# Patient Record
Sex: FEMALE | Race: OTHER | NOT HISPANIC OR LATINO | Employment: OTHER | URBAN - METROPOLITAN AREA
[De-identification: names, ages, dates, MRNs, and addresses within clinical notes are randomized per-mention and may not be internally consistent; named-entity substitution may affect disease eponyms.]

---

## 2017-02-20 ENCOUNTER — HOSPITAL ENCOUNTER (EMERGENCY)
Facility: HOSPITAL | Age: 67
Discharge: HOME | End: 2017-02-21
Attending: EMERGENCY MEDICINE
Payer: MEDICARE

## 2017-02-20 DIAGNOSIS — R10.13 EPIGASTRIC ABDOMINAL PAIN: Primary | ICD-10-CM

## 2017-02-20 DIAGNOSIS — I10 POORLY-CONTROLLED HYPERTENSION: ICD-10-CM

## 2017-02-20 DIAGNOSIS — Z91.148 H/O MEDICATION NONCOMPLIANCE: ICD-10-CM

## 2017-02-20 LAB
ALBUMIN SERPL BCP-MCNC: 3.8 G/DL
ALP SERPL-CCNC: 127 U/L
ALT SERPL W/O P-5'-P-CCNC: 45 U/L
ANION GAP SERPL CALC-SCNC: 9 MMOL/L
AST SERPL-CCNC: 37 U/L
BASOPHILS # BLD AUTO: 0.03 K/UL
BASOPHILS NFR BLD: 0.4 %
BILIRUB SERPL-MCNC: 0.5 MG/DL
BNP SERPL-MCNC: 36 PG/ML
BUN SERPL-MCNC: 10 MG/DL
CALCIUM SERPL-MCNC: 9.3 MG/DL
CHLORIDE SERPL-SCNC: 100 MMOL/L
CO2 SERPL-SCNC: 28 MMOL/L
CREAT SERPL-MCNC: 1.2 MG/DL
DIFFERENTIAL METHOD: NORMAL
EOSINOPHIL # BLD AUTO: 0.3 K/UL
EOSINOPHIL NFR BLD: 3.1 %
ERYTHROCYTE [DISTWIDTH] IN BLOOD BY AUTOMATED COUNT: 14 %
EST. GFR  (AFRICAN AMERICAN): 55 ML/MIN/1.73 M^2
EST. GFR  (NON AFRICAN AMERICAN): 48 ML/MIN/1.73 M^2
GLUCOSE SERPL-MCNC: 222 MG/DL
HCT VFR BLD AUTO: 43 %
HGB BLD-MCNC: 14.2 G/DL
INR PPP: 1
LYMPHOCYTES # BLD AUTO: 2.6 K/UL
LYMPHOCYTES NFR BLD: 30.9 %
MCH RBC QN AUTO: 28.1 PG
MCHC RBC AUTO-ENTMCNC: 33 %
MCV RBC AUTO: 85 FL
MONOCYTES # BLD AUTO: 0.5 K/UL
MONOCYTES NFR BLD: 5.8 %
NEUTROPHILS # BLD AUTO: 5.1 K/UL
NEUTROPHILS NFR BLD: 59.8 %
PLATELET # BLD AUTO: 281 K/UL
PMV BLD AUTO: 11 FL
POCT GLUCOSE: 216 MG/DL (ref 70–110)
POTASSIUM SERPL-SCNC: 4.5 MMOL/L
PROT SERPL-MCNC: 8.2 G/DL
PROTHROMBIN TIME: 10.7 SEC
RBC # BLD AUTO: 5.05 M/UL
SODIUM SERPL-SCNC: 137 MMOL/L
TROPONIN I SERPL DL<=0.01 NG/ML-MCNC: 0.01 NG/ML
WBC # BLD AUTO: 8.52 K/UL

## 2017-02-20 PROCEDURE — 82962 GLUCOSE BLOOD TEST: CPT

## 2017-02-20 PROCEDURE — 96374 THER/PROPH/DIAG INJ IV PUSH: CPT

## 2017-02-20 PROCEDURE — 84484 ASSAY OF TROPONIN QUANT: CPT

## 2017-02-20 PROCEDURE — 83880 ASSAY OF NATRIURETIC PEPTIDE: CPT

## 2017-02-20 PROCEDURE — 63600175 PHARM REV CODE 636 W HCPCS: Performed by: EMERGENCY MEDICINE

## 2017-02-20 PROCEDURE — 80053 COMPREHEN METABOLIC PANEL: CPT

## 2017-02-20 PROCEDURE — 96375 TX/PRO/DX INJ NEW DRUG ADDON: CPT

## 2017-02-20 PROCEDURE — 85610 PROTHROMBIN TIME: CPT

## 2017-02-20 PROCEDURE — 93010 ELECTROCARDIOGRAM REPORT: CPT | Mod: ,,, | Performed by: INTERNAL MEDICINE

## 2017-02-20 PROCEDURE — 25000003 PHARM REV CODE 250: Performed by: EMERGENCY MEDICINE

## 2017-02-20 PROCEDURE — 99284 EMERGENCY DEPT VISIT MOD MDM: CPT | Mod: 25

## 2017-02-20 PROCEDURE — 93005 ELECTROCARDIOGRAM TRACING: CPT

## 2017-02-20 PROCEDURE — 85025 COMPLETE CBC W/AUTO DIFF WBC: CPT

## 2017-02-20 RX ORDER — FAMOTIDINE 10 MG/ML
20 INJECTION INTRAVENOUS 2 TIMES DAILY
Status: DISCONTINUED | OUTPATIENT
Start: 2017-02-20 | End: 2017-02-20

## 2017-02-20 RX ORDER — AMLODIPINE BESYLATE 10 MG/1
10 TABLET ORAL DAILY
Qty: 30 TABLET | Refills: 0 | Status: ON HOLD | OUTPATIENT
Start: 2017-02-20 | End: 2017-07-04 | Stop reason: HOSPADM

## 2017-02-20 RX ORDER — HYDRALAZINE HYDROCHLORIDE 20 MG/ML
10 INJECTION INTRAMUSCULAR; INTRAVENOUS
Status: DISCONTINUED | OUTPATIENT
Start: 2017-02-20 | End: 2017-02-21

## 2017-02-20 RX ORDER — AMLODIPINE BESYLATE 10 MG/1
10 TABLET ORAL DAILY
Qty: 30 TABLET | Refills: 0 | Status: SHIPPED | OUTPATIENT
Start: 2017-02-20 | End: 2017-02-20

## 2017-02-20 RX ORDER — NITROGLYCERIN 0.4 MG/1
0.4 TABLET SUBLINGUAL EVERY 5 MIN PRN
Status: DISCONTINUED | OUTPATIENT
Start: 2017-02-20 | End: 2017-02-21 | Stop reason: HOSPADM

## 2017-02-20 RX ORDER — FAMOTIDINE 10 MG/ML
20 INJECTION INTRAVENOUS ONCE
Status: COMPLETED | OUTPATIENT
Start: 2017-02-20 | End: 2017-02-20

## 2017-02-20 RX ORDER — ASPIRIN 325 MG
325 TABLET ORAL
Status: COMPLETED | OUTPATIENT
Start: 2017-02-20 | End: 2017-02-20

## 2017-02-20 RX ORDER — MORPHINE SULFATE 2 MG/ML
6 INJECTION, SOLUTION INTRAMUSCULAR; INTRAVENOUS
Status: COMPLETED | OUTPATIENT
Start: 2017-02-20 | End: 2017-02-20

## 2017-02-20 RX ORDER — ONDANSETRON 4 MG/1
4 TABLET, ORALLY DISINTEGRATING ORAL EVERY 8 HOURS PRN
Qty: 15 TABLET | Refills: 0 | Status: SHIPPED | OUTPATIENT
Start: 2017-02-20 | End: 2017-02-27

## 2017-02-20 RX ORDER — OMEPRAZOLE 20 MG/1
20 CAPSULE, DELAYED RELEASE ORAL DAILY
Qty: 30 CAPSULE | Refills: 0 | Status: ON HOLD | OUTPATIENT
Start: 2017-02-20 | End: 2017-07-04 | Stop reason: HOSPADM

## 2017-02-20 RX ORDER — HYDRALAZINE HYDROCHLORIDE 20 MG/ML
10 INJECTION INTRAMUSCULAR; INTRAVENOUS
Status: COMPLETED | OUTPATIENT
Start: 2017-02-20 | End: 2017-02-20

## 2017-02-20 RX ADMIN — ASPIRIN 325 MG ORAL TABLET 325 MG: 325 PILL ORAL at 08:02

## 2017-02-20 RX ADMIN — FAMOTIDINE 20 MG: 10 INJECTION INTRAVENOUS at 08:02

## 2017-02-20 RX ADMIN — LIDOCAINE HYDROCHLORIDE: 20 SOLUTION ORAL; TOPICAL at 08:02

## 2017-02-20 RX ADMIN — HYDRALAZINE HYDROCHLORIDE 10 MG: 20 INJECTION INTRAMUSCULAR; INTRAVENOUS at 10:02

## 2017-02-20 RX ADMIN — MORPHINE SULFATE 6 MG: 2 INJECTION, SOLUTION INTRAMUSCULAR; INTRAVENOUS at 11:02

## 2017-02-20 NOTE — ED AVS SNAPSHOT
[facility]  [address]  [place] [address]               Eliza Louie   2017  7:29 PM   ED    Description:  Female : 1950   Department:  [facility]           Your Care was Coordinated By:     Provider Role From To    Erik Evans MD Attending Provider 17 --      Reason for Visit     Abdominal Pain           Diagnoses this Visit        Comments    Epigastric abdominal pain    -  Primary     Poorly-controlled hypertension         H/O medication noncompliance           ED Disposition     None           To Do List           Follow-up Information     Follow up with [facility]. Call in 3 days.    Specialty:  Family Medicine    Contact information:    [address] [address]  [phone]         These Medications        Disp Refills Start End    omeprazole (PRILOSEC) 20 MG capsule 30 capsule 0 2017    Take 1 capsule (20 mg total) by mouth once daily. - Oral    ondansetron (ZOFRAN ODT) 4 MG TbDL 15 tablet 0 2017    Take 1 tablet (4 mg total) by mouth every 8 (eight) hours as needed (nausea/vomiting). - Oral    amlodipine (NORVASC) 10 MG tablet 30 tablet 0 2017    Take 1 tablet (10 mg total) by mouth once daily. - Oral      [facility]     [facility] On Call Nurse Care Line -  Assistance  Registered nurses in the [facility] provide clinical advisement, health education, appointment booking, and other advisory services.  Call for this free service at [phone].             Medications           Message regarding Medications     Verify the changes and/or additions to your medication regime listed below are the same as discussed with your clinician today.  If any of these changes or additions are incorrect, please notify your healthcare provider.        START taking these NEW medications        Refills    omeprazole  (PRILOSEC) 20 MG capsule 0    Sig: Take 1 capsule (20 mg total) by mouth once daily.    Class: Print    Route: Oral    ondansetron (ZOFRAN ODT) 4 MG TbDL 0    Sig: Take 1 tablet (4 mg total) by mouth every 8 (eight) hours as needed (nausea/vomiting).    Class: Print    Route: Oral    amlodipine (NORVASC) 10 MG tablet 0    Sig: Take 1 tablet (10 mg total) by mouth once daily.    Class: Print    Route: Oral      These medications were administered today        Dose Freq    aspirin tablet 325 mg 325 mg ED 1 Time    Sig: Take 1 tablet (325 mg total) by mouth ED 1 Time.    Class: Normal    Route: Oral    (pyxis) gi cocktail (mylanta 30 mL, lidocaine 2 % viscous 10 mL, dicyclomine 10 mL) 50 mL  ED 1 Time    Sig: Take by mouth ED 1 Time.    Class: Normal    Route: Oral    nitroGLYCERIN SL tablet 0.4 mg 0.4 mg Every 5 min PRN    Sig: Place 1 tablet (0.4 mg total) under the tongue every 5 (five) minutes as needed for Chest pain.    Class: Normal    Route: Sublingual    famotidine (PF) 20 mg/2 mL injection 20 mg 20 mg Once    Sig: Inject 2 mLs (20 mg total) into the vein once.    Class: Normal    Route: Intravenous    hydrALAZINE injection 10 mg 10 mg ED 1 Time    Sig: Inject 0.5 mLs (10 mg total) into the vein ED 1 Time.    Class: Normal    Route: Intravenous           Verify that the below list of medications is an accurate representation of the medications you are currently taking.  If none reported, the list may be blank. If incorrect, please contact your healthcare provider. Carry this list with you in case of emergency.           Current Medications     amlodipine (NORVASC) 10 MG tablet Take 1 tablet (10 mg total) by mouth once daily.    hydrALAZINE injection 10 mg Inject 0.5 mLs (10 mg total) into the vein ED 1 Time.    nitroGLYCERIN SL tablet 0.4 mg Place 1 tablet (0.4 mg total) under the tongue every 5 (five) minutes as needed for Chest pain.    omeprazole (PRILOSEC) 20 MG capsule Take 1 capsule (20 mg total) by  "mouth once daily.    ondansetron (ZOFRAN ODT) 4 MG TbDL Take 1 tablet (4 mg total) by mouth every 8 (eight) hours as needed (nausea/vomiting).           Clinical Reference Information           Your Vitals Were     BP Pulse Temp Resp Height SpO2    189/78 66 98.6 °F (37 °C) (Oral) 20 5' 3" (1.6 m) 96%      Allergies as of 2/20/2017     No Known Allergies      Immunizations Administered on Date of Encounter - 2/20/2017     None      ED Micro, Lab, POCT     Start Ordered       Status Ordering Provider    02/20/17 2012 02/20/17 2012  CBC auto differential  STAT      Final result     02/20/17 2012 02/20/17 2012  Comprehensive metabolic panel  STAT      Final result     02/20/17 2012 02/20/17 2012  Protime-INR  STAT      Final result     02/20/17 2012 02/20/17 2012  Troponin I  Now then every 3 hours     Comments:  PLEASE REVIEW ORDER START TIME BEFORE MARKING SPECIMEN COLLECTED.   Start Status   02/20/17 2012 Final result   02/20/17 2312 Acknowledged       Acknowledged     02/20/17 2012 02/20/17 2012  B-Type natriuretic peptide (BNP)  STAT      Final result     02/20/17 1950 02/20/17 1950  POCT glucose  Once      Final result       ED Imaging Orders     Start Ordered       Status Ordering Provider    02/20/17 2012 02/20/17 2012  X-Ray Chest PA And Lateral  1 time imaging      Final result       Discharge References/Attachments     GASTRITIS OR ULCER (NO ANTIBIOTIC TREATMENT) (ENGLISH)      [facility] Sign-Up     Activating your [facility] account is as easy as 1-2-3!     1) Visit [email or web address], select Sign Up Now, enter this activation code and your date of birth, then select Next.  [email or web address]  Expires: 4/6/2017 10:21 PM      2) Create a username and password to use when you visit [facility] in the future and select a security question in case you lose your password and select Next.    3) Enter your e-mail address and click Sign Up!    Additional Information  If you have questions, please e-mail " [email or web address] or call [phone] to talk to our [facility] staff. Remember, [facility] is NOT to be used for urgent needs. For medical emergencies, dial 911.          [facility] complies with applicable Federal civil rights laws and does not discriminate on the basis of race, color, national origin, age, disability, or sex.        Language Assistance Services     ATTENTION: Language assistance services are available, free of charge. Please call [phone].      ATENCIÓN: Si habla español, tiene a coronado disposición servicios gratuitos de asistencia lingüística. Llame al [phone].     CHÚ Ý: N?u b?n nói Ti?ng Vi?t, có các d?ch v? h? tr? ngôn ng? mi?n phí dành cho b?n. G?i s? [phone].

## 2017-02-21 VITALS
DIASTOLIC BLOOD PRESSURE: 67 MMHG | TEMPERATURE: 99 F | OXYGEN SATURATION: 96 % | HEIGHT: 63 IN | SYSTOLIC BLOOD PRESSURE: 167 MMHG | RESPIRATION RATE: 22 BRPM | HEART RATE: 73 BPM

## 2017-02-21 NOTE — ED PROVIDER NOTES
Encounter Date: 2/20/2017       History     Chief Complaint   Patient presents with    Abdominal Pain     epigastric; accompanied by nausea; denies radiation; states was diagnosed with ulcer two years ago and thinks it came back;      Review of patient's allergies indicates:  No Known Allergies    HPI   Eliza Louie is a 66 y.o. female who has a past medical history of Diabetes mellitus and Hypertension who presents to the Emergency Department with epigastric pain. Symptoms began 3 days ago, gradual onset, dull, worsening this morning suddenly, now severe 10/10, nonradiating. Symptoms are associated with nausea, and are not associated with vomiting, diarrhea, chest pain, shortness of breath, back pain, fever, chills. Symptoms are aggravated by nothing, and relieved by nothing.  Patient did not take anything for her pain.  She does not relate any change with activity, PO intake or position. Patient has prior history of similar symptoms and diagnosed with ulcer. Pt has no past surgical history on file.      Past Medical History   Diagnosis Date    Diabetes mellitus     Hypertension      No past medical history pertinent negatives.  History reviewed. No pertinent past surgical history.  History reviewed. No pertinent family history.  Social History   Substance Use Topics    Smoking status: Never Smoker    Smokeless tobacco: None    Alcohol use No     Review of Systems   Constitutional: Negative for appetite change, fatigue and fever.   HENT: Negative for sore throat.    Eyes: Negative for photophobia.   Respiratory: Negative for shortness of breath.    Cardiovascular: Negative for chest pain and palpitations.   Gastrointestinal: Positive for abdominal pain and nausea. Negative for blood in stool, diarrhea and vomiting.   Genitourinary: Negative for difficulty urinating, dysuria, flank pain, pelvic pain, vaginal bleeding, vaginal discharge and vaginal pain.   Musculoskeletal: Negative for back pain.   Skin: Negative  for pallor.   Neurological: Negative for dizziness and light-headedness.   Hematological: Does not bruise/bleed easily.   Psychiatric/Behavioral: The patient is not nervous/anxious.    All other systems reviewed and are negative.      Physical Exam   Initial Vitals   BP Pulse Resp Temp SpO2   02/20/17 1823 02/20/17 1823 02/20/17 1823 02/20/17 1823 02/20/17 1823   257/117 82 20 98.6 °F (37 °C) 95 %     Physical Exam    Nursing note and vitals reviewed.  Constitutional: She appears well-developed and well-nourished. She is not diaphoretic. No distress.   HENT:   Head: Normocephalic and atraumatic.   Mouth/Throat: Oropharynx is clear and moist.   Eyes: Conjunctivae are normal. Pupils are equal, round, and reactive to light.   Neck: Neck supple.   Cardiovascular: Normal rate, regular rhythm and intact distal pulses.   No murmur heard.  Pulmonary/Chest: Breath sounds normal. No respiratory distress. She has no wheezes. She has no rhonchi. She has no rales. She exhibits no tenderness.   Abdominal: Soft. Bowel sounds are normal. She exhibits no distension. There is tenderness (epigastric, reproducible). There is guarding (voluntary).   Obesity   Musculoskeletal: Normal range of motion. She exhibits no edema or tenderness.   Neurological: She is alert and oriented to person, place, and time.   Skin: Skin is warm and dry. No rash noted.   Psychiatric: She has a normal mood and affect. Her behavior is normal. Judgment and thought content normal.         ED Course   Procedures  Labs Reviewed   COMPREHENSIVE METABOLIC PANEL - Abnormal; Notable for the following:        Result Value    Glucose 222 (*)     ALT 45 (*)     eGFR if  55 (*)     eGFR if non  48 (*)     All other components within normal limits    Narrative:     PLEASE REVIEW ORDER START TIME BEFORE MARKING SPECIMEN  COLLECTED.   POCT GLUCOSE - Abnormal; Notable for the following:     POCT Glucose 216 (*)     All other components within  normal limits   CBC W/ AUTO DIFFERENTIAL    Narrative:     PLEASE REVIEW ORDER START TIME BEFORE MARKING SPECIMEN  COLLECTED.   PROTIME-INR    Narrative:     PLEASE REVIEW ORDER START TIME BEFORE MARKING SPECIMEN  COLLECTED.   TROPONIN I    Narrative:     PLEASE REVIEW ORDER START TIME BEFORE MARKING SPECIMEN  COLLECTED.   B-TYPE NATRIURETIC PEPTIDE    Narrative:     PLEASE REVIEW ORDER START TIME BEFORE MARKING SPECIMEN  COLLECTED.     EKG Readings: (Independently Interpreted)   Initial Reading: No STEMI.   EKG: Normal sinus rhythm at 75 bpm, nl axis, nl intervals, no hypertrophy, T-wave inversions laterally as read by me.  No previous EKG for comparison.  Impression: Possible ischemia, abnormal EKG.          Medical Decision Making:   History:   Old Medical Records: I decided to obtain old medical records.  Old Records Summarized: other records.       <> Summary of Records: No previous charts for review  Initial Assessment:   66-year-old female presents emergently for epigastric pain ×3 days elevated blood pressure, abnormal EKG  Differential Diagnosis:   Gastritis, ulcer, ACS  Clinical Tests:   Lab Tests: Ordered and Reviewed  Radiological Study: Ordered and Reviewed  Medical Tests: Ordered and Reviewed  ED Management:  Cardiac workup including EKG and troponin, cardiac monitoring  GI cocktail, aspirin, nitroglycerin (as needed)              Attending Attestation:             Attending ED Notes:   Patient improved after GI cocktail, but still with mild to moderate pain.  Given morphine to which blood pressure responded nicely.  Do not believe the patient's blood pressures causing her symptoms but merely incidental due to medication compliance and pain.  Advised follow-up with GI physician within 1 week or rtn to ED for any concerns. Will give RX for norvasc for BP.          ED Course     Clinical Impression:   The primary encounter diagnosis was Epigastric abdominal pain. Diagnoses of Poorly-controlled  hypertension and H/O medication noncompliance were also pertinent to this visit.          Erik Evans MD  02/21/17 0452

## 2017-02-21 NOTE — ED NOTES
Pt sitting up in bed watching TV. NAD noted. Family at bedside. Will continue to monitor. Patient on cardiac monitor, automatic blood pressure cuff and pulse oximeter. Bed in low locked position with side rails up x 2.

## 2017-02-21 NOTE — ED NOTES
Patient identifiers verified and correct for Eliza Louie.    LOC: The patient is awake, alert and aware of environment with an appropriate affect, the patient is oriented x 3 and speaking appropriately.  APPEARANCE: Patient resting comfortably and in no acute distress  SKIN: The skin is warm and dry, color consistent with ethnicity, patient has normal skin turgor and moist mucus membranes, skin intact, no breakdown or bruising noted.  MUSCULOSKELETAL: Patient moving all extremities spontaneously, no obvious swelling or deformities noted.  RESPIRATORY: Airway is open and patent, respirations are spontaneous, patient has a normal effort and rate, no accessory muscle use noted, bilateral breath sounds CTA   CARDIAC: Patient has a normal rate and regular rhythm, no periphreal edema noted, capillary refill < 3 seconds. Pt reports reproducible epigastric chest pain    ABDOMEN: Soft and non tender to palpation, no distention noted, normoactive bowel sounds present in all four quadrants.

## 2017-07-02 ENCOUNTER — HOSPITAL ENCOUNTER (INPATIENT)
Facility: HOSPITAL | Age: 67
LOS: 3 days | Discharge: HOME | DRG: 377 | End: 2017-07-05
Attending: EMERGENCY MEDICINE | Admitting: INTERNAL MEDICINE
Payer: MEDICARE

## 2017-07-02 DIAGNOSIS — R10.13 EPIGASTRIC PAIN: ICD-10-CM

## 2017-07-02 DIAGNOSIS — I21.4 NSTEMI (NON-ST ELEVATED MYOCARDIAL INFARCTION): ICD-10-CM

## 2017-07-02 DIAGNOSIS — I16.1 HYPERTENSIVE EMERGENCY: ICD-10-CM

## 2017-07-02 DIAGNOSIS — K92.2 GASTROINTESTINAL HEMORRHAGE, UNSPECIFIED GASTROINTESTINAL HEMORRHAGE TYPE: Primary | ICD-10-CM

## 2017-07-02 DIAGNOSIS — K92.2 GASTROINTESTINAL HEMORRHAGE: ICD-10-CM

## 2017-07-02 DIAGNOSIS — R79.89 ELEVATED TROPONIN: ICD-10-CM

## 2017-07-02 DIAGNOSIS — R07.9 EXERTIONAL CHEST PAIN: ICD-10-CM

## 2017-07-02 DIAGNOSIS — I10 HTN (HYPERTENSION): ICD-10-CM

## 2017-07-02 DIAGNOSIS — K92.1 GASTROINTESTINAL HEMORRHAGE WITH MELENA: ICD-10-CM

## 2017-07-02 PROBLEM — R73.03 PREDIABETES: Status: ACTIVE | Noted: 2017-07-02

## 2017-07-02 PROBLEM — R06.00 DYSPNEA: Status: ACTIVE | Noted: 2017-07-02

## 2017-07-02 LAB
ABO + RH BLD: NORMAL
ALBUMIN SERPL BCP-MCNC: 3.4 G/DL
ALP SERPL-CCNC: 87 U/L
ALT SERPL W/O P-5'-P-CCNC: 44 U/L
AMYLASE SERPL-CCNC: 81 U/L
ANION GAP SERPL CALC-SCNC: 12 MMOL/L
AST SERPL-CCNC: 42 U/L
BASOPHILS # BLD AUTO: 0.05 K/UL
BASOPHILS NFR BLD: 0.7 %
BILIRUB SERPL-MCNC: 0.5 MG/DL
BLD GP AB SCN CELLS X3 SERPL QL: NORMAL
BNP SERPL-MCNC: 21 PG/ML
BUN SERPL-MCNC: 15 MG/DL
CALCIUM SERPL-MCNC: 9.1 MG/DL
CHLORIDE SERPL-SCNC: 100 MMOL/L
CO2 SERPL-SCNC: 25 MMOL/L
CREAT SERPL-MCNC: 1.1 MG/DL
D DIMER PPP IA.FEU-MCNC: 0.57 MG/L FEU
DIFFERENTIAL METHOD: ABNORMAL
EOSINOPHIL # BLD AUTO: 0.2 K/UL
EOSINOPHIL NFR BLD: 2.3 %
ERYTHROCYTE [DISTWIDTH] IN BLOOD BY AUTOMATED COUNT: 15.1 %
EST. GFR  (AFRICAN AMERICAN): >60 ML/MIN/1.73 M^2
EST. GFR  (NON AFRICAN AMERICAN): 53 ML/MIN/1.73 M^2
ESTIMATED AVG GLUCOSE: 214 MG/DL
GLUCOSE SERPL-MCNC: 235 MG/DL
HBA1C MFR BLD HPLC: 9.1 %
HCT VFR BLD AUTO: 28.9 %
HCT VFR BLD AUTO: 30.4 %
HCT VFR BLD AUTO: 30.4 %
HGB BLD-MCNC: 10.1 G/DL
HGB BLD-MCNC: 10.2 G/DL
HGB BLD-MCNC: 9.5 G/DL
LIPASE SERPL-CCNC: 91 U/L
LYMPHOCYTES # BLD AUTO: 3.3 K/UL
LYMPHOCYTES NFR BLD: 43.3 %
MCH RBC QN AUTO: 28.4 PG
MCHC RBC AUTO-ENTMCNC: 33.6 %
MCV RBC AUTO: 85 FL
MONOCYTES # BLD AUTO: 0.6 K/UL
MONOCYTES NFR BLD: 7.7 %
NEUTROPHILS # BLD AUTO: 3.4 K/UL
NEUTROPHILS NFR BLD: 45.3 %
OB PNL STL: POSITIVE
PLATELET # BLD AUTO: 321 K/UL
PMV BLD AUTO: 9.9 FL
POCT GLUCOSE: 237 MG/DL (ref 70–110)
POCT GLUCOSE: 286 MG/DL (ref 70–110)
POCT GLUCOSE: 325 MG/DL (ref 70–110)
POTASSIUM SERPL-SCNC: 3.2 MMOL/L
PROT SERPL-MCNC: 7.3 G/DL
RBC # BLD AUTO: 3.59 M/UL
SODIUM SERPL-SCNC: 137 MMOL/L
TROPONIN I SERPL DL<=0.01 NG/ML-MCNC: 0.05 NG/ML
TROPONIN I SERPL DL<=0.01 NG/ML-MCNC: 1.15 NG/ML
TSH SERPL DL<=0.005 MIU/L-ACNC: 1.56 UIU/ML
WBC # BLD AUTO: 7.53 K/UL

## 2017-07-02 PROCEDURE — 83036 HEMOGLOBIN GLYCOSYLATED A1C: CPT

## 2017-07-02 PROCEDURE — 93005 ELECTROCARDIOGRAM TRACING: CPT

## 2017-07-02 PROCEDURE — 63600175 PHARM REV CODE 636 W HCPCS: Performed by: FAMILY MEDICINE

## 2017-07-02 PROCEDURE — 63600175 PHARM REV CODE 636 W HCPCS: Performed by: EMERGENCY MEDICINE

## 2017-07-02 PROCEDURE — 85025 COMPLETE CBC W/AUTO DIFF WBC: CPT

## 2017-07-02 PROCEDURE — 80053 COMPREHEN METABOLIC PANEL: CPT

## 2017-07-02 PROCEDURE — 96376 TX/PRO/DX INJ SAME DRUG ADON: CPT

## 2017-07-02 PROCEDURE — 84484 ASSAY OF TROPONIN QUANT: CPT | Mod: 91

## 2017-07-02 PROCEDURE — 85379 FIBRIN DEGRADATION QUANT: CPT

## 2017-07-02 PROCEDURE — 82272 OCCULT BLD FECES 1-3 TESTS: CPT

## 2017-07-02 PROCEDURE — 86850 RBC ANTIBODY SCREEN: CPT

## 2017-07-02 PROCEDURE — 86900 BLOOD TYPING SEROLOGIC ABO: CPT

## 2017-07-02 PROCEDURE — 25000003 PHARM REV CODE 250: Performed by: FAMILY MEDICINE

## 2017-07-02 PROCEDURE — 85018 HEMOGLOBIN: CPT

## 2017-07-02 PROCEDURE — 25500020 PHARM REV CODE 255: Performed by: EMERGENCY MEDICINE

## 2017-07-02 PROCEDURE — 85014 HEMATOCRIT: CPT

## 2017-07-02 PROCEDURE — 96365 THER/PROPH/DIAG IV INF INIT: CPT

## 2017-07-02 PROCEDURE — 93010 ELECTROCARDIOGRAM REPORT: CPT | Mod: 77,,, | Performed by: INTERNAL MEDICINE

## 2017-07-02 PROCEDURE — 96366 THER/PROPH/DIAG IV INF ADDON: CPT

## 2017-07-02 PROCEDURE — 36415 COLL VENOUS BLD VENIPUNCTURE: CPT

## 2017-07-02 PROCEDURE — 93010 ELECTROCARDIOGRAM REPORT: CPT | Mod: ,,, | Performed by: INTERNAL MEDICINE

## 2017-07-02 PROCEDURE — C9113 INJ PANTOPRAZOLE SODIUM, VIA: HCPCS | Performed by: FAMILY MEDICINE

## 2017-07-02 PROCEDURE — 82962 GLUCOSE BLOOD TEST: CPT

## 2017-07-02 PROCEDURE — 99285 EMERGENCY DEPT VISIT HI MDM: CPT | Mod: 25

## 2017-07-02 PROCEDURE — 82150 ASSAY OF AMYLASE: CPT

## 2017-07-02 PROCEDURE — 83880 ASSAY OF NATRIURETIC PEPTIDE: CPT

## 2017-07-02 PROCEDURE — 96375 TX/PRO/DX INJ NEW DRUG ADDON: CPT

## 2017-07-02 PROCEDURE — 84443 ASSAY THYROID STIM HORMONE: CPT

## 2017-07-02 PROCEDURE — 94761 N-INVAS EAR/PLS OXIMETRY MLT: CPT

## 2017-07-02 PROCEDURE — 21400001 HC TELEMETRY ROOM

## 2017-07-02 PROCEDURE — 25000003 PHARM REV CODE 250: Performed by: EMERGENCY MEDICINE

## 2017-07-02 PROCEDURE — 36430 TRANSFUSION BLD/BLD COMPNT: CPT

## 2017-07-02 PROCEDURE — 83690 ASSAY OF LIPASE: CPT

## 2017-07-02 PROCEDURE — 86920 COMPATIBILITY TEST SPIN: CPT

## 2017-07-02 PROCEDURE — 25000003 PHARM REV CODE 250: Performed by: STUDENT IN AN ORGANIZED HEALTH CARE EDUCATION/TRAINING PROGRAM

## 2017-07-02 PROCEDURE — 99223 1ST HOSP IP/OBS HIGH 75: CPT | Mod: ,,, | Performed by: INTERNAL MEDICINE

## 2017-07-02 RX ORDER — HYDROCODONE BITARTRATE AND ACETAMINOPHEN 500; 5 MG/1; MG/1
TABLET ORAL
Status: DISCONTINUED | OUTPATIENT
Start: 2017-07-02 | End: 2017-07-05 | Stop reason: HOSPADM

## 2017-07-02 RX ORDER — NITROGLYCERIN 0.4 MG/1
0.4 TABLET SUBLINGUAL
Status: COMPLETED | OUTPATIENT
Start: 2017-07-02 | End: 2017-07-02

## 2017-07-02 RX ORDER — HYDRALAZINE HYDROCHLORIDE 20 MG/ML
10 INJECTION INTRAMUSCULAR; INTRAVENOUS
Status: COMPLETED | OUTPATIENT
Start: 2017-07-02 | End: 2017-07-02

## 2017-07-02 RX ORDER — HYDROMORPHONE HYDROCHLORIDE 1 MG/ML
1 INJECTION, SOLUTION INTRAMUSCULAR; INTRAVENOUS; SUBCUTANEOUS
Status: DISCONTINUED | OUTPATIENT
Start: 2017-07-02 | End: 2017-07-02

## 2017-07-02 RX ORDER — IBUPROFEN 200 MG
16 TABLET ORAL
Status: DISCONTINUED | OUTPATIENT
Start: 2017-07-02 | End: 2017-07-05 | Stop reason: HOSPADM

## 2017-07-02 RX ORDER — POTASSIUM CHLORIDE 7.45 MG/ML
10 INJECTION INTRAVENOUS ONCE
Status: COMPLETED | OUTPATIENT
Start: 2017-07-02 | End: 2017-07-02

## 2017-07-02 RX ORDER — SODIUM CHLORIDE 9 MG/ML
INJECTION, SOLUTION INTRAVENOUS CONTINUOUS
Status: DISCONTINUED | OUTPATIENT
Start: 2017-07-02 | End: 2017-07-05 | Stop reason: HOSPADM

## 2017-07-02 RX ORDER — ACETAMINOPHEN 325 MG/1
650 TABLET ORAL ONCE
Status: COMPLETED | OUTPATIENT
Start: 2017-07-02 | End: 2017-07-02

## 2017-07-02 RX ORDER — INSULIN ASPART 100 [IU]/ML
0-5 INJECTION, SOLUTION INTRAVENOUS; SUBCUTANEOUS EVERY 6 HOURS PRN
Status: DISCONTINUED | OUTPATIENT
Start: 2017-07-02 | End: 2017-07-05 | Stop reason: HOSPADM

## 2017-07-02 RX ORDER — GLUCAGON 1 MG
1 KIT INJECTION
Status: DISCONTINUED | OUTPATIENT
Start: 2017-07-02 | End: 2017-07-05 | Stop reason: HOSPADM

## 2017-07-02 RX ORDER — MORPHINE SULFATE 2 MG/ML
6 INJECTION, SOLUTION INTRAMUSCULAR; INTRAVENOUS
Status: COMPLETED | OUTPATIENT
Start: 2017-07-02 | End: 2017-07-02

## 2017-07-02 RX ORDER — FAMOTIDINE 10 MG/ML
20 INJECTION INTRAVENOUS 2 TIMES DAILY
Status: DISCONTINUED | OUTPATIENT
Start: 2017-07-02 | End: 2017-07-02

## 2017-07-02 RX ORDER — FAMOTIDINE 10 MG/ML
20 INJECTION INTRAVENOUS
Status: COMPLETED | OUTPATIENT
Start: 2017-07-02 | End: 2017-07-02

## 2017-07-02 RX ORDER — AMLODIPINE BESYLATE 5 MG/1
10 TABLET ORAL DAILY
Status: DISCONTINUED | OUTPATIENT
Start: 2017-07-02 | End: 2017-07-05 | Stop reason: HOSPADM

## 2017-07-02 RX ORDER — IBUPROFEN 200 MG
24 TABLET ORAL
Status: DISCONTINUED | OUTPATIENT
Start: 2017-07-02 | End: 2017-07-05 | Stop reason: HOSPADM

## 2017-07-02 RX ORDER — ONDANSETRON 2 MG/ML
4 INJECTION INTRAMUSCULAR; INTRAVENOUS
Status: COMPLETED | OUTPATIENT
Start: 2017-07-02 | End: 2017-07-02

## 2017-07-02 RX ORDER — HYDRALAZINE HYDROCHLORIDE 20 MG/ML
10 INJECTION INTRAMUSCULAR; INTRAVENOUS
Status: DISCONTINUED | OUTPATIENT
Start: 2017-07-02 | End: 2017-07-02

## 2017-07-02 RX ORDER — ASPIRIN 325 MG
325 TABLET ORAL
Status: COMPLETED | OUTPATIENT
Start: 2017-07-02 | End: 2017-07-02

## 2017-07-02 RX ORDER — METOCLOPRAMIDE HYDROCHLORIDE 5 MG/ML
5 INJECTION INTRAMUSCULAR; INTRAVENOUS
Status: DISCONTINUED | OUTPATIENT
Start: 2017-07-02 | End: 2017-07-02

## 2017-07-02 RX ADMIN — FAMOTIDINE 20 MG: 10 INJECTION, SOLUTION INTRAVENOUS at 05:07

## 2017-07-02 RX ADMIN — DEXTROSE 8 MG/HR: 50 INJECTION, SOLUTION INTRAVENOUS at 06:07

## 2017-07-02 RX ADMIN — ACETAMINOPHEN 650 MG: 325 TABLET ORAL at 08:07

## 2017-07-02 RX ADMIN — AMLODIPINE BESYLATE 10 MG: 5 TABLET ORAL at 10:07

## 2017-07-02 RX ADMIN — MORPHINE SULFATE 6 MG: 2 INJECTION, SOLUTION INTRAMUSCULAR; INTRAVENOUS at 05:07

## 2017-07-02 RX ADMIN — DEXTROSE 8 MG/HR: 50 INJECTION, SOLUTION INTRAVENOUS at 03:07

## 2017-07-02 RX ADMIN — HYDRALAZINE HYDROCHLORIDE 10 MG: 20 INJECTION INTRAMUSCULAR; INTRAVENOUS at 06:07

## 2017-07-02 RX ADMIN — DEXTROSE 8 MG/HR: 50 INJECTION, SOLUTION INTRAVENOUS at 08:07

## 2017-07-02 RX ADMIN — ONDANSETRON 4 MG: 2 INJECTION INTRAMUSCULAR; INTRAVENOUS at 05:07

## 2017-07-02 RX ADMIN — POTASSIUM CHLORIDE 10 MEQ: 10 INJECTION, SOLUTION INTRAVENOUS at 10:07

## 2017-07-02 RX ADMIN — IOHEXOL 100 ML: 350 INJECTION, SOLUTION INTRAVENOUS at 06:07

## 2017-07-02 RX ADMIN — SODIUM CHLORIDE: 0.9 INJECTION, SOLUTION INTRAVENOUS at 03:07

## 2017-07-02 RX ADMIN — HYDRALAZINE HYDROCHLORIDE 10 MG: 20 INJECTION INTRAMUSCULAR; INTRAVENOUS at 05:07

## 2017-07-02 RX ADMIN — ASPIRIN 325 MG ORAL TABLET 325 MG: 325 PILL ORAL at 04:07

## 2017-07-02 RX ADMIN — NITROGLYCERIN 0.4 MG: 0.4 TABLET SUBLINGUAL at 04:07

## 2017-07-02 RX ADMIN — DEXTROSE 8 MG/HR: 50 INJECTION, SOLUTION INTRAVENOUS at 10:07

## 2017-07-02 NOTE — NURSING
Pt arrived to unit via wheelchair from ED. Pt is AAOx4. She is room air. Telemetry applied on. Fall risk band applied. Complaints of headache. No lightheadedness or SOB reported. SCDs are on. Bed in lowest position. Bed alarm set. Pt instructed to call for assistance. VS documented refer to flowsheet. Will continue to monitor.

## 2017-07-02 NOTE — CONSULTS
[facility]  Gastroenterology  Consult Note    Patient Name: Eliza Louie  MRN: [id number]  Admission Date: 7/2/2017  Hospital Length of Stay: 0 days  Code Status: Full Code   Attending Provider: No att. providers found   Consulting Provider: Jethro Gorman Jr, MD  Primary Care Physician: Primary Doctor No  Principal Problem:Gastrointestinal hemorrhage    Consults  Subjective:     HPI:  67 yo female admitted with a two-week history of epigastric pain, intermittent nausea vomiting.  Describes episodes of dark tarry stools several days ago.  Indicates she is unable to tolerate much oral intake due to epigastric discomfort.    Describes a prior history of upper endoscopy for anemia at [facility] in 2014.  Clinical details are unclear but it sounds as though she had peptic ulcer disease.  She indicates she was transfused several units of packed red blood cells.  Also indicates she had a cholecystectomy in 2009 by Dr. Reagan.  She does have dyspnea on exertion.  She given various descriptions of her pain being epigastric or chest.  In my history she predominantly describes epigastric pain.  Denies NSAID use.  Nondrinker.  Family history apparently negative for GI neoplasm.  No prior colonoscopy.  Initial evaluation thus far: CT angiogram of the chest was negative for pulmonary embolus.  One troponin was elevated at 0.46.  Hematocrit 30 with a normal MCV the.  B naturetic peptide ,normal    Past medical history includes: HTN, peptic ulcer, and prediabetes    Past Medical History:   Diagnosis Date    Diabetes mellitus     Hypertension        History reviewed. No pertinent surgical history.    Review of patient's allergies indicates:  No Known Allergies  Family History     None        Social History Main Topics    Smoking status: Never Smoker    Smokeless tobacco: Not on file    Alcohol use No    Drug use: No    Sexual activity: Not on file     Review of Systems   Constitutional: Positive for  activity change and fatigue. Negative for appetite change, chills, fever and unexpected weight change.   HENT: Negative for congestion, ear pain, nosebleeds, sinus pressure, sore throat and voice change.    Eyes: Negative for pain.   Respiratory: Positive for shortness of breath. Negative for cough and wheezing.         Some dyspnea on exertion   Cardiovascular: Negative for chest pain, palpitations and leg swelling.   Genitourinary: Negative for difficulty urinating, dysuria, frequency and hematuria.   Musculoskeletal: Positive for back pain. Negative for joint swelling.   Skin: Negative for rash.   Allergic/Immunologic: Negative for immunocompromised state.   Neurological: Positive for weakness and light-headedness. Negative for seizures, numbness and headaches.   Hematological: Negative for adenopathy. Does not bruise/bleed easily.   Psychiatric/Behavioral: Negative for confusion and dysphoric mood. The patient is not nervous/anxious.      Objective:     Vital Signs (Most Recent):  Temp: 98.1 °F (36.7 °C) (07/02/17 1200)  Pulse: 96 (07/02/17 1300)  Resp: 20 (07/02/17 1200)  BP: (!) 175/83 (07/02/17 1200)  SpO2: 98 % (07/02/17 1525) Vital Signs (24h Range):  Temp:  [98.1 °F (36.7 °C)-98.4 °F (36.9 °C)] 98.1 °F (36.7 °C)  Pulse:  [] 96  Resp:  [16-26] 20  SpO2:  [96 %-99 %] 98 %  BP: (157-223)/() 175/83     Weight: 90.7 kg (200 lb) (07/02/17 0950)  Body mass index is 35.43 kg/m².    No intake or output data in the 24 hours ending 07/02/17 1529    Lines/Drains/Airways     Peripheral Intravenous Line                 Peripheral IV - Single Lumen 07/02/17 0452 Right Antecubital less than 1 day         Peripheral IV - Single Lumen 07/02/17 0620 Left Antecubital less than 1 day                Physical Exam   Constitutional: She is oriented to person, place, and time. She appears well-developed and well-nourished. No distress.   HENT:   Head: Normocephalic and atraumatic.   Nose: Nose normal.   Eyes:  Conjunctivae and EOM are normal. Pupils are equal, round, and reactive to light. No scleral icterus.   Neck: Neck supple.   Cardiovascular: Normal rate and regular rhythm.  Exam reveals no gallop.    No murmur heard.  Pulmonary/Chest: Effort normal and breath sounds normal. No stridor. No respiratory distress. She has no wheezes. She has no rales.   Abdominal: Soft. Bowel sounds are normal. She exhibits no distension and no mass. There is no tenderness. There is no rebound.   Musculoskeletal: She exhibits no edema or tenderness.   Lymphadenopathy:     She has no cervical adenopathy.   Neurological: She is alert and oriented to person, place, and time. No cranial nerve deficit. Coordination normal.   Skin: Skin is warm and dry. No rash noted. She is not diaphoretic. No erythema.   Psychiatric: She has a normal mood and affect. Her behavior is normal.       Significant Labs:  Amylase:   Recent Labs  Lab 07/02/17  0453   AMYLASE 81     CBC:   Recent Labs  Lab 07/02/17  0453 07/02/17  1025   WBC 7.53  --    HGB 10.2* 10.1*   HCT 30.4* 30.4*     --      BMP:   Recent Labs  Lab 07/02/17  0453   *      K 3.2*      CO2 25   BUN 15   CREATININE 1.1   CALCIUM 9.1     CMP:   Recent Labs  Lab 07/02/17  0453   *   CALCIUM 9.1   ALBUMIN 3.4*   PROT 7.3      K 3.2*   CO2 25      BUN 15   CREATININE 1.1   ALKPHOS 87   ALT 44   AST 42*   BILITOT 0.5     Coagulation: No results for input(s): INR, APTT in the last 48 hours.    Invalid input(s): PT    Significant Imaging:  Imaging results within the past 24 hours have been reviewed.    Assessment/Plan:     * Gastrointestinal hemorrhage    Impression:  History of nausea vomiting and melena.  Epigastric pain  Hemoccult positive stool  History of prior ulcer disease    Plan:  Continue PPI  Follow H&H  Gastroscopy in a.m.              Thank you for your consult. I will follow-up with patient. Please contact us if you have any additional  questions.    Jethro Gorman Jr, MD  Gastroenterology  [facility]

## 2017-07-02 NOTE — ASSESSMENT & PLAN NOTE
H/H in the ED 10.2/30.4 baseline 14.2/43  Follow h/h every 6 hours  Occult blood: positive  2 bore IV access  Type and screen  3 units of RBC prepared  [facility] consulted.   Consent obtained if needed to transfuse  IV Protonix given   Lipase : 91  Amylase: 81

## 2017-07-02 NOTE — SUBJECTIVE & OBJECTIVE
Past Medical History:   Diagnosis Date    Diabetes mellitus     Hypertension        History reviewed. No pertinent surgical history.    Review of patient's allergies indicates:  No Known Allergies  Family History     None        Social History Main Topics    Smoking status: Never Smoker    Smokeless tobacco: Not on file    Alcohol use No    Drug use: No    Sexual activity: Not on file     Review of Systems   Constitutional: Positive for activity change and fatigue. Negative for appetite change, chills, fever and unexpected weight change.   HENT: Negative for congestion, ear pain, nosebleeds, sinus pressure, sore throat and voice change.    Eyes: Negative for pain.   Respiratory: Positive for shortness of breath. Negative for cough and wheezing.         Some dyspnea on exertion   Cardiovascular: Negative for chest pain, palpitations and leg swelling.   Genitourinary: Negative for difficulty urinating, dysuria, frequency and hematuria.   Musculoskeletal: Positive for back pain. Negative for joint swelling.   Skin: Negative for rash.   Allergic/Immunologic: Negative for immunocompromised state.   Neurological: Positive for weakness and light-headedness. Negative for seizures, numbness and headaches.   Hematological: Negative for adenopathy. Does not bruise/bleed easily.   Psychiatric/Behavioral: Negative for confusion and dysphoric mood. The patient is not nervous/anxious.      Objective:     Vital Signs (Most Recent):  Temp: 98.1 °F (36.7 °C) (07/02/17 1200)  Pulse: 96 (07/02/17 1300)  Resp: 20 (07/02/17 1200)  BP: (!) 175/83 (07/02/17 1200)  SpO2: 98 % (07/02/17 1525) Vital Signs (24h Range):  Temp:  [98.1 °F (36.7 °C)-98.4 °F (36.9 °C)] 98.1 °F (36.7 °C)  Pulse:  [] 96  Resp:  [16-26] 20  SpO2:  [96 %-99 %] 98 %  BP: (157-223)/() 175/83     Weight: 90.7 kg (200 lb) (07/02/17 0950)  Body mass index is 35.43 kg/m².    No intake or output data in the 24 hours ending 07/02/17  1529    Lines/Drains/Airways     Peripheral Intravenous Line                 Peripheral IV - Single Lumen 07/02/17 0452 Right Antecubital less than 1 day         Peripheral IV - Single Lumen 07/02/17 0620 Left Antecubital less than 1 day                Physical Exam   Constitutional: She is oriented to person, place, and time. She appears well-developed and well-nourished. No distress.   HENT:   Head: Normocephalic and atraumatic.   Nose: Nose normal.   Eyes: Conjunctivae and EOM are normal. Pupils are equal, round, and reactive to light. No scleral icterus.   Neck: Neck supple.   Cardiovascular: Normal rate and regular rhythm.  Exam reveals no gallop.    No murmur heard.  Pulmonary/Chest: Effort normal and breath sounds normal. No stridor. No respiratory distress. She has no wheezes. She has no rales.   Abdominal: Soft. Bowel sounds are normal. She exhibits no distension and no mass. There is no tenderness. There is no rebound.   Musculoskeletal: She exhibits no edema or tenderness.   Lymphadenopathy:     She has no cervical adenopathy.   Neurological: She is alert and oriented to person, place, and time. No cranial nerve deficit. Coordination normal.   Skin: Skin is warm and dry. No rash noted. She is not diaphoretic. No erythema.   Psychiatric: She has a normal mood and affect. Her behavior is normal.       Significant Labs:  Amylase:   Recent Labs  Lab 07/02/17  0453   AMYLASE 81     CBC:   Recent Labs  Lab 07/02/17  0453 07/02/17  1025   WBC 7.53  --    HGB 10.2* 10.1*   HCT 30.4* 30.4*     --      BMP:   Recent Labs  Lab 07/02/17  0453   *      K 3.2*      CO2 25   BUN 15   CREATININE 1.1   CALCIUM 9.1     CMP:   Recent Labs  Lab 07/02/17  0453   *   CALCIUM 9.1   ALBUMIN 3.4*   PROT 7.3      K 3.2*   CO2 25      BUN 15   CREATININE 1.1   ALKPHOS 87   ALT 44   AST 42*   BILITOT 0.5     Coagulation: No results for input(s): INR, APTT in the last 48 hours.    Invalid  input(s): PT    Significant Imaging:  Imaging results within the past 24 hours have been reviewed.

## 2017-07-02 NOTE — SUBJECTIVE & OBJECTIVE
Past Medical History:   Diagnosis Date    Diabetes mellitus     Hypertension        History reviewed. No pertinent surgical history.    Review of patient's allergies indicates:  No Known Allergies    No current facility-administered medications on file prior to encounter.      Current Outpatient Prescriptions on File Prior to Encounter   Medication Sig    amlodipine (NORVASC) 10 MG tablet Take 1 tablet (10 mg total) by mouth once daily.    omeprazole (PRILOSEC) 20 MG capsule Take 1 capsule (20 mg total) by mouth once daily.     Family History     None        Social History Main Topics    Smoking status: Never Smoker    Smokeless tobacco: Not on file    Alcohol use No    Drug use: No    Sexual activity: Not on file     Review of Systems   HENT: Negative.    Respiratory: Positive for shortness of breath. Negative for cough, chest tightness and stridor.    Cardiovascular: Negative for chest pain, palpitations and leg swelling.   Gastrointestinal: Positive for abdominal pain, nausea and vomiting. Negative for blood in stool, constipation and diarrhea.   Genitourinary: Negative.    Musculoskeletal: Positive for back pain. Negative for gait problem and joint swelling.   Neurological: Positive for weakness.     Objective:     Vital Signs (Most Recent):  Temp: 98.4 °F (36.9 °C) (07/02/17 0425)  Pulse: 106 (07/02/17 0850)  Resp: 16 (07/02/17 0850)  BP: (!) 188/65 (07/02/17 0850)  SpO2: 99 % (07/02/17 0850) Vital Signs (24h Range):  Temp:  [98.4 °F (36.9 °C)] 98.4 °F (36.9 °C)  Pulse:  [] 106  Resp:  [16-26] 16  SpO2:  [96 %-99 %] 99 %  BP: (162-223)/() 188/65        There is no height or weight on file to calculate BMI.    Physical Exam   Constitutional: She is oriented to person, place, and time. She appears well-developed and well-nourished. No distress.   HENT:   Head: Normocephalic and atraumatic.   Nose: Nose normal.   Mouth/Throat: No oropharyngeal exudate.   Eyes: Conjunctivae are normal. Right  eye exhibits no discharge. Left eye exhibits no discharge.   Neck: Normal range of motion. Neck supple. No JVD present. No tracheal deviation present.   Cardiovascular: Normal rate, regular rhythm, normal heart sounds and intact distal pulses.  Exam reveals no gallop and no friction rub.    No murmur heard.  Pulmonary/Chest: Effort normal and breath sounds normal. No stridor. No respiratory distress. She has no wheezes. She has no rales. She exhibits no tenderness.   Abdominal: Soft. Bowel sounds are normal. She exhibits no distension and no mass. There is tenderness in the epigastric area. There is no guarding.   Musculoskeletal: Normal range of motion. She exhibits no edema, tenderness or deformity.   Neurological: She is alert and oriented to person, place, and time.   Skin: Skin is warm and dry. No rash noted. She is not diaphoretic. No erythema. No pallor.        Significant Labs:   CBC:   Recent Labs  Lab 07/02/17 0453   WBC 7.53   HGB 10.2*   HCT 30.4*        CMP:   Recent Labs  Lab 07/02/17 0453      K 3.2*      CO2 25   *   BUN 15   CREATININE 1.1   CALCIUM 9.1   PROT 7.3   ALBUMIN 3.4*   BILITOT 0.5   ALKPHOS 87   AST 42*   ALT 44   ANIONGAP 12   EGFRNONAA 53*     Cardiac Markers:   Recent Labs  Lab 07/02/17  0453   BNP 21       Significant Imaging: I have reviewed all pertinent imaging results/findings within the past 24 hours.

## 2017-07-02 NOTE — ASSESSMENT & PLAN NOTE
Initial trop: 0.052 and EKG NSR  Will trend trop and EKG x 3   BNP: 21  D- dimer: 0.57  CTA was done to r/o PE and dissection. Mild coronary artery atherosclerosis and thyroid nodules. Suspected cholelithiasis.

## 2017-07-02 NOTE — ED NOTES
I attempted to call report to [facility] and she could not take report and stated she would call me back in 5 minutes

## 2017-07-02 NOTE — ASSESSMENT & PLAN NOTE
Initial BP was 223/90  IV hydralazine 10 mg given x 2 and now BP is 157/80;s  Will restart home medication Amlodipine 10mg  Will continue to monitor BP.

## 2017-07-02 NOTE — ED TRIAGE NOTES
"Patient presents to the ED with reports of having intermittent nausea, vomiting and diarrhea x 2 weeks. States last diarrhea episode was x 1 week ago and now having only soft stool at this time. States having an epigastric pain that radiates to the back, shortness of breath that is worse on exertion, and generalized weakness. Denies any fever or chills. Patient states pain worsened tonight and is having multiple episodes of vomiting that patient describes as "dry" but with some bile. Hx of gallbladder removal and upper endoscopy. Patient also describes intermittent episodes of dark stools.     Review of patient's allergies indicates:  No Known Allergies     Patient has verified the spelling of their name and  on armband.   APPEARANCE: Patient is alert, oriented x 4, and is restless in appearance.  SKIN: Skin is normal for race, warm, and dry. Normal skin turgor and mucous membranes moist.  CARDIAC: Normal rate and rhythm, no murmur heard. +Chest pain.   RESPIRATORY:Normal rate and effort. Breath sounds clear bilaterally throughout chest. Respirations are equal and unlabored.    GASTRO: Bowel sounds normal, abdomen is soft, and no abdominal distention. +Epigastric tenderness. +N/V/D.   MUSCLE: Full ROM. No bony tenderness or soft tissue tenderness. No obvious deformity.  PERIPHERAL VASCULAR: peripheral pulses present. Normal cap refill. No edema. Warm to touch.  MENTAL STATUS: awake, alert and aware of environment.  ENT: EARS: no obvious drainage. NOSE: no active bleeding. THROAT: no redness or swelling.  "

## 2017-07-02 NOTE — HPI
67 yo female admitted with a two-week history of epigastric pain, intermittent nausea vomiting.  Describes episodes of dark tarry stools several days ago.  Indicates she is unable to tolerate much oral intake due to epigastric discomfort.    Describes a prior history of upper endoscopy for anemia at [facility] in 2014.  Clinical details are unclear but it sounds as though she had peptic ulcer disease.  She indicates she was transfused several units of packed red blood cells.  Also indicates she had a cholecystectomy in 2009 by Dr. Reagan.  She does have dyspnea on exertion.  She given various descriptions of her pain being epigastric or chest.  In my history she predominantly describes epigastric pain.  Denies NSAID use.  Nondrinker.  Family history apparently negative for GI neoplasm.  No prior colonoscopy.  Initial evaluation thus far: CT angiogram of the chest was negative for pulmonary embolus.  One troponin was elevated at 0.46.  Hematocrit 30 with a normal MCV the.  B naturetic peptide ,normal    Past medical history includes: HTN, peptic ulcer, and prediabetes

## 2017-07-02 NOTE — ASSESSMENT & PLAN NOTE
Impression:  History of nausea vomiting and melena.  Epigastric pain  Hemoccult positive stool  History of prior ulcer disease    Plan:  Continue PPI  Follow H&H  Gastroscopy in a.m.

## 2017-07-02 NOTE — NURSING
Call placed to Dr. Herron for GI consult. Left voicemail about pt information. Will continue to follow up and monitor.

## 2017-07-02 NOTE — HPI
65 yo female with pmhx of HTN, gastric ulcers, and prediabetes presented to the ED with nausea, vomiting, epigastric pain with SOB x 1 day. She reports having similar episode in the past and had an endoscopy done which showed that she had ulcers. When she had a similar episode last year she was vomiting blood, but this time it is mostly nausea. She experiences SOB only when she is exerting and whenever she has vomit and epigastric pain. The pain from the epigastric region radiates to the back. She reports not being able to sleep flat, and she sleeps on her sides. She has not had SOB otherwise. She denies any urinary symptoms or bowel movement symptoms. She has noticed that the past 2 weeks her stool has been dark and tarry but has been becoming brown now with no blood seen in the stool.

## 2017-07-02 NOTE — ASSESSMENT & PLAN NOTE
Pt has hx of prediabetes but unknown HgA1c.   HgA1c ordered  Glu initially 235, will continue to monitor  Low dose SSI

## 2017-07-02 NOTE — ED PROVIDER NOTES
Encounter Date: 7/2/2017       History     Chief Complaint   Patient presents with    Nausea     n.v.d x2 weeks. reports dark black tarry stool x 2 weeks. intermittent sharp epigastric pain that radiates to back with SOB. pt. dry heaving at present time.     66-year-old female presents to the emergency department with episode of chest pain, nausea. She has a history of high blood pressure, as well as GERD, and reports she feels like it's her GERD.  However she endorses that her chest pain gets worse with walking. She has some associated back pain when she vomits. She denies tearing ripping pain.  She denies numbness or tingling in her hands or legs.  She denies diarrhea, or abdominal cramping.  She denies burning with urination.      The history is provided by the patient.     Review of patient's allergies indicates:  No Known Allergies  Past Medical History:   Diagnosis Date    Diabetes mellitus     Hypertension      History reviewed. No pertinent surgical history.  History reviewed. No pertinent family history.  Social History   Substance Use Topics    Smoking status: Never Smoker    Smokeless tobacco: Not on file    Alcohol use No     Review of Systems   Cardiovascular: Positive for chest pain. Negative for palpitations and leg swelling.   Gastrointestinal: Positive for abdominal pain.   Endocrine: Negative.    Allergic/Immunologic: Negative.    All other systems reviewed and are negative.      Physical Exam     Initial Vitals [07/02/17 0425]   BP Pulse Resp Temp SpO2   (!) 197/109 105 19 98.4 °F (36.9 °C) 96 %      MAP       138.33         Physical Exam    Nursing note and vitals reviewed.  Constitutional: She appears well-developed and well-nourished. She appears distressed.   HENT:   Head: Normocephalic and atraumatic.   Right Ear: External ear normal.   Left Ear: External ear normal.   Eyes: Conjunctivae and EOM are normal. Pupils are equal, round, and reactive to light.   Neck: Normal range of motion.  Neck supple.   Cardiovascular: Normal rate, normal heart sounds and intact distal pulses.   Her blood pressure is 197-223/100's, pulses are symmetric and bounding bilaterally radial and femoral, DP pulses strong    She has some anterior chest wall tenderness/midepigastric tenderness   Pulmonary/Chest: Breath sounds normal. No respiratory distress.   Abdominal: Soft.   Musculoskeletal: Normal range of motion.   Neurological: She is alert and oriented to person, place, and time. She has normal strength. No cranial nerve deficit.   Skin: Skin is warm and dry.   No cyanosis or pallor, skin is warm   Psychiatric: She has a normal mood and affect. Her behavior is normal. Thought content normal.         ED Course   Critical Care  Date/Time: 7/2/2017 6:14 AM  Performed by: SARAH KHAN  Authorized by: SARAH KHAN   Direct patient critical care time: 7 minutes  Additional history critical care time: 7 minutes  Ordering / reviewing critical care time: 7 minutes  Documentation critical care time: 7 minutes  Consulting other physicians critical care time: 7 minutes  Total critical care time (exclusive of procedural time) : 35 minutes  Critical care time was exclusive of separately billable procedures and treating other patients and teaching time.  Critical care was necessary to treat or prevent imminent or life-threatening deterioration of the following conditions: cardiac failure.  Critical care was time spent personally by me on the following activities: interpretation of cardiac output measurements, evaluation of patient's response to treatment, examination of patient, ordering and performing treatments and interventions, ordering and review of laboratory studies, ordering and review of radiographic studies, pulse oximetry, re-evaluation of patient's condition, review of old charts and discussions with consultants.        Labs Reviewed   CBC W/ AUTO DIFFERENTIAL - Abnormal; Notable for the following:        Result  Value    RBC 3.59 (*)     Hemoglobin 10.2 (*)     Hematocrit 30.4 (*)     RDW 15.1 (*)     All other components within normal limits   COMPREHENSIVE METABOLIC PANEL - Abnormal; Notable for the following:     Potassium 3.2 (*)     Glucose 235 (*)     Albumin 3.4 (*)     AST 42 (*)     eGFR if non  53 (*)     All other components within normal limits   TROPONIN I - Abnormal; Notable for the following:     Troponin I 0.052 (*)     All other components within normal limits   OCCULT BLOOD X 1, STOOL - Abnormal; Notable for the following:     Occult Blood Positive (*)     All other components within normal limits   D DIMER, QUANTITATIVE - Abnormal; Notable for the following:     D-Dimer 0.57 (*)     All other components within normal limits   B-TYPE NATRIURETIC PEPTIDE   D DIMER, QUANTITATIVE   TYPE & SCREEN     EKG Readings: (Independently Interpreted)   Initial Reading: No STEMI. Previous EKG: Compared with most recent EKG Rhythm: Normal Sinus Rhythm. Heart Rate: 96. Ectopy: No Ectopy. Conduction: Normal.   Diffuse ST depression I, II, avF          Medical Decision Making:   Initial Assessment:   66-year-old female here w/ epigastric pain, that is worse with walking.  Associated nausea.  Differential Diagnosis:   ACS, possible dissection, possible pulmonary embolism, PE, GI bleed/gastritis/PUD/GERD  Clinical Tests:   Lab Tests: Ordered and Reviewed  Radiological Study: Ordered and Reviewed  Medical Tests: Ordered and Reviewed  ED Management:  Patient was given antacid (PPI backorder)  Aspirin, nitroglycerin    When she was last seen in February she was hypertensive to 252/120's, had normal troponin/BNP                         ED Course   5:35: She continued to have nausea and vomiting after getting the aspirin. She was given zofran and morphine. BP improved with nitro to 198. Her H/H dropped 4g since last checked a few months ago. She  Was given famotidine. CXR without mediastinal widening, no free air  under the diaphragm.     6:00 patient admitted to family medicine for hypertensive emergency with exertional chest pain, and GI bleed    Clinical Impression:   The primary encounter diagnosis was Gastrointestinal hemorrhage, unspecified gastrointestinal hemorrhage type. Diagnoses of Hypertensive emergency, NSTEMI (non-ST elevated myocardial infarction), and Exertional chest pain were also pertinent to this visit.                           Mia Camarillo MD  07/02/17 0617       Mia Camarillo MD  07/02/17 0633

## 2017-07-02 NOTE — PLAN OF CARE
Problem: Patient Care Overview  Goal: Plan of Care Review  Plan of care reviewed with the patient. Verbalized clear understanding. Bed alarm set. Bed in lowest position. Call light within reach. Instructed to call for assistance. Denies any pain or discomfort. NSR HR between 60's-70's on telemetry monitor. Continuous IV fluids@ 100ml/hr and IV protonix @ 20ml/hr. NPO after midnight for EGD tomorrow. Pt remains free of fall. No report of SOB or lightheadedness. Will continue to monitor.

## 2017-07-02 NOTE — H&P
[facility] Medicine  History & Physical    Patient Name: Eliza Louie  MRN: [id number]  Admission Date: 7/2/2017  Attending Physician: Dr. Caldwell  Primary Care Provider: Primary Doctor No         Patient information was obtained from patient and ER records.     Subjective:     Principal Problem:Gastrointestinal hemorrhage    Chief Complaint:   Chief Complaint   Patient presents with    Nausea     n.v.d x2 weeks. reports dark black tarry stool x 2 weeks. intermittent sharp epigastric pain that radiates to back with SOB. pt. dry heaving at present time.        HPI: 65 yo female with pmhx of HTN, gastric ulcers, and prediabetes presented to the ED with nausea, vomiting, epigastric pain with SOB x 1 day. She reports having similar episode in the past and had an endoscopy done which showed that she had ulcers. When she had a similar episode last year she was vomiting blood, but this time it is mostly nausea. She experiences SOB only when she is exerting and whenever she has vomit and epigastric pain. The pain from the epigastric region radiates to the back. She reports not being able to sleep flat, and she sleeps on her sides. She has not had SOB otherwise. She denies any urinary symptoms or bowel movement symptoms. She has noticed that the past 2 weeks her stool has been dark and tarry but has been becoming brown now with no blood seen in the stool.         Past Medical History:   Diagnosis Date    Diabetes mellitus     Hypertension        History reviewed. No pertinent surgical history.    Review of patient's allergies indicates:  No Known Allergies    No current facility-administered medications on file prior to encounter.      Current Outpatient Prescriptions on File Prior to Encounter   Medication Sig    amlodipine (NORVASC) 10 MG tablet Take 1 tablet (10 mg total) by mouth once daily.    omeprazole (PRILOSEC) 20 MG capsule Take 1 capsule (20 mg total) by mouth once daily.     Family  History     None        Social History Main Topics    Smoking status: Never Smoker    Smokeless tobacco: Not on file    Alcohol use No    Drug use: No    Sexual activity: Not on file     Review of Systems   HENT: Negative.    Respiratory: Positive for shortness of breath. Negative for cough, chest tightness and stridor.    Cardiovascular: Negative for chest pain, palpitations and leg swelling.   Gastrointestinal: Positive for abdominal pain, nausea and vomiting. Negative for blood in stool, constipation and diarrhea.   Genitourinary: Negative.    Musculoskeletal: Positive for back pain. Negative for gait problem and joint swelling.   Neurological: Positive for weakness.     Objective:     Vital Signs (Most Recent):  Temp: 98.4 °F (36.9 °C) (07/02/17 0425)  Pulse: 106 (07/02/17 0850)  Resp: 16 (07/02/17 0850)  BP: (!) 188/65 (07/02/17 0850)  SpO2: 99 % (07/02/17 0850) Vital Signs (24h Range):  Temp:  [98.4 °F (36.9 °C)] 98.4 °F (36.9 °C)  Pulse:  [] 106  Resp:  [16-26] 16  SpO2:  [96 %-99 %] 99 %  BP: (162-223)/() 188/65        There is no height or weight on file to calculate BMI.    Physical Exam   Constitutional: She is oriented to person, place, and time. She appears well-developed and well-nourished. No distress.   HENT:   Head: Normocephalic and atraumatic.   Nose: Nose normal.   Mouth/Throat: No oropharyngeal exudate.   Eyes: Conjunctivae are normal. Right eye exhibits no discharge. Left eye exhibits no discharge.   Neck: Normal range of motion. Neck supple. No JVD present. No tracheal deviation present.   Cardiovascular: Normal rate, regular rhythm, normal heart sounds and intact distal pulses.  Exam reveals no gallop and no friction rub.    No murmur heard.  Pulmonary/Chest: Effort normal and breath sounds normal. No stridor. No respiratory distress. She has no wheezes. She has no rales. She exhibits no tenderness.   Abdominal: Soft. Bowel sounds are normal. She exhibits no distension and no  mass. There is tenderness in the epigastric area. There is no guarding.   Musculoskeletal: Normal range of motion. She exhibits no edema, tenderness or deformity.   Neurological: She is alert and oriented to person, place, and time.   Skin: Skin is warm and dry. No rash noted. She is not diaphoretic. No erythema. No pallor.        Significant Labs:   CBC:   Recent Labs  Lab 07/02/17 0453   WBC 7.53   HGB 10.2*   HCT 30.4*        CMP:   Recent Labs  Lab 07/02/17 0453      K 3.2*      CO2 25   *   BUN 15   CREATININE 1.1   CALCIUM 9.1   PROT 7.3   ALBUMIN 3.4*   BILITOT 0.5   ALKPHOS 87   AST 42*   ALT 44   ANIONGAP 12   EGFRNONAA 53*     Cardiac Markers:   Recent Labs  Lab 07/02/17 0453   BNP 21       Significant Imaging: I have reviewed all pertinent imaging results/findings within the past 24 hours.    Assessment/Plan:     * Gastrointestinal hemorrhage    H/H in the ED 10.2/30.4 baseline 14.2/43  Follow h/h every 6 hours  Occult blood: positive  2 bore IV access  Type and screen  3 units of RBC prepared  [facility] consulted.   Consent obtained if needed to transfuse  IV Protonix given   Lipase : 91  Amylase: 81        Hypertension    Initial BP was 223/90  IV hydralazine 10 mg given x 2 and now BP is 157/80;s  Will restart home medication Amlodipine 10mg  Will continue to monitor BP.             Dyspnea    Initial trop: 0.052 and EKG NSR  Will trend trop and EKG x 3   BNP: 21  D- dimer: 0.57  CTA was done to r/o PE and dissection. Mild coronary artery atherosclerosis and thyroid nodules. Suspected cholelithiasis.             Prediabetes    Pt has hx of prediabetes but unknown HgA1c.   HgA1c ordered  Glu initially 235, will continue to monitor  Low dose SSI             VTE Risk Mitigation         Ordered     Medium Risk of VTE  Once      07/02/17 0950     Place sequential compression device  Until discontinued      07/02/17 0950        Yvette Caruso MD  Department of Hospital Medicine    [facility]

## 2017-07-03 ENCOUNTER — ANESTHESIA EVENT (OUTPATIENT)
Dept: ENDOSCOPY | Facility: HOSPITAL | Age: 67
DRG: 377 | End: 2017-07-03
Payer: MEDICARE

## 2017-07-03 ENCOUNTER — ANESTHESIA (OUTPATIENT)
Dept: ENDOSCOPY | Facility: HOSPITAL | Age: 67
DRG: 377 | End: 2017-07-03
Payer: MEDICARE

## 2017-07-03 LAB
ALBUMIN SERPL BCP-MCNC: 3.1 G/DL
ALP SERPL-CCNC: 76 U/L
ALT SERPL W/O P-5'-P-CCNC: 39 U/L
ANION GAP SERPL CALC-SCNC: 8 MMOL/L
AST SERPL-CCNC: 50 U/L
BASOPHILS # BLD AUTO: 0.03 K/UL
BASOPHILS NFR BLD: 0.4 %
BILIRUB SERPL-MCNC: 0.6 MG/DL
BUN SERPL-MCNC: 9 MG/DL
CALCIUM SERPL-MCNC: 8.2 MG/DL
CHLORIDE SERPL-SCNC: 105 MMOL/L
CO2 SERPL-SCNC: 25 MMOL/L
CREAT SERPL-MCNC: 1.1 MG/DL
DIFFERENTIAL METHOD: ABNORMAL
EOSINOPHIL # BLD AUTO: 0.2 K/UL
EOSINOPHIL NFR BLD: 3.1 %
ERYTHROCYTE [DISTWIDTH] IN BLOOD BY AUTOMATED COUNT: 15.8 %
EST. GFR  (AFRICAN AMERICAN): >60 ML/MIN/1.73 M^2
EST. GFR  (NON AFRICAN AMERICAN): 53 ML/MIN/1.73 M^2
GLUCOSE SERPL-MCNC: 159 MG/DL
HCT VFR BLD AUTO: 27.5 %
HCT VFR BLD AUTO: 28.6 %
HCT VFR BLD AUTO: 29.1 %
HCT VFR BLD AUTO: 31.1 %
HCT VFR BLD AUTO: 31.9 %
HGB BLD-MCNC: 10.2 G/DL
HGB BLD-MCNC: 10.2 G/DL
HGB BLD-MCNC: 9.3 G/DL
HGB BLD-MCNC: 9.5 G/DL
HGB BLD-MCNC: 9.7 G/DL
LYMPHOCYTES # BLD AUTO: 3 K/UL
LYMPHOCYTES NFR BLD: 40.3 %
MAGNESIUM SERPL-MCNC: 1.4 MG/DL
MCH RBC QN AUTO: 28.5 PG
MCHC RBC AUTO-ENTMCNC: 33.2 %
MCV RBC AUTO: 86 FL
MONOCYTES # BLD AUTO: 0.6 K/UL
MONOCYTES NFR BLD: 8.4 %
NEUTROPHILS # BLD AUTO: 3.6 K/UL
NEUTROPHILS NFR BLD: 47.5 %
PHOSPHATE SERPL-MCNC: 3.6 MG/DL
PLATELET # BLD AUTO: 294 K/UL
PMV BLD AUTO: 9.8 FL
POCT GLUCOSE: 177 MG/DL (ref 70–110)
POCT GLUCOSE: 200 MG/DL (ref 70–110)
POCT GLUCOSE: 206 MG/DL (ref 70–110)
POCT GLUCOSE: 237 MG/DL (ref 70–110)
POTASSIUM SERPL-SCNC: 3.4 MMOL/L
PROT SERPL-MCNC: 6.3 G/DL
RBC # BLD AUTO: 3.33 M/UL
SODIUM SERPL-SCNC: 138 MMOL/L
TROPONIN I SERPL DL<=0.01 NG/ML-MCNC: 2.61 NG/ML
TROPONIN I SERPL DL<=0.01 NG/ML-MCNC: 3.5 NG/ML
WBC # BLD AUTO: 7.52 K/UL

## 2017-07-03 PROCEDURE — 85018 HEMOGLOBIN: CPT

## 2017-07-03 PROCEDURE — 85014 HEMATOCRIT: CPT

## 2017-07-03 PROCEDURE — B211YZZ FLUOROSCOPY OF MULTIPLE CORONARY ARTERIES USING OTHER CONTRAST: ICD-10-PCS | Performed by: INTERNAL MEDICINE

## 2017-07-03 PROCEDURE — C9113 INJ PANTOPRAZOLE SODIUM, VIA: HCPCS | Performed by: FAMILY MEDICINE

## 2017-07-03 PROCEDURE — 80053 COMPREHEN METABOLIC PANEL: CPT

## 2017-07-03 PROCEDURE — 93306 TTE W/DOPPLER COMPLETE: CPT

## 2017-07-03 PROCEDURE — 25500020 PHARM REV CODE 255

## 2017-07-03 PROCEDURE — 37000009 HC ANESTHESIA EA ADD 15 MINS: Performed by: INTERNAL MEDICINE

## 2017-07-03 PROCEDURE — 63600175 PHARM REV CODE 636 W HCPCS: Performed by: NURSE ANESTHETIST, CERTIFIED REGISTERED

## 2017-07-03 PROCEDURE — 93005 ELECTROCARDIOGRAM TRACING: CPT

## 2017-07-03 PROCEDURE — 94761 N-INVAS EAR/PLS OXIMETRY MLT: CPT

## 2017-07-03 PROCEDURE — 25000003 PHARM REV CODE 250: Performed by: STUDENT IN AN ORGANIZED HEALTH CARE EDUCATION/TRAINING PROGRAM

## 2017-07-03 PROCEDURE — 43235 EGD DIAGNOSTIC BRUSH WASH: CPT | Performed by: INTERNAL MEDICINE

## 2017-07-03 PROCEDURE — 0DJ08ZZ INSPECTION OF UPPER INTESTINAL TRACT, VIA NATURAL OR ARTIFICIAL OPENING ENDOSCOPIC: ICD-10-PCS | Performed by: INTERNAL MEDICINE

## 2017-07-03 PROCEDURE — 43235 EGD DIAGNOSTIC BRUSH WASH: CPT | Mod: ,,, | Performed by: INTERNAL MEDICINE

## 2017-07-03 PROCEDURE — 83735 ASSAY OF MAGNESIUM: CPT

## 2017-07-03 PROCEDURE — 63600175 PHARM REV CODE 636 W HCPCS: Performed by: STUDENT IN AN ORGANIZED HEALTH CARE EDUCATION/TRAINING PROGRAM

## 2017-07-03 PROCEDURE — 84484 ASSAY OF TROPONIN QUANT: CPT

## 2017-07-03 PROCEDURE — 21400001 HC TELEMETRY ROOM

## 2017-07-03 PROCEDURE — 85025 COMPLETE CBC W/AUTO DIFF WBC: CPT

## 2017-07-03 PROCEDURE — 25000003 PHARM REV CODE 250

## 2017-07-03 PROCEDURE — 25000003 PHARM REV CODE 250: Performed by: FAMILY MEDICINE

## 2017-07-03 PROCEDURE — 37000008 HC ANESTHESIA 1ST 15 MINUTES: Performed by: INTERNAL MEDICINE

## 2017-07-03 PROCEDURE — 99152 MOD SED SAME PHYS/QHP 5/>YRS: CPT

## 2017-07-03 PROCEDURE — 36415 COLL VENOUS BLD VENIPUNCTURE: CPT

## 2017-07-03 PROCEDURE — 63600175 PHARM REV CODE 636 W HCPCS: Performed by: FAMILY MEDICINE

## 2017-07-03 PROCEDURE — 25000003 PHARM REV CODE 250: Performed by: INTERNAL MEDICINE

## 2017-07-03 PROCEDURE — 63600175 PHARM REV CODE 636 W HCPCS

## 2017-07-03 PROCEDURE — 84100 ASSAY OF PHOSPHORUS: CPT

## 2017-07-03 PROCEDURE — 25000003 PHARM REV CODE 250: Performed by: NURSE ANESTHETIST, CERTIFIED REGISTERED

## 2017-07-03 RX ORDER — NAPROXEN SODIUM 220 MG/1
81 TABLET, FILM COATED ORAL DAILY
Status: DISCONTINUED | OUTPATIENT
Start: 2017-07-04 | End: 2017-07-03

## 2017-07-03 RX ORDER — LISINOPRIL 5 MG/1
5 TABLET ORAL DAILY
Status: DISCONTINUED | OUTPATIENT
Start: 2017-07-03 | End: 2017-07-04

## 2017-07-03 RX ORDER — PROPOFOL 10 MG/ML
VIAL (ML) INTRAVENOUS
Status: DISCONTINUED | OUTPATIENT
Start: 2017-07-03 | End: 2017-07-03

## 2017-07-03 RX ORDER — ATORVASTATIN CALCIUM 40 MG/1
80 TABLET, FILM COATED ORAL NIGHTLY
Status: DISCONTINUED | OUTPATIENT
Start: 2017-07-03 | End: 2017-07-05 | Stop reason: HOSPADM

## 2017-07-03 RX ORDER — MAGNESIUM SULFATE HEPTAHYDRATE 40 MG/ML
2 INJECTION, SOLUTION INTRAVENOUS ONCE
Status: COMPLETED | OUTPATIENT
Start: 2017-07-03 | End: 2017-07-03

## 2017-07-03 RX ORDER — PANTOPRAZOLE SODIUM 40 MG/1
40 TABLET, DELAYED RELEASE ORAL 2 TIMES DAILY
Status: DISCONTINUED | OUTPATIENT
Start: 2017-07-03 | End: 2017-07-05 | Stop reason: HOSPADM

## 2017-07-03 RX ORDER — CLOPIDOGREL BISULFATE 75 MG/1
600 TABLET ORAL ONCE
Status: COMPLETED | OUTPATIENT
Start: 2017-07-03 | End: 2017-07-03

## 2017-07-03 RX ORDER — OXYCODONE HYDROCHLORIDE 5 MG/1
5 TABLET ORAL EVERY 4 HOURS PRN
Status: DISCONTINUED | OUTPATIENT
Start: 2017-07-03 | End: 2017-07-05 | Stop reason: HOSPADM

## 2017-07-03 RX ORDER — ISOSORBIDE MONONITRATE 60 MG/1
60 TABLET, EXTENDED RELEASE ORAL DAILY
Status: DISCONTINUED | OUTPATIENT
Start: 2017-07-04 | End: 2017-07-05 | Stop reason: HOSPADM

## 2017-07-03 RX ORDER — LIDOCAINE HCL/PF 100 MG/5ML
SYRINGE (ML) INTRAVENOUS
Status: DISCONTINUED | OUTPATIENT
Start: 2017-07-03 | End: 2017-07-03

## 2017-07-03 RX ORDER — ASPIRIN 81 MG/1
81 TABLET ORAL DAILY
Status: DISCONTINUED | OUTPATIENT
Start: 2017-07-04 | End: 2017-07-05 | Stop reason: HOSPADM

## 2017-07-03 RX ORDER — CLOPIDOGREL BISULFATE 75 MG/1
75 TABLET ORAL DAILY
Status: DISCONTINUED | OUTPATIENT
Start: 2017-07-04 | End: 2017-07-05 | Stop reason: HOSPADM

## 2017-07-03 RX ORDER — POTASSIUM CHLORIDE 20 MEQ/1
40 TABLET, EXTENDED RELEASE ORAL DAILY
Status: DISCONTINUED | OUTPATIENT
Start: 2017-07-03 | End: 2017-07-05 | Stop reason: HOSPADM

## 2017-07-03 RX ORDER — ATORVASTATIN CALCIUM 40 MG/1
80 TABLET, FILM COATED ORAL DAILY
Status: DISCONTINUED | OUTPATIENT
Start: 2017-07-03 | End: 2017-07-03

## 2017-07-03 RX ADMIN — MAGNESIUM SULFATE HEPTAHYDRATE 2 G: 40 INJECTION, SOLUTION INTRAVENOUS at 12:07

## 2017-07-03 RX ADMIN — DEXTROSE 8 MG/HR: 50 INJECTION, SOLUTION INTRAVENOUS at 01:07

## 2017-07-03 RX ADMIN — LIDOCAINE HYDROCHLORIDE 40 MG: 20 INJECTION, SOLUTION INTRAVENOUS at 02:07

## 2017-07-03 RX ADMIN — PANTOPRAZOLE SODIUM 40 MG: 40 TABLET, DELAYED RELEASE ORAL at 08:07

## 2017-07-03 RX ADMIN — POTASSIUM CHLORIDE 40 MEQ: 20 TABLET, EXTENDED RELEASE ORAL at 12:07

## 2017-07-03 RX ADMIN — DEXTROSE 8 MG/HR: 50 INJECTION, SOLUTION INTRAVENOUS at 06:07

## 2017-07-03 RX ADMIN — DEXTROSE 8 MG/HR: 50 INJECTION, SOLUTION INTRAVENOUS at 12:07

## 2017-07-03 RX ADMIN — PROPOFOL 50 MG: 10 INJECTION, EMULSION INTRAVENOUS at 02:07

## 2017-07-03 RX ADMIN — ATORVASTATIN CALCIUM 80 MG: 40 TABLET, FILM COATED ORAL at 08:07

## 2017-07-03 RX ADMIN — CLOPIDOGREL BISULFATE 600 MG: 75 TABLET ORAL at 05:07

## 2017-07-03 RX ADMIN — AMLODIPINE BESYLATE 10 MG: 5 TABLET ORAL at 08:07

## 2017-07-03 RX ADMIN — INSULIN ASPART 2 UNITS: 100 INJECTION, SOLUTION INTRAVENOUS; SUBCUTANEOUS at 05:07

## 2017-07-03 NOTE — PLAN OF CARE
Problem: Patient Care Overview  Goal: Plan of Care Review  Outcome: Ongoing (interventions implemented as appropriate)  Pt on RA with sats of 96%. Will continue to monitor.

## 2017-07-03 NOTE — ANESTHESIA RELEASE NOTE
"Anesthesia Release from PACU Note    Patient: Eliza Louie    Procedure(s) Performed: Procedure(s) (LRB):  ESOPHAGOGASTRODUODENOSCOPY (EGD) (N/A)    Anesthesia type: MAC    Post pain: Adequate analgesia    Post assessment: no apparent anesthetic complications    Last Vitals:   Visit Vitals  BP (!) 145/55   Pulse 72   Temp 36.7 °C (98 °F) (Oral)   Resp (!) 24   Ht 5' 3" (1.6 m)   Wt 90.7 kg (200 lb)   SpO2 96%   Breastfeeding? No   BMI 35.43 kg/m²       Post vital signs: stable    Level of consciousness: awake and oriented    Nausea/Vomiting: no nausea/no vomiting    Complications: none    Airway Patency: patent    Respiratory: unassisted, spontaneous ventilation, room air SpO2 94%    Cardiovascular: stable    Hydration: euvolemic  "

## 2017-07-03 NOTE — ANESTHESIA POSTPROCEDURE EVALUATION
"Anesthesia Post Evaluation    Patient: Eliza Louie    Procedure(s) Performed: Procedure(s) (LRB):  ESOPHAGOGASTRODUODENOSCOPY (EGD) (N/A)    Final Anesthesia Type: MAC  Patient location during evaluation: PACU  Patient participation: Yes- Able to Participate  Level of consciousness: awake  Post-procedure vital signs: reviewed and stable  Pain management: adequate  Airway patency: patent  PONV status at discharge: No PONV  Anesthetic complications: no      Cardiovascular status: stable  Respiratory status: room air  Hydration status: euvolemic  Follow-up not needed.        Visit Vitals  BP (!) 145/55   Pulse 72   Temp 36.7 °C (98 °F) (Oral)   Resp (!) 24   Ht 5' 3" (1.6 m)   Wt 90.7 kg (200 lb)   SpO2 96%   Breastfeeding? No   BMI 35.43 kg/m²       Pain/Anton Score: Pain Assessment Performed: Yes (7/3/2017 11:45 AM)  Presence of Pain: denies (7/3/2017 11:45 AM)  Pain Rating Prior to Med Admin: 10 (7/2/2017  8:58 AM)  Anton Score: 10 (7/3/2017 11:45 AM)      "

## 2017-07-03 NOTE — TRANSFER OF CARE
"Anesthesia Transfer of Care Note    Patient: Eliza Louie    Procedure(s) Performed: Procedure(s) (LRB):  ESOPHAGOGASTRODUODENOSCOPY (EGD) (N/A)    Patient location: PACU    Anesthesia Type: MAC    Transport from OR: Transported from OR on room air with adequate spontaneous ventilation    Post pain: adequate analgesia    Post assessment: no apparent anesthetic complications and tolerated procedure well    Post vital signs: stable    Level of consciousness: awake, alert and oriented    Nausea/Vomiting: no nausea/vomiting    Complications: none    Transfer of care protocol was followed      Last vitals:   Visit Vitals  BP (!) 178/84 (BP Location: Right arm, Patient Position: Lying, BP Method: Automatic)   Pulse 82   Temp 37.2 °C (98.9 °F) (Oral)   Resp 15   Ht 5' 3" (1.6 m)   Wt 90.7 kg (200 lb)   SpO2 96%   Breastfeeding? No   BMI 35.43 kg/m²     "

## 2017-07-03 NOTE — SIGNIFICANT EVENT
Patient with elevation in troponin from 0.051 to 1.153 to 3.495. EKG reviewed, no ST elevation. Patient seen and evaluated. Denies chest pain/SOB, epigastric pain or N/V. Will repeat troponin and EKG at 5AM. F/U cardiology and echo and continue to monitor. Holding ASA at this time due to history of PUD and scheduled EGD in AM.     7/3/2017 1:33 AM James Espinosa M.D.

## 2017-07-03 NOTE — CONSULTS
[facility] Cardiology Consult Note     Date of Admit: 7/2/2017    Consult Requested By:  Reason For Consult: Elevated troponin     Chief Complaint      Chief Complaint   Patient presents with    Nausea     n.v.d x2 weeks. reports dark black tarry stool x 2 weeks. intermittent sharp epigastric pain that radiates to back with SOB. pt. dry heaving at present time.        Subjective:      History of Present Illness:  Eliza Louie is a 66 y.o. female who  has a past medical history of Diabetes mellitus and Hypertension.. The patient presented to the [facility] on 7/2/2017 with a primary complaint of Nausea (n.v.d x2 weeks. reports dark black tarry stool x 2 weeks. intermittent sharp epigastric pain that radiates to back with SOB. pt. dry heaving at present time.)    Notes she has not been taking home medications for 3 years     The patient was in their usual state of health until 2 nights ago. Woke up in 10/10 epigastric pain. Pain radiates to back. Denied fever, cough, SOB, sweating, vomiting, diaphoresis, dizziness. Admits to dry heaves.     Last night, troponin increased to 3.495.      Past Medical History:  Past Medical History:   Diagnosis Date    Diabetes mellitus     Hypertension         Past Surgical History:  History reviewed. No pertinent surgical history.     Allergies:  Review of patient's allergies indicates:  No Known Allergies     Home Medications:  Prior to Admission medications    Medication Sig Start Date End Date Taking? Authorizing Provider   amlodipine (NORVASC) 10 MG tablet Take 1 tablet (10 mg total) by mouth once daily. 2/20/17 2/20/18  Erik Evans MD   omeprazole (PRILOSEC) 20 MG capsule Take 1 capsule (20 mg total) by mouth once daily. 2/20/17 2/20/18  Erik Evans MD        Family History:  History reviewed. No pertinent family history.     Social History:  Social History   Substance Use Topics    Smoking status: Never Smoker    Smokeless tobacco: Not on file    Alcohol use No  "       Review of Systems:  Pertinent items are noted in HPI. All other systems are reviewed and are negative.       Objective:   Last 24 Hour Vital Signs:  BP  Min: 149/71  Max: 225/96  Temp  Av.6 °F (37 °C)  Min: 98.2 °F (36.8 °C)  Max: 98.9 °F (37.2 °C)  Pulse  Av.9  Min: 62  Max: 96  Resp  Av.4  Min: 10  Max: 20  SpO2  Av %  Min: 91 %  Max: 99 %  I/O last 3 completed shifts:  In: 1965.3 [I.V.:1965.3]  Out: 550 [Urine:550]     Physical Examination:    BP (!) 178/84 (BP Location: Right arm, Patient Position: Lying, BP Method: Automatic)   Pulse 66   Temp 98.9 °F (37.2 °C) (Oral)   Resp 15   Ht 5' 3" (1.6 m)   Wt 90.7 kg (200 lb)   SpO2 97%   Breastfeeding? No   BMI 35.43 kg/m²     General Appearance:    Alert, cooperative, no distress, appears stated age   Head:    Normocephalic, without obvious abnormality, atraumatic   Eyes:    PERRL, conjunctiva/corneas clear, EOM's intact, fundi     benign, both eyes   Ears:    Normal TM's and external ear canals, both ears   Nose:   Nares normal, septum midline, mucosa normal, no drainage    or sinus tenderness   Throat:   Lips, mucosa, and tongue normal; teeth and gums normal   Neck:   Supple, symmetrical, trachea midline, no adenopathy;     thyroid:  no enlargement/tenderness/nodules; no carotid    bruit or JVD   Back:     Symmetric, no curvature, ROM normal, no CVA tenderness   Lungs:     Clear to auscultation bilaterally, respirations unlabored   Chest Wall:    No tenderness or deformity    Heart:    Regular rate and rhythm, S1 and S2 normal, no murmur, rub   or gallop   Breast Exam:    No tenderness, masses, or nipple abnormality   Abdomen:     Soft, non-tender, bowel sounds active all four quadrants,     no masses, no organomegaly   Genitalia:    Normal female without lesion, discharge or tenderness   Rectal:    Normal tone, no masses or tenderness;    guaiac negative stool   Extremities:   Extremities normal, atraumatic, no cyanosis or edema "   Pulses:   2+ and symmetric all extremities   Skin:   Skin color, texture, turgor normal, no rashes or lesions   Lymph nodes:   Cervical, supraclavicular, and axillary nodes normal   Neurologic:   CNII-XII intact, normal strength, sensation and reflexes     throughout            Laboratory:    Component      Latest Ref Rng & Units 7/3/2017 7/2/2017 7/2/2017 7/2/2017           11:49 PM  4:58 PM 10:25 AM   Troponin I      0.000 - 0.026 ng/mL 2.606 (H) 3.495 (H) 1.153 (H) 0.051 (H)     Component      Latest Ref Rng & Units 7/2/2017 7/2/2017           7:42 AM  4:53 AM   Troponin I      0.000 - 0.026 ng/mL 0.046 (H) 0.052 (H)     Component      Latest Ref Rng & Units 7/3/2017 7/2/2017   WBC      3.90 - 12.70 K/uL 7.52 7.53   RBC      4.00 - 5.40 M/uL 3.33 (L) 3.59 (L)   Hemoglobin      12.0 - 16.0 g/dL 9.5 (L) 10.2 (L)   Hematocrit      37.0 - 48.5 % 28.6 (L) 30.4 (L)   MCV      82 - 98 fL 86 85   MCH      27.0 - 31.0 pg 28.5 28.4   MCHC      32.0 - 36.0 % 33.2 33.6   RDW      11.5 - 14.5 % 15.8 (H) 15.1 (H)   Platelets      150 - 350 K/uL 294 321   MPV      9.2 - 12.9 fL 9.8 9.9   Gran #      1.8 - 7.7 K/uL 3.6 3.4   Lymph #      1.0 - 4.8 K/uL 3.0 3.3   Mono #      0.3 - 1.0 K/uL 0.6 0.6   Eos #      0.0 - 0.5 K/uL 0.2 0.2   Baso #      0.00 - 0.20 K/uL 0.03 0.05   Gran%      38.0 - 73.0 % 47.5 45.3   Lymph%      18.0 - 48.0 % 40.3 43.3   Mono%      4.0 - 15.0 % 8.4 7.7   Eosinophil%      0.0 - 8.0 % 3.1 2.3   Basophil%      0.0 - 1.9 % 0.4 0.7   Differential Method       Automated Automated     Component      Latest Ref Rng & Units 7/3/2017 7/2/2017   Sodium      136 - 145 mmol/L 138 137   Potassium      3.5 - 5.1 mmol/L 3.4 (L) 3.2 (L)   Chloride      95 - 110 mmol/L 105 100   CO2      23 - 29 mmol/L 25 25   Glucose      70 - 110 mg/dL 159 (H) 235 (H)   BUN, Bld      8 - 23 mg/dL 9 15   Creatinine      0.5 - 1.4 mg/dL 1.1 1.1   Calcium      8.7 - 10.5 mg/dL 8.2 (L) 9.1   Total Protein      6.0 - 8.4 g/dL 6.3 7.3    Albumin      3.5 - 5.2 g/dL 3.1 (L) 3.4 (L)   Total Bilirubin      0.1 - 1.0 mg/dL 0.6 0.5   Alkaline Phosphatase      55 - 135 U/L 76 87   AST      10 - 40 U/L 50 (H) 42 (H)   ALT      10 - 44 U/L 39 44   Anion Gap      8 - 16 mmol/L 8 12   eGFR if African American      >60 mL/min/1.73 m:2 >60 >60   eGFR if non African American      >60 mL/min/1.73 m:2 53 (A) 53 (A)       Radiology:  Cta Chest Non-coronary (pe Study)    Result Date: 7/2/2017  Technique:  1.25 mm axial images were obtained through the chest following the administration of 100 cc of Omnipaque 350 IV contrast. Multiplanar MIP reformats were performed Comparison: Radiograph 07/02/2017. Findings: Hypodense thyroid nodule is noted, the largest of which measures approximately 1 cm and is located within the isthmus/left lobe. There is a left-sided aortic arch with 3 branch vessels.  The thoracic aorta tapers normally with mild atherosclerotic calcification.  No aneurysmal dilatation or dissection. Pulmonary arteries distributed normally.  No filling defects identified to suggest pulmonary thromboembolus.  Heart is at the upper limit of normal in size and demonstrates mild coronary artery atherosclerosis.  No pericardial effusion. No axillary or mediastinal lymph node enlargement.  Hilar contours are unremarkable. Esophagus is normal in caliber and course. Trachea and proximal airways are patent.  Incidental note is made of a tracheal bronchus, a normal congenital variant. Lung volumes are normal and symmetric.  No focal consolidation.  Subsegmental atelectasis is noted within the lingula and right middle lobe.  No pneumothorax or pleural effusions.  No bronchiectasis.  No CT findings to suggest chronic interstitial lung disease. Suspected calcified stone noted within the gallbladder lumen.  Remaining visualized upper abdominal structures demonstrate no significant abnormalities. Subcutaneous soft tissues are within normal limits. No fractures.  No  osseous destruction.    No pulmonary thromboembolus. Findings include: - Mild coronary artery atherosclerosis. - Thyroid nodules.  Consider correlation with ultrasound. - Suspected cholelithiasis. Electronically signed by: FRANCES GARZA MD Date:     07/02/17 Time:    06:20     X-ray Chest Ap Portable    Result Date: 7/2/2017  Technique: AP chest radiograph. Comparison: Radiograph 02/20/2017. Findings: Mediastinal structures are midline. Cardiac silhouette is normal in size. Lung volumes are normal and symmetric. No pulmonary consolidation.  No pneumothorax or pleural effusion. No free air beneath the diaphragm. Bones demonstrate no acute abnormalities.    No acute radiographic findings in the chest. Electronically signed by: FRANCES GARZA MD Date:     07/02/17 Time:    05:11           Assessment:    Eliza Louie is a 66 y.o. female with:  Patient Active Problem List    Diagnosis Date Noted    Gastrointestinal hemorrhage 07/02/2017    Hypertension 07/02/2017    Prediabetes 07/02/2017    Dyspnea 07/02/2017    Epigastric pain         Plan:      NSTEMI - H/o HTN and DM. Will undergo coronary angiography today    HTN - Elevated upon admission at 225/96. Unknown if due to pain or uncontrolled HTN. /62 this am. Continue amlodipine 10 mg qd and monitoring BP

## 2017-07-03 NOTE — BRIEF OP NOTE
S/p Coronary Angiography     LM: large caliber vessel without significant disease   LAD:  Moderate caliber vessel without significant disease that wraps around the apex   LCX:  Moderate caliber vessel with near total occlusion of distal portion near second OM  Right:  Right dominant vessel, large caliber with two sequential 80% lesions.      Continue active medical management for NSTEMI, recommend increase BP control, will discuss with GI timing of intervention as NSTEMI felt to be secondary to LCX (Unable to provide intervention) and RCA lesions thought to be incidental.

## 2017-07-03 NOTE — CONSULTS
Discussed case with her son as well, discussed her case in detail, he understands and is amenable to proceeding

## 2017-07-03 NOTE — ANESTHESIA PREPROCEDURE EVALUATION
07/03/2017  Eliza Louie is a 66 y.o., female w GI bleed for upper GI endoscopy.    ATTEMPT TO PRE-OP FOR EGD:  PT WAS ON CATH LAB TABLE TO R/O CAD.  EGD done later (p pcath).  _________________________________________________  POST-CATH CARDIOLOGY NOTE QUOTE 2017-07-03  S/p Coronary Angiography    LM: large caliber vessel without significant disease   LAD:  Moderate caliber vessel without significant disease that wraps around the apex   LCX:  Moderate caliber vessel with near total occlusion of distal portion near second OM  Right:  Right dominant vessel, large caliber with two sequential 80% lesions.     Continue active medical management for NSTEMI, recommend increase BP control, will discuss with GI timing of intervention as NSTEMI felt to be secondary to LCX (Unable to provide intervention) and RCA lesions thought to be incidental.      PRIOR ANES (in Epic) 2017-07-03   none    ANES-RELATED MED/SURG  HTN  DM   CAD (being managed as NSTEMI 2017-07-03)   - ischemic EKG  GI bleed & epigastric pain    History reviewed. No pertinent surgical history.  Gall bladder  BTL  Cardiac Cath (coronary arteries) 2017-07-03    ALLERGIES  Review of patient's allergies indicates:  No Known Allergies    ANES-RELATED MAR 201707-03  amlodipine insulin-aspart(SS)  lisinopril  atorvastatin  KCl  MgSO4    Anesthesia Evaluation         Review of Systems  Anesthesia Hx:  History of prior surgery of interest to airway management or planning: Denies Personal Hx of Anesthesia complications.   Social:  Non-Smoker, No Alcohol Use    Cardiovascular:   Hypertension    Endocrine:   Diabetes      Wt Readings from Last 1 Encounters:   07/02/17 90.7 kg (200 lb)     Temp Readings from Last 1 Encounters:   07/03/17 37.2 °C (98.9 °F) (Oral)     BP Readings from Last 1 Encounters:   07/03/17 (!) 156/72     Pulse Readings from Last 1 Encounters:    07/03/17 73     SpO2 Readings from Last 1 Encounters:   07/03/17 95%       Physical Exam  General:  Obesity    Airway/Jaw/Neck:  Airway Findings: Mouth Opening: Small, but > 3cm     Dental:  DENTAL FINDINGS: Normal   Chest/Lungs:   Pt on cath lab table; unable to examine       Mental Status:  Mental Status Findings: Normal       Lab Results   Component Value Date    WBC 7.52 07/03/2017    HGB 9.5 (L) 07/03/2017    HCT 28.6 (L) 07/03/2017    MCV 86 07/03/2017     07/03/2017       Chemistry        Component Value Date/Time     07/03/2017 0558    K 3.4 (L) 07/03/2017 0558     07/03/2017 0558    CO2 25 07/03/2017 0558    BUN 9 07/03/2017 0558    CREATININE 1.1 07/03/2017 0558     (H) 07/03/2017 0558        Component Value Date/Time    CALCIUM 8.2 (L) 07/03/2017 0558    ALKPHOS 76 07/03/2017 0558    AST 50 (H) 07/03/2017 0558    ALT 39 07/03/2017 0558    BILITOT 0.6 07/03/2017 0558    ESTGFRAFRICA >60 07/03/2017 0558    EGFRNONAA 53 (A) 07/03/2017 0558        Lab Results   Component Value Date    ALBUMIN 3.1 (L) 07/03/2017     Lab Results   Component Value Date    TSH 1.560 07/02/2017     POCT GLUCOSE 2017-07-03 05:00  177    CXR 2017-07-02  Mediastinal structures are midline. Cardiac silhouette is normal in size. Lung volumes are normal and symmetric. No pulmonary consolidation.  No pneumothorax or pleural effusion. No free air beneath the diaphragm. Bones demonstrate no acute abnormalities.    EKG 2017-07-03  Normal sinus rhythm  ST and T wave abnormality, consider lateral ischemia  Abnormal ECG  When compared with ECG of 03-JUL-2017 01:09,  No significant change was found    ECHO      Anesthesia Plan  Type of Anesthesia, risks & benefits discussed:  Anesthesia Type:  MAC  Patient's Preference:   Intra-op Monitoring Plan:   Intra-op Monitoring Plan Comments:   Post Op Pain Control Plan:   Post Op Pain Control Plan Comments:   Induction:    Beta Blocker:  Patient is not currently on a  Beta-Blocker (No further documentation required).       Informed Consent: Patient understands risks and agrees with Anesthesia plan.  Questions answered. Anesthesia consent signed with patient.  ASA Score: 4  emergent   Day of Surgery Review of History & Physical:        Anesthesia Plan Notes: 2017-07-03   - pt was on cath lab table     ~ unable to perform exam of heart/lungs     ~ pt able to consent, but not sign consent => consent obtained later by Dr. Aguero (?)        Ready For Surgery From Anesthesia Perspective.

## 2017-07-03 NOTE — PLAN OF CARE
TN met with patient and son at bedside. Patient lives with her family at home. Patient has a rollator she uses at home, she DOES NOT have a glucometer. TN informed patient regarding importance of checking sugars at home, and can be bought at [facility] if needing one sooner. Patient still drives, but her family can help with transport. Patient will need a PCP on discharge. TN will continue to follow, updated  Dianne.     07/03/17 2357   Discharge Assessment   Assessment Type Discharge Planning Assessment   Confirmed/corrected address and phone number on facesheet? Yes   Assessment information obtained from? Patient;Other  (son at bedside)   Prior to hospitilization cognitive status: Alert/Oriented   Prior to hospitalization functional status: Assistive Equipment   Current cognitive status: Alert/Oriented   Current Functional Status: Assistive Equipment   Arrived From home or self-care   Lives With child(everette), adult   Able to Return to Prior Arrangements yes   Is patient able to care for self after discharge? Yes   How many people do you have in your home that can help with your care after discharge? 1   Who are your caregiver(s) and their phone number(s)? Katie [phone]   Patient's perception of discharge disposition home or selfcare   Readmission Within The Last 30 Days no previous admission in last 30 days   Patient currently receives home health services? No   Does the patient currently use HME? Yes   Equipment Currently Used at Home rollator   Do you have any problems affording any of your prescribed medications? No   Does the patient have transportation to healthcare appointments? Yes   Transportation Available car;family or friend will provide   On Dialysis? No   Discharge Plan A Home with family   Discharge Plan B Home with family;Home Health   Patient/Family In Agreement With Plan yes     Mi Weaver RN  Transition Navigator  [phone]

## 2017-07-03 NOTE — PROGRESS NOTES
Cath showed occluded OM2 filling late which most likely is the culprit leison along with two significant lesions in the proximal and mid Right which is large. The groin stick was high and clearly, with the right not being the culprit, feel that we will manage medically today and see what the EGD yields. If and when OK with GI regarding antiplatelet use and anticoagulation for the interventinal procedure, will proceed with right coronary intervention

## 2017-07-03 NOTE — PROGRESS NOTES
"PGY-1 Progress Note    Hospital Stay Day 1    Patient is 66 y.o. year old female with PMH of HTN and pre-dm admitted with concerns for GI bleed and elevated trops.     Subjective: Overnight, patient had increase in Trop with EKG wnl. Cards consulted. Patient seen briefly this AM. On way to cath lab. Pt had no complaints.     Scheduled Meds:   amlodipine  10 mg Oral Daily     Continuous Infusions:   sodium chloride 0.9% 100 mL/hr at 17 1524    pantoprazole 40 mg in dextrose 5 % 100 mL infusion (ready to mix system) 8 mg/hr (17 0620)     PRN Meds:sodium chloride, dextrose 50%, dextrose 50%, dextrose 50%, glucagon (human recombinant), glucagon (human recombinant), glucose, glucose, insulin aspart, pneumoc 13-diana conj-dip cr(PF)    Review of patient's allergies indicates:  No Known Allergies    Objectives:     Vitals(Most Recent)      BP  Min: 149/67  Max: 225/96  Temp  Av.5 °F (36.9 °C)  Min: 98.1 °F (36.7 °C)  Max: 98.9 °F (37.2 °C)  Pulse  Av.7  Min: 62  Max: 106  Resp  Av.6  Min: 16  Max: 20  SpO2  Av.9 %  Min: 97 %  Max: 99 %  Height  Av' 3" (160 cm)  Min: 5' 3" (160 cm)  Max: 5' 3" (160 cm)  Weight  Av.7 kg (200 lb)  Min: 90.7 kg (200 lb)  Max: 90.7 kg (200 lb)             Vitals([id number])  Temp:  [98.1 °F (36.7 °C)-98.9 °F (37.2 °C)]   Pulse:  []   Resp:  [16-20]   BP: (149-225)/(61-98)   SpO2:  [97 %-99 %]     I & O(Aceg91o)    Intake/Output Summary (Last 24 hours) at 17 0846  Last data filed at 17 0620   Gross per 24 hour   Intake          1965.33 ml   Output              550 ml   Net          1415.33 ml       PE: unable to obtain this AM. Will evaluate once pt returns from Cath lab.     LABS  CBC    Recent Labs  Lab 17  0453  17  1658 17  2349 17  0558   WBC 7.53  --   --   --  7.52   RBC 3.59*  --   --   --  3.33*   HGB 10.2*  < > 9.5* 9.7* 9.5*   HCT 30.4*  < > 28.9* 29.1* 28.6*     --   --   --  294   MCV 85  --   -- "   --  86   MCH 28.4  --   --   --  28.5   MCHC 33.6  --   --   --  33.2   < > = values in this interval not displayed.  BMP    Recent Labs  Lab 07/02/17  0453 07/03/17  0558    138   K 3.2* 3.4*   CO2 25 25    105   BUN 15 9   CREATININE 1.1 1.1   * 159*       POCT-Glucose  POCT Glucose   Date Value Ref Range Status   07/03/2017 177 (H) 70 - 110 mg/dL Final   07/03/2017 200 (H) 70 - 110 mg/dL Final   07/02/2017 237 (H) 70 - 110 mg/dL Final   07/02/2017 325 (H) 70 - 110 mg/dL Final   07/02/2017 286 (H) 70 - 110 mg/dL Final         Recent Labs  Lab 07/02/17  0453 07/03/17  0558   CALCIUM 9.1 8.2*   MG  --  1.4*   PHOS  --  3.6     LFT    Recent Labs  Lab 07/02/17 0453 07/03/17  0558   PROT 7.3 6.3   ALBUMIN 3.4* 3.1*   BILITOT 0.5 0.6   AST 42* 50*   ALKPHOS 87 76   ALT 44 39         CE    Recent Labs  Lab 07/02/17  1658 07/02/17  2349 07/03/17  0558   TROPONINI 1.153* 3.495* 2.606*       BNP    Recent Labs  Lab 07/02/17  0453   BNP 21     UA  No results for input(s): COLORU, CLARITYU, SPECGRAV, PHUR, PROTEINUA, GLUCOSEU, BLOODU, WBCU, RBCU, BACTERIA, MUCUS in the last 24 hours.    Invalid input(s):  BILIRUBINCON  LAST HbA1c  Lab Results   Component Value Date    HGBA1C 9.1 (H) 07/02/2017           Imaging  Imaging Results          CTA Chest Non-Coronary (PE Study) (Final result)  Result time 07/02/17 06:20:33    Final result by Frances Glaser MD (07/02/17 06:20:33)                 Impression:        No pulmonary thromboembolus.    Findings include:  - Mild coronary artery atherosclerosis.  - Thyroid nodules.  Consider correlation with ultrasound.  - Suspected cholelithiasis.      Electronically signed by: FRANCES GLASER MD  Date:     07/02/17  Time:    06:20              Narrative:    Technique:  1.25 mm axial images were obtained through the chest following the administration of 100 cc of Omnipaque 350 IV contrast. Multiplanar MIP reformats were performed    Comparison: Radiograph  07/02/2017.    Findings:    Hypodense thyroid nodule is noted, the largest of which measures approximately 1 cm and is located within the isthmus/left lobe.    There is a left-sided aortic arch with 3 branch vessels.  The thoracic aorta tapers normally with mild atherosclerotic calcification.  No aneurysmal dilatation or dissection.    Pulmonary arteries distributed normally.  No filling defects identified to suggest pulmonary thromboembolus.      Heart is at the upper limit of normal in size and demonstrates mild coronary artery atherosclerosis.  No pericardial effusion.    No axillary or mediastinal lymph node enlargement.  Hilar contours are unremarkable.    Esophagus is normal in caliber and course.    Trachea and proximal airways are patent.  Incidental note is made of a tracheal bronchus, a normal congenital variant.    Lung volumes are normal and symmetric.  No focal consolidation.  Subsegmental atelectasis is noted within the lingula and right middle lobe.  No pneumothorax or pleural effusions.  No bronchiectasis.  No CT findings to suggest chronic interstitial lung disease.    Suspected calcified stone noted within the gallbladder lumen.  Remaining visualized upper abdominal structures demonstrate no significant abnormalities.    Subcutaneous soft tissues are within normal limits.    No fractures.  No osseous destruction.                             X-Ray Chest AP Portable (Final result)  Result time 07/02/17 05:11:58    Final result by Frances Glaser MD (07/02/17 05:11:58)                 Impression:        No acute radiographic findings in the chest.      Electronically signed by: FRANCES GLASER MD  Date:     07/02/17  Time:    05:11              Narrative:    Technique: AP chest radiograph.    Comparison: Radiograph 02/20/2017.    Findings:    Mediastinal structures are midline. Cardiac silhouette is normal in size. Lung volumes are normal and symmetric. No pulmonary consolidation.  No pneumothorax  or pleural effusion. No free air beneath the diaphragm. Bones demonstrate no acute abnormalities.                              Micro:  Microbiology Results (last 7 days)     ** No results found for the last 168 hours. **         Assessment/Plan:  67 yo Female with hx HTN and pre-dm admitted for concern for GI bleed with H/H decline from 4 months prior with GI consulted and plans for EGD. Also admitted with Elevated troponins. Overnight troponin increased and cards consulted.     Gastrointestinal hemorrhage     H/H in the ED 10.2/30.4 baseline 14.2/43  H/H today stable 9.5/28.6  Follow h/h every 6 hours  Occult blood: positive  Type and screen  3 units of RBC prepared   consented obtained.   [facility] consulted plans for EGD today.   GI notified patient sent to cath lab per cards.   IV Protonix given   Lipase : 91  Amylase: 81      Elevated Troponin  - On admission: Initial trop: 0.052 and EKG NSR  - Trop elevated overnight: 3.495, EKG no ischemic change. [facility] consulted.  - AM trop 2.606  - Pt brought to Cath lab this AM  - Follow up Cardiology recommendations.        Hypertension     Initial BP was 223/90  24hr -225/61-98   IV hydralazine 10 mg given x 2 and now BP is 157/80;s  Will restart home medication Amlodipine 10mg  Will continue to monitor BP.              SOB      improved today  BNP: 21  D- dimer: 0.57  CTA was done to r/o PE and dissection. Mild coronary artery atherosclerosis and thyroid nodules. Suspected cholelithiasis.              Diabetes     Pt has hx of prediabetes but unknown HgA1c prior to admission.   HgA1c ordered and elevated at 9.1  Glu 235 on admission  AM   Low dose SSI     Will likely start metformin on discharge.       Dispo: follow up cards and cath, follow up GI and EGD      Jenny Sweeney D.O.  [facility] [place]  HO-2  07/03/2017

## 2017-07-03 NOTE — PLAN OF CARE
Problem: Patient Care Overview  Goal: Plan of Care Review  Outcome: Ongoing (interventions implemented as appropriate)  No issues in recovery, vss

## 2017-07-03 NOTE — NURSING
Report called to kilo Rn and questions answered. Patient right groin site WNL, no bleeding, no hematoma, no swelling noted. Vitals and pulses WNL and patient transported to room on monitor by RN.

## 2017-07-03 NOTE — PLAN OF CARE
Problem: Fall Risk (Adult)  Goal: Identify Related Risk Factors and Signs and Symptoms  Related risk factors and signs and symptoms are identified upon initiation of Human Response Clinical Practice Guideline (CPG)   Outcome: Ongoing (interventions implemented as appropriate)  Bed in low position and locked. Alarms on and audible. Call light within reach. Education given on preventing falls and using call light. Undersatnding verbalized    Problem: Patient Care Overview  Goal: Plan of Care Review  Outcome: Ongoing (interventions implemented as appropriate)  Patient AAOx4 moving all extremities as normal.  Respirations even and unlabored. Skin warm dry intact and normal color of ethnicity. Telemetry monitor on, functioning and displaying no red alarms.  Plan of care discussed and goals created. Patient educated on diagnosis and medications. Understanding verbalized.    Problem: Diabetes, Type 2 (Adult)  Goal: Signs and Symptoms of Listed Potential Problems Will be Absent, Minimized or Managed (Diabetes, Type 2)  Signs and symptoms of listed potential problems will be absent, minimized or managed by discharge/transition of care (reference Diabetes, Type 2 (Adult) CPG).   Outcome: Ongoing (interventions implemented as appropriate)  Accuchecks are monitored every six hours since NPO status and sliding scale insulin administered as ordered

## 2017-07-03 NOTE — PLAN OF CARE
Problem: Patient Care Overview  Goal: Plan of Care Review  Outcome: Ongoing (interventions implemented as appropriate)  Pt on RA with sats of 98%.  Will continue to monitor.

## 2017-07-04 LAB
ALBUMIN SERPL BCP-MCNC: 3.2 G/DL
ALP SERPL-CCNC: 82 U/L
ALT SERPL W/O P-5'-P-CCNC: 37 U/L
ANION GAP SERPL CALC-SCNC: 6 MMOL/L
AST SERPL-CCNC: 39 U/L
BASOPHILS # BLD AUTO: 0.03 K/UL
BASOPHILS NFR BLD: 0.5 %
BILIRUB SERPL-MCNC: 0.4 MG/DL
BUN SERPL-MCNC: 8 MG/DL
CALCIUM SERPL-MCNC: 8.4 MG/DL
CHLORIDE SERPL-SCNC: 106 MMOL/L
CO2 SERPL-SCNC: 26 MMOL/L
CORONARY STENOSIS: ABNORMAL
CREAT SERPL-MCNC: 1.1 MG/DL
DIFFERENTIAL METHOD: ABNORMAL
EOSINOPHIL # BLD AUTO: 0.3 K/UL
EOSINOPHIL NFR BLD: 4.7 %
ERYTHROCYTE [DISTWIDTH] IN BLOOD BY AUTOMATED COUNT: 16.1 %
EST. GFR  (AFRICAN AMERICAN): >60 ML/MIN/1.73 M^2
EST. GFR  (NON AFRICAN AMERICAN): 53 ML/MIN/1.73 M^2
ESTIMATED PA SYSTOLIC PRESSURE: 9.45
GLUCOSE SERPL-MCNC: 198 MG/DL
HCT VFR BLD AUTO: 27.4 %
HCT VFR BLD AUTO: 28.5 %
HCT VFR BLD AUTO: 28.6 %
HCT VFR BLD AUTO: 29.4 %
HGB BLD-MCNC: 9.1 G/DL
HGB BLD-MCNC: 9.3 G/DL
HGB BLD-MCNC: 9.4 G/DL
HGB BLD-MCNC: 9.5 G/DL
LYMPHOCYTES # BLD AUTO: 2.5 K/UL
LYMPHOCYTES NFR BLD: 38.5 %
MAGNESIUM SERPL-MCNC: 1.8 MG/DL
MCH RBC QN AUTO: 28.4 PG
MCHC RBC AUTO-ENTMCNC: 32 %
MCV RBC AUTO: 89 FL
MONOCYTES # BLD AUTO: 0.5 K/UL
MONOCYTES NFR BLD: 7.1 %
NEUTROPHILS # BLD AUTO: 3.1 K/UL
NEUTROPHILS NFR BLD: 49.2 %
PHOSPHATE SERPL-MCNC: 2.8 MG/DL
PLATELET # BLD AUTO: 296 K/UL
PMV BLD AUTO: 10.1 FL
POCT GLUCOSE: 229 MG/DL (ref 70–110)
POCT GLUCOSE: 241 MG/DL (ref 70–110)
POCT GLUCOSE: 297 MG/DL (ref 70–110)
POCT GLUCOSE: 330 MG/DL (ref 70–110)
POTASSIUM SERPL-SCNC: 3.6 MMOL/L
PROT SERPL-MCNC: 6.8 G/DL
RBC # BLD AUTO: 3.31 M/UL
RETIRED EF AND QEF - SEE NOTES: 65 (ref 55–65)
SODIUM SERPL-SCNC: 138 MMOL/L
WBC # BLD AUTO: 6.36 K/UL

## 2017-07-04 PROCEDURE — 25000003 PHARM REV CODE 250: Performed by: INTERNAL MEDICINE

## 2017-07-04 PROCEDURE — 84100 ASSAY OF PHOSPHORUS: CPT

## 2017-07-04 PROCEDURE — 93005 ELECTROCARDIOGRAM TRACING: CPT

## 2017-07-04 PROCEDURE — 94761 N-INVAS EAR/PLS OXIMETRY MLT: CPT

## 2017-07-04 PROCEDURE — 21400001 HC TELEMETRY ROOM

## 2017-07-04 PROCEDURE — 83735 ASSAY OF MAGNESIUM: CPT

## 2017-07-04 PROCEDURE — 80053 COMPREHEN METABOLIC PANEL: CPT

## 2017-07-04 PROCEDURE — 25000003 PHARM REV CODE 250: Performed by: STUDENT IN AN ORGANIZED HEALTH CARE EDUCATION/TRAINING PROGRAM

## 2017-07-04 PROCEDURE — 85018 HEMOGLOBIN: CPT | Mod: 91

## 2017-07-04 PROCEDURE — 25000003 PHARM REV CODE 250: Performed by: FAMILY MEDICINE

## 2017-07-04 PROCEDURE — 85014 HEMATOCRIT: CPT | Mod: 91

## 2017-07-04 PROCEDURE — 85025 COMPLETE CBC W/AUTO DIFF WBC: CPT

## 2017-07-04 PROCEDURE — 86677 HELICOBACTER PYLORI ANTIBODY: CPT

## 2017-07-04 PROCEDURE — 36415 COLL VENOUS BLD VENIPUNCTURE: CPT

## 2017-07-04 RX ORDER — AMLODIPINE BESYLATE 10 MG/1
10 TABLET ORAL DAILY
Qty: 90 TABLET | Refills: 3 | Status: SHIPPED | OUTPATIENT
Start: 2017-07-04 | End: 2018-02-20 | Stop reason: SDUPTHER

## 2017-07-04 RX ORDER — PANTOPRAZOLE SODIUM 40 MG/1
40 TABLET, DELAYED RELEASE ORAL 2 TIMES DAILY
Qty: 180 TABLET | Refills: 3 | OUTPATIENT
Start: 2017-07-04 | End: 2017-07-04

## 2017-07-04 RX ORDER — CLOPIDOGREL BISULFATE 75 MG/1
75 TABLET ORAL DAILY
Qty: 90 TABLET | Refills: 3 | OUTPATIENT
Start: 2017-07-04 | End: 2017-07-04

## 2017-07-04 RX ORDER — ATORVASTATIN CALCIUM 80 MG/1
80 TABLET, FILM COATED ORAL NIGHTLY
Qty: 90 TABLET | Refills: 3 | Status: SHIPPED | OUTPATIENT
Start: 2017-07-04 | End: 2017-12-07 | Stop reason: SINTOL

## 2017-07-04 RX ORDER — POTASSIUM CHLORIDE 20 MEQ/1
40 TABLET, EXTENDED RELEASE ORAL ONCE
Status: COMPLETED | OUTPATIENT
Start: 2017-07-04 | End: 2017-07-04

## 2017-07-04 RX ORDER — CLOPIDOGREL BISULFATE 75 MG/1
75 TABLET ORAL DAILY
Qty: 90 TABLET | Refills: 3 | Status: SHIPPED | OUTPATIENT
Start: 2017-07-04 | End: 2018-02-20 | Stop reason: SDUPTHER

## 2017-07-04 RX ORDER — NITROGLYCERIN 0.4 MG/1
0.4 TABLET SUBLINGUAL EVERY 5 MIN PRN
Qty: 10 TABLET | Refills: 1 | Status: SHIPPED | OUTPATIENT
Start: 2017-07-04 | End: 2021-08-26 | Stop reason: SDUPTHER

## 2017-07-04 RX ORDER — ISOSORBIDE MONONITRATE 60 MG/1
60 TABLET, EXTENDED RELEASE ORAL DAILY
Qty: 90 TABLET | Refills: 3 | Status: ON HOLD | OUTPATIENT
Start: 2017-07-04 | End: 2017-07-07

## 2017-07-04 RX ORDER — ASPIRIN 81 MG/1
81 TABLET ORAL DAILY
Qty: 90 TABLET | Refills: 3 | OUTPATIENT
Start: 2017-07-04 | End: 2017-07-04

## 2017-07-04 RX ORDER — ASPIRIN 81 MG/1
81 TABLET ORAL DAILY
Qty: 90 TABLET | Refills: 3 | Status: SHIPPED | OUTPATIENT
Start: 2017-07-04 | End: 2018-02-20 | Stop reason: SDUPTHER

## 2017-07-04 RX ORDER — LANOLIN ALCOHOL/MO/W.PET/CERES
400 CREAM (GRAM) TOPICAL DAILY
Status: DISCONTINUED | OUTPATIENT
Start: 2017-07-04 | End: 2017-07-05 | Stop reason: HOSPADM

## 2017-07-04 RX ORDER — PANTOPRAZOLE SODIUM 40 MG/1
40 TABLET, DELAYED RELEASE ORAL 2 TIMES DAILY
Qty: 180 TABLET | Refills: 3 | Status: SHIPPED | OUTPATIENT
Start: 2017-07-04 | End: 2017-07-27 | Stop reason: SDUPTHER

## 2017-07-04 RX ORDER — LISINOPRIL 10 MG/1
10 TABLET ORAL DAILY
Qty: 90 TABLET | Refills: 3 | OUTPATIENT
Start: 2017-07-05 | End: 2017-07-04

## 2017-07-04 RX ORDER — AMLODIPINE BESYLATE 10 MG/1
10 TABLET ORAL DAILY
Qty: 90 TABLET | Refills: 3 | OUTPATIENT
Start: 2017-07-04 | End: 2017-07-04

## 2017-07-04 RX ORDER — ASPIRIN 81 MG/1
81 TABLET ORAL DAILY
Refills: 0 | COMMUNITY
Start: 2017-07-04 | End: 2017-07-04

## 2017-07-04 RX ORDER — CARVEDILOL 3.12 MG/1
3.12 TABLET ORAL 2 TIMES DAILY
Qty: 180 TABLET | Refills: 3 | Status: SHIPPED | OUTPATIENT
Start: 2017-07-04 | End: 2017-07-05 | Stop reason: HOSPADM

## 2017-07-04 RX ORDER — ISOSORBIDE MONONITRATE 60 MG/1
60 TABLET, EXTENDED RELEASE ORAL DAILY
Qty: 90 TABLET | Refills: 3 | OUTPATIENT
Start: 2017-07-04 | End: 2017-07-04

## 2017-07-04 RX ORDER — METFORMIN HYDROCHLORIDE 1000 MG/1
500 TABLET ORAL 2 TIMES DAILY WITH MEALS
Qty: 90 TABLET | Refills: 3 | Status: ON HOLD | OUTPATIENT
Start: 2017-07-04 | End: 2017-07-07

## 2017-07-04 RX ORDER — ATORVASTATIN CALCIUM 80 MG/1
80 TABLET, FILM COATED ORAL NIGHTLY
Qty: 90 TABLET | Refills: 3 | OUTPATIENT
Start: 2017-07-04 | End: 2017-07-04

## 2017-07-04 RX ORDER — METFORMIN HYDROCHLORIDE 1000 MG/1
500 TABLET ORAL 2 TIMES DAILY WITH MEALS
Qty: 90 TABLET | Refills: 3 | OUTPATIENT
Start: 2017-07-04 | End: 2017-07-04

## 2017-07-04 RX ORDER — CARVEDILOL 3.12 MG/1
3.12 TABLET ORAL ONCE
Status: COMPLETED | OUTPATIENT
Start: 2017-07-04 | End: 2017-07-04

## 2017-07-04 RX ORDER — CARVEDILOL 3.12 MG/1
3.12 TABLET ORAL 2 TIMES DAILY
Status: DISCONTINUED | OUTPATIENT
Start: 2017-07-04 | End: 2017-07-05

## 2017-07-04 RX ORDER — LISINOPRIL 10 MG/1
10 TABLET ORAL DAILY
Status: DISCONTINUED | OUTPATIENT
Start: 2017-07-05 | End: 2017-07-05 | Stop reason: HOSPADM

## 2017-07-04 RX ORDER — LISINOPRIL 10 MG/1
10 TABLET ORAL DAILY
Qty: 90 TABLET | Refills: 3 | Status: SHIPPED | OUTPATIENT
Start: 2017-07-05 | End: 2017-12-07

## 2017-07-04 RX ADMIN — PANTOPRAZOLE SODIUM 40 MG: 40 TABLET, DELAYED RELEASE ORAL at 09:07

## 2017-07-04 RX ADMIN — CARVEDILOL 3.12 MG: 3.12 TABLET, FILM COATED ORAL at 02:07

## 2017-07-04 RX ADMIN — POTASSIUM CHLORIDE 40 MEQ: 20 TABLET, EXTENDED RELEASE ORAL at 12:07

## 2017-07-04 RX ADMIN — MAGNESIUM OXIDE TAB 400 MG (241.3 MG ELEMENTAL MG) 400 MG: 400 (241.3 MG) TAB at 12:07

## 2017-07-04 RX ADMIN — CARVEDILOL 3.12 MG: 3.12 TABLET, FILM COATED ORAL at 09:07

## 2017-07-04 RX ADMIN — CARVEDILOL 3.12 MG: 3.12 TABLET, FILM COATED ORAL at 12:07

## 2017-07-04 RX ADMIN — POTASSIUM CHLORIDE 40 MEQ: 20 TABLET, EXTENDED RELEASE ORAL at 09:07

## 2017-07-04 RX ADMIN — AMLODIPINE BESYLATE 10 MG: 5 TABLET ORAL at 09:07

## 2017-07-04 RX ADMIN — CLOPIDOGREL BISULFATE 75 MG: 75 TABLET ORAL at 09:07

## 2017-07-04 RX ADMIN — ASPIRIN 81 MG: 81 TABLET, COATED ORAL at 09:07

## 2017-07-04 RX ADMIN — ATORVASTATIN CALCIUM 80 MG: 40 TABLET, FILM COATED ORAL at 09:07

## 2017-07-04 RX ADMIN — ISOSORBIDE MONONITRATE 60 MG: 60 TABLET, EXTENDED RELEASE ORAL at 09:07

## 2017-07-04 NOTE — PROGRESS NOTES
Notified that patient was tachycardic with pulses 100-120's, PVC's seen on tele.  Nursing concerned about potential afib.    On exam, patient states that she is excited to be going home. She has no complaints, denies cp, sob, palpations.     K 3.6, will replete to maintain K at 4. Mg 1.8, will replete to maintain Mg at 2.     Spoke with [facility], Dr. Wang, Ok to start lose dose beta blocker and ok to discharge.    Jenny Sweeney D.O.  [facility] HO-2  07/04/2017

## 2017-07-04 NOTE — NURSING
Pt ambulated in gomez, around nurses station, and outside on the balcony. No reported SOB or pain. No noted distress.

## 2017-07-04 NOTE — MEDICAL/APP STUDENT
Update 7/5/17: Pt had an episode of tachycardia with questionable Afib yesterday. Pt given Coreg 3.125x2 yesterday with improvement of rate to 70-80. Pt was seen by cardiology this morning and has a follow-up scheduled for Friday 7/7/17 for right coronary intervention. Pt otherwise asymptomatic this morning sitting comfortably in bed.     HPI:   This 66 y.o. female with PMHx of diabetes and HTN presented to the hospital complaining of nausea, vomiting, and epigastric pain. Pt was found to have an elevated troponin and admitted to r/o ACS. Trended troponins increased to 3.1 Cardiology was consulted and a left heart catheterization was performed on 7/3 which showed RCA stenosis 95%. Echocardiogram showed EF of 60-65%. EKG showed SR with PACs. GI proceeded with EGD which showed small duodenal ulcer without active bleeding. GI approved antiplatelet/anticoagulant therapy as well as adding a PPI. Pt was seen by cardiology this morning and was told she was cleared to have outpatient follow-up for right coronary intervention. Pt to be discharged on ASA, plavix, statin, amlodipine, imdur, and ACE. Pt reports no complaints today.    ROS:  General: negative for fever, chills, fatigue  Eyes: negative for vision changes, eye pain  Cardio: negative for chest pain, palpitations, edema  Pulm: negative for SOB, coug  Abdomen: negative for abdominal pain, nausea, vomiting, diarrhea, constipation  Musc: negative for arthralgias, myalgias.   Neuro: negative for headache, weakness, dizziness, numbness   Gu: negative for dysuria, hematuria, polyuria.     Physical exam:  General: well-developed, well-nourished, NAD  Head: normocephalic, atraumatic  Eyes: PERRLA, EOM WNL  Cardio: RRR, no murmurs  Pulm: CTA, no wheezing, rales, or rhonchi  Musc: no edema, normal ROM  Neuro: AAOx3    Assessment/Plan  NSTEMI  - discharge with outpatient follow-up with cardiology for right coronary intervention  - ASA 81 mg  - Plavix 75 mg qd  - Amlodipine 10  mg qd  - Atorvastatin 80 mg qd  - Imdur 20 mg bid  - Lisinopril 10 mg qd    Duodenal Ulcer  - PPI bid

## 2017-07-04 NOTE — DISCHARGE SUMMARY
Discharge Summary      Admit Date: 7/2/2017    Discharge Date and Time: 07/04/2017    Attending Physician: No att. providers found     Discharge Physician: Jenny Sweeney    Principal Diagnoses: Gastrointestinal hemorrhage  The primary encounter diagnosis was Gastrointestinal hemorrhage, unspecified gastrointestinal hemorrhage type. Diagnoses of Hypertensive emergency, NSTEMI (non-ST elevated myocardial infarction), Exertional chest pain, HTN (hypertension), Epigastric pain, Gastrointestinal hemorrhage with melena, Elevated troponin, and Gastrointestinal hemorrhage were also pertinent to this visit.    Discharged Condition: stable    HPI:   67 yo female with pmhx of HTN, gastric ulcers, and prediabetes presented to the ED with nausea, vomiting, epigastric pain with SOB x 1 day. She reports having similar episode in the past and had an endoscopy done which showed that she had ulcers. When she had a similar episode last year she was vomiting blood, but this time it is mostly nausea. She experiences SOB only when she is exerting and whenever she has vomit and epigastric pain. The pain from the epigastric region radiates to the back. She reports not being able to sleep flat, and she sleeps on her sides. She has not had SOB otherwise. She denies any urinary symptoms or bowel movement symptoms. She has noticed that the past 2 weeks her stool has been dark and tarry but has been becoming brown now with no blood seen in the stool.     Hospital Course: Eliza Louie is a 66 y.o. female with pmh  has a past medical history of Diabetes mellitus and Hypertension. who presented with Gastrointestinal hemorrhage. Pt was evaluated in the ED c/o exertional chest pain, nausea, and vomiting. Pt had elevated 's/100's. EKG showed NRS, no STEMI, Diffuse ST depression in I, II, avF. Labs significant for elevated troponin and decreased H/H. Pt was found to be FOBT positive. GI was consulted and EGD ordered. Pt's troponins trended upward  to 3.495. Cardiology was consulted. Left heart catheterization showed LCX lesion and RCA 95% stenosis. No intervention was indicated at the time. Echocardiogram showed EF 60-65%. Pt started on statin and Plavix per cardiology. Antiplatelt/Anticoagulatnt therapy was held secondary to GI bleed. GI performed EGD which showed small duodenal ulcer without active bleeding. GI recommended PPI BID. Pt started on Protonix 40 mg BID. H. Pylori antibody IgG in process. GI approved starting antiplatelet/anticoagulant therapy. Pt started on Plavix. Cardiology evaluated patient and determined pt was stable to discharge home with outpatient follow-up for right coronary intervention. Troponin trended down from 3.495 to 2.606. Prior to discharge, pt's had episodes of atrial fibrillation with heart rate in the 100-120s. Cardiology consulted. Pt given Coreg 3.125 mg x2 and converted to NSR with heart rate 60-80s. Pt was asymptomatic during the episodes of tachycardia. Pt to be discharged on ASA, Plavix, Amlodipine, Statin, Imdur, ACE and Coreg per cardiology. Sublingual NTG prescribed per Family Medicine. Pt's HbA1c in hospital was 9.1 Pt also discharged home on Metformin.       Consults: IP CONSULT TO GASTROENTEROLOGY-[facility]  IP CONSULT TO CARDIOLOGY-[facility]      Disposition: Home or Self Care    Patient Instructions:   Current Discharge Medication List      START taking these medications    Details   aspirin (ECOTRIN) 81 MG EC tablet Take 1 tablet (81 mg total) by mouth once daily.  Qty: 90 tablet, Refills: 3      atorvastatin (LIPITOR) 80 MG tablet Take 1 tablet (80 mg total) by mouth every evening.  Qty: 90 tablet, Refills: 3      clopidogrel (PLAVIX) 75 mg tablet Take 1 tablet (75 mg total) by mouth once daily.  Qty: 90 tablet, Refills: 3      isosorbide mononitrate (IMDUR) 60 MG 24 hr tablet Take 1 tablet (60 mg total) by mouth once daily.  Qty: 90 tablet, Refills: 3      lisinopril 10 MG tablet Take 1 tablet (10 mg total) by mouth  once daily.  Qty: 90 tablet, Refills: 3      metformin (GLUCOPHAGE) 1000 MG tablet Take 0.5 tablets (500 mg total) by mouth 2 (two) times daily with meals.  Qty: 90 tablet, Refills: 3      pantoprazole (PROTONIX) 40 MG tablet Take 1 tablet (40 mg total) by mouth 2 (two) times daily.  Qty: 180 tablet, Refills: 3         CONTINUE these medications which have CHANGED    Details   amlodipine (NORVASC) 10 MG tablet Take 1 tablet (10 mg total) by mouth once daily.  Qty: 90 tablet, Refills: 3         STOP taking these medications       omeprazole (PRILOSEC) 20 MG capsule Comments:   Reason for Stopping:               Jenny Sweeney D.O.  [facility] HO-2  07/05/2017  11:02 AM

## 2017-07-04 NOTE — PROGRESS NOTES
She feels well with no chest pains at all. See diagram in chart. She does have a right coronary lesion which can be intervened on as outpatient. At this time, would discharge on ASA, Plavix, statin, amlodipine, Imdur and ACE. I will set her up for intervention as outpatient. She also has instructions to return if she has any further chest pains. Her blood pressure is still elevated and thus would increase the ACE

## 2017-07-04 NOTE — PROGRESS NOTES
Pharmacy New Medication Education     Patient accepted medication education.     Pharmacy educated patient on the following medications, using the teach-back method.   Norvasc  Asa  Lipitor  Plavix  D50%  Glucagon  Glucose  Novolog  Imdur  Lisinopril  Oxy  Pantoprazole  kcl    Learners of pharmacy medication education included:  patient     Patient +/- learner response:  verbalize understanding

## 2017-07-04 NOTE — PROGRESS NOTES
PGY-1 Progress Note    Hospital Stay Day 2    Subjective: Patient seen and examined today. Pt states that she is feeling much better. She has no complaints today. She has been ambulating to rest room and is tolerating diet. Denies any CP, SOB, N/V/D, headaches, fever or chills.       Scheduled Meds:   amlodipine  10 mg Oral Daily    aspirin  81 mg Oral Daily    atorvastatin  80 mg Oral QHS    clopidogrel  75 mg Oral Daily    isosorbide mononitrate  60 mg Oral Daily    lisinopril  5 mg Oral Daily    pantoprazole  40 mg Oral BID    potassium chloride  40 mEq Oral Daily     Continuous Infusions:   sodium chloride 0.9% 100 mL/hr at 17 1524     PRN Meds:sodium chloride, dextrose 50%, dextrose 50%, dextrose 50%, glucagon (human recombinant), glucagon (human recombinant), glucose, glucose, insulin aspart, oxycodone, pneumoc 13-diana conj-dip cr(PF)    Review of patient's allergies indicates:  No Known Allergies    ROS  -per HPI    Objectives:     Vitals(Most Recent)      BP  Min: 136/61  Max: 225/96  Temp  Av.3 °F (36.8 °C)  Min: 97.7 °F (36.5 °C)  Max: 98.9 °F (37.2 °C)  Pulse  Av.9  Min: 66  Max: 112  Resp  Av.4  Min: 10  Max: 25  SpO2  Av.3 %  Min: 91 %  Max: 98 %             Vitals([id number])  Temp:  [97.7 °F (36.5 °C)-98.9 °F (37.2 °C)]   Pulse:  []   Resp:  [10-25]   BP: (136-225)/(55-96)   SpO2:  [91 %-98 %]     I & O([id number])    Intake/Output Summary (Last 24 hours) at 17 0724  Last data filed at 17 0500   Gross per 24 hour   Intake             1800 ml   Output              500 ml   Net             1300 ml     Physical Exam   Constitutional:  oriented to person, place, and time.  appears well-developed and well-nourished. No distress.   HENT:   Head: Normocephalic and atraumatic.   Mouth/Throat: Oropharynx is clear and moist.   Eyes: Conjunctivae and EOM are normal. Pupils are equal, round, and reactive to light.   Neck: Normal range of motion. Neck supple.    Cardiovascular: Normal rate, regular rhythm, normal heart sounds and intact distal pulses.    Pulmonary/Chest: Effort normal and breath sounds normal.   Abdominal: Soft. Bowel sounds are normal. no distension. There is no tenderness.   Musculoskeletal: Normal range of motion.  no edema, tenderness or deformity.   Neurological:  alert and oriented to person, place, and time.  normal reflexes.   Skin: Skin is warm and dry. not diaphoretic.   Psychiatric:  normal mood and affect.  behavior is normal. Judgment and thought content normal.   Nursing note and vitals reviewed.      LABS  CBC    Recent Labs  Lab 07/02/17  0453  07/03/17  0558  07/03/17  1745 07/03/17  2309 07/04/17  0458   WBC 7.53  --  7.52  --   --   --  6.36   RBC 3.59*  --  3.33*  --   --   --  3.31*   HGB 10.2*  < > 9.5*  < > 10.2* 9.3* 9.4*   HCT 30.4*  < > 28.6*  < > 31.1* 27.5* 29.4*     --  294  --   --   --  296   MCV 85  --  86  --   --   --  89   MCH 28.4  --  28.5  --   --   --  28.4   MCHC 33.6  --  33.2  --   --   --  32.0   < > = values in this interval not displayed.  BMP    Recent Labs  Lab 07/02/17  0453 07/03/17  0558    138   K 3.2* 3.4*   CO2 25 25    105   BUN 15 9   CREATININE 1.1 1.1   * 159*       POCT-Glucose  POCT Glucose   Date Value Ref Range Status   07/04/2017 229 (H) 70 - 110 mg/dL Final   07/03/2017 237 (H) 70 - 110 mg/dL Final   07/03/2017 206 (H) 70 - 110 mg/dL Final   07/03/2017 177 (H) 70 - 110 mg/dL Final   07/03/2017 200 (H) 70 - 110 mg/dL Final   07/02/2017 237 (H) 70 - 110 mg/dL Final   07/02/2017 325 (H) 70 - 110 mg/dL Final   07/02/2017 286 (H) 70 - 110 mg/dL Final         Recent Labs  Lab 07/02/17  0453 07/03/17  0558   CALCIUM 9.1 8.2*   MG  --  1.4*   PHOS  --  3.6     LFT    Recent Labs  Lab 07/02/17  0453 07/03/17  0558   PROT 7.3 6.3   ALBUMIN 3.4* 3.1*   BILITOT 0.5 0.6   AST 42* 50*   ALKPHOS 87 76   ALT 44 39       CE    Recent Labs  Lab 07/02/17  1658 07/02/17  2348  07/03/17  0558   TROPONINI 1.153* 3.495* 2.606*       BNP    Recent Labs  Lab 07/02/17  0453   BNP 21     LAST HbA1c  Lab Results   Component Value Date    HGBA1C 9.1 (H) 07/02/2017       Imaging  Imaging Results          CTA Chest Non-Coronary (PE Study) (Final result)  Result time 07/02/17 06:20:33    Final result by Frances Glaser MD (07/02/17 06:20:33)                 Impression:        No pulmonary thromboembolus.    Findings include:  - Mild coronary artery atherosclerosis.  - Thyroid nodules.  Consider correlation with ultrasound.  - Suspected cholelithiasis.      Electronically signed by: FRANCES GLASER MD  Date:     07/02/17  Time:    06:20              Narrative:    Technique:  1.25 mm axial images were obtained through the chest following the administration of 100 cc of Omnipaque 350 IV contrast. Multiplanar MIP reformats were performed    Comparison: Radiograph 07/02/2017.    Findings:    Hypodense thyroid nodule is noted, the largest of which measures approximately 1 cm and is located within the isthmus/left lobe.    There is a left-sided aortic arch with 3 branch vessels.  The thoracic aorta tapers normally with mild atherosclerotic calcification.  No aneurysmal dilatation or dissection.    Pulmonary arteries distributed normally.  No filling defects identified to suggest pulmonary thromboembolus.      Heart is at the upper limit of normal in size and demonstrates mild coronary artery atherosclerosis.  No pericardial effusion.    No axillary or mediastinal lymph node enlargement.  Hilar contours are unremarkable.    Esophagus is normal in caliber and course.    Trachea and proximal airways are patent.  Incidental note is made of a tracheal bronchus, a normal congenital variant.    Lung volumes are normal and symmetric.  No focal consolidation.  Subsegmental atelectasis is noted within the lingula and right middle lobe.  No pneumothorax or pleural effusions.  No bronchiectasis.  No CT findings to  suggest chronic interstitial lung disease.    Suspected calcified stone noted within the gallbladder lumen.  Remaining visualized upper abdominal structures demonstrate no significant abnormalities.    Subcutaneous soft tissues are within normal limits.    No fractures.  No osseous destruction.                             X-Ray Chest AP Portable (Final result)  Result time 07/02/17 05:11:58    Final result by Arcadio Glaser MD (07/02/17 05:11:58)                 Impression:        No acute radiographic findings in the chest.                 Narrative:    Technique: AP chest radiograph.    Comparison: Radiograph 02/20/2017.    Findings:    Mediastinal structures are midline. Cardiac silhouette is normal in size. Lung volumes are normal and symmetric. No pulmonary consolidation.  No pneumothorax or pleural effusion. No free air beneath the diaphragm. Bones demonstrate no acute abnormalities.                              Assessment/Plan:  67 yo Female with hx HTN and pre-dm admitted for concern of Gi bleed now s/p EGD found to have non-bleeding ulcer in duodenum. Also with elevated trop found to have NSTEMI with lesion in LCX.     NSTEMI  - On admission: Initial trop: 0.052 and EKG NSR  - Trop elevated to max of 3.495  - Serial EKG no ischemic change  - [facility] consulted and performed Cardiac Cath yesterday with noted lesion in LCX, 80% occlusion of RCA. no intervention at that time.  - medical management with BP control, plavix, statin at this time  - Per cards, OK to discharge with plan for Intervention as outpatient       HTN  - Initial BP was 223/90  - 24hr -225/55-96 with AM /85  - Per cards, medication recommended: ASA, Plavix, statin, amlodipine, Imdur and ACE (for both HTN and NSTEMI management)    GI Hemorrhage  - H/H in the ED 10.2/30.4 baseline 14.2/43  - GI consulted  - s/p EGD with finding of non-bleeding duodenal ulcer  - GI recs for PPI BID, h pylori testing and Ok to  anti-coagulate.    DM2  - On admisstion, Pt has hx of prediabetes but unknown HgA1c   - HgA1c  9.1  - Glu 235 on admission  - AM   - Low dose SSI   - Will metformin on discharge and recommend dietary changes    SOB - Resolved  - SOB on admission  - BNP: 21  - D- dimer: 0.57  - CTA was done to r/o PE and dissection. Mild coronary artery atherosclerosis and thyroid nodules      Dispo: likely discharge today with follow up for Cath as outpatient. F/u H. Pylori labs.       Jenny Sweeney D.O.  [facility] HO-1  07/04/2017

## 2017-07-04 NOTE — PLAN OF CARE
Problem: Patient Care Overview  Goal: Plan of Care Review  Outcome: Ongoing (interventions implemented as appropriate)  Pt stable. NO distress noted. POC reviewed with pt. Pt verbalized understanding. Pt remains SR n the monitor. NO true red alarms noted. Pt denied pain. 1 BM noted. Normal stool. Fall precautions maintained. Bed in lowest position, call light in reach and bed alarm on.

## 2017-07-04 NOTE — PLAN OF CARE
D/c orders received, pt has no HH or DME needs at this time. Family available to transport patient home,  Team assist with follow up appt.      Future Appointments  Date Time Provider Department Center   7/10/2017 3:20 PM Jenny Sweeney DO [facility]

## 2017-07-05 DIAGNOSIS — I21.4 NSTEMI (NON-ST ELEVATED MYOCARDIAL INFARCTION): Primary | ICD-10-CM

## 2017-07-05 LAB
ALBUMIN SERPL BCP-MCNC: 3.1 G/DL
ALP SERPL-CCNC: 78 U/L
ALT SERPL W/O P-5'-P-CCNC: 31 U/L
ANION GAP SERPL CALC-SCNC: 4 MMOL/L
AST SERPL-CCNC: 28 U/L
BASOPHILS # BLD AUTO: 0.03 K/UL
BASOPHILS NFR BLD: 0.3 %
BILIRUB SERPL-MCNC: 0.4 MG/DL
BUN SERPL-MCNC: 6 MG/DL
CALCIUM SERPL-MCNC: 8.4 MG/DL
CHLORIDE SERPL-SCNC: 106 MMOL/L
CO2 SERPL-SCNC: 27 MMOL/L
CREAT SERPL-MCNC: 1 MG/DL
DIFFERENTIAL METHOD: ABNORMAL
EOSINOPHIL # BLD AUTO: 0.4 K/UL
EOSINOPHIL NFR BLD: 4.8 %
ERYTHROCYTE [DISTWIDTH] IN BLOOD BY AUTOMATED COUNT: 15.6 %
EST. GFR  (AFRICAN AMERICAN): >60 ML/MIN/1.73 M^2
EST. GFR  (NON AFRICAN AMERICAN): 60 ML/MIN/1.73 M^2
GLUCOSE SERPL-MCNC: 180 MG/DL
HCT VFR BLD AUTO: 26.8 %
HCT VFR BLD AUTO: 27.6 %
HCT VFR BLD AUTO: 28.2 %
HGB BLD-MCNC: 8.9 G/DL
HGB BLD-MCNC: 9.2 G/DL
HGB BLD-MCNC: 9.3 G/DL
LYMPHOCYTES # BLD AUTO: 2.6 K/UL
LYMPHOCYTES NFR BLD: 29.7 %
MAGNESIUM SERPL-MCNC: 1.5 MG/DL
MCH RBC QN AUTO: 28.7 PG
MCHC RBC AUTO-ENTMCNC: 33.3 %
MCV RBC AUTO: 86 FL
MONOCYTES # BLD AUTO: 0.6 K/UL
MONOCYTES NFR BLD: 7.4 %
NEUTROPHILS # BLD AUTO: 5 K/UL
NEUTROPHILS NFR BLD: 57.6 %
PHOSPHATE SERPL-MCNC: 3.4 MG/DL
PLATELET # BLD AUTO: 289 K/UL
PMV BLD AUTO: 9.6 FL
POCT GLUCOSE: 202 MG/DL (ref 70–110)
POCT GLUCOSE: 228 MG/DL (ref 70–110)
POTASSIUM SERPL-SCNC: 4 MMOL/L
PROT SERPL-MCNC: 6.5 G/DL
RBC # BLD AUTO: 3.21 M/UL
SODIUM SERPL-SCNC: 137 MMOL/L
WBC # BLD AUTO: 8.66 K/UL

## 2017-07-05 PROCEDURE — 63600175 PHARM REV CODE 636 W HCPCS: Performed by: STUDENT IN AN ORGANIZED HEALTH CARE EDUCATION/TRAINING PROGRAM

## 2017-07-05 PROCEDURE — 25000003 PHARM REV CODE 250: Performed by: FAMILY MEDICINE

## 2017-07-05 PROCEDURE — 84100 ASSAY OF PHOSPHORUS: CPT

## 2017-07-05 PROCEDURE — 85018 HEMOGLOBIN: CPT

## 2017-07-05 PROCEDURE — 85025 COMPLETE CBC W/AUTO DIFF WBC: CPT

## 2017-07-05 PROCEDURE — 80053 COMPREHEN METABOLIC PANEL: CPT

## 2017-07-05 PROCEDURE — 83735 ASSAY OF MAGNESIUM: CPT

## 2017-07-05 PROCEDURE — 25000003 PHARM REV CODE 250: Performed by: STUDENT IN AN ORGANIZED HEALTH CARE EDUCATION/TRAINING PROGRAM

## 2017-07-05 PROCEDURE — 94761 N-INVAS EAR/PLS OXIMETRY MLT: CPT

## 2017-07-05 PROCEDURE — 25000003 PHARM REV CODE 250: Performed by: INTERNAL MEDICINE

## 2017-07-05 PROCEDURE — 63600175 PHARM REV CODE 636 W HCPCS: Performed by: FAMILY MEDICINE

## 2017-07-05 PROCEDURE — 36415 COLL VENOUS BLD VENIPUNCTURE: CPT

## 2017-07-05 PROCEDURE — 85014 HEMATOCRIT: CPT

## 2017-07-05 RX ORDER — MAGNESIUM SULFATE HEPTAHYDRATE 40 MG/ML
2 INJECTION, SOLUTION INTRAVENOUS ONCE
Status: COMPLETED | OUTPATIENT
Start: 2017-07-05 | End: 2017-07-05

## 2017-07-05 RX ORDER — CARVEDILOL 6.25 MG/1
6.25 TABLET ORAL 2 TIMES DAILY
Status: DISCONTINUED | OUTPATIENT
Start: 2017-07-05 | End: 2017-07-05 | Stop reason: HOSPADM

## 2017-07-05 RX ORDER — CARVEDILOL 6.25 MG/1
6.25 TABLET ORAL 2 TIMES DAILY
Qty: 180 TABLET | Refills: 3 | Status: SHIPPED | OUTPATIENT
Start: 2017-07-05 | End: 2018-02-20 | Stop reason: SDUPTHER

## 2017-07-05 RX ADMIN — CLOPIDOGREL BISULFATE 75 MG: 75 TABLET ORAL at 08:07

## 2017-07-05 RX ADMIN — ASPIRIN 81 MG: 81 TABLET, COATED ORAL at 08:07

## 2017-07-05 RX ADMIN — PANTOPRAZOLE SODIUM 40 MG: 40 TABLET, DELAYED RELEASE ORAL at 08:07

## 2017-07-05 RX ADMIN — POTASSIUM CHLORIDE 40 MEQ: 20 TABLET, EXTENDED RELEASE ORAL at 08:07

## 2017-07-05 RX ADMIN — LISINOPRIL 10 MG: 10 TABLET ORAL at 08:07

## 2017-07-05 RX ADMIN — INSULIN ASPART 2 UNITS: 100 INJECTION, SOLUTION INTRAVENOUS; SUBCUTANEOUS at 12:07

## 2017-07-05 RX ADMIN — ISOSORBIDE MONONITRATE 60 MG: 60 TABLET, EXTENDED RELEASE ORAL at 08:07

## 2017-07-05 RX ADMIN — MAGNESIUM SULFATE HEPTAHYDRATE 2 G: 40 INJECTION, SOLUTION INTRAVENOUS at 10:07

## 2017-07-05 RX ADMIN — CARVEDILOL 3.12 MG: 3.12 TABLET, FILM COATED ORAL at 08:07

## 2017-07-05 RX ADMIN — MAGNESIUM OXIDE TAB 400 MG (241.3 MG ELEMENTAL MG) 400 MG: 400 (241.3 MG) TAB at 08:07

## 2017-07-05 RX ADMIN — AMLODIPINE BESYLATE 10 MG: 5 TABLET ORAL at 08:07

## 2017-07-05 NOTE — CONSULTS
[facility]  Gastroenterology  Consult Note    Patient Name: Eliza Louie  MRN: [id number]  Admission Date: 7/2/2017  Hospital Length of Stay: 3 days  Code Status: Full Code   Attending Provider: No att. providers found   Consulting Provider: Junior Philippe MD  Primary Care Physician: Primary Doctor No  Principal Problem:Gastrointestinal hemorrhage    Inpatient consult to [facility]  Consult performed by: JUNIOR PHILIPPE  Consult ordered by: MARILOU DAMIAN        Subjective:   Consult already completed

## 2017-07-05 NOTE — NURSING
Discharge instructions and education provided. Patient voices understanding. IV site and telemetry discontinued without adverse reaction. Patient waiting for medications to arrive from pharmacy and then will be discharged.

## 2017-07-05 NOTE — PLAN OF CARE
Problem: Patient Care Overview  Goal: Plan of Care Review  Pt was going to go home today but had a change in heart rate, pt was tachycardic and then atrial fibrillation on the monitor. Cardiology consulted and coreg given twice today. Pt has currently converted back to normal sinus but will be staying overnight for monitoring. Pt will be reevaluated tomorrow by cardiology. No reported pain of noted distress.

## 2017-07-05 NOTE — PLAN OF CARE
Problem: Patient Care Overview  Goal: Plan of Care Review  Outcome: Ongoing (interventions implemented as appropriate)  Pt on RA with sats of 97. Will continue to monitor.

## 2017-07-05 NOTE — PROGRESS NOTES
PGY-1 Progress Note    Hospital Stay Day 3    Subjective: Patient seen and examined today. Pt has no complaints today and is eager for discharge. Denies any CP, SOB, N/V/D, headaches, fever or chills.         Scheduled Meds:   amlodipine  10 mg Oral Daily    aspirin  81 mg Oral Daily    atorvastatin  80 mg Oral QHS    carvedilol  6.25 mg Oral BID    clopidogrel  75 mg Oral Daily    isosorbide mononitrate  60 mg Oral Daily    lisinopril  10 mg Oral Daily    magnesium oxide  400 mg Oral Daily    magnesium sulfate IVPB  2 g Intravenous Once    pantoprazole  40 mg Oral BID    potassium chloride  40 mEq Oral Daily     Continuous Infusions:   sodium chloride 0.9% 100 mL/hr at 17 1524     PRN Meds:sodium chloride, dextrose 50%, dextrose 50%, dextrose 50%, glucagon (human recombinant), glucagon (human recombinant), glucose, glucose, insulin aspart, oxycodone, pneumoc 13-diana conj-dip cr(PF)    Review of patient's allergies indicates:  No Known Allergies    ROS  -per HPI    Objectives:     Vitals(Most Recent)      BP  Min: 134/61  Max: 172/73  Temp  Av.6 °F (37 °C)  Min: 98.3 °F (36.8 °C)  Max: 98.8 °F (37.1 °C)  Pulse  Av.7  Min: 68  Max: 116  Resp  Av  Min: 18  Max: 18  SpO2  Av %  Min: 96 %  Max: 98 %             Vitals([id number])  Temp:  [98.3 °F (36.8 °C)-98.8 °F (37.1 °C)]   Pulse:  []   Resp:  [18]   BP: (134-172)/(61-82)   SpO2:  [96 %-98 %]     I & O([id number])    Intake/Output Summary (Last 24 hours) at 17 1017  Last data filed at 17 0800   Gross per 24 hour   Intake             1475 ml   Output              400 ml   Net             1075 ml     Physical Exam   Constitutional:  oriented to person, place, and time.  appears well-developed and well-nourished. No distress.   HENT:   Head: Normocephalic and atraumatic.   Mouth/Throat: Oropharynx is clear and moist.   Eyes: Conjunctivae and EOM are normal. Pupils are equal, round, and reactive to light.   Neck: Normal  range of motion. Neck supple.   Cardiovascular: Normal rate, regular rhythm, normal heart sounds and intact distal pulses.    Pulmonary/Chest: Effort normal and breath sounds normal.   Abdominal: Soft. Bowel sounds are normal. no distension. There is no tenderness.   Musculoskeletal: Normal range of motion.  no edema, tenderness or deformity.   Neurological:  alert and oriented to person, place, and time.  normal reflexes.   Skin: Skin is warm and dry. not diaphoretic.   Psychiatric:  normal mood and affect.  behavior is normal. Judgment and thought content normal.   Nursing note and vitals reviewed.      LABS  CBC    Recent Labs  Lab 07/03/17  0558  07/04/17  0458  07/04/17  1736 07/04/17  2333 07/05/17  0446   WBC 7.52  --  6.36  --   --   --  8.66   RBC 3.33*  --  3.31*  --   --   --  3.21*   HGB 9.5*  < > 9.4*  < > 9.3* 9.1* 9.3*  9.2*   HCT 28.6*  < > 29.4*  < > 28.5* 27.4* 28.2*  27.6*     --  296  --   --   --  289   MCV 86  --  89  --   --   --  86   MCH 28.5  --  28.4  --   --   --  28.7   MCHC 33.2  --  32.0  --   --   --  33.3   < > = values in this interval not displayed.  BMP    Recent Labs  Lab 07/03/17  0558 07/04/17  0800 07/05/17  0446    138 137   K 3.4* 3.6 4.0   CO2 25 26 27    106 106   BUN 9 8 6*   CREATININE 1.1 1.1 1.0   * 198* 180*       POCT-Glucose  POCT Glucose   Date Value Ref Range Status   07/05/2017 202 (H) 70 - 110 mg/dL Final   07/04/2017 297 (H) 70 - 110 mg/dL Final   07/04/2017 330 (H) 70 - 110 mg/dL Final   07/04/2017 241 (H) 70 - 110 mg/dL Final   07/04/2017 229 (H) 70 - 110 mg/dL Final   07/03/2017 237 (H) 70 - 110 mg/dL Final   07/03/2017 206 (H) 70 - 110 mg/dL Final   07/03/2017 177 (H) 70 - 110 mg/dL Final   07/03/2017 200 (H) 70 - 110 mg/dL Final   07/02/2017 237 (H) 70 - 110 mg/dL Final   07/02/2017 325 (H) 70 - 110 mg/dL Final         Recent Labs  Lab 07/03/17  0558 07/04/17  0800 07/05/17  0446   CALCIUM 8.2* 8.4* 8.4*   MG 1.4* 1.8 1.5*    PHOS 3.6 2.8 3.4     LFT    Recent Labs  Lab 07/03/17  0558 07/04/17  0800 07/05/17  0446   PROT 6.3 6.8 6.5   ALBUMIN 3.1* 3.2* 3.1*   BILITOT 0.6 0.4 0.4   AST 50* 39 28   ALKPHOS 76 82 78   ALT 39 37 31       CE    Recent Labs  Lab 07/02/17  1658 07/02/17  2349 07/03/17  0558   TROPONINI 1.153* 3.495* 2.606*       BNP    Recent Labs  Lab 07/02/17  0453   BNP 21     LAST HbA1c  Lab Results   Component Value Date    HGBA1C 9.1 (H) 07/02/2017       Imaging  Imaging Results          CTA Chest Non-Coronary (PE Study) (Final result)  Result time 07/02/17 06:20:33    Final result by Frances Glaser MD (07/02/17 06:20:33)                 Impression:        No pulmonary thromboembolus.    Findings include:  - Mild coronary artery atherosclerosis.  - Thyroid nodules.  Consider correlation with ultrasound.  - Suspected cholelithiasis.      Electronically signed by: FRANCES GLASER MD  Date:     07/02/17  Time:    06:20              Narrative:    Technique:  1.25 mm axial images were obtained through the chest following the administration of 100 cc of Omnipaque 350 IV contrast. Multiplanar MIP reformats were performed    Comparison: Radiograph 07/02/2017.    Findings:    Hypodense thyroid nodule is noted, the largest of which measures approximately 1 cm and is located within the isthmus/left lobe.    There is a left-sided aortic arch with 3 branch vessels.  The thoracic aorta tapers normally with mild atherosclerotic calcification.  No aneurysmal dilatation or dissection.    Pulmonary arteries distributed normally.  No filling defects identified to suggest pulmonary thromboembolus.      Heart is at the upper limit of normal in size and demonstrates mild coronary artery atherosclerosis.  No pericardial effusion.    No axillary or mediastinal lymph node enlargement.  Hilar contours are unremarkable.    Esophagus is normal in caliber and course.    Trachea and proximal airways are patent.  Incidental note is made of a  tracheal bronchus, a normal congenital variant.    Lung volumes are normal and symmetric.  No focal consolidation.  Subsegmental atelectasis is noted within the lingula and right middle lobe.  No pneumothorax or pleural effusions.  No bronchiectasis.  No CT findings to suggest chronic interstitial lung disease.    Suspected calcified stone noted within the gallbladder lumen.  Remaining visualized upper abdominal structures demonstrate no significant abnormalities.    Subcutaneous soft tissues are within normal limits.    No fractures.  No osseous destruction.                             X-Ray Chest AP Portable (Final result)  Result time 07/02/17 05:11:58    Final result by Arcadio Glaser MD (07/02/17 05:11:58)                 Impression:        No acute radiographic findings in the chest.                 Narrative:    Technique: AP chest radiograph.    Comparison: Radiograph 02/20/2017.    Findings:    Mediastinal structures are midline. Cardiac silhouette is normal in size. Lung volumes are normal and symmetric. No pulmonary consolidation.  No pneumothorax or pleural effusion. No free air beneath the diaphragm. Bones demonstrate no acute abnormalities.                              Assessment/Plan:  65 yo Female with hx HTN and pre-dm admitted for concern of Gi bleed now s/p EGD found to have non-bleeding ulcer in duodenum. Also with elevated trop found to have NSTEMI with lesion in LCX.     NSTEMI  - On admission: Initial trop: 0.052 and EKG NSR  - Trop elevated to max of 3.495  - Serial EKG no ischemic change  - [facility] consulted and performed Cardiac Cath yesterday with noted lesion in LCX, 80% occlusion of RCA. no intervention at that time.  - medical management with BP control, plavix, statin at this time    Transient Afib  - Pt had episodes of Afib yesterday prior to planned discharge.  - Pt started on Coreg and received 3.125 x 2 yesterday afternoon and 3.125 prior to bedtime.  - Cards consulted  and recommend to keep patient overnight  - Today cards recommends Coreg 6.125mg BID and Ok to discharge with plan for PCI on Friday as Outpatient.  - NSR today.     HTN  - Initial BP was 223/90  - AM /73 P 73  - Per cards, medication recommended: ASA, Plavix, statin, amlodipine, Imdur and ACE (for both HTN and NSTEMI management)    GI Hemorrhage  - H/H in the ED 10.2/30.4 baseline 14.2/43  - GI consulted  - s/p EGD with finding of non-bleeding duodenal ulcer  - GI recs for PPI BID, h pylori testing and Ok to anti-coagulate.    DM2  - On admisstion, Pt has hx of prediabetes but unknown HgA1c   - HgA1c  9.1  - Glu 235 on admission  - AM   - Low dose SSI   - Will metformin on discharge and recommend dietary changes    SOB - Resolved  - SOB on admission  - BNP: 21  - D- dimer: 0.57  - CTA was done to r/o PE and dissection. Mild coronary artery atherosclerosis and thyroid nodules      Dispo:  F/u H. Pylori labs.       Jenny Sweeney D.O.  [facility] HO-1  07/05/2017

## 2017-07-05 NOTE — PROGRESS NOTES
Pharmacy New Medication Education     Patient accepted medication education.     Pharmacy educated patient on the following medications, using the teach-back method.   Asa  Lipitor  Coreg  Plavix  Imdur  Lisinopril  Metformin  Ntg  Pantoprazole  norvasc    Learners of pharmacy medication education included:  patient     Patient +/- learner response:  verbalize understanding

## 2017-07-05 NOTE — PROGRESS NOTES
[facility] Cardiology Progress Note   Subjective:   Eliza Louie is a 66 y.o. female who is being followed by the [facility]\"" Cardiology service for NSTEMI, A fib. Last night, patient expeirenced episode of A fib. Pt was given coreg 3.125 bid and converted back. No chest pain, sob, dizziness.   Objective:   Temp:  [98.3 °F (36.8 °C)-98.8 °F (37.1 °C)] 98.8 °F (37.1 °C)  Pulse:  [] 73  Resp:  [18] 18  SpO2:  [96 %-98 %] 96 %  BP: (134-172)/(61-82) 172/73  I/O last 3 completed shifts:  In: 3450 [P.O.:750; I.V.:2700]  Out: 400 [Urine:400]  Current Medications:   Infusions:    sodium chloride 0.9% 100 mL/hr at 07/02/17 1524      Scheduled:    amlodipine  10 mg Oral Daily    aspirin  81 mg Oral Daily    atorvastatin  80 mg Oral QHS    carvedilol  3.125 mg Oral BID    clopidogrel  75 mg Oral Daily    isosorbide mononitrate  60 mg Oral Daily    lisinopril  10 mg Oral Daily    magnesium oxide  400 mg Oral Daily    magnesium sulfate IVPB  2 g Intravenous Once    pantoprazole  40 mg Oral BID    potassium chloride  40 mEq Oral Daily      PRN:   sodium chloride, dextrose 50%, dextrose 50%, dextrose 50%, glucagon (human recombinant), glucagon (human recombinant), glucose, glucose, insulin aspart, oxycodone, pneumoc 13-diana conj-dip cr(PF)   Physical Examination:   General: No Acute Distress, Awake, Alert and Oriented X 4   Head: normocephalic, atraumatic   HEENT: EOMI, PERRL, MMM   Neck: Supple JVP, no LAD   Cardiovascular: Iregular Rate and Rhythm, no Murmur   Respiratory: Clear to Ausculation Bilaterally, no Wheezes, Rhonchi or Rales   Abdomen: Bowel Sounds present, Soft, Non tender, Non distended   Extremities: No Clubbing, Cyanosis or Edema, 2+DP     Laboratory:     Component      Latest Ref Rng & Units 7/5/2017   Sodium      136 - 145 mmol/L 137   Potassium      3.5 - 5.1 mmol/L 4.0   Chloride      95 - 110 mmol/L 106   CO2      23 - 29 mmol/L 27   Glucose      70 - 110 mg/dL 180 (H)   BUN, Bld      8 - 23 mg/dL 6 (L)    Creatinine      0.5 - 1.4 mg/dL 1.0   Calcium      8.7 - 10.5 mg/dL 8.4 (L)   Total Protein      6.0 - 8.4 g/dL 6.5   Albumin      3.5 - 5.2 g/dL 3.1 (L)   Total Bilirubin      0.1 - 1.0 mg/dL 0.4   Alkaline Phosphatase      55 - 135 U/L 78   AST      10 - 40 U/L 28   ALT      10 - 44 U/L 31   Anion Gap      8 - 16 mmol/L 4 (L)   eGFR if African American      >60 mL/min/1.73 m:2 >60   eGFR if non African American      >60 mL/min/1.73 m:2 60     Component      Latest Ref Rng & Units 7/5/2017   WBC      3.90 - 12.70 K/uL 8.66   RBC      4.00 - 5.40 M/uL 3.21 (L)   Hemoglobin      12.0 - 16.0 g/dL 9.2 (L)   Hematocrit      37.0 - 48.5 % 27.6 (L)   MCV      82 - 98 fL 86   MCH      27.0 - 31.0 pg 28.7   MCHC      32.0 - 36.0 % 33.3   RDW      11.5 - 14.5 % 15.6 (H)   Platelets      150 - 350 K/uL 289   MPV      9.2 - 12.9 fL 9.6   Gran #      1.8 - 7.7 K/uL 5.0   Lymph #      1.0 - 4.8 K/uL 2.6   Mono #      0.3 - 1.0 K/uL 0.6   Eos #      0.0 - 0.5 K/uL 0.4   Baso #      0.00 - 0.20 K/uL 0.03   Gran%      38.0 - 73.0 % 57.6   Lymph%      18.0 - 48.0 % 29.7   Mono%      4.0 - 15.0 % 7.4   Eosinophil%      0.0 - 8.0 % 4.8   Basophil%      0.0 - 1.9 % 0.3   Differential Method       Automated       Assessment:   Eliza Louie is a 66 y.o.female with   Patient Active Problem List    Diagnosis Date Noted    Gastrointestinal hemorrhage 07/02/2017    Hypertension 07/02/2017    Prediabetes 07/02/2017    Dyspnea 07/02/2017    Epigastric pain       Plan:     NSTEMI:   will undergo cath outpatient on Friday.   Continue asa 81 mg qd  Continue plavix 75 mg qd  Continue atorvastatin 80 mg qd   Continue amlodipine 10 qd  Continue Imdur 60 qd  Continue lisinopril 10 mg qd    Atrial Fibrillation:  Increase Coreg to 6.25 mg bid

## 2017-07-05 NOTE — PLAN OF CARE
Future Appointments  Date Time Provider Department Center   7/10/2017 3:20 PM Jenny Sweeney DO [facility] [place] [facility]     Patient returning Friday for outpatient cath with Dr. kruse       07/05/17 1345   Final Note   Assessment Type Final Discharge Note   Discharge Disposition Home [place]   Discharge planning education complete? Yes   Hospital Follow Up  Appt(s) scheduled? Yes   Discharge plans and expectations educations in teach back method with documentation complete? Yes   Offered [facility] -- Bedside Delivery? Yes   Discharge/Hospital Encounter Summary to (non-[facility]) PCP No   Referral to Outpatient Case Management complete? No   30 day supply of medicines given at discharge, if documented non-compliance / non-adherence? No   Any social issues identified prior to discharge? No   Did you assess the readiness or willingness of the family or caregiver to support self management of care? No   Right Care Referral Info   Post Acute Recommendation No Care     Dianne Muir, RN, CCM, CMSRN  RN Transition Navigator  [phone]

## 2017-07-05 NOTE — DISCHARGE INSTRUCTIONS
GASTROINTESTINAL (GI) BLEEDING, WHEN YOU HAVE (ENGLISH) View Edit Remove   CATHETERIZATION, CARDIAC (ENGLISH) View Edit Remove   ASPIRIN, ASA ORAL TABLETS (ENGLISH) View Edit Remove   ATORVASTATIN TABLETS (ENGLISH) View Edit Remove   CLOPIDOGREL BISULFATE ORAL TABLET (ENGLISH) View Edit Remove   PANTOPRAZOLE TABLETS (ENGLISH) View Edit Remove   NITROGLYCERIN SUBLINGUAL TABLETS (ENGLISH) View Edit Remove   METFORMIN TABLETS (ENGLISH) View Edit Remove   ISOSORBIDE MONONITRATE TABLETS (ENGLISH) View Edit Remove   LISINOPRIL TABLETS (ENGLISH) View Edit Remove   FIBRILLATION, ATRIAL (ENGLISH) View Edit Remove

## 2017-07-05 NOTE — PROGRESS NOTES
Angiogram scheduled for Friday 7/7. Pre procedure instructions given at bedside, paper copy provided. Arrival time 830am. Pt verbalized full understanding of instructions and denies questions.

## 2017-07-05 NOTE — PLAN OF CARE
Problem: Patient Care Overview  Goal: Plan of Care Review  Outcome: Ongoing (interventions implemented as appropriate)  Pt on RA sats  96%.

## 2017-07-05 NOTE — PLAN OF CARE
Problem: Diabetes, Type 2 (Adult)  Goal: Signs and Symptoms of Listed Potential Problems Will be Absent, Minimized or Managed (Diabetes, Type 2)  Signs and symptoms of listed potential problems will be absent, minimized or managed by discharge/transition of care (reference Diabetes, Type 2 (Adult) CPG).   Outcome: Unable to achieve outcome(s) by discharge  Will continue to monitor diabetes outpatient.

## 2017-07-06 LAB
BLD PROD TYP BPU: NORMAL
BLOOD UNIT EXPIRATION DATE: NORMAL
BLOOD UNIT TYPE CODE: 5100
BLOOD UNIT TYPE: NORMAL
CODING SYSTEM: NORMAL
DISPENSE STATUS: NORMAL
H PYLORI IGG SERPL QL IA: POSITIVE
TRANS ERYTHROCYTES VOL PATIENT: NORMAL ML

## 2017-07-06 NOTE — PHYSICIAN QUERY
PT Name: Eliza Louie  MR #: [id number]     Physician Query Form - Documentation Clarification      CDS/: Brook Patino               Contact information:[email or web address]    This form is a permanent document in the medical record.     Query Date: July 6, 2017    By submitting this query, we are merely seeking further clarification of documentation. Please utilize your independent clinical judgment when addressing the question(s) below.    The Medical record reflects the following:    Supporting Clinical Findings Location in Medical Record     GI performed EGD which showed small duodenal ulcer without active bleeding. GI recommended PPI BID. Pt started on Protonix 40 mg BID. H. Pylori antibody IgG in process       D/C Summary 7/4                                                                                      Doctor, Please specify the acuity of the duodenal ulcer that was found during EGD.    Provider Use Only      [ x  ]  Acute    [   ]  Chronic    [   ]  Other explanations with details_______________________________________                                                                                                                 [  ] Clinically undetermined

## 2017-07-07 ENCOUNTER — HOSPITAL ENCOUNTER (OUTPATIENT)
Facility: HOSPITAL | Age: 67
Discharge: HOME | End: 2017-07-08
Attending: INTERNAL MEDICINE | Admitting: INTERNAL MEDICINE
Payer: MEDICARE

## 2017-07-07 ENCOUNTER — SURGERY (OUTPATIENT)
Age: 67
End: 2017-07-07

## 2017-07-07 VITALS
BODY MASS INDEX: 35.44 KG/M2 | HEART RATE: 70 BPM | SYSTOLIC BLOOD PRESSURE: 162 MMHG | RESPIRATION RATE: 16 BRPM | OXYGEN SATURATION: 95 % | WEIGHT: 200 LBS | DIASTOLIC BLOOD PRESSURE: 74 MMHG | TEMPERATURE: 98 F | HEIGHT: 63 IN

## 2017-07-07 DIAGNOSIS — R93.1 ABNORMAL CORONARY ANGIOGRAM: ICD-10-CM

## 2017-07-07 DIAGNOSIS — I21.4 NSTEMI (NON-ST ELEVATED MYOCARDIAL INFARCTION): ICD-10-CM

## 2017-07-07 DIAGNOSIS — K92.2 GASTROINTESTINAL HEMORRHAGE: ICD-10-CM

## 2017-07-07 LAB
ANION GAP SERPL CALC-SCNC: 11 MMOL/L
BASOPHILS # BLD AUTO: 0.03 K/UL
BASOPHILS NFR BLD: 0.4 %
BUN SERPL-MCNC: 10 MG/DL
CALCIUM SERPL-MCNC: 8.9 MG/DL
CHLORIDE SERPL-SCNC: 103 MMOL/L
CO2 SERPL-SCNC: 24 MMOL/L
CREAT SERPL-MCNC: 1.2 MG/DL
DIFFERENTIAL METHOD: ABNORMAL
EOSINOPHIL # BLD AUTO: 0.3 K/UL
EOSINOPHIL NFR BLD: 3.2 %
ERYTHROCYTE [DISTWIDTH] IN BLOOD BY AUTOMATED COUNT: 15.2 %
EST. GFR  (AFRICAN AMERICAN): 55 ML/MIN/1.73 M^2
EST. GFR  (NON AFRICAN AMERICAN): 48 ML/MIN/1.73 M^2
GLUCOSE SERPL-MCNC: 165 MG/DL
HCT VFR BLD AUTO: 29.6 %
HGB BLD-MCNC: 9.7 G/DL
INR PPP: 1
LYMPHOCYTES # BLD AUTO: 2.1 K/UL
LYMPHOCYTES NFR BLD: 24.4 %
MCH RBC QN AUTO: 28.3 PG
MCHC RBC AUTO-ENTMCNC: 32.8 %
MCV RBC AUTO: 86 FL
MONOCYTES # BLD AUTO: 0.7 K/UL
MONOCYTES NFR BLD: 7.7 %
NEUTROPHILS # BLD AUTO: 5.4 K/UL
NEUTROPHILS NFR BLD: 64.2 %
PLATELET # BLD AUTO: 342 K/UL
PMV BLD AUTO: 10.3 FL
POTASSIUM SERPL-SCNC: 4.1 MMOL/L
PROTHROMBIN TIME: 10.2 SEC
RBC # BLD AUTO: 3.43 M/UL
SODIUM SERPL-SCNC: 138 MMOL/L
WBC # BLD AUTO: 8.47 K/UL

## 2017-07-07 PROCEDURE — 25000003 PHARM REV CODE 250: Performed by: INTERNAL MEDICINE

## 2017-07-07 PROCEDURE — 25000003 PHARM REV CODE 250

## 2017-07-07 PROCEDURE — 63600175 PHARM REV CODE 636 W HCPCS

## 2017-07-07 PROCEDURE — 85025 COMPLETE CBC W/AUTO DIFF WBC: CPT

## 2017-07-07 PROCEDURE — C1760 CLOSURE DEV, VASC: HCPCS

## 2017-07-07 PROCEDURE — C1769 GUIDE WIRE: HCPCS

## 2017-07-07 PROCEDURE — 80048 BASIC METABOLIC PNL TOTAL CA: CPT

## 2017-07-07 PROCEDURE — 25500020 PHARM REV CODE 255

## 2017-07-07 PROCEDURE — 85610 PROTHROMBIN TIME: CPT

## 2017-07-07 PROCEDURE — 93005 ELECTROCARDIOGRAM TRACING: CPT

## 2017-07-07 PROCEDURE — 36415 COLL VENOUS BLD VENIPUNCTURE: CPT

## 2017-07-07 RX ORDER — CLOPIDOGREL BISULFATE 75 MG/1
300 TABLET ORAL ONCE
Status: DISCONTINUED | OUTPATIENT
Start: 2017-07-07 | End: 2017-07-08 | Stop reason: HOSPADM

## 2017-07-07 RX ORDER — CARVEDILOL 6.25 MG/1
6.25 TABLET ORAL 2 TIMES DAILY
Status: DISCONTINUED | OUTPATIENT
Start: 2017-07-07 | End: 2017-07-08 | Stop reason: HOSPADM

## 2017-07-07 RX ORDER — ACETAMINOPHEN 325 MG/1
650 TABLET ORAL ONCE
Status: COMPLETED | OUTPATIENT
Start: 2017-07-07 | End: 2017-07-07

## 2017-07-07 RX ORDER — ASPIRIN 81 MG/1
81 TABLET ORAL DAILY
Status: DISCONTINUED | OUTPATIENT
Start: 2017-07-07 | End: 2017-07-08 | Stop reason: HOSPADM

## 2017-07-07 RX ORDER — LISINOPRIL 20 MG/1
20 TABLET ORAL ONCE
Status: COMPLETED | OUTPATIENT
Start: 2017-07-07 | End: 2017-07-07

## 2017-07-07 RX ORDER — PANTOPRAZOLE SODIUM 40 MG/1
40 TABLET, DELAYED RELEASE ORAL 2 TIMES DAILY
Status: DISCONTINUED | OUTPATIENT
Start: 2017-07-07 | End: 2017-07-08 | Stop reason: HOSPADM

## 2017-07-07 RX ORDER — CLOPIDOGREL BISULFATE 75 MG/1
75 TABLET ORAL DAILY
Status: DISCONTINUED | OUTPATIENT
Start: 2017-07-08 | End: 2017-07-08 | Stop reason: HOSPADM

## 2017-07-07 RX ORDER — AMLODIPINE BESYLATE 5 MG/1
10 TABLET ORAL DAILY
Status: DISCONTINUED | OUTPATIENT
Start: 2017-07-07 | End: 2017-07-08 | Stop reason: HOSPADM

## 2017-07-07 RX ORDER — ATORVASTATIN CALCIUM 40 MG/1
80 TABLET, FILM COATED ORAL NIGHTLY
Status: DISCONTINUED | OUTPATIENT
Start: 2017-07-07 | End: 2017-07-08 | Stop reason: HOSPADM

## 2017-07-07 RX ORDER — LISINOPRIL 5 MG/1
10 TABLET ORAL DAILY
Status: DISCONTINUED | OUTPATIENT
Start: 2017-07-07 | End: 2017-07-08

## 2017-07-07 RX ORDER — SODIUM CHLORIDE 9 MG/ML
1.5 INJECTION, SOLUTION INTRAVENOUS CONTINUOUS
Status: ACTIVE | OUTPATIENT
Start: 2017-07-07 | End: 2017-07-07

## 2017-07-07 RX ORDER — AMLODIPINE BESYLATE 5 MG/1
10 TABLET ORAL ONCE
Status: DISCONTINUED | OUTPATIENT
Start: 2017-07-07 | End: 2017-07-08 | Stop reason: HOSPADM

## 2017-07-07 RX ADMIN — LISINOPRIL 10 MG: 10 TABLET ORAL at 02:07

## 2017-07-07 RX ADMIN — AMLODIPINE BESYLATE 10 MG: 5 TABLET ORAL at 02:07

## 2017-07-07 RX ADMIN — LISINOPRIL 20 MG: 20 TABLET ORAL at 08:07

## 2017-07-07 RX ADMIN — SODIUM CHLORIDE 1.5 ML/KG/HR: 0.9 INJECTION, SOLUTION INTRAVENOUS at 02:07

## 2017-07-07 RX ADMIN — ACETAMINOPHEN 650 MG: 325 TABLET ORAL at 08:07

## 2017-07-07 RX ADMIN — ATORVASTATIN CALCIUM 80 MG: 40 TABLET, FILM COATED ORAL at 08:07

## 2017-07-07 RX ADMIN — PANTOPRAZOLE SODIUM 40 MG: 40 TABLET, DELAYED RELEASE ORAL at 08:07

## 2017-07-07 RX ADMIN — CARVEDILOL 6.25 MG: 6.25 TABLET, FILM COATED ORAL at 04:07

## 2017-07-07 RX ADMIN — AMLODIPINE BESYLATE 10 MG: 5 TABLET ORAL at 04:07

## 2017-07-07 NOTE — BRIEF OP NOTE
Left heart cath: Olga Gautam/Tamera  Blood loss: <50 cc  Complications: none  Site: right radial - could not advance wire due to spasm  Right groin entered. Left amplatz used; wire, baloon and stent placed without any difficulty.  Minx deployed - partial failure with manual pressure held  300 mg plavix given in the lab  Continue angiomax till completion  Continue ASA and Plavix, statin, carvedilol, amlodipine and lisinopril.  Will hold imdur  Plan for discharge in am

## 2017-07-07 NOTE — PLAN OF CARE
There are plavix 300 mg once and aspirin 81mg daily scheduled at 1430. Cath lab nurse reported that pt already had plavix 300 mg during cath lab procedure and pt stated that pt took aspirin 81 mg this morning. Called and verified with Dr. Thompson. MD order to not give plavix and aspirin scheduled at 1430.

## 2017-07-07 NOTE — H&P
[facility] H and P- Fellow Note     Date of Admit: 7/7/2017     Chief Complaint      Stenting      Subjective:      History of Present Illness:  Eliza Louie is a 66 y.o.  female who  has a past medical history of Diabetes mellitus, CAD s/p recent NSTEMI thought secondary to Left circumflex disease which was unable to be intervened upon, and Hypertension.. The patient presented to the [facility] facility on 7/7/2017 for elective outpatient intervention of proximal RCA lesions.    Patient recently had NSTEMI as above but had history of recent melanotic stools. She underwent EGD earlier this week showing nonbleeding duodenal ulceration.       Denies Chest Pain, SOB, DUMAS, RACIEL or any other concerns since discharge 2 days prior   Denies hematemesis, hematochezia, melena, or any other bleeding episodes        Past Medical History:  Past Medical History:   Diagnosis Date    Diabetes mellitus     Hypertension         Past Surgical History:  History reviewed. No pertinent surgical history.     Allergies:  Review of patient's allergies indicates:  No Known Allergies     Home Medications:  Prior to Admission medications    Medication Sig Start Date End Date Taking? Authorizing Provider   amlodipine (NORVASC) 10 MG tablet Take 1 tablet (10 mg total) by mouth once daily. 7/4/17 7/4/18 Yes Jenny Sweeney, DO   aspirin (ECOTRIN) 81 MG EC tablet Take 1 tablet (81 mg total) by mouth once daily. 7/4/17 7/4/18 Yes Jenny Sweeney DO   atorvastatin (LIPITOR) 80 MG tablet Take 1 tablet (80 mg total) by mouth every evening. 7/4/17 7/4/18 Yes Jenny Sweeney DO   carvedilol (COREG) 6.25 MG tablet Take 1 tablet (6.25 mg total) by mouth 2 (two) times daily. 7/5/17 7/5/18 Yes Jenny Sweeney DO   clopidogrel (PLAVIX) 75 mg tablet Take 1 tablet (75 mg total) by mouth once daily. 7/4/17 7/4/18 Yes Jenny Sweeney, DO   isosorbide mononitrate (IMDUR) 60 MG 24 hr tablet Take 1 tablet (60 mg total) by mouth once daily. 7/4/17  7/4/18 Yes Jenny Sweeney DO   lisinopril 10 MG tablet Take 1 tablet (10 mg total) by mouth once daily. 7/5/17 7/5/18 Yes Jenny Sweeney DO   metformin (GLUCOPHAGE) 1000 MG tablet Take 0.5 tablets (500 mg total) by mouth 2 (two) times daily with meals. 7/4/17 7/4/18 Yes Jenny Sweeney DO   nitroGLYCERIN (NITROSTAT) 0.4 MG SL tablet Place 1 tablet (0.4 mg total) under the tongue every 5 (five) minutes as needed for Chest pain (for a max of 3). 7/4/17 7/4/18 Yes Jenny Sweeney DO   pantoprazole (PROTONIX) 40 MG tablet Take 1 tablet (40 mg total) by mouth 2 (two) times daily. 7/4/17 7/4/18 Yes Jenny Sweeney DO        Family History:  History reviewed. No pertinent family history.     Social History:  Social History   Substance Use Topics    Smoking status: Never Smoker    Smokeless tobacco: Never Used    Alcohol use No        Review of Systems:  Pertinent items are noted in HPI. All other systems are reviewed and are negative.       Objective:   Last 24 Hour Vital Signs:  No Data Recorded  No intake/output data recorded.     Physical Examination:  General: No Acute Distress, Awake, Alert and Oriented X 4   Head: normocephalic, atraumatic   HEENT: EOMI, PERRL, MMM   Neck: Supple JVP, no LAD   Cardiovascular: Iregular Rate and Rhythm, no Murmur   Respiratory: Clear to Ausculation Bilaterally, no Wheezes, Rhonchi or Rales   Abdomen: Bowel Sounds present, Soft, Non tender, Non distended   Extremities: No Clubbing, Cyanosis or Edema, 2+DP      Laboratory:  Component      Latest Ref Rng & Units 7/5/2017   WBC      3.90 - 12.70 K/uL 8.66   RBC      4.00 - 5.40 M/uL 3.21 (L)   Hemoglobin      12.0 - 16.0 g/dL 9.2 (L)   Hematocrit      37.0 - 48.5 % 27.6 (L)   MCV      82 - 98 fL 86   MCH      27.0 - 31.0 pg 28.7   MCHC      32.0 - 36.0 % 33.3   RDW      11.5 - 14.5 % 15.6 (H)   Platelets      150 - 350 K/uL 289     Component      Latest Ref Rng & Units 7/5/2017   Sodium      136 - 145 mmol/L 137   Potassium      3.5 -  5.1 mmol/L 4.0   Chloride      95 - 110 mmol/L 106   CO2      23 - 29 mmol/L 27   Glucose      70 - 110 mg/dL 180 (H)   BUN, Bld      8 - 23 mg/dL 6 (L)   Creatinine      0.5 - 1.4 mg/dL 1.0   Calcium      8.7 - 10.5 mg/dL 8.4 (L)   Total Protein      6.0 - 8.4 g/dL 6.5   Albumin      3.5 - 5.2 g/dL 3.1 (L)   Total Bilirubin      0.1 - 1.0 mg/dL 0.4   Alkaline Phosphatase      55 - 135 U/L 78   AST      10 - 40 U/L 28   ALT      10 - 44 U/L 31   Anion Gap      8 - 16 mmol/L 4 (L)   eGFR if African American      >60 mL/min/1.73 m:2 >60   eGFR if non African American      >60 mL/min/1.73 m:2 60       Assessment:      Eliza Louie is a 66 y.o. female with:  Patient Active Problem List    Diagnosis Date Noted    NSTEMI (non-ST elevated myocardial infarction) 07/07/2017     Priority: High    Gastrointestinal hemorrhage 07/02/2017    Hypertension 07/02/2017    Prediabetes 07/02/2017    Dyspnea 07/02/2017    Epigastric pain        Plan:      Proceed with elective PTCA of proximal RCA.

## 2017-07-07 NOTE — PLAN OF CARE
Notify Dr. Thompson pt's hypertension /87. MD order to give an extra amlodipine 10 mg once and give carvedilol 6.25 mg schedule at 2100 tonight now.

## 2017-07-08 VITALS
SYSTOLIC BLOOD PRESSURE: 177 MMHG | RESPIRATION RATE: 20 BRPM | OXYGEN SATURATION: 95 % | HEART RATE: 68 BPM | WEIGHT: 200 LBS | BODY MASS INDEX: 35.44 KG/M2 | HEIGHT: 63 IN | TEMPERATURE: 99 F | DIASTOLIC BLOOD PRESSURE: 72 MMHG

## 2017-07-08 PROCEDURE — 25000003 PHARM REV CODE 250: Performed by: INTERNAL MEDICINE

## 2017-07-08 RX ORDER — LISINOPRIL 20 MG/1
20 TABLET ORAL 2 TIMES DAILY
Status: DISCONTINUED | OUTPATIENT
Start: 2017-07-08 | End: 2017-07-08 | Stop reason: HOSPADM

## 2017-07-08 RX ORDER — LISINOPRIL 20 MG/1
20 TABLET ORAL 2 TIMES DAILY
Qty: 60 TABLET | Refills: 11 | Status: SHIPPED | OUTPATIENT
Start: 2017-07-08 | End: 2017-09-01 | Stop reason: DRUGHIGH

## 2017-07-08 RX ADMIN — CARVEDILOL 6.25 MG: 6.25 TABLET, FILM COATED ORAL at 08:07

## 2017-07-08 RX ADMIN — LISINOPRIL 20 MG: 20 TABLET ORAL at 08:07

## 2017-07-08 RX ADMIN — ASPIRIN 81 MG: 81 TABLET, COATED ORAL at 08:07

## 2017-07-08 RX ADMIN — CLOPIDOGREL BISULFATE 75 MG: 75 TABLET ORAL at 08:07

## 2017-07-08 RX ADMIN — PANTOPRAZOLE SODIUM 40 MG: 40 TABLET, DELAYED RELEASE ORAL at 08:07

## 2017-07-08 RX ADMIN — AMLODIPINE BESYLATE 10 MG: 5 TABLET ORAL at 08:07

## 2017-07-08 NOTE — PLAN OF CARE
Discharge orders noted, no HH or HME ordered.    Future Appointments  Date Time Provider Department Center   7/10/2017 3:20 PM Jenny Sweeney DO [facility] [place] [facility]          07/08/17 0910   Final Note   Assessment Type Final Discharge Note   Discharge Disposition Home   What phone number can be called within the next 1-3 days to see how you are doing after discharge? [phone]   Hospital Follow Up  Appt(s) scheduled? No  (offices closed, [place] to follow up )   Offered [facility] -- Bedside Delivery? n/a   Discharge/Hospital Encounter Summary to (non[facility]) PCP n/a   Referral to Outpatient Case Management complete? n/a   Referral to / orders for Home Health Complete? n/a   30 day supply of medicines given at discharge, if documented non-compliance / non-adherence? n/a   Any social issues identified prior to discharge? n/a   Did you assess the readiness or willingness of the family or caregiver to support self management of care? n/a   Right Care Referral Info   Post Acute Recommendation No Care     Enriqueta Stapleton RN Transitional Navigator  [phone]

## 2017-07-08 NOTE — NURSING
Pt arrived to unit via stretcher with Cath lab nurse. Pt is AAOx4. She is room air. Telemetry applied on.  No lightheadedness or SOB reported. Bed in lowest position. Bed alarm set. Pt instructed to call for assistance. VS documented refer to flowsheet. Will continue to monitor.

## 2017-07-08 NOTE — NURSING
Pt discharged. IV and telemetry box removed. Pt education given and reviewed with Pt. Pt is awaiting daughter to pick her up. No noted distress or reported pain.

## 2017-07-08 NOTE — PLAN OF CARE
Problem: Patient Care Overview  Goal: Plan of Care Review  Plan of care reviewed with the patient. Verbalized clear understanding. Bed alarm set. Bed in lowest position. Call light within reach. Instructed to call for assistance. NSR HR on monitor. No report of SOB or lightheadedness. Complaints headache. Pt aware the recovery will be up @ 7:45pm. Pt remain flat till the recovery ends. Bleeding sites are clean, no hematoma and bleeding. Will continue to monitor.

## 2017-07-08 NOTE — PLAN OF CARE
Problem: Patient Care Overview  Goal: Plan of Care Review  Outcome: Ongoing (interventions implemented as appropriate)  Bedside shift report completed with Melonie Willams. Plan of care reviewed with patient. Patient verbalized complete understanding. Fall precautions maintained. Bed in lowest position, locked, call light within reach, and bed alarm on. Side rails up x's 2 with slip resistant socks on. Nurse instructed patient to notify staff for any assistance and pt verbalized complete understanding.

## 2017-07-08 NOTE — PLAN OF CARE
Problem: Patient Care Overview  Goal: Plan of Care Review  Outcome: Ongoing (interventions implemented as appropriate)  Pt received on RA.  SPO2  95%.  Pt in no apparent respiratory distress.  Will continue to monitor.

## 2017-07-08 NOTE — NURSING
Pt reported headache on pain scale of 10/10. Page Dr. Wang but waiting for call back. Gave report on to oncoming nurse to follow up for pain medicine for headache.

## 2017-07-08 NOTE — PLAN OF CARE
Problem: Cardiac Catheterization (Diagnostic/Interventional) (Adult)  Goal: Signs and Symptoms of Listed Potential Problems Will be Absent, Minimized or Managed (Cardiac Catheterization)  Signs and symptoms of listed potential problems will be absent, minimized or managed by discharge/transition of care (reference Cardiac Catheterization (Diagnostic/Interventional) (Adult) CPG).   Outcome: Ongoing (interventions implemented as appropriate)  Pt notified Dr. Wang of pt elevated SBP from 160's-180's during post cath procedure Q1 hr vitals. Pt also reporting headache 10/10, non relieved by cold pack.  R groin and R radial site c/d/i, free of pain, free of tenderness, a free of hematoma. Palpable pulses +2. NSR on the monitior, HR 65. Rn instructed to administer additional dose of Lisinopril 20 mg and re-check pressure in 1 hour and administer Tyelnol for headache. Rn instructed to notify MD if SBP elevated greater than 170 overnight. 1 hour after medications, B/P 167/75, and pt headache down to 2/10. Pt resting comfortably in bed with family in the room. Rn will continue to closely monitor.

## 2017-07-08 NOTE — PLAN OF CARE
Problem: Patient Care Overview  Goal: Plan of Care Review  Outcome: Ongoing (interventions implemented as appropriate)  Pt on room air with 98 SpO2  %. No respiratory distress noted. Will continue to monitor.

## 2017-07-09 NOTE — DISCHARGE SUMMARY
[facility] Discharge Summary    Primary Team: [facility]  Attending Physician: Donna  Fellow: Tamera    Date of Admit: 7/7/2017  Date of Discharge: 7/9/2017    Discharge to: Home  Condition: Stable     Discharge Diagnoses     Patient Active Problem List   Diagnosis    Gastrointestinal hemorrhage    Hypertension    Prediabetes    Dyspnea    Epigastric pain    NSTEMI (non-ST elevated myocardial infarction)       Procedures     S/p PCI of Proximal to mid RCA    Brief History of Present Illness     Eliza Louie is a 66 y.o.  female who  has a past medical history of Diabetes mellitus, CAD s/p recent NSTEMI thought secondary to Left circumflex disease which was unable to be intervened upon, and Hypertension.. The patient presented to the [facility] facility on 7/7/2017 for elective outpatient intervention of proximal RCA lesions.     Patient recently had NSTEMI as above but had history of recent melanotic stools. She underwent EGD earlier this week showing nonbleeding duodenal ulceration.       Denies Chest Pain, SOB, DUMAS, RACIEL or any other concerns since discharge 2 days prior   Denies hematemesis, hematochezia, melena, or any other bleeding episodes     For the full HPI please refer to the History & Physical from this admission.    Hospital Course By Problem with Pertinent Findings     Patient electively admitted through outpatient surgery for PCI of RCA.  Underwent procedure without complications and was discharged home the following day     Discharge Medications        Medication List      CHANGE how you take these medications    * lisinopril 10 MG tablet  Take 1 tablet (10 mg total) by mouth once daily.  What changed:  Another medication with the same name was added. Make sure you understand how and when to take each.     * lisinopril 20 MG tablet  Commonly known as:  PRINIVIL,ZESTRIL  Take 1 tablet (20 mg total) by mouth 2 (two) times daily.  What changed:  You were already taking a  medication with the same name, and this prescription was added. Make sure you understand how and when to take each.        * This list has 2 medication(s) that are the same as other medications prescribed for you. Read the directions carefully, and ask your doctor or other care provider to review them with you.            CONTINUE taking these medications    amlodipine 10 MG tablet  Commonly known as:  NORVASC  Take 1 tablet (10 mg total) by mouth once daily.     aspirin 81 MG EC tablet  Commonly known as:  ECOTRIN  Take 1 tablet (81 mg total) by mouth once daily.     atorvastatin 80 MG tablet  Commonly known as:  LIPITOR  Take 1 tablet (80 mg total) by mouth every evening.     carvedilol 6.25 MG tablet  Commonly known as:  COREG  Take 1 tablet (6.25 mg total) by mouth 2 (two) times daily.     clopidogrel 75 mg tablet  Commonly known as:  PLAVIX  Take 1 tablet (75 mg total) by mouth once daily.     nitroGLYCERIN 0.4 MG SL tablet  Commonly known as:  NITROSTAT  Place 1 tablet (0.4 mg total) under the tongue every 5 (five) minutes as needed for Chest pain (for a max of 3).     pantoprazole 40 MG tablet  Commonly known as:  PROTONIX  Take 1 tablet (40 mg total) by mouth 2 (two) times daily.        STOP taking these medications    isosorbide mononitrate 60 MG 24 hr tablet  Commonly known as:  IMDUR     metformin 1000 MG tablet  Commonly known as:  GLUCOPHAGE           Where to Get Your Medications      These medications were sent to [facility] - [address]  [address], [place] [address]    Phone:  [phone]   · lisinopril 20 MG tablet         Discharge Information:   Diet:  cardiac    Physical Activity:  No heavy lifting or exertion x 1 week     Instructions:  1. Take all medications as prescribed  2. Keep all follow-up appointments  3. Please take all meds as prescribed, keep all follow up appointments, Call PCP or return to ED for SOB,  Lightheadness, confusion, blood loss, or any other medical concerns      Follow-Up Appointments:  Donna 1-2 weeks     Billy Philip  [facility], -6

## 2017-07-10 ENCOUNTER — OFFICE VISIT (OUTPATIENT)
Dept: FAMILY MEDICINE | Facility: HOSPITAL | Age: 67
End: 2017-07-10
Attending: FAMILY MEDICINE
Payer: MEDICARE

## 2017-07-10 VITALS
BODY MASS INDEX: 35.39 KG/M2 | DIASTOLIC BLOOD PRESSURE: 71 MMHG | HEIGHT: 63 IN | HEART RATE: 86 BPM | WEIGHT: 199.75 LBS | SYSTOLIC BLOOD PRESSURE: 141 MMHG

## 2017-07-10 DIAGNOSIS — Z09 HOSPITAL DISCHARGE FOLLOW-UP: Primary | ICD-10-CM

## 2017-07-10 DIAGNOSIS — A04.8 H. PYLORI INFECTION: ICD-10-CM

## 2017-07-10 LAB
CORONARY STENOSIS: ABNORMAL
CORONARY STENT: YES

## 2017-07-10 PROCEDURE — 99213 OFFICE O/P EST LOW 20 MIN: CPT | Performed by: STUDENT IN AN ORGANIZED HEALTH CARE EDUCATION/TRAINING PROGRAM

## 2017-07-10 RX ORDER — BISMUTH SUBCITRATE POTASSIUM, METRONIDAZOLE AND TETRACYCLINE HYDROCHLORIDE 140; 125; 125 MG/1; MG/1; MG/1
3 CAPSULE ORAL
Qty: 120 CAPSULE | Refills: 0 | Status: SHIPPED | OUTPATIENT
Start: 2017-07-10 | End: 2017-07-20

## 2017-07-11 ENCOUNTER — TELEPHONE (OUTPATIENT)
Dept: PHARMACY | Facility: CLINIC | Age: 67
End: 2017-07-11

## 2017-07-11 NOTE — PHYSICIAN QUERY
PT Name: Eliza Louie  MR #: [id number]     Physician Query Form - Etiology of Condition Clarification      CDS/: Mary Haney               Contact information: [email or web address]    This form is a permanent document in the medical record.     Query Date: July 11, 2017    By submitting this query, we are merely seeking further clarification of documentation.  Please utilize your independent clinical judgment when addressing the question(s) below.     The medical record contains the following:    Findings Supporting Clinical Information Location in Medical Record   Gastrointestinal hemorrhage Impression:   History of nausea vomiting and melena. Epigastric pain   Hemoccult positive stool   History of prior ulcer disease     One non-bleeding superficial duodenal ulcer with no stigmata of   bleeding was found in the first portion of the duodenum. The lesion   was 6 mm in largest dimension. Estimated blood loss was minimal.       GI performed EGD which showed small duodenal ulcer without active bleeding.  GI recommended PPI BID. Pt started on Protonix 40 mg BID. H. Pylori antibody IgG in process. GI approved starting antiplatelet/anticoagulant therapy. Pt started on Plavix  Consult 7/2          EGD findings                Discharge summary     Please document your best medical opinion regarding the etiology of _Melena________________ for which the primary focus of treatment is/was directed.           (x  ) Duodenal Ulcer  (  ) Other ( please specify) ______________________________                [    ]  Clinically Undetermined    Please document in your progress notes daily for the duration of treatment, until resolved, and include in your discharge summary.

## 2017-07-11 NOTE — TELEPHONE ENCOUNTER
Patient was referred by Pharmacy for assistance with Pylera. Patient didn't answer the phone I left a message and mailed a letter with my contact information

## 2017-07-11 NOTE — PROGRESS NOTES
Subjective:       Patient ID: Eliza Louie is a 66 y.o. female.    Chief Complaint: Follow-up (HOSPITAL)    HPI   67 yo Female with Hx of HTN and recent admission for concerning for GI bleed s/p EGD with non-bleeding doudenal ulcer noted and NSTEMI with noted lesion on LCX and RCA here for hospital follow up.   Patient states that following hospital discharge she has been compliant with all recommended medications. She states that she has been feeling well but endorses some mild fatigue.    She states that on Friday 7/7 she returns for outpatient procedure with Dr. Correa. Patient was brought to Cath Lab for stenting procedure of the RCA. Patient states procedure went well.    Pt has no other complaints today and denies headache, visual disturbances, dizziness, chest pain, sob, nausea, vomiting, diarrhea, constipation, myalgia, arthralgia, dysuria, frequency.      Review of Systems   Constitutional: Positive for fatigue. Negative for chills and fever.   Respiratory: Negative for chest tightness, shortness of breath and wheezing.    Cardiovascular: Negative for chest pain and leg swelling.   Gastrointestinal: Negative for abdominal pain, constipation, diarrhea, nausea and vomiting.   Musculoskeletal: Negative for arthralgias and myalgias.   Skin: Negative for rash.   Neurological: Negative for dizziness, weakness and headaches.   Psychiatric/Behavioral: Negative for behavioral problems. The patient is not nervous/anxious.          Current Outpatient Prescriptions:     amlodipine (NORVASC) 10 MG tablet, Take 1 tablet (10 mg total) PO q day    aspirin (ECOTRIN) 81 MG EC tablet, Take 1 tablet (81 mg total) PO qday    atorvastatin (LIPITOR) 80 MG tablet, Take 1 tablet (80 mg total) PO QHS    carvedilol (COREG) 6.25 MG tablet, Take 1 tablet (6.25 mg total) PO BID    clopidogrel (PLAVIX) 75 mg tablet, Take 1 tablet (75 mg total) PO Qday    nitroGLYCERIN (NITROSTAT) 0.4 MG SL tablet, Place 1 tablet (0.4 mg total) under  the tongue every 5 (five) minutes as needed for Chest pain (for a max of 3).    pantoprazole (PROTONIX) 40 MG tablet, Take 1 tablet (40 mg total) PO BID    lisinopril 10 MG tablet, Take 1 tablet (10 mg total) PO q day    Objective:      Vitals:    07/10/17 1548   BP: (!) 141/71   Pulse: 86     Physical Exam   Constitutional: She is oriented to person, place, and time. She appears well-developed and well-nourished. No distress.   HENT:   Head: Normocephalic and atraumatic.   Eyes: Conjunctivae and EOM are normal. Pupils are equal, round, and reactive to light.   Neck: Normal range of motion. Neck supple. No JVD present. No thyromegaly present.   Cardiovascular: Normal rate, regular rhythm and normal heart sounds.  Exam reveals no gallop and no friction rub.    No murmur heard.  Pulmonary/Chest: Effort normal and breath sounds normal. She has no wheezes. She has no rales.   Abdominal: Soft. Bowel sounds are normal. She exhibits no distension. There is no tenderness.   Musculoskeletal: Normal range of motion.   Neurological: She is alert and oriented to person, place, and time. She has normal reflexes.   Skin: Skin is warm and dry. She is not diaphoretic.   Psychiatric: She has a normal mood and affect. Her behavior is normal.   Nursing note and vitals reviewed.      Assessment:       1. Hospital discharge follow-up    2. H. pylori infection        Plan:       Hospital discharge follow-up  - Hospitalization from 7/2-7/4 for NSTEMI and questionable GI bleed s/p EGD (non-bleeding ulcer)   -Pt doing well following discharge.   -Compliant will all medications.  - Pt s/p PCI of RCA on 7/7    H. pylori infection  - 7/4/17 H Pylori IgG: positive  - Will Tx with Quadruple therapy. Pt currently on Protonix 40 mg BID. Will add Pylera.     -     bismuth-metronidazole-tetracycline (PYLERA) 140-125-125 mg per capsule; Take 3 capsules by mouth 4 (four) times daily before meals and nightly.  Dispense: 120 capsule; Refill:  0      Return in about 3 months (around 10/10/2017).      Jenny Sweeney D.O.  [facility] HO-2  07/10/2017

## 2017-07-12 ENCOUNTER — TELEPHONE (OUTPATIENT)
Dept: FAMILY MEDICINE | Facility: HOSPITAL | Age: 67
End: 2017-07-12

## 2017-07-12 NOTE — TELEPHONE ENCOUNTER
----- Message from Ludmila Lawton sent at 7/12/2017  1:15 PM CDT -----  [facility] they requesting a phone call is about pt medicine bismuth-metronidazole-tetracycline (PYLERA) 140-125-125 mg per capsule;    The med is $900 and they want to know can they prescribe a different med for the pt.

## 2017-07-27 DIAGNOSIS — A04.8 H. PYLORI INFECTION: Primary | ICD-10-CM

## 2017-07-27 RX ORDER — AMOXICILLIN 500 MG/1
1000 TABLET, FILM COATED ORAL EVERY 12 HOURS
Qty: 40 TABLET | Refills: 0 | Status: SHIPPED | OUTPATIENT
Start: 2017-07-27 | End: 2017-08-06

## 2017-07-27 RX ORDER — PANTOPRAZOLE SODIUM 40 MG/1
40 TABLET, DELAYED RELEASE ORAL 2 TIMES DAILY
Qty: 180 TABLET | Refills: 3 | Status: SHIPPED | OUTPATIENT
Start: 2017-07-27 | End: 2018-10-01 | Stop reason: SDUPTHER

## 2017-07-27 RX ORDER — METRONIDAZOLE 500 MG/1
500 TABLET ORAL EVERY 12 HOURS
Qty: 20 TABLET | Refills: 0 | Status: SHIPPED | OUTPATIENT
Start: 2017-07-27 | End: 2017-08-06

## 2017-07-27 RX ORDER — CLARITHROMYCIN 500 MG/1
500 TABLET, FILM COATED ORAL EVERY 12 HOURS
Qty: 20 TABLET | Refills: 0 | Status: SHIPPED | OUTPATIENT
Start: 2017-07-27 | End: 2017-08-06

## 2017-09-01 ENCOUNTER — OFFICE VISIT (OUTPATIENT)
Dept: FAMILY MEDICINE | Facility: HOSPITAL | Age: 67
End: 2017-09-01
Payer: MEDICARE

## 2017-09-01 VITALS
DIASTOLIC BLOOD PRESSURE: 79 MMHG | HEIGHT: 63 IN | SYSTOLIC BLOOD PRESSURE: 161 MMHG | HEART RATE: 66 BPM | BODY MASS INDEX: 34.68 KG/M2 | WEIGHT: 195.75 LBS

## 2017-09-01 DIAGNOSIS — M79.89 LEG SWELLING: ICD-10-CM

## 2017-09-01 DIAGNOSIS — I25.2 HX OF NON-ST ELEVATION MYOCARDIAL INFARCTION (NSTEMI): ICD-10-CM

## 2017-09-01 DIAGNOSIS — R73.03 PREDIABETES: ICD-10-CM

## 2017-09-01 DIAGNOSIS — I10 ESSENTIAL HYPERTENSION: Primary | ICD-10-CM

## 2017-09-01 PROCEDURE — 99213 OFFICE O/P EST LOW 20 MIN: CPT | Performed by: STUDENT IN AN ORGANIZED HEALTH CARE EDUCATION/TRAINING PROGRAM

## 2017-09-01 NOTE — PROGRESS NOTES
Subjective:       Patient ID: Eliza Louie is a 66 y.o. female.    Chief Complaint: Follow-up    HPI   A 66 y.o. Female with PMHx of HTN, DM2 H.pylori infection with duodenal ulcer, and NSTEMI s/p stenting presents for follow up. She reports an increase in energy and feeling well overall. She has an appointment with her cardiologist next month for f/u of her NSTEMI and stenting. She is able to walk easier and perform ADLs but reports some bilateral ankle swelling that is worse with walking. She reports that the swelling is better in the morning. She denies any chest pain, SOB, dyspnea on exertion, fevers or chills.     She finished her Pylera for the H. Pylori infection and is still taking the protonix. She reports symptom relief and denies any abdominal pain, hematochezia, melena, nausea, vomiting, or acid reflux symptoms.   Review of Systems   Constitutional: Negative for chills, fatigue and fever.   Respiratory: Negative for chest tightness, shortness of breath and wheezing.    Cardiovascular: Positive for leg swelling. Negative for chest pain.   Gastrointestinal: Negative for abdominal pain, anal bleeding, blood in stool, constipation, diarrhea, nausea and vomiting.   Musculoskeletal: Negative for arthralgias and myalgias.   Skin: Negative for rash.   Neurological: Negative for dizziness, weakness and headaches.   Psychiatric/Behavioral: Negative for behavioral problems. The patient is not nervous/anxious.        Objective:      Vitals:    09/01/17 1120   BP: (!) 161/79   Pulse: 66     Physical Exam   Constitutional: She is oriented to person, place, and time. She appears well-developed and well-nourished.   HENT:   Head: Atraumatic.   Eyes: Conjunctivae and EOM are normal. Pupils are equal, round, and reactive to light.   Neck: Normal range of motion. Neck supple. No JVD present. No thyromegaly present.   Cardiovascular: Normal rate, regular rhythm, normal heart sounds and intact distal pulses.  Exam reveals no  gallop and no friction rub.    No murmur heard.  Pulmonary/Chest: Effort normal and breath sounds normal. She has no wheezes. She has no rales.   Abdominal: Soft. Bowel sounds are normal. She exhibits no distension. There is no tenderness.   Musculoskeletal: Normal range of motion. She exhibits edema.   Bilateral 1+ pitting edema to the ankles   Neurological: She is alert and oriented to person, place, and time. She has normal reflexes.   Skin: Skin is warm and dry.   Psychiatric: She has a normal mood and affect. Her behavior is normal. Judgment and thought content normal.   Nursing note and vitals reviewed.      Assessment:       1. Essential hypertension    2. Prediabetes    3. Leg swelling    4. Hx of non-ST elevation myocardial infarction (NSTEMI)        Plan:       Essential hypertension  -   / 79 repeat manual 158/80  - Medication: continue current medications.   - Education: The importance of dietary salt and fat restriction and exercise were discussed with the patient.    Prediabetes  - The patient is asked to make an attempt to improve diet and exercise patterns to aid in medical management of this problem.    Leg swelling  - ddx, norvasc side effect, venous insufficiency   - Pt will ask Cardiologist if medication change will help with leg swelling  - pt advised to elevate legs at end of day and wear compression stockings.     Hx of non-ST elevation myocardial infarction (NSTEMI)  - follow up with Cards as scheduled.     Return in about 3 months (around 12/1/2017).      Jenny Sweeney D.O.  [facility] HO-2  09/01/2017

## 2017-12-07 ENCOUNTER — OFFICE VISIT (OUTPATIENT)
Dept: FAMILY MEDICINE | Facility: HOSPITAL | Age: 67
End: 2017-12-07
Attending: FAMILY MEDICINE
Payer: MEDICARE

## 2017-12-07 VITALS
SYSTOLIC BLOOD PRESSURE: 175 MMHG | HEART RATE: 77 BPM | BODY MASS INDEX: 36.68 KG/M2 | WEIGHT: 207 LBS | HEIGHT: 63 IN | DIASTOLIC BLOOD PRESSURE: 83 MMHG

## 2017-12-07 DIAGNOSIS — E78.5 HYPERLIPIDEMIA, UNSPECIFIED HYPERLIPIDEMIA TYPE: Primary | ICD-10-CM

## 2017-12-07 DIAGNOSIS — I25.2 HX OF NON-ST ELEVATION MYOCARDIAL INFARCTION (NSTEMI): ICD-10-CM

## 2017-12-07 DIAGNOSIS — I10 ESSENTIAL HYPERTENSION: ICD-10-CM

## 2017-12-07 PROCEDURE — 99213 OFFICE O/P EST LOW 20 MIN: CPT | Performed by: STUDENT IN AN ORGANIZED HEALTH CARE EDUCATION/TRAINING PROGRAM

## 2017-12-07 RX ORDER — LISINOPRIL 20 MG/1
TABLET ORAL
COMMUNITY
Start: 2017-10-30 | End: 2017-12-07 | Stop reason: SINTOL

## 2017-12-07 RX ORDER — LOSARTAN POTASSIUM 25 MG/1
25 TABLET ORAL DAILY
Qty: 90 TABLET | Refills: 3 | Status: SHIPPED | OUTPATIENT
Start: 2017-12-07 | End: 2018-01-22 | Stop reason: SDUPTHER

## 2017-12-07 RX ORDER — ROSUVASTATIN CALCIUM 20 MG/1
20 TABLET, COATED ORAL DAILY
Qty: 90 TABLET | Refills: 3 | Status: SHIPPED | OUTPATIENT
Start: 2017-12-07 | End: 2018-02-20 | Stop reason: SDUPTHER

## 2017-12-07 NOTE — PROGRESS NOTES
Subjective:       Patient ID: Eliza Louie is a 67 y.o. female.    Chief Complaint: Medication Problem    HPI    66 y.o. Female with PMHx of HTN, hx of H.pylori infection with duodenal ulcer treated, and NSTEMI s/p stenting (July 2017) presents for follow up for chronic conditions. Patient states that she has been having diffuse muscle aches that she associates with taking Lipitor. She has been taking medication at night but still feels aches during day.  Pt reports dry cough since starting lisinopril. Denies wheeze, sob, cp, f/c, n/v/c/d.       Review of Systems   Constitutional: Negative for chills and fever.   Respiratory: Positive for cough (dry). Negative for chest tightness, shortness of breath and wheezing.    Cardiovascular: Negative for chest pain, palpitations and leg swelling.   Gastrointestinal: Negative for abdominal pain, constipation, diarrhea, nausea and vomiting.   Genitourinary: Negative for frequency and hematuria.   Musculoskeletal: Positive for myalgias. Negative for arthralgias.   Skin: Negative for rash.   Neurological: Negative for dizziness, weakness, light-headedness and headaches.   Psychiatric/Behavioral: Negative for behavioral problems. The patient is not nervous/anxious.        Objective:      Vitals:    12/07/17 1333   BP: (!) 175/83   Pulse: 77     Physical Exam   Constitutional: She is oriented to person, place, and time. She appears well-developed and well-nourished.   HENT:   Head: Normocephalic and atraumatic.   Eyes: Conjunctivae and EOM are normal. Pupils are equal, round, and reactive to light.   Neck: Normal range of motion. Neck supple. No JVD present. No thyromegaly present.   Cardiovascular: Normal rate, regular rhythm and normal heart sounds.  Exam reveals no gallop and no friction rub.    No murmur heard.  Pulmonary/Chest: Effort normal and breath sounds normal. She has no wheezes. She has no rales.   Abdominal: Soft. Bowel sounds are normal. She exhibits no distension.  There is no tenderness.   Musculoskeletal: Normal range of motion.   Neurological: She is alert and oriented to person, place, and time. She has normal reflexes.   Skin: Skin is warm and dry.   Psychiatric: She has a normal mood and affect. Her behavior is normal.   Nursing note and vitals reviewed.      Assessment:       1. Hyperlipidemia, unspecified hyperlipidemia type    2. Essential hypertension    3. Hx of non-ST elevation myocardial infarction (NSTEMI)        Plan:       Hyperlipidemia, unspecified hyperlipidemia type  - will change medication to crestor 2/2 side effects with lipitor.  - if pain continues at next visit will consider cpk  -     rosuvastatin (CRESTOR) 20 MG tablet; Take 1 tablet (20 mg total) by mouth once daily.  Dispense: 90 tablet; Refill: 3    Essential hypertension  - will d/c ace and start arb  -     losartan (COZAAR) 25 MG tablet; Take 1 tablet (25 mg total) by mouth once daily.  Dispense: 90 tablet; Refill: 3    Hx of non-ST elevation myocardial infarction (NSTEMI)  - stable       Return in about 2 weeks (around 12/21/2017) for medication follow up.        Jenny Sweeney D.O.  [facility] HO-2  12/07/2017

## 2017-12-08 NOTE — PROGRESS NOTES
I assume primary medical responsibility for this patient, I have reviewed the case history, findings, diagnosis and treatment plan with the resident and agree that the care is reasonable and necessary. This service has been performed by a resident without the presence of a teaching physician under the primary care exception  Rubi Gonsales  12/8/2017

## 2017-12-21 ENCOUNTER — OFFICE VISIT (OUTPATIENT)
Dept: FAMILY MEDICINE | Facility: HOSPITAL | Age: 67
End: 2017-12-21
Payer: MEDICARE

## 2017-12-21 VITALS
DIASTOLIC BLOOD PRESSURE: 82 MMHG | BODY MASS INDEX: 35.93 KG/M2 | WEIGHT: 202.81 LBS | HEIGHT: 63 IN | HEART RATE: 84 BPM | SYSTOLIC BLOOD PRESSURE: 148 MMHG

## 2017-12-21 DIAGNOSIS — A04.8 H. PYLORI INFECTION: ICD-10-CM

## 2017-12-21 DIAGNOSIS — R05.9 COUGH: Primary | ICD-10-CM

## 2017-12-21 PROCEDURE — 99214 OFFICE O/P EST MOD 30 MIN: CPT | Performed by: STUDENT IN AN ORGANIZED HEALTH CARE EDUCATION/TRAINING PROGRAM

## 2017-12-21 NOTE — PROGRESS NOTES
Subjective:       Patient ID: Eliza Louie is a 67 y.o. female.    Chief Complaint: Follow-up and Cough    HPI   68 yo  Female with hx of Gerd, h. Pylori, NSTEMI presents to clinic for follow up of chronic cough x 5 months. Patient was seen here on 12/07 for cough and was recommended to stop lisinopril as cough sx coincided with start of medication and start Losartan instead. Patient states that she was confused and did not stop Lisinopril since she thought that it was important for her heart.     Pt reports dry cough present throughout day and night, at rest and with activity. She denies nasal congestion, post nasal drip, recent uri, f/c.      Pt has not been taking protonix for several weeks.     Review of Systems   Constitutional: Negative for chills, fatigue and fever.   HENT: Negative for congestion, postnasal drip, rhinorrhea and sore throat.    Respiratory: Positive for cough. Negative for chest tightness, shortness of breath and wheezing.    Cardiovascular: Negative for chest pain, palpitations and leg swelling.   Gastrointestinal: Negative for abdominal pain, constipation, diarrhea, nausea and vomiting.   Musculoskeletal: Negative for arthralgias and myalgias.   Skin: Negative for rash.   Neurological: Negative for dizziness, weakness and headaches.   Psychiatric/Behavioral: Negative for behavioral problems. The patient is not nervous/anxious.        Objective:      Vitals:    12/21/17 0917   BP: (!) 148/82   Pulse: 84     Physical Exam   Constitutional: She is oriented to person, place, and time. She appears well-developed and well-nourished.   HENT:   Head: Normocephalic and atraumatic.   Eyes: Conjunctivae and EOM are normal. Pupils are equal, round, and reactive to light.   Neck: Normal range of motion. Neck supple. No JVD present. No thyromegaly present.   Cardiovascular: Normal rate, regular rhythm and normal heart sounds.  Exam reveals no gallop and no friction rub.    No murmur  heard.  Pulmonary/Chest: Effort normal and breath sounds normal. No respiratory distress. She has no wheezes. She has no rales.   Abdominal: Soft. Bowel sounds are normal. She exhibits no distension. There is no tenderness.   Musculoskeletal: Normal range of motion.   Neurological: She is alert and oriented to person, place, and time. She has normal reflexes.   Skin: Skin is warm and dry.   Psychiatric: She has a normal mood and affect. Her behavior is normal.   Nursing note and vitals reviewed.      Assessment:       1. Cough    2. H. pylori infection        Plan:       Cough  - likely 2/2 lisinopril vs Reflux  - Stop lisinopril and continue losartan  - Pt educated that losartan is replacement for lisinopril  - Pt will restart protonix for 1 month   - Pt will RTC  In 1 month for follow up: if cough still present will refer to GI and consider reglan for cough    H. pylori infection  - Pt treated for h.plyori : will repeat stool study to eval if cleared.   -     H. pylori antigen, stool; Future; Expected date: 12/21/2017      Return in about 4 weeks (around 1/18/2018) for follow up cough.     Jenny Sweeney D.O.  [facility] HO-2  12/21/2017

## 2017-12-21 NOTE — PATIENT INSTRUCTIONS
Stop taking Lisinopril: Take losartan instead.    Restart Pantoprazole: 40mg Q day for 1 month.  Stool study for H. pylori

## 2018-01-22 ENCOUNTER — OFFICE VISIT (OUTPATIENT)
Dept: FAMILY MEDICINE | Facility: HOSPITAL | Age: 68
End: 2018-01-22
Attending: FAMILY MEDICINE
Payer: MEDICARE

## 2018-01-22 VITALS
SYSTOLIC BLOOD PRESSURE: 146 MMHG | BODY MASS INDEX: 36.63 KG/M2 | WEIGHT: 206.81 LBS | HEART RATE: 69 BPM | DIASTOLIC BLOOD PRESSURE: 78 MMHG

## 2018-01-22 DIAGNOSIS — I10 ESSENTIAL HYPERTENSION: ICD-10-CM

## 2018-01-22 DIAGNOSIS — I10 HYPERTENSION, UNSPECIFIED TYPE: Primary | ICD-10-CM

## 2018-01-22 PROCEDURE — 99213 OFFICE O/P EST LOW 20 MIN: CPT | Performed by: FAMILY MEDICINE

## 2018-01-22 RX ORDER — LOSARTAN POTASSIUM 25 MG/1
50 TABLET ORAL DAILY
Qty: 180 TABLET | Refills: 3 | Status: SHIPPED | OUTPATIENT
Start: 2018-01-22 | End: 2018-01-22 | Stop reason: SDUPTHER

## 2018-01-22 RX ORDER — LOSARTAN POTASSIUM 50 MG/1
50 TABLET ORAL DAILY
Qty: 90 TABLET | Refills: 3 | Status: SHIPPED | OUTPATIENT
Start: 2018-01-22 | End: 2018-02-20 | Stop reason: SDUPTHER

## 2018-01-22 NOTE — PROGRESS NOTES
Subjective:       Patient ID: Eliza Louie is a 67 y.o. female.    Chief Complaint: Follow-up and Hypertension    Patient here for follow up of BP med change. Started on Losartan after lisinopril d/c'd due to cough. Pt denies any complaints.    Hypertension   This is a chronic problem. The current episode started more than 1 month ago. The problem has been gradually improving since onset. The problem is uncontrolled. Pertinent negatives include no anxiety, blurred vision, chest pain, headaches, malaise/fatigue, neck pain, orthopnea, palpitations, peripheral edema, PND, shortness of breath or sweats. There are no associated agents to hypertension. Risk factors for coronary artery disease include obesity, post-menopausal state and sedentary lifestyle. Past treatments include calcium channel blockers, beta blockers and angiotensin blockers.            Review of Systems   Constitutional: Negative for malaise/fatigue.   Eyes: Negative for blurred vision.   Respiratory: Negative for shortness of breath.    Cardiovascular: Negative for chest pain, palpitations, orthopnea and PND.   Musculoskeletal: Negative for neck pain.   Neurological: Negative for headaches.       Past Medical History:   Diagnosis Date    Diabetes mellitus     Hypertension        No family history on file.    Social History     Social History    Marital status: Single     Spouse name: N/A    Number of children: N/A    Years of education: N/A     Social History Main Topics    Smoking status: Never Smoker    Smokeless tobacco: Never Used    Alcohol use No    Drug use: No    Sexual activity: Not on file     Other Topics Concern    Not on file     Social History Narrative    No narrative on file       No past surgical history on file.      Current Outpatient Prescriptions:     amlodipine (NORVASC) 10 MG tablet, Take 1 tablet (10 mg total) by mouth once daily., Disp: 90 tablet, Rfl: 3    aspirin (ECOTRIN) 81 MG EC tablet, Take 1 tablet (81 mg  total) by mouth once daily., Disp: 90 tablet, Rfl: 3    carvedilol (COREG) 6.25 MG tablet, Take 1 tablet (6.25 mg total) by mouth 2 (two) times daily., Disp: 180 tablet, Rfl: 3    clopidogrel (PLAVIX) 75 mg tablet, Take 1 tablet (75 mg total) by mouth once daily., Disp: 90 tablet, Rfl: 3    losartan (COZAAR) 50 MG tablet, Take 1 tablet (50 mg total) by mouth once daily., Disp: 90 tablet, Rfl: 3    nitroGLYCERIN (NITROSTAT) 0.4 MG SL tablet, Place 1 tablet (0.4 mg total) under the tongue every 5 (five) minutes as needed for Chest pain (for a max of 3)., Disp: 10 tablet, Rfl: 1    pantoprazole (PROTONIX) 40 MG tablet, Take 1 tablet (40 mg total) by mouth 2 (two) times daily., Disp: 180 tablet, Rfl: 3    rosuvastatin (CRESTOR) 20 MG tablet, Take 1 tablet (20 mg total) by mouth once daily., Disp: 90 tablet, Rfl: 3      activities of daily living accessed.       Objective:      Vitals:    01/22/18 0900   BP: (!) 146/78   Pulse: 69   Body mass index is 36.63 kg/m².     Physical Exam   Constitutional: She is oriented to person, place, and time. She appears well-developed and well-nourished.   HENT:   Head: Normocephalic and atraumatic.   Eyes: Conjunctivae are normal.   Cardiovascular: Normal rate, regular rhythm, normal heart sounds and intact distal pulses.    Pulmonary/Chest: Effort normal and breath sounds normal.   Abdominal: Soft. Bowel sounds are normal.   Musculoskeletal: Normal range of motion.   Neurological: She is alert and oriented to person, place, and time.   Skin: Skin is warm and dry. Capillary refill takes less than 2 seconds.   Psychiatric: She has a normal mood and affect. Her behavior is normal. Judgment and thought content normal.       Assessment:       1. Hypertension, unspecified type    2. Essential hypertension        Plan:       Hypertension, unspecified type    Essential hypertension  -     Discontinue: losartan (COZAAR) 25 MG tablet; Take 2 tablets (50 mg total) by mouth once daily.   Dispense: 180 tablet; Refill: 3  -     losartan (COZAAR) 50 MG tablet; Take 1 tablet (50 mg total) by mouth once daily.  Dispense: 90 tablet; Refill: 3    increased cozaar to 50 mg daily.    Follow-up in about 4 weeks (around 2/19/2018) for blood pressure check.

## 2018-02-20 ENCOUNTER — OFFICE VISIT (OUTPATIENT)
Dept: FAMILY MEDICINE | Facility: HOSPITAL | Age: 68
End: 2018-02-20
Payer: MEDICARE

## 2018-02-20 VITALS
DIASTOLIC BLOOD PRESSURE: 77 MMHG | SYSTOLIC BLOOD PRESSURE: 156 MMHG | WEIGHT: 211.88 LBS | HEART RATE: 81 BPM | BODY MASS INDEX: 37.54 KG/M2 | HEIGHT: 63 IN

## 2018-02-20 DIAGNOSIS — I25.2 HX OF NON-ST ELEVATION MYOCARDIAL INFARCTION (NSTEMI): ICD-10-CM

## 2018-02-20 DIAGNOSIS — I10 ESSENTIAL HYPERTENSION: ICD-10-CM

## 2018-02-20 DIAGNOSIS — Z12.11 COLON CANCER SCREENING: ICD-10-CM

## 2018-02-20 DIAGNOSIS — E78.5 HYPERLIPIDEMIA, UNSPECIFIED HYPERLIPIDEMIA TYPE: ICD-10-CM

## 2018-02-20 DIAGNOSIS — K21.9 GASTROESOPHAGEAL REFLUX DISEASE, ESOPHAGITIS PRESENCE NOT SPECIFIED: Primary | ICD-10-CM

## 2018-02-20 PROCEDURE — 99213 OFFICE O/P EST LOW 20 MIN: CPT | Performed by: STUDENT IN AN ORGANIZED HEALTH CARE EDUCATION/TRAINING PROGRAM

## 2018-02-20 RX ORDER — AMLODIPINE BESYLATE 10 MG/1
10 TABLET ORAL DAILY
Qty: 90 TABLET | Refills: 3 | Status: SHIPPED | OUTPATIENT
Start: 2018-02-20 | End: 2018-02-20 | Stop reason: SDUPTHER

## 2018-02-20 RX ORDER — AMLODIPINE BESYLATE 10 MG/1
10 TABLET ORAL DAILY
Qty: 90 TABLET | Refills: 3 | Status: SHIPPED | OUTPATIENT
Start: 2018-02-20 | End: 2018-07-18 | Stop reason: SDUPTHER

## 2018-02-20 RX ORDER — CLOPIDOGREL BISULFATE 75 MG/1
75 TABLET ORAL DAILY
Qty: 90 TABLET | Refills: 3 | Status: SHIPPED | OUTPATIENT
Start: 2018-02-20 | End: 2018-06-08 | Stop reason: SDUPTHER

## 2018-02-20 RX ORDER — ROSUVASTATIN CALCIUM 20 MG/1
20 TABLET, COATED ORAL DAILY
Qty: 90 TABLET | Refills: 3 | Status: SHIPPED | OUTPATIENT
Start: 2018-02-20 | End: 2018-06-08 | Stop reason: SDUPTHER

## 2018-02-20 RX ORDER — ASPIRIN 81 MG/1
81 TABLET ORAL DAILY
Qty: 90 TABLET | Refills: 3 | Status: ON HOLD | OUTPATIENT
Start: 2018-02-20 | End: 2020-12-27

## 2018-02-20 RX ORDER — CARVEDILOL 6.25 MG/1
6.25 TABLET ORAL 2 TIMES DAILY
Qty: 180 TABLET | Refills: 3 | Status: SHIPPED | OUTPATIENT
Start: 2018-02-20 | End: 2018-06-08 | Stop reason: SDUPTHER

## 2018-02-20 RX ORDER — ASPIRIN 81 MG/1
81 TABLET ORAL DAILY
Qty: 90 TABLET | Refills: 3 | Status: SHIPPED | OUTPATIENT
Start: 2018-02-20 | End: 2018-02-20 | Stop reason: SDUPTHER

## 2018-02-20 RX ORDER — CLOPIDOGREL BISULFATE 75 MG/1
75 TABLET ORAL DAILY
Qty: 90 TABLET | Refills: 3 | Status: SHIPPED | OUTPATIENT
Start: 2018-02-20 | End: 2018-02-20 | Stop reason: SDUPTHER

## 2018-02-20 RX ORDER — LOSARTAN POTASSIUM 50 MG/1
50 TABLET ORAL DAILY
Qty: 90 TABLET | Refills: 3 | Status: SHIPPED | OUTPATIENT
Start: 2018-02-20 | End: 2018-06-08 | Stop reason: SDUPTHER

## 2018-02-20 NOTE — PROGRESS NOTES
Subjective:       Patient ID: Eliza Louie is a 67 y.o. female.    Chief Complaint: Follow-up    HPI    66 y.o. Female with PMHx of HTN, hx of H.pylori infection with duodenal ulcer treated, and NSTEMI s/p stenting (July 2017) presents to clinic today c/o medication refill and follow up of chronic conditions.     Today, patient states she is doing well.she is compliant with medication. She states that she has gained a few pounds over past several months 2/2 diet and is trying to eat healthy again.  She denies chest pain, sob, cough, palpations, leg swelling. She occasionally checks BP at home and states wnl.  Pt states that GERD symptoms have improved.       Review of Systems   Constitutional: Negative for chills and fever.   Respiratory: Negative for chest tightness, shortness of breath and wheezing.    Cardiovascular: Negative for chest pain and leg swelling.   Gastrointestinal: Negative for abdominal pain, constipation, diarrhea, nausea and vomiting.   Musculoskeletal: Negative for arthralgias and myalgias.   Skin: Negative for rash.   Neurological: Negative for dizziness, weakness and headaches.   Psychiatric/Behavioral: Negative for behavioral problems. The patient is not nervous/anxious.        Objective:      Vitals:    02/20/18 1320   BP: (!) 156/77   Pulse: 81     Physical Exam   Constitutional: She is oriented to person, place, and time. She appears well-developed and well-nourished.   HENT:   Head: Atraumatic.   Eyes: Conjunctivae and EOM are normal. Pupils are equal, round, and reactive to light.   Neck: Normal range of motion. Neck supple. No JVD present. No thyromegaly present.   Cardiovascular: Normal rate, regular rhythm and normal heart sounds.  Exam reveals no gallop and no friction rub.    No murmur heard.  Pulmonary/Chest: Effort normal and breath sounds normal. She has no wheezes. She has no rales.   Abdominal: Soft. Bowel sounds are normal. She exhibits no distension. There is no tenderness.    Musculoskeletal: Normal range of motion.   Neurological: She is alert and oriented to person, place, and time. She has normal reflexes.   Skin: Skin is warm and dry.   Psychiatric: She has a normal mood and affect. Her behavior is normal.   Nursing note and vitals reviewed.      Assessment:       1. Gastroesophageal reflux disease, esophagitis presence not specified    2. Essential hypertension    3. Hyperlipidemia, unspecified hyperlipidemia type    4. Hx of non-ST elevation myocardial infarction (NSTEMI)    5. Colon cancer screening        Plan:         Gastroesophageal reflux disease, esophagitis presence not specified  - stable    Essential hypertension  Hx of non-ST elevation myocardial infarction (NSTEMI)  - BP elevated today at 156/77 but usually wnl at home  - Pt will make effort to improve diet and exercise  - Pt will RTC in 1 month for recheck and if increased will plan to increase coreg to 12.5 bid.   -     amLODIPine (NORVASC) 10 MG tablet; Take 1 tablet (10 mg total) by mouth once daily.  Dispense: 90 tablet; Refill: 3  -     aspirin (ECOTRIN) 81 MG EC tablet; Take 1 tablet (81 mg total) by mouth once daily.  Dispense: 90 tablet; Refill: 3  -     carvedilol (COREG) 6.25 MG tablet; Take 1 tablet (6.25 mg total) by mouth 2 (two) times daily.  Dispense: 180 tablet; Refill: 3  -     clopidogrel (PLAVIX) 75 mg tablet; Take 1 tablet (75 mg total) by mouth once daily.  Dispense: 90 tablet; Refill: 3  -     losartan (COZAAR) 50 MG tablet; Take 1 tablet (50 mg total) by mouth once daily.  Dispense: 90 tablet; Refill: 3    Hyperlipidemia, unspecified hyperlipidemia type  -     rosuvastatin (CRESTOR) 20 MG tablet; Take 1 tablet (20 mg total) by mouth once daily.  Dispense: 90 tablet; Refill: 3       Health Maintenance  - Mammogram: states last about 5 years ago. Wnl. Pt defers at this time.  - Colonoscopy: refusing at this time will order fit kit.       Follow-up in about 4 weeks (around 3/20/2018).        Jenny  LEONARD Sweeney D.O.  [facility] HO-2  02/20/2018

## 2018-03-22 ENCOUNTER — OFFICE VISIT (OUTPATIENT)
Dept: FAMILY MEDICINE | Facility: HOSPITAL | Age: 68
End: 2018-03-22
Attending: FAMILY MEDICINE
Payer: MEDICARE

## 2018-03-22 VITALS
DIASTOLIC BLOOD PRESSURE: 77 MMHG | BODY MASS INDEX: 37.03 KG/M2 | HEART RATE: 80 BPM | HEIGHT: 63 IN | WEIGHT: 209 LBS | SYSTOLIC BLOOD PRESSURE: 147 MMHG

## 2018-03-22 DIAGNOSIS — I10 ESSENTIAL HYPERTENSION: Primary | ICD-10-CM

## 2018-03-22 DIAGNOSIS — N39.41 URGE INCONTINENCE OF URINE: ICD-10-CM

## 2018-03-22 PROCEDURE — 99213 OFFICE O/P EST LOW 20 MIN: CPT | Performed by: STUDENT IN AN ORGANIZED HEALTH CARE EDUCATION/TRAINING PROGRAM

## 2018-03-22 NOTE — PROGRESS NOTES
Subjective:       Patient ID: Eliza Louie is a 67 y.o. female.    Chief Complaint: bp check and urinary incontinence     HPI   66 y.o. Female with PMHx of HTN and NSTEMI s/p stenting (July 2017) presents to clinic today bp check and urinary incontinence.      BP  At last visit BP elevated to 150-160 range. Pt states that she has been eating a more healthy diet and has lost about 5 lbs. She is compliant with medications, amlodipine 10, asa, coreg 6.25 bid, plavix 75, losartan 50. Does not check bp at home. Denies ha, dizziness, blurry vision, chest pain/pressure/palpations.     Urinary incontinence  Pt states that she has the urge to urinate but can not make it to the toilet. She denies leakage with cough, sneeze or laugh. Denies dysuria.     Review of Systems   Constitutional: Negative for chills and fever.   Respiratory: Negative for chest tightness, shortness of breath and wheezing.    Cardiovascular: Negative for chest pain and leg swelling.   Gastrointestinal: Negative for abdominal pain, constipation, diarrhea, nausea and vomiting.   Genitourinary: Positive for enuresis. Negative for dyspareunia, dysuria, hematuria and pelvic pain.   Musculoskeletal: Negative for arthralgias and myalgias.   Skin: Negative for rash.   Neurological: Negative for dizziness, weakness and headaches.   Psychiatric/Behavioral: Negative for behavioral problems. The patient is not nervous/anxious.        Objective:      Vitals:    03/22/18 1324   BP: (!) 147/77  Repeat 138/78   Pulse: 80     Physical Exam   Constitutional: She is oriented to person, place, and time. She appears well-developed and well-nourished.   HENT:   Head: Atraumatic.   Eyes: Conjunctivae and EOM are normal. Pupils are equal, round, and reactive to light.   Neck: Normal range of motion. Neck supple. No thyromegaly present.   Cardiovascular: Normal rate, regular rhythm and normal heart sounds.  Exam reveals no gallop and no friction rub.    No murmur  heard.  Pulmonary/Chest: Effort normal and breath sounds normal. She has no wheezes. She has no rales.   Abdominal: Soft. Bowel sounds are normal. She exhibits no distension. There is no tenderness.   Musculoskeletal: Normal range of motion.   Neurological: She is alert and oriented to person, place, and time. She has normal reflexes.   Skin: Skin is warm and dry.   Psychiatric: She has a normal mood and affect. Her behavior is normal.   Nursing note and vitals reviewed.      Assessment:       1. Essential hypertension    2. Urge incontinence of urine        Plan:       Essential hypertension  Hx of nstemi  - /78 today  - c/w diet and exercise  - c/w medications:  amlodipine 10, asa, coreg 6.25 bid, plavix 75, losartan 50.    Urge incontinence of urine  - kegel exercises   - bladder training  - avoiding caffeine   - pt provided with handout   - discussed medication options but will try conservative management at this time.    Follow-up if symptoms worsen or fail to improve.      Jenny Sweeney D.O.  [facility] HO-2  03/22/2018

## 2018-03-23 NOTE — PROGRESS NOTES
I assume primary medical responsibility for this patient, I have reviewed the case history, findings, diagnosis and treatment plan with the resident and agree that the care is reasonable and necessary. This service has been performed by a resident without the presence of a teaching physician under the primary care exception  Rubi Gonsales  3/23/2018

## 2018-06-08 ENCOUNTER — OFFICE VISIT (OUTPATIENT)
Dept: FAMILY MEDICINE | Facility: HOSPITAL | Age: 68
End: 2018-06-08
Attending: FAMILY MEDICINE
Payer: MEDICARE

## 2018-06-08 VITALS
WEIGHT: 206.56 LBS | HEIGHT: 63 IN | DIASTOLIC BLOOD PRESSURE: 74 MMHG | SYSTOLIC BLOOD PRESSURE: 155 MMHG | BODY MASS INDEX: 36.6 KG/M2 | HEART RATE: 73 BPM

## 2018-06-08 DIAGNOSIS — I10 ESSENTIAL HYPERTENSION: Primary | ICD-10-CM

## 2018-06-08 DIAGNOSIS — E78.5 HYPERLIPIDEMIA, UNSPECIFIED HYPERLIPIDEMIA TYPE: ICD-10-CM

## 2018-06-08 DIAGNOSIS — Z12.11 COLON CANCER SCREENING: ICD-10-CM

## 2018-06-08 DIAGNOSIS — I25.2 HX OF NON-ST ELEVATION MYOCARDIAL INFARCTION (NSTEMI): ICD-10-CM

## 2018-06-08 PROCEDURE — 99213 OFFICE O/P EST LOW 20 MIN: CPT | Performed by: STUDENT IN AN ORGANIZED HEALTH CARE EDUCATION/TRAINING PROGRAM

## 2018-06-08 RX ORDER — ROSUVASTATIN CALCIUM 20 MG/1
20 TABLET, COATED ORAL DAILY
Qty: 90 TABLET | Refills: 3 | Status: SHIPPED | OUTPATIENT
Start: 2018-06-08 | End: 2018-12-18 | Stop reason: SDUPTHER

## 2018-06-08 RX ORDER — CLOPIDOGREL BISULFATE 75 MG/1
75 TABLET ORAL DAILY
Qty: 90 TABLET | Refills: 3 | Status: SHIPPED | OUTPATIENT
Start: 2018-06-08 | End: 2019-04-12 | Stop reason: SDUPTHER

## 2018-06-08 RX ORDER — LOSARTAN POTASSIUM 50 MG/1
50 TABLET ORAL DAILY
Qty: 90 TABLET | Refills: 3 | Status: SHIPPED | OUTPATIENT
Start: 2018-06-08 | End: 2020-07-20 | Stop reason: SDUPTHER

## 2018-06-08 RX ORDER — CARVEDILOL 6.25 MG/1
6.25 TABLET ORAL 2 TIMES DAILY
Qty: 180 TABLET | Refills: 3 | Status: SHIPPED | OUTPATIENT
Start: 2018-06-08 | End: 2018-12-03 | Stop reason: DRUGHIGH

## 2018-06-08 NOTE — PROGRESS NOTES
Subjective:       Patient ID: Eliza Louie is a 67 y.o. female.    Chief Complaint: Hypertension    HPI     67 year old female PMHx HTN, GERD, and NSTEMI presents to the clinic for follow-up on HTN management and medication refill. She denies any chest pain, edema, nausea, vomiting, or palpitations. She does not endorse any problems and is happy with her current prescription regimen but is only taking coreg q day.       Review of Systems   Constitutional: Negative for chills and fever.   Respiratory: Negative for chest tightness, shortness of breath and wheezing.    Cardiovascular: Negative for chest pain and leg swelling.   Gastrointestinal: Negative for abdominal pain, constipation, diarrhea, nausea and vomiting.   Musculoskeletal: Negative for arthralgias and myalgias.   Skin: Negative for rash.   Neurological: Negative for dizziness, weakness and headaches.   Psychiatric/Behavioral: Negative for behavioral problems. The patient is not nervous/anxious.        Objective:      Vitals:    06/08/18 0845   BP: (!) 155/74    Repeat 144/86   Pulse: 73     Physical Exam   Constitutional: She is oriented to person, place, and time. She appears well-developed and well-nourished.   HENT:   Head: Atraumatic.   Eyes: Conjunctivae and EOM are normal. Pupils are equal, round, and reactive to light.   Neck: Normal range of motion. Neck supple. No JVD present. No thyromegaly present.   Cardiovascular: Normal rate, regular rhythm and normal heart sounds.  Exam reveals no gallop and no friction rub.    No murmur heard.  Pulmonary/Chest: Effort normal and breath sounds normal. She has no wheezes. She has no rales.   Abdominal: Soft. Bowel sounds are normal. She exhibits no distension. There is no tenderness.   Musculoskeletal: Normal range of motion.   Neurological: She is alert and oriented to person, place, and time. She has normal reflexes.   Skin: Skin is warm and dry.   Psychiatric: She has a normal mood and affect. Her behavior  is normal.   Nursing note and vitals reviewed.      Assessment:       1. Essential hypertension    2. Hx of non-ST elevation myocardial infarction (NSTEMI)    3. Hyperlipidemia, unspecified hyperlipidemia type    4. Colon cancer screening        Plan:       Essential hypertension  Hx of non-ST elevation myocardial infarction (NSTEMI)  - bp elevated 155/74 with repeat 144/86  - pt has only been taking coreg Q day instead of  BID as recommended. Pt states that she was unaware that medication was bid. Educated on med and will take bid from now on.  - The patient is asked to make an attempt to improve diet and exercise patterns to aid in medical management of this problem.    -     carvedilol (COREG) 6.25 MG tablet; Take 1 tablet (6.25 mg total) by mouth 2 (two) times daily.  Dispense: 180 tablet; Refill: 3  -     clopidogrel (PLAVIX) 75 mg tablet; Take 1 tablet (75 mg total) by mouth once daily.  Dispense: 90 tablet; Refill: 3  -     losartan (COZAAR) 50 MG tablet; Take 1 tablet (50 mg total) by mouth once daily.  Dispense: 90 tablet; Refill: 3  -     rosuvastatin (CRESTOR) 20 MG tablet; Take 1 tablet (20 mg total) by mouth once daily.  Dispense: 90 tablet; Refill: 3    Colon cancer screening  -     Fecal Immunochemical Test (iFOBT); Future; Expected date: 06/08/2018    Health Maintenance  - Pap: declines  - Mammogram; Declines, breast self care advised  - Colonoscopy:  Declines, fit kit given       Follow-up in about 6 months (around 12/8/2018).        Jenny Sweeney D.O.  [facility]  06/08/2018

## 2018-06-18 ENCOUNTER — LAB VISIT (OUTPATIENT)
Dept: LAB | Facility: HOSPITAL | Age: 68
End: 2018-06-18
Attending: STUDENT IN AN ORGANIZED HEALTH CARE EDUCATION/TRAINING PROGRAM
Payer: MEDICARE

## 2018-06-18 DIAGNOSIS — Z12.11 COLON CANCER SCREENING: ICD-10-CM

## 2018-06-18 LAB — HEMOCCULT STL QL IA: NEGATIVE

## 2018-06-18 PROCEDURE — 82274 ASSAY TEST FOR BLOOD FECAL: CPT

## 2018-07-18 DIAGNOSIS — I10 ESSENTIAL HYPERTENSION: ICD-10-CM

## 2018-07-18 NOTE — TELEPHONE ENCOUNTER
----- Message from Yesenia Milligan sent at 7/18/2018  9:40 AM CDT -----  Patient needs a refill for amLODIPine (NORVASC) 10 MG tablet

## 2018-07-24 RX ORDER — AMLODIPINE BESYLATE 10 MG/1
10 TABLET ORAL DAILY
Qty: 90 TABLET | Refills: 3 | Status: SHIPPED | OUTPATIENT
Start: 2018-07-24 | End: 2018-12-03 | Stop reason: ALTCHOICE

## 2018-10-01 DIAGNOSIS — A04.8 H. PYLORI INFECTION: ICD-10-CM

## 2018-10-01 RX ORDER — PANTOPRAZOLE SODIUM 40 MG/1
40 TABLET, DELAYED RELEASE ORAL 2 TIMES DAILY
Qty: 180 TABLET | Refills: 3 | OUTPATIENT
Start: 2018-10-01 | End: 2018-10-02 | Stop reason: SDUPTHER

## 2018-10-01 NOTE — TELEPHONE ENCOUNTER
----- Message from Jennifer Armando sent at 10/1/2018  8:38 AM CDT -----  PT NEED REFILL pantoprazole (PROTONIX) 40 MG tablet ()

## 2018-10-02 DIAGNOSIS — A04.8 H. PYLORI INFECTION: ICD-10-CM

## 2018-10-02 RX ORDER — PANTOPRAZOLE SODIUM 40 MG/1
40 TABLET, DELAYED RELEASE ORAL 2 TIMES DAILY
Qty: 180 TABLET | Refills: 3 | Status: SHIPPED | OUTPATIENT
Start: 2018-10-02 | End: 2018-10-02 | Stop reason: SDUPTHER

## 2018-10-02 RX ORDER — PANTOPRAZOLE SODIUM 40 MG/1
40 TABLET, DELAYED RELEASE ORAL 2 TIMES DAILY
Qty: 180 TABLET | Refills: 3 | Status: SHIPPED | OUTPATIENT
Start: 2018-10-02 | End: 2021-08-26

## 2018-10-02 NOTE — TELEPHONE ENCOUNTER
----- Message from Eliza Mathew MA sent at 10/2/2018  1:14 PM CDT -----  Pantoprazole was printed and not sent to [facility].  Please send.  Thanks.

## 2018-12-03 ENCOUNTER — OFFICE VISIT (OUTPATIENT)
Dept: FAMILY MEDICINE | Facility: HOSPITAL | Age: 68
End: 2018-12-03
Attending: FAMILY MEDICINE
Payer: MEDICARE

## 2018-12-03 VITALS
HEART RATE: 78 BPM | SYSTOLIC BLOOD PRESSURE: 150 MMHG | DIASTOLIC BLOOD PRESSURE: 90 MMHG | WEIGHT: 206.81 LBS | HEIGHT: 63 IN | BODY MASS INDEX: 36.64 KG/M2

## 2018-12-03 DIAGNOSIS — I10 ESSENTIAL HYPERTENSION: Primary | ICD-10-CM

## 2018-12-03 DIAGNOSIS — R60.0 LOWER EXTREMITY EDEMA: ICD-10-CM

## 2018-12-03 PROBLEM — R05.9 COUGH: Status: RESOLVED | Noted: 2017-12-21 | Resolved: 2018-12-03

## 2018-12-03 PROBLEM — K92.2 GASTROINTESTINAL HEMORRHAGE: Status: RESOLVED | Noted: 2017-07-02 | Resolved: 2018-12-03

## 2018-12-03 PROBLEM — R06.00 DYSPNEA: Status: RESOLVED | Noted: 2017-07-02 | Resolved: 2018-12-03

## 2018-12-03 PROBLEM — I21.4 NSTEMI (NON-ST ELEVATED MYOCARDIAL INFARCTION): Status: RESOLVED | Noted: 2017-07-07 | Resolved: 2018-12-03

## 2018-12-03 PROCEDURE — 99214 OFFICE O/P EST MOD 30 MIN: CPT | Performed by: STUDENT IN AN ORGANIZED HEALTH CARE EDUCATION/TRAINING PROGRAM

## 2018-12-03 RX ORDER — NIFEDIPINE 20 MG/1
20 CAPSULE ORAL EVERY 8 HOURS
Qty: 90 CAPSULE | Refills: 11 | Status: ON HOLD | OUTPATIENT
Start: 2018-12-03 | End: 2020-12-27 | Stop reason: HOSPADM

## 2018-12-03 RX ORDER — LOSARTAN POTASSIUM 100 MG/1
TABLET ORAL
COMMUNITY
Start: 2018-10-25 | End: 2018-12-03 | Stop reason: DRUGHIGH

## 2018-12-03 RX ORDER — CARVEDILOL 12.5 MG/1
TABLET ORAL
COMMUNITY
Start: 2018-10-29 | End: 2020-07-20 | Stop reason: SDUPTHER

## 2018-12-03 NOTE — PROGRESS NOTES
Subjective:       Patient ID: Eliza Louie is a 68 y.o. female.    Chief Complaint: Hypertension    68 year old female with PMHx of HTN, pre-diabetes, NSTEMI, and GERD, here for HTN follow-up. Patient is compliant with all of her medications at home. She is not routinely checking her BP at home but is willing to do so. Reports that she is eating a healthy diet and being mindful of her sodium intake. She does not exercise due to SOB on exertion. Of note: pt has echo stress test planned for March 2019 with her cardiologist Dr. Correa. She is otherwise feeling well and has no complaints today. She is declining the flu vaccine today and is aware of risks and benefits.        Review of Systems   Constitutional: Negative.    HENT: Negative.    Eyes: Negative.    Respiratory: Negative for cough, chest tightness and wheezing.         SOB on exertion only. No orthopnea, No dyspnea at rest.   Cardiovascular: Positive for leg swelling. Negative for chest pain and palpitations.   Gastrointestinal: Negative for abdominal pain, anal bleeding, blood in stool, constipation, diarrhea, nausea and vomiting.   Endocrine: Negative.    Genitourinary: Negative.    Musculoskeletal: Negative.    Skin: Negative.    Neurological: Negative.    Hematological: Negative.    Psychiatric/Behavioral: Negative.             Objective:      Vitals:    12/03/18 1026   BP: (!) 150/90   Pulse:      Repeat BP: 150/80    Physical Exam   Constitutional: She is oriented to person, place, and time. She appears well-developed and well-nourished. No distress.   HENT:   Head: Normocephalic and atraumatic.   Eyes: Conjunctivae and EOM are normal. Pupils are equal, round, and reactive to light.   Neck: Normal range of motion. Neck supple.   Cardiovascular: Normal rate, regular rhythm, normal heart sounds and intact distal pulses. Exam reveals no gallop and no friction rub.   No murmur heard.  1+ LE piting edema BL   Pulmonary/Chest: Effort normal and breath sounds  normal. No stridor. No respiratory distress. She has no wheezes. She has no rales.   Abdominal: Soft. Bowel sounds are normal. There is no tenderness.   Musculoskeletal: She exhibits edema.   Lymphadenopathy:     She has no cervical adenopathy.   Neurological: She is alert and oriented to person, place, and time.   Skin: Skin is warm and dry. She is not diaphoretic.   Psychiatric: She has a normal mood and affect. Her behavior is normal.         Assessment:       1. Essential hypertension    2. Lower extremity edema          Plan:       Essential hypertension  -bp elevated today   - will change norvasc to procardia  -     NIFEdipine (PROCARDIA) 20 MG Cap; Take 1 capsule (20 mg total) by mouth every 8 (eight) hours.  Dispense: 90 capsule; Refill: 11    Lower extremity edema  - trace edema b/l  - ddx includes drug side effect, venous stasis, chf  - pt has planned stress echo in spring  - recommended to elevate legs and wear compression stockings  - will continue to monitor    RTC in 3-6 months    Jenny Sweeney D.O.  [facility] HO-3  12/03/2018

## 2018-12-05 ENCOUNTER — TELEPHONE (OUTPATIENT)
Dept: FAMILY MEDICINE | Facility: HOSPITAL | Age: 68
End: 2018-12-05

## 2018-12-05 NOTE — TELEPHONE ENCOUNTER
----- Message from Yesenia Milligan sent at 12/5/2018  8:36 AM CST -----  Patient needs a renewal for her handicap sticker. She would like to receive a call to see if  can do that

## 2018-12-18 DIAGNOSIS — E78.5 HYPERLIPIDEMIA, UNSPECIFIED HYPERLIPIDEMIA TYPE: ICD-10-CM

## 2018-12-18 NOTE — TELEPHONE ENCOUNTER
----- Message from Yesenia Milligan sent at 12/18/2018  8:43 AM CST -----  Patient needs a refill for rosuvastatin (CRESTOR) 20 MG tablet and also is waiting for the handicapped paperwork to be sent so she can get her tag.

## 2018-12-19 RX ORDER — ROSUVASTATIN CALCIUM 20 MG/1
20 TABLET, COATED ORAL DAILY
Qty: 90 TABLET | Refills: 3 | Status: SHIPPED | OUTPATIENT
Start: 2018-12-19 | End: 2018-12-21 | Stop reason: SDUPTHER

## 2018-12-21 DIAGNOSIS — E78.5 HYPERLIPIDEMIA, UNSPECIFIED HYPERLIPIDEMIA TYPE: ICD-10-CM

## 2018-12-21 RX ORDER — ROSUVASTATIN CALCIUM 20 MG/1
20 TABLET, COATED ORAL DAILY
Qty: 90 TABLET | Refills: 3 | Status: ON HOLD | OUTPATIENT
Start: 2018-12-21 | End: 2020-12-27

## 2019-04-12 DIAGNOSIS — I10 ESSENTIAL HYPERTENSION: ICD-10-CM

## 2019-04-12 DIAGNOSIS — I25.2 HX OF NON-ST ELEVATION MYOCARDIAL INFARCTION (NSTEMI): ICD-10-CM

## 2019-04-12 RX ORDER — CLOPIDOGREL BISULFATE 75 MG/1
75 TABLET ORAL DAILY
Qty: 90 TABLET | Refills: 3 | Status: ON HOLD | OUTPATIENT
Start: 2019-04-12 | End: 2020-12-27

## 2019-04-12 RX ORDER — CLOPIDOGREL BISULFATE 75 MG/1
75 TABLET ORAL DAILY
Qty: 90 TABLET | Refills: 3 | Status: SHIPPED | OUTPATIENT
Start: 2019-04-12 | End: 2019-04-12 | Stop reason: SDUPTHER

## 2019-06-04 ENCOUNTER — LAB VISIT (OUTPATIENT)
Dept: LAB | Facility: HOSPITAL | Age: 69
End: 2019-06-04
Attending: STUDENT IN AN ORGANIZED HEALTH CARE EDUCATION/TRAINING PROGRAM
Payer: MEDICARE

## 2019-06-04 ENCOUNTER — TELEPHONE (OUTPATIENT)
Dept: FAMILY MEDICINE | Facility: HOSPITAL | Age: 69
End: 2019-06-04

## 2019-06-04 ENCOUNTER — OFFICE VISIT (OUTPATIENT)
Dept: FAMILY MEDICINE | Facility: HOSPITAL | Age: 69
End: 2019-06-04
Payer: MEDICARE

## 2019-06-04 VITALS
SYSTOLIC BLOOD PRESSURE: 123 MMHG | DIASTOLIC BLOOD PRESSURE: 68 MMHG | WEIGHT: 208.13 LBS | HEART RATE: 69 BPM | OXYGEN SATURATION: 97 % | HEIGHT: 63 IN | BODY MASS INDEX: 36.88 KG/M2

## 2019-06-04 DIAGNOSIS — Z12.11 COLON CANCER SCREENING: ICD-10-CM

## 2019-06-04 DIAGNOSIS — E11.9 TYPE 2 DIABETES MELLITUS WITHOUT COMPLICATION, WITHOUT LONG-TERM CURRENT USE OF INSULIN: ICD-10-CM

## 2019-06-04 DIAGNOSIS — I10 ESSENTIAL HYPERTENSION: Primary | ICD-10-CM

## 2019-06-04 LAB
ALBUMIN SERPL BCP-MCNC: 3.6 G/DL (ref 3.5–5.2)
ALP SERPL-CCNC: 108 U/L (ref 55–135)
ALT SERPL W/O P-5'-P-CCNC: 16 U/L (ref 10–44)
ANION GAP SERPL CALC-SCNC: 14 MMOL/L (ref 8–16)
AST SERPL-CCNC: 19 U/L (ref 10–40)
BASOPHILS # BLD AUTO: 0.04 K/UL (ref 0–0.2)
BASOPHILS NFR BLD: 0.6 % (ref 0–1.9)
BILIRUB SERPL-MCNC: 0.5 MG/DL (ref 0.1–1)
BUN SERPL-MCNC: 12 MG/DL (ref 8–23)
CALCIUM SERPL-MCNC: 9.1 MG/DL (ref 8.7–10.5)
CHLORIDE SERPL-SCNC: 100 MMOL/L (ref 95–110)
CHOLEST SERPL-MCNC: 134 MG/DL (ref 120–199)
CHOLEST/HDLC SERPL: 3.7 {RATIO} (ref 2–5)
CO2 SERPL-SCNC: 20 MMOL/L (ref 23–29)
CREAT SERPL-MCNC: 1.3 MG/DL (ref 0.5–1.4)
DIFFERENTIAL METHOD: NORMAL
EOSINOPHIL # BLD AUTO: 0.4 K/UL (ref 0–0.5)
EOSINOPHIL NFR BLD: 5.8 % (ref 0–8)
ERYTHROCYTE [DISTWIDTH] IN BLOOD BY AUTOMATED COUNT: 13.7 % (ref 11.5–14.5)
EST. GFR  (AFRICAN AMERICAN): 49 ML/MIN/1.73 M^2
EST. GFR  (NON AFRICAN AMERICAN): 43 ML/MIN/1.73 M^2
ESTIMATED AVG GLUCOSE: 341 MG/DL (ref 68–131)
GLUCOSE SERPL-MCNC: 421 MG/DL (ref 70–110)
HBA1C MFR BLD HPLC: 13.5 % (ref 4–5.6)
HCT VFR BLD AUTO: 39 % (ref 37–48.5)
HDLC SERPL-MCNC: 36 MG/DL (ref 40–75)
HDLC SERPL: 26.9 % (ref 20–50)
HGB BLD-MCNC: 13 G/DL (ref 12–16)
LDLC SERPL CALC-MCNC: 46.4 MG/DL (ref 63–159)
LYMPHOCYTES # BLD AUTO: 2.5 K/UL (ref 1–4.8)
LYMPHOCYTES NFR BLD: 38.4 % (ref 18–48)
MCH RBC QN AUTO: 27.6 PG (ref 27–31)
MCHC RBC AUTO-ENTMCNC: 33.3 G/DL (ref 32–36)
MCV RBC AUTO: 83 FL (ref 82–98)
MONOCYTES # BLD AUTO: 0.3 K/UL (ref 0.3–1)
MONOCYTES NFR BLD: 4.7 % (ref 4–15)
NEUTROPHILS # BLD AUTO: 3.3 K/UL (ref 1.8–7.7)
NEUTROPHILS NFR BLD: 50.3 % (ref 38–73)
NONHDLC SERPL-MCNC: 98 MG/DL
PLATELET # BLD AUTO: 230 K/UL (ref 150–350)
PMV BLD AUTO: 10.8 FL (ref 9.2–12.9)
POTASSIUM SERPL-SCNC: 3.9 MMOL/L (ref 3.5–5.1)
PROT SERPL-MCNC: 7.6 G/DL (ref 6–8.4)
RBC # BLD AUTO: 4.71 M/UL (ref 4–5.4)
SODIUM SERPL-SCNC: 134 MMOL/L (ref 136–145)
TRIGL SERPL-MCNC: 258 MG/DL (ref 30–150)
WBC # BLD AUTO: 6.53 K/UL (ref 3.9–12.7)

## 2019-06-04 PROCEDURE — 80053 COMPREHEN METABOLIC PANEL: CPT

## 2019-06-04 PROCEDURE — 99214 OFFICE O/P EST MOD 30 MIN: CPT | Performed by: STUDENT IN AN ORGANIZED HEALTH CARE EDUCATION/TRAINING PROGRAM

## 2019-06-04 PROCEDURE — 85025 COMPLETE CBC W/AUTO DIFF WBC: CPT

## 2019-06-04 PROCEDURE — 36415 COLL VENOUS BLD VENIPUNCTURE: CPT

## 2019-06-04 PROCEDURE — 80061 LIPID PANEL: CPT

## 2019-06-04 PROCEDURE — 83036 HEMOGLOBIN GLYCOSYLATED A1C: CPT

## 2019-06-04 RX ORDER — METFORMIN HYDROCHLORIDE 1000 MG/1
1000 TABLET ORAL 2 TIMES DAILY WITH MEALS
Qty: 180 TABLET | Refills: 3 | Status: SHIPPED | OUTPATIENT
Start: 2019-06-04 | End: 2019-12-02

## 2019-06-04 NOTE — PROGRESS NOTES
Subjective:       Patient ID: Eliza Louie is a 68 y.o. female.    Chief Complaint: HTN follow up    HPI   69 yo female presenting for htn follow up. Home bp wnl. Today wnl.   Pt has no complaints today, Denies any CP, SOB, N/V/D, headaches, fever or chills.     Per chart review patient had hx of elevated a1c but has not been on medications.     Review of Systems   Constitutional: Negative for chills and fever.   Respiratory: Negative for chest tightness, shortness of breath and wheezing.    Cardiovascular: Negative for chest pain and leg swelling.   Gastrointestinal: Negative for abdominal pain, constipation, diarrhea, nausea and vomiting.   Musculoskeletal: Negative for arthralgias and myalgias.   Skin: Negative for rash.   Neurological: Negative for dizziness, weakness and headaches.   Psychiatric/Behavioral: Negative for behavioral problems. The patient is not nervous/anxious.        Objective:      Vitals:    06/04/19 0846   BP: 123/68   Pulse: 69     Physical Exam   Constitutional: She is oriented to person, place, and time. She appears well-developed and well-nourished.   HENT:   Head: Atraumatic.   Eyes: Pupils are equal, round, and reactive to light. Conjunctivae and EOM are normal.   Neck: Normal range of motion. Neck supple. No JVD present. No thyromegaly present.   Cardiovascular: Normal rate, regular rhythm and normal heart sounds. Exam reveals no gallop and no friction rub.   No murmur heard.  Pulmonary/Chest: Effort normal and breath sounds normal. She has no wheezes. She has no rales.   Abdominal: Soft. Bowel sounds are normal. She exhibits no distension. There is no tenderness.   Musculoskeletal: Normal range of motion.   Neurological: She is alert and oriented to person, place, and time. She has normal reflexes.   Skin: Skin is warm and dry.   Psychiatric: She has a normal mood and affect. Her behavior is normal.   Nursing note and vitals reviewed.      Assessment:       1. Type 2 diabetes mellitus  without complication, without long-term current use of insulin    2. Colon cancer screening        Plan:       Type 2 diabetes mellitus without complication, without long-term current use of insulin  -     Comprehensive metabolic panel; Future; Expected date: 06/04/2019  -     Lipid panel; Future; Expected date: 06/04/2019  -     CBC auto differential; Future; Expected date: 06/04/2019  -     Hemoglobin A1c; Future; Expected date: 06/04/2019  -     metFORMIN (GLUCOPHAGE) 1000 MG tablet; Take 1 tablet (1,000 mg total) by mouth 2 (two) times daily with meals.  Dispense: 180 tablet; Refill: 3  -     Ambulatory consult to Diabetic Education    Colon cancer screening  -     Cologuard Screening (Multitarget Stool DNA); Future; Expected date: 06/04/2019      RTC in 1 month    Jenny Sweeney D.O.  [facility] HO-3  06/27/2019

## 2019-12-02 ENCOUNTER — OFFICE VISIT (OUTPATIENT)
Dept: FAMILY MEDICINE | Facility: HOSPITAL | Age: 69
End: 2019-12-02
Attending: SPECIALIST
Payer: MEDICARE

## 2019-12-02 VITALS
WEIGHT: 202.19 LBS | HEIGHT: 63 IN | DIASTOLIC BLOOD PRESSURE: 76 MMHG | BODY MASS INDEX: 35.82 KG/M2 | SYSTOLIC BLOOD PRESSURE: 132 MMHG | HEART RATE: 75 BPM

## 2019-12-02 DIAGNOSIS — H60.502 ACUTE OTITIS EXTERNA OF LEFT EAR, UNSPECIFIED TYPE: Primary | ICD-10-CM

## 2019-12-02 DIAGNOSIS — Z13.820 SCREENING FOR OSTEOPOROSIS: ICD-10-CM

## 2019-12-02 DIAGNOSIS — Z11.59 NEED FOR HEPATITIS C SCREENING TEST: ICD-10-CM

## 2019-12-02 DIAGNOSIS — M89.9 DISORDER OF BONE, UNSPECIFIED: ICD-10-CM

## 2019-12-02 DIAGNOSIS — E11.9 TYPE 2 DIABETES MELLITUS WITHOUT COMPLICATION, WITHOUT LONG-TERM CURRENT USE OF INSULIN: ICD-10-CM

## 2019-12-02 PROCEDURE — 99213 OFFICE O/P EST LOW 20 MIN: CPT | Performed by: STUDENT IN AN ORGANIZED HEALTH CARE EDUCATION/TRAINING PROGRAM

## 2019-12-02 RX ORDER — METFORMIN HYDROCHLORIDE 1000 MG/1
1000 TABLET ORAL 2 TIMES DAILY WITH MEALS
Qty: 180 TABLET | Refills: 3 | Status: SHIPPED | OUTPATIENT
Start: 2019-12-02 | End: 2021-08-04

## 2019-12-02 RX ORDER — CIPROFLOXACIN AND DEXAMETHASONE 3; 1 MG/ML; MG/ML
4 SUSPENSION/ DROPS AURICULAR (OTIC) 2 TIMES DAILY
Qty: 7.5 ML | Refills: 0 | Status: SHIPPED | OUTPATIENT
Start: 2019-12-02 | End: 2020-12-21

## 2019-12-02 NOTE — PROGRESS NOTES
Subjective                                                                                                                                                                           Chief Complaint: ear pain    Otalgia    There is pain in the left ear. This is a new problem. The current episode started in the past 7 days. The problem occurs every few minutes. The problem has been unchanged. There has been no fever. The pain is at a severity of 4/10. The pain is mild. Pertinent negatives include no abdominal pain, coughing, diarrhea, ear discharge, headaches, hearing loss, neck pain, rhinorrhea, sore throat or vomiting. She has tried nothing for the symptoms. There is no history of a chronic ear infection, hearing loss or a tympanostomy tube.     Eliza Louie is a 69 y.o. female who  has a past medical history of Diabetes mellitus and Hypertension. The patient presents to clinic for evaluation of L sided ear pain.     DM2: per chart review, last a1c of >13. Appears result fell through and patient was lost to follow up prior to coming into clinic. Patient was on metformin in the past but is not currently taking any medications. Does not want to be on insulin, but is okay with resuming metformin.    Diet: The patient does not follow any particular diet.     Pt does not wish to get vaccines today, although she believes that she is behind on her vaccine series. Does not wish to undergo screening colonoscopy either.     Health Maintenance   Topic Date Due    Foot Exam  09/28/1962    Eye Exam  09/28/1962    Urine Microalbumin  09/28/1962    TETANUS VACCINE  09/28/1970    Mammogram  09/28/1992    DEXA SCAN  09/28/1992    Colonoscopy  09/28/2002    Pneumococcal Vaccine (65+ Low/Medium Risk) (1 of 2 - PCV13) 09/28/2017    Hemoglobin A1c  12/04/2019    Low Dose Statin  06/04/2020    Lipid Panel  06/04/2020    Hepatitis C Screening  Completed        Review of Systems   Constitutional: Negative for activity change,  "chills, fatigue and fever.   HENT: Positive for ear pain. Negative for congestion, ear discharge, hearing loss, rhinorrhea, sinus pressure and sore throat.    Eyes: Negative for visual disturbance.   Respiratory: Negative for cough, chest tightness and shortness of breath.    Cardiovascular: Negative for chest pain.   Gastrointestinal: Negative for abdominal distention, abdominal pain, diarrhea, nausea and vomiting.   Endocrine: Negative for polyuria.   Genitourinary: Negative for frequency.   Musculoskeletal: Negative for arthralgias, myalgias and neck pain.   Skin: Negative for color change.   Neurological: Positive for numbness (tingling bilateral hands and feet). Negative for dizziness, weakness, light-headedness and headaches.   Psychiatric/Behavioral: Negative for agitation and behavioral problems.        Past Medical History:   Diagnosis Date    Diabetes mellitus     Hypertension        Social History     Tobacco Use    Smoking status: Never Smoker    Smokeless tobacco: Never Used   Substance Use Topics    Alcohol use: No    Drug use: No       Review of patient's allergies indicates:   Allergen Reactions    Ace inhibitors Other (See Comments)     Cough        Objective                                                                                                                                                                             Vitals:    12/02/19 0910   BP: 132/76   Pulse: 75   Weight: 91.7 kg (202 lb 2.6 oz)   Height: 5' 3" (1.6 m)      Body mass index is 35.81 kg/m².    Physical Exam   Constitutional: She is oriented to person, place, and time. She appears well-developed and well-nourished. No distress.   HENT:   Head: Normocephalic and atraumatic.   Right Ear: Hearing, tympanic membrane, external ear and ear canal normal.   Left Ear: Hearing and tympanic membrane normal. There is tenderness. No drainage. No foreign bodies.   Erythema L ear canal   Eyes: Conjunctivae are normal.   Neck: " Normal range of motion. Neck supple.   Cardiovascular: Normal rate and regular rhythm.   Pulmonary/Chest: Effort normal and breath sounds normal.   Abdominal: Soft. Bowel sounds are normal. She exhibits no distension. There is no tenderness.   Musculoskeletal: She exhibits no edema or tenderness.   Neurological: She is alert and oriented to person, place, and time. A sensory deficit (bilateral hands and feet. Failed 2 point discrimination) is present.   Skin: Skin is warm.   Psychiatric: She has a normal mood and affect. Her behavior is normal.   Nursing note and vitals reviewed.    Laboratory:  Lab Results   Component Value Date    WBC 6.53 06/04/2019    HGB 13.0 06/04/2019    HCT 39.0 06/04/2019    MCV 83 06/04/2019     06/04/2019       Chemistry        Component Value Date/Time     (L) 06/04/2019 0920    K 3.9 06/04/2019 0920     06/04/2019 0920    CO2 20 (L) 06/04/2019 0920    BUN 12 06/04/2019 0920    CREATININE 1.3 06/04/2019 0920     (H) 06/04/2019 0920        Component Value Date/Time    CALCIUM 9.1 06/04/2019 0920    ALKPHOS 108 06/04/2019 0920    AST 19 06/04/2019 0920    ALT 16 06/04/2019 0920    BILITOT 0.5 06/04/2019 0920    ESTGFRAFRICA 49 (A) 06/04/2019 0920    EGFRNONAA 43 (A) 06/04/2019 0920          Lab Results   Component Value Date    MG 1.5 (L) 07/05/2017    PHOS 3.4 07/05/2017     Lab Results   Component Value Date    CHOL 134 06/04/2019    HDL 36 (L) 06/04/2019    LDLCALC 46.4 (L) 06/04/2019    TRIG 258 (H) 06/04/2019     The 10-year ASCVD risk score (Eriemaria eugenia DÍAZ Jr., et al., 2013) is: 17.5%    Values used to calculate the score:      Age: 69 years      Sex: Female      Is Non- : No      Diabetic: Yes      Tobacco smoker: No      Systolic Blood Pressure: 132 mmHg      Is BP treated: Yes      HDL Cholesterol: 36 mg/dL      Total Cholesterol: 134 mg/dL    Lab Results   Component Value Date    TSH 1.560 07/02/2017     Lab Results   Component Value  Date    HGBA1C 13.5 (H) 06/04/2019       Reviewed previous medical records and     Assessment/Plan                                                                                                                                                                Eliza Louie is a 69 y.o. female who presents to clinic with:    1. Acute otitis externa of left ear, unspecified type    2. Type 2 diabetes mellitus without complication, without long-term current use of insulin    3. Screening for osteoporosis    4. Disorder of bone, unspecified     5. Need for hepatitis C screening test         Problem List Items Addressed This Visit     None      Visit Diagnoses     Acute otitis externa of left ear, unspecified type    -  Primary    Relevant Medications    ciprofloxacin-dexamethasone 0.3-0.1% (CIPRODEX) 0.3-0.1 % DrpS    Type 2 diabetes mellitus without complication, without long-term current use of insulin        Relevant Medications    metFORMIN (GLUCOPHAGE) 1000 MG tablet    Other Relevant Orders    Comprehensive metabolic panel    Hemoglobin A1c    Ambulatory referral to Ophthalmology    Screening for osteoporosis        Relevant Orders    DXA Bone Density Spine And Hip    Disorder of bone, unspecified         Relevant Orders    DXA Bone Density Spine And Hip    Need for hepatitis C screening test        Relevant Orders    Hepatitis C antibody (Completed)          Medication List with Changes/Refills   New Medications    CIPROFLOXACIN-DEXAMETHASONE 0.3-0.1% (CIPRODEX) 0.3-0.1 % DRPS    Place 4 drops into both ears 2 (two) times daily.    METFORMIN (GLUCOPHAGE) 1000 MG TABLET    Take 1 tablet (1,000 mg total) by mouth 2 (two) times daily with meals.   Current Medications    ASPIRIN (ECOTRIN) 81 MG EC TABLET    Take 1 tablet (81 mg total) by mouth once daily.    CARVEDILOL (COREG) 12.5 MG TABLET        CLOPIDOGREL (PLAVIX) 75 MG TABLET    Take 1 tablet (75 mg total) by mouth once daily.    LOSARTAN (COZAAR) 50 MG TABLET     Take 1 tablet (50 mg total) by mouth once daily.    NIFEDIPINE (PROCARDIA) 20 MG CAP    Take 1 capsule (20 mg total) by mouth every 8 (eight) hours.    NITROGLYCERIN (NITROSTAT) 0.4 MG SL TABLET    Place 1 tablet (0.4 mg total) under the tongue every 5 (five) minutes as needed for Chest pain (for a max of 3).    PANTOPRAZOLE (PROTONIX) 40 MG TABLET    Take 1 tablet (40 mg total) by mouth 2 (two) times daily.    ROSUVASTATIN (CRESTOR) 20 MG TABLET    Take 1 tablet (20 mg total) by mouth once daily.   Discontinued Medications    METFORMIN (GLUCOPHAGE) 1000 MG TABLET    Take 1 tablet (1,000 mg total) by mouth 2 (two) times daily with meals.     Patient counseled about the importance of healthy dietary habits as well as routine physical activity and exercise for better health outcomes. Instructed that if unable to get blood sugars under control will need to consider insulin or other alternatives.     The patient's diagnosis, medications, and proper use of medications were dicussed. The importance of close follow up to discuss labs, modify medications, and monitor any potential side effects was also discussed. The patient was also informed of the importance of any future cancer screening.     Follow up in about 3 months (around 3/2/2020). Follow up for further workup and reassessment or sooner as needed.    Patient expressed understanding after counseling regarding diagnosis and recommendations.        Jayden Pérez MD  [place] PGY-3

## 2019-12-09 ENCOUNTER — HOSPITAL ENCOUNTER (OUTPATIENT)
Dept: RADIOLOGY | Facility: HOSPITAL | Age: 69
Discharge: HOME | End: 2019-12-09
Attending: STUDENT IN AN ORGANIZED HEALTH CARE EDUCATION/TRAINING PROGRAM
Payer: MEDICARE

## 2019-12-09 DIAGNOSIS — Z13.820 SCREENING FOR OSTEOPOROSIS: ICD-10-CM

## 2019-12-09 DIAGNOSIS — M89.9 DISORDER OF BONE, UNSPECIFIED: ICD-10-CM

## 2019-12-09 PROCEDURE — 77080 DXA BONE DENSITY AXIAL: CPT | Mod: TC

## 2019-12-09 PROCEDURE — 77080 DEXA BONE DENSITY SPINE HIP: ICD-10-PCS | Mod: 26,,, | Performed by: RADIOLOGY

## 2019-12-09 PROCEDURE — 77080 DXA BONE DENSITY AXIAL: CPT | Mod: 26,,, | Performed by: RADIOLOGY

## 2020-07-20 ENCOUNTER — TELEPHONE (OUTPATIENT)
Dept: GASTROENTEROLOGY | Facility: CLINIC | Age: 70
End: 2020-07-20

## 2020-07-20 ENCOUNTER — OFFICE VISIT (OUTPATIENT)
Dept: URGENT CARE | Facility: CLINIC | Age: 70
End: 2020-07-20
Payer: MEDICARE

## 2020-07-20 ENCOUNTER — HOSPITAL ENCOUNTER (EMERGENCY)
Facility: HOSPITAL | Age: 70
Discharge: HOME | End: 2020-07-20
Attending: EMERGENCY MEDICINE
Payer: MEDICARE

## 2020-07-20 VITALS
HEIGHT: 63 IN | BODY MASS INDEX: 35.79 KG/M2 | WEIGHT: 202 LBS | RESPIRATION RATE: 20 BRPM | TEMPERATURE: 99 F | DIASTOLIC BLOOD PRESSURE: 84 MMHG | SYSTOLIC BLOOD PRESSURE: 197 MMHG | OXYGEN SATURATION: 95 % | HEART RATE: 64 BPM

## 2020-07-20 VITALS
HEIGHT: 63 IN | WEIGHT: 202 LBS | HEART RATE: 101 BPM | SYSTOLIC BLOOD PRESSURE: 224 MMHG | DIASTOLIC BLOOD PRESSURE: 102 MMHG | TEMPERATURE: 98 F | OXYGEN SATURATION: 97 % | BODY MASS INDEX: 35.79 KG/M2 | RESPIRATION RATE: 16 BRPM

## 2020-07-20 DIAGNOSIS — I10 ESSENTIAL HYPERTENSION: Primary | ICD-10-CM

## 2020-07-20 DIAGNOSIS — I10 ELEVATED BLOOD PRESSURE READING IN OFFICE WITH DIAGNOSIS OF HYPERTENSION: ICD-10-CM

## 2020-07-20 DIAGNOSIS — R94.31 EKG ABNORMALITIES: ICD-10-CM

## 2020-07-20 DIAGNOSIS — R73.9 HYPERGLYCEMIA: ICD-10-CM

## 2020-07-20 DIAGNOSIS — K80.20 CYSTIC DUCT CALCULUS: ICD-10-CM

## 2020-07-20 DIAGNOSIS — M54.6 ACUTE BILATERAL THORACIC BACK PAIN: Primary | ICD-10-CM

## 2020-07-20 DIAGNOSIS — M54.9 RIGHT-SIDED BACK PAIN: ICD-10-CM

## 2020-07-20 LAB
ALBUMIN SERPL BCP-MCNC: 2.9 G/DL (ref 3.5–5.2)
ALP SERPL-CCNC: 99 U/L (ref 55–135)
ALT SERPL W/O P-5'-P-CCNC: 24 U/L (ref 10–44)
ANION GAP SERPL CALC-SCNC: 10 MMOL/L (ref 8–16)
AST SERPL-CCNC: 23 U/L (ref 10–40)
BASOPHILS # BLD AUTO: 0.05 K/UL (ref 0–0.2)
BASOPHILS NFR BLD: 0.7 % (ref 0–1.9)
BILIRUB SERPL-MCNC: 0.5 MG/DL (ref 0.1–1)
BNP SERPL-MCNC: 44 PG/ML (ref 0–99)
BUN SERPL-MCNC: 8 MG/DL (ref 8–23)
CALCIUM SERPL-MCNC: 8 MG/DL (ref 8.7–10.5)
CHLORIDE SERPL-SCNC: 98 MMOL/L (ref 95–110)
CO2 SERPL-SCNC: 25 MMOL/L (ref 23–29)
CREAT SERPL-MCNC: 1 MG/DL (ref 0.5–1.4)
DIFFERENTIAL METHOD: NORMAL
EOSINOPHIL # BLD AUTO: 0.2 K/UL (ref 0–0.5)
EOSINOPHIL NFR BLD: 2.1 % (ref 0–8)
ERYTHROCYTE [DISTWIDTH] IN BLOOD BY AUTOMATED COUNT: 13.8 % (ref 11.5–14.5)
EST. GFR  (AFRICAN AMERICAN): >60 ML/MIN/1.73 M^2
EST. GFR  (NON AFRICAN AMERICAN): 58 ML/MIN/1.73 M^2
GLUCOSE SERPL-MCNC: 395 MG/DL (ref 70–110)
GLUCOSE SERPL-MCNC: 398 MG/DL (ref 70–110)
HCT VFR BLD AUTO: 43.4 % (ref 37–48.5)
HGB BLD-MCNC: 15.1 G/DL (ref 12–16)
IMM GRANULOCYTES # BLD AUTO: 0.03 K/UL (ref 0–0.04)
IMM GRANULOCYTES NFR BLD AUTO: 0.4 % (ref 0–0.5)
LYMPHOCYTES # BLD AUTO: 2.8 K/UL (ref 1–4.8)
LYMPHOCYTES NFR BLD: 37 % (ref 18–48)
MCH RBC QN AUTO: 28.9 PG (ref 27–31)
MCHC RBC AUTO-ENTMCNC: 34.8 G/DL (ref 32–36)
MCV RBC AUTO: 83 FL (ref 82–98)
MONOCYTES # BLD AUTO: 0.5 K/UL (ref 0.3–1)
MONOCYTES NFR BLD: 6.8 % (ref 4–15)
NEUTROPHILS # BLD AUTO: 4.1 K/UL (ref 1.8–7.7)
NEUTROPHILS NFR BLD: 53 % (ref 38–73)
NRBC BLD-RTO: 0 /100 WBC
PLATELET # BLD AUTO: 225 K/UL (ref 150–350)
PMV BLD AUTO: 11.6 FL (ref 9.2–12.9)
POC ANION GAP: 18 MMOL/L (ref 10–20)
POC BUN: 7 MMOL/L (ref 8–26)
POC CHLORIDE: 96 MMOL/L (ref 98–109)
POC CREATININE: 0.8 MG/DL (ref 0.6–1.3)
POC HEMATOCRIT: 49 %PCV (ref 37–47)
POC HEMOGLOBIN: 16.7 G/DL (ref 12.5–16)
POC ICA: 1.08 MMOL/L (ref 1.12–1.32)
POC POTASSIUM: 3.7 MMOL/L (ref 3.5–4.9)
POC SODIUM: 136 MMOL/L (ref 138–146)
POC TCO2: 27 MMOL/L (ref 24–29)
POCT GLUCOSE: 212 MG/DL (ref 70–110)
POCT GLUCOSE: 251 MG/DL (ref 70–110)
POTASSIUM SERPL-SCNC: 3.2 MMOL/L (ref 3.5–5.1)
PROT SERPL-MCNC: 7 G/DL (ref 6–8.4)
RBC # BLD AUTO: 5.23 M/UL (ref 4–5.4)
SODIUM SERPL-SCNC: 133 MMOL/L (ref 136–145)
TROPONIN I SERPL DL<=0.01 NG/ML-MCNC: 0.02 NG/ML (ref 0–0.03)
TROPONIN I SERPL DL<=0.01 NG/ML-MCNC: 0.02 NG/ML (ref 0–0.03)
WBC # BLD AUTO: 7.67 K/UL (ref 3.9–12.7)

## 2020-07-20 PROCEDURE — 80047 POCT CHEMISTRY PANEL: ICD-10-PCS | Mod: QW,S$GLB,, | Performed by: NURSE PRACTITIONER

## 2020-07-20 PROCEDURE — 63600175 PHARM REV CODE 636 W HCPCS: Performed by: EMERGENCY MEDICINE

## 2020-07-20 PROCEDURE — 82962 GLUCOSE BLOOD TEST: CPT | Mod: 91

## 2020-07-20 PROCEDURE — 99214 PR OFFICE/OUTPT VISIT, EST, LEVL IV, 30-39 MIN: ICD-10-PCS | Mod: S$GLB,,, | Performed by: NURSE PRACTITIONER

## 2020-07-20 PROCEDURE — 84484 ASSAY OF TROPONIN QUANT: CPT | Mod: 91

## 2020-07-20 PROCEDURE — 93005 ELECTROCARDIOGRAM TRACING: CPT | Mod: S$GLB,,, | Performed by: NURSE PRACTITIONER

## 2020-07-20 PROCEDURE — 85025 COMPLETE CBC W/AUTO DIFF WBC: CPT

## 2020-07-20 PROCEDURE — 93010 EKG 12-LEAD: ICD-10-PCS | Mod: ,,, | Performed by: INTERNAL MEDICINE

## 2020-07-20 PROCEDURE — 80053 COMPREHEN METABOLIC PANEL: CPT

## 2020-07-20 PROCEDURE — 93010 ELECTROCARDIOGRAM REPORT: CPT | Mod: ,,, | Performed by: INTERNAL MEDICINE

## 2020-07-20 PROCEDURE — 99285 EMERGENCY DEPT VISIT HI MDM: CPT | Mod: 25

## 2020-07-20 PROCEDURE — 83880 ASSAY OF NATRIURETIC PEPTIDE: CPT

## 2020-07-20 PROCEDURE — 93005 ELECTROCARDIOGRAM TRACING: CPT

## 2020-07-20 PROCEDURE — 93005 EKG 12-LEAD: ICD-10-PCS | Mod: S$GLB,,, | Performed by: NURSE PRACTITIONER

## 2020-07-20 PROCEDURE — 25500020 PHARM REV CODE 255: Performed by: EMERGENCY MEDICINE

## 2020-07-20 PROCEDURE — 25000003 PHARM REV CODE 250: Performed by: EMERGENCY MEDICINE

## 2020-07-20 PROCEDURE — 93010 EKG 12-LEAD: ICD-10-PCS | Mod: 76,S$GLB,, | Performed by: INTERNAL MEDICINE

## 2020-07-20 PROCEDURE — 93010 ELECTROCARDIOGRAM REPORT: CPT | Mod: 76,S$GLB,, | Performed by: INTERNAL MEDICINE

## 2020-07-20 PROCEDURE — 80047 BASIC METABLC PNL IONIZED CA: CPT | Mod: QW,S$GLB,, | Performed by: NURSE PRACTITIONER

## 2020-07-20 PROCEDURE — 99214 OFFICE O/P EST MOD 30 MIN: CPT | Mod: S$GLB,,, | Performed by: NURSE PRACTITIONER

## 2020-07-20 PROCEDURE — 96374 THER/PROPH/DIAG INJ IV PUSH: CPT

## 2020-07-20 RX ORDER — CLONIDINE HYDROCHLORIDE 0.1 MG/1
0.1 TABLET ORAL
Status: COMPLETED | OUTPATIENT
Start: 2020-07-20 | End: 2020-07-20

## 2020-07-20 RX ORDER — CARVEDILOL 25 MG/1
25 TABLET ORAL 2 TIMES DAILY
Qty: 180 TABLET | Refills: 3 | Status: ON HOLD | OUTPATIENT
Start: 2020-07-20 | End: 2020-12-27 | Stop reason: HOSPADM

## 2020-07-20 RX ORDER — LOSARTAN POTASSIUM 100 MG/1
100 TABLET ORAL DAILY
Qty: 90 TABLET | Refills: 3 | Status: ON HOLD | OUTPATIENT
Start: 2020-07-20 | End: 2020-12-27 | Stop reason: SDUPTHER

## 2020-07-20 RX ORDER — ASPIRIN 325 MG
325 TABLET ORAL
Status: COMPLETED | OUTPATIENT
Start: 2020-07-20 | End: 2020-07-20

## 2020-07-20 RX ADMIN — INSULIN HUMAN 10 UNITS: 100 INJECTION, SOLUTION PARENTERAL at 04:07

## 2020-07-20 RX ADMIN — ASPIRIN 325 MG ORAL TABLET 325 MG: 325 PILL ORAL at 11:07

## 2020-07-20 RX ADMIN — IOHEXOL 100 ML: 350 INJECTION, SOLUTION INTRAVENOUS at 12:07

## 2020-07-20 RX ADMIN — CLONIDINE HYDROCHLORIDE 0.1 MG: 0.1 TABLET ORAL at 10:07

## 2020-07-20 RX ADMIN — CLONIDINE HYDROCHLORIDE 0.1 MG: 0.1 TABLET ORAL at 04:07

## 2020-07-20 NOTE — TELEPHONE ENCOUNTER
----- Message from Noy Moreira MD sent at 7/20/2020  3:46 PM CDT -----  Good afternoon Cat,     Can you schedule Ms Eliza Louie (MRN [id number]) in advanced endoscopy clinic as soon as possible? She was seen in the ED for back pain and US showed retained cystic duct stone. Thank you!    Noy Moreira  [facility]

## 2020-07-20 NOTE — ED PROVIDER NOTES
Encounter Date: 7/20/2020       History     Chief Complaint   Patient presents with    Back Pain     pt went to urgent care for neck and back pain x 1 month. pain is descriobed as intermittent and sharp. currently pain hasa resolved.  on eval at urgent care, pt was found to be hypertensive and hyperglycemic.     Hypertension    Hyperglycemia     The patient is a 69-year-old female who presents to the emergency department by EMS from [facility].  The patient went to urgent care this morning because she was having thoracic back pain for the past month to the right thoracic spine area, the pain was worse yesterday and today.  The patient states currently her back pain is completely resolved.  When she arrived in the Urgent Care, she was noted to have a blood pressure of 224/102 and an Accu-Chek of 395.  EKG showed a right bundle branch block.  The patient was given clonidine 0.1 mg p.o. and currently her blood pressure is improved, 206/90.  She denies any chest pain or shortness of breath, no peripheral edema, no abdominal pain, no nausea vomiting or diarrhea.  The patient is afebrile.        Review of patient's allergies indicates:   Allergen Reactions    Ace inhibitors Other (See Comments)     Cough     Past Medical History:   Diagnosis Date    Diabetes mellitus     Hypertension      History reviewed. No pertinent surgical history.  No family history on file.  Social History     Tobacco Use    Smoking status: Never Smoker    Smokeless tobacco: Never Used   Substance Use Topics    Alcohol use: No    Drug use: No     Review of Systems   Constitutional: Negative for activity change, appetite change, fatigue and fever.   HENT: Negative for sore throat.    Respiratory: Negative for shortness of breath.    Cardiovascular: Negative for chest pain.   Gastrointestinal: Negative for abdominal pain, diarrhea, nausea and vomiting.   Genitourinary: Negative for dysuria and flank pain.   Musculoskeletal: Negative  for back pain.   Skin: Negative for rash.   Neurological: Negative for dizziness, syncope, weakness and headaches.   Hematological: Does not bruise/bleed easily.       Physical Exam     Initial Vitals [07/20/20 1108]   BP Pulse Resp Temp SpO2   (!) 206/90 72 16 96 °F (35.6 °C) 100 %      MAP       --         Physical Exam    Nursing note and vitals reviewed.  Constitutional: She appears well-developed and well-nourished.   HENT:   Head: Normocephalic and atraumatic.   Mouth/Throat: Oropharynx is clear and moist.   Eyes: Conjunctivae and EOM are normal. Pupils are equal, round, and reactive to light.   Neck: Normal range of motion. Neck supple.   Cardiovascular: Normal rate, regular rhythm, normal heart sounds and intact distal pulses. Exam reveals no gallop and no friction rub.    No murmur heard.  Pulmonary/Chest: Breath sounds normal.   Abdominal: Soft. Bowel sounds are normal. She exhibits no distension. There is no abdominal tenderness. There is no rebound and no guarding.   Musculoskeletal: Normal range of motion. No tenderness or edema.   Lymphadenopathy:     She has no cervical adenopathy.   Neurological: She is alert and oriented to person, place, and time. She has normal strength and normal reflexes.   Skin: Skin is warm and dry.   Psychiatric: She has a normal mood and affect. Her behavior is normal. Judgment and thought content normal.         ED Course   Procedures  Labs Reviewed   COMPREHENSIVE METABOLIC PANEL - Abnormal; Notable for the following components:       Result Value    Sodium 133 (*)     Potassium 3.2 (*)     Glucose 398 (*)     Calcium 8.0 (*)     Albumin 2.9 (*)     eGFR if non  58 (*)     All other components within normal limits   CBC W/ AUTO DIFFERENTIAL   TROPONIN I   TROPONIN I   B-TYPE NATRIURETIC PEPTIDE   POCT GLUCOSE MONITORING CONTINUOUS     EKG Readings: (Independently Interpreted)   Initial: 1121. Rhythm: Normal Sinus Rhythm. Heart Rate: 71. Conduction: RBBB. ST  Segments: Normal ST Segments. T Waves: Normal.     ECG Results          EKG 12-lead (Final result)  Result time 07/20/20 13:21:47    Final result by Stanislaw, Lab In [facility] (07/20/20 13:21:47)                 Narrative:    Test Reason : R07.9,    Vent. Rate : 071 BPM     Atrial Rate : 071 BPM     P-R Int : 166 ms          QRS Dur : 150 ms      QT Int : 472 ms       P-R-T Axes : 015 031 010 degrees     QTc Int : 512 ms    Normal sinus rhythm  Right bundle branch block  Cannot rule out Inferior infarct (cited on or before 20-JUL-2020)  Abnormal ECG  When compared with ECG of 20-JUL-2020 10:01,  Premature atrial complexes are no longer Present  Confirmed by Alfonso COLORADO MD, Desmond SORIANO ([id number]) on 7/20/2020 1:21:34 PM    Referred By: BISHOP BERKOWITZ           Confirmed By:Desmond Hamilton III, MD                            Imaging Results          US Abdomen Limited (Gallbladder) (Final result)  Result time 07/20/20 13:40:38    Final result by Tino Pabon MD (07/20/20 13:40:38)                 Impression:      Postsurgical changes status post cholecystectomy with a 1.0 cm echogenic focus, likely within the cystic duct.      Electronically signed by: Tino Pabon MD  Date:    07/20/2020  Time:    13:40             Narrative:    EXAMINATION:  US ABDOMEN LIMITED    CLINICAL HISTORY:  Dorsalgia, unspecified    TECHNIQUE:  Limited ultrasound of the right upper quadrant of the abdomen (including pancreas, liver, gallbladder, common bile duct, and spleen) was performed.    COMPARISON:  CTA of the chest from same date.    FINDINGS:  Liver: Mildly enlarged in size measuring 18.5 cm. Homogeneous echotexture. No focal hepatic lesions.    Gallbladder: The gallbladder is surgically absent.    Biliary system: The common duct is not dilated, measuring 5 mm.  No intrahepatic ductal dilatation.  Echogenic focus within the cystic duct measuring 1.0 cm likely representing a stone.    Spleen: Normal in size and echotexture,  measuring 10.1 cm.    Miscellaneous: No upper abdominal ascites.                               CTA Chest Non-Coronary - PE Study (Final result)  Result time 07/20/20 12:20:42    Final result by Tino Pabon MD (07/20/20 12:20:42)                 Impression:      No evidence of thoracic aortic aneurysm or pulmonary embolism.    Thyroid nodule is visualized.  Correlation with dedicated nonemergent thyroid ultrasound is recommended.    Gallbladder is surgically absent, however there is a 1.1 cm hyperdense focus likely within the cystic duct suggestive of a retained gallstone.      Electronically signed by: Tino Pabon MD  Date:    07/20/2020  Time:    12:20             Narrative:    EXAMINATION:  CTA CHEST NON CORONARY    CLINICAL HISTORY:  Thoracic aortic aneurysm suspected, initial exam;    TECHNIQUE:  Low dose axial images, sagittal and coronal reformations were obtained from the thoracic inlet to the lung bases before and following the IV administration of 100 mL of Omnipaque 350.  Contrast timing was optimized to evaluate the pulmonary arteries.  MIP images were performed.    COMPARISON:  CTA chest from 07/02/2017    FINDINGS:  Pulmonary Arteries: This study is adequate for the evaluation of pulmonary thromboembolism.  No evidence of pulmonary embolism to the level of the subsegmental arteries bilaterally.    Soft tissues of the neck:  Thyroid nodule in the inferior pole of the left lobe measuring 1.4 cm.    Thoracic aorta:  Normal in caliber and contour.  Minimal ectasia.  No evidence of dissection.    Heart: No cardiomegaly.  No pericardial effusion.    Lymph nodes:  No evidence of mediastinal, hilar, or axillary lymphadenopathy.    Trachea and bronchi: Patent.    Lungs:  Lungs are clear.  No focal consolidation.  5 mm ground-glass nodule in the left upper lobe (series 3, image 198).  Linear atelectasis of the lingula.  No pleural effusion.  No pneumothorax.    Limited evaluation of the upper  abdomen:  Gallbladder is surgically absent, however there is a 1.1 cm hyperdense focus suggestive of a retained stone within the cystic duct.    Osseous structures:  No evidence of fractures or suspicious osseous lesions.                               X-Ray Chest AP Portable (Final result)  Result time 07/20/20 12:49:27    Final result by Toby Miller MD (07/20/20 12:49:27)                 Impression:      Stable enlarged cardiac silhouette without radiographic evidence of failure or acute intrathoracic process seen on this single view.      Electronically signed by: Toby Miller MD  Date:    07/20/2020  Time:    12:49             Narrative:    EXAMINATION:  XR CHEST AP PORTABLE    CLINICAL HISTORY:  Chest Pain;    TECHNIQUE:  Single frontal view of the chest was performed.    COMPARISON:  Chest radiograph 07/02/2017    FINDINGS:  Monitoring leads overlie the chest.  Patient is slightly rotated.  Large body habitus.    Cardiomediastinal silhouette is midline and prominent similar to prior.  Chronic mild nonspecific elevation of the right hemidiaphragm unchanged.  The lungs are otherwise well expanded without large consolidation, pleural effusion or pneumothorax.  Pulmonary vasculature and hilar contours are within normal limits.  No acute osseous process seen.  PA and lateral views can be obtained.                                                   ED Course as of Jul 20 1559   Mon Jul 20, 2020   1516 [facility] consulted regarding CT and ultrasound results.    [ST]   1543 The patient has a new right bundle branch block on her EKG.  She has had no chest pain but she has had right back pain off and on for the past month.  The patient has a stone in her cystic duct status post cholecystectomy.  Currently she is pain free.  Troponin is normal.  EKG has no ischemic changes.  The patient has remained hypertensive in the emergency department.  She states she is compliant with her medications however her glucose is elevated  also.  She is on metformin for her diabetes.  I will give her some insulin in the emergency department and additional blood pressure medications and she will be discharged with additional hypertension medications.  She can follow up with her primary care doctor in the next week.  The case was discussed with GI and they will call the patient and have her follow-up and [facility].    [ST]      ED Course User Index  [ST] Shaista Jarquin MD                Clinical Impression:       ICD-10-CM ICD-9-CM   1. Essential hypertension  I10 401.9   2. Right-sided back pain  M54.9 724.5   3. Hyperglycemia  R73.9 790.29   4. Cystic duct calculus  K80.20 574.20             ED Disposition Condition    Discharge Stable        ED Prescriptions     Medication Sig Dispense Start Date End Date Auth. Provider    losartan (COZAAR) 100 MG tablet Take 1 tablet (100 mg total) by mouth once daily. 90 tablet 7/20/2020 7/20/2021 Shaista Jarquin MD    carvediloL (COREG) 25 MG tablet Take 1 tablet (25 mg total) by mouth 2 (two) times daily. 180 tablet 7/20/2020 10/18/2020 Shaista Jarquin MD        Follow-up Information     Follow up With Specialties Details Why Contact Info    Jayden Pérez MD Family Medicine   [address] [place] [address]  [phone]      Wei Leigh MD Gastroenterology Schedule an appointment as soon as possible for a visit  The [facility] will call you for an appointment [address]  [place] [address]  [phone]                                       Shaista Jarquin MD  07/20/20 8070

## 2020-07-20 NOTE — PROGRESS NOTES
"Subjective:       Patient ID: Eliza Louie is a 69 y.o. female.    Vitals:  height is 5' 3" (1.6 m) and weight is 91.6 kg (202 lb). Her tympanic temperature is 97.9 °F (36.6 °C). Her blood pressure is 224/102 (abnormal) and her pulse is 101. Her respiration is 16 and oxygen saturation is 97%.     Chief Complaint: Back Pain (UPPER BACK)    Healthy-appearing adult female in no apparent distress.  She comes to the clinic today with complaint of thoracic bilateral back pain.    Patient noted to have elevated blood pressure in office on arrival.  She does have history of hypertension and is on carvedilol 12.5 mg.  She states she takes this medication in the evening and did take it last night.  She denies chest pain, palpitations, dizziness, syncope or presyncope, nausea or focal weakness.    Patient has pertinent history of stent placement she states was 2 years prior.  She states she has seen her cardiologist (Dr. Wang) in June and they noted an elevation of blood pressure at that time as well.  They asked her to continue to monitor for symptoms, including atypical symptoms of MI such as back pain, and to contact them or go to the emergency room if she experiences these pains.  Patient was encouraged by family member to come to clinic today secondary to pain.  She was unaware of her elevated blood pressure.    Back Pain  This is a new problem. The current episode started more than 1 month ago (X1 MONTH). The problem occurs intermittently. The problem is unchanged. The quality of the pain is described as shooting and burning. The pain is at a severity of 8/10. The pain is moderate. The pain is the same all the time. Risk factors include menopause and lack of exercise. She has tried nothing for the symptoms.       Constitution: Negative.   HENT: Negative.    Neck: negative.   Cardiovascular: Negative.    Eyes: Negative.    Respiratory: Negative.    Gastrointestinal: Negative.    Endocrine: negative.   Genitourinary: " Negative.    Musculoskeletal: Positive for pain and back pain. Negative for trauma.   Skin: Negative for color change and rash.   Allergic/Immunologic: Negative.    Neurological: Negative.    Hematologic/Lymphatic: Negative.    Psychiatric/Behavioral: Positive for sleep disturbance.       Objective:      Physical Exam   Constitutional: She appears well-developed. She is cooperative.  Non-toxic appearance. No distress.   HENT:   Head: Normocephalic and atraumatic.   Ears:   Right Ear: Hearing and external ear normal.   Left Ear: Hearing and external ear normal.   Nose: Nose normal.   Mouth/Throat: Oropharynx is clear and moist and mucous membranes are normal.   Eyes: Conjunctivae and lids are normal.   Neck: Trachea normal, normal range of motion, full passive range of motion without pain and phonation normal. Neck supple. Normal carotid pulses, no hepatojugular reflux and no JVD present. Carotid bruit is not present. No neck rigidity.   Cardiovascular: Normal rate, regular rhythm and normal heart sounds. Exam reveals no decreased pulses.   No murmur heard.  Pulses:       Carotid pulses are 2+ on the right side and 2+ on the left side.       Radial pulses are 2+ on the right side and 2+ on the left side.        Femoral pulses are 1+ on the right side and 0 on the left side.       Dorsalis pedis pulses are 1+ on the right side and 1+ on the left side.   Pulmonary/Chest: Effort normal and breath sounds normal. No stridor. She has no decreased breath sounds. She has no wheezes. She has no rhonchi. She has no rales.   Abdominal: Soft. Normal appearance and bowel sounds are normal. She exhibits no distension, no abdominal bruit, no pulsatile midline mass and no mass. There is no abdominal tenderness. There is no guarding.   Musculoskeletal:         General: No deformity.      Thoracic back: She exhibits tenderness. She exhibits normal range of motion, no bony tenderness and no spasm.        Back:    Neurological: She is  alert. She has normal motor skills, normal strength, normal reflexes and intact cranial nerves. She is disoriented. No sensory deficit. Gait and coordination normal.   Skin: Skin is warm, dry, intact and not diaphoretic. Psychiatric: Her speech is normal and behavior is normal. Judgment and thought content normal. Her mood appears anxious.      Comments: Anxious after discussing B/P and glucose   Nursing note and vitals reviewed.    Results for orders placed or performed in visit on 07/20/20   POCT Chemistry Panel   Result Value Ref Range    POC Sodium 136 (A) 138 - 146 MMOL/L    POC Potassium 3.7 3.5 - 4.9 MMOL/L    POC Chloride 96 (A) 98 - 109 MMOL/L    POC BUN 7 (A) 8 - 26 MMOL/L    POC Glucose 395 (A) 70 - 110 MG/DL    POC Creatinine 0.8 0.6 - 1.3 mg/dL    POC iCA 1.08 (A) 1.12 - 1.32 MMOL/L    POC TCO2 27 24 - 29 MMOL/L    POC Hematocrit 49 (A) 37 - 47 %PCV    POC Hemoglobin 16.7 (A) 12.5 - 16 g/dL    POC Anion Gap 18 10.0 - 20 MMOL/L     EKG: nonspecific ST and T waves changes, RBBB, PAC's noted.  This is EKG displays changes when compared to EKG of 7/2017.        Assessment:       1. Acute bilateral thoracic back pain    2. Elevated blood pressure reading in office with diagnosis of hypertension    3. Hyperglycemia    4. EKG abnormalities        Plan:       Labs ordered at this visit reviewed.     I discussed the disposition of this patient with the medical director Dr. Shields.  We agreed the patient could benefit from ED visit for further testing.    I discussed this patient's disposition with Dr. Jarquin from the [facility] emergency department.    Patient transported via EMS in no apparent distress.    Patient's car keys given to daughter per patient's request.      Acute bilateral thoracic back pain  -     IN OFFICE EKG 12-LEAD (to [facility])  -     POCT Chemistry Panel  -     Refer to Emergency Dept.    Elevated blood pressure reading in office with diagnosis of hypertension  -     IN OFFICE EKG 12-LEAD (to  [facility])  -     POCT Chemistry Panel  -     cloNIDine tablet 0.1 mg  -     Refer to Emergency Dept.    Hyperglycemia  -     Refer to Emergency Dept.    EKG abnormalities  -     Refer to Emergency Dept.      Patient Instructions     You are being sent to the Emergency Department based on your symptoms of back pain and lab results gathered while in Urgent Care today.    Back Pain (Acute or Chronic)     Back pain is one of the most common problems. The good news is that most people feel better in 1 to 2 weeks, and most of the rest in 1 to 2 months. Most people can remain active.  People experience and describe pain differently; not everyone is the same.  · The pain can be sharp, stabbing, shooting, aching, cramping or burning.  · Movement, standing, bending, lifting, sitting, or walking may worsen pain.  · It can be localized to one spot or area, or it can be more generalized.  · It can spread or radiate upwards, to the front, or go down your arms or legs (sciatica).  · It can cause muscle spasm.  Most of the time, mechanical problems with the muscles or spine cause the pain. Mechanical problems are usually caused by an injury to the muscles or ligaments. While illness can cause back pain, it is usually not caused by a serious illness. Mechanical problems include:   · Physical activity such as sports, exercise, work, or normal activity  · Overexertion, lifting, pushing, pulling incorrectly or too aggressively  · Sudden twisting, bending, or stretching from an accident, or accidental movement  · Poor posture  · Stretching or moving wrong, without noticing pain at the time  · Poor coordination, lack of regular exercise (check with your doctor about this)  · Spinal disc disease or arthritis  · Stress  Pain can also be related to pregnancy, or illness like appendicitis, bladder or kidney infections, pelvic infections, and many other things.  Acute back pain usually gets better in 1 to 2 weeks. Back pain related to disk  disease, arthritis in the spinal joints or spinal stenosis (narrowing of the spinal canal) can become chronic and last for months or years.  Unless you had a physical injury (for example, a car accident or fall) X-rays are usually not needed for the initial evaluation of back pain. If pain continues and does not respond to medical treatment, X-rays and other tests may be needed.  Home care  Try these home care recommendations:  · When in bed, try to find a position of comfort. A firm mattress is best. Try lying flat on your back with pillows under your knees. You can also try lying on your side with your knees bent up towards your chest and a pillow between your knees.  · At first, do not try to stretch out the sore spots. If there is a strain, it is not like the good soreness you get after exercising without an injury. In this case, stretching may make it worse.  · Avoid prolong sitting, long car rides, or travel. This puts more stress on the lower back than standing or walking.  · During the first 24 to 72 hours after an acute injury or flare up of chronic back pain, apply an ice pack to the painful area for 20 minutes and then remove it for 20 minutes. Do this over a period of 60 to 90 minutes or several times a day. This will reduce swelling and pain. Wrap the ice pack in a thin towel or plastic to protect your skin.  · You can start with ice, then switch to heat. Heat (hot shower, hot bath, or heating pad) reduces pain and works well for muscle spasms. Heat can be applied to the painful area for 20 minutes then remove it for 20 minutes. Do this over a period of 60 to 90 minutes or several times a day. Do not sleep on a heating pad. It can lead to skin burns or tissue damage.  · You can alternate ice and heat therapy. Talk with your doctor about the best treatment for your back pain.  · Therapeutic massage can help relax the back muscles without stretching them.  · Be aware of safe lifting methods and do not  lift anything without stretching first.  Medicines  Talk to your doctor before using medicine, especially if you have other medical problems or are taking other medicines.  · You may use over-the-counter medicine as directed on the bottle to control pain, unless another pain medicine was prescribed. If you have chronic conditions like diabetes, liver or kidney disease, stomach ulcers, or gastrointestinal bleeding, or are taking blood thinners, talk to your doctor before taking any medicine.  · Be careful if you are given a prescription medicines, narcotics, or medicine for muscle spasms. They can cause drowsiness, affect your coordination, reflexes, and judgement. Do not drive or operate heavy machinery.  Follow-up care  Follow up with your healthcare provider, or as advised.   A radiologist will review any X-rays that were taken. Your provide will notify you of any new findings that may affect your care.  Call 911  Call emergency services if any of the following occur:  · Trouble breathing  · Confusion  · Very drowsy or trouble awakening  · Fainting or loss of consciousness  · Rapid or very slow heart rate  · Loss of bowel or bladder control  When to seek medical advice  Call your healthcare provider right away if any of these occur:   · Pain becomes worse or spreads to your legs  · Weakness or numbness in one or both legs  · Numbness in the groin or genital area  Date Last Reviewed: 7/1/2016  © 1324-2084 [employer], [employer]. [address] [place] [address], [place] [address]. All rights reserved. This information is not intended as a substitute for professional medical care. Always follow your healthcare professional's instructions.

## 2020-07-20 NOTE — TELEPHONE ENCOUNTER
----- Message from Noy Moreira MD sent at 7/20/2020  3:46 PM CDT -----  Good afternoon Cat,     Can you schedule Ms Eliza Louie (MRN [id number]) in advanced endoscopy clinic as soon as possible? She was seen in the ED for back pain and US showed retained cystic duct stone. Thank you!    Noy Moreira  [facility]

## 2020-07-20 NOTE — PATIENT INSTRUCTIONS
You are being sent to the Emergency Department based on your symptoms of back pain and lab results gathered while in Urgent Care today.    Back Pain (Acute or Chronic)     Back pain is one of the most common problems. The good news is that most people feel better in 1 to 2 weeks, and most of the rest in 1 to 2 months. Most people can remain active.  People experience and describe pain differently; not everyone is the same.  · The pain can be sharp, stabbing, shooting, aching, cramping or burning.  · Movement, standing, bending, lifting, sitting, or walking may worsen pain.  · It can be localized to one spot or area, or it can be more generalized.  · It can spread or radiate upwards, to the front, or go down your arms or legs (sciatica).  · It can cause muscle spasm.  Most of the time, mechanical problems with the muscles or spine cause the pain. Mechanical problems are usually caused by an injury to the muscles or ligaments. While illness can cause back pain, it is usually not caused by a serious illness. Mechanical problems include:   · Physical activity such as sports, exercise, work, or normal activity  · Overexertion, lifting, pushing, pulling incorrectly or too aggressively  · Sudden twisting, bending, or stretching from an accident, or accidental movement  · Poor posture  · Stretching or moving wrong, without noticing pain at the time  · Poor coordination, lack of regular exercise (check with your doctor about this)  · Spinal disc disease or arthritis  · Stress  Pain can also be related to pregnancy, or illness like appendicitis, bladder or kidney infections, pelvic infections, and many other things.  Acute back pain usually gets better in 1 to 2 weeks. Back pain related to disk disease, arthritis in the spinal joints or spinal stenosis (narrowing of the spinal canal) can become chronic and last for months or years.  Unless you had a physical injury (for example, a car accident or fall) X-rays are usually  not needed for the initial evaluation of back pain. If pain continues and does not respond to medical treatment, X-rays and other tests may be needed.  Home care  Try these home care recommendations:  · When in bed, try to find a position of comfort. A firm mattress is best. Try lying flat on your back with pillows under your knees. You can also try lying on your side with your knees bent up towards your chest and a pillow between your knees.  · At first, do not try to stretch out the sore spots. If there is a strain, it is not like the good soreness you get after exercising without an injury. In this case, stretching may make it worse.  · Avoid prolong sitting, long car rides, or travel. This puts more stress on the lower back than standing or walking.  · During the first 24 to 72 hours after an acute injury or flare up of chronic back pain, apply an ice pack to the painful area for 20 minutes and then remove it for 20 minutes. Do this over a period of 60 to 90 minutes or several times a day. This will reduce swelling and pain. Wrap the ice pack in a thin towel or plastic to protect your skin.  · You can start with ice, then switch to heat. Heat (hot shower, hot bath, or heating pad) reduces pain and works well for muscle spasms. Heat can be applied to the painful area for 20 minutes then remove it for 20 minutes. Do this over a period of 60 to 90 minutes or several times a day. Do not sleep on a heating pad. It can lead to skin burns or tissue damage.  · You can alternate ice and heat therapy. Talk with your doctor about the best treatment for your back pain.  · Therapeutic massage can help relax the back muscles without stretching them.  · Be aware of safe lifting methods and do not lift anything without stretching first.  Medicines  Talk to your doctor before using medicine, especially if you have other medical problems or are taking other medicines.  · You may use over-the-counter medicine as directed on the  bottle to control pain, unless another pain medicine was prescribed. If you have chronic conditions like diabetes, liver or kidney disease, stomach ulcers, or gastrointestinal bleeding, or are taking blood thinners, talk to your doctor before taking any medicine.  · Be careful if you are given a prescription medicines, narcotics, or medicine for muscle spasms. They can cause drowsiness, affect your coordination, reflexes, and judgement. Do not drive or operate heavy machinery.  Follow-up care  Follow up with your healthcare provider, or as advised.   A radiologist will review any X-rays that were taken. Your provide will notify you of any new findings that may affect your care.  Call 911  Call emergency services if any of the following occur:  · Trouble breathing  · Confusion  · Very drowsy or trouble awakening  · Fainting or loss of consciousness  · Rapid or very slow heart rate  · Loss of bowel or bladder control  When to seek medical advice  Call your healthcare provider right away if any of these occur:   · Pain becomes worse or spreads to your legs  · Weakness or numbness in one or both legs  · Numbness in the groin or genital area  Date Last Reviewed: 7/1/2016  © 4883-4551 [employer], [employer]. [address] [place] [address], [place] [address]. All rights reserved. This information is not intended as a substitute for professional medical care. Always follow your healthcare professional's instructions.

## 2020-07-20 NOTE — ED NOTES
Pt resting on stretcher comfortably. Pt denies headache, chest pain, and improvement in back pain. Pt has no complaints at this time. Pt is NAD. Will continue to monitor.

## 2020-07-20 NOTE — ED NOTES
Pt arrived to ED via [facility] from [facility] in [place]. Pt reports she went to the urgent care to be evaluated for recurring pain in her upper back and chest for the past month. Pt reports that for the past month she has been experiencing a sudden onset of shooting pain in the R side of her upper back that radiates to the R side of her chest and R breast. Pt says the pain lasts for several minutes and the last time it happened was this morning. Pt denies any SOB, abdominal pain, nausea or vomiting. Pt also denies any headaches, dizziness or blurred vision.

## 2020-07-20 NOTE — PLAN OF CARE
[facility]    69 year old female s/p cholecystectomy who presents with intermittent, sharp back pain not associated with nausea or vomiting or RUQ abdominal pain. Reviewed labs and imaging, LFTs within normal limits, no hyperbilirubinemia, US GB w/ retained 1 cm cystic duct stone. Given back pain resolved, will refer to advanced endoscopy clinic for further evaluation to determine if stone needs to be removed.    Noy Moreira MD  [facility]

## 2020-07-20 NOTE — ED NOTES
HTN noted upon vital signs reassessment. Pt denies any dizziness, blurred vision, head ache or chest pain. Will advise MD

## 2020-07-20 NOTE — ED NOTES
Per Dr. Newman, watch patient for 15 more mins and recheck BP and blood sugar. Pt is NAD. Will continue to monitor.

## 2020-07-20 NOTE — DISCHARGE INSTRUCTIONS
Stop taking your Cozaar 50 mg once a day and start Cozaar 100 mg once a day.  Stop taking Coreg 12.5 mg twice a day and start taking Coreg 25 mg twice a day.  Continue Procardia 20 mg every 8 hr.  Continue metformin twice a day.  Continue Plavix once a day.  Continue aspirin 81 mg once a day.  Continue nitroglycerin sublingual as needed for chest pain.  Continue Protonix 40 mg twice a day.    Continue Crestor 20 mg once a day

## 2020-07-21 NOTE — PROGRESS NOTES
Sinus rhythm with Premature atrial complexes   Possible Left atrial enlargement   Right bundle branch block   Inferior infarct ,age undetermined   Abnormal ECG   When compared with ECG of 07-JUL-2017 14:55,   Premature atrial complexes are now Present   Right bundle branch block is now Present   Inferior infarct is now Present   Confirmed by Alfonso COLORADO MD, Desmond SORIANO ([id number]) on 7/20/2020 12:43:49 PM

## 2020-12-08 ENCOUNTER — TELEPHONE (OUTPATIENT)
Dept: ADMINISTRATIVE | Facility: OTHER | Age: 70
End: 2020-12-08

## 2020-12-21 ENCOUNTER — HOSPITAL ENCOUNTER (INPATIENT)
Facility: HOSPITAL | Age: 70
LOS: 6 days | Discharge: HOME | DRG: 280 | End: 2020-12-27
Attending: EMERGENCY MEDICINE | Admitting: INTERNAL MEDICINE
Payer: MEDICARE

## 2020-12-21 DIAGNOSIS — N17.9 AKI (ACUTE KIDNEY INJURY): Primary | ICD-10-CM

## 2020-12-21 DIAGNOSIS — R06.02 SOB (SHORTNESS OF BREATH): ICD-10-CM

## 2020-12-21 DIAGNOSIS — R11.2 NAUSEA & VOMITING: ICD-10-CM

## 2020-12-21 DIAGNOSIS — E78.5 HYPERLIPIDEMIA, UNSPECIFIED HYPERLIPIDEMIA TYPE: ICD-10-CM

## 2020-12-21 DIAGNOSIS — J81.0 ACUTE PULMONARY EDEMA: ICD-10-CM

## 2020-12-21 DIAGNOSIS — I21.3 ACUTE ST ELEVATION MYOCARDIAL INFARCTION (STEMI): ICD-10-CM

## 2020-12-21 DIAGNOSIS — I10 ESSENTIAL HYPERTENSION: ICD-10-CM

## 2020-12-21 DIAGNOSIS — J96.01 ACUTE HYPOXEMIC RESPIRATORY FAILURE: ICD-10-CM

## 2020-12-21 DIAGNOSIS — I50.9 CHF (CONGESTIVE HEART FAILURE): ICD-10-CM

## 2020-12-21 DIAGNOSIS — R07.9 CHEST PAIN: ICD-10-CM

## 2020-12-21 DIAGNOSIS — I45.10 RBBB: ICD-10-CM

## 2020-12-21 DIAGNOSIS — I25.2 HX OF NON-ST ELEVATION MYOCARDIAL INFARCTION (NSTEMI): ICD-10-CM

## 2020-12-21 DIAGNOSIS — R11.2 NAUSEA AND VOMITING, INTRACTABILITY OF VOMITING NOT SPECIFIED, UNSPECIFIED VOMITING TYPE: ICD-10-CM

## 2020-12-21 DIAGNOSIS — I10 HYPERTENSION, UNSPECIFIED TYPE: ICD-10-CM

## 2020-12-21 DIAGNOSIS — R00.1 BRADYCARDIA: ICD-10-CM

## 2020-12-21 DIAGNOSIS — R07.9 CHEST PAIN, UNSPECIFIED TYPE: ICD-10-CM

## 2020-12-21 PROBLEM — I16.0 HYPERTENSIVE URGENCY: Status: ACTIVE | Noted: 2020-12-21

## 2020-12-21 PROBLEM — J81.1 PULMONARY EDEMA: Status: ACTIVE | Noted: 2020-12-21

## 2020-12-21 LAB
ALBUMIN SERPL BCP-MCNC: 3.6 G/DL (ref 3.5–5.2)
ALLENS TEST: ABNORMAL
ALP SERPL-CCNC: 77 U/L (ref 55–135)
ALT SERPL W/O P-5'-P-CCNC: 31 U/L (ref 10–44)
ANION GAP SERPL CALC-SCNC: 18 MMOL/L (ref 8–16)
ANION GAP SERPL CALC-SCNC: 18 MMOL/L (ref 8–16)
AORTIC ROOT ANNULUS: 3.31 CM
APTT BLDCRRT: 23.9 SEC (ref 21–32)
ASCENDING AORTA: 3.22 CM
AST SERPL-CCNC: 121 U/L (ref 10–40)
AV INDEX (PROSTH): 0.62
AV MEAN GRADIENT: 4 MMHG
AV PEAK GRADIENT: 5 MMHG
AV VALVE AREA: 2.15 CM2
AV VELOCITY RATIO: 0.69
B-OH-BUTYR BLD STRIP-SCNC: 0.9 MMOL/L (ref 0–0.5)
BACTERIA #/AREA URNS HPF: NORMAL /HPF
BASOPHILS # BLD AUTO: 0.03 K/UL (ref 0–0.2)
BASOPHILS NFR BLD: 0.2 % (ref 0–1.9)
BILIRUB SERPL-MCNC: 0.8 MG/DL (ref 0.1–1)
BILIRUB UR QL STRIP: NEGATIVE
BNP SERPL-MCNC: 935 PG/ML (ref 0–99)
BSA FOR ECHO PROCEDURE: 1.92 M2
BUN SERPL-MCNC: 14 MG/DL (ref 8–23)
BUN SERPL-MCNC: 21 MG/DL (ref 8–23)
CALCIUM SERPL-MCNC: 8.2 MG/DL (ref 8.7–10.5)
CALCIUM SERPL-MCNC: 8.4 MG/DL (ref 8.7–10.5)
CHLORIDE SERPL-SCNC: 95 MMOL/L (ref 95–110)
CHLORIDE SERPL-SCNC: 97 MMOL/L (ref 95–110)
CLARITY UR: CLEAR
CO2 SERPL-SCNC: 20 MMOL/L (ref 23–29)
CO2 SERPL-SCNC: 22 MMOL/L (ref 23–29)
COLOR UR: YELLOW
CREAT SERPL-MCNC: 1.6 MG/DL (ref 0.5–1.4)
CREAT SERPL-MCNC: 1.8 MG/DL (ref 0.5–1.4)
CTP QC/QA: YES
CV ECHO LV RWT: 0.46 CM
DIFFERENTIAL METHOD: ABNORMAL
DOP CALC AO PEAK VEL: 1.16 M/S
DOP CALC AO VTI: 25.37 CM
DOP CALC LVOT AREA: 3.5 CM2
DOP CALC LVOT DIAMETER: 2.1 CM
DOP CALC LVOT PEAK VEL: 0.8 M/S
DOP CALC LVOT STROKE VOLUME: 54.56 CM3
DOP CALCLVOT PEAK VEL VTI: 15.76 CM
E WAVE DECELERATION TIME: 248 MSEC
E/A RATIO: 1.46
E/E' RATIO: 20.44 M/S
ECHO LV POSTERIOR WALL: 1.17 CM (ref 0.6–1.1)
EOSINOPHIL # BLD AUTO: 0 K/UL (ref 0–0.5)
EOSINOPHIL NFR BLD: 0 % (ref 0–8)
ERYTHROCYTE [DISTWIDTH] IN BLOOD BY AUTOMATED COUNT: 13.5 % (ref 11.5–14.5)
ERYTHROCYTE [DISTWIDTH] IN BLOOD BY AUTOMATED COUNT: 13.7 % (ref 11.5–14.5)
EST. GFR  (AFRICAN AMERICAN): 33 ML/MIN/1.73 M^2
EST. GFR  (AFRICAN AMERICAN): 38 ML/MIN/1.73 M^2
EST. GFR  (NON AFRICAN AMERICAN): 29 ML/MIN/1.73 M^2
EST. GFR  (NON AFRICAN AMERICAN): 33 ML/MIN/1.73 M^2
ESTIMATED AVG GLUCOSE: 246 MG/DL (ref 68–131)
FRACTIONAL SHORTENING: 23 % (ref 28–44)
GLUCOSE SERPL-MCNC: 408 MG/DL (ref 70–110)
GLUCOSE SERPL-MCNC: 506 MG/DL (ref 70–110)
GLUCOSE UR QL STRIP: ABNORMAL
HBA1C MFR BLD HPLC: 10.2 % (ref 4–5.6)
HCO3 UR-SCNC: 23.7 MMOL/L (ref 24–28)
HCT VFR BLD AUTO: 39.6 % (ref 37–48.5)
HCT VFR BLD AUTO: 40.6 % (ref 37–48.5)
HGB BLD-MCNC: 13.5 G/DL (ref 12–16)
HGB BLD-MCNC: 13.6 G/DL (ref 12–16)
HGB UR QL STRIP: ABNORMAL
HYALINE CASTS #/AREA URNS LPF: 0 /LPF
IMM GRANULOCYTES # BLD AUTO: 0.09 K/UL (ref 0–0.04)
IMM GRANULOCYTES NFR BLD AUTO: 0.6 % (ref 0–0.5)
INR PPP: 1.1 (ref 0.8–1.2)
INTERVENTRICULAR SEPTUM: 1.21 CM (ref 0.6–1.1)
KETONES UR QL STRIP: ABNORMAL
LA MAJOR: 5.22 CM
LA MINOR: 5.67 CM
LA WIDTH: 4.7 CM
LEFT ATRIUM SIZE: 4.32 CM
LEFT ATRIUM VOLUME INDEX MOD: 37.6 ML/M2
LEFT ATRIUM VOLUME INDEX: 50.4 ML/M2
LEFT ATRIUM VOLUME MOD: 69.94 CM3
LEFT ATRIUM VOLUME: 93.81 CM3
LEFT INTERNAL DIMENSION IN SYSTOLE: 3.96 CM (ref 2.1–4)
LEFT VENTRICLE DIASTOLIC VOLUME INDEX: 67.69 ML/M2
LEFT VENTRICLE DIASTOLIC VOLUME: 126.05 ML
LEFT VENTRICLE MASS INDEX: 130 G/M2
LEFT VENTRICLE SYSTOLIC VOLUME INDEX: 36.7 ML/M2
LEFT VENTRICLE SYSTOLIC VOLUME: 68.4 ML
LEFT VENTRICULAR INTERNAL DIMENSION IN DIASTOLE: 5.14 CM (ref 3.5–6)
LEFT VENTRICULAR MASS: 241.43 G
LEUKOCYTE ESTERASE UR QL STRIP: NEGATIVE
LIPASE SERPL-CCNC: 33 U/L (ref 4–60)
LV LATERAL E/E' RATIO: 18.4 M/S
LV SEPTAL E/E' RATIO: 23 M/S
LYMPHOCYTES # BLD AUTO: 1.5 K/UL (ref 1–4.8)
LYMPHOCYTES NFR BLD: 9.8 % (ref 18–48)
MCH RBC QN AUTO: 28.8 PG (ref 27–31)
MCH RBC QN AUTO: 28.8 PG (ref 27–31)
MCHC RBC AUTO-ENTMCNC: 33.5 G/DL (ref 32–36)
MCHC RBC AUTO-ENTMCNC: 34.1 G/DL (ref 32–36)
MCV RBC AUTO: 85 FL (ref 82–98)
MCV RBC AUTO: 86 FL (ref 82–98)
MICROSCOPIC COMMENT: NORMAL
MONOCYTES # BLD AUTO: 0.6 K/UL (ref 0.3–1)
MONOCYTES NFR BLD: 4.1 % (ref 4–15)
MV A" WAVE DURATION": 12.56 MSEC
MV PEAK A VEL: 0.63 M/S
MV PEAK E VEL: 0.92 M/S
NEUTROPHILS # BLD AUTO: 13.2 K/UL (ref 1.8–7.7)
NEUTROPHILS NFR BLD: 85.3 % (ref 38–73)
NITRITE UR QL STRIP: NEGATIVE
NRBC BLD-RTO: 0 /100 WBC
PCO2 BLDA: 42 MMHG (ref 35–45)
PH SMN: 7.36 [PH] (ref 7.35–7.45)
PH UR STRIP: 5 [PH] (ref 5–8)
PISA TR MAX VEL: 2.44 M/S
PLATELET # BLD AUTO: 244 K/UL (ref 150–350)
PLATELET # BLD AUTO: 260 K/UL (ref 150–350)
PMV BLD AUTO: 11.7 FL (ref 9.2–12.9)
PMV BLD AUTO: 12.3 FL (ref 9.2–12.9)
PO2 BLDA: 38 MMHG (ref 40–60)
POC BE: -2 MMOL/L
POC SATURATED O2: 70 % (ref 95–100)
POC TCO2: 25 MMOL/L (ref 24–29)
POCT GLUCOSE: 275 MG/DL (ref 70–110)
POCT GLUCOSE: 300 MG/DL (ref 70–110)
POCT GLUCOSE: 322 MG/DL (ref 70–110)
POCT GLUCOSE: 375 MG/DL (ref 70–110)
POCT GLUCOSE: 418 MG/DL (ref 70–110)
POCT GLUCOSE: 449 MG/DL (ref 70–110)
POCT GLUCOSE: 470 MG/DL (ref 70–110)
POCT GLUCOSE: 481 MG/DL (ref 70–110)
POCT GLUCOSE: 497 MG/DL (ref 70–110)
POCT GLUCOSE: >500 MG/DL (ref 70–110)
POTASSIUM SERPL-SCNC: 3.1 MMOL/L (ref 3.5–5.1)
POTASSIUM SERPL-SCNC: 3.5 MMOL/L (ref 3.5–5.1)
PROT SERPL-MCNC: 7.9 G/DL (ref 6–8.4)
PROT UR QL STRIP: ABNORMAL
PROTHROMBIN TIME: 11.4 SEC (ref 9–12.5)
PULM VEIN S/D RATIO: 1.21
PV PEAK D VEL: 0.19 M/S
PV PEAK S VEL: 0.23 M/S
RA MAJOR: 4.35 CM
RA PRESSURE: 8 MMHG
RA WIDTH: 4.82 CM
RBC # BLD AUTO: 4.68 M/UL (ref 4–5.4)
RBC # BLD AUTO: 4.73 M/UL (ref 4–5.4)
RBC #/AREA URNS HPF: 2 /HPF (ref 0–4)
RIGHT VENTRICULAR END-DIASTOLIC DIMENSION: 2.51 CM
RV TISSUE DOPPLER FREE WALL SYSTOLIC VELOCITY 1 (APICAL 4 CHAMBER VIEW): 8.49 CM/S
SAMPLE: ABNORMAL
SARS-COV-2 RDRP RESP QL NAA+PROBE: NEGATIVE
SITE: ABNORMAL
SODIUM SERPL-SCNC: 135 MMOL/L (ref 136–145)
SODIUM SERPL-SCNC: 135 MMOL/L (ref 136–145)
SP GR UR STRIP: 1.02 (ref 1–1.03)
SQUAMOUS #/AREA URNS HPF: 8 /HPF
STJ: 3.11 CM
TDI LATERAL: 0.05 M/S
TDI SEPTAL: 0.04 M/S
TDI: 0.05 M/S
TR MAX PG: 24 MMHG
TRICUSPID ANNULAR PLANE SYSTOLIC EXCURSION: 2.33 CM
TROPONIN I SERPL DL<=0.01 NG/ML-MCNC: 29.11 NG/ML (ref 0–0.03)
TROPONIN I SERPL DL<=0.01 NG/ML-MCNC: 38.33 NG/ML (ref 0–0.03)
TSH SERPL DL<=0.005 MIU/L-ACNC: 0.77 UIU/ML (ref 0.4–4)
TV REST PULMONARY ARTERY PRESSURE: 32 MMHG
URN SPEC COLLECT METH UR: ABNORMAL
UROBILINOGEN UR STRIP-ACNC: NEGATIVE EU/DL
WBC # BLD AUTO: 12.02 K/UL (ref 3.9–12.7)
WBC # BLD AUTO: 15.46 K/UL (ref 3.9–12.7)
WBC #/AREA URNS HPF: 0 /HPF (ref 0–5)

## 2020-12-21 PROCEDURE — 25000003 PHARM REV CODE 250: Performed by: NURSE PRACTITIONER

## 2020-12-21 PROCEDURE — 85027 COMPLETE CBC AUTOMATED: CPT

## 2020-12-21 PROCEDURE — 25000003 PHARM REV CODE 250: Performed by: STUDENT IN AN ORGANIZED HEALTH CARE EDUCATION/TRAINING PROGRAM

## 2020-12-21 PROCEDURE — 84443 ASSAY THYROID STIM HORMONE: CPT

## 2020-12-21 PROCEDURE — 85730 THROMBOPLASTIN TIME PARTIAL: CPT

## 2020-12-21 PROCEDURE — 85610 PROTHROMBIN TIME: CPT

## 2020-12-21 PROCEDURE — 96361 HYDRATE IV INFUSION ADD-ON: CPT

## 2020-12-21 PROCEDURE — 20000000 HC ICU ROOM

## 2020-12-21 PROCEDURE — 83036 HEMOGLOBIN GLYCOSYLATED A1C: CPT

## 2020-12-21 PROCEDURE — 94761 N-INVAS EAR/PLS OXIMETRY MLT: CPT

## 2020-12-21 PROCEDURE — 94660 CPAP INITIATION&MGMT: CPT

## 2020-12-21 PROCEDURE — 25000003 PHARM REV CODE 250: Performed by: EMERGENCY MEDICINE

## 2020-12-21 PROCEDURE — 25000003 PHARM REV CODE 250: Performed by: INTERNAL MEDICINE

## 2020-12-21 PROCEDURE — 93005 ELECTROCARDIOGRAM TRACING: CPT

## 2020-12-21 PROCEDURE — 93010 ELECTROCARDIOGRAM REPORT: CPT | Mod: 76,,, | Performed by: INTERNAL MEDICINE

## 2020-12-21 PROCEDURE — 84484 ASSAY OF TROPONIN QUANT: CPT | Mod: 91

## 2020-12-21 PROCEDURE — 63600175 PHARM REV CODE 636 W HCPCS: Performed by: EMERGENCY MEDICINE

## 2020-12-21 PROCEDURE — 96365 THER/PROPH/DIAG IV INF INIT: CPT

## 2020-12-21 PROCEDURE — 82962 GLUCOSE BLOOD TEST: CPT

## 2020-12-21 PROCEDURE — 27000221 HC OXYGEN, UP TO 24 HOURS

## 2020-12-21 PROCEDURE — 99900035 HC TECH TIME PER 15 MIN (STAT)

## 2020-12-21 PROCEDURE — 84484 ASSAY OF TROPONIN QUANT: CPT

## 2020-12-21 PROCEDURE — 63600175 PHARM REV CODE 636 W HCPCS: Performed by: STUDENT IN AN ORGANIZED HEALTH CARE EDUCATION/TRAINING PROGRAM

## 2020-12-21 PROCEDURE — 81000 URINALYSIS NONAUTO W/SCOPE: CPT

## 2020-12-21 PROCEDURE — C9399 UNCLASSIFIED DRUGS OR BIOLOG: HCPCS | Performed by: NURSE PRACTITIONER

## 2020-12-21 PROCEDURE — 82010 KETONE BODYS QUAN: CPT

## 2020-12-21 PROCEDURE — 25000242 PHARM REV CODE 250 ALT 637 W/ HCPCS: Performed by: EMERGENCY MEDICINE

## 2020-12-21 PROCEDURE — 83880 ASSAY OF NATRIURETIC PEPTIDE: CPT

## 2020-12-21 PROCEDURE — 27000190 HC CPAP FULL FACE MASK W/VALVE

## 2020-12-21 PROCEDURE — 83690 ASSAY OF LIPASE: CPT

## 2020-12-21 PROCEDURE — 99222 1ST HOSP IP/OBS MODERATE 55: CPT | Mod: 25,,, | Performed by: INTERNAL MEDICINE

## 2020-12-21 PROCEDURE — 96375 TX/PRO/DX INJ NEW DRUG ADDON: CPT

## 2020-12-21 PROCEDURE — U0002 COVID-19 LAB TEST NON-CDC: HCPCS | Performed by: EMERGENCY MEDICINE

## 2020-12-21 PROCEDURE — 99222 PR INITIAL HOSPITAL CARE,LEVL II: ICD-10-PCS | Mod: 25,,, | Performed by: INTERNAL MEDICINE

## 2020-12-21 PROCEDURE — 93010 EKG 12-LEAD: ICD-10-PCS | Mod: 76,,, | Performed by: INTERNAL MEDICINE

## 2020-12-21 PROCEDURE — 80053 COMPREHEN METABOLIC PANEL: CPT

## 2020-12-21 PROCEDURE — 99285 EMERGENCY DEPT VISIT HI MDM: CPT | Mod: 25

## 2020-12-21 PROCEDURE — 80048 BASIC METABOLIC PNL TOTAL CA: CPT

## 2020-12-21 PROCEDURE — 36415 COLL VENOUS BLD VENIPUNCTURE: CPT

## 2020-12-21 PROCEDURE — 63600175 PHARM REV CODE 636 W HCPCS: Performed by: NURSE PRACTITIONER

## 2020-12-21 PROCEDURE — 85025 COMPLETE CBC W/AUTO DIFF WBC: CPT

## 2020-12-21 PROCEDURE — 82803 BLOOD GASES ANY COMBINATION: CPT

## 2020-12-21 RX ORDER — CLOPIDOGREL BISULFATE 75 MG/1
225 TABLET ORAL DAILY
Status: DISCONTINUED | OUTPATIENT
Start: 2020-12-21 | End: 2020-12-22

## 2020-12-21 RX ORDER — NITROGLYCERIN 0.4 MG/1
0.4 TABLET SUBLINGUAL
Status: COMPLETED | OUTPATIENT
Start: 2020-12-21 | End: 2020-12-21

## 2020-12-21 RX ORDER — NIFEDIPINE 10 MG/1
20 CAPSULE ORAL EVERY 8 HOURS
Status: DISCONTINUED | OUTPATIENT
Start: 2020-12-21 | End: 2020-12-21

## 2020-12-21 RX ORDER — INSULIN ASPART 100 [IU]/ML
0-5 INJECTION, SOLUTION INTRAVENOUS; SUBCUTANEOUS
Status: DISCONTINUED | OUTPATIENT
Start: 2020-12-21 | End: 2020-12-21

## 2020-12-21 RX ORDER — ACETAMINOPHEN 325 MG/1
650 TABLET ORAL EVERY 4 HOURS PRN
Status: DISCONTINUED | OUTPATIENT
Start: 2020-12-21 | End: 2020-12-27 | Stop reason: HOSPADM

## 2020-12-21 RX ORDER — CARVEDILOL 25 MG/1
25 TABLET ORAL 2 TIMES DAILY WITH MEALS
Status: DISCONTINUED | OUTPATIENT
Start: 2020-12-21 | End: 2020-12-21

## 2020-12-21 RX ORDER — MUPIROCIN 20 MG/G
OINTMENT TOPICAL 2 TIMES DAILY
Status: COMPLETED | OUTPATIENT
Start: 2020-12-21 | End: 2020-12-26

## 2020-12-21 RX ORDER — NICARDIPINE HYDROCHLORIDE 0.2 MG/ML
0-15 INJECTION INTRAVENOUS CONTINUOUS
Status: DISCONTINUED | OUTPATIENT
Start: 2020-12-21 | End: 2020-12-22

## 2020-12-21 RX ORDER — INSULIN ASPART 100 [IU]/ML
1-10 INJECTION, SOLUTION INTRAVENOUS; SUBCUTANEOUS EVERY 6 HOURS PRN
Status: DISCONTINUED | OUTPATIENT
Start: 2020-12-21 | End: 2020-12-23

## 2020-12-21 RX ORDER — POTASSIUM CHLORIDE 20 MEQ/1
40 TABLET, EXTENDED RELEASE ORAL
Status: COMPLETED | OUTPATIENT
Start: 2020-12-21 | End: 2020-12-21

## 2020-12-21 RX ORDER — NITROGLYCERIN 20 MG/100ML
5 INJECTION INTRAVENOUS CONTINUOUS
Status: DISCONTINUED | OUTPATIENT
Start: 2020-12-21 | End: 2020-12-21

## 2020-12-21 RX ORDER — ONDANSETRON 2 MG/ML
4 INJECTION INTRAMUSCULAR; INTRAVENOUS EVERY 6 HOURS PRN
Status: DISCONTINUED | OUTPATIENT
Start: 2020-12-21 | End: 2020-12-22

## 2020-12-21 RX ORDER — LABETALOL HYDROCHLORIDE 5 MG/ML
10 INJECTION, SOLUTION INTRAVENOUS
Status: COMPLETED | OUTPATIENT
Start: 2020-12-21 | End: 2020-12-21

## 2020-12-21 RX ORDER — SODIUM,POTASSIUM PHOSPHATES 280-250MG
2 POWDER IN PACKET (EA) ORAL
Status: COMPLETED | OUTPATIENT
Start: 2020-12-21 | End: 2020-12-22

## 2020-12-21 RX ORDER — ASPIRIN 81 MG/1
81 TABLET ORAL DAILY
Status: DISCONTINUED | OUTPATIENT
Start: 2020-12-22 | End: 2020-12-27 | Stop reason: HOSPADM

## 2020-12-21 RX ORDER — SODIUM CHLORIDE 0.9 % (FLUSH) 0.9 %
10 SYRINGE (ML) INJECTION
Status: DISCONTINUED | OUTPATIENT
Start: 2020-12-21 | End: 2020-12-27 | Stop reason: HOSPADM

## 2020-12-21 RX ORDER — ASPIRIN 81 MG/1
81 TABLET ORAL DAILY
Status: DISCONTINUED | OUTPATIENT
Start: 2020-12-21 | End: 2020-12-21

## 2020-12-21 RX ORDER — CLONIDINE HYDROCHLORIDE 0.1 MG/1
0.2 TABLET ORAL
Status: DISCONTINUED | OUTPATIENT
Start: 2020-12-21 | End: 2020-12-21

## 2020-12-21 RX ORDER — NICARDIPINE HYDROCHLORIDE 0.2 MG/ML
0-15 INJECTION INTRAVENOUS CONTINUOUS
Status: DISCONTINUED | OUTPATIENT
Start: 2020-12-21 | End: 2020-12-21

## 2020-12-21 RX ORDER — ONDANSETRON 2 MG/ML
8 INJECTION INTRAMUSCULAR; INTRAVENOUS
Status: COMPLETED | OUTPATIENT
Start: 2020-12-21 | End: 2020-12-21

## 2020-12-21 RX ORDER — NITROGLYCERIN 0.4 MG/1
0.4 TABLET SUBLINGUAL EVERY 5 MIN PRN
Status: DISCONTINUED | OUTPATIENT
Start: 2020-12-21 | End: 2020-12-22

## 2020-12-21 RX ORDER — NIFEDIPINE 60 MG/1
60 TABLET, EXTENDED RELEASE ORAL DAILY
Status: DISCONTINUED | OUTPATIENT
Start: 2020-12-21 | End: 2020-12-21

## 2020-12-21 RX ORDER — IBUPROFEN 200 MG
24 TABLET ORAL
Status: DISCONTINUED | OUTPATIENT
Start: 2020-12-21 | End: 2020-12-22

## 2020-12-21 RX ORDER — FUROSEMIDE 10 MG/ML
40 INJECTION INTRAMUSCULAR; INTRAVENOUS
Status: COMPLETED | OUTPATIENT
Start: 2020-12-21 | End: 2020-12-21

## 2020-12-21 RX ORDER — GLUCAGON 1 MG
1 KIT INJECTION
Status: DISCONTINUED | OUTPATIENT
Start: 2020-12-21 | End: 2020-12-22

## 2020-12-21 RX ORDER — IBUPROFEN 200 MG
16 TABLET ORAL
Status: DISCONTINUED | OUTPATIENT
Start: 2020-12-21 | End: 2020-12-22

## 2020-12-21 RX ORDER — ASPIRIN 325 MG
325 TABLET ORAL
Status: COMPLETED | OUTPATIENT
Start: 2020-12-21 | End: 2020-12-21

## 2020-12-21 RX ORDER — CLOPIDOGREL BISULFATE 75 MG/1
75 TABLET ORAL DAILY
Status: DISCONTINUED | OUTPATIENT
Start: 2020-12-21 | End: 2020-12-27 | Stop reason: HOSPADM

## 2020-12-21 RX ORDER — NIFEDIPINE 30 MG/1
30 TABLET, EXTENDED RELEASE ORAL DAILY
Status: DISCONTINUED | OUTPATIENT
Start: 2020-12-22 | End: 2020-12-21

## 2020-12-21 RX ORDER — ONDANSETRON 4 MG/1
4 TABLET, ORALLY DISINTEGRATING ORAL EVERY 6 HOURS PRN
Status: DISCONTINUED | OUTPATIENT
Start: 2020-12-21 | End: 2020-12-23

## 2020-12-21 RX ORDER — ROSUVASTATIN CALCIUM 10 MG/1
20 TABLET, COATED ORAL DAILY
Status: DISCONTINUED | OUTPATIENT
Start: 2020-12-21 | End: 2020-12-22

## 2020-12-21 RX ORDER — HYDRALAZINE HYDROCHLORIDE 20 MG/ML
10 INJECTION INTRAMUSCULAR; INTRAVENOUS EVERY 8 HOURS PRN
Status: DISCONTINUED | OUTPATIENT
Start: 2020-12-21 | End: 2020-12-27 | Stop reason: HOSPADM

## 2020-12-21 RX ORDER — HEPARIN SODIUM,PORCINE/D5W 25000/250
0-40 INTRAVENOUS SOLUTION INTRAVENOUS CONTINUOUS
Status: DISCONTINUED | OUTPATIENT
Start: 2020-12-21 | End: 2020-12-24

## 2020-12-21 RX ADMIN — NITROGLYCERIN 5 MCG/MIN: 20 INJECTION INTRAVENOUS at 12:12

## 2020-12-21 RX ADMIN — MUPIROCIN: 20 OINTMENT TOPICAL at 10:12

## 2020-12-21 RX ADMIN — ONDANSETRON 4 MG: 2 INJECTION INTRAMUSCULAR; INTRAVENOUS at 04:12

## 2020-12-21 RX ADMIN — INSULIN ASPART 5 UNITS: 100 INJECTION, SOLUTION INTRAVENOUS; SUBCUTANEOUS at 03:12

## 2020-12-21 RX ADMIN — LABETALOL HYDROCHLORIDE 10 MG: 5 INJECTION, SOLUTION INTRAVENOUS at 06:12

## 2020-12-21 RX ADMIN — SODIUM CHLORIDE 8.5 UNITS/HR: 9 INJECTION, SOLUTION INTRAVENOUS at 07:12

## 2020-12-21 RX ADMIN — INSULIN DETEMIR 10 UNITS: 100 INJECTION, SOLUTION SUBCUTANEOUS at 03:12

## 2020-12-21 RX ADMIN — HEPARIN SODIUM AND DEXTROSE 12 UNITS/KG/HR: 10000; 5 INJECTION INTRAVENOUS at 06:12

## 2020-12-21 RX ADMIN — ONDANSETRON 8 MG: 2 INJECTION INTRAMUSCULAR; INTRAVENOUS at 06:12

## 2020-12-21 RX ADMIN — ROSUVASTATIN CALCIUM 20 MG: 10 TABLET, FILM COATED ORAL at 03:12

## 2020-12-21 RX ADMIN — NITROGLYCERIN 0.4 MG: 0.4 TABLET SUBLINGUAL at 12:12

## 2020-12-21 RX ADMIN — NICARDIPINE HYDROCHLORIDE 10 MG/HR: 0.2 INJECTION, SOLUTION INTRAVENOUS at 05:12

## 2020-12-21 RX ADMIN — ASPIRIN 325 MG ORAL TABLET 325 MG: 325 PILL ORAL at 12:12

## 2020-12-21 RX ADMIN — NICARDIPINE HYDROCHLORIDE 5 MG/HR: 0.2 INJECTION, SOLUTION INTRAVENOUS at 01:12

## 2020-12-21 RX ADMIN — CLOPIDOGREL 225 MG: 75 TABLET, FILM COATED ORAL at 06:12

## 2020-12-21 RX ADMIN — POTASSIUM & SODIUM PHOSPHATES POWDER PACK 280-160-250 MG 2 PACKET: 280-160-250 PACK at 06:12

## 2020-12-21 RX ADMIN — SODIUM CHLORIDE, SODIUM LACTATE, POTASSIUM CHLORIDE, AND CALCIUM CHLORIDE 1000 ML: .6; .31; .03; .02 INJECTION, SOLUTION INTRAVENOUS at 08:12

## 2020-12-21 RX ADMIN — LABETALOL HYDROCHLORIDE 10 MG: 5 INJECTION, SOLUTION INTRAVENOUS at 09:12

## 2020-12-21 RX ADMIN — CLOPIDOGREL 75 MG: 75 TABLET, FILM COATED ORAL at 03:12

## 2020-12-21 RX ADMIN — FUROSEMIDE 40 MG: 10 INJECTION, SOLUTION INTRAVENOUS at 12:12

## 2020-12-21 RX ADMIN — POTASSIUM CHLORIDE 40 MEQ: 1500 TABLET, EXTENDED RELEASE ORAL at 08:12

## 2020-12-21 RX ADMIN — PROMETHAZINE HYDROCHLORIDE 12.5 MG: 25 INJECTION INTRAMUSCULAR; INTRAVENOUS at 09:12

## 2020-12-21 NOTE — ASSESSMENT & PLAN NOTE
She has been out of her home meds x3 weeks. She was given clonidine and labetalol in the ED. Complained of CP--was given Nitro and furosemide with improvement in the chest pain and SOB. Will admit to ICU and start Cardene gtt. SBP range 178-245.

## 2020-12-21 NOTE — ED NOTES
Bed: EXAM 02  Expected date:   Expected time:   Means of arrival:   Comments:  Traveling to [facility]

## 2020-12-21 NOTE — PROGRESS NOTES
Nursing Transfer Note      12/21/2020   Received report from JOSEPH Pink, RN at 1400.     Transfer From: ER    Transfer via stretcher    Transfer with cardiac monitoring and bipap    Transported by JOSEPH Pink RN    Medicines sent: Nitro and Cardene    Chart send with patient: Yes    Notified: spouse    Upon arrival to floor: cardiac monitor applied, patient oriented to room, call bell in reach and bed in lowest position

## 2020-12-21 NOTE — ED NOTES
Pt presents with c/o HTN, epigastric pain, nausea and vomiting. States she has been out of her bp meds for 2 weeks and does not have an appointment with her pcp until next year. Pt reports she began having Gi symptoms last night after eating. Denies watery stool but states her stool resembles mucous. Pt denies headache/lightheadedness.

## 2020-12-21 NOTE — ED NOTES
Pt stated she vomited, ED MD notified, awaiting meds. Pt tolerated 100 cc of water while swallowing PO potassium

## 2020-12-21 NOTE — PROGRESS NOTES
Reviewed EKG done. She definitely now has ST elevation inferiorly with ST depression anteriorly consistent with an inferoposterior MI. However, with distal disease in the PL branch and troponin elevated at 38, wonder if she would get any benefit with opening the right which was tortuous and difficult to intubate during the first attempt. This would entail a lot of dye and with her renal dysfunction and normal EF, would prefer medical management at this time. Spoke to patient earlier about this and will proceed as planned.

## 2020-12-21 NOTE — PLAN OF CARE
The pt's currently in ICU. The Sw met with the pt to complete the assessment. The pt lives with her spouse Noah Louie [phone] in [place]. The pt still drives but states her spouse or dtr Katie Louie [phone] will transport her home at d/c. The pt's independent with her adl's and has a w/c,shwr chair and glucometer at home. The pt was a bit guarded with her answers during the assessment. The Sw completed the white board in the pt's room and gave her a d/c brochure. The Sw encouraged her to call if she has any questions or concerns. The Sw will continue to follow the pt throughout her transitions of care and will assist with any d/c needs. The pt hasn't been taking her blood pressure meds stating she ran out and didn't get them filled again.        12/21/20 1642   Discharge Assessment   Assessment Type Discharge Planning Assessment   Confirmed/corrected address and phone number on facesheet? Yes   Assessment information obtained from? Patient   Prior to hospitilization cognitive status: Alert/Oriented   Prior to hospitalization functional status: Independent   Current cognitive status: Alert/Oriented   Current Functional Status: Independent   Lives With spouse   Able to Return to Prior Arrangements yes   Is patient able to care for self after discharge? Yes   Who are your caregiver(s) and their phone number(s)? Noah Louie(spouse)[phone]/ Katie Louie(dtr)[phone]   Patient's perception of discharge disposition home or selfcare   Readmission Within the Last 30 Days no previous admission in last 30 days   Patient currently being followed by outpatient case management? No   Patient currently receives any other outside agency services? No   Equipment Currently Used at Home wheelchair;shower chair;glucometer  (PT STATES SHE HAS A GLUCOMETER AT HOME BUT DOESN'T USE IT)   Do you have any problems affording any of your prescribed medications? No  (the pt receives her meds affordably at [facility] in [place])   Is the patient  taking medications as prescribed? no   If no, which medications is patient not taking? blood pressure meds   Does the patient have transportation home? Yes   Transportation Anticipated family or friend will provide   Does the patient receive services at the [facility]? No   Discharge Plan A Home with family   Discharge Plan B Home Health   DME Needed Upon Discharge  other (see comments)  (TBD)   Patient/Family in Agreement with Plan yes

## 2020-12-21 NOTE — ED NOTES
After phenergan administration, pt sats 87-89% on room air. Pt placed on 3L nasal cannula with increase in sats to 90-93%. MD notified.

## 2020-12-21 NOTE — ASSESSMENT & PLAN NOTE
Began complaining of SOB in the ED---concerned for flash pulm edema along with CP. 40 mg IV furosemide given. Started on Bipap.  - now on lasix 80mg IV bid per Cardiology  - monitor strict I/Os

## 2020-12-21 NOTE — CONSULTS
[facility]   Consult   Consult Requested By:   Reason for Consult: abnormal troponin    SUBJECTIVE:     History of Present Illness:  Patient is a 70 y.o. female presents with history of CAD with previous stent placed 2017; Seen by me in the clinic as followup with no symptoms. History of hypertension and she states that in the past two weeks, she has been off all her medications and her blood pressures have been running in the 200's at home. Don't know if she ran out of her medications or not. She has not taken any of her medications which included crestor, ace, nifedipine, carvedilol, and plavix. She states she ate a midnight snack and then noted nausea and vomiting. She denies having any chest pains or shortness of breath. In the ER her blood pressure was 217/100. During her evaluation, she became short of breath and was placed on bipap.       ROS    Review of patient's allergies indicates:   Allergen Reactions    Ace inhibitors Other (See Comments)     Cough       Past Medical History:   Diagnosis Date    Diabetes mellitus     Hypertension        History reviewed. No pertinent surgical history.    History reviewed. No pertinent family history.    Social History     Tobacco Use    Smoking status: Never Smoker    Smokeless tobacco: Never Used   Substance Use Topics    Alcohol use: No    Drug use: No        Home meds:  No current facility-administered medications on file prior to encounter.      Current Outpatient Medications on File Prior to Encounter   Medication Sig Dispense Refill    losartan (COZAAR) 100 MG tablet Take 1 tablet (100 mg total) by mouth once daily. 90 tablet 3    aspirin (ECOTRIN) 81 MG EC tablet Take 1 tablet (81 mg total) by mouth once daily. 90 tablet 3    carvediloL (COREG) 25 MG tablet Take 1 tablet (25 mg total) by mouth 2 (two) times daily. 180 tablet 3    clopidogrel (PLAVIX) 75 mg tablet Take 1 tablet (75 mg total) by mouth once daily. 90 tablet 3     metFORMIN (GLUCOPHAGE) 1000 MG tablet Take 1 tablet (1,000 mg total) by mouth 2 (two) times daily with meals. 180 tablet 3    NIFEdipine (PROCARDIA) 20 MG Cap Take 1 capsule (20 mg total) by mouth every 8 (eight) hours. 90 capsule 11    nitroGLYCERIN (NITROSTAT) 0.4 MG SL tablet Place 1 tablet (0.4 mg total) under the tongue every 5 (five) minutes as needed for Chest pain (for a max of 3). 10 tablet 1    pantoprazole (PROTONIX) 40 MG tablet Take 1 tablet (40 mg total) by mouth 2 (two) times daily. 180 tablet 3    rosuvastatin (CRESTOR) 20 MG tablet Take 1 tablet (20 mg total) by mouth once daily. 90 tablet 3    [DISCONTINUED] ciprofloxacin-dexamethasone 0.3-0.1% (CIPRODEX) 0.3-0.1 % DrpS Place 4 drops into both ears 2 (two) times daily. (Patient not taking: Reported on 7/20/2020) 7.5 mL 0       Current meds:    Scheduled Meds:   [START ON 12/22/2020] aspirin  81 mg Oral Daily    clopidogreL  75 mg Oral Daily    insulin detemir U-100  10 Units Subcutaneous Daily    rosuvastatin  20 mg Oral Daily     Continuous Infusions:   nicardipine 2.5 mg/hr (12/21/20 1347)     PRN Meds:.acetaminophen, dextrose 50%, dextrose 50%, glucagon (human recombinant), glucose, glucose, hydrALAZINE, insulin aspart U-100, nitroGLYCERIN, ondansetron, ondansetron, sodium chloride 0.9%        OBJECTIVE:     Vital Signs (Most Recent)  Temp: 98.4 °F (36.9 °C) (12/21/20 0556)  Pulse: 81 (12/21/20 1401)  Resp: (!) 30 (12/21/20 1401)  BP: (!) 146/77 (12/21/20 1401)  SpO2: 96 % (12/21/20 1401)    Vital Signs Range (Last 24H):  Temp:  [98.4 °F (36.9 °C)]   Pulse:  []   Resp:  [24-46]   BP: (146-245)/()   SpO2:  [87 %-100 %]     Physical Exam:  Neck: normal carotids, no bruits; normal JVP  Lungs: fine crackles both bases  Heart: RR, normal S1,S2, no AI or MR heard  Abd: nontender, no masses  Exts; no edema; normal PT pulses noted bilaterally       LABS      CBC  Hemoglobin (g/dL)   Date Value   12/21/2020 13.5   07/20/2020 15.1    06/04/2019 13.0     Hematocrit (%)   Date Value   12/21/2020 39.6   07/20/2020 43.4   06/04/2019 39.0          BMP  Sodium (mmol/L)   Date Value   12/21/2020 135 (L)   07/20/2020 133 (L)   06/04/2019 134 (L)     Potassium (mmol/L)   Date Value   12/21/2020 3.1 (L)   07/20/2020 3.2 (L)   06/04/2019 3.9     CO2 (mmol/L)   Date Value   12/21/2020 22 (L)   07/20/2020 25   06/04/2019 20 (L)     Chloride (mmol/L)   Date Value   12/21/2020 95   07/20/2020 98   06/04/2019 100     BUN (mg/dL)   Date Value   12/21/2020 14   07/20/2020 8   06/04/2019 12     Creatinine (mg/dL)   Date Value   12/21/2020 1.6 (H)   07/20/2020 1.0   06/04/2019 1.3     Glucose (mg/dL)   Date Value   12/21/2020 408 (H)   07/20/2020 398 (H)   06/04/2019 421 (H)       BNP  BNP (pg/mL)   Date Value   12/21/2020 935 (H)   07/20/2020 44   07/02/2017 21       Troponin panel:   No results found for: TROPONIN      COAGS  INR (no units)   Date Value   07/07/2017 1.0   02/20/2017 1.0       Lipid panel:    Lab Results   Component Value Date    CHOL 134 06/04/2019     Lab Results   Component Value Date    HDL 36 (L) 06/04/2019     Lab Results   Component Value Date    LDLCALC 46.4 (L) 06/04/2019     Lab Results   Component Value Date    TRIG 258 (H) 06/04/2019     Lab Results   Component Value Date    CHOLHDL 26.9 06/04/2019       Diagnostic Results:    EKG: sinus; RBBB; ST depression noted V1-V3; in lead III, there is ST elevation noted but not in II;    buCXR:pulmonary infilterates consistent with pulmonary edema   Echo: 12/21/20: LVH, normal EF, in the four chamber view, there is akinesis of the basal portion of the inferior wall which is new from 2017; however overall is clearly normal. The anterior and lateral walls are normal This wall motion is seen only in the apical two chamber view.  The true posterior wall is poorly seen for comments     Chart review:  EKG: in the past with no RBBB  Cath: right stent placed for 95% lesion; LAD normal; Left main:  normal; Cx: normal; OM2 occluded     ASSESSMENT/PLAN:     1. Hypertension with CXR showing pulmonary edema consistent with acute diastolic heart failure with normal EF on echo just done  2. EKG with new RBBB and ST depression anteriorly ;   3. CKD new with K 3.1 which could also explain the ST depression noted ; however, her BNP is 935 and her troponin is elevated at 38 which clearly represents an MI: the question is whether this is a true posterior infarct; and does the ST elevation in lead III suggest a inferior posterior infarct, that is completed.   4. Poorly controlled diabetes   Plan: 1. At this time, would treat medically with control of blood pressure, close monitoring; she is already 12 hours out from the 1st symptom; diurese carefully  2. With normal EF, and previous normal LAD and CX, this most likely is the right coronary artery and will manage medically rather than take her to the cath lab   and complicate her renal function;   3. Start ASA, reload with plavix 300 mg and then 75 mg daily; statin, heparin; and will follow   4. Will check repeat EKG as well     Lul Thompson MD

## 2020-12-21 NOTE — SUBJECTIVE & OBJECTIVE
Past Medical History:   Diagnosis Date    Diabetes mellitus     Hypertension        History reviewed. No pertinent surgical history.    Review of patient's allergies indicates:   Allergen Reactions    Ace inhibitors Other (See Comments)     Cough       No current facility-administered medications on file prior to encounter.      Current Outpatient Medications on File Prior to Encounter   Medication Sig    aspirin (ECOTRIN) 81 MG EC tablet Take 1 tablet (81 mg total) by mouth once daily.    carvediloL (COREG) 25 MG tablet Take 1 tablet (25 mg total) by mouth 2 (two) times daily.    ciprofloxacin-dexamethasone 0.3-0.1% (CIPRODEX) 0.3-0.1 % DrpS Place 4 drops into both ears 2 (two) times daily. (Patient not taking: Reported on 7/20/2020)    clopidogrel (PLAVIX) 75 mg tablet Take 1 tablet (75 mg total) by mouth once daily.    losartan (COZAAR) 100 MG tablet Take 1 tablet (100 mg total) by mouth once daily.    metFORMIN (GLUCOPHAGE) 1000 MG tablet Take 1 tablet (1,000 mg total) by mouth 2 (two) times daily with meals.    NIFEdipine (PROCARDIA) 20 MG Cap Take 1 capsule (20 mg total) by mouth every 8 (eight) hours.    nitroGLYCERIN (NITROSTAT) 0.4 MG SL tablet Place 1 tablet (0.4 mg total) under the tongue every 5 (five) minutes as needed for Chest pain (for a max of 3).    pantoprazole (PROTONIX) 40 MG tablet Take 1 tablet (40 mg total) by mouth 2 (two) times daily.    rosuvastatin (CRESTOR) 20 MG tablet Take 1 tablet (20 mg total) by mouth once daily.     Family History     None        Tobacco Use    Smoking status: Never Smoker    Smokeless tobacco: Never Used   Substance and Sexual Activity    Alcohol use: No    Drug use: No    Sexual activity: Not on file     Review of Systems   Constitutional: Positive for activity change, appetite change and fatigue. Negative for chills, diaphoresis and fever.   HENT: Negative for trouble swallowing.    Eyes: Negative for visual disturbance.   Respiratory: Positive  for shortness of breath.    Cardiovascular: Positive for chest pain.   Gastrointestinal: Positive for diarrhea and nausea. Negative for blood in stool, constipation and vomiting.   Genitourinary: Negative for menstrual problem.   Musculoskeletal: Negative for arthralgias, gait problem and myalgias.   Skin: Negative for color change.   Neurological: Positive for weakness. Negative for dizziness and headaches.   Hematological: Does not bruise/bleed easily.   Psychiatric/Behavioral: Negative for agitation and confusion. The patient is not nervous/anxious.      Objective:     Vital Signs (Most Recent):  Temp: 98.4 °F (36.9 °C) (12/21/20 0556)  Pulse: 82 (12/21/20 1302)  Resp: (!) 38 (12/21/20 1302)  BP: (!) 217/100 (12/21/20 1302)  SpO2: 98 % (12/21/20 1302) Vital Signs (24h Range):  Temp:  [98.4 °F (36.9 °C)] 98.4 °F (36.9 °C)  Pulse:  [] 82  Resp:  [24-46] 38  SpO2:  [87 %-100 %] 98 %  BP: (178-245)/() 217/100     Weight: 83.3 kg (183 lb 8.5 oz)  Body mass index is 32.51 kg/m².    Physical Exam  Vitals signs and nursing note reviewed.   Constitutional:       Appearance: Normal appearance.   HENT:      Head: Normocephalic and atraumatic.      Nose: Nose normal.   Eyes:      Extraocular Movements: Extraocular movements intact.      Pupils: Pupils are equal, round, and reactive to light.   Neck:      Musculoskeletal: Normal range of motion.   Cardiovascular:      Rate and Rhythm: Normal rate.      Pulses: Normal pulses.      Heart sounds: Normal heart sounds.   Pulmonary:      Breath sounds: No wheezing.   Abdominal:      General: Bowel sounds are normal. There is no distension.      Palpations: Abdomen is soft.      Tenderness: There is no abdominal tenderness. There is no guarding.   Musculoskeletal: Normal range of motion.   Skin:     General: Skin is warm and dry.      Capillary Refill: Capillary refill takes less than 2 seconds.   Neurological:      Mental Status: She is alert and oriented to person,  place, and time.   Psychiatric:         Mood and Affect: Mood normal.         Behavior: Behavior normal.           CRANIAL NERVES     CN III, IV, VI   Pupils are equal, round, and reactive to light.       Significant Labs:   Recent Lab Results       12/21/20  0958   12/21/20  0755   12/21/20  0748   12/21/20  0727   12/21/20  0650        Albumin               Alkaline Phosphatase               Allens Test   N/A           ALT               Anion Gap               Appearance, UA       Clear       AST               Bacteria, UA       Rare       Beta-Hydroxybutyrate     0.9         Bilirubin (UA)       Negative       BILIRUBIN TOTAL               Site   Other           BUN               Calcium               Chloride               CO2               Color, UA       Yellow       Creatinine               eGFR if                eGFR if non                Estimated Avg Glucose               Glucose               Glucose, UA       2+       Hematocrit               Hemoglobin               Hemoglobin A1C External               Hyaline Casts, UA       0       Ketones, UA       Trace       Leukocytes, UA       Negative       Lipase               MCH               MCHC               MCV               Microscopic Comment       SEE COMMENT  Comment:  Other formed elements not mentioned in the report are not   present in the microscopic examination.          MPV               NITRITE UA       Negative       Occult Blood UA       2+       pH, UA       5.0       Platelets               POC BE   -2           POC HCO3   23.7           POC PCO2   42.0           POC PH   7.358           POC PO2   38           POC SATURATED O2   70           POC TCO2   25           POCT Glucose 418             Potassium               PROTEIN TOTAL               Protein, UA       2+  Comment:  Recommend a 24 hour urine protein or a urine   protein/creatinine ratio if globulin induced proteinuria is  clinically suspected.           Acceptable         Yes     RBC               RBC, UA       2       RDW               Sample   VENOUS           SARS-CoV-2 RNA, Amplification, Qual         Negative  Comment:  .[email or web address]     Sodium               Specific [place], UA       1.025       Specimen UA       Urine, Clean Catch       Squam Epithel, UA       8       TSH               UROBILINOGEN UA       Negative       WBC, UA       0       WBC                                12/21/20  0619        Albumin 3.6     Alkaline Phosphatase 77     Allens Test       ALT 31     Anion Gap 18     Appearance, UA            Bacteria, UA       Beta-Hydroxybutyrate       Bilirubin (UA)       BILIRUBIN TOTAL 0.8  Comment:  For infants and newborns, interpretation of results should be based  on gestational age, weight and in agreement with clinical  observations.  Premature Infant recommended reference ranges:  Up to 24 hours.............<8.0 mg/dL  Up to 48 hours............<12.0 mg/dL  3-5 days..................<15.0 mg/dL  6-29 days.................<15.0 mg/dL       Site       BUN 14     Calcium 8.2     Chloride 95     CO2 22     Color, UA       Creatinine 1.6     eGFR if  38     eGFR if non  33  Comment:  Calculation used to obtain the estimated glomerular filtration  rate (eGFR) is the CKD-EPI equation.        Estimated Avg Glucose 246     Glucose 408     Glucose, UA       Hematocrit 39.6     Hemoglobin 13.5     Hemoglobin A1C External 10.2  Comment:  ADA Screening Guidelines:  5.7-6.4%  Consistent with prediabetes  >or=6.5%  Consistent with diabetes  High levels of fetal hemoglobin interfere with the HbA1C  assay. Heterozygous hemoglobin variants (HbS, HgC, etc)do  not significantly interfere with this assay.   However, presence of multiple variants may affect accuracy.       Hyaline Casts, UA       Ketones, UA       Leukocytes, UA       Lipase 33     MCH 28.8     MCHC 34.1     MCV 85     Microscopic Comment        MPV 11.7     NITRITE UA       Occult Blood UA       pH, UA       Platelets 244     POC BE       POC HCO3       POC PCO2       POC PH       POC PO2       POC SATURATED O2       POC TCO2       POCT Glucose       Potassium 3.1     PROTEIN TOTAL 7.9     Protein, UA        Acceptable       RBC 4.68     RBC, UA       RDW 13.5     Sample       SARS-CoV-2 RNA, Amplification, Qual       Sodium 135     Specific [place], UA       Specimen UA       Squam Epithel, UA       TSH 0.766     UROBILINOGEN UA       WBC, UA       WBC 12.02           Significant Imaging: I have reviewed all pertinent imaging results/findings within the past 24 hours.

## 2020-12-21 NOTE — H&P
[facility] Hospital Medicine  History & Physical    Patient Name: Eliza Louie  MRN: [id number]  Patient Class: IP- Inpatient  Admission Date: 12/21/2020  Attending Physician: Jenni Alcala MD   Primary Care Provider: Jayden Pérez MD         Patient information was obtained from patient, past medical records and ER records.     Subjective:     Principal Problem:Hypertensive urgency    Chief Complaint:   Chief Complaint   Patient presents with    Vomiting     Multiple episodes of vomiting and liquid stools since 11pm last night. Denies eating anything unusual. Denies pain or SOB. Denies known COVID exposure.    Diarrhea    Hypertension     Hypertensive in triage. Pt reports that she ran out of her BP meds 2 weeks ago and never had it refilled.        HPI: Ms. Eliza Louie is a 71 y/o female who lives in [place], [place] at her residence. The patients PCP is Dr. Jayden Pérez. She has a pmh of HTN and DM. No surgical history noted. She denies smoking cigarettes, drinking alcohol, and using illicit drugs. She presents to the ED here at [facility] with complaints of Vomiting since 11pm, Diarrhea, and HTN. Per the  Patient she has been out of her medications for 3 weeks. She denies hematemesis, bilious vomiting, coffee ground emesis.  Diarrhea is mucusy and brown; she denies any hematochezia and melena. She notes some crampy abdominal pain in the epigastric region. The pain does not radiate.  No fever, chills or sweats.  No sick contacts.  No trauma. No recent travel. No recent ABX use. Her ED workup includes Na--135, potassium 3.1, Cr--1.6, glucose 408, hemoglobin A1c--10.2. CXR---Pulmonary opacities consistent with pulmonary infiltrate/airspace disease most notably on the right with suspected infiltrate at the left base as well, as discussed above. XR abdomen---Nonobstructive bowel gas pattern. Right upper quadrant calcification corresponding to a stone seen within the cystic duct  as seen on prior ultrasound. Probable left nephrolith. COVID-19 is negative. She will be admitted to the ICU for further care.    Past Medical History:   Diagnosis Date    Diabetes mellitus     Hypertension        History reviewed. No pertinent surgical history.    Review of patient's allergies indicates:   Allergen Reactions    Ace inhibitors Other (See Comments)     Cough       No current facility-administered medications on file prior to encounter.      Current Outpatient Medications on File Prior to Encounter   Medication Sig    aspirin (ECOTRIN) 81 MG EC tablet Take 1 tablet (81 mg total) by mouth once daily.    carvediloL (COREG) 25 MG tablet Take 1 tablet (25 mg total) by mouth 2 (two) times daily.    ciprofloxacin-dexamethasone 0.3-0.1% (CIPRODEX) 0.3-0.1 % DrpS Place 4 drops into both ears 2 (two) times daily. (Patient not taking: Reported on 7/20/2020)    clopidogrel (PLAVIX) 75 mg tablet Take 1 tablet (75 mg total) by mouth once daily.    losartan (COZAAR) 100 MG tablet Take 1 tablet (100 mg total) by mouth once daily.    metFORMIN (GLUCOPHAGE) 1000 MG tablet Take 1 tablet (1,000 mg total) by mouth 2 (two) times daily with meals.    NIFEdipine (PROCARDIA) 20 MG Cap Take 1 capsule (20 mg total) by mouth every 8 (eight) hours.    nitroGLYCERIN (NITROSTAT) 0.4 MG SL tablet Place 1 tablet (0.4 mg total) under the tongue every 5 (five) minutes as needed for Chest pain (for a max of 3).    pantoprazole (PROTONIX) 40 MG tablet Take 1 tablet (40 mg total) by mouth 2 (two) times daily.    rosuvastatin (CRESTOR) 20 MG tablet Take 1 tablet (20 mg total) by mouth once daily.     Family History     None        Tobacco Use    Smoking status: Never Smoker    Smokeless tobacco: Never Used   Substance and Sexual Activity    Alcohol use: No    Drug use: No    Sexual activity: Not on file     Review of Systems   Constitutional: Positive for activity change, appetite change and fatigue. Negative for chills,  diaphoresis and fever.   HENT: Negative for trouble swallowing.    Eyes: Negative for visual disturbance.   Respiratory: Positive for shortness of breath.    Cardiovascular: Positive for chest pain.   Gastrointestinal: Positive for diarrhea and nausea. Negative for blood in stool, constipation and vomiting.   Genitourinary: Negative for menstrual problem.   Musculoskeletal: Negative for arthralgias, gait problem and myalgias.   Skin: Negative for color change.   Neurological: Positive for weakness. Negative for dizziness and headaches.   Hematological: Does not bruise/bleed easily.   Psychiatric/Behavioral: Negative for agitation and confusion. The patient is not nervous/anxious.      Objective:     Vital Signs (Most Recent):  Temp: 98.4 °F (36.9 °C) (12/21/20 0556)  Pulse: 82 (12/21/20 1302)  Resp: (!) 38 (12/21/20 1302)  BP: (!) 217/100 (12/21/20 1302)  SpO2: 98 % (12/21/20 1302) Vital Signs (24h Range):  Temp:  [98.4 °F (36.9 °C)] 98.4 °F (36.9 °C)  Pulse:  [] 82  Resp:  [24-46] 38  SpO2:  [87 %-100 %] 98 %  BP: (178-245)/() 217/100     Weight: 83.3 kg (183 lb 8.5 oz)  Body mass index is 32.51 kg/m².    Physical Exam  Vitals signs and nursing note reviewed.   Constitutional:       Appearance: Normal appearance.   HENT:      Head: Normocephalic and atraumatic.      Nose: Nose normal.   Eyes:      Extraocular Movements: Extraocular movements intact.      Pupils: Pupils are equal, round, and reactive to light.   Neck:      Musculoskeletal: Normal range of motion.   Cardiovascular:      Rate and Rhythm: Normal rate.      Pulses: Normal pulses.      Heart sounds: Normal heart sounds.   Pulmonary:      Breath sounds: No wheezing.   Abdominal:      General: Bowel sounds are normal. There is no distension.      Palpations: Abdomen is soft.      Tenderness: There is no abdominal tenderness. There is no guarding.   Musculoskeletal: Normal range of motion.   Skin:     General: Skin is warm and dry.       Capillary Refill: Capillary refill takes less than 2 seconds.   Neurological:      Mental Status: She is alert and oriented to person, place, and time.   Psychiatric:         Mood and Affect: Mood normal.         Behavior: Behavior normal.           CRANIAL NERVES     CN III, IV, VI   Pupils are equal, round, and reactive to light.       Significant Labs:   Recent Lab Results       12/21/20  0958   12/21/20  0755   12/21/20  0748   12/21/20  0727   12/21/20  0650        Albumin               Alkaline Phosphatase               Allens Test   N/A           ALT               Anion Gap               Appearance, UA       Clear       AST               Bacteria, UA       Rare       Beta-Hydroxybutyrate     0.9         Bilirubin (UA)       Negative       BILIRUBIN TOTAL               Site   Other           BUN               Calcium               Chloride               CO2               Color, UA       Yellow       Creatinine               eGFR if                eGFR if non                Estimated Avg Glucose               Glucose               Glucose, UA       2+       Hematocrit               Hemoglobin               Hemoglobin A1C External               Hyaline Casts, UA       0       Ketones, UA       Trace       Leukocytes, UA       Negative       Lipase               MCH               MCHC               MCV               Microscopic Comment       SEE COMMENT  Comment:  Other formed elements not mentioned in the report are not   present in the microscopic examination.          MPV               NITRITE UA       Negative       Occult Blood UA       2+       pH, UA       5.0       Platelets               POC BE   -2           POC HCO3   23.7           POC PCO2   42.0           POC PH   7.358           POC PO2   38           POC SATURATED O2   70           POC TCO2   25           POCT Glucose 418             Potassium               PROTEIN TOTAL               Protein, UA        2+  Comment:  Recommend a 24 hour urine protein or a urine   protein/creatinine ratio if globulin induced proteinuria is  clinically suspected.          Acceptable         Yes     RBC               RBC, UA       2       RDW               Sample   VENOUS           SARS-CoV-2 RNA, Amplification, Qual         Negative  Comment:  .RapidAbbott     Sodium               Specific [place], UA       1.025       Specimen UA       Urine, Clean Catch       Squam Epithel, UA       8       TSH               UROBILINOGEN UA       Negative       WBC, UA       0       WBC                                12/21/20  0619        Albumin 3.6     Alkaline Phosphatase 77     Allens Test       ALT 31     Anion Gap 18     Appearance, UA            Bacteria, UA       Beta-Hydroxybutyrate       Bilirubin (UA)       BILIRUBIN TOTAL 0.8  Comment:  For infants and newborns, interpretation of results should be based  on gestational age, weight and in agreement with clinical  observations.  Premature Infant recommended reference ranges:  Up to 24 hours.............<8.0 mg/dL  Up to 48 hours............<12.0 mg/dL  3-5 days..................<15.0 mg/dL  6-29 days.................<15.0 mg/dL       Site       BUN 14     Calcium 8.2     Chloride 95     CO2 22     Color, UA       Creatinine 1.6     eGFR if  38     eGFR if non  33  Comment:  Calculation used to obtain the estimated glomerular filtration  rate (eGFR) is the CKD-EPI equation.        Estimated Avg Glucose 246     Glucose 408     Glucose, UA       Hematocrit 39.6     Hemoglobin 13.5     Hemoglobin A1C External 10.2  Comment:  ADA Screening Guidelines:  5.7-6.4%  Consistent with prediabetes  >or=6.5%  Consistent with diabetes  High levels of fetal hemoglobin interfere with the HbA1C  assay. Heterozygous hemoglobin variants (HbS, HgC, etc)do  not significantly interfere with this assay.   However, presence of multiple variants may affect  accuracy.       Hyaline Casts, UA       Ketones, UA       Leukocytes, UA       Lipase 33     MCH 28.8     MCHC 34.1     MCV 85     Microscopic Comment       MPV 11.7     NITRITE UA       Occult Blood UA       pH, UA       Platelets 244     POC BE       POC HCO3       POC PCO2       POC PH       POC PO2       POC SATURATED O2       POC TCO2       POCT Glucose       Potassium 3.1     PROTEIN TOTAL 7.9     Protein, UA        Acceptable       RBC 4.68     RBC, UA       RDW 13.5     Sample       SARS-CoV-2 RNA, Amplification, Qual       Sodium 135     Specific [place], UA       Specimen UA       Squam Epithel, UA       TSH 0.766     UROBILINOGEN UA       WBC, UA       WBC 12.02           Significant Imaging: I have reviewed all pertinent imaging results/findings within the past 24 hours.    Assessment/Plan:     * Hypertensive urgency  She has been out of her home meds x3 weeks. She was given clonidine and labetalol in the ED. Complained of CP--was given Nitro and furosemide with improvement in the chest pain and SOB. Will admit to ICU and start Cardene gtt. SBP range 178-245.      Acute hypoxemic respiratory failure       Pulmonary edema  Began complaining of SOB in the ED---concerned for flash pulm edema along with CP. 40 mg IV furosemide given. Started on Bipap. Ordered BNP---935 and TTE pending. Consulted cardiology. Monitor.    Hypokalemia  Replace and monitor.       Type 2 diabetes mellitus, without long-term current use of insulin  Hold Metformin while inpatient. Hemoglobin A1c is 10.2. start Levemir 10 units daily for now and uptitrate if needed. Diabetic cardiac diet ordered. Low dose aspart SSI prn for now. She needs better compliance outpatient.       JOHN (acute kidney injury)  Baseline Cr 1.0---Cr 1.6 today. Holding Arb. Monitor BMP      GERD (gastroesophageal reflux disease)  Chronic.       VTE Risk Mitigation (From admission, onward)         Ordered     IP VTE HIGH RISK PATIENT  Once       12/21/20 1105     Place sequential compression device  Until discontinued      12/21/20 1105                   Nicole Peace NP  Department of Hospital Medicine   [facility]

## 2020-12-21 NOTE — ASSESSMENT & PLAN NOTE
Hold Metformin while inpatient. Hemoglobin A1c is 10.2. start Levemir 10 units daily for now and uptitrate if needed. Diabetic cardiac diet ordered. Low dose aspart SSI prn for now. She needs better compliance outpatient.

## 2020-12-21 NOTE — ED PROVIDER NOTES
"History       Chief Complaint   Patient presents with    Vomiting     Multiple episodes of vomiting and liquid stools since 11pm last night. Denies eating anything unusual. Denies pain or SOB. Denies known COVID exposure.    Diarrhea    Hypertension     Hypertensive in triage. Pt reports that she ran out of her BP meds 2 weeks ago and never had it refilled.       HPI    Eliza Louie 70 y.o. presents to the emergency department today with a complaint of nausea vomiting and diarrhea.  It has been present for since 11 last night. Pt has "not felt well for the past few days". The vomiting and diarrhea started last night at 11 pm.  They have been vomiting up what she eats. Denies hematemesis, bilious vomiting, coffee ground emesis.  Diarrhea is mucusy and brown. Denies any hematochezia, melena  They have some crampy abdominal pain in the epigastric region. The pain does not radiate.  No fever, chills or sweats.  No sick contacts.  No trauma. No recent travel.  No camping, river or lake water exposure.  No recent ABX use. No blood in stools.  No change in urination.     ROS    Constitutional: No fever, no chills.  Eyes: No discharge. No pain.  HENT: No nasal drainage. No ear ache. No sore throat.  Cardiovascular: No chest pain, no palpitations.  Respiratory: No cough, no shortness of breath.  Gastrointestinal:  See above.  Genitourinary: No hematuria, dysuria, urgency.  Musculoskeletal: No back pain.   Skin: No rashes, no lesions.  Neurological: No headache, no focal weakness.    Otherwise remaining ROS negative     The history is provided by the patient      ALLERGIES REVIEWED  MEDICATIONS REVIEWED  PMH/PSH/SOC/FH REVIEWED       Past Medical History:   Diagnosis Date    Diabetes mellitus     Hypertension        History reviewed. No pertinent surgical history.    Social History     Socioeconomic History    Marital status: Single     Spouse name: Not on file    Number of children: Not on file    Years of education: " "Not on file    Highest education level: Not on file   Occupational History    Not on file   Social Needs    Financial resource strain: Not on file    Food insecurity     Worry: Not on file     Inability: Not on file    Transportation needs     Medical: Not on file     Non-medical: Not on file   Tobacco Use    Smoking status: Never Smoker    Smokeless tobacco: Never Used   Substance and Sexual Activity    Alcohol use: No    Drug use: No    Sexual activity: Not on file   Lifestyle    Physical activity     Days per week: Not on file     Minutes per session: Not on file    Stress: Not on file   Relationships    Social connections     Talks on phone: Not on file     Gets together: Not on file     Attends Oriental orthodox service: Not on file     Active member of club or organization: Not on file     Attends meetings of clubs or organizations: Not on file     Relationship status: Not on file   Other Topics Concern    Not on file   Social History Narrative    Not on file       History reviewed. No pertinent family history.      Physical exam:     Nursing/Ancillary staff note reviewed.  VS reviewed  BP (!) 142/64   Pulse 94   Temp 98.1 °F (36.7 °C) (Oral)   Resp (!) 36   Ht 5' 3" (1.6 m)   Wt 85.5 kg (188 lb 7.9 oz)   LMP  (LMP Unknown)   SpO2 (!) 92%   Breastfeeding No   BMI 33.39 kg/m²     General Appearance: The patient is alert, has no immediate need for airway protection She appears to not feel well. Lying in bed but able to sit up without difficulty.   HEENT: Eyes: Pupils equal and round no pallor or injection. Extra ocular movements intact. No drainage.       Mouth: Mucous membranes are dry. Oropharynx clear.   Neck:Neck is supple non-tender. No lymphadenopathy. No stridor.   Respiratory: There are no retractions, lungs are clear to auscultation. No wheezing, no crackles. Chest wall nontender to palpation.   Cardiovascular: Regular rate and rhythm. No murmurs, rubs or gallops.  Gastrointestinal:  " Abdomen is soft and has diffuse tenderness to palpation in the upper abdomen,  no guarding, no rebound, no periumbilical pain, no Torres's sign, no pain in the right lower quadrant or at McBurney's point.  no masses, bowel sounds hyperactive. No pulsatile mass. No CVA tenderness.  Neurological: Alert and oriented x 4. CN II-XII grossly intact. No focal weakness. Strength intact 5/5 bilaterally in upper and lower extremities.   Skin: Warm and dry, no rashes.  Musculoskeletal:Musculoskeletal: Extremities are non-tender, non-swollen and have full range of motion. Back NTTP along the midline.     DIFFERENTIAL DIAGNOSIS: After history and physical exam a differential diagnosis was considered, but was not limited to, gastroenteritis, gastritis, ileus, obstruction.        Initial management:  Eliza Louie  presents to the emergency department today with nausea vomiting and diarrhea that started at 11pm. She appears to not feel well. She has been out of her medications for BP and DM. Her BP is high, her glucose is high. Will obtain labs, xray Abd, five fluids, symptom control, monitor closely and reassess.         ED Course     ED Course as of Dec 21 1644   Mon Dec 21, 2020   0723 Pt reports slightly better    [JA]   0729 High, AG high. Will obtain VBG.    Glucose(!): 408 [JA]   0730 Will replete     Potassium(!): 3.1 [JA]   0757 Xray reviewed no signs of obstructive pattern.     [JA]   0810 Normal   POC PH: 7.358 [JA]   0810 Just under normal.    POC HCO3(!): 23.7 [JA]   0912 D    [JA]   0912 0810 Does not appear to be in DKA at this time.     [JA]   0912 GFR decreased, receiving fluids.     [JA]   0938 After phenergan she is very drowsy, desats, requiring supplemental O2       [JA]   1103 Pt reports not feeling much better, her BP is still elevated despite 2 doses meds, glucose still high after IV fluids. I will speak with [facility] for admission.     [JA]   1104 I spoke with Heidi cavazos:the pts presentation and  workup, pt will be admitted by [facility].     [JA]   1226 Pt now c/o CP and SOB, EKG similar to previous. Will obtain troponin, BNP, CXR. Discussed with [facility], they'd like us to place on tridil drip. She denies any CP prior to this event. No CP at home.     [JA]      ED Course User Index  [JA] Jody Sommers MD     Labs independently reviewed by myself and show the following abnormalities:     Labs Reviewed   COMPREHENSIVE METABOLIC PANEL - Abnormal; Notable for the following components:       Result Value    Sodium 135 (*)     Potassium 3.1 (*)     CO2 22 (*)     Glucose 408 (*)     Creatinine 1.6 (*)     Calcium 8.2 (*)      (*)     Anion Gap 18 (*)     eGFR if  38 (*)     eGFR if non  33 (*)     All other components within normal limits   URINALYSIS, REFLEX TO URINE CULTURE - Abnormal; Notable for the following components:    Protein, UA 2+ (*)     Glucose, UA 2+ (*)     Ketones, UA Trace (*)     Occult Blood UA 2+ (*)     All other components within normal limits    Narrative:     Specimen Source->Urine   BETA - HYDROXYBUTYRATE, SERUM - Abnormal; Notable for the following components:    Beta-Hydroxybutyrate 0.9 (*)     All other components within normal limits   HEMOGLOBIN A1C - Abnormal; Notable for the following components:    Hemoglobin A1C 10.2 (*)     Estimated Avg Glucose 246 (*)     All other components within normal limits   TROPONIN I - Abnormal; Notable for the following components:    Troponin I 38.330 (*)     All other components within normal limits   B-TYPE NATRIURETIC PEPTIDE - Abnormal; Notable for the following components:     (*)     All other components within normal limits   ISTAT PROCEDURE - Abnormal; Notable for the following components:    POC PO2 38 (*)     POC HCO3 23.7 (*)     POC SATURATED O2 70 (*)     All other components within normal limits   POCT GLUCOSE - Abnormal; Notable for the following  components:    POCT Glucose 418 (*)     All other components within normal limits   CBC WITHOUT DIFFERENTIAL   LIPASE   URINALYSIS MICROSCOPIC    Narrative:     Specimen Source->Urine   HEMOGLOBIN A1C   TSH   TSH   SARS-COV-2 RDRP GENE     Imaging reviewed by myself, read by radiology:  X-Ray Chest 1 View   Final Result      Pulmonary opacities consistent with pulmonary infiltrate/airspace disease most notably on the right with suspected infiltrate at the left base as well, as discussed above.         Electronically signed by: Shreyas Monge   Date:    12/21/2020   Time:    12:54      X-Ray Abdomen Flat And Erect   Final Result      1. Nonobstructive bowel gas pattern.      2. Right upper quadrant calcification corresponding to a stone seen within the cystic duct as seen on prior ultrasound.   3. Probable left nephrolith.         Electronically signed by: Claus Jarquin   Date:    12/21/2020   Time:    07:33                 Past medical records reviewed: Pt seen by Dr Wang with cardiology. Last seen in ED in July 2020 for HTN.       IDRIS Louie  presents to the emergency Department today with c/o nausea vomiting and diarrhea. She has an elevated glucose on workup and elevated BP. She had no chest pain or her presentation but did develop shortness of breath after admission. She was placed on Bipap and given Nitro and she feels better, flash pulmonary edema due to HTN emergency vs NSTEMI.  Did not appreciate ST Elevations in 2 or more contiguous leads. Also unlikely for her troponin to elevate to this extent in the small amount of time she had SOB and chest pain as her troponin and BNP were drawn at that time.  She had no chest pain on arrival.  She has been admitted to [facility] and cardiology, Dr Wang cardiology has been to bedside.              This medical record was prepared using voice dictation software. There may be phonetic errors.              Impression          The  primary encounter diagnosis was JOHN (acute kidney injury). Diagnoses of Nausea & vomiting, Hypertension, unspecified type, SOB (shortness of breath), Chest pain, and RBBB were also pertinent to this visit.                    Critical Care   Date/Time:   Performed by: Jody Sommers MD  Authorized by: Jody Sommers MD   Total critical care time (exclusive of procedural time) :  42 minutes  Critical care time was exclusive of separately billable procedures and treating other patients.  Critical care was necessary to treat or prevent imminent or life-threatening deterioration of the following conditions: respiratory failure, requiring BIPAP.  Critical care was time spent personally by me on the following activities: development of treatment plan with patient or surrogate, interpretation of cardiac output measurements, examination of patient, evaluation of patient's response to treatment, obtaining history from patient or surrogate, ordering and performing treatments and interventions, ordering and review of radiographic studies, ordering and review of laboratory studies, pulse oximetry, review of old charts and re-evaluation of patient's condition.        Jody Sommers MD  12/21/20 [id number]       Jody Sommers MD  12/21/20 [id number]

## 2020-12-21 NOTE — ASSESSMENT & PLAN NOTE
Complained of chest pain and SOB in the ED . Nitro given. Furosemide 40 IV given. Patient states she is better. Ordered BNP and TTE. Consulted cardiology. Monitor.

## 2020-12-21 NOTE — ED NOTES
Pt tolerating bipap 15/5 well, blood pressure better controled with nicardipine at 0.2mg/ml, pt aaox3, repositioned self for comfort

## 2020-12-21 NOTE — HPI
"Patient is a 70 year old female with PMHx of CAD s/p 1 stent, HTN, DM who presented to the ED with 1 day history of nausea and vomiting. Patient reports the evening before presentation, she attempted to eat some jello and bread and immediately vomited them back up. Patient then presented to the ED the following morning with continued nausea and vomiting. While in the ED, patient significantly hypertensive to maximum 245/119 and then the patient began to develop rapid onset shortness of breath and was noted to be desat-ing. Patient placed on BiPAP with improvement in shortness of breath and improvement in sats. Labwork at the time was remarkable for a troponin elevated to 38. EKG at the time was largely without changes. Labwork also significant for severely elevated blood glucose, anion gap, positive beta-hydroxybutyrate. Cardiology was consulted in the ED and recommended medical management of the patient at this time.    Repeat EKG some time later significant for ST elevation anteriorly and ST depression inferiorly. Case was discussed again with cardiology who felt there would be questionable benefit and high risk for an invasive intervention and continued to recommend medical management. Patient's BG continued to be elevated>450 despite insulin. CXR showed "Pulmonary opacities consistent with pulmonary infiltrate/airspace disease most notably on the right with suspected infiltrate at the left base as well."  "

## 2020-12-21 NOTE — ED NOTES
Pt became acutely short of breath. Pt sats at 88% on 5L nasal cannula with complaints of chest pain. Bilateral crackles noted in all fields. ED MD at bedside, EKG obtained. Respiratory notified for need of bipap. New orders placed.

## 2020-12-21 NOTE — HPI
Ms. Eliza Louie is a 69 y/o female who lives in [place], [place] at her residence. The patients PCP is Dr. Jayden Pérez. She has a pmh of HTN and DM. No surgical history noted. She denies smoking cigarettes, drinking alcohol, and using illicit drugs. She presents to the ED here at [facility] with complaints of Vomiting since 11pm, Diarrhea, and HTN. Per the  Patient she has been out of her medications for 3 weeks. She denies hematemesis, bilious vomiting, coffee ground emesis.  Diarrhea is mucusy and brown; she denies any hematochezia and melena. She notes some crampy abdominal pain in the epigastric region. The pain does not radiate.  No fever, chills or sweats.  No sick contacts.  No trauma. No recent travel. No recent ABX use. Her ED workup includes Na--135, potassium 3.1, Cr--1.6, glucose 408, hemoglobin A1c--10.2. CXR---Pulmonary opacities consistent with pulmonary infiltrate/airspace disease most notably on the right with suspected infiltrate at the left base as well, as discussed above. XR abdomen---Nonobstructive bowel gas pattern. Right upper quadrant calcification corresponding to a stone seen within the cystic duct as seen on prior ultrasound. Probable left nephrolith. COVID-19 is negative. She will be admitted to the ICU for further care.

## 2020-12-22 PROBLEM — I50.33 ACUTE ON CHRONIC DIASTOLIC HEART FAILURE: Status: ACTIVE | Noted: 2020-12-22

## 2020-12-22 PROBLEM — I21.3 ACUTE ST ELEVATION MYOCARDIAL INFARCTION (STEMI): Status: ACTIVE | Noted: 2017-07-07

## 2020-12-22 PROBLEM — I16.1 HYPERTENSIVE EMERGENCY: Status: ACTIVE | Noted: 2020-12-21

## 2020-12-22 PROBLEM — E83.42 HYPOMAGNESEMIA: Status: ACTIVE | Noted: 2020-12-22

## 2020-12-22 PROBLEM — J81.1 PULMONARY EDEMA: Status: RESOLVED | Noted: 2020-12-21 | Resolved: 2020-12-22

## 2020-12-22 LAB
ANION GAP SERPL CALC-SCNC: 14 MMOL/L (ref 8–16)
ANION GAP SERPL CALC-SCNC: 15 MMOL/L (ref 8–16)
ANION GAP SERPL CALC-SCNC: 15 MMOL/L (ref 8–16)
APTT BLDCRRT: 38.4 SEC (ref 21–32)
APTT BLDCRRT: 41.6 SEC (ref 21–32)
APTT BLDCRRT: 42.7 SEC (ref 21–32)
APTT BLDCRRT: 46.4 SEC (ref 21–32)
BASOPHILS # BLD AUTO: 0.04 K/UL (ref 0–0.2)
BASOPHILS NFR BLD: 0.2 % (ref 0–1.9)
BUN SERPL-MCNC: 23 MG/DL (ref 8–23)
BUN SERPL-MCNC: 26 MG/DL (ref 8–23)
BUN SERPL-MCNC: 28 MG/DL (ref 8–23)
BUN SERPL-MCNC: 31 MG/DL (ref 8–23)
BUN SERPL-MCNC: 32 MG/DL (ref 8–23)
CALCIUM SERPL-MCNC: 7.6 MG/DL (ref 8.7–10.5)
CALCIUM SERPL-MCNC: 8.2 MG/DL (ref 8.7–10.5)
CALCIUM SERPL-MCNC: 8.2 MG/DL (ref 8.7–10.5)
CHLORIDE SERPL-SCNC: 96 MMOL/L (ref 95–110)
CHLORIDE SERPL-SCNC: 97 MMOL/L (ref 95–110)
CHLORIDE SERPL-SCNC: 98 MMOL/L (ref 95–110)
CK SERPL-CCNC: 829 U/L (ref 20–180)
CO2 SERPL-SCNC: 21 MMOL/L (ref 23–29)
CO2 SERPL-SCNC: 24 MMOL/L (ref 23–29)
CO2 SERPL-SCNC: 24 MMOL/L (ref 23–29)
CO2 SERPL-SCNC: 25 MMOL/L (ref 23–29)
CO2 SERPL-SCNC: 25 MMOL/L (ref 23–29)
CREAT SERPL-MCNC: 1.9 MG/DL (ref 0.5–1.4)
CREAT SERPL-MCNC: 2.1 MG/DL (ref 0.5–1.4)
CREAT SERPL-MCNC: 2.1 MG/DL (ref 0.5–1.4)
CREAT SERPL-MCNC: 2.2 MG/DL (ref 0.5–1.4)
CREAT SERPL-MCNC: 2.3 MG/DL (ref 0.5–1.4)
DIFFERENTIAL METHOD: ABNORMAL
EOSINOPHIL # BLD AUTO: 0 K/UL (ref 0–0.5)
EOSINOPHIL NFR BLD: 0 % (ref 0–8)
ERYTHROCYTE [DISTWIDTH] IN BLOOD BY AUTOMATED COUNT: 13.9 % (ref 11.5–14.5)
EST. GFR  (AFRICAN AMERICAN): 24 ML/MIN/1.73 M^2
EST. GFR  (AFRICAN AMERICAN): 26 ML/MIN/1.73 M^2
EST. GFR  (AFRICAN AMERICAN): 27 ML/MIN/1.73 M^2
EST. GFR  (AFRICAN AMERICAN): 27 ML/MIN/1.73 M^2
EST. GFR  (AFRICAN AMERICAN): 31 ML/MIN/1.73 M^2
EST. GFR  (NON AFRICAN AMERICAN): 21 ML/MIN/1.73 M^2
EST. GFR  (NON AFRICAN AMERICAN): 22 ML/MIN/1.73 M^2
EST. GFR  (NON AFRICAN AMERICAN): 24 ML/MIN/1.73 M^2
EST. GFR  (NON AFRICAN AMERICAN): 24 ML/MIN/1.73 M^2
EST. GFR  (NON AFRICAN AMERICAN): 27 ML/MIN/1.73 M^2
GLUCOSE SERPL-MCNC: 207 MG/DL (ref 70–110)
GLUCOSE SERPL-MCNC: 219 MG/DL (ref 70–110)
GLUCOSE SERPL-MCNC: 238 MG/DL (ref 70–110)
GLUCOSE SERPL-MCNC: 241 MG/DL (ref 70–110)
GLUCOSE SERPL-MCNC: 265 MG/DL (ref 70–110)
HCT VFR BLD AUTO: 38.6 % (ref 37–48.5)
HGB BLD-MCNC: 13.5 G/DL (ref 12–16)
IMM GRANULOCYTES # BLD AUTO: 0.11 K/UL (ref 0–0.04)
IMM GRANULOCYTES NFR BLD AUTO: 0.5 % (ref 0–0.5)
LYMPHOCYTES # BLD AUTO: 2.1 K/UL (ref 1–4.8)
LYMPHOCYTES NFR BLD: 9.6 % (ref 18–48)
MAGNESIUM SERPL-MCNC: 1.2 MG/DL (ref 1.6–2.6)
MCH RBC QN AUTO: 29.5 PG (ref 27–31)
MCHC RBC AUTO-ENTMCNC: 35 G/DL (ref 32–36)
MCV RBC AUTO: 85 FL (ref 82–98)
MONOCYTES # BLD AUTO: 1 K/UL (ref 0.3–1)
MONOCYTES NFR BLD: 4.6 % (ref 4–15)
NEUTROPHILS # BLD AUTO: 18.2 K/UL (ref 1.8–7.7)
NEUTROPHILS NFR BLD: 85.1 % (ref 38–73)
NRBC BLD-RTO: 0 /100 WBC
PLATELET # BLD AUTO: 253 K/UL (ref 150–350)
PMV BLD AUTO: 12.1 FL (ref 9.2–12.9)
POCT GLUCOSE: 170 MG/DL (ref 70–110)
POCT GLUCOSE: 190 MG/DL (ref 70–110)
POCT GLUCOSE: 192 MG/DL (ref 70–110)
POCT GLUCOSE: 211 MG/DL (ref 70–110)
POCT GLUCOSE: 221 MG/DL (ref 70–110)
POCT GLUCOSE: 226 MG/DL (ref 70–110)
POCT GLUCOSE: 230 MG/DL (ref 70–110)
POCT GLUCOSE: 234 MG/DL (ref 70–110)
POCT GLUCOSE: 243 MG/DL (ref 70–110)
POCT GLUCOSE: 250 MG/DL (ref 70–110)
POCT GLUCOSE: 255 MG/DL (ref 70–110)
POCT GLUCOSE: 267 MG/DL (ref 70–110)
POCT GLUCOSE: 267 MG/DL (ref 70–110)
POTASSIUM SERPL-SCNC: 3 MMOL/L (ref 3.5–5.1)
POTASSIUM SERPL-SCNC: 3.1 MMOL/L (ref 3.5–5.1)
POTASSIUM SERPL-SCNC: 3.3 MMOL/L (ref 3.5–5.1)
POTASSIUM SERPL-SCNC: 3.5 MMOL/L (ref 3.5–5.1)
POTASSIUM SERPL-SCNC: 4 MMOL/L (ref 3.5–5.1)
RBC # BLD AUTO: 4.57 M/UL (ref 4–5.4)
SODIUM SERPL-SCNC: 132 MMOL/L (ref 136–145)
SODIUM SERPL-SCNC: 135 MMOL/L (ref 136–145)
SODIUM SERPL-SCNC: 135 MMOL/L (ref 136–145)
SODIUM SERPL-SCNC: 137 MMOL/L (ref 136–145)
SODIUM SERPL-SCNC: 137 MMOL/L (ref 136–145)
TROPONIN I SERPL DL<=0.01 NG/ML-MCNC: >50 NG/ML (ref 0–0.03)
WBC # BLD AUTO: 21.4 K/UL (ref 3.9–12.7)

## 2020-12-22 PROCEDURE — 82550 ASSAY OF CK (CPK): CPT

## 2020-12-22 PROCEDURE — 93010 ELECTROCARDIOGRAM REPORT: CPT | Mod: 76,,, | Performed by: INTERNAL MEDICINE

## 2020-12-22 PROCEDURE — 85730 THROMBOPLASTIN TIME PARTIAL: CPT | Mod: 91

## 2020-12-22 PROCEDURE — 83735 ASSAY OF MAGNESIUM: CPT

## 2020-12-22 PROCEDURE — 63600175 PHARM REV CODE 636 W HCPCS: Performed by: STUDENT IN AN ORGANIZED HEALTH CARE EDUCATION/TRAINING PROGRAM

## 2020-12-22 PROCEDURE — 93010 EKG 12-LEAD: ICD-10-PCS | Mod: 76,,, | Performed by: INTERNAL MEDICINE

## 2020-12-22 PROCEDURE — 63600175 PHARM REV CODE 636 W HCPCS: Performed by: NURSE PRACTITIONER

## 2020-12-22 PROCEDURE — 63600175 PHARM REV CODE 636 W HCPCS: Performed by: INTERNAL MEDICINE

## 2020-12-22 PROCEDURE — 93005 ELECTROCARDIOGRAM TRACING: CPT

## 2020-12-22 PROCEDURE — 25000003 PHARM REV CODE 250: Performed by: NURSE PRACTITIONER

## 2020-12-22 PROCEDURE — 25000003 PHARM REV CODE 250: Performed by: INTERNAL MEDICINE

## 2020-12-22 PROCEDURE — 99232 SBSQ HOSP IP/OBS MODERATE 35: CPT | Mod: ,,, | Performed by: INTERNAL MEDICINE

## 2020-12-22 PROCEDURE — 25000242 PHARM REV CODE 250 ALT 637 W/ HCPCS: Performed by: NURSE PRACTITIONER

## 2020-12-22 PROCEDURE — 25000003 PHARM REV CODE 250: Performed by: HOSPITALIST

## 2020-12-22 PROCEDURE — 36415 COLL VENOUS BLD VENIPUNCTURE: CPT

## 2020-12-22 PROCEDURE — 20000000 HC ICU ROOM

## 2020-12-22 PROCEDURE — S5010 5% DEXTROSE AND 0.45% SALINE: HCPCS | Performed by: HOSPITALIST

## 2020-12-22 PROCEDURE — 80048 BASIC METABOLIC PNL TOTAL CA: CPT

## 2020-12-22 PROCEDURE — 80048 BASIC METABOLIC PNL TOTAL CA: CPT | Mod: 91

## 2020-12-22 PROCEDURE — 63600175 PHARM REV CODE 636 W HCPCS: Performed by: HOSPITALIST

## 2020-12-22 PROCEDURE — 84484 ASSAY OF TROPONIN QUANT: CPT

## 2020-12-22 PROCEDURE — 99900035 HC TECH TIME PER 15 MIN (STAT)

## 2020-12-22 PROCEDURE — 85730 THROMBOPLASTIN TIME PARTIAL: CPT

## 2020-12-22 PROCEDURE — 99232 PR SUBSEQUENT HOSPITAL CARE,LEVL II: ICD-10-PCS | Mod: ,,, | Performed by: INTERNAL MEDICINE

## 2020-12-22 PROCEDURE — 25000003 PHARM REV CODE 250: Performed by: STUDENT IN AN ORGANIZED HEALTH CARE EDUCATION/TRAINING PROGRAM

## 2020-12-22 PROCEDURE — 94761 N-INVAS EAR/PLS OXIMETRY MLT: CPT

## 2020-12-22 PROCEDURE — C9399 UNCLASSIFIED DRUGS OR BIOLOG: HCPCS | Performed by: STUDENT IN AN ORGANIZED HEALTH CARE EDUCATION/TRAINING PROGRAM

## 2020-12-22 PROCEDURE — 27100171 HC OXYGEN HIGH FLOW UP TO 24 HOURS

## 2020-12-22 PROCEDURE — 85025 COMPLETE CBC W/AUTO DIFF WBC: CPT

## 2020-12-22 RX ORDER — MORPHINE SULFATE 2 MG/ML
2 INJECTION, SOLUTION INTRAMUSCULAR; INTRAVENOUS EVERY 4 HOURS PRN
Status: DISCONTINUED | OUTPATIENT
Start: 2020-12-22 | End: 2020-12-27 | Stop reason: HOSPADM

## 2020-12-22 RX ORDER — FUROSEMIDE 10 MG/ML
80 INJECTION INTRAMUSCULAR; INTRAVENOUS 2 TIMES DAILY
Status: DISCONTINUED | OUTPATIENT
Start: 2020-12-22 | End: 2020-12-23

## 2020-12-22 RX ORDER — INSULIN ASPART 100 [IU]/ML
6 INJECTION, SOLUTION INTRAVENOUS; SUBCUTANEOUS
Status: DISCONTINUED | OUTPATIENT
Start: 2020-12-22 | End: 2020-12-22

## 2020-12-22 RX ORDER — MAGNESIUM SULFATE HEPTAHYDRATE 40 MG/ML
4 INJECTION, SOLUTION INTRAVENOUS ONCE
Status: COMPLETED | OUTPATIENT
Start: 2020-12-22 | End: 2020-12-22

## 2020-12-22 RX ORDER — POTASSIUM CHLORIDE 20 MEQ/1
40 TABLET, EXTENDED RELEASE ORAL ONCE
Status: COMPLETED | OUTPATIENT
Start: 2020-12-22 | End: 2020-12-22

## 2020-12-22 RX ORDER — DEXTROSE MONOHYDRATE AND SODIUM CHLORIDE 5; .45 G/100ML; G/100ML
INJECTION, SOLUTION INTRAVENOUS CONTINUOUS
Status: DISCONTINUED | OUTPATIENT
Start: 2020-12-22 | End: 2020-12-22

## 2020-12-22 RX ORDER — PANTOPRAZOLE SODIUM 40 MG/1
40 TABLET, DELAYED RELEASE ORAL 2 TIMES DAILY
Status: DISCONTINUED | OUTPATIENT
Start: 2020-12-22 | End: 2020-12-27 | Stop reason: HOSPADM

## 2020-12-22 RX ORDER — FUROSEMIDE 10 MG/ML
40 INJECTION INTRAMUSCULAR; INTRAVENOUS 2 TIMES DAILY
Status: DISCONTINUED | OUTPATIENT
Start: 2020-12-22 | End: 2020-12-22

## 2020-12-22 RX ORDER — CARVEDILOL 12.5 MG/1
12.5 TABLET ORAL 2 TIMES DAILY
Status: DISCONTINUED | OUTPATIENT
Start: 2020-12-22 | End: 2020-12-22

## 2020-12-22 RX ORDER — ROSUVASTATIN CALCIUM 10 MG/1
40 TABLET, COATED ORAL NIGHTLY
Status: DISCONTINUED | OUTPATIENT
Start: 2020-12-22 | End: 2020-12-27 | Stop reason: HOSPADM

## 2020-12-22 RX ORDER — FUROSEMIDE 10 MG/ML
40 INJECTION INTRAMUSCULAR; INTRAVENOUS
Status: DISCONTINUED | OUTPATIENT
Start: 2020-12-22 | End: 2020-12-22

## 2020-12-22 RX ORDER — INSULIN ASPART 100 [IU]/ML
8 INJECTION, SOLUTION INTRAVENOUS; SUBCUTANEOUS
Status: DISCONTINUED | OUTPATIENT
Start: 2020-12-23 | End: 2020-12-25

## 2020-12-22 RX ORDER — POTASSIUM CHLORIDE 20 MEQ/1
40 TABLET, EXTENDED RELEASE ORAL
Status: COMPLETED | OUTPATIENT
Start: 2020-12-22 | End: 2020-12-22

## 2020-12-22 RX ADMIN — HEPARIN SODIUM AND DEXTROSE 14 UNITS/KG/HR: 10000; 5 INJECTION INTRAVENOUS at 03:12

## 2020-12-22 RX ADMIN — POTASSIUM CHLORIDE 40 MEQ: 1500 TABLET, EXTENDED RELEASE ORAL at 06:12

## 2020-12-22 RX ADMIN — CLOPIDOGREL 75 MG: 75 TABLET, FILM COATED ORAL at 08:12

## 2020-12-22 RX ADMIN — INSULIN ASPART 6 UNITS: 100 INJECTION, SOLUTION INTRAVENOUS; SUBCUTANEOUS at 11:12

## 2020-12-22 RX ADMIN — POTASSIUM & SODIUM PHOSPHATES POWDER PACK 280-160-250 MG 2 PACKET: 280-160-250 PACK at 01:12

## 2020-12-22 RX ADMIN — MUPIROCIN: 20 OINTMENT TOPICAL at 11:12

## 2020-12-22 RX ADMIN — CARVEDILOL 12.5 MG: 12.5 TABLET, FILM COATED ORAL at 09:12

## 2020-12-22 RX ADMIN — FUROSEMIDE 40 MG: 10 INJECTION, SOLUTION INTRAMUSCULAR; INTRAVENOUS at 09:12

## 2020-12-22 RX ADMIN — POTASSIUM CHLORIDE 40 MEQ: 1500 TABLET, EXTENDED RELEASE ORAL at 09:12

## 2020-12-22 RX ADMIN — PANTOPRAZOLE SODIUM 40 MG: 40 TABLET, DELAYED RELEASE ORAL at 11:12

## 2020-12-22 RX ADMIN — INSULIN DETEMIR 20 UNITS: 100 INJECTION, SOLUTION SUBCUTANEOUS at 09:12

## 2020-12-22 RX ADMIN — SODIUM CHLORIDE, SODIUM LACTATE, POTASSIUM CHLORIDE, AND CALCIUM CHLORIDE 250 ML: .6; .31; .03; .02 INJECTION, SOLUTION INTRAVENOUS at 04:12

## 2020-12-22 RX ADMIN — ASPIRIN 81 MG: 81 TABLET, COATED ORAL at 08:12

## 2020-12-22 RX ADMIN — ONDANSETRON 4 MG: 4 TABLET, ORALLY DISINTEGRATING ORAL at 02:12

## 2020-12-22 RX ADMIN — DEXTROSE AND SODIUM CHLORIDE 50 ML/HR: 5; .45 INJECTION, SOLUTION INTRAVENOUS at 08:12

## 2020-12-22 RX ADMIN — NITROGLYCERIN 0.4 MG: 0.4 TABLET SUBLINGUAL at 03:12

## 2020-12-22 RX ADMIN — POTASSIUM & SODIUM PHOSPHATES POWDER PACK 280-160-250 MG 2 PACKET: 280-160-250 PACK at 04:12

## 2020-12-22 RX ADMIN — MORPHINE SULFATE 2 MG: 2 INJECTION, SOLUTION INTRAMUSCULAR; INTRAVENOUS at 03:12

## 2020-12-22 RX ADMIN — PANTOPRAZOLE SODIUM 40 MG: 40 TABLET, DELAYED RELEASE ORAL at 09:12

## 2020-12-22 RX ADMIN — INSULIN ASPART 1 UNITS: 100 INJECTION, SOLUTION INTRAVENOUS; SUBCUTANEOUS at 11:12

## 2020-12-22 RX ADMIN — ROSUVASTATIN CALCIUM 20 MG: 10 TABLET, FILM COATED ORAL at 08:12

## 2020-12-22 RX ADMIN — CLOPIDOGREL 225 MG: 75 TABLET, FILM COATED ORAL at 08:12

## 2020-12-22 RX ADMIN — FUROSEMIDE 80 MG: 10 INJECTION, SOLUTION INTRAVENOUS at 11:12

## 2020-12-22 RX ADMIN — ONDANSETRON 4 MG: 4 TABLET, ORALLY DISINTEGRATING ORAL at 10:12

## 2020-12-22 RX ADMIN — MUPIROCIN: 20 OINTMENT TOPICAL at 08:12

## 2020-12-22 RX ADMIN — ONDANSETRON 4 MG: 2 INJECTION INTRAMUSCULAR; INTRAVENOUS at 03:12

## 2020-12-22 RX ADMIN — POTASSIUM CHLORIDE 40 MEQ: 1500 TABLET, EXTENDED RELEASE ORAL at 11:12

## 2020-12-22 RX ADMIN — ROSUVASTATIN CALCIUM 40 MG: 10 TABLET, FILM COATED ORAL at 11:12

## 2020-12-22 RX ADMIN — INSULIN DETEMIR 5 UNITS: 100 INJECTION, SOLUTION SUBCUTANEOUS at 11:12

## 2020-12-22 RX ADMIN — MAGNESIUM SULFATE IN WATER 4 G: 40 INJECTION, SOLUTION INTRAVENOUS at 09:12

## 2020-12-22 RX ADMIN — ONDANSETRON 4 MG: 4 TABLET, ORALLY DISINTEGRATING ORAL at 04:12

## 2020-12-22 RX ADMIN — INSULIN ASPART 6 UNITS: 100 INJECTION, SOLUTION INTRAVENOUS; SUBCUTANEOUS at 04:12

## 2020-12-22 RX ADMIN — NICARDIPINE HYDROCHLORIDE 2.5 MG/HR: 0.2 INJECTION, SOLUTION INTRAVENOUS at 06:12

## 2020-12-22 NOTE — NURSING
Patient BG down to 190 this AM on insulin gtt. BP stable on 2.5 of Cardene. Patient is resting in bed. Oriented x4. Moves all extremities and follows commands. See flowsheets for details. All lines and drains are intact and fluids are infusing without difficulty. See MAR for gtts. Continuous nausea present and medicated per MAR x2.  Bed in lowest position. Siderails x3. Will continue to monitor

## 2020-12-22 NOTE — CONSULTS
"[facility] ICU 5th Floor  Critical Care Medicine  Consult Note    Patient Name: Eliza Louie  MRN: [id number]  Admission Date: 12/21/2020  Hospital Length of Stay: 0 days  Code Status: Full Code  Attending Physician: Jenni Alcala MD   Primary Care Provider: Jayden Pérez MD   Principal Problem: Hypertensive urgency    Consults  Subjective:     HPI:  Patient is a 70 year old female with PMHx of CAD s/p 1 stent, HTN, DM who presented to the ED with 1 day history of nausea and vomiting. Patient reports the evening before presentation, she attempted to eat some jello and bread and immediately vomited them back up. Patient then presented to the ED the following morning with continued nausea and vomiting. While in the ED, patient significantly hypertensive to maximum 245/119 and then the patient began to develop rapid onset shortness of breath and was noted to be desat-ing. Patient placed on BiPAP with improvement in shortness of breath and improvement in sats. Labwork at the time was remarkable for a troponin elevated to 38. EKG at the time was largely without changes. Labwork also significant for severely elevated blood glucose, anion gap, positive beta-hydroxybutyrate. Cardiology was consulted in the ED and recommended medical management of the patient at this time.    Repeat EKG some time later significant for ST elevation anteriorly and ST depression inferiorly. Case was discussed again with cardiology who felt there would be questionable benefit and high risk for an invasive intervention and continued to recommend medical management. Patient's BG continued to be elevated>450 despite insulin. CXR showed "Pulmonary opacities consistent with pulmonary infiltrate/airspace disease most notably on the right with suspected infiltrate at the left base as well."    Hospital/ICU Course:  No notes on file    Past Medical History:   Diagnosis Date    Diabetes mellitus     Hypertension  "       History reviewed. No pertinent surgical history.    Review of patient's allergies indicates:   Allergen Reactions    Ace inhibitors Other (See Comments)     Cough       Family History     None        Tobacco Use    Smoking status: Never Smoker    Smokeless tobacco: Never Used   Substance and Sexual Activity    Alcohol use: No    Drug use: No    Sexual activity: Not on file      Review of Systems   Constitutional: Negative for chills, fatigue and fever.   Eyes: Negative for pain, discharge and itching.   Respiratory: Positive for shortness of breath (improving). Negative for cough and wheezing.    Cardiovascular: Positive for chest pain (intermittent).   Gastrointestinal: Positive for abdominal pain, nausea and vomiting. Negative for blood in stool, constipation and diarrhea.   Genitourinary: Negative for dysuria.   Musculoskeletal: Negative for arthralgias, back pain and myalgias.   Skin: Negative for color change and rash.   Neurological: Negative for dizziness, weakness, light-headedness, numbness and headaches.     Objective:     Vital Signs (Most Recent):  Temp: 98.1 °F (36.7 °C) (12/21/20 1630)  Pulse: 89 (12/21/20 1749)  Resp: (!) 30 (12/21/20 1749)  BP: 129/63 (12/21/20 1745)  SpO2: (!) 88 % (12/21/20 1749) Vital Signs (24h Range):  Temp:  [98.1 °F (36.7 °C)-99.2 °F (37.3 °C)] 98.1 °F (36.7 °C)  Pulse:  [] 89  Resp:  [24-46] 30  SpO2:  [87 %-100 %] 88 %  BP: (128-245)/() 129/63   Weight: 85.5 kg (188 lb 7.9 oz)  Body mass index is 33.39 kg/m².      Intake/Output Summary (Last 24 hours) at 12/21/2020 1806  Last data filed at 12/21/2020 1633  Gross per 24 hour   Intake 1099.32 ml   Output 400 ml   Net 699.32 ml       Physical Exam  Constitutional:       General: She is not in acute distress.     Appearance: She is well-developed. She is not diaphoretic.   HENT:      Head: Normocephalic and atraumatic.   Neck:      Musculoskeletal: Normal range of motion and neck supple.      Vascular: No  JVD.   Cardiovascular:      Rate and Rhythm: Normal rate and regular rhythm.      Heart sounds: Normal heart sounds. No murmur.   Pulmonary:      Effort: Pulmonary effort is normal. No respiratory distress.      Breath sounds: Rhonchi (bilateral R worse than L) present.   Abdominal:      General: Bowel sounds are normal. There is no distension.      Palpations: Abdomen is soft. There is no mass.      Tenderness: There is no abdominal tenderness.   Musculoskeletal: Normal range of motion.   Skin:     General: Skin is warm and dry.      Capillary Refill: Capillary refill takes less than 2 seconds.   Neurological:      Mental Status: She is alert and oriented to person, place, and time.      Comments: Fully alert and oriented but slightly somnolent     Significant Labs:    CBC/Anemia Profile:  Recent Labs   Lab 12/21/20 0619   WBC 12.02   HGB 13.5   HCT 39.6      MCV 85   RDW 13.5        Chemistries:  Recent Labs   Lab 12/21/20 0619   *   K 3.1*   CL 95   CO2 22*   BUN 14   CREATININE 1.6*   CALCIUM 8.2*   ALBUMIN 3.6   PROT 7.9   BILITOT 0.8   ALKPHOS 77   ALT 31   *       A1C:   Recent Labs   Lab 12/21/20 0619   HGBA1C 10.2*       Troponin:   Recent Labs   Lab 12/21/20  1225   TROPONINI 38.330*       Significant Imaging:   Imaging Results          X-Ray Chest 1 View (Final result)  Result time 12/21/20 12:54:52    Final result by Shreyas Monge MD (12/21/20 12:54:52)                 Impression:      Pulmonary opacities consistent with pulmonary infiltrate/airspace disease most notably on the right with suspected infiltrate at the left base as well, as discussed above.      Electronically signed by: Shreyas Monge  Date:    12/21/2020  Time:    12:54             Narrative:    EXAMINATION:  XR CHEST 1 VIEW    CLINICAL HISTORY:  Shortness of breath    TECHNIQUE:  Single frontal view of the chest was performed.    COMPARISON:  July 20, 2020    FINDINGS:  Single portable chest view is submitted.   The cardiomediastinal silhouette appears stable.  There is pulmonary opacity associated with the right hemithorax consistent with pulmonary infiltrate/airspace disease, throughout the right hemithorax, with suspected infiltrate at the left base as well.  There is no large pleural effusion and there is no pneumothorax.  There may be minimal pleural fluid at the right costophrenic angle, the possibly of a small amount of pleural fluid at the left base would also be difficult to exclude.  There is no pneumothorax.  The osseous structures demonstrate chronic change.                               X-Ray Abdomen Flat And Erect (Final result)  Result time 12/21/20 07:33:42    Final result by Claus Jarquin DO (12/21/20 07:33:42)                 Impression:      1. Nonobstructive bowel gas pattern.    2. Right upper quadrant calcification corresponding to a stone seen within the cystic duct as seen on prior ultrasound.  3. Probable left nephrolith.      Electronically signed by: Claus Jarquin  Date:    12/21/2020  Time:    07:33             Narrative:    EXAMINATION:  XR ABDOMEN FLAT AND ERECT    CLINICAL HISTORY:  Nausea with vomiting, unspecified.    TECHNIQUE:  Flat and erect AP views of the abdomen were performed.    COMPARISON:  None    FINDINGS:  There is a normal, nonobstructive bowel gas pattern. Free air cannot be excluded on this supine radiograph, although none is seen.  There is a right upper quadrant calcification adjacent to the surgical clips, likely corresponding to the echogenic focus in the cystic duct as seen on prior ultrasound.  There is a calcification overlying the expected locations of the left renal fossa, likely a nephrolith.  The visualized osseous structures are intact.  The visualized lung bases are clear.  There are right upper quadrant surgical clips.                                    Assessment/Plan:     Neuro/Psych  Slightly somnolent; likely 2/2 DKA/HHS but fully alert and oriented when  awake and responding to questions appropriately  Continue to monitor and expect improvement with improvement in metabolic derangements     CV  #Inferoposterior MI  Patient with initial symptoms of nausea/vomiting followed by development of chest pain and rapid onset shortness of breath in the setting of wildly uncontrolled hypertension  Troponin elevated to 38  ST elevation inferior leads, ST depression anterior leads  Cardiology consulted, appreciate recommendations; currently recommending to continue with medical management  Heparin gtt  ASA and plavix load; statin  Trend troponin, EKG    #Hyerptensive Urgency/Emergency  /114 on presentation  Currently on cardene gtt  Titrate cardene to goal systolic about 160  Resume home meds     Pulm  Initially on room air but with rapid development of severe shortness of breath and desaturation necessitating BiPAP  Currently on 5L by NC  Supplemental O2 as needed; transition to high flow as needed  Goal sat >90%     Heme/ID  Afebrile and without leukocytosis on presentation  No signs/symptoms of infection other than abdominal pain and n/v although more likely 2/2 DKA/HHS and CV issues    FEN/GI  NPO in case of possible procedure in AM and DKA/HHS  Not currently having n/v  No other known acute issues at this time     RENAL  #JOHN  BUN/Cr: 14/1.6, baseline 10/1.1  Continue to monitor     Endo  #DKA/HHS  Blood glucose >400 on presentation with trace urine ketones, B-hydroxybutyrate .9, anion gap 18 but not acidotic  Reportedly only on metformin at home but A1c>10  Insulin drip per DKA protocol  Attempt to wean insulin gtt and transition to sq insulin when gap closed and BG under control  Careful with IVF given grade 2 diastolic dysfunction    Dwaine Benítez MD  Critical Care Medicine  [facility] ICU 5th Floor

## 2020-12-22 NOTE — SUBJECTIVE & OBJECTIVE
Interval History/Significant Events: Patient seen and examined this morning. No acute events overnight. Patient reports that she is feeling much improved this morning. Mental status has improved.     Review of Systems   Constitutional: Negative for chills, fatigue and fever.   Respiratory: Negative for cough, shortness of breath and wheezing.    Cardiovascular: Negative for chest pain.   Gastrointestinal: Negative for abdominal pain, constipation, diarrhea, nausea and vomiting.   Musculoskeletal: Negative for arthralgias and myalgias.   Skin: Negative for color change and rash.   Neurological: Negative for light-headedness and headaches.   Psychiatric/Behavioral: Negative for confusion.     Objective:     Vital Signs (Most Recent):  Temp: 98 °F (36.7 °C) (12/22/20 1530)  Pulse: (!) 56 (12/22/20 1745)  Resp: (!) 30 (12/22/20 1745)  BP: (!) 98/59 (12/22/20 1745)  SpO2: 95 % (12/22/20 1745) Vital Signs (24h Range):  Temp:  [97.9 °F (36.6 °C)-99.1 °F (37.3 °C)] 98 °F (36.7 °C)  Pulse:  [] 56  Resp:  [20-45] 30  SpO2:  [89 %-96 %] 95 %  BP: ()/() 98/59   Weight: 84.7 kg (186 lb 11.7 oz)  Body mass index is 33.08 kg/m².      Intake/Output Summary (Last 24 hours) at 12/22/2020 1755  Last data filed at 12/22/2020 1621  Gross per 24 hour   Intake 1408.65 ml   Output 400 ml   Net 1008.65 ml       Physical Exam  Constitutional:       General: She is not in acute distress.     Appearance: She is not ill-appearing.   HENT:      Head: Normocephalic and atraumatic.   Cardiovascular:      Rate and Rhythm: Normal rate and regular rhythm.      Heart sounds: Normal heart sounds. No murmur.   Pulmonary:      Effort: Pulmonary effort is normal.      Breath sounds: Rhonchi (bilateral) present.   Abdominal:      General: Abdomen is flat. There is no distension.      Palpations: Abdomen is soft.      Tenderness: There is no abdominal tenderness.   Musculoskeletal:      Right lower leg: No edema.      Left lower leg: No  edema.   Neurological:      Mental Status: She is alert and oriented to person, place, and time.     Significant Labs:    CBC/Anemia Profile:  Recent Labs   Lab 12/21/20  0619 12/21/20  1732 12/22/20  0522   WBC 12.02 15.46* 21.40*   HGB 13.5 13.6 13.5   HCT 39.6 40.6 38.6    260 253   MCV 85 86 85   RDW 13.5 13.7 13.9        Chemistries:  Recent Labs   Lab 12/21/20  0619  12/22/20  0522 12/22/20  1257 12/22/20  1608   *   < > 137 135* 135*   K 3.1*   < > 3.0* 3.3* 3.5   CL 95   < > 97 96 97   CO2 22*   < > 25 25 24   BUN 14   < > 26* 28* 31*   CREATININE 1.6*   < > 2.1* 2.1* 2.2*   CALCIUM 8.2*   < > 8.2* 7.6* 7.6*   ALBUMIN 3.6  --   --   --   --    PROT 7.9  --   --   --   --    BILITOT 0.8  --   --   --   --    ALKPHOS 77  --   --   --   --    ALT 31  --   --   --   --    *  --   --   --   --    MG  --   --  1.2*  --   --     < > = values in this interval not displayed.     Significant Imaging:  I have reviewed and interpreted all pertinent imaging results/findings within the past 24 hours.

## 2020-12-22 NOTE — PROGRESS NOTES
She feels much better today and wants to eat. No complaints of nausea, chest pains or shortness of breath  VS: blood pressures normal on the low side for her    Lungs: still with fine basilar crackles  Heart: irregular with normal S1,S2, no murmurs     Bedside echo again: unchanged with normal EF and inferoposterior changes  Troponin from yesterday lower  GFR worse around 24; K 3.0  EKG: atrial fibrillation with ST depression better; ST elevation improved and only in Lead III  Meds at this time:  ASA  plavix  Heparin  Carvedilol 12.5 mg BID  Lasix 40 mg BID  crestor 40 mg      Imp: new atrial fibrillation; s/p acute MI with new RBBB    Recs: continue with medical therapy  She is on heparin now and after tomorrow, transition to eliquis for the atrial fibrillation along with the plavix and drop the ASA after 1 month  2. Consider renal consult  3. Her lungs are still wet and I would continue the lasix at a higher dose for one day  4. Would watch today and move to floor later today or tomorrow  5. At this time, would not consider any intervention at all. Will need a stress test as outpatient later to assess the LAD which was normal in her previous cath.

## 2020-12-22 NOTE — CONSULTS
[facility] ICU 5th Floor  Cardiology  Consult Note    Patient Name: Eliza Louie  MRN: [id number]  Admission Date: 12/21/2020  Hospital Length of Stay: 1 days  Code Status: Full Code   Attending Provider: Mnany Gatica, *   Consulting Provider: KISHA Nation, ANP  Primary Care Physician: Jayden Pérez MD  Principal Problem:Hypertensive urgency      [facility] consult [facility]  Consult performed by: KISHA Bass, ANP  Consult ordered by: Nicole Peace NP        Consult received on 12/21/2020 and patient briefly seen. Reports followed regularly by Dr. Hartman in the clinic. Care turned over to [facility]. Further recommendations and management per [facility]

## 2020-12-22 NOTE — PLAN OF CARE
Ms. Louie remains in ICU on 8L HFNC to maintain 02 sats >88%. She also remains on a Cardene gtt to maintain SBP <160. She denies any chest pain or SOB. Last EKG done showed changes and was reported to Dr. Thompson. She is currently on a titratable Heparin gtt. Next aPTT scheduled for 0030. BG have been in the 400s. Titratable insulin gtt started. She is NPO except meds. Urine output adequate via purewick. She is able to turn herself in bed. POC discussed with her. Bed alarm on. Call bell within reach. Report given to MADDIE Louise RN to assume care.

## 2020-12-22 NOTE — PROGRESS NOTES
"[facility] ICU 5th Floor  Critical Care Medicine  Progress Note    Patient Name: Eliza Louie  MRN: [id number]  Admission Date: 12/21/2020  Hospital Length of Stay: 1 days  Code Status: Full Code  Attending Provider: Manny Gatica, *  Primary Care Provider: Jayden Pérez MD   Principal Problem: Hypertensive urgency    Subjective:     HPI:  Patient is a 70 year old female with PMHx of CAD s/p 1 stent, HTN, DM who presented to the ED with 1 day history of nausea and vomiting. Patient reports the evening before presentation, she attempted to eat some jello and bread and immediately vomited them back up. Patient then presented to the ED the following morning with continued nausea and vomiting. While in the ED, patient significantly hypertensive to maximum 245/119 and then the patient began to develop rapid onset shortness of breath and was noted to be desat-ing. Patient placed on BiPAP with improvement in shortness of breath and improvement in sats. Labwork at the time was remarkable for a troponin elevated to 38. EKG at the time was largely without changes. Labwork also significant for severely elevated blood glucose, anion gap, positive beta-hydroxybutyrate. Cardiology was consulted in the ED and recommended medical management of the patient at this time.    Repeat EKG some time later significant for ST elevation anteriorly and ST depression inferiorly. Case was discussed again with cardiology who felt there would be questionable benefit and high risk for an invasive intervention and continued to recommend medical management. Patient's BG continued to be elevated>450 despite insulin. CXR showed "Pulmonary opacities consistent with pulmonary infiltrate/airspace disease most notably on the right with suspected infiltrate at the left base as well."    Hospital/ICU Course:  No notes on file    Interval History/Significant Events: Patient seen and examined this morning. No acute events " overnight. Patient reports that she is feeling much improved this morning. Mental status has improved.     Review of Systems   Constitutional: Negative for chills, fatigue and fever.   Respiratory: Negative for cough, shortness of breath and wheezing.    Cardiovascular: Negative for chest pain.   Gastrointestinal: Negative for abdominal pain, constipation, diarrhea, nausea and vomiting.   Musculoskeletal: Negative for arthralgias and myalgias.   Skin: Negative for color change and rash.   Neurological: Negative for light-headedness and headaches.   Psychiatric/Behavioral: Negative for confusion.     Objective:     Vital Signs (Most Recent):  Temp: 98 °F (36.7 °C) (12/22/20 1530)  Pulse: (!) 56 (12/22/20 1745)  Resp: (!) 30 (12/22/20 1745)  BP: (!) 98/59 (12/22/20 1745)  SpO2: 95 % (12/22/20 1745) Vital Signs (24h Range):  Temp:  [97.9 °F (36.6 °C)-99.1 °F (37.3 °C)] 98 °F (36.7 °C)  Pulse:  [] 56  Resp:  [20-45] 30  SpO2:  [89 %-96 %] 95 %  BP: ()/() 98/59   Weight: 84.7 kg (186 lb 11.7 oz)  Body mass index is 33.08 kg/m².      Intake/Output Summary (Last 24 hours) at 12/22/2020 1755  Last data filed at 12/22/2020 1621  Gross per 24 hour   Intake 1408.65 ml   Output 400 ml   Net 1008.65 ml       Physical Exam  Constitutional:       General: She is not in acute distress.     Appearance: She is not ill-appearing.   HENT:      Head: Normocephalic and atraumatic.   Cardiovascular:      Rate and Rhythm: Normal rate and regular rhythm.      Heart sounds: Normal heart sounds. No murmur.   Pulmonary:      Effort: Pulmonary effort is normal.      Breath sounds: Rhonchi (bilateral) present.   Abdominal:      General: Abdomen is flat. There is no distension.      Palpations: Abdomen is soft.      Tenderness: There is no abdominal tenderness.   Musculoskeletal:      Right lower leg: No edema.      Left lower leg: No edema.   Neurological:      Mental Status: She is alert and oriented to person, place, and  time.     Significant Labs:    CBC/Anemia Profile:  Recent Labs   Lab 12/21/20  0619 12/21/20  1732 12/22/20  0522   WBC 12.02 15.46* 21.40*   HGB 13.5 13.6 13.5   HCT 39.6 40.6 38.6    260 253   MCV 85 86 85   RDW 13.5 13.7 13.9        Chemistries:  Recent Labs   Lab 12/21/20  0619  12/22/20  0522 12/22/20  1257 12/22/20  1608   *   < > 137 135* 135*   K 3.1*   < > 3.0* 3.3* 3.5   CL 95   < > 97 96 97   CO2 22*   < > 25 25 24   BUN 14   < > 26* 28* 31*   CREATININE 1.6*   < > 2.1* 2.1* 2.2*   CALCIUM 8.2*   < > 8.2* 7.6* 7.6*   ALBUMIN 3.6  --   --   --   --    PROT 7.9  --   --   --   --    BILITOT 0.8  --   --   --   --    ALKPHOS 77  --   --   --   --    ALT 31  --   --   --   --    *  --   --   --   --    MG  --   --  1.2*  --   --     < > = values in this interval not displayed.     Significant Imaging:  I have reviewed and interpreted all pertinent imaging results/findings within the past 24 hours.      ABG  Recent Labs   Lab 12/21/20  0755   PH 7.358   PO2 38*   PCO2 42.0   HCO3 23.7*   BE -2     Assessment/Plan:     Neuro/Psych  Mental status much improved this AM. Continue to monitor.     CV  #Inferoposterior MI  Patient with initial symptoms of nausea/vomiting followed by development of chest pain and rapid onset shortness of breath in the setting of wildly uncontrolled hypertension  Now without any chest pain  Troponin elevated to 38 then downtrending but now uptrending again to >50  ST elevation inferior leads, ST depression anterior leads  Cardiology consulted, appreciate recommendations; currently recommending to continue with medical management and to continue to monitor the patient  Heparin gtt  ASA, plavix, statin  Trend troponin     #HfPEF  Patient fluid overloaded on exam; significant ronchi on exam  Edema on CXR  Currently net +1.5L  Currently on Lasix 80mg IV BID  Continue to monitor UOP on current dose of lasix and titrate as needed    #Hyerptensive  Urgency/Emergency  Initially with significant HTN controlled on Cardene drip  Stopped Cardene and resumed 1/2 of home coreg but patient became slightly hypotensive  Will hold coreg and monitor to see what happens with BP; consider resuming Coreg at 6.25     Pulm  Fluctuating O2 requirements between 5-8L HFNC  Appears comfortable, denies shortness of breath  Supplemental O2 as needed  Goal sat >90%     Heme/ID  Afebrile and without leukocytosis on presentation  No signs/symptoms of infection other than abdominal pain and n/v although more likely 2/2 DKA/HHS and CV issues     FEN/GI  Diabetic diet  Not currently having n/v  No other known acute issues at this time     RENAL  #JOHN  BUN/Cr: 31/2.2, baseline 10/1.1  Likely 2/2 lack of intravascular volume and/or ischemia from current MI  Continue to monitor     Endo  #T2DM  Blood glucose >400 on presentation with trace urine ketones, B-hydroxybutyrate .9, anion gap 18 but not acidotic  Reportedly only on metformin at home but A1c>10  Gap closed and has remained closed  Increased basal insulin to 24u detemir daily and bolus insulin to 8u aspart TID  Consider splitting detemir if continues to be above goal  MSSI  Goal -180        Dwaine Benítez MD  Critical Care Medicine  [facility] ICU 5th Floor

## 2020-12-22 NOTE — PROGRESS NOTES
12/22/20 1530   Vital Signs   Pulse 71   Resp (!) 22   SpO2 (!) 93 %   BP (!) 100/51   MAP (mmHg) 72     Pt c/o sharp chest pain 10/10. Not radiating. Denies SOB. PRN sublingual nitro given.

## 2020-12-22 NOTE — PLAN OF CARE
Patient on oxygen with documented flow.  Will attempt to wean per O2 order protocol. Will continue with current plan of care and update as needed.

## 2020-12-22 NOTE — SUBJECTIVE & OBJECTIVE
Past Medical History:   Diagnosis Date    Diabetes mellitus     Hypertension        History reviewed. No pertinent surgical history.    Review of patient's allergies indicates:   Allergen Reactions    Ace inhibitors Other (See Comments)     Cough       Family History     None        Tobacco Use    Smoking status: Never Smoker    Smokeless tobacco: Never Used   Substance and Sexual Activity    Alcohol use: No    Drug use: No    Sexual activity: Not on file      Review of Systems   Constitutional: Negative for chills, fatigue and fever.   Eyes: Negative for pain, discharge and itching.   Respiratory: Positive for shortness of breath (improving). Negative for cough and wheezing.    Cardiovascular: Positive for chest pain (intermittent).   Gastrointestinal: Positive for abdominal pain, nausea and vomiting. Negative for blood in stool, constipation and diarrhea.   Genitourinary: Negative for dysuria.   Musculoskeletal: Negative for arthralgias, back pain and myalgias.   Skin: Negative for color change and rash.   Neurological: Negative for dizziness, weakness, light-headedness, numbness and headaches.     Objective:     Vital Signs (Most Recent):  Temp: 98.1 °F (36.7 °C) (12/21/20 1630)  Pulse: 89 (12/21/20 1749)  Resp: (!) 30 (12/21/20 1749)  BP: 129/63 (12/21/20 1745)  SpO2: (!) 88 % (12/21/20 1749) Vital Signs (24h Range):  Temp:  [98.1 °F (36.7 °C)-99.2 °F (37.3 °C)] 98.1 °F (36.7 °C)  Pulse:  [] 89  Resp:  [24-46] 30  SpO2:  [87 %-100 %] 88 %  BP: (128-245)/() 129/63   Weight: 85.5 kg (188 lb 7.9 oz)  Body mass index is 33.39 kg/m².      Intake/Output Summary (Last 24 hours) at 12/21/2020 1806  Last data filed at 12/21/2020 1633  Gross per 24 hour   Intake 1099.32 ml   Output 400 ml   Net 699.32 ml       Physical Exam  Constitutional:       General: She is not in acute distress.     Appearance: She is well-developed. She is not diaphoretic.   HENT:      Head: Normocephalic and atraumatic.   Neck:       Musculoskeletal: Normal range of motion and neck supple.      Vascular: No JVD.   Cardiovascular:      Rate and Rhythm: Normal rate and regular rhythm.      Heart sounds: Normal heart sounds. No murmur.   Pulmonary:      Effort: Pulmonary effort is normal. No respiratory distress.      Breath sounds: Rhonchi (bilateral R worse than L) present.   Abdominal:      General: Bowel sounds are normal. There is no distension.      Palpations: Abdomen is soft. There is no mass.      Tenderness: There is no abdominal tenderness.   Musculoskeletal: Normal range of motion.   Skin:     General: Skin is warm and dry.      Capillary Refill: Capillary refill takes less than 2 seconds.   Neurological:      Mental Status: She is alert and oriented to person, place, and time.      Comments: Fully alert and oriented but slightly somnolent     Significant Labs:    CBC/Anemia Profile:  Recent Labs   Lab 12/21/20 0619   WBC 12.02   HGB 13.5   HCT 39.6      MCV 85   RDW 13.5        Chemistries:  Recent Labs   Lab 12/21/20 0619   *   K 3.1*   CL 95   CO2 22*   BUN 14   CREATININE 1.6*   CALCIUM 8.2*   ALBUMIN 3.6   PROT 7.9   BILITOT 0.8   ALKPHOS 77   ALT 31   *       A1C:   Recent Labs   Lab 12/21/20 0619   HGBA1C 10.2*       Troponin:   Recent Labs   Lab 12/21/20  1225   TROPONINI 38.330*       Significant Imaging:   Imaging Results          X-Ray Chest 1 View (Final result)  Result time 12/21/20 12:54:52    Final result by Shreyas Monge MD (12/21/20 12:54:52)                 Impression:      Pulmonary opacities consistent with pulmonary infiltrate/airspace disease most notably on the right with suspected infiltrate at the left base as well, as discussed above.      Electronically signed by: Shreyas Monge  Date:    12/21/2020  Time:    12:54             Narrative:    EXAMINATION:  XR CHEST 1 VIEW    CLINICAL HISTORY:  Shortness of breath    TECHNIQUE:  Single frontal view of the chest was  performed.    COMPARISON:  July 20, 2020    FINDINGS:  Single portable chest view is submitted.  The cardiomediastinal silhouette appears stable.  There is pulmonary opacity associated with the right hemithorax consistent with pulmonary infiltrate/airspace disease, throughout the right hemithorax, with suspected infiltrate at the left base as well.  There is no large pleural effusion and there is no pneumothorax.  There may be minimal pleural fluid at the right costophrenic angle, the possibly of a small amount of pleural fluid at the left base would also be difficult to exclude.  There is no pneumothorax.  The osseous structures demonstrate chronic change.                               X-Ray Abdomen Flat And Erect (Final result)  Result time 12/21/20 07:33:42    Final result by Claus Jarquin DO (12/21/20 07:33:42)                 Impression:      1. Nonobstructive bowel gas pattern.    2. Right upper quadrant calcification corresponding to a stone seen within the cystic duct as seen on prior ultrasound.  3. Probable left nephrolith.      Electronically signed by: Claus Jarquin  Date:    12/21/2020  Time:    07:33             Narrative:    EXAMINATION:  XR ABDOMEN FLAT AND ERECT    CLINICAL HISTORY:  Nausea with vomiting, unspecified.    TECHNIQUE:  Flat and erect AP views of the abdomen were performed.    COMPARISON:  None    FINDINGS:  There is a normal, nonobstructive bowel gas pattern. Free air cannot be excluded on this supine radiograph, although none is seen.  There is a right upper quadrant calcification adjacent to the surgical clips, likely corresponding to the echogenic focus in the cystic duct as seen on prior ultrasound.  There is a calcification overlying the expected locations of the left renal fossa, likely a nephrolith.  The visualized osseous structures are intact.  The visualized lung bases are clear.  There are right upper quadrant surgical clips.

## 2020-12-22 NOTE — PROGRESS NOTES
Called regarding troponiin. Her new EKG from 12 pm does not show anything changing. Would hold tight and see where she goes. Still concerned that she has not diuresed well.

## 2020-12-23 PROBLEM — I16.1 HYPERTENSIVE EMERGENCY: Status: RESOLVED | Noted: 2020-12-21 | Resolved: 2020-12-23

## 2020-12-23 PROBLEM — E83.42 HYPOMAGNESEMIA: Status: RESOLVED | Noted: 2020-12-22 | Resolved: 2020-12-23

## 2020-12-23 PROBLEM — J18.9 CAP (COMMUNITY ACQUIRED PNEUMONIA): Status: ACTIVE | Noted: 2020-12-23

## 2020-12-23 LAB
ALLENS TEST: ABNORMAL
ANION GAP SERPL CALC-SCNC: 13 MMOL/L (ref 8–16)
ANION GAP SERPL CALC-SCNC: 16 MMOL/L (ref 8–16)
APTT BLDCRRT: 41.9 SEC (ref 21–32)
BACTERIA #/AREA URNS HPF: ABNORMAL /HPF
BASOPHILS # BLD AUTO: 0.03 K/UL (ref 0–0.2)
BASOPHILS NFR BLD: 0.2 % (ref 0–1.9)
BILIRUB UR QL STRIP: NEGATIVE
BUN SERPL-MCNC: 36 MG/DL (ref 8–23)
BUN SERPL-MCNC: 37 MG/DL (ref 8–23)
CALCIUM SERPL-MCNC: 7.7 MG/DL (ref 8.7–10.5)
CALCIUM SERPL-MCNC: 7.7 MG/DL (ref 8.7–10.5)
CHLORIDE SERPL-SCNC: 95 MMOL/L (ref 95–110)
CHLORIDE SERPL-SCNC: 96 MMOL/L (ref 95–110)
CK SERPL-CCNC: 617 U/L (ref 20–180)
CLARITY UR: ABNORMAL
CO2 SERPL-SCNC: 23 MMOL/L (ref 23–29)
CO2 SERPL-SCNC: 26 MMOL/L (ref 23–29)
COLOR UR: ABNORMAL
CREAT SERPL-MCNC: 2.4 MG/DL (ref 0.5–1.4)
CREAT SERPL-MCNC: 2.5 MG/DL (ref 0.5–1.4)
DELSYS: ABNORMAL
DIFFERENTIAL METHOD: ABNORMAL
EOSINOPHIL # BLD AUTO: 0 K/UL (ref 0–0.5)
EOSINOPHIL NFR BLD: 0.2 % (ref 0–8)
ERYTHROCYTE [DISTWIDTH] IN BLOOD BY AUTOMATED COUNT: 14.3 % (ref 11.5–14.5)
EST. GFR  (AFRICAN AMERICAN): 22 ML/MIN/1.73 M^2
EST. GFR  (AFRICAN AMERICAN): 23 ML/MIN/1.73 M^2
EST. GFR  (NON AFRICAN AMERICAN): 19 ML/MIN/1.73 M^2
EST. GFR  (NON AFRICAN AMERICAN): 20 ML/MIN/1.73 M^2
GLUCOSE SERPL-MCNC: 169 MG/DL (ref 70–110)
GLUCOSE SERPL-MCNC: 171 MG/DL (ref 70–110)
GLUCOSE UR QL STRIP: NEGATIVE
HCO3 UR-SCNC: 25.4 MMOL/L (ref 24–28)
HCT VFR BLD AUTO: 37 % (ref 37–48.5)
HCT VFR BLD CALC: 41 %PCV (ref 36–54)
HGB BLD-MCNC: 12.4 G/DL (ref 12–16)
HGB BLD-MCNC: 14 G/DL
HGB UR QL STRIP: ABNORMAL
HYALINE CASTS #/AREA URNS LPF: ABNORMAL /LPF
IMM GRANULOCYTES # BLD AUTO: 0.11 K/UL (ref 0–0.04)
IMM GRANULOCYTES NFR BLD AUTO: 0.6 % (ref 0–0.5)
KETONES UR QL STRIP: NEGATIVE
LEUKOCYTE ESTERASE UR QL STRIP: NEGATIVE
LYMPHOCYTES # BLD AUTO: 3 K/UL (ref 1–4.8)
LYMPHOCYTES NFR BLD: 15.4 % (ref 18–48)
MAGNESIUM SERPL-MCNC: 2.4 MG/DL (ref 1.6–2.6)
MCH RBC QN AUTO: 29.1 PG (ref 27–31)
MCHC RBC AUTO-ENTMCNC: 33.5 G/DL (ref 32–36)
MCV RBC AUTO: 87 FL (ref 82–98)
MICROSCOPIC COMMENT: ABNORMAL
MONOCYTES # BLD AUTO: 1.1 K/UL (ref 0.3–1)
MONOCYTES NFR BLD: 5.9 % (ref 4–15)
NEUTROPHILS # BLD AUTO: 14.9 K/UL (ref 1.8–7.7)
NEUTROPHILS NFR BLD: 77.7 % (ref 38–73)
NITRITE UR QL STRIP: NEGATIVE
NRBC BLD-RTO: 0 /100 WBC
PCO2 BLDA: 36.7 MMHG (ref 35–45)
PH SMN: 7.45 [PH] (ref 7.35–7.45)
PH UR STRIP: 5 [PH] (ref 5–8)
PLATELET # BLD AUTO: 202 K/UL (ref 150–350)
PMV BLD AUTO: 12.3 FL (ref 9.2–12.9)
PO2 BLDA: 77 MMHG (ref 80–100)
POC BE: 1 MMOL/L
POC SATURATED O2: 96 % (ref 95–100)
POC TCO2: 27 MMOL/L (ref 23–27)
POCT GLUCOSE: 141 MG/DL (ref 70–110)
POCT GLUCOSE: 146 MG/DL (ref 70–110)
POCT GLUCOSE: 193 MG/DL (ref 70–110)
POCT GLUCOSE: 208 MG/DL (ref 70–110)
POTASSIUM BLD-SCNC: 2.8 MMOL/L (ref 3.5–5.1)
POTASSIUM SERPL-SCNC: 3.9 MMOL/L (ref 3.5–5.1)
POTASSIUM SERPL-SCNC: 4.5 MMOL/L (ref 3.5–5.1)
PROT UR QL STRIP: ABNORMAL
RBC # BLD AUTO: 4.26 M/UL (ref 4–5.4)
RBC #/AREA URNS HPF: >100 /HPF (ref 0–4)
SAMPLE: ABNORMAL
SARS-COV-2 RDRP RESP QL NAA+PROBE: NEGATIVE
SITE: ABNORMAL
SODIUM BLD-SCNC: 127 MMOL/L (ref 136–145)
SODIUM SERPL-SCNC: 134 MMOL/L (ref 136–145)
SODIUM SERPL-SCNC: 135 MMOL/L (ref 136–145)
SP GR UR STRIP: 1.02 (ref 1–1.03)
URATE SERPL-MCNC: 13.8 MG/DL (ref 2.4–5.7)
URN SPEC COLLECT METH UR: ABNORMAL
UROBILINOGEN UR STRIP-ACNC: NEGATIVE EU/DL
WBC # BLD AUTO: 19.2 K/UL (ref 3.9–12.7)
WBC #/AREA URNS HPF: ABNORMAL /HPF (ref 0–5)

## 2020-12-23 PROCEDURE — 63600175 PHARM REV CODE 636 W HCPCS: Performed by: INTERNAL MEDICINE

## 2020-12-23 PROCEDURE — 25000003 PHARM REV CODE 250: Performed by: STUDENT IN AN ORGANIZED HEALTH CARE EDUCATION/TRAINING PROGRAM

## 2020-12-23 PROCEDURE — 63600175 PHARM REV CODE 636 W HCPCS: Performed by: STUDENT IN AN ORGANIZED HEALTH CARE EDUCATION/TRAINING PROGRAM

## 2020-12-23 PROCEDURE — 20000000 HC ICU ROOM

## 2020-12-23 PROCEDURE — 36600 WITHDRAWAL OF ARTERIAL BLOOD: CPT

## 2020-12-23 PROCEDURE — 25000003 PHARM REV CODE 250: Performed by: HOSPITALIST

## 2020-12-23 PROCEDURE — 83735 ASSAY OF MAGNESIUM: CPT

## 2020-12-23 PROCEDURE — U0002 COVID-19 LAB TEST NON-CDC: HCPCS

## 2020-12-23 PROCEDURE — 80048 BASIC METABOLIC PNL TOTAL CA: CPT

## 2020-12-23 PROCEDURE — C9399 UNCLASSIFIED DRUGS OR BIOLOG: HCPCS | Performed by: STUDENT IN AN ORGANIZED HEALTH CARE EDUCATION/TRAINING PROGRAM

## 2020-12-23 PROCEDURE — 63600175 PHARM REV CODE 636 W HCPCS: Performed by: NURSE PRACTITIONER

## 2020-12-23 PROCEDURE — 85730 THROMBOPLASTIN TIME PARTIAL: CPT

## 2020-12-23 PROCEDURE — 36415 COLL VENOUS BLD VENIPUNCTURE: CPT

## 2020-12-23 PROCEDURE — 25000003 PHARM REV CODE 250: Performed by: NURSE PRACTITIONER

## 2020-12-23 PROCEDURE — 99232 PR SUBSEQUENT HOSPITAL CARE,LEVL II: ICD-10-PCS | Mod: ,,, | Performed by: INTERNAL MEDICINE

## 2020-12-23 PROCEDURE — 99291 PR CRITICAL CARE, E/M 30-74 MINUTES: ICD-10-PCS | Mod: GC,,, | Performed by: INTERNAL MEDICINE

## 2020-12-23 PROCEDURE — 81000 URINALYSIS NONAUTO W/SCOPE: CPT

## 2020-12-23 PROCEDURE — 99232 SBSQ HOSP IP/OBS MODERATE 35: CPT | Mod: ,,, | Performed by: INTERNAL MEDICINE

## 2020-12-23 PROCEDURE — 25000003 PHARM REV CODE 250: Performed by: INTERNAL MEDICINE

## 2020-12-23 PROCEDURE — 99900035 HC TECH TIME PER 15 MIN (STAT)

## 2020-12-23 PROCEDURE — 94761 N-INVAS EAR/PLS OXIMETRY MLT: CPT

## 2020-12-23 PROCEDURE — 25000242 PHARM REV CODE 250 ALT 637 W/ HCPCS: Performed by: STUDENT IN AN ORGANIZED HEALTH CARE EDUCATION/TRAINING PROGRAM

## 2020-12-23 PROCEDURE — 80048 BASIC METABOLIC PNL TOTAL CA: CPT | Mod: 91

## 2020-12-23 PROCEDURE — 82550 ASSAY OF CK (CPK): CPT

## 2020-12-23 PROCEDURE — 51702 INSERT TEMP BLADDER CATH: CPT

## 2020-12-23 PROCEDURE — 99291 CRITICAL CARE FIRST HOUR: CPT | Mod: GC,,, | Performed by: INTERNAL MEDICINE

## 2020-12-23 PROCEDURE — 84550 ASSAY OF BLOOD/URIC ACID: CPT

## 2020-12-23 PROCEDURE — 94640 AIRWAY INHALATION TREATMENT: CPT

## 2020-12-23 PROCEDURE — 27000221 HC OXYGEN, UP TO 24 HOURS

## 2020-12-23 PROCEDURE — 94660 CPAP INITIATION&MGMT: CPT

## 2020-12-23 PROCEDURE — 27000190 HC CPAP FULL FACE MASK W/VALVE

## 2020-12-23 PROCEDURE — 85025 COMPLETE CBC W/AUTO DIFF WBC: CPT

## 2020-12-23 PROCEDURE — 82803 BLOOD GASES ANY COMBINATION: CPT

## 2020-12-23 PROCEDURE — 63600175 PHARM REV CODE 636 W HCPCS: Performed by: HOSPITALIST

## 2020-12-23 RX ORDER — FUROSEMIDE 10 MG/ML
80 INJECTION INTRAMUSCULAR; INTRAVENOUS DAILY
Status: DISCONTINUED | OUTPATIENT
Start: 2020-12-23 | End: 2020-12-23

## 2020-12-23 RX ORDER — INSULIN ASPART 100 [IU]/ML
0-5 INJECTION, SOLUTION INTRAVENOUS; SUBCUTANEOUS
Status: DISCONTINUED | OUTPATIENT
Start: 2020-12-23 | End: 2020-12-27 | Stop reason: HOSPADM

## 2020-12-23 RX ORDER — ALLOPURINOL 100 MG/1
100 TABLET ORAL DAILY
Status: DISCONTINUED | OUTPATIENT
Start: 2020-12-24 | End: 2020-12-27 | Stop reason: HOSPADM

## 2020-12-23 RX ORDER — DOXYCYCLINE HYCLATE 100 MG
100 TABLET ORAL EVERY 12 HOURS
Status: DISCONTINUED | OUTPATIENT
Start: 2020-12-23 | End: 2020-12-27 | Stop reason: HOSPADM

## 2020-12-23 RX ORDER — IBUPROFEN 200 MG
24 TABLET ORAL
Status: DISCONTINUED | OUTPATIENT
Start: 2020-12-23 | End: 2020-12-27 | Stop reason: HOSPADM

## 2020-12-23 RX ORDER — NITROGLYCERIN 0.4 MG/1
0.4 TABLET SUBLINGUAL EVERY 5 MIN PRN
Status: DISCONTINUED | OUTPATIENT
Start: 2020-12-23 | End: 2020-12-27 | Stop reason: HOSPADM

## 2020-12-23 RX ORDER — IPRATROPIUM BROMIDE AND ALBUTEROL SULFATE 2.5; .5 MG/3ML; MG/3ML
3 SOLUTION RESPIRATORY (INHALATION)
Status: DISCONTINUED | OUTPATIENT
Start: 2020-12-23 | End: 2020-12-27 | Stop reason: HOSPADM

## 2020-12-23 RX ORDER — GLUCAGON 1 MG
1 KIT INJECTION
Status: DISCONTINUED | OUTPATIENT
Start: 2020-12-23 | End: 2020-12-27 | Stop reason: HOSPADM

## 2020-12-23 RX ORDER — FUROSEMIDE 10 MG/ML
80 INJECTION INTRAMUSCULAR; INTRAVENOUS ONCE
Status: COMPLETED | OUTPATIENT
Start: 2020-12-23 | End: 2020-12-23

## 2020-12-23 RX ORDER — ONDANSETRON 2 MG/ML
8 INJECTION INTRAMUSCULAR; INTRAVENOUS EVERY 6 HOURS PRN
Status: DISCONTINUED | OUTPATIENT
Start: 2020-12-23 | End: 2020-12-27 | Stop reason: HOSPADM

## 2020-12-23 RX ORDER — IBUPROFEN 200 MG
16 TABLET ORAL
Status: DISCONTINUED | OUTPATIENT
Start: 2020-12-23 | End: 2020-12-27 | Stop reason: HOSPADM

## 2020-12-23 RX ADMIN — ONDANSETRON 8 MG: 2 INJECTION INTRAMUSCULAR; INTRAVENOUS at 09:12

## 2020-12-23 RX ADMIN — IPRATROPIUM BROMIDE AND ALBUTEROL SULFATE 3 ML: .5; 3 SOLUTION RESPIRATORY (INHALATION) at 01:12

## 2020-12-23 RX ADMIN — INSULIN ASPART 8 UNITS: 100 INJECTION, SOLUTION INTRAVENOUS; SUBCUTANEOUS at 11:12

## 2020-12-23 RX ADMIN — MUPIROCIN: 20 OINTMENT TOPICAL at 08:12

## 2020-12-23 RX ADMIN — DOXYCYCLINE HYCLATE 100 MG: 100 TABLET, COATED ORAL at 11:12

## 2020-12-23 RX ADMIN — ONDANSETRON 8 MG: 2 INJECTION INTRAMUSCULAR; INTRAVENOUS at 03:12

## 2020-12-23 RX ADMIN — CEFTRIAXONE SODIUM 1 G: 1 INJECTION, POWDER, FOR SOLUTION INTRAMUSCULAR; INTRAVENOUS at 10:12

## 2020-12-23 RX ADMIN — NITROGLYCERIN 0.4 MG: 0.4 TABLET SUBLINGUAL at 02:12

## 2020-12-23 RX ADMIN — ONDANSETRON 4 MG: 4 TABLET, ORALLY DISINTEGRATING ORAL at 11:12

## 2020-12-23 RX ADMIN — PROMETHAZINE HYDROCHLORIDE 12.5 MG: 25 INJECTION INTRAMUSCULAR; INTRAVENOUS at 04:12

## 2020-12-23 RX ADMIN — HEPARIN SODIUM AND DEXTROSE 14 UNITS/KG/HR: 10000; 5 INJECTION INTRAVENOUS at 07:12

## 2020-12-23 RX ADMIN — FUROSEMIDE 15 MG/HR: 10 INJECTION, SOLUTION INTRAMUSCULAR; INTRAVENOUS at 10:12

## 2020-12-23 RX ADMIN — NITROGLYCERIN 0.4 MG: 0.4 TABLET SUBLINGUAL at 04:12

## 2020-12-23 RX ADMIN — DOXYCYCLINE HYCLATE 100 MG: 100 TABLET, COATED ORAL at 08:12

## 2020-12-23 RX ADMIN — FUROSEMIDE 80 MG: 10 INJECTION, SOLUTION INTRAVENOUS at 10:12

## 2020-12-23 RX ADMIN — IPRATROPIUM BROMIDE AND ALBUTEROL SULFATE 3 ML: .5; 3 SOLUTION RESPIRATORY (INHALATION) at 07:12

## 2020-12-23 RX ADMIN — ASPIRIN 81 MG: 81 TABLET, COATED ORAL at 08:12

## 2020-12-23 RX ADMIN — INSULIN ASPART 8 UNITS: 100 INJECTION, SOLUTION INTRAVENOUS; SUBCUTANEOUS at 08:12

## 2020-12-23 RX ADMIN — NITROGLYCERIN 0.4 MG: 0.4 TABLET SUBLINGUAL at 10:12

## 2020-12-23 RX ADMIN — LIDOCAINE HYDROCHLORIDE: 20 SOLUTION ORAL; TOPICAL at 01:12

## 2020-12-23 RX ADMIN — PANTOPRAZOLE SODIUM 40 MG: 40 TABLET, DELAYED RELEASE ORAL at 08:12

## 2020-12-23 RX ADMIN — FUROSEMIDE 5 MG/HR: 10 INJECTION, SOLUTION INTRAVENOUS at 09:12

## 2020-12-23 RX ADMIN — IPRATROPIUM BROMIDE AND ALBUTEROL SULFATE 3 ML: .5; 3 SOLUTION RESPIRATORY (INHALATION) at 08:12

## 2020-12-23 RX ADMIN — INSULIN DETEMIR 24 UNITS: 100 INJECTION, SOLUTION SUBCUTANEOUS at 08:12

## 2020-12-23 RX ADMIN — ROSUVASTATIN CALCIUM 40 MG: 10 TABLET, FILM COATED ORAL at 08:12

## 2020-12-23 RX ADMIN — CLOPIDOGREL 75 MG: 75 TABLET, FILM COATED ORAL at 08:12

## 2020-12-23 NOTE — ASSESSMENT & PLAN NOTE
- presented with inferior ST elevations and anterior ST depressions with troponin 38; now >50  - treating medically per cardiology  - continue heparin gtt, plavix loaded -> 75mg daily, ASA loaded -> 81mg daily, coreg 25mg bid -> held 2/2 bradycardia/hypotension, rosuvastatin 40mg daily  - lasix to manage volume overload  - TTE obtained  - tele  - monitor lytes  - Cardiology following, appreciate assistance

## 2020-12-23 NOTE — ASSESSMENT & PLAN NOTE
- baseline serum Cr 1.0, 1.6 on admit and uptrending  - continue to monitor renal function with daily labs   - renal US  - avoid nephrotoxins  - renally dose all medications   - monitor events that may lead to decreased renal perfusion (hypovolemia, hypotension, sepsis)  - monitor urine output to assure that no obstruction precipitates worsening in GFR  - Nephrology consulted, may need HD for volume control if no improvement with lasix challenge

## 2020-12-23 NOTE — PROGRESS NOTES
[facility] ICU 5th Floor  Hospital Medicine  Progress Note    Patient Name: Eliza Louie  MRN: [id number]  Patient Class: IP- Inpatient   Admission Date: 12/21/2020  Length of Stay: 2 days  Attending Physician: Manny Gatica, *  Primary Care Provider: Jayden Pérez MD        Subjective:     Principal Problem:Acute ST elevation myocardial infarction (STEMI)        HPI:  Ms. Eliza Louie is a 71 y/o female who lives in [place], [place] at her residence. The patients PCP is Dr. Jayden Pérez. She has a pmh of HTN and DM. No surgical history noted. She denies smoking cigarettes, drinking alcohol, and using illicit drugs. She presents to the ED here at [facility] with complaints of Vomiting since 11pm, Diarrhea, and HTN. Per the  Patient she has been out of her medications for 3 weeks. She denies hematemesis, bilious vomiting, coffee ground emesis.  Diarrhea is mucusy and brown; she denies any hematochezia and melena. She notes some crampy abdominal pain in the epigastric region. The pain does not radiate.  No fever, chills or sweats.  No sick contacts.  No trauma. No recent travel. No recent ABX use. Her ED workup includes Na--135, potassium 3.1, Cr--1.6, glucose 408, hemoglobin A1c--10.2. CXR---Pulmonary opacities consistent with pulmonary infiltrate/airspace disease most notably on the right with suspected infiltrate at the left base as well, as discussed above. XR abdomen---Nonobstructive bowel gas pattern. Right upper quadrant calcification corresponding to a stone seen within the cystic duct as seen on prior ultrasound. Probable left nephrolith. COVID-19 is negative. She will be admitted to the ICU for further care.    Overview/Hospital Course:  No notes on file    Interval History: some chest pain this am, resolved with NTG. Very wet on exam. Now experiencing nausea, likely related to ACS. Discussed case with family at bedside and with Cards/ICU teams.    Review of Systems    Constitutional: Negative for fever.   Respiratory: Positive for shortness of breath.    Cardiovascular: Positive for chest pain. Negative for leg swelling.   Genitourinary: Positive for decreased urine volume.     Objective:     Vital Signs (Most Recent):  Temp: 97.8 °F (36.6 °C) (12/23/20 1506)  Pulse: 61 (12/23/20 1506)  Resp: (!) 30 (12/23/20 1506)  BP: (!) 99/50 (12/23/20 1500)  SpO2: (!) 90 % (12/23/20 1506) Vital Signs (24h Range):  Temp:  [97.6 °F (36.4 °C)-98.2 °F (36.8 °C)] 97.8 °F (36.6 °C)  Pulse:  [49-90] 61  Resp:  [18-41] 30  SpO2:  [87 %-99 %] 90 %  BP: ()/(46-84) 99/50     Weight: 85.9 kg (189 lb 6 oz)  Body mass index is 33.55 kg/m².    Intake/Output Summary (Last 24 hours) at 12/23/2020 1550  Last data filed at 12/23/2020 1400  Gross per 24 hour   Intake 921.37 ml   Output 835 ml   Net 86.37 ml      Physical Exam  Vitals signs and nursing note reviewed.   Constitutional:       Appearance: Normal appearance.   HENT:      Head: Normocephalic and atraumatic.      Nose: Nose normal.   Eyes:      Extraocular Movements: Extraocular movements intact.      Pupils: Pupils are equal, round, and reactive to light.   Neck:      Musculoskeletal: Normal range of motion.   Cardiovascular:      Rate and Rhythm: Normal rate.      Pulses: Normal pulses.      Heart sounds: Normal heart sounds.   Pulmonary:      Breath sounds: Rales present. No wheezing.      Comments: On HFNC  Abdominal:      General: Bowel sounds are normal. There is no distension.      Palpations: Abdomen is soft.      Tenderness: There is no abdominal tenderness. There is no guarding.   Musculoskeletal: Normal range of motion.   Skin:     General: Skin is warm and dry.      Capillary Refill: Capillary refill takes less than 2 seconds.   Neurological:      Mental Status: She is alert and oriented to person, place, and time.   Psychiatric:         Mood and Affect: Mood normal.         Behavior: Behavior normal.         Significant Labs: All  pertinent labs within the past 24 hours have been reviewed.    Significant Imaging: I have reviewed all pertinent imaging results/findings within the past 24 hours.      Assessment/Plan:      * Acute ST elevation myocardial infarction (STEMI)  - presented with inferior ST elevations and anterior ST depressions with troponin 38; now >50  - treating medically per cardiology  - continue heparin gtt, plavix loaded -> 75mg daily, ASA loaded -> 81mg daily, coreg 25mg bid -> held 2/2 bradycardia/hypotension, rosuvastatin 40mg daily  - lasix to manage volume overload; on gtt  - tele  - monitor lytes  - Cardiology following, appreciate assistance  - TTE:  · The left ventricle is mildly enlarged with concentric hypertrophy andThe estimated ejection fraction is 55%  · Moderate left atrial enlargement.  · Grade II left ventricular diastolic dysfunction.  · There are segmental left ventricular wall motion abnormalities.  · There is mild tricuspid valve stenosis.  · Mild mitral regurgitation.  · Intermediate central venous pressure (8 mmHg).  · The estimated PA systolic pressure is 32 mmHg.  · Mild aortic regurgitation.  · Normal right ventricular size with normal right ventricular systolic function.    CAP (community acquired pneumonia)  - likely hypoxia due to pulmonary edema alone, but given leukocytosis, continued high O2 needs, and congestion on CXR, will cover empirically with abx  - ceftriaxone and azithromycin    Nausea & vomiting  - likely 2/2 ACS  - zofran prn  - ACS treatment as above      Chest pain  - 2/2 ACS  - NTG prn/morphine      Acute on chronic diastolic heart failure  - due to ischemia  - initiated on IV lasix  - now on lasix gtt  - Nephrology consulted, may need HD for volume removal if no improvement in diuresis and worsening kidney dysfunction  - Cards following, appreciate recs      Acute hypoxemic respiratory failure  Began complaining of SOB in the ED---concerned for flash pulm edema along with CP. 40 mg  IV furosemide given. Started on Bipap -> HFNC  - now on lasix gtt  - monitor strict I/Os  - adding on antibiotics today: does have leukocytosis but has been afebrile; likely due to pulmonary edema/ischemia but will trial abx given significant hypoxia despite attempts at diuresis      Type 2 diabetes mellitus with hyperglycemia, without long-term current use of insulin  Hold Metformin while inpatient. Hemoglobin A1c is 10.2  - initiated on insulin gtt upon arrival, now discontinued  - hold home metformin  - transitioned to subQ detemir 24u with aspart 8u qac  - LDSSI with accuchecks  - diabetic diet    ARF (acute renal failure)  - baseline serum Cr 1.0, 1.6 on admit and uptrending  - continue to monitor renal function with daily labs   - renal US  - avoid nephrotoxins  - renally dose all medications   - monitor events that may lead to decreased renal perfusion (hypovolemia, hypotension, sepsis)  - monitor urine output to assure that no obstruction precipitates worsening in GFR  - Nephrology consulted, may need HD for volume control if no improvement with lasix challenge    GERD (gastroesophageal reflux disease)  Chronic.         VTE Risk Mitigation (From admission, onward)         Ordered     heparin 25,000 units in dextrose 5% (100 units/ml) IV bolus from bag - ADDITIONAL PRN BOLUS - 60 units/kg (max bolus 4000 units)  As needed (PRN)     Question:  Heparin Infusion Adjustment (DO NOT MODIFY ANSWER)  Answer:  \[email or web address]\Images\[email or web address]\HeparinInfusions\heparin LOW INTENSITY nomogram for [facility] [id number]    12/21/20 1716     heparin 25,000 units in dextrose 5% (100 units/ml) IV bolus from bag - ADDITIONAL PRN BOLUS - 30 units/kg (max bolus 4000 units)  As needed (PRN)     Question:  Heparin Infusion Adjustment (DO NOT MODIFY ANSWER)  Answer:  \[email or web address]\Images\[email or web address]\HeparinInfusions\heparin LOW INTENSITY nomogram for [facility] [id number]    12/21/20 1716     IP VTE HIGH RISK PATIENT  Once      12/21/20  1746     heparin 25,000 units in dextrose 5% 250 mL (100 units/mL) infusion LOW INTENSITY nomogram - [facility]  Continuous     Question Answer Comment   Heparin Infusion Adjustment (DO NOT MODIFY ANSWER) \\[email or web address]\Images\[email or web address]\HeparinInfusions\heparin LOW INTENSITY nomogram for [facility] [id number]    Begin at (in units/kg/hr) 12        12/21/20 1716     Place sequential compression device  Until discontinued      12/21/20 1105                Discharge Planning   EMILIA: 12/26/2020     Code Status: Full Code   Is the patient medically ready for discharge?:     Reason for patient still in hospital (select all that apply): Patient unstable and Consult recommendations  Discharge Plan A: Home with family            Critical care time spent on the evaluation and treatment of severe organ dysfunction, review of pertinent labs and imaging studies, discussions with consulting providers and discussions with patient/family: 60 minutes.      Manny Gatica MD  Department of Hospital Medicine   [facility] ICU 5th Floor

## 2020-12-23 NOTE — ASSESSMENT & PLAN NOTE
- likely hypoxia due to pulmonary edema alone, but given leukocytosis, continued high O2 needs, and congestion on CXR, will cover empirically with abx  - ceftriaxone and azithromycin

## 2020-12-23 NOTE — SUBJECTIVE & OBJECTIVE
Interval History/Significant Events: Patient seen and examined this morning. No acute events overnight. This morning patient initially found to be in good spirits and without any significant complaints. Later in the morning, patient developed new onset chest pain, similar in nature to the chest pain earlier in the hospitalization, as well as severe worsening shortness of breath and coughing. Patient placed on BiPAP with symptomatic improvement. Chest pain resolved with nitro.     Review of Systems   Constitutional: Positive for fatigue. Negative for chills and fever.   Respiratory: Positive for cough, shortness of breath and wheezing.    Cardiovascular: Positive for chest pain. Negative for leg swelling.   Gastrointestinal: Positive for nausea. Negative for abdominal pain, constipation, diarrhea and vomiting.   Musculoskeletal: Negative for arthralgias and myalgias.   Skin: Negative for color change and rash.   Neurological: Positive for weakness. Negative for light-headedness and headaches.   Psychiatric/Behavioral: Negative for confusion.     Objective:     Vital Signs (Most Recent):  Temp: 98.1 °F (36.7 °C) (12/23/20 1116)  Pulse: (!) 55 (12/23/20 1203)  Resp: (!) 31 (12/23/20 1203)  BP: 126/64 (12/23/20 1203)  SpO2: (!) 93 % (12/23/20 1203) Vital Signs (24h Range):  Temp:  [97.6 °F (36.4 °C)-98.2 °F (36.8 °C)] 98.1 °F (36.7 °C)  Pulse:  [49-90] 55  Resp:  [18-41] 31  SpO2:  [87 %-99 %] 93 %  BP: ()/(46-84) 126/64   Weight: 85.9 kg (189 lb 6 oz)  Body mass index is 33.55 kg/m².      Intake/Output Summary (Last 24 hours) at 12/23/2020 1232  Last data filed at 12/23/2020 1056  Gross per 24 hour   Intake 921.37 ml   Output 465 ml   Net 456.37 ml       Physical Exam  Constitutional:       General: She is in acute distress.      Appearance: She is obese. She is ill-appearing.   Cardiovascular:      Rate and Rhythm: Normal rate. Rhythm irregularly irregular.      Heart sounds: No murmur.   Pulmonary:      Breath  sounds: Wheezing (bilateral R>L) and rhonchi present.      Comments: Initially on 5L by HFNC then needing BiPAP  Abdominal:      General: Abdomen is flat. There is no distension.      Palpations: Abdomen is soft.      Tenderness: There is no abdominal tenderness.   Musculoskeletal: Normal range of motion.      Right lower leg: No edema.      Left lower leg: No edema.   Neurological:      Mental Status: She is alert and oriented to person, place, and time.     Significant Labs:    CBC/Anemia Profile:  Recent Labs   Lab 12/21/20  1732 12/22/20  0522 12/23/20  0406   WBC 15.46* 21.40* 19.20*   HGB 13.6 13.5 12.4   HCT 40.6 38.6 37.0    253 202   MCV 86 85 87   RDW 13.7 13.9 14.3        Chemistries:  Recent Labs   Lab 12/22/20  0522  12/22/20  1930 12/23/20  0020 12/23/20  0406      < > 132* 134* 135*   K 3.0*   < > 4.0 3.9 4.5   CL 97   < > 97 95 96   CO2 25   < > 21* 23 26   BUN 26*   < > 32* 36* 37*   CREATININE 2.1*   < > 2.3* 2.4* 2.5*   CALCIUM 8.2*   < > 7.6* 7.7* 7.7*   MG 1.2*  --   --   --  2.4    < > = values in this interval not displayed.       Troponin:   Recent Labs   Lab 12/21/20 1732 12/22/20  1257   TROPONINI 29.113* >50.000*       Significant Imaging:  I have reviewed and interpreted all pertinent imaging results/findings within the past 24 hours.

## 2020-12-23 NOTE — CONSULTS
Nephrology Consult  H&P      Consult Requested By: Manny Gatica, *  Reason for Consult: ARF    SUBJECTIVE:     History of Present Illness:  Patient is a 70 y.o. female presents with STEMI on 12/21/20 inferiorly with ST depression anteriorly consistent with an inferoposterior MI  Today she  has decreased UO and pulmonary edema  She has underlying CKD 2-3 with CR 1- 1.3     Review of Systems   Unable to perform ROS: Critical illness   in respiratory distress on BIPAP    Past Medical History:   Diagnosis Date    Diabetes mellitus     Hypertension      History reviewed. No pertinent surgical history.  History reviewed. No pertinent family history.  Social History     Tobacco Use    Smoking status: Never Smoker    Smokeless tobacco: Never Used   Substance Use Topics    Alcohol use: No    Drug use: No     Review of patient's allergies indicates:   Allergen Reactions    Ace inhibitors Other (See Comments)     Cough      OBJECTIVE:     Vital Signs (Most Recent)  Vitals:    12/23/20 1506 12/23/20 1530 12/23/20 1600 12/23/20 1630   BP:  (!) 119/59 (!) 105/54 (!) 109/58   BP Location:       Patient Position:       Pulse: 61 62 61 60   Resp: (!) 30 (!) 28 (!) 28 (!) 35   Temp: 97.8 °F (36.6 °C)      TempSrc: Oral      SpO2: (!) 90% (!) 94% (!) 93% (!) 92%   Weight:       Height:         Date 12/23/20 0700 - 12/24/20 0659   Shift 4866-1343 4102-7488 8634-4668 24 Hour Total   INTAKE   I.V.(mL/kg) 1.4(0)   1.4(0)   IV Piggyback 50 50  100   Shift Total(mL/kg) 51.4(0.6) 50(0.6)  101.4(1.2)   OUTPUT   Urine(mL/kg/hr) 535(0.8) 270  805   Shift Total(mL/kg) 535(6.2) 270(3.1)  805(9.4)   Weight (kg) 85.9 85.9 85.9 85.9     Medications:   albuterol-ipratropium  3 mL Nebulization Q6H WAKE    aspirin  81 mg Oral Daily    cefTRIAXone (ROCEPHIN) IVPB  1 g Intravenous Q24H    clopidogreL  75 mg Oral Daily    doxycycline  100 mg Oral Q12H    insulin aspart U-100  8 Units Subcutaneous TIDWM    insulin detemir U-100  24  Units Subcutaneous Daily    mupirocin   Nasal BID    pantoprazole  40 mg Oral BID    rosuvastatin  40 mg Oral QHS     Physical Exam   Constitutional: She is oriented to person, place, and time and well-developed, well-nourished, and in no distress. No distress.   HENT:   Head: Normocephalic and atraumatic.   Mouth/Throat: Oropharynx is clear and moist.   Eyes: EOM are normal. No scleral icterus.   Neck: Neck supple. No JVD present.   Cardiovascular: Normal rate and regular rhythm. Exam reveals no friction rub.   No murmur heard.  Pulmonary/Chest: Tachypnea noted. She is in respiratory distress. She has no wheezes. She has rales.   Abdominal: Soft. Bowel sounds are normal. She exhibits no distension. There is no abdominal tenderness.   Musculoskeletal:         General: Edema (trace) present.   Neurological: She is alert and oriented to person, place, and time.   Skin: Skin is warm and dry. No rash noted. She is not diaphoretic. No erythema.   Psychiatric: Affect normal.     Diagnostic Results:  X-Ray: Reviewed  US: Reviewed  Echo: Reviewed  ASSESSMENT/PLAN:   1. ARF on CKD 3 baseline Cr 1-1.3  ATN from hemodynamic instability from STEMI +/- TMA from HTN emergency on presentation  Supportive care for now   Avoid nephrotoxins, dose all meds ad for GFr < 10  No indication for urgent RRT. Monitor closely. Strict ins/outs, daily weight      Bolus Lasix 80 IV and then lasix gtt. If UO <100 cc/hour might need CRRT. Discussed with daughter  Recent Labs   Lab 12/22/20  1930 12/23/20  0020 12/23/20  0406   * 134* 135*   K 4.0 3.9 4.5   BUN 32* 36* 37*   CREATININE 2.3* 2.4* 2.5*     2. HTN (I10) - off all BP meds, BP in 120s sytolic  Discussed with cards and critical care possible nitro gtt if with CP again  4. MBD (E88.9 M90.80) - check phos, mg replaced  Recent Labs   Lab 12/23/20  0406   CALCIUM 7.7*     Recent Labs   Lab 12/22/20  0522 12/23/20  0406   MG 1.2* 2.4     No results found for: [id number]  5.  Acidosis - better today, not on bicarb therapy  Recent Labs   Lab 12/22/20  1930 12/23/20  0020 12/23/20  0406   CL 97 95 96   CO2 21* 23 26     Total critical care time 35 minutes: included management of organ failures related to critical illness, titration of continuous infusions, review of pertinent labs and imaging studies, discussion with primary team      Thank you for allowing me to participate in care of your patient  With any question please call [phone]  Kimi Wallace    [facility]  SCOTT Vaughn MD, FACARABELLA SCHMDIT MD,   MD EPI Rodriguez MD E. V. Harmon, NP  [address]  [place], [address]  [phone]  After hours answering service: [phone]

## 2020-12-23 NOTE — PROGRESS NOTES
[facility] Progress Note   Subjective:   Eliza Louie is a 70 y.o. female who is being followed by the [facility] service for posterior MI.    Episode of chest discomfort this am.  Was having a catheter manipulated and while lying flat started feeling short of breath and became hypoxic.  Felt better sitting up and on Bipap.  Also relieved by nitroglycerin.         Objective:   Temp:  [97.6 °F (36.4 °C)-98.3 °F (36.8 °C)] 98.2 °F (36.8 °C)  Pulse:  [49-92] 71  Resp:  [18-40] 36  SpO2:  [87 %-99 %] 94 %  BP: ()/(42-84) 134/79  I/O last 3 completed shifts:  In: 1799.2 [P.O.:700; I.V.:829.5; IV Piggyback:269.7]  Out: 700 [Urine:700]  Current Medications:   Infusions:    furosemide (LASIX) 2 mg/mL continuous infusion (titrating) 5 mg/hr (12/23/20 0947)    heparin (porcine) in D5W 14 Units/kg/hr (12/22/20 2005)      Scheduled:    albuterol-ipratropium  3 mL Nebulization Q6H WAKE    aspirin  81 mg Oral Daily    clopidogreL  75 mg Oral Daily    insulin aspart U-100  8 Units Subcutaneous TIDWM    insulin detemir U-100  24 Units Subcutaneous Daily    mupirocin   Nasal BID    pantoprazole  40 mg Oral BID    rosuvastatin  40 mg Oral QHS      PRN:   acetaminophen, dextrose 50%, glucagon (human recombinant), glucose, glucose, heparin (PORCINE), heparin (PORCINE), hydrALAZINE, morphine, ondansetron, sodium chloride 0.9%   Physical Examination:   General: No Acute Distress, Awake, Alert and Oriented X 4, BIPAP in place    Head: normocephalic, atraumatic   HEENT: EOMI, PERRL, MMM   Neck: Supple JVP unable to assess, no LAD   Cardiovascular: Regular Rate and Rhythm, no Murmur   Respiratory: Rales bilaterally   Abdomen: Bowel Sounds present, Soft, Non tender, Non distended   Extremities: No Clubbing, trace bilateral lower extremity edema    Laboratory:   Component      Latest Ref Rng & Units 12/23/2020   WBC      3.90 - 12.70 K/uL 19.20 (H)   RBC      4.00 - 5.40 M/uL 4.26   Hemoglobin      12.0 - 16.0 g/dL 12.4    Hematocrit      37.0 - 48.5 % 37.0   MCV      82 - 98 fL 87   MCH      27.0 - 31.0 pg 29.1   MCHC      32.0 - 36.0 g/dL 33.5   RDW      11.5 - 14.5 % 14.3   Platelets      150 - 350 K/uL 202     Component      Latest Ref Rng & Units 12/23/2020   Sodium      136 - 145 mmol/L 135 (L)   Potassium      3.5 - 5.1 mmol/L 4.5   Chloride      95 - 110 mmol/L 96   CO2      23 - 29 mmol/L 26   Glucose      70 - 110 mg/dL 171 (H)   BUN      8 - 23 mg/dL 37 (H)   Creatinine      0.5 - 1.4 mg/dL 2.5 (H)   Calcium      8.7 - 10.5 mg/dL 7.7 (L)   Anion Gap      8 - 16 mmol/L 13   eGFR if African American      >60 mL/min/1.73 m:2 22 (A)   eGFR if non African American      >60 mL/min/1.73 m:2 19 (A)       Assessment:   Eliza Louie is a 70 y.o.female with   Patient Active Problem List    Diagnosis Date Noted    Acute ST elevation myocardial infarction (STEMI) 07/07/2017     Priority: High    Acute on chronic diastolic heart failure 12/22/2020    Hypomagnesemia 12/22/2020    JOHN (acute kidney injury) 12/21/2020    Hypertensive emergency 12/21/2020    Acute hypoxemic respiratory failure 12/21/2020    Type 2 diabetes mellitus with hyperglycemia, without long-term current use of insulin     Hypokalemia     Urge incontinence of urine 03/22/2018    GERD (gastroesophageal reflux disease) 02/20/2018    Hypertension 07/02/2017    Prediabetes 07/02/2017      Plan:     Late presenting Posterior MI:  Medical management considering normal EF with creatinine of 2.5.  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  Just given dose of lasix, will continue to monitor volume status and renal function.

## 2020-12-23 NOTE — SUBJECTIVE & OBJECTIVE
Interval History: some chest pain this am, resolved with NTG. Very wet on exam. Now experiencing nausea, likely related to ACS. Discussed case with family at bedside and with Cards/ICU teams.    Review of Systems   Constitutional: Negative for fever.   Respiratory: Positive for shortness of breath.    Cardiovascular: Positive for chest pain. Negative for leg swelling.   Genitourinary: Positive for decreased urine volume.     Objective:     Vital Signs (Most Recent):  Temp: 97.8 °F (36.6 °C) (12/23/20 1506)  Pulse: 61 (12/23/20 1506)  Resp: (!) 30 (12/23/20 1506)  BP: (!) 99/50 (12/23/20 1500)  SpO2: (!) 90 % (12/23/20 1506) Vital Signs (24h Range):  Temp:  [97.6 °F (36.4 °C)-98.2 °F (36.8 °C)] 97.8 °F (36.6 °C)  Pulse:  [49-90] 61  Resp:  [18-41] 30  SpO2:  [87 %-99 %] 90 %  BP: ()/(46-84) 99/50     Weight: 85.9 kg (189 lb 6 oz)  Body mass index is 33.55 kg/m².    Intake/Output Summary (Last 24 hours) at 12/23/2020 1550  Last data filed at 12/23/2020 1400  Gross per 24 hour   Intake 921.37 ml   Output 835 ml   Net 86.37 ml      Physical Exam  Vitals signs and nursing note reviewed.   Constitutional:       Appearance: Normal appearance.   HENT:      Head: Normocephalic and atraumatic.      Nose: Nose normal.   Eyes:      Extraocular Movements: Extraocular movements intact.      Pupils: Pupils are equal, round, and reactive to light.   Neck:      Musculoskeletal: Normal range of motion.   Cardiovascular:      Rate and Rhythm: Normal rate.      Pulses: Normal pulses.      Heart sounds: Normal heart sounds.   Pulmonary:      Breath sounds: Rales present. No wheezing.      Comments: On HFNC  Abdominal:      General: Bowel sounds are normal. There is no distension.      Palpations: Abdomen is soft.      Tenderness: There is no abdominal tenderness. There is no guarding.   Musculoskeletal: Normal range of motion.   Skin:     General: Skin is warm and dry.      Capillary Refill: Capillary refill takes less than 2  seconds.   Neurological:      Mental Status: She is alert and oriented to person, place, and time.   Psychiatric:         Mood and Affect: Mood normal.         Behavior: Behavior normal.         Significant Labs: All pertinent labs within the past 24 hours have been reviewed.    Significant Imaging: I have reviewed all pertinent imaging results/findings within the past 24 hours.

## 2020-12-23 NOTE — ASSESSMENT & PLAN NOTE
- baseline serum Cr 1.0, 1.6 on admit and uptrending  - continue to monitor renal function with daily labs   - renal US  - avoid nephrotoxins  - renally dose all medications   - monitor events that may lead to decreased renal perfusion (hypovolemia, hypotension, sepsis)  - monitor urine output to assure that no obstruction precipitates worsening in GFR  - Nephrology consulted

## 2020-12-23 NOTE — ASSESSMENT & PLAN NOTE
- due to ischemia  - initiated on IV lasix but developed HTN  - possible RV component leading to hypovolemia? -> not noted on TTE  - Cards following, appreciate recs

## 2020-12-23 NOTE — SUBJECTIVE & OBJECTIVE
Interval History: initially without symptoms, but developed chest pain during course of day. Gave NTG x1 without improvement, trial morphine. Be careful with NTG due to possible RV infarct. Hypotensive; have given 250cc bolus with improvement although will need to be cautious as patient already volume up. May need inotrope.    Review of Systems   Constitutional: Negative for fever.   Respiratory: Positive for shortness of breath.    Cardiovascular: Positive for chest pain. Negative for leg swelling.   Genitourinary: Positive for decreased urine volume.     Objective:     Vital Signs (Most Recent):  Temp: 98 °F (36.7 °C) (12/22/20 1530)  Pulse: (!) 57 (12/22/20 1845)  Resp: (!) 27 (12/22/20 1845)  BP: (!) 101/56 (12/22/20 1845)  SpO2: (!) 93 % (12/22/20 1845) Vital Signs (24h Range):  Temp:  [97.9 °F (36.6 °C)-99.1 °F (37.3 °C)] 98 °F (36.7 °C)  Pulse:  [] 57  Resp:  [20-45] 27  SpO2:  [89 %-97 %] 93 %  BP: ()/() 101/56     Weight: 84.7 kg (186 lb 11.7 oz)  Body mass index is 33.08 kg/m².    Intake/Output Summary (Last 24 hours) at 12/22/2020 1859  Last data filed at 12/22/2020 1750  Gross per 24 hour   Intake 1287.29 ml   Output 550 ml   Net 737.29 ml      Physical Exam  Vitals signs and nursing note reviewed.   Constitutional:       Appearance: Normal appearance.   HENT:      Head: Normocephalic and atraumatic.      Nose: Nose normal.   Eyes:      Extraocular Movements: Extraocular movements intact.      Pupils: Pupils are equal, round, and reactive to light.   Neck:      Musculoskeletal: Normal range of motion.   Cardiovascular:      Rate and Rhythm: Normal rate.      Pulses: Normal pulses.      Heart sounds: Normal heart sounds.   Pulmonary:      Breath sounds: Rales present. No wheezing.      Comments: On HFNC  Abdominal:      General: Bowel sounds are normal. There is no distension.      Palpations: Abdomen is soft.      Tenderness: There is no abdominal tenderness. There is no guarding.    Musculoskeletal: Normal range of motion.   Skin:     General: Skin is warm and dry.      Capillary Refill: Capillary refill takes less than 2 seconds.   Neurological:      Mental Status: She is alert and oriented to person, place, and time.   Psychiatric:         Mood and Affect: Mood normal.         Behavior: Behavior normal.         Significant Labs: All pertinent labs within the past 24 hours have been reviewed.    Significant Imaging: I have reviewed all pertinent imaging results/findings within the past 24 hours.

## 2020-12-23 NOTE — PLAN OF CARE
Pt lying comfortably in aaox4. Respirations even and unlabored. SATS 93% on 6L HFNC. Hypotensive during shift--responded well to 250 bolus. HR 40-60 afib. No appetite--glucose 200's. C/O nausea--PRN zofran given x2. 2 BM's during shift. Minimal urine output. Mag and K replaced as ordered. Heparin therapeutic.

## 2020-12-23 NOTE — ASSESSMENT & PLAN NOTE
She has been out of her home meds x3 weeks. She was given clonidine and labetalol in the ED. Complained of CP--was given Nitro and furosemide with improvement in the chest pain and SOB  - initially on cardene gtt; switched to coreg  - became hypotensive; holding medication now  - may have had HTN related to ACS

## 2020-12-23 NOTE — ASSESSMENT & PLAN NOTE
Began complaining of SOB in the ED---concerned for flash pulm edema along with CP. 40 mg IV furosemide given. Started on Bipap -> HFNC  - now on lasix gtt  - monitor strict I/Os  - adding on antibiotics today: does have leukocytosis but has been afebrile; likely due to pulmonary edema/ischemia but will trial abx given significant hypoxia despite attempts at diuresis

## 2020-12-23 NOTE — ASSESSMENT & PLAN NOTE
Hold Metformin while inpatient. Hemoglobin A1c is 10.2  - initiated on insulin gtt  - will transition to subQ today  - diabetic diet

## 2020-12-23 NOTE — PROGRESS NOTES
"[facility] ICU 5th Floor  Critical Care Medicine  Progress Note    Patient Name: Eliza Louie  MRN: [id number]  Admission Date: 12/21/2020  Hospital Length of Stay: 2 days  Code Status: Full Code  Attending Provider: Manny Gatica, *  Primary Care Provider: Jayden Pérez MD   Principal Problem: Acute ST elevation myocardial infarction (STEMI)    Subjective:     HPI:  Patient is a 70 year old female with PMHx of CAD s/p 1 stent, HTN, DM who presented to the ED with 1 day history of nausea and vomiting. Patient reports the evening before presentation, she attempted to eat some jello and bread and immediately vomited them back up. Patient then presented to the ED the following morning with continued nausea and vomiting. While in the ED, patient significantly hypertensive to maximum 245/119 and then the patient began to develop rapid onset shortness of breath and was noted to be desat-ing. Patient placed on BiPAP with improvement in shortness of breath and improvement in sats. Labwork at the time was remarkable for a troponin elevated to 38. EKG at the time was largely without changes. Labwork also significant for severely elevated blood glucose, anion gap, positive beta-hydroxybutyrate. Cardiology was consulted in the ED and recommended medical management of the patient at this time.    Repeat EKG some time later significant for ST elevation anteriorly and ST depression inferiorly. Case was discussed again with cardiology who felt there would be questionable benefit and high risk for an invasive intervention and continued to recommend medical management. Patient's BG continued to be elevated>450 despite insulin. CXR showed "Pulmonary opacities consistent with pulmonary infiltrate/airspace disease most notably on the right with suspected infiltrate at the left base as well."    Hospital/ICU Course:  No notes on file    Interval History/Significant Events: Patient seen and examined this " morning. No acute events overnight. This morning patient initially found to be in good spirits and without any significant complaints. Later in the morning, patient developed new onset chest pain, similar in nature to the chest pain earlier in the hospitalization, as well as severe worsening shortness of breath and coughing. Patient placed on BiPAP with symptomatic improvement. Chest pain resolved with nitro.     Very long conversation was had between primary team, Pulm/CC, Cardiology, Nephrology regarding worsening chest pain, EKG changes, shortness of breath, and renal functioning. Ultimately there was a high degree of concern from the cardiology team that be it that the patient is already having the beginning of an aneurysmal dilatation of the posterior wall, further intervention into the RCA likely would not yield benefit and would likely not be of use at this point. Given that she is having a significant amount of fluid back up into the lung, there was some concern that the patient is having LV dysfunction at this point. Thus it was posited that there may be benefit in PCI of the LAD or circumflex. But then concern remained that any sort of coronary intervention would pose a high risk to making the patient require dialysis given declining renal function.    Review of Systems   Constitutional: Positive for fatigue. Negative for chills and fever.   Respiratory: Positive for cough, shortness of breath and wheezing.    Cardiovascular: Positive for chest pain. Negative for leg swelling.   Gastrointestinal: Positive for nausea. Negative for abdominal pain, constipation, diarrhea and vomiting.   Musculoskeletal: Negative for arthralgias and myalgias.   Skin: Negative for color change and rash.   Neurological: Positive for weakness. Negative for light-headedness and headaches.   Psychiatric/Behavioral: Negative for confusion.     Objective:     Vital Signs (Most Recent):  Temp: 98.1 °F (36.7 °C) (12/23/20 1116)  Pulse:  (!) 55 (12/23/20 1203)  Resp: (!) 31 (12/23/20 1203)  BP: 126/64 (12/23/20 1203)  SpO2: (!) 93 % (12/23/20 1203) Vital Signs (24h Range):  Temp:  [97.6 °F (36.4 °C)-98.2 °F (36.8 °C)] 98.1 °F (36.7 °C)  Pulse:  [49-90] 55  Resp:  [18-41] 31  SpO2:  [87 %-99 %] 93 %  BP: ()/(46-84) 126/64   Weight: 85.9 kg (189 lb 6 oz)  Body mass index is 33.55 kg/m².      Intake/Output Summary (Last 24 hours) at 12/23/2020 1232  Last data filed at 12/23/2020 1056  Gross per 24 hour   Intake 921.37 ml   Output 465 ml   Net 456.37 ml       Physical Exam  Constitutional:       General: She is in acute distress.      Appearance: She is obese. She is ill-appearing.   Cardiovascular:      Rate and Rhythm: Normal rate. Rhythm irregularly irregular.      Heart sounds: No murmur.   Pulmonary:      Breath sounds: Wheezing (bilateral R>L) and rhonchi present.      Comments: Initially on 5L by HFNC then needing BiPAP  Abdominal:      General: Abdomen is flat. There is no distension.      Palpations: Abdomen is soft.      Tenderness: There is no abdominal tenderness.   Musculoskeletal: Normal range of motion.      Right lower leg: No edema.      Left lower leg: No edema.   Neurological:      Mental Status: She is alert and oriented to person, place, and time.     Significant Labs:    CBC/Anemia Profile:  Recent Labs   Lab 12/21/20  1732 12/22/20  0522 12/23/20  0406   WBC 15.46* 21.40* 19.20*   HGB 13.6 13.5 12.4   HCT 40.6 38.6 37.0    253 202   MCV 86 85 87   RDW 13.7 13.9 14.3        Chemistries:  Recent Labs   Lab 12/22/20  0522  12/22/20  1930 12/23/20  0020 12/23/20  0406      < > 132* 134* 135*   K 3.0*   < > 4.0 3.9 4.5   CL 97   < > 97 95 96   CO2 25   < > 21* 23 26   BUN 26*   < > 32* 36* 37*   CREATININE 2.1*   < > 2.3* 2.4* 2.5*   CALCIUM 8.2*   < > 7.6* 7.7* 7.7*   MG 1.2*  --   --   --  2.4    < > = values in this interval not displayed.       Troponin:   Recent Labs   Lab 12/21/20  1732 12/22/20  1257    TROPONINI 29.113* >50.000*       Significant Imaging:  I have reviewed and interpreted all pertinent imaging results/findings within the past 24 hours.    Assessment/Plan:     Neuro/Psych  Mental status appropriate this AM. Continue to monitor     CV  #STEMI  Patient with initial symptoms of nausea/vomiting followed by development of chest pain and rapid onset shortness of breath in the setting of wildly uncontrolled hypertension  Troponin elevated to 38 then downtrending but now uptrending again to >50  ST elevation inferior leads, ST depression anterior leads  Patient with new onset worsening chest pain this AM associated with significant shortness of breath  Cardiology consulted, appreciate recommendations  Above conversation regarding risks vs. Benefits of intervention was discussed with the patient and the patient ulitmately decided that the risks of dialysis outweigh possible benefits of cath and to not have a cath  Heparin gtt  ASA, plavix, statin  Will consider low dose ACEi if BP will allow it  Also if BP allows it, may benefit from Nitro gtt per cardiology     #HfPEF  Patient fluid overloaded on exam; significant ronchi on exam and now with new wheezing as well  Worsening edema on CXR  Gave bolus 80mg IV lasix and now on lasix gtt  Moderate urine output on lasix gtt  Continue to monitor     #Hyerptensive Urgency/Emergency  Initially with significant HTN controlled on Cardene drip  Stopped Cardene and resumed 1/2 of home coreg but patient became slightly hypotensive  Will hold coreg and monitor to see what happens with BP; consider resuming Coreg at 6.25     Pulm  Significant increase in O2 requirements today after onset of chest pain  Placed on BiPAP with improvement in sats and symptoms  Will continue on BiPAP for now pending diuresis and hope to transition back to HFNC  Likely will not show improvement until adequate diuresis     Heme/ID  Afebrile and without leukocytosis on presentation  Ceftriaxone  started by Primary team for concern for possible infection     FEN/GI  Diabetic diet w/ fluid restriction  Continue to be very judicious with fluid administration given fluid overload  No other known acute issues at this time     RENAL  #JOHN  BUN/Cr slowly increasing: today 37/2.5, up from 31/2.2 yesterday, baseline 10/1.1  Nephrology consulted; appreciate input  Starting to put out more urine on Lasix gtt; previously with some concern for decreased output overnight although difficult to measure  Given cardiac issues, unclear how kidneys will continue to function  Will need close monitoring to see if CRRT will need to be started but if urinating adequately after lasix challenge can wait and see     Endo  #T2DM  Blood glucose >400 on presentation with trace urine ketones, B-hydroxybutyrate .9, anion gap 18 but not acidotic  Reportedly only on metformin at home but A1c>10  Gap closed and has remained closed  24u detemir daily and 8u aspart TID  Consider splitting detemir if continues to be above goal  MSSI  Goal -180       Dwaine Benítez MD  Critical Care Medicine  [facility] ICU 5th Floor

## 2020-12-23 NOTE — ASSESSMENT & PLAN NOTE
Hold Metformin while inpatient. Hemoglobin A1c is 10.2  - initiated on insulin gtt upon arrival, now discontinued  - hold home metformin  - transitioned to subQ detemir 24u with aspart 8u qac  - LDSSI with accuchecks  - diabetic diet

## 2020-12-23 NOTE — PROGRESS NOTES
[facility] ICU 5th Floor  Hospital Medicine  Progress Note    Patient Name: Eliza Louie  MRN: [id number]  Patient Class: IP- Inpatient   Admission Date: 12/21/2020  Length of Stay: 1 days  Attending Physician: Manny Gatica, *  Primary Care Provider: Jayden Pérez MD        Subjective:     Principal Problem:Acute ST elevation myocardial infarction (STEMI)        HPI:  Ms. Eliza Louie is a 71 y/o female who lives in [place], [place] at her residence. The patients PCP is Dr. Jayden Pérez. She has a pmh of HTN and DM. No surgical history noted. She denies smoking cigarettes, drinking alcohol, and using illicit drugs. She presents to the ED here at [facility] with complaints of Vomiting since 11pm, Diarrhea, and HTN. Per the  Patient she has been out of her medications for 3 weeks. She denies hematemesis, bilious vomiting, coffee ground emesis.  Diarrhea is mucusy and brown; she denies any hematochezia and melena. She notes some crampy abdominal pain in the epigastric region. The pain does not radiate.  No fever, chills or sweats.  No sick contacts.  No trauma. No recent travel. No recent ABX use. Her ED workup includes Na--135, potassium 3.1, Cr--1.6, glucose 408, hemoglobin A1c--10.2. CXR---Pulmonary opacities consistent with pulmonary infiltrate/airspace disease most notably on the right with suspected infiltrate at the left base as well, as discussed above. XR abdomen---Nonobstructive bowel gas pattern. Right upper quadrant calcification corresponding to a stone seen within the cystic duct as seen on prior ultrasound. Probable left nephrolith. COVID-19 is negative. She will be admitted to the ICU for further care.    Overview/Hospital Course:  No notes on file    Interval History: initially without symptoms, but developed chest pain during course of day. Gave NTG x1 without improvement, trial morphine. Be careful with NTG due to possible RV infarct. Hypotensive; have given  250cc bolus with improvement although will need to be cautious as patient already volume up. May need inotrope.    Review of Systems   Constitutional: Negative for fever.   Respiratory: Positive for shortness of breath.    Cardiovascular: Positive for chest pain. Negative for leg swelling.   Genitourinary: Positive for decreased urine volume.     Objective:     Vital Signs (Most Recent):  Temp: 98 °F (36.7 °C) (12/22/20 1530)  Pulse: (!) 57 (12/22/20 1845)  Resp: (!) 27 (12/22/20 1845)  BP: (!) 101/56 (12/22/20 1845)  SpO2: (!) 93 % (12/22/20 1845) Vital Signs (24h Range):  Temp:  [97.9 °F (36.6 °C)-99.1 °F (37.3 °C)] 98 °F (36.7 °C)  Pulse:  [] 57  Resp:  [20-45] 27  SpO2:  [89 %-97 %] 93 %  BP: ()/() 101/56     Weight: 84.7 kg (186 lb 11.7 oz)  Body mass index is 33.08 kg/m².    Intake/Output Summary (Last 24 hours) at 12/22/2020 1859  Last data filed at 12/22/2020 1750  Gross per 24 hour   Intake 1287.29 ml   Output 550 ml   Net 737.29 ml      Physical Exam  Vitals signs and nursing note reviewed.   Constitutional:       Appearance: Normal appearance.   HENT:      Head: Normocephalic and atraumatic.      Nose: Nose normal.   Eyes:      Extraocular Movements: Extraocular movements intact.      Pupils: Pupils are equal, round, and reactive to light.   Neck:      Musculoskeletal: Normal range of motion.   Cardiovascular:      Rate and Rhythm: Normal rate.      Pulses: Normal pulses.      Heart sounds: Normal heart sounds.   Pulmonary:      Breath sounds: Rales present. No wheezing.      Comments: On HFNC  Abdominal:      General: Bowel sounds are normal. There is no distension.      Palpations: Abdomen is soft.      Tenderness: There is no abdominal tenderness. There is no guarding.   Musculoskeletal: Normal range of motion.   Skin:     General: Skin is warm and dry.      Capillary Refill: Capillary refill takes less than 2 seconds.   Neurological:      Mental Status: She is alert and oriented to  person, place, and time.   Psychiatric:         Mood and Affect: Mood normal.         Behavior: Behavior normal.         Significant Labs: All pertinent labs within the past 24 hours have been reviewed.    Significant Imaging: I have reviewed all pertinent imaging results/findings within the past 24 hours.      Assessment/Plan:      * Acute ST elevation myocardial infarction (STEMI)  - presented with inferior ST elevations and anterior ST depressions with troponin 38; now >50  - treating medically per cardiology  - continue heparin gtt, plavix loaded -> 75mg daily, ASA loaded -> 81mg daily, coreg 25mg bid -> held 2/2 bradycardia/hypotension, rosuvastatin 40mg daily  - lasix to manage volume overload  - TTE obtained  - tele  - monitor lytes  - Cardiology following, appreciate assistance    Hypomagnesemia  - replete prn      Acute on chronic diastolic heart failure  - due to ischemia  - initiated on IV lasix but developed HTN  - possible RV component leading to hypovolemia? -> not noted on TTE  - Cards following, appreciate recs      Acute hypoxemic respiratory failure  Began complaining of SOB in the ED---concerned for flash pulm edema along with CP. 40 mg IV furosemide given. Started on Bipap.  - now on lasix 80mg IV bid per Cardiology  - monitor strict I/Os      Hypertensive emergency  She has been out of her home meds x3 weeks. She was given clonidine and labetalol in the ED. Complained of CP--was given Nitro and furosemide with improvement in the chest pain and SOB  - initially on cardene gtt; switched to coreg  - became hypotensive; holding medication now  - may have had HTN related to ACS      Hypokalemia  Replace and monitor.       Type 2 diabetes mellitus with hyperglycemia, without long-term current use of insulin  Hold Metformin while inpatient. Hemoglobin A1c is 10.2  - initiated on insulin gtt  - will transition to subQ today  - diabetic diet    JOHN (acute kidney injury)  - baseline serum Cr 1.0, 1.6 on  admit and uptrending  - continue to monitor renal function with daily labs   - renal US  - avoid nephrotoxins  - renally dose all medications   - monitor events that may lead to decreased renal perfusion (hypovolemia, hypotension, sepsis)  - monitor urine output to assure that no obstruction precipitates worsening in GFR  - Nephrology consulted        GERD (gastroesophageal reflux disease)  Chronic.         VTE Risk Mitigation (From admission, onward)         Ordered     heparin 25,000 units in dextrose 5% (100 units/ml) IV bolus from bag - ADDITIONAL PRN BOLUS - 60 units/kg (max bolus 4000 units)  As needed (PRN)     Question:  Heparin Infusion Adjustment (DO NOT MODIFY ANSWER)  Answer:  \[email or web address]\Images\Pharmacy\HeparinInfusions\heparin LOW INTENSITY nomogram for [facility] [id number]    12/21/20 1716     heparin 25,000 units in dextrose 5% (100 units/ml) IV bolus from bag - ADDITIONAL PRN BOLUS - 30 units/kg (max bolus 4000 units)  As needed (PRN)     Question:  Heparin Infusion Adjustment (DO NOT MODIFY ANSWER)  Answer:  \[email or web address]\Images\Pharmacy\HeparinInfusions\heparin LOW INTENSITY nomogram for [facility] [id number]    12/21/20 1716     IP VTE HIGH RISK PATIENT  Once      12/21/20 1746     heparin 25,000 units in dextrose 5% 250 mL (100 units/mL) infusion LOW INTENSITY nomogram - [facility]  Continuous     Question Answer Comment   Heparin Infusion Adjustment (DO NOT MODIFY ANSWER) \\[email or web address]\Images\Pharmacy\HeparinInfusions\heparin LOW INTENSITY nomogram for [facility] [id number]    Begin at (in units/kg/hr) 12        12/21/20 1716     Place sequential compression device  Until discontinued      12/21/20 1105                Discharge Planning   EMILIA: 12/26/2020     Code Status: Full Code   Is the patient medically ready for discharge?:     Reason for patient still in hospital (select all that apply): Patient unstable and Consult recommendations  Discharge Plan A: Home with family            Critical care time  spent on the evaluation and treatment of severe organ dysfunction, review of pertinent labs and imaging studies, discussions with consulting providers and discussions with patient/family: 60 minutes.      Manny Gatica MD  Department of Hospital Medicine   [facility] ICU 5th Floor

## 2020-12-23 NOTE — NURSING
Patient complaining of chest pain this AM after no chest pains overnight. Pt reports pain is milder than yesterday. EKG obtained and reported to MD Wang.

## 2020-12-24 PROBLEM — I48.91 A-FIB: Status: ACTIVE | Noted: 2020-12-24

## 2020-12-24 LAB
ALBUMIN SERPL BCP-MCNC: 2.9 G/DL (ref 3.5–5.2)
ALLENS TEST: ABNORMAL
ALP SERPL-CCNC: 102 U/L (ref 55–135)
ALT SERPL W/O P-5'-P-CCNC: 36 U/L (ref 10–44)
ANION GAP SERPL CALC-SCNC: 16 MMOL/L (ref 8–16)
ANION GAP SERPL CALC-SCNC: 16 MMOL/L (ref 8–16)
APTT BLDCRRT: 47 SEC (ref 21–32)
AST SERPL-CCNC: 64 U/L (ref 10–40)
BASOPHILS # BLD AUTO: 0.02 K/UL (ref 0–0.2)
BASOPHILS NFR BLD: 0.1 % (ref 0–1.9)
BILIRUB DIRECT SERPL-MCNC: 0.3 MG/DL (ref 0.1–0.3)
BILIRUB SERPL-MCNC: 0.6 MG/DL (ref 0.1–1)
BUN SERPL-MCNC: 35 MG/DL (ref 8–23)
BUN SERPL-MCNC: 35 MG/DL (ref 8–23)
CALCIUM SERPL-MCNC: 7.3 MG/DL (ref 8.7–10.5)
CALCIUM SERPL-MCNC: 7.4 MG/DL (ref 8.7–10.5)
CHLORIDE SERPL-SCNC: 92 MMOL/L (ref 95–110)
CHLORIDE SERPL-SCNC: 92 MMOL/L (ref 95–110)
CO2 SERPL-SCNC: 23 MMOL/L (ref 23–29)
CO2 SERPL-SCNC: 24 MMOL/L (ref 23–29)
CREAT SERPL-MCNC: 2 MG/DL (ref 0.5–1.4)
CREAT SERPL-MCNC: 2 MG/DL (ref 0.5–1.4)
DELSYS: ABNORMAL
DIFFERENTIAL METHOD: ABNORMAL
EOSINOPHIL # BLD AUTO: 0 K/UL (ref 0–0.5)
EOSINOPHIL NFR BLD: 0.1 % (ref 0–8)
EP: 5
ERYTHROCYTE [DISTWIDTH] IN BLOOD BY AUTOMATED COUNT: 13.9 % (ref 11.5–14.5)
EST. GFR  (AFRICAN AMERICAN): 29 ML/MIN/1.73 M^2
EST. GFR  (AFRICAN AMERICAN): 29 ML/MIN/1.73 M^2
EST. GFR  (NON AFRICAN AMERICAN): 25 ML/MIN/1.73 M^2
EST. GFR  (NON AFRICAN AMERICAN): 25 ML/MIN/1.73 M^2
FIO2: 50
GIANT PLATELETS BLD QL SMEAR: PRESENT
GLUCOSE SERPL-MCNC: 112 MG/DL (ref 70–110)
GLUCOSE SERPL-MCNC: 116 MG/DL (ref 70–110)
HCO3 UR-SCNC: 26 MMOL/L (ref 24–28)
HCT VFR BLD AUTO: 37.7 % (ref 37–48.5)
HGB BLD-MCNC: 12.9 G/DL (ref 12–16)
IMM GRANULOCYTES # BLD AUTO: 0.15 K/UL (ref 0–0.04)
IMM GRANULOCYTES NFR BLD AUTO: 1 % (ref 0–0.5)
IP: 10
LYMPHOCYTES # BLD AUTO: 1.9 K/UL (ref 1–4.8)
LYMPHOCYTES NFR BLD: 13 % (ref 18–48)
MAGNESIUM SERPL-MCNC: 1.6 MG/DL (ref 1.6–2.6)
MCH RBC QN AUTO: 28.9 PG (ref 27–31)
MCHC RBC AUTO-ENTMCNC: 34.2 G/DL (ref 32–36)
MCV RBC AUTO: 85 FL (ref 82–98)
MODE: ABNORMAL
MONOCYTES # BLD AUTO: 1 K/UL (ref 0.3–1)
MONOCYTES NFR BLD: 6.8 % (ref 4–15)
NEUTROPHILS # BLD AUTO: 11.6 K/UL (ref 1.8–7.7)
NEUTROPHILS NFR BLD: 79 % (ref 38–73)
NRBC BLD-RTO: 0 /100 WBC
PCO2 BLDA: 36.9 MMHG (ref 35–45)
PH SMN: 7.46 [PH] (ref 7.35–7.45)
PLATELET # BLD AUTO: 242 K/UL (ref 150–350)
PLATELET BLD QL SMEAR: ABNORMAL
PMV BLD AUTO: 12.2 FL (ref 9.2–12.9)
PO2 BLDA: 82 MMHG (ref 80–100)
POC BE: 2 MMOL/L
POC SATURATED O2: 97 % (ref 95–100)
POC TCO2: 27 MMOL/L (ref 23–27)
POCT GLUCOSE: 110 MG/DL (ref 70–110)
POCT GLUCOSE: 179 MG/DL (ref 70–110)
POCT GLUCOSE: 222 MG/DL (ref 70–110)
POCT GLUCOSE: 232 MG/DL (ref 70–110)
POTASSIUM SERPL-SCNC: 3.1 MMOL/L (ref 3.5–5.1)
POTASSIUM SERPL-SCNC: 3.4 MMOL/L (ref 3.5–5.1)
PROT SERPL-MCNC: 7 G/DL (ref 6–8.4)
RBC # BLD AUTO: 4.46 M/UL (ref 4–5.4)
SAMPLE: ABNORMAL
SITE: ABNORMAL
SODIUM SERPL-SCNC: 131 MMOL/L (ref 136–145)
SODIUM SERPL-SCNC: 132 MMOL/L (ref 136–145)
TROPONIN I SERPL DL<=0.01 NG/ML-MCNC: 49.33 NG/ML (ref 0–0.03)
WBC # BLD AUTO: 14.66 K/UL (ref 3.9–12.7)

## 2020-12-24 PROCEDURE — 83735 ASSAY OF MAGNESIUM: CPT

## 2020-12-24 PROCEDURE — 27000249 HC VAPOTHERM CIRCUIT

## 2020-12-24 PROCEDURE — 11000001 HC ACUTE MED/SURG PRIVATE ROOM

## 2020-12-24 PROCEDURE — 99291 PR CRITICAL CARE, E/M 30-74 MINUTES: ICD-10-PCS | Mod: GC,,, | Performed by: INTERNAL MEDICINE

## 2020-12-24 PROCEDURE — 99232 SBSQ HOSP IP/OBS MODERATE 35: CPT | Mod: ,,, | Performed by: INTERNAL MEDICINE

## 2020-12-24 PROCEDURE — 94761 N-INVAS EAR/PLS OXIMETRY MLT: CPT

## 2020-12-24 PROCEDURE — 82803 BLOOD GASES ANY COMBINATION: CPT

## 2020-12-24 PROCEDURE — 63600175 PHARM REV CODE 636 W HCPCS: Performed by: HOSPITALIST

## 2020-12-24 PROCEDURE — 25000003 PHARM REV CODE 250: Performed by: INTERNAL MEDICINE

## 2020-12-24 PROCEDURE — 36600 WITHDRAWAL OF ARTERIAL BLOOD: CPT

## 2020-12-24 PROCEDURE — 85730 THROMBOPLASTIN TIME PARTIAL: CPT

## 2020-12-24 PROCEDURE — 27100171 HC OXYGEN HIGH FLOW UP TO 24 HOURS

## 2020-12-24 PROCEDURE — 25000003 PHARM REV CODE 250: Performed by: HOSPITALIST

## 2020-12-24 PROCEDURE — 94640 AIRWAY INHALATION TREATMENT: CPT

## 2020-12-24 PROCEDURE — 20000000 HC ICU ROOM

## 2020-12-24 PROCEDURE — 84484 ASSAY OF TROPONIN QUANT: CPT

## 2020-12-24 PROCEDURE — 80048 BASIC METABOLIC PNL TOTAL CA: CPT

## 2020-12-24 PROCEDURE — 99291 CRITICAL CARE FIRST HOUR: CPT | Mod: GC,,, | Performed by: INTERNAL MEDICINE

## 2020-12-24 PROCEDURE — 36415 COLL VENOUS BLD VENIPUNCTURE: CPT

## 2020-12-24 PROCEDURE — 99900035 HC TECH TIME PER 15 MIN (STAT)

## 2020-12-24 PROCEDURE — 80076 HEPATIC FUNCTION PANEL: CPT

## 2020-12-24 PROCEDURE — 63600175 PHARM REV CODE 636 W HCPCS: Performed by: INTERNAL MEDICINE

## 2020-12-24 PROCEDURE — 80048 BASIC METABOLIC PNL TOTAL CA: CPT | Mod: 91

## 2020-12-24 PROCEDURE — 99232 PR SUBSEQUENT HOSPITAL CARE,LEVL II: ICD-10-PCS | Mod: ,,, | Performed by: INTERNAL MEDICINE

## 2020-12-24 PROCEDURE — 85025 COMPLETE CBC W/AUTO DIFF WBC: CPT

## 2020-12-24 PROCEDURE — 63600175 PHARM REV CODE 636 W HCPCS: Performed by: STUDENT IN AN ORGANIZED HEALTH CARE EDUCATION/TRAINING PROGRAM

## 2020-12-24 PROCEDURE — 63600175 PHARM REV CODE 636 W HCPCS: Performed by: NURSE PRACTITIONER

## 2020-12-24 PROCEDURE — 25000003 PHARM REV CODE 250: Performed by: STUDENT IN AN ORGANIZED HEALTH CARE EDUCATION/TRAINING PROGRAM

## 2020-12-24 PROCEDURE — 25000242 PHARM REV CODE 250 ALT 637 W/ HCPCS: Performed by: STUDENT IN AN ORGANIZED HEALTH CARE EDUCATION/TRAINING PROGRAM

## 2020-12-24 RX ORDER — LOSARTAN POTASSIUM 50 MG/1
50 TABLET ORAL DAILY
Status: DISCONTINUED | OUTPATIENT
Start: 2020-12-24 | End: 2020-12-26

## 2020-12-24 RX ORDER — FUROSEMIDE 10 MG/ML
40 INJECTION INTRAMUSCULAR; INTRAVENOUS
Status: DISCONTINUED | OUTPATIENT
Start: 2020-12-24 | End: 2020-12-26

## 2020-12-24 RX ORDER — POTASSIUM CHLORIDE 20 MEQ/1
40 TABLET, EXTENDED RELEASE ORAL ONCE
Status: COMPLETED | OUTPATIENT
Start: 2020-12-24 | End: 2020-12-24

## 2020-12-24 RX ORDER — POTASSIUM CHLORIDE 7.45 MG/ML
10 INJECTION INTRAVENOUS
Status: COMPLETED | OUTPATIENT
Start: 2020-12-24 | End: 2020-12-24

## 2020-12-24 RX ORDER — MAGNESIUM SULFATE HEPTAHYDRATE 40 MG/ML
2 INJECTION, SOLUTION INTRAVENOUS ONCE
Status: COMPLETED | OUTPATIENT
Start: 2020-12-24 | End: 2020-12-24

## 2020-12-24 RX ADMIN — INSULIN ASPART 8 UNITS: 100 INJECTION, SOLUTION INTRAVENOUS; SUBCUTANEOUS at 04:12

## 2020-12-24 RX ADMIN — PROMETHAZINE HYDROCHLORIDE 12.5 MG: 25 INJECTION INTRAMUSCULAR; INTRAVENOUS at 11:12

## 2020-12-24 RX ADMIN — POTASSIUM CHLORIDE 10 MEQ: 7.46 INJECTION, SOLUTION INTRAVENOUS at 03:12

## 2020-12-24 RX ADMIN — INSULIN ASPART 8 UNITS: 100 INJECTION, SOLUTION INTRAVENOUS; SUBCUTANEOUS at 11:12

## 2020-12-24 RX ADMIN — POTASSIUM CHLORIDE 10 MEQ: 7.46 INJECTION, SOLUTION INTRAVENOUS at 12:12

## 2020-12-24 RX ADMIN — MORPHINE SULFATE 2 MG: 2 INJECTION, SOLUTION INTRAMUSCULAR; INTRAVENOUS at 02:12

## 2020-12-24 RX ADMIN — PANTOPRAZOLE SODIUM 40 MG: 40 TABLET, DELAYED RELEASE ORAL at 08:12

## 2020-12-24 RX ADMIN — MUPIROCIN: 20 OINTMENT TOPICAL at 09:12

## 2020-12-24 RX ADMIN — CEFTRIAXONE SODIUM 1 G: 1 INJECTION, POWDER, FOR SOLUTION INTRAMUSCULAR; INTRAVENOUS at 12:12

## 2020-12-24 RX ADMIN — MAGNESIUM SULFATE IN WATER 2 G: 40 INJECTION, SOLUTION INTRAVENOUS at 02:12

## 2020-12-24 RX ADMIN — PROMETHAZINE HYDROCHLORIDE 12.5 MG: 25 INJECTION INTRAMUSCULAR; INTRAVENOUS at 12:12

## 2020-12-24 RX ADMIN — POTASSIUM CHLORIDE 40 MEQ: 1500 TABLET, EXTENDED RELEASE ORAL at 12:12

## 2020-12-24 RX ADMIN — ROSUVASTATIN CALCIUM 40 MG: 10 TABLET, FILM COATED ORAL at 08:12

## 2020-12-24 RX ADMIN — INSULIN ASPART 2 UNITS: 100 INJECTION, SOLUTION INTRAVENOUS; SUBCUTANEOUS at 04:12

## 2020-12-24 RX ADMIN — HYDRALAZINE HYDROCHLORIDE 10 MG: 20 INJECTION INTRAMUSCULAR; INTRAVENOUS at 01:12

## 2020-12-24 RX ADMIN — ONDANSETRON 8 MG: 2 INJECTION INTRAMUSCULAR; INTRAVENOUS at 09:12

## 2020-12-24 RX ADMIN — INSULIN DETEMIR 24 UNITS: 100 INJECTION, SOLUTION SUBCUTANEOUS at 11:12

## 2020-12-24 RX ADMIN — IPRATROPIUM BROMIDE AND ALBUTEROL SULFATE 3 ML: .5; 3 SOLUTION RESPIRATORY (INHALATION) at 07:12

## 2020-12-24 RX ADMIN — PANTOPRAZOLE SODIUM 40 MG: 40 TABLET, DELAYED RELEASE ORAL at 12:12

## 2020-12-24 RX ADMIN — IPRATROPIUM BROMIDE AND ALBUTEROL SULFATE 3 ML: .5; 3 SOLUTION RESPIRATORY (INHALATION) at 01:12

## 2020-12-24 RX ADMIN — NITROGLYCERIN 0.4 MG: 0.4 TABLET SUBLINGUAL at 02:12

## 2020-12-24 RX ADMIN — DOXYCYCLINE HYCLATE 100 MG: 100 TABLET, COATED ORAL at 08:12

## 2020-12-24 RX ADMIN — APIXABAN 5 MG: 5 TABLET, FILM COATED ORAL at 12:12

## 2020-12-24 RX ADMIN — APIXABAN 5 MG: 5 TABLET, FILM COATED ORAL at 08:12

## 2020-12-24 RX ADMIN — POTASSIUM CHLORIDE 10 MEQ: 7.46 INJECTION, SOLUTION INTRAVENOUS at 01:12

## 2020-12-24 RX ADMIN — INSULIN ASPART 1 UNITS: 100 INJECTION, SOLUTION INTRAVENOUS; SUBCUTANEOUS at 08:12

## 2020-12-24 RX ADMIN — POTASSIUM CHLORIDE 10 MEQ: 7.46 INJECTION, SOLUTION INTRAVENOUS at 02:12

## 2020-12-24 RX ADMIN — FUROSEMIDE 40 MG: 10 INJECTION, SOLUTION INTRAVENOUS at 08:12

## 2020-12-24 RX ADMIN — ALLOPURINOL 100 MG: 100 TABLET ORAL at 12:12

## 2020-12-24 RX ADMIN — MUPIROCIN: 20 OINTMENT TOPICAL at 08:12

## 2020-12-24 RX ADMIN — LOSARTAN POTASSIUM 50 MG: 50 TABLET, FILM COATED ORAL at 02:12

## 2020-12-24 NOTE — PLAN OF CARE
The proper method of use, as well as anticipated side effects, of this aerosol treatment are discussed and demonstrated to the patient.   Patient between bipap and high flow cannula.  Will continue to monitor and attempt to wean

## 2020-12-24 NOTE — NURSING
Stephanie NP and respiratory at bedside to speak with patient and spouse. Pt agreed to wear Bipap. Bipap placed. ABG ordered.     Respiratory getting ABG now. Waiting for results.

## 2020-12-24 NOTE — SUBJECTIVE & OBJECTIVE
[address] [address] 
[phone] Patient: Tani Cazares MRN: [id number] OUB:[id number] Visit Information Date & Time Provider Department Dept. Phone Encounter #  
 6/4/2018  4:45 PM Mukul Benson MD [address] [facility] [phone] [id number] Upcoming Health Maintenance Date Due DTaP/Tdap/Td series (1 - Tdap) 10/30/2002 Influenza Age 5 to Adult 8/1/2018 Allergies as of 6/4/2018  Review Complete On: 6/4/2018 By: Mukul Benson MD  
 No Known Allergies Current Immunizations  Never Reviewed Name Date Tdap  Incomplete Not reviewed this visit You Were Diagnosed With   
  
 Codes Comments Obesity, morbid ([facility]    -  Primary ICD-10-CM: E66.01 
ICD-9-CM: 278.01 Recurrent depression ([facility]     ICD-10-CM: F33.9 ICD-9-CM: 296.30 Anxiety     ICD-10-CM: F41.9 ICD-9-CM: 300.00 Encounter for immunization     ICD-10-CM: I24 ICD-9-CM: V03.89 Vitals BP Pulse Temp Resp Height(growth percentile) Weight(growth percentile) 133/90 (BP 1 Location: Left arm, BP Patient Position: Sitting) 100 98.5 °F (36.9 °C) (Oral) 16 5' 6\" (1.676 m) 248 lb 6.4 oz (112.7 kg) SpO2 BMI Smoking Status 99% 40.09 kg/m2 Former Smoker Vitals History BMI and BSA Data Body Mass Index Body Surface Area 40.09 kg/m 2 2.29 m 2 Preferred Pharmacy Pharmacy Name Phone [facility] [address] [facility], [address] [place] [address] [phone] Your Updated Medication List  
  
   
This list is accurate as of 6/4/18  5:18 PM.  Always use your most recent med list. ADVIL PM PO Take  by mouth. Indications: alterantes if necessary buPROPion  mg XL tablet Commonly known as:  Ce Vera Take 1 Tab by mouth every morning. busPIRone 10 mg tablet Commonly known as:  BUSPAR Interval History/Significant Events: Patient seen and examined this [facility]. Overnight, the patient was placed on BiPAP but had a significant amount of trouble wearing it throughout the night, constantly taking it off. Patient then placed on vapotherm and continued to struggle with this as well. Per nursing, patient was quite altered throughout the night. Also was experiencing intermittent bouts of chest pain as well and nausea/vomiting as well. Patient was ultimately able to sleep a few hours overnight and reported feeling a bit better this morning. Denies any current chest pain.     Review of Systems   Constitutional: Positive for fatigue. Negative for chills and fever.   Respiratory: Negative for cough, shortness of breath and wheezing.    Cardiovascular: Negative for chest pain.   Gastrointestinal: Positive for diarrhea and nausea. Negative for abdominal pain, constipation and vomiting.   Musculoskeletal: Negative for arthralgias and myalgias.   Skin: Negative for color change and rash.   Neurological: Positive for weakness. Negative for light-headedness and headaches.   Psychiatric/Behavioral: Positive for confusion.     Objective:     Vital Signs (Most Recent):  Temp: 98.3 °F (36.8 °C) (12/24/20 0715)  Pulse: 77 (12/24/20 1015)  Resp: (!) 24 (12/24/20 1015)  BP: (!) 153/72 (12/24/20 1000)  SpO2: (!) 93 % (12/24/20 1015) Vital Signs (24h Range):  Temp:  [97.5 °F (36.4 °C)-98.3 °F (36.8 °C)] 98.3 °F (36.8 °C)  Pulse:  [53-93] 77  Resp:  [18-43] 24  SpO2:  [88 %-100 %] 93 %  BP: ()/(50-95) 153/72   Weight: 91.5 kg (201 lb 11.5 oz)  Body mass index is 35.73 kg/m².      Intake/Output Summary (Last 24 hours) at 12/24/2020 1037  Last data filed at 12/24/2020 0600  Gross per 24 hour   Intake 1381.59 ml   Output 3105 ml   Net -1723.41 ml       Physical Exam  Constitutional:       General: She is not in acute distress.     Appearance: She is ill-appearing.   HENT:      Head: Normocephalic and atraumatic.    Cardiovascular:      Rate and Rhythm: Normal rate. Rhythm irregularly irregular.      Heart sounds: No murmur.   Pulmonary:      Effort: Pulmonary effort is normal.      Breath sounds: Wheezing (much less than yesterday, still slightly more on R) and rhonchi (bilateral improving) present.      Comments: Was taken off of vapotherm momentarily only with minor de-sat  Abdominal:      General: Abdomen is flat. There is no distension.      Palpations: Abdomen is soft.      Tenderness: There is no abdominal tenderness.   Skin:     General: Skin is warm and dry.   Neurological:      Mental Status: She is alert and oriented to person, place, and time.       Significant Labs:    CBC/Anemia Profile:  Recent Labs   Lab 12/23/20  0406 12/23/20  2334 12/24/20  0328   WBC 19.20*  --  14.66*   HGB 12.4  --  12.9   HCT 37.0 41 37.7     --  242   MCV 87  --  85   RDW 14.3  --  13.9        Chemistries:  Recent Labs   Lab 12/23/20  0406 12/24/20  0027 12/24/20  0328 12/24/20  0333   * 131*  --  132*   K 4.5 3.1*  --  3.4*   CL 96 92*  --  92*   CO2 26 23  --  24   BUN 37* 35*  --  35*   CREATININE 2.5* 2.0*  --  2.0*   CALCIUM 7.7* 7.4*  --  7.3*   ALBUMIN  --   --  2.9*  --    PROT  --   --  7.0  --    BILITOT  --   --  0.6  --    ALKPHOS  --   --  102  --    ALT  --   --  36  --    AST  --   --  64*  --    MG 2.4  --   --  1.6         Significant Imaging:  I have reviewed and interpreted all pertinent imaging results/findings within the past 24 hours.   TAKE 1 TABLET BY MOUTH TWICE DAILY  
  
 cyclobenzaprine 10 mg tablet Commonly known as:  FLEXERIL Take 1 Tab by mouth three (3) times daily as needed for Muscle Spasm(s). diclofenac 1 % Gel Commonly known as:  VOLTAREN Apply  to affected area four (4) times daily. guaiFENesin-codeine 100-10 mg/5 mL solution Commonly known as:  ROBITUSSIN AC Take 5 mL by mouth three (3) times daily as needed for Cough. Max Daily Amount: 15 mL. meloxicam 15 mg tablet Commonly known as:  MOBIC TK 1 T PO QD  
  
 PARoxetine 20 mg tablet Commonly known as:  PAXIL Take 1 Tab by mouth daily. predniSONE 10 mg tablet Commonly known as:  Rai Hope Please take 60mg x 2 days, 50mg x 2 days, 40mg x 2 days, 30mg x 2 days, 20mg x 2 days, 10mg x 2 days Prescriptions Sent to Pharmacy Refills PARoxetine (PAXIL) 20 mg tablet 2 Sig: Take 1 Tab by mouth daily. Class: Normal  
 Pharmacy: [facility] [address] [place] [address] [address] [place] [address] #: [phone] Route: Oral  
  
We Performed the Following TETANUS, DIPHTHERIA TOXOIDS AND ACELLULAR PERTUSSIS VACCINE (TDAP), IN INDIVIDS. >=7,  [id number] CPT(R)] Patient Instructions Body Mass Index: Care Instructions Your Care Instructions Body mass index (BMI) can help you see if your weight is raising your risk for health problems. It uses a formula to compare how much you weigh with how tall you are. · A BMI lower than 18.5 is considered underweight. · A BMI between 18.5 and 24.9 is considered healthy. · A BMI between 25 and 29.9 is considered overweight. A BMI of 30 or higher is considered obese. If your BMI is in the normal range, it means that you have a lower risk for weight-related health problems.  If your BMI is in the overweight or obese range, you may be at increased risk for weight-related health problems, such as high blood pressure, heart disease, stroke, arthritis or joint pain, and diabetes. If your BMI is in the underweight range, you may be at increased risk for health problems such as fatigue, lower protection (immunity) against illness, muscle loss, bone loss, hair loss, and hormone problems. BMI is just one measure of your risk for weight-related health problems. You may be at higher risk for health problems if you are not active, you eat an unhealthy diet, or you drink too much alcohol or use tobacco products. Follow-up care is a key part of your treatment and safety. Be sure to make and go to all appointments, and call your doctor if you are having problems. It's also a good idea to know your test results and keep a list of the medicines you take. How can you care for yourself at home? · Practice healthy eating habits. This includes eating plenty of fruits, vegetables, whole grains, lean protein, and low-fat dairy. · If your doctor recommends it, get more exercise. Walking is a good choice. Bit by bit, increase the amount you walk every day. Try for at least 30 minutes on most days of the week. · Do not smoke. Smoking can increase your risk for health problems. If you need help quitting, talk to your doctor about stop-smoking programs and medicines. These can increase your chances of quitting for good. · Limit alcohol to 2 drinks a day for men and 1 drink a day for women. Too much alcohol can cause health problems. If you have a BMI higher than 25 · Your doctor may do other tests to check your risk for weight-related health problems. This may include measuring the distance around your waist. A waist measurement of more than 40 inches in men or 35 inches in women can increase the risk of weight-related health problems. · Talk with your doctor about steps you can take to stay healthy or improve your health. You may need to make lifestyle changes to lose weight and stay healthy, such as changing your diet and getting regular exercise. If you have a BMI lower than 18.5 · Your doctor may do other tests to check your risk for health problems. · Talk with your doctor about steps you can take to stay healthy or improve your health. You may need to make lifestyle changes to gain or maintain weight and stay healthy, such as getting more healthy foods in your diet and doing exercises to build muscle. Where can you learn more? Go to http://derik-pb.info/. Enter S176 in the search box to learn more about \"Body Mass Index: Care Instructions. \" Current as of: October 13, 2016 Content Version: 11.4 © 2272-6440 [employer]. Care instructions adapted under license by [employer] (which disclaims liability or warranty for this information). If you have questions about a medical condition or this instruction, always ask your healthcare professional. [address] any warranty or liability for your use of this information. Introducing [facility] & HEALTH [facility]! [facility] introduces EveryScape patient portal. Now you can access parts of your medical record, email your doctor's office, and request medication refills online. 1. In your internet browser, go to [email or web address] 2. Click on the First Time User? Click Here link in the Sign In box. You will see the New Member Sign Up page. 3. Enter your EveryScape Access Code exactly as it appears below. You will not need to use this code after youve completed the sign-up process. If you do not sign up before the expiration date, you must request a new code. · EveryScape Access Code: [email or web address] Expires: 9/2/2018  5:18 PM 
 
4. Enter the last four digits of your Social Security Number (xxxx) and Date of Birth (mm/dd/yyyy) as indicated and click Submit. You will be taken to the next sign-up page. 5. Create a EveryScape ID.  This will be your EveryScape login ID and cannot be changed, so think of one that is secure and easy to remember. 6. Create a SwypeShield password. You can change your password at any time. 7. Enter your Password Reset Question and Answer. This can be used at a later time if you forget your password. 8. Enter your e-mail address. You will receive e-mail notification when new information is available in [address] 9. Click Sign Up. You can now view and download portions of your medical record. 10. Click the Download Summary menu link to download a portable copy of your medical information. If you have questions, please visit the Frequently Asked Questions section of the SwypeShield website. Remember, SwypeShield is NOT to be used for urgent needs. For medical emergencies, dial 911. Now available from your iPhone and Android! Please provide this summary of care documentation to your next provider. Your primary care clinician is listed as Monse aCrlos. If you have any questions after today's visit, please call [phone].

## 2020-12-24 NOTE — NURSING
Spoke with ROBYN Brown about patients status; increased work of breathing; refusing to put on Bipap mask; Pt educated. States she is going to come to bedside to speak with  and patient.

## 2020-12-24 NOTE — PROGRESS NOTES
Nephrology Progress Note      Consult Requested By: Manny Gatica, *  Reason for Consult: ARF    SUBJECTIVE:   as underlying CKD 2-3 with CR 1- 1.3     Review of Systems   Constitutional: Negative for chills and fever.   Respiratory: Negative for cough and shortness of breath.    Cardiovascular: Negative for chest pain and leg swelling.   Gastrointestinal: Negative for nausea.        OBJECTIVE:     Vital Signs (Most Recent)  Vitals:    12/24/20 1215 12/24/20 1230 12/24/20 1245 12/24/20 1300   BP:  (!) 161/77     Pulse: 67 66 71 72   Resp: (!) 22 (!) 21 (!) 24 (!) 24   Temp:       TempSrc:       SpO2: 97% 96% 95% 97%   Weight:       Height:         Date 12/24/20 0700 - 12/25/20 0659   Shift 3436-0533 7191-3988 8589-9601 24 Hour Total   INTAKE   Shift Total(mL/kg)       OUTPUT   Urine(mL/kg/hr) 630   630   Shift Total(mL/kg) 630(6.9)   630(6.9)   Weight (kg) 91.5 91.5 91.5 91.5     Medications:   albuterol-ipratropium  3 mL Nebulization Q6H WAKE    allopurinoL  100 mg Oral Daily    apixaban  5 mg Oral BID    aspirin  81 mg Oral Daily    cefTRIAXone (ROCEPHIN) IVPB  1 g Intravenous Q24H    clopidogreL  75 mg Oral Daily    doxycycline  100 mg Oral Q12H    insulin aspart U-100  8 Units Subcutaneous TIDWM    insulin detemir U-100  24 Units Subcutaneous Daily    losartan  50 mg Oral Daily    magnesium sulfate IVPB  2 g Intravenous Once    mupirocin   Nasal BID    pantoprazole  40 mg Oral BID    rosuvastatin  40 mg Oral QHS     Physical Exam   Constitutional: She is oriented to person, place, and time and well-developed, well-nourished, and in no distress. No distress.   HENT:   Head: Normocephalic and atraumatic.   Mouth/Throat: Oropharynx is clear and moist.   Eyes: EOM are normal. No scleral icterus.   Neck: Neck supple. No JVD present.   Cardiovascular: Normal rate and regular rhythm. Exam reveals no friction rub.   No murmur heard.  Pulmonary/Chest: Tachypnea noted. No respiratory distress (on  high flow 70% FIo2). She has no wheezes. She has rales.   Abdominal: Soft. Bowel sounds are normal. She exhibits no distension. There is no abdominal tenderness.   Musculoskeletal:         General: Edema (trace) present.   Neurological: She is alert and oriented to person, place, and time.   Skin: Skin is warm and dry. No rash noted. She is not diaphoretic. No erythema.   Psychiatric: Affect normal.     Diagnostic Results:  X-Ray: Reviewed  US: Reviewed  Echo: Reviewed  ASSESSMENT/PLAN:   1. ARF on CKD 3 baseline Cr 1-1.3  ATN from hemodynamic instability from STEMI +/- TMA from HTN emergency on presentation  Supportive care for now   Avoid nephrotoxins, dose all meds ad for GFr < 10  No indication for urgent RRT. Monitor closely. Strict ins/outs, daily weight    UO 3.2 L over the last 24 hours on Lasix gtt. Stop drip, lasix 40 IV bid, next dose @ 8pm    Replacing K  Recent Labs   Lab 12/23/20  0406 12/24/20  0027 12/24/20  0333   * 131* 132*   K 4.5 3.1* 3.4*   BUN 37* 35* 35*   CREATININE 2.5* 2.0* 2.0*     2. HTN (I10) - resume losartan 50 (home dose 100, will titrate up as needed)  4. MBD (E88.9 M90.80) - check phos, mg replaced  Recent Labs   Lab 12/24/20  0333   CALCIUM 7.3*     Recent Labs   Lab 12/22/20  0522 12/23/20  0406 12/24/20  0333   MG 1.2* 2.4 1.6     No results found for: AOGUKZEY23NL  5. Acidosis - better today, not on bicarb therapy  Recent Labs   Lab 12/23/20  0406 12/24/20  0027 12/24/20  0333   CL 96 92* 92*   CO2 26 23 24     Total critical care time 35 minutes: included management of organ failures related to critical illness, titration of continuous infusions, review of pertinent labs and imaging studies, discussion with primary team      Thank you for allowing me to participate in care of your patient  With any question please call [phone]  Kimi Wallace    [facility]  SCOTT Vaughn MD, FACP,   M. Shields KENYETTA TAVERA MD V.V. Rusnak, MD E. V. Harmon, NP  [phone]  [place], [address]  [phone]  After hours answering service: [phone]

## 2020-12-24 NOTE — NURSING
Patient complaining of chest pain 7/10. 1 nitroglycerin SL tablet given. NP Stephanie notified. Order to give PRN morphine as well.   NP ordered an X ray and troponin. EKG printed and reviewed by NP.No new orders given.

## 2020-12-24 NOTE — PLAN OF CARE
12/24/20 1035   Discharge Reassessment   Assessment Type Discharge Planning Reassessment   Provided patient/caregiver education on the expected discharge date and the discharge plan Yes   Do you have any problems affording any of your prescribed medications? No   Discharge Plan A Home with family   Discharge Plan B Home Health   DME Needed Upon Discharge  other (see comments)  (TBD)   Anticipated Discharge Disposition   (TBD)   Can the patient/caregiver answer the patient profile reliably? Yes, cognitively intact   How does the patient rate their overall health at the present time? Fair   Describe the patient's ability to walk at the present time. Minor restrictions or changes   How often would a person be available to care for the patient? Whenever needed   Number of comorbid conditions (as recorded on the chart) Two   During the past month, has the patient often been bothered by feeling down, depressed or hopeless? No   During the past month, has the patient often been bothered by little interest or pleasure in doing things? No   Post-Acute Status   Post-Acute Authorization   (TBD)   Discharge Delays None known at this time

## 2020-12-24 NOTE — PLAN OF CARE
Problem: Adult Inpatient Plan of Care  Goal: Plan of Care Review  Outcome: Ongoing, Progressing     Pt AAO; drowsy at times. Vital signs stable at this time.;remains afebrile. Vapotherm on; 40L/min 100% fiO2. Pt resting comfortably at the moment. No complaints of chest pain at this time. Heparin and lasix gtt continued per order. Urinary output adequate. Potassium replaced this shift given 40 mEq. PRN medication given for nausea and chest pain. Repositioned independently; weight shift assistance provided. Safety precautions in place. Plan of care reviewed with patient and  at bedside.

## 2020-12-24 NOTE — PLAN OF CARE
Pt lying in bed comfortably with eyes closed, easily aroused, oriented x4. C/o SOB during shift. HFNC at 8L with SATS 92%. Bipap PRN as pt does not tolerate for long. Lung sounds with crackles and exp wheezes. Repeat covid test negative. Tele sinus arrhythmia with frequent PAC's. HR 50-70. C/O mild chest pain with mild to moderate relief from PRN nitro. Heparin at therapeutic dose. Afebrile. BP stable. Responded well to lasix gtt with 1L urine output at 15mg/hr. C/O nausea with dry heaving--mild relief from PRN zofran and gi cocktail. Moderate relief from PRN phenergan. POC discussed with patient, daughter, and spouse. Will continue to monitor.

## 2020-12-24 NOTE — NURSING
Pt continuously removing vapotherm and Bipap; Pt oriented. Pt educated on importance .  at bedside. Vapotherm currently in use.

## 2020-12-24 NOTE — NURSING
Pt ripped off Bipap mask; went in to reapply mask. Noticed patient was very drowsy; couldn't answer my questions. Sternal rubbed patient responded. Respiratory at bedside. ABG performed. Stephanie NP called states she is coming to bedside.     Pt complains of nausea. Stephanie CARLSON ordered to give PRN Phenergan. Pt more arousable placed on vapotherm for now until complaints of nausea are relieved.

## 2020-12-24 NOTE — CARE UPDATE
This note also relates to the following rows which could not be included:  SpO2 - Cannot attach notes to unvalidated device data  Pulse - Cannot attach notes to unvalidated device data  Resp - Cannot attach notes to unvalidated device data  BP - Cannot attach notes to unvalidated device data    Entered room as rn was trying to arouse patient.  Patient was not repsonding.  This rt sternal rubbed the patient with no response.  Help summoned.  Pressure put on finger nail bed and eyes of patient opened.  abg obatined.  Then patient began to heave and when asked if she was nauseated stated yes.  bipap removed and placed on standby.  Patient placed on vapotherm.  Patient still feel ill.  md to bedside awaiting orders

## 2020-12-24 NOTE — CARE UPDATE
This note also relates to the following rows which could not be included:  SpO2 - Cannot attach notes to unvalidated device data  Pulse - Cannot attach notes to unvalidated device data  Resp - Cannot attach notes to unvalidated device data  BP - Cannot attach notes to unvalidated device data    vapotherm on standby bipap back per md directive, patient continues to pull at bipap and attempt to remove mask.  Constant redirection needed

## 2020-12-24 NOTE — PROGRESS NOTES
"[facility] ICU 5th Floor  Critical Care Medicine  Progress Note    Patient Name: Eliza Louie  MRN: [id number]  Admission Date: 12/21/2020  Hospital Length of Stay: 3 days  Code Status: Full Code  Attending Provider: Manny Gatica, *  Primary Care Provider: Jayden Pérez MD   Principal Problem: Acute ST elevation myocardial infarction (STEMI)    Subjective:     HPI:  Patient is a 70 year old female with PMHx of CAD s/p 1 stent, HTN, DM who presented to the ED with 1 day history of nausea and vomiting. Patient reports the evening before presentation, she attempted to eat some jello and bread and immediately vomited them back up. Patient then presented to the ED the following morning with continued nausea and vomiting. While in the ED, patient significantly hypertensive to maximum 245/119 and then the patient began to develop rapid onset shortness of breath and was noted to be desat-ing. Patient placed on BiPAP with improvement in shortness of breath and improvement in sats. Labwork at the time was remarkable for a troponin elevated to 38. EKG at the time was largely without changes. Labwork also significant for severely elevated blood glucose, anion gap, positive beta-hydroxybutyrate. Cardiology was consulted in the ED and recommended medical management of the patient at this time.    Repeat EKG some time later significant for ST elevation anteriorly and ST depression inferiorly. Case was discussed again with cardiology who felt there would be questionable benefit and high risk for an invasive intervention and continued to recommend medical management. Patient's BG continued to be elevated>450 despite insulin. CXR showed "Pulmonary opacities consistent with pulmonary infiltrate/airspace disease most notably on the right with suspected infiltrate at the left base as well."    Hospital/ICU Course:  No notes on file    Interval History/Significant Events: Patient seen and examined this " abimbola. Overnight, the patient was placed on BiPAP but had a significant amount of trouble wearing it throughout the night, constantly taking it off. Patient then placed on vapotherm and continued to struggle with this as well. Per nursing, patient was quite altered throughout the night. Also was experiencing intermittent bouts of chest pain as well and nausea/vomiting as well. Patient was ultimately able to sleep a few hours overnight and reported feeling a bit better this morning. Denies any current chest pain.     Review of Systems   Constitutional: Positive for fatigue. Negative for chills and fever.   Respiratory: Negative for cough, shortness of breath and wheezing.    Cardiovascular: Negative for chest pain.   Gastrointestinal: Positive for diarrhea and nausea. Negative for abdominal pain, constipation and vomiting.   Musculoskeletal: Negative for arthralgias and myalgias.   Skin: Negative for color change and rash.   Neurological: Positive for weakness. Negative for light-headedness and headaches.   Psychiatric/Behavioral: Positive for confusion.     Objective:     Vital Signs (Most Recent):  Temp: 98.3 °F (36.8 °C) (12/24/20 0715)  Pulse: 77 (12/24/20 1015)  Resp: (!) 24 (12/24/20 1015)  BP: (!) 153/72 (12/24/20 1000)  SpO2: (!) 93 % (12/24/20 1015) Vital Signs (24h Range):  Temp:  [97.5 °F (36.4 °C)-98.3 °F (36.8 °C)] 98.3 °F (36.8 °C)  Pulse:  [53-93] 77  Resp:  [18-43] 24  SpO2:  [88 %-100 %] 93 %  BP: ()/(50-95) 153/72   Weight: 91.5 kg (201 lb 11.5 oz)  Body mass index is 35.73 kg/m².      Intake/Output Summary (Last 24 hours) at 12/24/2020 1037  Last data filed at 12/24/2020 0600  Gross per 24 hour   Intake 1381.59 ml   Output 3105 ml   Net -1723.41 ml       Physical Exam  Constitutional:       General: She is not in acute distress.     Appearance: She is ill-appearing.   HENT:      Head: Normocephalic and atraumatic.   Cardiovascular:      Rate and Rhythm: Normal rate. Rhythm irregularly  irregular.      Heart sounds: No murmur.   Pulmonary:      Effort: Pulmonary effort is normal.      Breath sounds: Wheezing (much less than yesterday, still slightly more on R) and rhonchi (bilateral improving) present.      Comments: Was taken off of vapotherm momentarily only with minor de-sat  Abdominal:      General: Abdomen is flat. There is no distension.      Palpations: Abdomen is soft.      Tenderness: There is no abdominal tenderness.   Skin:     General: Skin is warm and dry.   Neurological:      Mental Status: She is alert and oriented to person, place, and time.       Significant Labs:    CBC/Anemia Profile:  Recent Labs   Lab 12/23/20  0406 12/23/20  2334 12/24/20  0328   WBC 19.20*  --  14.66*   HGB 12.4  --  12.9   HCT 37.0 41 37.7     --  242   MCV 87  --  85   RDW 14.3  --  13.9        Chemistries:  Recent Labs   Lab 12/23/20  0406 12/24/20  0027 12/24/20  0328 12/24/20  0333   * 131*  --  132*   K 4.5 3.1*  --  3.4*   CL 96 92*  --  92*   CO2 26 23  --  24   BUN 37* 35*  --  35*   CREATININE 2.5* 2.0*  --  2.0*   CALCIUM 7.7* 7.4*  --  7.3*   ALBUMIN  --   --  2.9*  --    PROT  --   --  7.0  --    BILITOT  --   --  0.6  --    ALKPHOS  --   --  102  --    ALT  --   --  36  --    AST  --   --  64*  --    MG 2.4  --   --  1.6         Significant Imaging:  I have reviewed and interpreted all pertinent imaging results/findings within the past 24 hours.      ABG  Recent Labs   Lab 12/24/20  0025   PH 7.455*   PO2 82   PCO2 36.9   HCO3 26.0   BE 2     Assessment/Plan:     Neuro/Psych  Mental status questionable overnight, likely delirium  This morning slightly improved after some sleep  Delirium precautions     CV  #STEMI  Patient with initial symptoms of nausea/vomiting followed by development of chest pain and rapid onset shortness of breath in the setting of wildly uncontrolled hypertension  Troponin still significantly elevated but downtrending  Intermittent chest pain controlled with  PRN nitro  Cardiology consulted, appreciate recommendations  Per cardiology recommendation, will transition from heparin gtt to eliquis  ASA, plavix, statin  Will consider low dose ACEi if BP will allow it     #HfPEF  Patient still with fluid on exam  Net -1847 (uop 3230mL) on lasix gtt over last 24hrs  Continue lasix gtt  Continue to monitor     #Hyerptensive Urgency/Emergency   Initially with significant HTN controlled on Cardene drip  Stopped Cardene and resumed 1/2 of home coreg but patient became slightly hypotensive  BP labile but may consider low dose BB or ACEi    #A. Fib  New onset s/p STEMI  No RVR  Starting Eliquis today   Continue to monitor     Pulm  O2 requirements fluctuating relating to chest pain  Currently on vapotherm  Attempt to wean as able, likely can tolerate nasal cannula     Heme/ID  Afebrile and without leukocytosis on presentation  Ceftriaxone started by Primary team for concern for possible pneumonia?     FEN/GI  Diabetic diet w/ fluid restriction  Continue to be very judicious with fluid administration given fluid overload  No other known acute issues at this time     RENAL  #JOHN on CKD  BUN/Cr now downtrending: today 35/2.0, down from 37/2.5  Nephrology consulted; appreciate input  Lasix gtt decent urine output  Continue to monitor     Endo  #T2DM  Blood glucose >400 on presentation with trace urine ketones, B-hydroxybutyrate .9, anion gap 18 but not acidotic  Reportedly only on metformin at home but A1c>10  Gap closed and has remained closed  24u detemir daily and 8u aspart TID  MSSI  Goal -180    Dispo: If can remain on vapotherm/NC, likely can step down     Dwaine Benítez MD  Critical Care Medicine  [facility] ICU 5th Floor

## 2020-12-24 NOTE — PROGRESS NOTES
No complaints of chest pains or shortness of breath today but nausea persists. I don't understand this except that she has had this in the past and has uncontrolled diabetes and wonder what component of gastroparesis etc she has contributing to this.   VS: blood pressures stable and in fact in the 160 range  Pulse normal; I/O: put out 3230 yesterday on IV lasix drip  Lungs: still with fine crackles bilaterally  Heart: RR, normal S1,S2, no murmurs noted; no rubs noted today    Labs: EKG: atrial fibrillation with ST elevation noted in lead III only; same as the first EKG; ST depression noted but better   Cr better with GFR today 25 up from 19; K 3.4;  CXR Yesterday was markedly improved and today about the same with blunted left angle    Imp: Acute MI most lilkely posterior - is this from the right coronary artery with distal PLB branch small and diseased and occlusion of the stent? Or is this occlusion with no collateral flow to the circumflex marginal branch. In either case, presenting with over 12 hours of nausea and troponin elevation of 38 suggests that she had already infarcted; echo on admission and later shows good LV function with good motion of the anterior and lateral walls with akinesis of the basal portion of the inferior wall.   Even if intervention was done with her renal function being poor, this would not change the wall motion or the muscle damage she already had on admission. Patient also did not wish to proceed with risk of further kidney damage and thus medical therapy is being pursued  Plan: continue ASA, plavix for now. She is on heparin and would transition to eliquis; hold off on beta blockers for now till more stable and start at very low dose.   Eventually as outpatient, she would need a stress test to look at the LAD to make sure she has no new lesions of concern.

## 2020-12-24 NOTE — CARE UPDATE
This note also relates to the following rows which could not be included:  SpO2 - Cannot attach notes to unvalidated device data  Pulse - Cannot attach notes to unvalidated device data  Resp - Cannot attach notes to unvalidated device data  BP - Cannot attach notes to unvalidated device data    Patient continues to pull out vapotherm from nose to spite this therapist and corrina menjivar going in room and reeducating.   at bedside but not assisting at this time.  NP aware of situation.  When patient does drift to sleep on vapotherm patients respiratory status appears to be less labored.  Will continue to monitor

## 2020-12-24 NOTE — PLAN OF CARE
Mrs. Louie, will be encouraged and assisted to turn every 2 hours, supported with pillows, heels floated off the mattress, to prevent pressure areas to the skin. She will be assisted to get up to the bedside commode and sit in the chair at the bedside;  while sitting she will be encouraged to do heel slides, ankle pumps, seated marching, and straight leg raises.Respiratory is weaning her oxygen flow slowly to accommodate her needs.

## 2020-12-25 LAB
ANION GAP SERPL CALC-SCNC: 14 MMOL/L (ref 8–16)
BASOPHILS # BLD AUTO: 0.01 K/UL (ref 0–0.2)
BASOPHILS # BLD AUTO: 0.02 K/UL (ref 0–0.2)
BASOPHILS NFR BLD: 0.1 % (ref 0–1.9)
BASOPHILS NFR BLD: 0.2 % (ref 0–1.9)
BUN SERPL-MCNC: 37 MG/DL (ref 8–23)
CALCIUM SERPL-MCNC: 7.7 MG/DL (ref 8.7–10.5)
CHLORIDE SERPL-SCNC: 93 MMOL/L (ref 95–110)
CO2 SERPL-SCNC: 28 MMOL/L (ref 23–29)
CREAT SERPL-MCNC: 2.3 MG/DL (ref 0.5–1.4)
DIFFERENTIAL METHOD: ABNORMAL
DIFFERENTIAL METHOD: NORMAL
EOSINOPHIL # BLD AUTO: 0.2 K/UL (ref 0–0.5)
EOSINOPHIL # BLD AUTO: 0.2 K/UL (ref 0–0.5)
EOSINOPHIL NFR BLD: 1.9 % (ref 0–8)
EOSINOPHIL NFR BLD: 1.9 % (ref 0–8)
ERYTHROCYTE [DISTWIDTH] IN BLOOD BY AUTOMATED COUNT: 14.2 % (ref 11.5–14.5)
ERYTHROCYTE [DISTWIDTH] IN BLOOD BY AUTOMATED COUNT: 14.3 % (ref 11.5–14.5)
EST. GFR  (AFRICAN AMERICAN): 24 ML/MIN/1.73 M^2
EST. GFR  (NON AFRICAN AMERICAN): 21 ML/MIN/1.73 M^2
GLUCOSE SERPL-MCNC: 159 MG/DL (ref 70–110)
HCT VFR BLD AUTO: 39.3 % (ref 37–48.5)
HCT VFR BLD AUTO: 39.5 % (ref 37–48.5)
HGB BLD-MCNC: 12.8 G/DL (ref 12–16)
HGB BLD-MCNC: 13 G/DL (ref 12–16)
IMM GRANULOCYTES # BLD AUTO: 0.04 K/UL (ref 0–0.04)
IMM GRANULOCYTES # BLD AUTO: 0.06 K/UL (ref 0–0.04)
IMM GRANULOCYTES NFR BLD AUTO: 0.4 % (ref 0–0.5)
IMM GRANULOCYTES NFR BLD AUTO: 0.6 % (ref 0–0.5)
LYMPHOCYTES # BLD AUTO: 2.1 K/UL (ref 1–4.8)
LYMPHOCYTES # BLD AUTO: 2.2 K/UL (ref 1–4.8)
LYMPHOCYTES NFR BLD: 21 % (ref 18–48)
LYMPHOCYTES NFR BLD: 21.1 % (ref 18–48)
MAGNESIUM SERPL-MCNC: 2 MG/DL (ref 1.6–2.6)
MCH RBC QN AUTO: 28.5 PG (ref 27–31)
MCH RBC QN AUTO: 28.7 PG (ref 27–31)
MCHC RBC AUTO-ENTMCNC: 32.6 G/DL (ref 32–36)
MCHC RBC AUTO-ENTMCNC: 32.9 G/DL (ref 32–36)
MCV RBC AUTO: 87 FL (ref 82–98)
MCV RBC AUTO: 88 FL (ref 82–98)
MONOCYTES # BLD AUTO: 0.7 K/UL (ref 0.3–1)
MONOCYTES # BLD AUTO: 0.7 K/UL (ref 0.3–1)
MONOCYTES NFR BLD: 6.7 % (ref 4–15)
MONOCYTES NFR BLD: 6.8 % (ref 4–15)
NEUTROPHILS # BLD AUTO: 7 K/UL (ref 1.8–7.7)
NEUTROPHILS # BLD AUTO: 7.2 K/UL (ref 1.8–7.7)
NEUTROPHILS NFR BLD: 69.6 % (ref 38–73)
NEUTROPHILS NFR BLD: 69.7 % (ref 38–73)
NRBC BLD-RTO: 0 /100 WBC
NRBC BLD-RTO: 0 /100 WBC
PLATELET # BLD AUTO: 232 K/UL (ref 150–350)
PLATELET # BLD AUTO: 235 K/UL (ref 150–350)
PMV BLD AUTO: 11.9 FL (ref 9.2–12.9)
PMV BLD AUTO: 12.1 FL (ref 9.2–12.9)
POCT GLUCOSE: 103 MG/DL (ref 70–110)
POCT GLUCOSE: 129 MG/DL (ref 70–110)
POCT GLUCOSE: 155 MG/DL (ref 70–110)
POCT GLUCOSE: 216 MG/DL (ref 70–110)
POTASSIUM SERPL-SCNC: 4 MMOL/L (ref 3.5–5.1)
RBC # BLD AUTO: 4.49 M/UL (ref 4–5.4)
RBC # BLD AUTO: 4.53 M/UL (ref 4–5.4)
SODIUM SERPL-SCNC: 135 MMOL/L (ref 136–145)
WBC # BLD AUTO: 10.01 K/UL (ref 3.9–12.7)
WBC # BLD AUTO: 10.27 K/UL (ref 3.9–12.7)

## 2020-12-25 PROCEDURE — 25000003 PHARM REV CODE 250: Performed by: INTERNAL MEDICINE

## 2020-12-25 PROCEDURE — 63600175 PHARM REV CODE 636 W HCPCS: Performed by: INTERNAL MEDICINE

## 2020-12-25 PROCEDURE — 85025 COMPLETE CBC W/AUTO DIFF WBC: CPT | Mod: 91

## 2020-12-25 PROCEDURE — 94640 AIRWAY INHALATION TREATMENT: CPT

## 2020-12-25 PROCEDURE — 83735 ASSAY OF MAGNESIUM: CPT

## 2020-12-25 PROCEDURE — 27000221 HC OXYGEN, UP TO 24 HOURS

## 2020-12-25 PROCEDURE — 25000003 PHARM REV CODE 250: Performed by: NURSE PRACTITIONER

## 2020-12-25 PROCEDURE — 27100171 HC OXYGEN HIGH FLOW UP TO 24 HOURS

## 2020-12-25 PROCEDURE — 63600175 PHARM REV CODE 636 W HCPCS: Performed by: HOSPITALIST

## 2020-12-25 PROCEDURE — 94761 N-INVAS EAR/PLS OXIMETRY MLT: CPT

## 2020-12-25 PROCEDURE — 25000003 PHARM REV CODE 250: Performed by: HOSPITALIST

## 2020-12-25 PROCEDURE — 25000242 PHARM REV CODE 250 ALT 637 W/ HCPCS: Performed by: STUDENT IN AN ORGANIZED HEALTH CARE EDUCATION/TRAINING PROGRAM

## 2020-12-25 PROCEDURE — 80048 BASIC METABOLIC PNL TOTAL CA: CPT

## 2020-12-25 PROCEDURE — 25000003 PHARM REV CODE 250: Performed by: STUDENT IN AN ORGANIZED HEALTH CARE EDUCATION/TRAINING PROGRAM

## 2020-12-25 PROCEDURE — 11000001 HC ACUTE MED/SURG PRIVATE ROOM

## 2020-12-25 PROCEDURE — 99900035 HC TECH TIME PER 15 MIN (STAT)

## 2020-12-25 RX ORDER — INSULIN ASPART 100 [IU]/ML
9 INJECTION, SOLUTION INTRAVENOUS; SUBCUTANEOUS
Status: DISCONTINUED | OUTPATIENT
Start: 2020-12-25 | End: 2020-12-27 | Stop reason: HOSPADM

## 2020-12-25 RX ADMIN — MUPIROCIN: 20 OINTMENT TOPICAL at 09:12

## 2020-12-25 RX ADMIN — INSULIN DETEMIR 24 UNITS: 100 INJECTION, SOLUTION SUBCUTANEOUS at 09:12

## 2020-12-25 RX ADMIN — PANTOPRAZOLE SODIUM 40 MG: 40 TABLET, DELAYED RELEASE ORAL at 09:12

## 2020-12-25 RX ADMIN — ASPIRIN 81 MG: 81 TABLET, COATED ORAL at 09:12

## 2020-12-25 RX ADMIN — CEFTRIAXONE SODIUM 1 G: 1 INJECTION, POWDER, FOR SOLUTION INTRAMUSCULAR; INTRAVENOUS at 11:12

## 2020-12-25 RX ADMIN — ROSUVASTATIN CALCIUM 40 MG: 10 TABLET, FILM COATED ORAL at 09:12

## 2020-12-25 RX ADMIN — INSULIN ASPART 8 UNITS: 100 INJECTION, SOLUTION INTRAVENOUS; SUBCUTANEOUS at 11:12

## 2020-12-25 RX ADMIN — DOXYCYCLINE HYCLATE 100 MG: 100 TABLET, COATED ORAL at 09:12

## 2020-12-25 RX ADMIN — INSULIN ASPART 8 UNITS: 100 INJECTION, SOLUTION INTRAVENOUS; SUBCUTANEOUS at 09:12

## 2020-12-25 RX ADMIN — APIXABAN 5 MG: 5 TABLET, FILM COATED ORAL at 09:12

## 2020-12-25 RX ADMIN — FUROSEMIDE 40 MG: 10 INJECTION, SOLUTION INTRAVENOUS at 09:12

## 2020-12-25 RX ADMIN — IPRATROPIUM BROMIDE AND ALBUTEROL SULFATE 3 ML: .5; 3 SOLUTION RESPIRATORY (INHALATION) at 01:12

## 2020-12-25 RX ADMIN — ALLOPURINOL 100 MG: 100 TABLET ORAL at 09:12

## 2020-12-25 RX ADMIN — IPRATROPIUM BROMIDE AND ALBUTEROL SULFATE 3 ML: .5; 3 SOLUTION RESPIRATORY (INHALATION) at 08:12

## 2020-12-25 RX ADMIN — CLOPIDOGREL 75 MG: 75 TABLET, FILM COATED ORAL at 09:12

## 2020-12-25 RX ADMIN — LOSARTAN POTASSIUM 50 MG: 50 TABLET, FILM COATED ORAL at 09:12

## 2020-12-25 RX ADMIN — INSULIN ASPART 9 UNITS: 100 INJECTION, SOLUTION INTRAVENOUS; SUBCUTANEOUS at 05:12

## 2020-12-25 NOTE — ASSESSMENT & PLAN NOTE
Began complaining of SOB in the ED---concerned for flash pulm edema along with CP. 40 mg IV furosemide given. Started on Bipap -> HFNC  - now on lasix gtt->bid  - monitor strict I/Os  - added on antibiotics 12/23: does have leukocytosis but has been afebrile; likely due to pulmonary edema/ischemia but will trial abx given significant hypoxia despite attempts at diuresis

## 2020-12-25 NOTE — ASSESSMENT & PLAN NOTE
- hemoglobin A1c is 10.2  - hold Metformin while inpatient  - initiated on insulin gtt upon arrival, now discontinued  - hold home metformin  - transitioned to subQ detemir 24u with aspart 9u qac  - LDSSI with accuchecks  - diabetic diet

## 2020-12-25 NOTE — ASSESSMENT & PLAN NOTE
- due to ischemia  - initiated on IV lasix  - now on lasix gtt->IV push  - start low dose ARB  - Nephrology consulted, appreciate recs  - Cards following, appreciate recs

## 2020-12-25 NOTE — PLAN OF CARE
Problem: Adult Inpatient Plan of Care  Goal: Plan of Care Review  Outcome: Ongoing, Progressing  Pt was a transfer from icu. All admission documentation was completed. Chart reviewed and care plan updated

## 2020-12-25 NOTE — ASSESSMENT & PLAN NOTE
- due to ischemia  - initiated on IV lasix  - lasix gtt->IV push  - started low dose ARB on 12/24; will need BB once able to tolerate  - Nephrology consulted, appreciate recs  - Cards following, appreciate recs

## 2020-12-25 NOTE — ASSESSMENT & PLAN NOTE
- likely hypoxia due to pulmonary edema alone, but given leukocytosis, continued high O2 needs, and congestion on CXR, will cover empirically with abx  - ceftriaxone and doxycycline  - 5d total course

## 2020-12-25 NOTE — NURSING TRANSFER
Nursing Transfer Note      12/24/2020     Transfer To: Room 485    Transfer via bed    Transfer with to O2, cardiac monitoring    Transported by MINDY Khalil     Medicines sent: Nitroglycerin SL tab, Insulin detemir/aspart, doxycycline     Chart send with patient: Yes    Notified: Patient notified spouse of transfer.    Pt oriented to room, call light in reach, bed in lowest position & safety precautions in place. All personal belongings sent with patient. MINDY Upton at bedside to assess patient.

## 2020-12-25 NOTE — PLAN OF CARE
The proper method of use, as well as anticipated side effects, of this aerosol treatment are discussed and demonstrated to the patient.   On vapotherm weaning well.  Will continue to wean and alisonior

## 2020-12-25 NOTE — NURSING
Aguirre removed at 2230; pt due to void at 0430 12/25. Preparing for transfer to floor. Patient notified spouse of transfer. Patient going to room 485. Will gather patient belongings and send with her.

## 2020-12-25 NOTE — PLAN OF CARE
Safety maintained, bed alarm on and call bell in reach. Telemetry monitoring in progress Sinus Booker noted. No red alarms heard.POC reviewed and questions answered. Denies any pain or discomfort. No respiratory distress noted. Remains on Hi Flow O2@ 10 Liters.Antibiotic therapy in progress. Vital signs stable. No distress noted.will continue to monitor.

## 2020-12-25 NOTE — PLAN OF CARE
Received from ICU, transported via bed accompanied by Simran RN and Stanislaw RN. Awake, alert and oriented x4. Telemetry monitor in progress. Placed on O2@ 10L hi flow. Oriented to room and equipment call bell in reach and bed in low position. Instructed to call for assistance prior to getting out of bed. Verbalized understanding. Bed alarm activated. Will continue to monitor.

## 2020-12-25 NOTE — ASSESSMENT & PLAN NOTE
- presented with inferior ST elevations and anterior ST depressions with troponin 38; peak >50  - treating medically per cardiology  - treated with heparin gtt, plavix loaded -> 75mg daily, ASA loaded -> 81mg daily, coreg 25mg bid -> held 2/2 bradycardia/hypotension, rosuvastatin 40mg daily  - lasix to manage volume overload; transitioned off gtt to 40mg IV bid dosing on 12/24  - tele  - monitor lytes  - Cardiology following, appreciate assistance  - TTE:  · The left ventricle is mildly enlarged with concentric hypertrophy andThe estimated ejection fraction is 55%  · Moderate left atrial enlargement.  · Grade II left ventricular diastolic dysfunction.  · There are segmental left ventricular wall motion abnormalities.  · There is mild tricuspid valve stenosis.  · Mild mitral regurgitation.  · Intermediate central venous pressure (8 mmHg).  · The estimated PA systolic pressure is 32 mmHg.  · Mild aortic regurgitation.  · Normal right ventricular size with normal right ventricular systolic function.

## 2020-12-25 NOTE — ASSESSMENT & PLAN NOTE
- initially on heparin  - transitioned to eliquis 5mg bid on 12/24  - will eventually need BB once stable

## 2020-12-25 NOTE — SUBJECTIVE & OBJECTIVE
Interval History: nausea, intermittent chest pain. repiratory status slowing improving. Good UOP yesterday.    Review of Systems   Constitutional: Negative for fever.   Respiratory: Positive for shortness of breath.    Cardiovascular: Positive for chest pain. Negative for leg swelling.   Gastrointestinal: Positive for nausea.     Objective:     Vital Signs (Most Recent):  Temp: 98.4 °F (36.9 °C) (12/24/20 1506)  Pulse: 60 (12/24/20 1900)  Resp: 19 (12/24/20 1900)  BP: (!) 105/54 (12/24/20 1900)  SpO2: 97 % (12/24/20 1900) Vital Signs (24h Range):  Temp:  [97.5 °F (36.4 °C)-98.4 °F (36.9 °C)] 98.4 °F (36.9 °C)  Pulse:  [] 60  Resp:  [18-43] 19  SpO2:  [88 %-100 %] 97 %  BP: ()/(54-95) 105/54     Weight: 91.5 kg (201 lb 11.5 oz)  Body mass index is 35.73 kg/m².    Intake/Output Summary (Last 24 hours) at 12/24/2020 1918  Last data filed at 12/24/2020 1707  Gross per 24 hour   Intake 1230.41 ml   Output 3250 ml   Net -2019.59 ml      Physical Exam  Vitals signs and nursing note reviewed.   Constitutional:       Appearance: Normal appearance.   HENT:      Head: Normocephalic and atraumatic.      Nose: Nose normal.   Eyes:      Extraocular Movements: Extraocular movements intact.      Pupils: Pupils are equal, round, and reactive to light.   Neck:      Musculoskeletal: Normal range of motion.   Cardiovascular:      Rate and Rhythm: Normal rate.      Pulses: Normal pulses.      Heart sounds: Normal heart sounds.   Pulmonary:      Breath sounds: Rales present. No wheezing.      Comments: On HFNC  Abdominal:      General: Bowel sounds are normal. There is no distension.      Palpations: Abdomen is soft.      Tenderness: There is no abdominal tenderness. There is no guarding.   Musculoskeletal: Normal range of motion.   Skin:     General: Skin is warm and dry.      Capillary Refill: Capillary refill takes less than 2 seconds.   Neurological:      Mental Status: She is alert and oriented to person, place, and  time.   Psychiatric:         Mood and Affect: Mood normal.         Behavior: Behavior normal.         Significant Labs: All pertinent labs within the past 24 hours have been reviewed.    Significant Imaging: I have reviewed all pertinent imaging results/findings within the past 24 hours.

## 2020-12-25 NOTE — PROGRESS NOTES
[facility]\"" Medicine  Progress Note    Patient Name: Eliza Louie  MRN: [id number]  Patient Class: IP- Inpatient   Admission Date: 12/21/2020  Length of Stay: 4 days  Attending Physician: Manny Gatica, *  Primary Care Provider: Jayden Pérez MD        Subjective:     Principal Problem:Acute ST elevation myocardial infarction (STEMI)        HPI:  Ms. Eliza Louie is a 69 y/o female who lives in [place], [place] at her residence. The patients PCP is Dr. Jayden Pérez. She has a pmh of HTN and DM. No surgical history noted. She denies smoking cigarettes, drinking alcohol, and using illicit drugs. She presents [place] [facility] with complaints of Vomiting since 11pm, Diarrhea, and HTN. Per the  Patient she has been out of her medications for 3 weeks. She denies hematemesis, bilious vomiting, coffee ground emesis.  Diarrhea is mucusy and brown; she denies any hematochezia and melena. She notes some crampy abdominal pain in the epigastric region. The pain does not radiate.  No fever, chills or sweats.  No sick contacts.  No trauma. No recent travel. No recent ABX use. Her ED workup includes Na--135, potassium 3.1, Cr--1.6, glucose 408, hemoglobin A1c--10.2. CXR---Pulmonary opacities consistent with pulmonary infiltrate/airspace disease most notably on the right with suspected infiltrate at the left base as well, as discussed above. XR abdomen---Nonobstructive bowel gas pattern. Right upper quadrant calcification corresponding to a stone seen within the cystic duct as seen on prior ultrasound. Probable left nephrolith. COVID-19 is negative. She will be admitted to the ICU for further care.    Overview/Hospital Course:  No notes on file    Interval History: nausea and chest pain improved. Feels much better today, eating breakfast this morning and appeared comfortable. Still on substantial O2, will try to wean today and get out of bed.    Review of Systems    Constitutional: Negative for fever.   Respiratory: Positive for shortness of breath.    Cardiovascular: Negative for chest pain and leg swelling.   Gastrointestinal: Negative for nausea.     Objective:     Vital Signs (Most Recent):  Temp: 96.9 °F (36.1 °C) (12/25/20 1118)  Pulse: (!) 58 (12/25/20 1118)  Resp: 18 (12/25/20 1118)  BP: 114/65 (12/25/20 1118)  SpO2: 97 % (12/25/20 1118) Vital Signs (24h Range):  Temp:  [96.9 °F (36.1 °C)-98.7 °F (37.1 °C)] 96.9 °F (36.1 °C)  Pulse:  [] 58  Resp:  [16-38] 18  SpO2:  [91 %-100 %] 97 %  BP: (104-170)/(52-90) 114/65     Weight: 83.9 kg (184 lb 15.5 oz)  Body mass index is 32.77 kg/m².    Intake/Output Summary (Last 24 hours) at 12/25/2020 1141  Last data filed at 12/25/2020 1139  Gross per 24 hour   Intake 300 ml   Output 2595 ml   Net -2295 ml      Physical Exam  Vitals signs and nursing note reviewed.   Constitutional:       Appearance: Normal appearance.   HENT:      Head: Normocephalic and atraumatic.      Nose: Nose normal.   Eyes:      Extraocular Movements: Extraocular movements intact.      Pupils: Pupils are equal, round, and reactive to light.   Neck:      Musculoskeletal: Normal range of motion.   Cardiovascular:      Rate and Rhythm: Normal rate.      Pulses: Normal pulses.      Heart sounds: Normal heart sounds.   Pulmonary:      Breath sounds: Rales present. No wheezing.      Comments: On HFNC  Abdominal:      General: Bowel sounds are normal. There is no distension.      Palpations: Abdomen is soft.      Tenderness: There is no abdominal tenderness. There is no guarding.   Musculoskeletal: Normal range of motion.   Skin:     General: Skin is warm and dry.      Capillary Refill: Capillary refill takes less than 2 seconds.   Neurological:      Mental Status: She is alert and oriented to person, place, and time.   Psychiatric:         Mood and Affect: Mood normal.         Behavior: Behavior normal.         Significant Labs: All pertinent labs within the  past 24 hours have been reviewed.    Significant Imaging: I have reviewed all pertinent imaging results/findings within the past 24 hours.      Assessment/Plan:      * Acute ST elevation myocardial infarction (STEMI)  - presented with inferior ST elevations and anterior ST depressions with troponin 38; peak >50  - treating medically per cardiology  - treated with heparin gtt, plavix loaded -> 75mg daily, ASA loaded -> 81mg daily, coreg 25mg bid -> held 2/2 bradycardia/hypotension, rosuvastatin 40mg daily  - lasix to manage volume overload; transitioned off gtt to 40mg IV bid dosing on 12/24  - tele  - monitor lytes  - Cardiology following, appreciate assistance  - TTE:  · The left ventricle is mildly enlarged with concentric hypertrophy andThe estimated ejection fraction is 55%  · Moderate left atrial enlargement.  · Grade II left ventricular diastolic dysfunction.  · There are segmental left ventricular wall motion abnormalities.  · There is mild tricuspid valve stenosis.  · Mild mitral regurgitation.  · Intermediate central venous pressure (8 mmHg).  · The estimated PA systolic pressure is 32 mmHg.  · Mild aortic regurgitation.  · Normal right ventricular size with normal right ventricular systolic function.    New onset a-fib  - initially on heparin  - transitioned to eliquis 5mg bid on 12/24  - will eventually need BB once stable      CAP (community acquired pneumonia)  - likely hypoxia due to pulmonary edema alone, but given leukocytosis, continued high O2 needs, and congestion on CXR, will cover empirically with abx  - ceftriaxone and doxycycline  - 5d total course    Nausea & vomiting  - likely 2/2 ACS  - zofran prn  - ACS treatment as above  - resolved today    Chest pain  - 2/2 ACS  - NTG prn/morphine      Acute on chronic diastolic heart failure  - due to ischemia  - initiated on IV lasix  - lasix gtt->IV push  - started low dose ARB on 12/24; will need BB once able to tolerate  - Nephrology consulted,  appreciate recs  - Cards following, appreciate recs      Acute hypoxemic respiratory failure  Began complaining of SOB in the ED---concerned for flash pulm edema along with CP. 40 mg IV furosemide given. Started on Bipap -> HFNC  - now on lasix gtt->40mg IV bid  - monitor strict I/Os  - added on antibiotics 12/23: does have leukocytosis but has been afebrile; likely due to pulmonary edema/ischemia but will trial abx given significant hypoxia despite attempts at diuresis - treat 5d total course      Type 2 diabetes mellitus with hyperglycemia, without long-term current use of insulin  - hemoglobin A1c is 10.2  - hold Metformin while inpatient  - initiated on insulin gtt upon arrival, now discontinued  - hold home metformin  - transitioned to subQ detemir 24u with aspart 9u qac  - LDSSI with accuchecks  - diabetic diet    ARF (acute renal failure)  - baseline serum Cr 1.0, 1.6 on admit, has run around 2.3-2.5 while in house  - continue to monitor renal function with daily labs   - renal US  - avoid nephrotoxins  - renally dose all medications   - monitor events that may lead to decreased renal perfusion (hypovolemia, hypotension, sepsis)  - monitor urine output to assure that no obstruction precipitates worsening in GFR  - Nephrology consulted, appreciate assistance    GERD (gastroesophageal reflux disease)  Chronic.       Hypertension  - low dose losartan added 12/24  - will need BB once able to tolerate        VTE Risk Mitigation (From admission, onward)         Ordered     apixaban tablet 5 mg  2 times daily      12/24/20 1058     IP VTE HIGH RISK PATIENT  Once      12/21/20 1746     Place sequential compression device  Until discontinued      12/21/20 1105                Discharge Planning   EMILIA: 12/26/2020     Code Status: Full Code   Is the patient medically ready for discharge?:     Reason for patient still in hospital (select all that apply): Patient unstable, Patient trending condition and Consult  recommendations  Discharge Plan A: Home with family   Discharge Delays: None known at this time              Manny Gatica MD  Department of Hospital Medicine   [facility]

## 2020-12-25 NOTE — ASSESSMENT & PLAN NOTE
- baseline serum Cr 1.0, 1.6 on admit, has run around 2.3-2.5 while in house  - continue to monitor renal function with daily labs   - renal US  - avoid nephrotoxins  - renally dose all medications   - monitor events that may lead to decreased renal perfusion (hypovolemia, hypotension, sepsis)  - monitor urine output to assure that no obstruction precipitates worsening in GFR  - Nephrology consulted, appreciate assistance

## 2020-12-25 NOTE — ASSESSMENT & PLAN NOTE
Began complaining of SOB in the ED---concerned for flash pulm edema along with CP. 40 mg IV furosemide given. Started on Bipap -> HFNC  - now on lasix gtt->40mg IV bid  - monitor strict I/Os  - added on antibiotics 12/23: does have leukocytosis but has been afebrile; likely due to pulmonary edema/ischemia but will trial abx given significant hypoxia despite attempts at diuresis - treat 5d total course

## 2020-12-25 NOTE — PROGRESS NOTES
[facility] ICU 5th Floor  Hospital Medicine  Progress Note    Patient Name: Eliza Louie  MRN: [id number]  Patient Class: IP- Inpatient   Admission Date: 12/21/2020  Length of Stay: 3 days  Attending Physician: Manny Gatica, *  Primary Care Provider: Jayden Pérez MD        Subjective:     Principal Problem:Acute ST elevation myocardial infarction (STEMI)        HPI:  Ms. Eliza Louie is a 71 y/o female who lives in [place], [place] at her residence. The patients PCP is Dr. Jayden Pérez. She has a pmh of HTN and DM. No surgical history noted. She denies smoking cigarettes, drinking alcohol, and using illicit drugs. She presents to the ED here at [facility] with complaints of Vomiting since 11pm, Diarrhea, and HTN. Per the  Patient she has been out of her medications for 3 weeks. She denies hematemesis, bilious vomiting, coffee ground emesis.  Diarrhea is mucusy and brown; she denies any hematochezia and melena. She notes some crampy abdominal pain in the epigastric region. The pain does not radiate.  No fever, chills or sweats.  No sick contacts.  No trauma. No recent travel. No recent ABX use. Her ED workup includes Na--135, potassium 3.1, Cr--1.6, glucose 408, hemoglobin A1c--10.2. CXR---Pulmonary opacities consistent with pulmonary infiltrate/airspace disease most notably on the right with suspected infiltrate at the left base as well, as discussed above. XR abdomen---Nonobstructive bowel gas pattern. Right upper quadrant calcification corresponding to a stone seen within the cystic duct as seen on prior ultrasound. Probable left nephrolith. COVID-19 is negative. She will be admitted to the ICU for further care.    Overview/Hospital Course:  No notes on file    Interval History: nausea, intermittent chest pain. repiratory status slowing improving. Good UOP yesterday.    Review of Systems   Constitutional: Negative for fever.   Respiratory: Positive for shortness of breath.     Cardiovascular: Positive for chest pain. Negative for leg swelling.   Gastrointestinal: Positive for nausea.     Objective:     Vital Signs (Most Recent):  Temp: 98.4 °F (36.9 °C) (12/24/20 1506)  Pulse: 60 (12/24/20 1900)  Resp: 19 (12/24/20 1900)  BP: (!) 105/54 (12/24/20 1900)  SpO2: 97 % (12/24/20 1900) Vital Signs (24h Range):  Temp:  [97.5 °F (36.4 °C)-98.4 °F (36.9 °C)] 98.4 °F (36.9 °C)  Pulse:  [] 60  Resp:  [18-43] 19  SpO2:  [88 %-100 %] 97 %  BP: ()/(54-95) 105/54     Weight: 91.5 kg (201 lb 11.5 oz)  Body mass index is 35.73 kg/m².    Intake/Output Summary (Last 24 hours) at 12/24/2020 1918  Last data filed at 12/24/2020 1707  Gross per 24 hour   Intake 1230.41 ml   Output 3250 ml   Net -2019.59 ml      Physical Exam  Vitals signs and nursing note reviewed.   Constitutional:       Appearance: Normal appearance.   HENT:      Head: Normocephalic and atraumatic.      Nose: Nose normal.   Eyes:      Extraocular Movements: Extraocular movements intact.      Pupils: Pupils are equal, round, and reactive to light.   Neck:      Musculoskeletal: Normal range of motion.   Cardiovascular:      Rate and Rhythm: Normal rate.      Pulses: Normal pulses.      Heart sounds: Normal heart sounds.   Pulmonary:      Breath sounds: Rales present. No wheezing.      Comments: On HFNC  Abdominal:      General: Bowel sounds are normal. There is no distension.      Palpations: Abdomen is soft.      Tenderness: There is no abdominal tenderness. There is no guarding.   Musculoskeletal: Normal range of motion.   Skin:     General: Skin is warm and dry.      Capillary Refill: Capillary refill takes less than 2 seconds.   Neurological:      Mental Status: She is alert and oriented to person, place, and time.   Psychiatric:         Mood and Affect: Mood normal.         Behavior: Behavior normal.         Significant Labs: All pertinent labs within the past 24 hours have been reviewed.    Significant Imaging: I have  reviewed all pertinent imaging results/findings within the past 24 hours.      Assessment/Plan:      * Acute ST elevation myocardial infarction (STEMI)  - presented with inferior ST elevations and anterior ST depressions with troponin 38; now >50  - treating medically per cardiology  - treated with heparin gtt, plavix loaded -> 75mg daily, ASA loaded -> 81mg daily, coreg 25mg bid -> held 2/2 bradycardia/hypotension, rosuvastatin 40mg daily  - lasix to manage volume overload; on gtt now to bid dosing  - tele  - monitor lytes  - Cardiology following, appreciate assistance  - TTE:  · The left ventricle is mildly enlarged with concentric hypertrophy andThe estimated ejection fraction is 55%  · Moderate left atrial enlargement.  · Grade II left ventricular diastolic dysfunction.  · There are segmental left ventricular wall motion abnormalities.  · There is mild tricuspid valve stenosis.  · Mild mitral regurgitation.  · Intermediate central venous pressure (8 mmHg).  · The estimated PA systolic pressure is 32 mmHg.  · Mild aortic regurgitation.  · Normal right ventricular size with normal right ventricular systolic function.    A-fib  - initially on heparin  - transition to eliquis 5mg bid today  - will eventually need BB once stable      CAP (community acquired pneumonia)  - likely hypoxia due to pulmonary edema alone, but given leukocytosis, continued high O2 needs, and congestion on CXR, will cover empirically with abx  - ceftriaxone and azithromycin    Nausea & vomiting  - likely 2/2 ACS  - zofran prn  - ACS treatment as above      Chest pain  - 2/2 ACS  - NTG prn/morphine      Acute on chronic diastolic heart failure  - due to ischemia  - initiated on IV lasix  - now on lasix gtt->IV push  - start low dose ARB  - Nephrology consulted, appreciate recs  - Cards following, appreciate recs      Acute hypoxemic respiratory failure  Began complaining of SOB in the ED---concerned for flash pulm edema along with CP. 40 mg IV  furosemide given. Started on Bipap -> HFNC  - now on lasix gtt->bid  - monitor strict I/Os  - added on antibiotics 12/23: does have leukocytosis but has been afebrile; likely due to pulmonary edema/ischemia but will trial abx given significant hypoxia despite attempts at diuresis      Type 2 diabetes mellitus with hyperglycemia, without long-term current use of insulin  Hold Metformin while inpatient. Hemoglobin A1c is 10.2  - initiated on insulin gtt upon arrival, now discontinued  - hold home metformin  - transitioned to subQ detemir 24u with aspart 8u qac  - LDSSI with accuchecks  - diabetic diet    ARF (acute renal failure)  - baseline serum Cr 1.0, 1.6 on admit, now stabilizing  - continue to monitor renal function with daily labs   - renal US  - avoid nephrotoxins  - renally dose all medications   - monitor events that may lead to decreased renal perfusion (hypovolemia, hypotension, sepsis)  - monitor urine output to assure that no obstruction precipitates worsening in GFR  - Nephrology consulted, appreciate assistance    GERD (gastroesophageal reflux disease)  Chronic.       Hypertension  - low dose losartan today  - will need BB once able to tolerate        VTE Risk Mitigation (From admission, onward)         Ordered     apixaban tablet 5 mg  2 times daily      12/24/20 1058     IP VTE HIGH RISK PATIENT  Once      12/21/20 1746     Place sequential compression device  Until discontinued      12/21/20 1105                Discharge Planning   EMILIA: 12/26/2020     Code Status: Full Code   Is the patient medically ready for discharge?:     Reason for patient still in hospital (select all that apply): Patient unstable and Consult recommendations  Discharge Plan A: Home with family   Discharge Delays: None known at this time        Critical care time spent on the evaluation and treatment of severe organ dysfunction, review of pertinent labs and imaging studies, discussions with consulting providers and discussions  with patient/family: 40 minutes.      Manny Gatica MD  Department of Hospital Medicine   [facility] ICU 5th Floor

## 2020-12-25 NOTE — ASSESSMENT & PLAN NOTE
- initially on heparin  - transition to eliquis 5mg bid today  - will eventually need BB once stable

## 2020-12-25 NOTE — ASSESSMENT & PLAN NOTE
- baseline serum Cr 1.0, 1.6 on admit, now stabilizing  - continue to monitor renal function with daily labs   - renal US  - avoid nephrotoxins  - renally dose all medications   - monitor events that may lead to decreased renal perfusion (hypovolemia, hypotension, sepsis)  - monitor urine output to assure that no obstruction precipitates worsening in GFR  - Nephrology consulted, appreciate assistance

## 2020-12-25 NOTE — ASSESSMENT & PLAN NOTE
- presented with inferior ST elevations and anterior ST depressions with troponin 38; now >50  - treating medically per cardiology  - treated with heparin gtt, plavix loaded -> 75mg daily, ASA loaded -> 81mg daily, coreg 25mg bid -> held 2/2 bradycardia/hypotension, rosuvastatin 40mg daily  - lasix to manage volume overload; on gtt now to bid dosing  - tele  - monitor lytes  - Cardiology following, appreciate assistance  - TTE:  · The left ventricle is mildly enlarged with concentric hypertrophy andThe estimated ejection fraction is 55%  · Moderate left atrial enlargement.  · Grade II left ventricular diastolic dysfunction.  · There are segmental left ventricular wall motion abnormalities.  · There is mild tricuspid valve stenosis.  · Mild mitral regurgitation.  · Intermediate central venous pressure (8 mmHg).  · The estimated PA systolic pressure is 32 mmHg.  · Mild aortic regurgitation.  · Normal right ventricular size with normal right ventricular systolic function.

## 2020-12-25 NOTE — SUBJECTIVE & OBJECTIVE
Interval History: nausea and chest pain improved. Feels much better today, eating breakfast this morning and appeared comfortable. Still on substantial O2, will try to wean today and get out of bed.    Review of Systems   Constitutional: Negative for fever.   Respiratory: Positive for shortness of breath.    Cardiovascular: Negative for chest pain and leg swelling.   Gastrointestinal: Negative for nausea.     Objective:     Vital Signs (Most Recent):  Temp: 96.9 °F (36.1 °C) (12/25/20 1118)  Pulse: (!) 58 (12/25/20 1118)  Resp: 18 (12/25/20 1118)  BP: 114/65 (12/25/20 1118)  SpO2: 97 % (12/25/20 1118) Vital Signs (24h Range):  Temp:  [96.9 °F (36.1 °C)-98.7 °F (37.1 °C)] 96.9 °F (36.1 °C)  Pulse:  [] 58  Resp:  [16-38] 18  SpO2:  [91 %-100 %] 97 %  BP: (104-170)/(52-90) 114/65     Weight: 83.9 kg (184 lb 15.5 oz)  Body mass index is 32.77 kg/m².    Intake/Output Summary (Last 24 hours) at 12/25/2020 1141  Last data filed at 12/25/2020 1139  Gross per 24 hour   Intake 300 ml   Output 2595 ml   Net -2295 ml      Physical Exam  Vitals signs and nursing note reviewed.   Constitutional:       Appearance: Normal appearance.   HENT:      Head: Normocephalic and atraumatic.      Nose: Nose normal.   Eyes:      Extraocular Movements: Extraocular movements intact.      Pupils: Pupils are equal, round, and reactive to light.   Neck:      Musculoskeletal: Normal range of motion.   Cardiovascular:      Rate and Rhythm: Normal rate.      Pulses: Normal pulses.      Heart sounds: Normal heart sounds.   Pulmonary:      Breath sounds: Rales present. No wheezing.      Comments: On HFNC  Abdominal:      General: Bowel sounds are normal. There is no distension.      Palpations: Abdomen is soft.      Tenderness: There is no abdominal tenderness. There is no guarding.   Musculoskeletal: Normal range of motion.   Skin:     General: Skin is warm and dry.      Capillary Refill: Capillary refill takes less than 2 seconds.    Neurological:      Mental Status: She is alert and oriented to person, place, and time.   Psychiatric:         Mood and Affect: Mood normal.         Behavior: Behavior normal.         Significant Labs: All pertinent labs within the past 24 hours have been reviewed.    Significant Imaging: I have reviewed all pertinent imaging results/findings within the past 24 hours.

## 2020-12-25 NOTE — PROGRESS NOTES
Nephrology Progress Note      Consult Requested By: Manny Gatica, *  Reason for Consult: ARF    SUBJECTIVE:   as underlying CKD 2-3 with CR 1- 1.3     Review of Systems   Constitutional: Negative for chills and fever.   Respiratory: Negative for cough and shortness of breath.    Cardiovascular: Negative for chest pain and leg swelling.   Gastrointestinal: Negative for nausea.        OBJECTIVE:     Vital Signs (Most Recent)  Vitals:    12/25/20 0830 12/25/20 0836 12/25/20 0859 12/25/20 1118   BP:   (!) 128/58 114/65   BP Location:   Left arm Left arm   Patient Position:   Lying Lying   Pulse:   82 (!) 58   Resp:   18 18   Temp:   98.7 °F (37.1 °C) 96.9 °F (36.1 °C)   TempSrc:   Oral Oral   SpO2: 98% 96% 97% 97%   Weight:       Height:           Medications:   albuterol-ipratropium  3 mL Nebulization Q6H WAKE    allopurinoL  100 mg Oral Daily    apixaban  5 mg Oral BID    aspirin  81 mg Oral Daily    cefTRIAXone (ROCEPHIN) IVPB  1 g Intravenous Q24H    clopidogreL  75 mg Oral Daily    doxycycline  100 mg Oral Q12H    furosemide (LASIX) IV  40 mg Intravenous Q12H    insulin aspart U-100  8 Units Subcutaneous TIDWM    insulin detemir U-100  24 Units Subcutaneous Daily    losartan  50 mg Oral Daily    mupirocin   Nasal BID    pantoprazole  40 mg Oral BID    rosuvastatin  40 mg Oral QHS     Physical Exam   Constitutional: She is oriented to person, place, and time and well-developed, well-nourished, and in no distress. No distress.   HENT:   Head: Normocephalic and atraumatic.   Mouth/Throat: Oropharynx is clear and moist.   Eyes: EOM are normal. No scleral icterus.   Neck: Neck supple. No JVD present.   Cardiovascular: Normal rate and regular rhythm. Exam reveals no friction rub.   No murmur heard.  Pulmonary/Chest: Tachypnea noted. No respiratory distress (on high flow 70% FIo2). She has no wheezes. She has rales.   Abdominal: Soft. Bowel sounds are normal. She exhibits no distension. There is no  abdominal tenderness.   Musculoskeletal:         General: Edema (trace) present.   Neurological: She is alert and oriented to person, place, and time.   Skin: Skin is warm and dry. No rash noted. She is not diaphoretic. No erythema.   Psychiatric: Affect normal.     Diagnostic Results:  X-Ray: Reviewed  US: Reviewed  Echo: Reviewed  ASSESSMENT/PLAN:   1. ARF on CKD 3 baseline Cr 1-1.3  ATN from hemodynamic instability from STEMI +/- TMA from HTN emergency on presentation  Supportive care for now   Avoid nephrotoxins, dose all meds ad for GFr < 10  No indication for urgent RRT. Monitor closely. Strict ins/outs, daily weight    UO 2.7 L over the last 24 hours turned off Lasix GTT ~ 1 pm on 12/24 and started on Lasix 40 IV BID ~ 8 pm   Replacing K  Recent Labs   Lab 12/24/20  0027 12/24/20  0333 12/25/20  0646   * 132* 135*   K 3.1* 3.4* 4.0   BUN 35* 35* 37*   CREATININE 2.0* 2.0* 2.3*   Hyponatremia - better with LAsix  Hypokalemia - diuretic induced better with replacement and losratan  2. HTN (I10) - resumed losartan 50 (home dose 100, will titrate up as needed)  4. MBD (E88.9 M90.80) - check phos, mg replaced, check alb and vit D  Recent Labs   Lab 12/25/20  0646   CALCIUM 7.7*     Recent Labs   Lab 12/23/20  0406 12/24/20  0333 12/25/20  0646   MG 2.4 1.6 2.0     No results found for: XYRUEIHO89EQ  5. Acidosis - stable, not on bicarb therapy  Recent Labs   Lab 12/24/20  0027 12/24/20  0333 12/25/20  0646   CL 92* 92* 93*   CO2 23 24 28       Thank you for allowing me to participate in care of your patient  With any question please call [phone]  Kimi Wallace    [facility]  SCOTT Vaughn MD, FACPARABELLA MD,   MD EPI Rodriguez MD E. V. Harmon, NP  [address]  [place], [address] [phone]  After hours answering service: [phone]

## 2020-12-26 LAB
25(OH)D3+25(OH)D2 SERPL-MCNC: 5 NG/ML (ref 30–96)
ALBUMIN SERPL BCP-MCNC: 2.5 G/DL (ref 3.5–5.2)
ANION GAP SERPL CALC-SCNC: 15 MMOL/L (ref 8–16)
BASOPHILS # BLD AUTO: 0.02 K/UL (ref 0–0.2)
BASOPHILS NFR BLD: 0.3 % (ref 0–1.9)
BUN SERPL-MCNC: 41 MG/DL (ref 8–23)
CALCIUM SERPL-MCNC: 8 MG/DL (ref 8.7–10.5)
CHLORIDE SERPL-SCNC: 97 MMOL/L (ref 95–110)
CO2 SERPL-SCNC: 26 MMOL/L (ref 23–29)
CREAT SERPL-MCNC: 2.4 MG/DL (ref 0.5–1.4)
DIFFERENTIAL METHOD: NORMAL
EOSINOPHIL # BLD AUTO: 0.3 K/UL (ref 0–0.5)
EOSINOPHIL NFR BLD: 4.1 % (ref 0–8)
ERYTHROCYTE [DISTWIDTH] IN BLOOD BY AUTOMATED COUNT: 14.1 % (ref 11.5–14.5)
EST. GFR  (AFRICAN AMERICAN): 23 ML/MIN/1.73 M^2
EST. GFR  (NON AFRICAN AMERICAN): 20 ML/MIN/1.73 M^2
GLUCOSE SERPL-MCNC: 89 MG/DL (ref 70–110)
HCT VFR BLD AUTO: 37.5 % (ref 37–48.5)
HGB BLD-MCNC: 12.4 G/DL (ref 12–16)
IMM GRANULOCYTES # BLD AUTO: 0.04 K/UL (ref 0–0.04)
IMM GRANULOCYTES NFR BLD AUTO: 0.5 % (ref 0–0.5)
LYMPHOCYTES # BLD AUTO: 1.8 K/UL (ref 1–4.8)
LYMPHOCYTES NFR BLD: 23.1 % (ref 18–48)
MAGNESIUM SERPL-MCNC: 1.7 MG/DL (ref 1.6–2.6)
MCH RBC QN AUTO: 28.6 PG (ref 27–31)
MCHC RBC AUTO-ENTMCNC: 33.1 G/DL (ref 32–36)
MCV RBC AUTO: 86 FL (ref 82–98)
MONOCYTES # BLD AUTO: 0.8 K/UL (ref 0.3–1)
MONOCYTES NFR BLD: 10.7 % (ref 4–15)
NEUTROPHILS # BLD AUTO: 4.8 K/UL (ref 1.8–7.7)
NEUTROPHILS NFR BLD: 61.3 % (ref 38–73)
NRBC BLD-RTO: 0 /100 WBC
PHOSPHATE SERPL-MCNC: 3.6 MG/DL (ref 2.7–4.5)
PLATELET # BLD AUTO: 244 K/UL (ref 150–350)
PMV BLD AUTO: 11.8 FL (ref 9.2–12.9)
POCT GLUCOSE: 106 MG/DL (ref 70–110)
POCT GLUCOSE: 183 MG/DL (ref 70–110)
POCT GLUCOSE: 202 MG/DL (ref 70–110)
POCT GLUCOSE: 209 MG/DL (ref 70–110)
POTASSIUM SERPL-SCNC: 3.1 MMOL/L (ref 3.5–5.1)
RBC # BLD AUTO: 4.34 M/UL (ref 4–5.4)
SODIUM SERPL-SCNC: 138 MMOL/L (ref 136–145)
WBC # BLD AUTO: 7.85 K/UL (ref 3.9–12.7)

## 2020-12-26 PROCEDURE — 25000003 PHARM REV CODE 250: Performed by: NURSE PRACTITIONER

## 2020-12-26 PROCEDURE — 94640 AIRWAY INHALATION TREATMENT: CPT

## 2020-12-26 PROCEDURE — 83735 ASSAY OF MAGNESIUM: CPT

## 2020-12-26 PROCEDURE — 99900035 HC TECH TIME PER 15 MIN (STAT)

## 2020-12-26 PROCEDURE — 25000003 PHARM REV CODE 250: Performed by: INTERNAL MEDICINE

## 2020-12-26 PROCEDURE — 25000003 PHARM REV CODE 250: Performed by: STUDENT IN AN ORGANIZED HEALTH CARE EDUCATION/TRAINING PROGRAM

## 2020-12-26 PROCEDURE — 11000001 HC ACUTE MED/SURG PRIVATE ROOM

## 2020-12-26 PROCEDURE — 94761 N-INVAS EAR/PLS OXIMETRY MLT: CPT

## 2020-12-26 PROCEDURE — 25000242 PHARM REV CODE 250 ALT 637 W/ HCPCS: Performed by: STUDENT IN AN ORGANIZED HEALTH CARE EDUCATION/TRAINING PROGRAM

## 2020-12-26 PROCEDURE — 80069 RENAL FUNCTION PANEL: CPT

## 2020-12-26 PROCEDURE — 82306 VITAMIN D 25 HYDROXY: CPT

## 2020-12-26 PROCEDURE — 25000003 PHARM REV CODE 250: Performed by: HOSPITALIST

## 2020-12-26 PROCEDURE — 63600175 PHARM REV CODE 636 W HCPCS: Performed by: HOSPITALIST

## 2020-12-26 PROCEDURE — 85025 COMPLETE CBC W/AUTO DIFF WBC: CPT

## 2020-12-26 PROCEDURE — 36415 COLL VENOUS BLD VENIPUNCTURE: CPT

## 2020-12-26 RX ORDER — METOPROLOL SUCCINATE 25 MG/1
25 TABLET, EXTENDED RELEASE ORAL DAILY
Status: DISCONTINUED | OUTPATIENT
Start: 2020-12-27 | End: 2020-12-26

## 2020-12-26 RX ORDER — POTASSIUM CHLORIDE 20 MEQ/1
40 TABLET, EXTENDED RELEASE ORAL
Status: ACTIVE | OUTPATIENT
Start: 2020-12-26 | End: 2020-12-26

## 2020-12-26 RX ORDER — ERGOCALCIFEROL 1.25 MG/1
50000 CAPSULE ORAL
Status: DISCONTINUED | OUTPATIENT
Start: 2020-12-26 | End: 2020-12-27 | Stop reason: HOSPADM

## 2020-12-26 RX ORDER — LOSARTAN POTASSIUM 50 MG/1
100 TABLET ORAL DAILY
Status: DISCONTINUED | OUTPATIENT
Start: 2020-12-26 | End: 2020-12-27 | Stop reason: HOSPADM

## 2020-12-26 RX ORDER — METOPROLOL SUCCINATE 25 MG/1
25 TABLET, EXTENDED RELEASE ORAL DAILY
Status: DISCONTINUED | OUTPATIENT
Start: 2020-12-26 | End: 2020-12-27

## 2020-12-26 RX ORDER — FUROSEMIDE 40 MG/1
40 TABLET ORAL 2 TIMES DAILY
Status: DISCONTINUED | OUTPATIENT
Start: 2020-12-26 | End: 2020-12-27 | Stop reason: HOSPADM

## 2020-12-26 RX ADMIN — PANTOPRAZOLE SODIUM 40 MG: 40 TABLET, DELAYED RELEASE ORAL at 08:12

## 2020-12-26 RX ADMIN — FUROSEMIDE 40 MG: 40 TABLET ORAL at 05:12

## 2020-12-26 RX ADMIN — DOXYCYCLINE HYCLATE 100 MG: 100 TABLET, COATED ORAL at 08:12

## 2020-12-26 RX ADMIN — METOPROLOL SUCCINATE 25 MG: 25 TABLET, FILM COATED, EXTENDED RELEASE ORAL at 05:12

## 2020-12-26 RX ADMIN — INSULIN DETEMIR 24 UNITS: 100 INJECTION, SOLUTION SUBCUTANEOUS at 08:12

## 2020-12-26 RX ADMIN — IPRATROPIUM BROMIDE AND ALBUTEROL SULFATE 3 ML: .5; 3 SOLUTION RESPIRATORY (INHALATION) at 08:12

## 2020-12-26 RX ADMIN — MUPIROCIN: 20 OINTMENT TOPICAL at 08:12

## 2020-12-26 RX ADMIN — IPRATROPIUM BROMIDE AND ALBUTEROL SULFATE 3 ML: .5; 3 SOLUTION RESPIRATORY (INHALATION) at 03:12

## 2020-12-26 RX ADMIN — LOSARTAN POTASSIUM 100 MG: 50 TABLET, FILM COATED ORAL at 08:12

## 2020-12-26 RX ADMIN — APIXABAN 5 MG: 5 TABLET, FILM COATED ORAL at 08:12

## 2020-12-26 RX ADMIN — FUROSEMIDE 40 MG: 40 TABLET ORAL at 08:12

## 2020-12-26 RX ADMIN — ASPIRIN 81 MG: 81 TABLET, COATED ORAL at 08:12

## 2020-12-26 RX ADMIN — CLOPIDOGREL 75 MG: 75 TABLET, FILM COATED ORAL at 08:12

## 2020-12-26 RX ADMIN — ALLOPURINOL 100 MG: 100 TABLET ORAL at 08:12

## 2020-12-26 RX ADMIN — ERGOCALCIFEROL 50000 UNITS: 1.25 CAPSULE ORAL at 11:12

## 2020-12-26 RX ADMIN — CEFTRIAXONE SODIUM 1 G: 1 INJECTION, POWDER, FOR SOLUTION INTRAMUSCULAR; INTRAVENOUS at 11:12

## 2020-12-26 RX ADMIN — ROSUVASTATIN CALCIUM 40 MG: 10 TABLET, FILM COATED ORAL at 08:12

## 2020-12-26 NOTE — PROGRESS NOTES
Nephrology Progress Note      Consult Requested By: Manny Gatica, *  Reason for Consult: ARF    SUBJECTIVE:   as underlying CKD 2-3 with CR 1- 1.3     Review of Systems   Constitutional: Negative for chills and fever.   Respiratory: Negative for cough and shortness of breath.    Cardiovascular: Negative for chest pain and leg swelling.   Gastrointestinal: Negative for nausea.        OBJECTIVE:     Vital Signs (Most Recent)  Vitals:    12/25/20 2330 12/26/20 0000 12/26/20 0400 12/26/20 0527   BP: (!) 146/64   127/62   BP Location: Left arm   Left arm   Patient Position: Lying   Lying   Pulse: 67 (!) 53 (!) 53 (!) 54   Resp: 16   16   Temp: 96.9 °F (36.1 °C)   96.5 °F (35.8 °C)   TempSrc: Oral   Oral   SpO2: 95%   98%   Weight:       Height:           Medications:   albuterol-ipratropium  3 mL Nebulization Q6H WAKE    allopurinoL  100 mg Oral Daily    apixaban  5 mg Oral BID    aspirin  81 mg Oral Daily    cefTRIAXone (ROCEPHIN) IVPB  1 g Intravenous Q24H    clopidogreL  75 mg Oral Daily    doxycycline  100 mg Oral Q12H    furosemide (LASIX) IV  40 mg Intravenous Q12H    insulin aspart U-100  9 Units Subcutaneous TIDWM    insulin detemir U-100  24 Units Subcutaneous Daily    losartan  50 mg Oral Daily    mupirocin   Nasal BID    pantoprazole  40 mg Oral BID    rosuvastatin  40 mg Oral QHS     Physical Exam   Constitutional: She is oriented to person, place, and time and well-developed, well-nourished, and in no distress. No distress.   HENT:   Head: Normocephalic and atraumatic.   Mouth/Throat: Oropharynx is clear and moist.   Eyes: EOM are normal. No scleral icterus.   Neck: Neck supple. No JVD present.   Cardiovascular: Normal rate and regular rhythm. Exam reveals no friction rub.   No murmur heard.  Pulmonary/Chest: Tachypnea noted. No respiratory distress (on high flow 70% FIo2). She has no wheezes. She has rales.   Abdominal: Soft. Bowel sounds are normal. She exhibits no distension. There  is no abdominal tenderness.   Musculoskeletal:         General: Edema (trace) present.   Neurological: She is alert and oriented to person, place, and time.   Skin: Skin is warm and dry. No rash noted. She is not diaphoretic. No erythema.   Psychiatric: Affect normal.     Diagnostic Results:  X-Ray: Reviewed  US: Reviewed  Echo: Reviewed  ASSESSMENT/PLAN:   1. ARF on CKD 3 baseline Cr 1-1.3  ATN from hemodynamic instability from STEMI +/- TMA from HTN emergency on presentation  Supportive care for now   Avoid nephrotoxins, dose all meds ad for GFr < 10  No indication for urgent RRT. Monitor closely. Strict ins/outs, daily weight    UO 2.2 L over the last 24 hours turned off Lasix GTT ~ 1 pm on 12/24 and started on Lasix 40 IV BID ~ 8 pm     12/26/20 - transition to LAsix 40 PO BID    Recent Labs   Lab 12/24/20  0027 12/24/20  0333 12/25/20  0646   * 132* 135*   K 3.1* 3.4* 4.0   BUN 35* 35* 37*   CREATININE 2.0* 2.0* 2.3*     Hyponatremia - better with LAsix    Hypokalemia - diuretic induced better with replacement and losratan    2. HTN (I10) -increase losartan to 100  Temp:  [96.5 °F (35.8 °C)-97 °F (36.1 °C)]   Pulse:  [53-67]   Resp:  [16-20]   BP: (127-146)/(59-64)   SpO2:  [95 %-98 %]     4. MBD (E88.9 M90.80) - check phos, mg replaced, check alb and vit D  Recent Labs   Lab 12/25/20  0646   CALCIUM 7.7*     Recent Labs   Lab 12/23/20  0406 12/24/20  0333 12/25/20  0646   MG 2.4 1.6 2.0     No results found for: [id number]  5. Acidosis - stable, not on bicarb therapy  Recent Labs   Lab 12/24/20  0027 12/24/20  0333 12/25/20  0646   CL 92* 92* 93*   CO2 23 24 28       Thank you for allowing me to participate in care of your patient  With any question please call [phone]  Kimi Wallace    [facility]  SCOTT Vaughn MD, FACROXANA,   ARABELLA Shields MD,   MD EPI Rodriguez MD E. V. Harmon, NP  [address]  [place], [address] [phone]  After hours answering service:  [phone]

## 2020-12-26 NOTE — PLAN OF CARE
The proper method of use, as well as anticipated side effects, of this aerosol treatment are discussed and demonstrated to the patient.   Will continue to monitor.

## 2020-12-26 NOTE — ASSESSMENT & PLAN NOTE
- due to ischemia  - initiated on IV lasix  - lasix gtt->IV push->PO 40mg bid  - started low dose ARB on 12/24; will need BB once able to tolerate  - Nephrology consulted, appreciate recs  - Cards following, appreciate recs

## 2020-12-26 NOTE — ASSESSMENT & PLAN NOTE
- presented with inferior ST elevations and anterior ST depressions with troponin 38; peak >50  - treating medically per cardiology  - treated with heparin gtt, plavix loaded -> 75mg daily, ASA loaded -> 81mg daily, coreg 25mg bid -> held 2/2 bradycardia/hypotension, rosuvastatin 40mg daily  - lasix to manage volume overload; transitioned off gtt to 40mg IV bid dosing on 12/24  - switch to lasix 40mg bid PO  - add metoprolol 12/26  - tele  - monitor lytes  - Cardiology following, appreciate assistance  - TTE:  · The left ventricle is mildly enlarged with concentric hypertrophy andThe estimated ejection fraction is 55%  · Moderate left atrial enlargement.  · Grade II left ventricular diastolic dysfunction.  · There are segmental left ventricular wall motion abnormalities.  · There is mild tricuspid valve stenosis.  · Mild mitral regurgitation.  · Intermediate central venous pressure (8 mmHg).  · The estimated PA systolic pressure is 32 mmHg.  · Mild aortic regurgitation.  · Normal right ventricular size with normal right ventricular systolic function.

## 2020-12-26 NOTE — PROGRESS NOTES
[facility] Medicine  Progress Note    Patient Name: lEiza Louie  MRN: [id number]  Patient Class: IP- Inpatient   Admission Date: 12/21/2020  Length of Stay: 5 days  Attending Physician: Manny Gatica, *  Primary Care Provider: Jayden Pérez MD        Subjective:     Principal Problem:Acute ST elevation myocardial infarction (STEMI)        HPI:  Ms. Eliza Louie is a 69 y/o female who lives in [place], [place] at her residence. The patients PCP is Dr. Jayden Pérez. She has a pmh of HTN and DM. No surgical history noted. She denies smoking cigarettes, drinking alcohol, and using illicit drugs. [place] [facility] with complaints of Vomiting since 11pm, Diarrhea, and HTN. Per the  Patient she has been out of her medications for 3 weeks. She denies hematemesis, bilious vomiting, coffee ground emesis.  Diarrhea is mucusy and brown; she denies any hematochezia and melena. She notes some crampy abdominal pain in the epigastric region. The pain does not radiate.  No fever, chills or sweats.  No sick contacts.  No trauma. No recent travel. No recent ABX use. Her ED workup includes Na--135, potassium 3.1, Cr--1.6, glucose 408, hemoglobin A1c--10.2. CXR---Pulmonary opacities consistent with pulmonary infiltrate/airspace disease most notably on the right with suspected infiltrate at the left base as well, as discussed above. XR abdomen---Nonobstructive bowel gas pattern. Right upper quadrant calcification corresponding to a stone seen within the cystic duct as seen on prior ultrasound. Probable left nephrolith. COVID-19 is negative. She will be admitted to the ICU for further care.    Overview/Hospital Course:  No notes on file    Interval History: doing much better today. No shortness of breath or chest pain. Will work with PT/OT.    Review of Systems   Constitutional: Negative for fever.   Respiratory: Positive for shortness of breath.    Cardiovascular:  Negative for chest pain and leg swelling.   Gastrointestinal: Negative for nausea.     Objective:     Vital Signs (Most Recent):  Temp: 98.9 °F (37.2 °C) (12/26/20 1159)  Pulse: 81 (12/26/20 1553)  Resp: (!) 22 (12/26/20 1500)  BP: (!) 149/71 (12/26/20 1159)  SpO2: (!) 94 % (12/26/20 1500) Vital Signs (24h Range):  Temp:  [96.5 °F (35.8 °C)-98.9 °F (37.2 °C)] 98.9 °F (37.2 °C)  Pulse:  [45-93] 81  Resp:  [16-22] 22  SpO2:  [94 %-98 %] 94 %  BP: (127-149)/(59-71) 149/71     Weight: 83.9 kg (184 lb 15.5 oz)  Body mass index is 32.77 kg/m².    Intake/Output Summary (Last 24 hours) at 12/26/2020 1612  Last data filed at 12/26/2020 1458  Gross per 24 hour   Intake 108 ml   Output 2900 ml   Net -2792 ml      Physical Exam  Vitals signs and nursing note reviewed.   Constitutional:       Appearance: Normal appearance.   HENT:      Head: Normocephalic and atraumatic.      Nose: Nose normal.   Eyes:      Extraocular Movements: Extraocular movements intact.      Pupils: Pupils are equal, round, and reactive to light.   Neck:      Musculoskeletal: Normal range of motion.   Cardiovascular:      Rate and Rhythm: Normal rate.      Pulses: Normal pulses.      Heart sounds: Normal heart sounds.   Pulmonary:      Breath sounds: Rales present. No wheezing.      Comments: On ambient air  Abdominal:      General: Bowel sounds are normal. There is no distension.      Palpations: Abdomen is soft.      Tenderness: There is no abdominal tenderness. There is no guarding.   Musculoskeletal: Normal range of motion.   Skin:     General: Skin is warm and dry.      Capillary Refill: Capillary refill takes less than 2 seconds.   Neurological:      Mental Status: She is alert and oriented to person, place, and time.   Psychiatric:         Mood and Affect: Mood normal.         Behavior: Behavior normal.         Significant Labs: All pertinent labs within the past 24 hours have been reviewed.    Significant Imaging: I have reviewed all pertinent  imaging results/findings within the past 24 hours.      Assessment/Plan:      * Acute ST elevation myocardial infarction (STEMI)  - presented with inferior ST elevations and anterior ST depressions with troponin 38; peak >50  - treating medically per cardiology  - treated with heparin gtt, plavix loaded -> 75mg daily, ASA loaded -> 81mg daily, coreg 25mg bid -> held 2/2 bradycardia/hypotension, rosuvastatin 40mg daily  - lasix to manage volume overload; transitioned off gtt to 40mg IV bid dosing on 12/24  - switch to lasix 40mg bid PO  - add metoprolol 12/26  - tele  - monitor lytes  - Cardiology following, appreciate assistance  - TTE:  · The left ventricle is mildly enlarged with concentric hypertrophy andThe estimated ejection fraction is 55%  · Moderate left atrial enlargement.  · Grade II left ventricular diastolic dysfunction.  · There are segmental left ventricular wall motion abnormalities.  · There is mild tricuspid valve stenosis.  · Mild mitral regurgitation.  · Intermediate central venous pressure (8 mmHg).  · The estimated PA systolic pressure is 32 mmHg.  · Mild aortic regurgitation.  · Normal right ventricular size with normal right ventricular systolic function.    New onset a-fib  - initially on heparin  - transitioned to eliquis 5mg bid on 12/24  - start BB today      CAP (community acquired pneumonia)  - likely hypoxia due to pulmonary edema alone, but given leukocytosis, continued high O2 needs, and congestion on CXR, will cover empirically with abx  - ceftriaxone and doxycycline  - 5d total course    Acute on chronic diastolic heart failure  - due to ischemia  - initiated on IV lasix  - lasix gtt->IV push->PO 40mg bid  - started low dose ARB on 12/24; will need BB once able to tolerate  - Nephrology consulted, appreciate recs  - Cards following, appreciate recs      Acute hypoxemic respiratory failure  Began complaining of SOB in the ED---concerned for flash pulm edema along with CP. 40 mg IV  furosemide given. Started on Bipap -> HFNC->NC then off O2 today  - now on lasix gtt->40mg IV bid->40mg PO bid on 12/26  - monitor strict I/Os  - added on antibiotics 12/23: does have leukocytosis but has been afebrile; likely due to pulmonary edema/ischemia but will trial abx given significant hypoxia despite attempts at diuresis - treat 5d total course      Type 2 diabetes mellitus with hyperglycemia, without long-term current use of insulin  - hemoglobin A1c is 10.2  - hold Metformin while inpatient  - initiated on insulin gtt upon arrival, now discontinued  - hold home metformin  - transitioned to subQ detemir 24u with aspart 9u qac  - LDSSI with accuchecks  - diabetic diet    ARF (acute renal failure)  - baseline serum Cr 1.0, 1.6 on admit, has run around 2.3-2.5 while in house  - continue to monitor renal function with daily labs   - renal US  - avoid nephrotoxins  - renally dose all medications   - monitor events that may lead to decreased renal perfusion (hypovolemia, hypotension, sepsis)  - monitor urine output to assure that no obstruction precipitates worsening in GFR  - Nephrology consulted, appreciate assistance    GERD (gastroesophageal reflux disease)  Chronic.       Hypertension  - low dose losartan added 12/24  - add metoprolol 12/26        VTE Risk Mitigation (From admission, onward)         Ordered     apixaban tablet 5 mg  2 times daily      12/24/20 1058     IP VTE HIGH RISK PATIENT  Once      12/21/20 1746     Place sequential compression device  Until discontinued      12/21/20 1105                Discharge Planning   EMILIA: 12/26/2020     Code Status: Full Code   Is the patient medically ready for discharge?:     Reason for patient still in hospital (select all that apply): Consult recommendations and PT / OT recommendations  Discharge Plan A: Home with family   Discharge Delays: None known at this time              Manny Gatica MD  Department of Hospital Medicine   [facility]

## 2020-12-26 NOTE — NURSING
Patient safety maintained. Awake, alert, and oriented.  Medications administered per order. Instructed to call for assistance as needed. Ambulating to bathroom with one person assistance.

## 2020-12-26 NOTE — ASSESSMENT & PLAN NOTE
Began complaining of SOB in the ED---concerned for flash pulm edema along with CP. 40 mg IV furosemide given. Started on Bipap -> HFNC->NC then off O2 today  - now on lasix gtt->40mg IV bid->40mg PO bid on 12/26  - monitor strict I/Os  - added on antibiotics 12/23: does have leukocytosis but has been afebrile; likely due to pulmonary edema/ischemia but will trial abx given significant hypoxia despite attempts at diuresis - treat 5d total course

## 2020-12-26 NOTE — PLAN OF CARE
Problem: Adult Inpatient Plan of Care  Description: Chart and Care plan reviewed and updated  Outcome: Ongoing, Progressing

## 2020-12-26 NOTE — SUBJECTIVE & OBJECTIVE
Interval History: doing much better today. No shortness of breath or chest pain. Will work with PT/OT.    Review of Systems   Constitutional: Negative for fever.   Respiratory: Positive for shortness of breath.    Cardiovascular: Negative for chest pain and leg swelling.   Gastrointestinal: Negative for nausea.     Objective:     Vital Signs (Most Recent):  Temp: 98.9 °F (37.2 °C) (12/26/20 1159)  Pulse: 81 (12/26/20 1553)  Resp: (!) 22 (12/26/20 1500)  BP: (!) 149/71 (12/26/20 1159)  SpO2: (!) 94 % (12/26/20 1500) Vital Signs (24h Range):  Temp:  [96.5 °F (35.8 °C)-98.9 °F (37.2 °C)] 98.9 °F (37.2 °C)  Pulse:  [45-93] 81  Resp:  [16-22] 22  SpO2:  [94 %-98 %] 94 %  BP: (127-149)/(59-71) 149/71     Weight: 83.9 kg (184 lb 15.5 oz)  Body mass index is 32.77 kg/m².    Intake/Output Summary (Last 24 hours) at 12/26/2020 1612  Last data filed at 12/26/2020 1458  Gross per 24 hour   Intake 108 ml   Output 2900 ml   Net -2792 ml      Physical Exam  Vitals signs and nursing note reviewed.   Constitutional:       Appearance: Normal appearance.   HENT:      Head: Normocephalic and atraumatic.      Nose: Nose normal.   Eyes:      Extraocular Movements: Extraocular movements intact.      Pupils: Pupils are equal, round, and reactive to light.   Neck:      Musculoskeletal: Normal range of motion.   Cardiovascular:      Rate and Rhythm: Normal rate.      Pulses: Normal pulses.      Heart sounds: Normal heart sounds.   Pulmonary:      Breath sounds: Rales present. No wheezing.      Comments: On ambient air  Abdominal:      General: Bowel sounds are normal. There is no distension.      Palpations: Abdomen is soft.      Tenderness: There is no abdominal tenderness. There is no guarding.   Musculoskeletal: Normal range of motion.   Skin:     General: Skin is warm and dry.      Capillary Refill: Capillary refill takes less than 2 seconds.   Neurological:      Mental Status: She is alert and oriented to person, place, and time.    Psychiatric:         Mood and Affect: Mood normal.         Behavior: Behavior normal.         Significant Labs: All pertinent labs within the past 24 hours have been reviewed.    Significant Imaging: I have reviewed all pertinent imaging results/findings within the past 24 hours.

## 2020-12-26 NOTE — PLAN OF CARE
1900 Reported no pain.  Refused pure wick.  Very pleasant.     2100 accu ck: 103, no coverage.     Tele: Afib,  HR   55-60,  No alarms.     Bed in lowest position, wheels locked, non skid socks, ID band worn, personal items and call bell with in reach, bed alarm set.

## 2020-12-27 VITALS
HEIGHT: 63 IN | WEIGHT: 182.56 LBS | HEART RATE: 75 BPM | OXYGEN SATURATION: 95 % | RESPIRATION RATE: 18 BRPM | TEMPERATURE: 98 F | BODY MASS INDEX: 32.35 KG/M2 | DIASTOLIC BLOOD PRESSURE: 73 MMHG | SYSTOLIC BLOOD PRESSURE: 153 MMHG

## 2020-12-27 LAB
ALBUMIN SERPL BCP-MCNC: 2.7 G/DL (ref 3.5–5.2)
ANION GAP SERPL CALC-SCNC: 15 MMOL/L (ref 8–16)
BASOPHILS # BLD AUTO: 0.03 K/UL (ref 0–0.2)
BASOPHILS NFR BLD: 0.3 % (ref 0–1.9)
BUN SERPL-MCNC: 38 MG/DL (ref 8–23)
CALCIUM SERPL-MCNC: 8.7 MG/DL (ref 8.7–10.5)
CHLORIDE SERPL-SCNC: 97 MMOL/L (ref 95–110)
CO2 SERPL-SCNC: 26 MMOL/L (ref 23–29)
CREAT SERPL-MCNC: 2.6 MG/DL (ref 0.5–1.4)
DIFFERENTIAL METHOD: ABNORMAL
EOSINOPHIL # BLD AUTO: 0.5 K/UL (ref 0–0.5)
EOSINOPHIL NFR BLD: 5.1 % (ref 0–8)
ERYTHROCYTE [DISTWIDTH] IN BLOOD BY AUTOMATED COUNT: 13.7 % (ref 11.5–14.5)
EST. GFR  (AFRICAN AMERICAN): 21 ML/MIN/1.73 M^2
EST. GFR  (NON AFRICAN AMERICAN): 18 ML/MIN/1.73 M^2
GLUCOSE SERPL-MCNC: 154 MG/DL (ref 70–110)
HCT VFR BLD AUTO: 38.6 % (ref 37–48.5)
HGB BLD-MCNC: 12.8 G/DL (ref 12–16)
IMM GRANULOCYTES # BLD AUTO: 0.05 K/UL (ref 0–0.04)
IMM GRANULOCYTES NFR BLD AUTO: 0.5 % (ref 0–0.5)
LYMPHOCYTES # BLD AUTO: 2.7 K/UL (ref 1–4.8)
LYMPHOCYTES NFR BLD: 29.7 % (ref 18–48)
MCH RBC QN AUTO: 28.9 PG (ref 27–31)
MCHC RBC AUTO-ENTMCNC: 33.2 G/DL (ref 32–36)
MCV RBC AUTO: 87 FL (ref 82–98)
MONOCYTES # BLD AUTO: 1.1 K/UL (ref 0.3–1)
MONOCYTES NFR BLD: 12 % (ref 4–15)
NEUTROPHILS # BLD AUTO: 4.8 K/UL (ref 1.8–7.7)
NEUTROPHILS NFR BLD: 52.4 % (ref 38–73)
NRBC BLD-RTO: 0 /100 WBC
PHOSPHATE SERPL-MCNC: 3.3 MG/DL (ref 2.7–4.5)
PLATELET # BLD AUTO: 261 K/UL (ref 150–350)
PMV BLD AUTO: 11.5 FL (ref 9.2–12.9)
POCT GLUCOSE: 192 MG/DL (ref 70–110)
POCT GLUCOSE: 229 MG/DL (ref 70–110)
POCT GLUCOSE: 425 MG/DL (ref 70–110)
POTASSIUM SERPL-SCNC: 3 MMOL/L (ref 3.5–5.1)
RBC # BLD AUTO: 4.43 M/UL (ref 4–5.4)
SODIUM SERPL-SCNC: 138 MMOL/L (ref 136–145)
WBC # BLD AUTO: 9.23 K/UL (ref 3.9–12.7)

## 2020-12-27 PROCEDURE — 25000003 PHARM REV CODE 250: Performed by: HOSPITALIST

## 2020-12-27 PROCEDURE — 97165 OT EVAL LOW COMPLEX 30 MIN: CPT

## 2020-12-27 PROCEDURE — 25000003 PHARM REV CODE 250: Performed by: NURSE PRACTITIONER

## 2020-12-27 PROCEDURE — 63600175 PHARM REV CODE 636 W HCPCS: Performed by: HOSPITALIST

## 2020-12-27 PROCEDURE — 94640 AIRWAY INHALATION TREATMENT: CPT

## 2020-12-27 PROCEDURE — 99232 SBSQ HOSP IP/OBS MODERATE 35: CPT | Mod: ,,, | Performed by: INTERNAL MEDICINE

## 2020-12-27 PROCEDURE — 25000242 PHARM REV CODE 250 ALT 637 W/ HCPCS: Performed by: STUDENT IN AN ORGANIZED HEALTH CARE EDUCATION/TRAINING PROGRAM

## 2020-12-27 PROCEDURE — 85025 COMPLETE CBC W/AUTO DIFF WBC: CPT

## 2020-12-27 PROCEDURE — 36415 COLL VENOUS BLD VENIPUNCTURE: CPT

## 2020-12-27 PROCEDURE — 97161 PT EVAL LOW COMPLEX 20 MIN: CPT

## 2020-12-27 PROCEDURE — 99232 PR SUBSEQUENT HOSPITAL CARE,LEVL II: ICD-10-PCS | Mod: ,,, | Performed by: INTERNAL MEDICINE

## 2020-12-27 PROCEDURE — 94761 N-INVAS EAR/PLS OXIMETRY MLT: CPT

## 2020-12-27 PROCEDURE — 80069 RENAL FUNCTION PANEL: CPT

## 2020-12-27 PROCEDURE — 25000003 PHARM REV CODE 250: Performed by: STUDENT IN AN ORGANIZED HEALTH CARE EDUCATION/TRAINING PROGRAM

## 2020-12-27 PROCEDURE — 25000003 PHARM REV CODE 250: Performed by: INTERNAL MEDICINE

## 2020-12-27 RX ORDER — METOPROLOL SUCCINATE 50 MG/1
50 TABLET, EXTENDED RELEASE ORAL DAILY
Qty: 30 TABLET | Refills: 11 | Status: ON HOLD | OUTPATIENT
Start: 2020-12-28 | End: 2021-08-30 | Stop reason: HOSPADM

## 2020-12-27 RX ORDER — FUROSEMIDE 40 MG/1
40 TABLET ORAL 2 TIMES DAILY
Qty: 60 TABLET | Refills: 11 | Status: SHIPPED | OUTPATIENT
Start: 2020-12-27 | End: 2021-10-20 | Stop reason: SDUPTHER

## 2020-12-27 RX ORDER — POTASSIUM CHLORIDE 20 MEQ/1
40 TABLET, EXTENDED RELEASE ORAL ONCE
Status: COMPLETED | OUTPATIENT
Start: 2020-12-27 | End: 2020-12-27

## 2020-12-27 RX ORDER — ASPIRIN 81 MG/1
81 TABLET ORAL DAILY
Qty: 7 TABLET | Refills: 0 | Status: SHIPPED | OUTPATIENT
Start: 2020-12-27 | End: 2021-08-26

## 2020-12-27 RX ORDER — POTASSIUM CHLORIDE 20 MEQ/1
20 TABLET, EXTENDED RELEASE ORAL DAILY
Qty: 30 TABLET | Refills: 11 | Status: SHIPPED | OUTPATIENT
Start: 2020-12-27 | End: 2021-10-20 | Stop reason: SDUPTHER

## 2020-12-27 RX ORDER — ALLOPURINOL 100 MG/1
100 TABLET ORAL DAILY
Qty: 30 TABLET | Refills: 0 | Status: SHIPPED | OUTPATIENT
Start: 2020-12-28 | End: 2021-12-28

## 2020-12-27 RX ORDER — ROSUVASTATIN CALCIUM 20 MG/1
40 TABLET, COATED ORAL DAILY
Qty: 180 TABLET | Refills: 3 | Status: SHIPPED | OUTPATIENT
Start: 2020-12-27 | End: 2021-10-20 | Stop reason: SDUPTHER

## 2020-12-27 RX ORDER — METOPROLOL SUCCINATE 50 MG/1
50 TABLET, EXTENDED RELEASE ORAL DAILY
Status: DISCONTINUED | OUTPATIENT
Start: 2020-12-28 | End: 2020-12-27 | Stop reason: HOSPADM

## 2020-12-27 RX ORDER — ERGOCALCIFEROL 1.25 MG/1
50000 CAPSULE ORAL
Qty: 4 CAPSULE | Refills: 0 | Status: SHIPPED | OUTPATIENT
Start: 2021-01-02 | End: 2021-02-01

## 2020-12-27 RX ORDER — LOSARTAN POTASSIUM 100 MG/1
50 TABLET ORAL DAILY
Qty: 45 TABLET | Refills: 3 | Status: ON HOLD | OUTPATIENT
Start: 2020-12-27 | End: 2021-08-25

## 2020-12-27 RX ORDER — AMLODIPINE BESYLATE 5 MG/1
5 TABLET ORAL DAILY
Qty: 30 TABLET | Refills: 11 | Status: SHIPPED | OUTPATIENT
Start: 2020-12-27 | End: 2021-10-20 | Stop reason: SDUPTHER

## 2020-12-27 RX ORDER — CLOPIDOGREL BISULFATE 75 MG/1
75 TABLET ORAL DAILY
Qty: 90 TABLET | Refills: 3 | Status: ON HOLD | OUTPATIENT
Start: 2020-12-27 | End: 2021-08-18 | Stop reason: HOSPADM

## 2020-12-27 RX ADMIN — METOPROLOL SUCCINATE 25 MG: 25 TABLET, FILM COATED, EXTENDED RELEASE ORAL at 08:12

## 2020-12-27 RX ADMIN — ALLOPURINOL 100 MG: 100 TABLET ORAL at 08:12

## 2020-12-27 RX ADMIN — APIXABAN 5 MG: 5 TABLET, FILM COATED ORAL at 08:12

## 2020-12-27 RX ADMIN — PANTOPRAZOLE SODIUM 40 MG: 40 TABLET, DELAYED RELEASE ORAL at 08:12

## 2020-12-27 RX ADMIN — INSULIN ASPART 9 UNITS: 100 INJECTION, SOLUTION INTRAVENOUS; SUBCUTANEOUS at 11:12

## 2020-12-27 RX ADMIN — POTASSIUM CHLORIDE 40 MEQ: 1500 TABLET, EXTENDED RELEASE ORAL at 08:12

## 2020-12-27 RX ADMIN — ASPIRIN 81 MG: 81 TABLET, COATED ORAL at 08:12

## 2020-12-27 RX ADMIN — IPRATROPIUM BROMIDE AND ALBUTEROL SULFATE 3 ML: .5; 3 SOLUTION RESPIRATORY (INHALATION) at 01:12

## 2020-12-27 RX ADMIN — IPRATROPIUM BROMIDE AND ALBUTEROL SULFATE 3 ML: .5; 3 SOLUTION RESPIRATORY (INHALATION) at 08:12

## 2020-12-27 RX ADMIN — LOSARTAN POTASSIUM 100 MG: 50 TABLET, FILM COATED ORAL at 08:12

## 2020-12-27 RX ADMIN — FUROSEMIDE 40 MG: 40 TABLET ORAL at 08:12

## 2020-12-27 RX ADMIN — DOXYCYCLINE HYCLATE 100 MG: 100 TABLET, COATED ORAL at 08:12

## 2020-12-27 RX ADMIN — CLOPIDOGREL 75 MG: 75 TABLET, FILM COATED ORAL at 08:12

## 2020-12-27 RX ADMIN — CEFTRIAXONE SODIUM 1 G: 1 INJECTION, POWDER, FOR SOLUTION INTRAMUSCULAR; INTRAVENOUS at 11:12

## 2020-12-27 RX ADMIN — INSULIN DETEMIR 24 UNITS: 100 INJECTION, SOLUTION SUBCUTANEOUS at 08:12

## 2020-12-27 NOTE — PT/OT/SLP EVAL
Physical Therapy Evaluation and Discharge Note    Patient Name:  Eliza Louie   MRN:  [id number]    Recommendations:     Discharge Recommendations:  home   Discharge Equipment Recommendations: none   Barriers to discharge: None    Assessment:     Eliza Louie is a 70 y.o. female admitted with a medical diagnosis of Acute ST elevation myocardial infarction (STEMI). .  At this time, patient is functioning at their prior level of function and does not require further acute PT services.     Recent Surgery: * No surgery found *      Plan:     During this hospitalization, patient does not require further acute PT services.  Please re-consult if situation changes.      Subjective     Chief Complaint: wanting to be able to go to the bathroom by herself  Patient/Family Comments/goals: go home  Pain/Comfort:  · Pain Rating 1: 0/10  · Pain Rating Post-Intervention 1: 0/10    Patients cultural, spiritual, Confucianism conflicts given the current situation:      Living Environment:  Living Environment: w/ spouse in [facility] w/ 7STE & BHR inaccessible simultaneously; t/s  Previous level of function: MI w/ PRN rollator use; bathes on shower chair  Roles and Routines: IADLs; driving  Equipment Used at home:  rollator, shower chair  Assistance upon Discharge: family    Objective:     Communicated with MINDY Moreno prior to session.  Patient found HOB elevated with telemetry upon PT entry to room.    General Precautions: Standard, fall   Orthopedic Precautions:N/A   Braces: N/A     Exams:  · RLE Strength: WFL  · LLE Strength: WFL    Functional Mobility:  · Bed Mobility:     · Supine to Sit: supervision  · Sit to Supine: supervision  · Transfers:     · Sit to Stand:  supervision with no AD  · Gait: Patient ambulated 50 ft with Supervision and no AD    AM-PAC 6 CLICK MOBILITY  Total Score:20       Therapeutic Activities and Exercises:   Patient demonstrates no LOB. Patient reports that she is at baseline and does not wish for further PT  intervention    AM-PAC 6 CLICK MOBILITY  Total Score:20     Patient left HOB elevated with all lines intact and call button in reach.    GOALS:   Multidisciplinary Problems     Physical Therapy Goals     Not on file                History:     Past Medical History:   Diagnosis Date    Diabetes mellitus     Hypertension        History reviewed. No pertinent surgical history.    Time Tracking:     PT Received On: 12/27/20  PT Start Time: 0757     PT Stop Time: 0807  PT Total Time (min): 10 min     Billable Minutes: Evaluation 10      Jenn Marquez, PT  12/27/2020

## 2020-12-27 NOTE — DISCHARGE SUMMARY
[facility] Hospital Medicine  Discharge Summary      Patient Name: Eliza Louie  MRN: [id number]  Patient Class: IP- Inpatient  Admission Date: 12/21/2020  Hospital Length of Stay: 6 days  Discharge Date and Time:  12/27/2020 2:20 PM  Attending Physician: Manny Gatica, *   Discharging Provider: Manny Gatica MD  Primary Care Provider: Jayden Pérez MD      HPI:   Ms. Eliza Louie is a 69 y/o female who lives in [place], [place] at her residence. The patients PCP is Dr. Jayden Pérez. She has a pmh of HTN and DM. No surgical history noted. She denies smoking cigarettes, drinking alcohol, and using illicit drugs. She presents to the ED here at [facility] with complaints of Vomiting since 11pm, Diarrhea, and HTN. Per the  Patient she has been out of her medications for 3 weeks. She denies hematemesis, bilious vomiting, coffee ground emesis.  Diarrhea is mucusy and brown; she denies any hematochezia and melena. She notes some crampy abdominal pain in the epigastric region. The pain does not radiate.  No fever, chills or sweats.  No sick contacts.  No trauma. No recent travel. No recent ABX use. Her ED workup includes Na--135, potassium 3.1, Cr--1.6, glucose 408, hemoglobin A1c--10.2. CXR---Pulmonary opacities consistent with pulmonary infiltrate/airspace disease most notably on the right with suspected infiltrate at the left base as well, as discussed above. XR abdomen---Nonobstructive bowel gas pattern. Right upper quadrant calcification corresponding to a stone seen within the cystic duct as seen on prior ultrasound. Probable left nephrolith. COVID-19 is negative. She will be admitted to the ICU for further care.    * No surgery found *      Hospital Course:   Ms. Louie presented with posterior acute MI complicated by acute hypoxic respiratory failure due to acute on chronic diastolic heart failure and JOHN on CKD3. Also developed new onset atrial fibrillation. Cardiology  "was consulted and she was started on heparin gtt, ASA, plavix, and statin was increased to rosuvastatin 40. Determined to manage medically due to concern that patient had presented with over 12 hours of symptoms and had already infarcted heart muscle, especially in setting of severe JOHN. She was diuresed with IV lasix with weaning of O2. Also covered with antibiotics for CAP given concern for possible consolidation with substantial leukocytosis (although could also be due to infarction alone).    Will discharge on ASA, plavix, and eliquis (for Afib) x1 week, then transition to plavix and eliquis alone. Added on metoprolol succinate 50mg daily and amlodipine 5. Will continue lasix 40mg bid on discharge. Will need close f/u with Nephrology and Cardiology; should have repeat BMP and outpatient stress test.    BP (!) 153/73 (BP Location: Left arm, Patient Position: Lying)   Pulse 75   Temp 98.4 °F (36.9 °C) (Oral)   Resp 18   Ht 5' 3" (1.6 m)   Wt 82.8 kg (182 lb 8.7 oz)   LMP  (LMP Unknown)   SpO2 95%   Breastfeeding No   BMI 32.34 kg/m²   Physical Exam  Vitals signs and nursing note reviewed.   Constitutional:       Appearance: Normal appearance.   HENT:      Head: Normocephalic and atraumatic.      Nose: Nose normal.   Eyes:      Extraocular Movements: Extraocular movements intact.      Pupils: Pupils are equal, round, and reactive to light.   Neck:      Musculoskeletal: Normal range of motion.   Cardiovascular:      Rate and Rhythm: Normal rate.      Pulses: Normal pulses.      Heart sounds: Normal heart sounds.   Pulmonary:      Breath sounds: Faint rales present. No wheezing.      Comments: On ambient air  Abdominal:      General: Bowel sounds are normal. There is no distension.      Palpations: Abdomen is soft.      Tenderness: There is no abdominal tenderness. There is no guarding.   Musculoskeletal: Normal range of motion.   Skin:     General: Skin is warm and dry.      Capillary Refill: Capillary " refill takes less than 2 seconds.   Neurological:      Mental Status: She is alert and oriented to person, place, and time.   Psychiatric:         Mood and Affect: Mood normal.         Behavior: Behavior normal.      Consults:   Consults (From admission, onward)        Status Ordering Provider     [facility]  Once     Provider:  Lan Lugo MD    Completed GAMALIEL LEBRON     Inpatient consult to Nephrology-[facility] ([facility]  Once     Provider:  (Not yet assigned)    Acknowledged ANNA CRAVEN     Inpatient consult to Pulmonology  Once     Provider:  (Not yet assigned)    Completed ANNA CRAVEN     IP consult to case management  Once     Provider:  (Not yet assigned)    Acknowledged PROSPER HARO          * Acute ST elevation myocardial infarction (STEMI)  - presented with inferior ST elevations and anterior ST depressions with troponin 38; peak >50  - treating medically per cardiology  - treated with heparin gtt, plavix loaded -> 75mg daily, ASA loaded -> 81mg daily, coreg 25mg bid -> held 2/2 bradycardia/hypotension, rosuvastatin 40mg daily  - given Afib and need for eliquis, will use plavix/eliquis on discharge and hold aspirin  - lasix to manage volume overload; transitioned off gtt to 40mg IV bid dosing on 12/24  - switch to lasix 40mg bid PO for discharge  - add metoprolol succinate 50mg daily  - tele  - monitor lytes  - Cardiology following, appreciate assistance  - TTE:  · The left ventricle is mildly enlarged with concentric hypertrophy andThe estimated ejection fraction is 55%  · Moderate left atrial enlargement.  · Grade II left ventricular diastolic dysfunction.  · There are segmental left ventricular wall motion abnormalities.  · There is mild tricuspid valve stenosis.  · Mild mitral regurgitation.  · Intermediate central venous pressure (8 mmHg).  · The estimated PA systolic pressure is 32 mmHg.  · Mild  aortic regurgitation.  · Normal right ventricular size with normal right ventricular systolic function.    New onset a-fib  - initially on heparin  - transitioned to eliquis 5mg bid on 12/24  - uptitrating BB      CAP (community acquired pneumonia)  - likely hypoxia due to pulmonary edema alone, but given leukocytosis, continued high O2 needs, and congestion on CXR, will cover empirically with abx  - ceftriaxone and doxycycline  - 5d total course    Acute on chronic diastolic heart failure  - due to ischemia  - initiated on IV lasix  - lasix gtt->IV push->PO 40mg bid  - on BB and ARB  - Nephrology consulted, appreciate recs  - Cards following, appreciate recs      Acute hypoxemic respiratory failure  Began complaining of SOB in the ED---concerned for flash pulm edema along with CP. 40 mg IV furosemide given. Started on Bipap -> HFNC->NC then off O2 now  - now on lasix gtt->40mg IV bid->40mg PO bid on 12/26  - monitor strict I/Os  - added on antibiotics 12/23: does have leukocytosis but has been afebrile; likely due to pulmonary edema/ischemia but will trial abx given significant hypoxia despite attempts at diuresis - treat 5d total course (complete)      Type 2 diabetes mellitus with hyperglycemia, without long-term current use of insulin  - hemoglobin A1c is 10.2  - hold Metformin while inpatient  - initiated on insulin gtt upon arrival, now discontinued  - hold home metformin  - transitioned to subQ detemir 24u with aspart 9u qac  - LDSSI with accuchecks  - diabetic diet    ARF (acute renal failure)  - baseline serum Cr 1.0, 1.6 on admit, has run around 2.3-2.5 while in house  - will need close Nephrology f/u with repeat BMP next week  - decrease losartan to 50    GERD (gastroesophageal reflux disease)  Chronic.       Hypertension  - decrease losartan to 50  - start metoprolol succinate 50  - start amlodipine 5        Final Active Diagnoses:    Diagnosis Date Noted POA    PRINCIPAL PROBLEM:  Acute ST elevation  myocardial infarction (STEMI) [I21.3] 07/07/2017 Yes    New onset a-fib [I48.91] 12/24/2020 Yes    CAP (community acquired pneumonia) [J18.9] 12/23/2020 Yes    Acute on chronic diastolic heart failure [I50.33] 12/22/2020 Yes    ARF (acute renal failure) [N17.9] 12/21/2020 Yes    Acute hypoxemic respiratory failure [J96.01] 12/21/2020 Yes    Type 2 diabetes mellitus with hyperglycemia, without long-term current use of insulin [E11.65]  Yes    GERD (gastroesophageal reflux disease) [K21.9] 02/20/2018 Yes    Hypertension [I10] 07/02/2017 Yes      Problems Resolved During this Admission:    Diagnosis Date Noted Date Resolved POA    Chest pain [R07.9]  12/26/2020 Yes    Nausea & vomiting [R11.2]  12/26/2020 Yes    Hypomagnesemia [E83.42] 12/22/2020 12/23/2020 Yes    Hypertensive emergency [I16.1] 12/21/2020 12/23/2020 Yes    Pulmonary edema [J81.1] 12/21/2020 12/22/2020 Yes    Hypokalemia [E87.6]  12/23/2020 Yes       Discharged Condition: fair    Disposition: Home or Self Care    Follow Up:  Follow-up Information     Ivania Shields MD. Schedule an appointment as soon as possible for a visit in 1 week.    Specialty: Nephrology  Why: Nephrology Follow up / Offices closed for weekend. Patient to schedule own follow up appointment.  Contact information:  [address]  [address]  [facility]  [place] [id number]  [phone]             Lul Thompson MD. Schedule an appointment as soon as possible for a visit in 1 week.    Specialties: Cardiology, INTERVENTIONAL CARDIOLOGY  Why: Cardiology Follow Up / Offices closed for weekend. Patient to schedule own follow up appointment.  Contact information:  [address]  [address]  [id number]  [phone]                 Patient Instructions:      BASIC METABOLIC PANEL   Standing Status: Future Standing Exp. Date: 02/25/22     Ambulatory referral/consult to Cardiology   Standing Status: Future   Referral Priority: Routine Referral Type:  Consultation   Referral Reason: Specialty Services Required   Requested Specialty: Cardiology   Number of Visits Requested: 1     Ambulatory referral/consult to Nephrology   Standing Status: Future   Referral Priority: Routine Referral Type: Consultation   Referral Reason: Specialty Services Required   Requested Specialty: Nephrology   Number of Visits Requested: 1     Diet diabetic     Diet Cardiac     Notify your health care provider if you experience any of the following:  severe uncontrolled pain     Notify your health care provider if you experience any of the following:  difficulty breathing or increased cough     Activity as tolerated       Significant Diagnostic Studies: Labs:   CMP   Recent Labs   Lab 12/26/20  0517 12/27/20  0444    138   K 3.1* 3.0*   CL 97 97   CO2 26 26   GLU 89 154*   BUN 41* 38*   CREATININE 2.4* 2.6*   CALCIUM 8.0* 8.7   ALBUMIN 2.5* 2.7*   ANIONGAP 15 15   ESTGFRAFRICA 23* 21*   EGFRNONAA 20* 18*   , CBC   Recent Labs   Lab 12/26/20  0517 12/27/20  0444   WBC 7.85 9.23   HGB 12.4 12.8   HCT 37.5 38.6    261   , INR   Lab Results   Component Value Date    INR 1.1 12/21/2020    INR 1.0 07/07/2017    INR 1.0 02/20/2017   , Lipid Panel   Lab Results   Component Value Date    CHOL 134 06/04/2019    HDL 36 (L) 06/04/2019    LDLCALC 46.4 (L) 06/04/2019    TRIG 258 (H) 06/04/2019    CHOLHDL 26.9 06/04/2019   , Troponin   Recent Labs   Lab 12/21/20  1732 12/22/20  1257 12/24/20  0328   TROPONINI 29.113* >50.000* 49.328*   , A1C:   Recent Labs   Lab 12/21/20  0619   HGBA1C 10.2*    and All labs within the past 24 hours have been reviewed  Radiology: X-Ray: CXR: X-Ray Chest 1 View (CXR):   Results for orders placed or performed during the hospital encounter of 12/21/20   X-Ray Chest 1 View    Narrative    EXAMINATION:  XR CHEST 1 VIEW    CLINICAL HISTORY:  chest pain;    TECHNIQUE:  Single frontal view of the chest was  performed.    COMPARISON:  12/23/2020    FINDINGS:  Nonspecific prominence of interstitial markings unchanged.  Minimal pleural reaction at the costophrenic angles.  No focal lobar consolidation.  No evident pneumothorax.    The cardiac silhouette is unchanged, prominent.  The hilar and mediastinal contours are unremarkable.    Bones are intact.      Impression    No acute abnormality.      Electronically signed by: Henry Page MD  Date:    12/24/2020  Time:    06:17    and X-Ray Chest PA and Lateral (CXR): No results found for this visit on 12/21/20.  Cardiac Graphics: Echocardiogram:   2D echo with color flow doppler:   Results for orders placed or performed during the hospital encounter of 07/02/17   2D echo with color flow doppler   Result Value Ref Range    EF 65 55 - 65    Est. PA Systolic Pressure 9.45     Narrative    Date of Procedure: 07/03/2017        TEST DESCRIPTION   Technical Quality: This is a technically adequate study.     Aorta: The aortic root is normal in size, measuring 3.4 cm at sinotubular junction.     Left Atrium: The left atrial volume index is mildly enlarged, measuring 40.18 cc/m2.     Left Ventricle: The left ventricle is normal in size, with an end-diastolic diameter of 4.8 cm, and an end-systolic diameter of 3.1 cm. Wall thickness is increased, with the septum measuring 1.4 cm and the posterior wall measuring 1.1 cm across. Relative   wall thickness was increased at 0.46, and the LV mass index was increased at 143.0 g/m2 consistent with concentric left ventricular hypertrophy. There are no regional wall motion abnormalities. Left ventricular systolic function appears normal. Visually   estimated ejection fraction is 60-65%. The LV Doppler derived stroke volume equals 128.0 ccs.     Diastolic indices: E wave velocity 0.8 m/s, E/A ratio 0.8,  msec., E/e' ratio(lat) 13. Diastolic function is indeterminate.     Right Atrium: The right atrium is normal in size, measuring 4.4 cm  in length in the apical view.     Right Ventricle: The right ventricle is normal in size measuring 2.7 cm at the base in the apical right ventricle-focused view. Global right ventricular systolic function appears normal. The estimated PA systolic pressure is 9 mmHg.     Aortic Valve:  Aortic valve is normal in structure with normal leaflet mobility.     Mitral Valve:  Mitral valve is normal in structure with normal leaflet mobility.     Tricuspid Valve:  Tricuspid valve is normal in structure with normal leaflet mobility.     Pulmonary Valve:  Pulmonary valve is normal in structure with normal leaflet mobility.     IVC: IVC is normal in size and collapses > 50% with a sniff, suggesting normal right atrial pressure of 3 mmHg.     Intracavitary: There is no evidence of pericardial effusion, intracavity mass, thrombi, or vegetation.         CONCLUSIONS     1 - Mild left atrial enlargement.     2 - Concentric hypertrophy.     3 - No wall motion abnormalities.     4 - Normal left ventricular systolic function (EF 60-65%).     5 - Indeterminate LV diastolic function.     6 - Normal right ventricular systolic function .     7 - The estimated PA systolic pressure is 9 mmHg.             This document has been electronically    SIGNED BY: Lul Thompson MD On: 07/04/2017 06:37    and Transthoracic echo (TTE) complete (Cupid Only):   Results for orders placed or performed during the hospital encounter of 12/21/20   Echo Color Flow Doppler? Yes   Result Value Ref Range    BSA 1.92 m2    TDI SEPTAL 0.04 m/s    LV LATERAL E/E' RATIO 18.40 m/s    LV SEPTAL E/E' RATIO 23.00 m/s    LA WIDTH 4.70 cm    TDI LATERAL 0.05 m/s    LVIDd 5.14 3.5 - 6.0 cm    IVS 1.21 (A) 0.6 - 1.1 cm    Posterior Wall 1.17 (A) 0.6 - 1.1 cm    Ao root annulus 3.31 cm    LVIDs 3.96 2.1 - 4.0 cm    FS 23 28 - 44 %    LA volume 93.81 cm3    STJ 3.11 cm    Ascending aorta 3.22 cm    LV mass 241.43 g    LA size 4.32 cm    RVDD 2.51 cm    TAPSE 2.33 cm     "RV S' 8.49 cm/s    Left Ventricle Relative Wall Thickness 0.46 cm    AV mean gradient 4 mmHg    AV valve area 2.15 cm2    AV Velocity Ratio 0.69     AV index (prosthetic) 0.62     E/A ratio 1.46     Mean e' 0.05 m/s    E wave decelartion time 248.00 msec    MV "A" wave duration 12.56 msec    Pulm vein S/D ratio 1.21     LVOT diameter 2.10 cm    LVOT area 3.5 cm2    LVOT peak ja 0.80 m/s    LVOT peak VTI 15.76 cm    Ao peak ja 1.16 m/s    Ao VTI 25.37 cm    LVOT stroke volume 54.56 cm3    AV peak gradient 5 mmHg    E/E' ratio 20.44 m/s    MV Peak E Ja 0.92 m/s    TR Max Ja 2.44 m/s    MV Peak A Ja 0.63 m/s    PV Peak S Ja 0.23 m/s    PV Peak D Ja 0.19 m/s    LV Systolic Volume 68.40 mL    LV Systolic Volume Index 36.7 mL/m2    LV Diastolic Volume 126.05 mL    LV Diastolic Volume Index 67.69 mL/m2    LA Volume Index 50.4 mL/m2    LV Mass Index 130 g/m2    RA Major Axis 4.35 cm    Left Atrium Minor Axis 5.67 cm    Left Atrium Major Axis 5.22 cm    Triscuspid Valve Regurgitation Peak Gradient 24 mmHg    LA Volume Index (Mod) 37.6 mL/m2    LA volume (mod) 69.94 cm3    RA Width 4.82 cm    Right Atrial Pressure (from IVC) 8 mmHg    TV rest pulmonary artery pressure 32 mmHg    Narrative    · The left ventricle is mildly enlarged with concentric hypertrophy andThe   estimated ejection fraction is 55%  · Moderate left atrial enlargement.  · Grade II left ventricular diastolic dysfunction.  · There are segmental left ventricular wall motion abnormalities.  · There is mild tricuspid valve stenosis.  · Mild mitral regurgitation.  · Intermediate central venous pressure (8 mmHg).  · The estimated PA systolic pressure is 32 mmHg.  · Mild aortic regurgitation.  · Normal right ventricular size with normal right ventricular systolic   function.          Pending Diagnostic Studies:     None         Medications:  Reconciled Home Medications:      Medication List      START taking these medications    allopurinoL 100 MG " tablet  Commonly known as: ZYLOPRIM  Take 1 tablet (100 mg total) by mouth once daily.  Start taking on: December 28, 2020     amLODIPine 5 MG tablet  Commonly known as: NORVASC  Take 1 tablet (5 mg total) by mouth once daily.     apixaban 5 mg Tab  Commonly known as: ELIQUIS  Take 1 tablet (5 mg total) by mouth 2 (two) times daily.     ergocalciferol 50,000 unit Cap  Commonly known as: ERGOCALCIFEROL  Take 1 capsule (50,000 Units total) by mouth every 7 days.  Start taking on: January 2, 2021     furosemide 40 MG tablet  Commonly known as: LASIX  Take 1 tablet (40 mg total) by mouth 2 (two) times daily.     metoprolol succinate 50 MG 24 hr tablet  Commonly known as: TOPROL-XL  Take 1 tablet (50 mg total) by mouth once daily.  Start taking on: December 28, 2020     potassium chloride SA 20 MEQ tablet  Commonly known as: K-DUR,KLOR-CON  Take 1 tablet (20 mEq total) by mouth once daily.        CHANGE how you take these medications    losartan 100 MG tablet  Commonly known as: COZAAR  Take 0.5 tablets (50 mg total) by mouth once daily.  What changed: how much to take     rosuvastatin 20 MG tablet  Commonly known as: CRESTOR  Take 2 tablets (40 mg total) by mouth once daily.  What changed: how much to take        CONTINUE taking these medications    aspirin 81 MG EC tablet  Commonly known as: ECOTRIN  Take 1 tablet (81 mg total) by mouth once daily. for 7 days     clopidogreL 75 mg tablet  Commonly known as: PLAVIX  Take 1 tablet (75 mg total) by mouth once daily.     metFORMIN 1000 MG tablet  Commonly known as: GLUCOPHAGE  Take 1 tablet (1,000 mg total) by mouth 2 (two) times daily with meals.     nitroGLYCERIN 0.4 MG SL tablet  Commonly known as: NITROSTAT  Place 1 tablet (0.4 mg total) under the tongue every 5 (five) minutes as needed for Chest pain (for a max of 3).     pantoprazole 40 MG tablet  Commonly known as: PROTONIX  Take 1 tablet (40 mg total) by mouth 2 (two) times daily.        STOP taking these  medications    carvediloL 25 MG tablet  Commonly known as: COREG     NIFEdipine 20 MG Cap  Commonly known as: PROCARDIA            Indwelling Lines/Drains at time of discharge:   Lines/Drains/Airways     None                 Time spent on the discharge of patient: 45 minutes  Patient was seen and examined on the date of discharge and determined to be suitable for discharge.         Manny Gatica MD  Department of Hospital Medicine  [facility]

## 2020-12-27 NOTE — PLAN OF CARE
Problem: Adult Inpatient Plan of Care  Goal: Plan of Care Review  Patient is awake and orientedx4. Care plan explained to patient; she verbalized understanding. On room air, O2 saturation at 91-94%. Hooked to heart monitor running NSR/sinus bradycardia at 50-85bpm. Assisted to bathroom as needed. No pain/n/v/d during shift. Due medications given. Accuchecks completed, covered per sliding scale. Encouraged to turn every 2 hours as tolerated. Maintained on fall risk precaution. Bed in lowest position, bed alarm on, call light/personal items within reach and instructed to call for help when needed. Will continue to monitor.    Outcome: Ongoing, Progressing

## 2020-12-27 NOTE — PT/OT/SLP EVAL
Occupational Therapy   Evaluation and Discharge Note    Name: Eliza Louie  MRN: [id number]  Admitting Diagnosis:  Acute ST elevation myocardial infarction (STEMI)      Recommendations:     Discharge Recommendations: home  Discharge Equipment Recommendations:  none  Barriers to discharge:  None    Assessment:   Per OT eval, pt w/o signif change in fxnl status from baseline & no further OT svcs indicated at this time. DC OT.    Eliza Louie is a 70 y.o. female with a medical diagnosis of Acute ST elevation myocardial infarction (STEMI). At this time, patient is functioning at their prior level of function and does not require further acute OT services.     Plan:     During this hospitalization, patient does not require further acute OT services.  Please re-consult if situation changes.    · Plan of Care Reviewed with: patient    Subjective     Chief Complaint: Nk/V; SOB  Patient/Family Comments/goals: return home    Occupational Profile:  Living Environment: w/ spouse in [facility] w/ 7STE & BHR inaccessible simultaneously; t/s  Previous level of function: MI w/ PRN rollator use; bathes on shower chair  Roles and Routines: IADLs; driving  Equipment Used at home:  rollator, shower chair  Assistance upon Discharge: fly    Pain/Comfort:  · Pain Rating 1: 0/10  · Pain Rating Post-Intervention 1: 0/10    Patients cultural, spiritual, Advent conflicts given the current situation:      Objective:     Communicated with: nsg prior to session.  Patient found HOB elevated with telemetry upon OT entry to room.    General Precautions: Standard, fall   Orthopedic Precautions:N/A   Braces: N/A     Occupational Performance:    Bed Mobility:    · Patient completed Supine to Sit with modified independence    Functional Mobility/Transfers:  · Patient completed Sit <> Stand Transfer with modified independence  with  no assistive device   · Patient completed Bed <> Chair Transfer using Step Transfer technique with modified independence with no  assistive device  · Patient completed Toilet Transfer Step Transfer technique with modified independence with  no AD  · Functional Mobility: w/o DME around room for ADLs w/ MI    Activities of Daily Living:  · Grooming: modified independence standing at sink  · Lower Body Dressing: modified independence don undergarments  · Toileting: modified independence on toilet    Cognitive/Visual Perceptual:  AO4    No deficits noted    Physical Exam:  BUEs grossly WFL at 4+/5    Sit balance: G  Stand balance: G-    AMPAC 6 Click ADL:  AMPAC Total Score: 24    Treatment & Education:    Education:  Pt found in supine & agreeable to OT eval this AM.  Pt perf all fxnl mobility/tasks w/ MI & no DME.  Edu/tx re: general safety techs, HEP, endurance tx, energy conservation techs. Pt verbalized understanding.  Pt left UIC w/ nsg in room.    Patient left up in chair with all lines intact, call button in reach and lnsg present    GOALS:   Multidisciplinary Problems     Occupational Therapy Goals     Not on file          Multidisciplinary Problems (Resolved)        Problem: Occupational Therapy Goal    Goal Priority Disciplines Outcome Interventions   Occupational Therapy Goal   (Resolved)     OT, PT/OT Met                    History:     Past Medical History:   Diagnosis Date    Diabetes mellitus     Hypertension        History reviewed. No pertinent surgical history.    Time Tracking:     OT Date of Treatment: 12/27/20  OT Start Time: 0812  OT Stop Time: 0825  OT Total Time (min): 13 min    Billable Minutes:Evaluation 13  Total Time 13    SULEMAN Thacker  12/27/2020

## 2020-12-27 NOTE — DISCHARGE INSTRUCTIONS
Atrial Fibrillation, Discharge Instructions for (English) View Edit Remove   Heart Attack, Discharge Instructions for (English) View Edit Remove   Diet, Discharge Instructions for Eating a Low-Salt (English) View Edit Remove   Apixaban oral tablets (English) View Edit Remove   Furosemide tablets (English) View Edit Remove   Metoprolol tablets (English) View Edit Remove

## 2020-12-27 NOTE — ASSESSMENT & PLAN NOTE
- presented with inferior ST elevations and anterior ST depressions with troponin 38; peak >50  - treating medically per cardiology  - treated with heparin gtt, plavix loaded -> 75mg daily, ASA loaded -> 81mg daily, coreg 25mg bid -> held 2/2 bradycardia/hypotension, rosuvastatin 40mg daily  - given Afib and need for eliquis, will use plavix/eliquis on discharge and hold aspirin  - lasix to manage volume overload; transitioned off gtt to 40mg IV bid dosing on 12/24  - switch to lasix 40mg bid PO for discharge  - add metoprolol succinate 50mg daily  - tele  - monitor lytes  - Cardiology following, appreciate assistance  - TTE:  · The left ventricle is mildly enlarged with concentric hypertrophy andThe estimated ejection fraction is 55%  · Moderate left atrial enlargement.  · Grade II left ventricular diastolic dysfunction.  · There are segmental left ventricular wall motion abnormalities.  · There is mild tricuspid valve stenosis.  · Mild mitral regurgitation.  · Intermediate central venous pressure (8 mmHg).  · The estimated PA systolic pressure is 32 mmHg.  · Mild aortic regurgitation.  · Normal right ventricular size with normal right ventricular systolic function.

## 2020-12-27 NOTE — NURSING
Patient safety maintained. Medications administered per order. Instructed to call for assistance as needed. Printed documentation provided for discharge. IV and telemetry discontinued. Education completed per virtual nurse.

## 2020-12-27 NOTE — HOSPITAL COURSE
"Ms. Louie presented with posterior acute MI complicated by acute hypoxic respiratory failure due to acute on chronic diastolic heart failure and JOHN on CKD3. Also developed new onset atrial fibrillation. Cardiology was consulted and she was started on heparin gtt, ASA, plavix, and statin was increased to rosuvastatin 40. Determined to manage medically due to concern that patient had presented with over 12 hours of symptoms and had already infarcted heart muscle, especially in setting of severe JOHN. She was diuresed with IV lasix with weaning of O2. Also covered with antibiotics for CAP given concern for possible consolidation with substantial leukocytosis (although could also be due to infarction alone).    Will discharge on ASA, plavix, and eliquis (for Afib) x1 week, then transition to plavix and eliquis alone. Added on metoprolol succinate 50mg daily and amlodipine 5. Will continue lasix 40mg bid on discharge. Will need close f/u with Nephrology and Cardiology; should have repeat BMP and outpatient stress test.    BP (!) 153/73 (BP Location: Left arm, Patient Position: Lying)   Pulse 75   Temp 98.4 °F (36.9 °C) (Oral)   Resp 18   Ht 5' 3" (1.6 m)   Wt 82.8 kg (182 lb 8.7 oz)   LMP  (LMP Unknown)   SpO2 95%   Breastfeeding No   BMI 32.34 kg/m²   Physical Exam  Vitals signs and nursing note reviewed.   Constitutional:       Appearance: Normal appearance.   HENT:      Head: Normocephalic and atraumatic.      Nose: Nose normal.   Eyes:      Extraocular Movements: Extraocular movements intact.      Pupils: Pupils are equal, round, and reactive to light.   Neck:      Musculoskeletal: Normal range of motion.   Cardiovascular:      Rate and Rhythm: Normal rate.      Pulses: Normal pulses.      Heart sounds: Normal heart sounds.   Pulmonary:      Breath sounds: Faint rales present. No wheezing.      Comments: On ambient air  Abdominal:      General: Bowel sounds are normal. There is no distension.      Palpations: " Abdomen is soft.      Tenderness: There is no abdominal tenderness. There is no guarding.   Musculoskeletal: Normal range of motion.   Skin:     General: Skin is warm and dry.      Capillary Refill: Capillary refill takes less than 2 seconds.   Neurological:      Mental Status: She is alert and oriented to person, place, and time.   Psychiatric:         Mood and Affect: Mood normal.         Behavior: Behavior normal.

## 2020-12-27 NOTE — ASSESSMENT & PLAN NOTE
- due to ischemia  - initiated on IV lasix  - lasix gtt->IV push->PO 40mg bid  - on BB and ARB  - Nephrology consulted, appreciate recs  - Cards following, appreciate recs

## 2020-12-27 NOTE — PLAN OF CARE
Problem: Occupational Therapy Goal  Goal: Occupational Therapy Goal  Outcome: Met   Pt found in supine & agreeable to OT eval this AM.  Pt perf all fxnl mobility/tasks w/ MI & no DME.  Edu/tx re: general safety techs, HEP, endurance tx, energy conservation techs. Pt verbalized understanding.  Pt left UIC w/ nsg in room.    Per OT eval, pt w/o signif change in fxnl status from baseline & no further OT svcs indicated at this time. DC OT.

## 2020-12-27 NOTE — PLAN OF CARE
Discharge orders noted, no HH or HME ordered.    Future Appointments   Date Time Provider Department Center   1/11/2021  9:00 AM Lul Thompson MD [facility]  [facility]       Pt's nurse will go over medications/signs and symptoms prior to discharge       12/27/20 1434   Final Note   Assessment Type Final Discharge Note   Anticipated Discharge Disposition Home   What phone number can be called within the next 1-3 days to see how you are doing after discharge? [phone]   Hospital Follow Up  Appt(s) scheduled? No  (Offices closed for weekend. Patient to schedule own follow up appointment.)   Right Care Referral Info   Post Acute Recommendation No Care     Enriqueta Stapleton, RN Transitional Navigator  [phone]

## 2020-12-27 NOTE — NURSING
Patient safety maintained. Awake, alert, and oriented. Medications administered per order. Instructed to call for assistance as needed. Up to the bathroom with one person assistance. Oxygen discontinued, patient tolerating well.

## 2020-12-27 NOTE — PLAN OF CARE
VN note: VN cued into patient's room to review discharge papers. VN educated patient on new medication and side effects. Medication list reviewed. Follow-up information given. Medications sent to her preferred pharmacy. VN educated patient on signs and symptoms when to seek medical attention. I also reinforced diabetic and cardiac diet. Patient verbalized understanding and all questions answered. Refer to clinical references for further education given. She is waiting her family to transport home.

## 2020-12-27 NOTE — ASSESSMENT & PLAN NOTE
- baseline serum Cr 1.0, 1.6 on admit, has run around 2.3-2.5 while in house  - will need close Nephrology f/u with repeat BMP next week  - decrease losartan to 50

## 2020-12-27 NOTE — ASSESSMENT & PLAN NOTE
Began complaining of SOB in the ED---concerned for flash pulm edema along with CP. 40 mg IV furosemide given. Started on Bipap -> HFNC->NC then off O2 now  - now on lasix gtt->40mg IV bid->40mg PO bid on 12/26  - monitor strict I/Os  - added on antibiotics 12/23: does have leukocytosis but has been afebrile; likely due to pulmonary edema/ischemia but will trial abx given significant hypoxia despite attempts at diuresis - treat 5d total course (complete)

## 2020-12-28 ENCOUNTER — PATIENT OUTREACH (OUTPATIENT)
Dept: ADMINISTRATIVE | Facility: CLINIC | Age: 70
End: 2020-12-28

## 2020-12-28 NOTE — TELEPHONE ENCOUNTER
C3 nurse attempted to contact patient. No answer. The following message was left for the patient to return the call:  Good afternoon  I am a nurse calling on behalf of [facility] from the [facility].  This is a Transitional Care Call for Eliza Louie . When you have a moment please contact us at [phone] or [phone] Monday through Friday, between the hours of 8 am to 4 pm. We look forward to speaking with you. On behalf of [facility] have a nice day.    The patient does not have a scheduled HOSFU appointment within 7-14 days post hospital discharge date 12/26/20.

## 2020-12-29 ENCOUNTER — LAB VISIT (OUTPATIENT)
Dept: LAB | Facility: HOSPITAL | Age: 70
End: 2020-12-29
Attending: HOSPITALIST
Payer: MEDICARE

## 2020-12-29 DIAGNOSIS — I21.3 ACUTE ST ELEVATION MYOCARDIAL INFARCTION (STEMI): ICD-10-CM

## 2020-12-29 LAB
ANION GAP SERPL CALC-SCNC: 15 MMOL/L (ref 8–16)
BUN SERPL-MCNC: 26 MG/DL (ref 8–23)
CALCIUM SERPL-MCNC: 8.8 MG/DL (ref 8.7–10.5)
CHLORIDE SERPL-SCNC: 94 MMOL/L (ref 95–110)
CO2 SERPL-SCNC: 23 MMOL/L (ref 23–29)
CREAT SERPL-MCNC: 2.4 MG/DL (ref 0.5–1.4)
EST. GFR  (AFRICAN AMERICAN): 23.2 ML/MIN/1.73 M^2
EST. GFR  (NON AFRICAN AMERICAN): 20.1 ML/MIN/1.73 M^2
GLUCOSE SERPL-MCNC: 327 MG/DL (ref 70–110)
POTASSIUM SERPL-SCNC: 3.4 MMOL/L (ref 3.5–5.1)
SODIUM SERPL-SCNC: 132 MMOL/L (ref 136–145)

## 2020-12-29 PROCEDURE — 80048 BASIC METABOLIC PNL TOTAL CA: CPT

## 2020-12-29 PROCEDURE — 36415 COLL VENOUS BLD VENIPUNCTURE: CPT

## 2020-12-29 NOTE — PATIENT INSTRUCTIONS
Discharge Instructions for Heart Attack  You have had a heart attack (acute myocardial infarction). A heart attack occurs when a vessel that sends blood to your heart suddenly becomes blocked. This causes your heart not to work as well as it should. Follow these guidelines for home care and lifestyle changes.  Home care  · Take your medicines exactly as directed. Dont skip doses. Talk with your healthcare provider if your medicines aren't working for you. Together you can come up with another treatment plan.  · Remember that recovery after a heart attack takes time. Plan to rest for at least 4 to 8 weeks while you recover. Then return to normal activity when your doctor says its OK.  · Ask your doctor about joining a heart rehabilitation program. This can help strengthen your heart and lungs and give you more energy and confidence.  · Tell your doctor if you are feeling depressed. Feelings of sadness are common after a heart attack. But it is important to speak to someone or seek counseling if you are feeling overwhelmed by these feelings.  · Call 911 right away if you have chest pain or pain that goes to your shoulder, neck, or back. Don't drive yourself to the hospital.  · Ask your family members to learn CPR. This is an important skill that can save lives when it's needed.  · Learn to take your own blood pressure and pulse. Keep a record of your results. Ask your doctor when you should seek emergency medical attention. He or she will tell you which blood pressure reading is dangerous.  Lifestyle changes  Your heart attack might have been caused by cardiovascular disease. Your healthcare provider will work with you to make changes to your lifestyle. This will help the heart disease from getting worse. These changes will most likely be a combination of diet and exercise.  Diet  Your healthcare provider will tell you what changes you need to make to your diet. You may need to see a registered dietitian for help  with these diet changes. These changes may include:  · Cutting back on how much fat and cholesterol you eat  · Cutting back on how much salt (sodium) you eat, especially if you have high blood pressure  · Eating more fresh vegetables and fruits  · Eating lean proteins such as fish, poultry, beans, and peas, and eating less red meat and processed meats  · Using low-fat dairy products  · Using vegetable and nut oils in limited amounts  · Limiting how many sweets and processed foods such as chips, cookies, and baked goods you eat  · Limiting how often you eat out. And when you do eat out, making better food choices.  · Not eating fried or greasy foods, or foods high in saturated fat  Exercise  Your healthcare provider may tell you to get more exercise if you haven't been physically active. Depending on your case, your provider may recommend that you get moderate to vigorous physical activity for at least 40 minutes each day, and for at least 3 to 4 days each week. A few examples of moderate to vigorous activity include:  · Walking at a brisk pace, about 3 to 4 miles per hour  · Jogging or running  · Swimming or water aerobics  · Hiking  · Dancing  · Martial arts  · Tennis  · Riding a bicycle or stationary bike  Other changes  Your healthcare provider may also recommend that you:  · Lose weight. If you are overweight or obese, your provider will work with you to lose extra pounds. Making diet changes and getting more exercise can help. A good goal is to lose your 10% of your body weight in one year.  · Stop smoking. Sign up for a stop-smoking program to make it more likely for you to quit for good. You can join a stop-smoking support group. Or ask your doctor about nicotine replacement products.  · Learn to manage stress. Stress management techniques to help you deal with stress in your home and work life. This will help you feel better emotionally and ease the strain on your heart.  Follow-up  Make a follow-up  appointment as directed by our staff.     When to seek medical advice  Call 911 right away if you have:  · Chest pain that goes to your neck, jaw, back, or shoulder  · Shortness of breath  Otherwise, call your doctor immediately if you have:  · Lightheadedness, dizziness, or fainting  · Feeling of irregular heartbeat or fast pulse.   Date Last Reviewed: 10/1/2016  © 5922-3900 [employer]. [address] [place] [address], [place] [address]. All rights reserved. This information is not intended as a substitute for professional medical care. Always follow your healthcare professional's instructions.

## 2021-01-11 ENCOUNTER — TELEPHONE (OUTPATIENT)
Dept: ADMINISTRATIVE | Facility: OTHER | Age: 71
End: 2021-01-11

## 2021-01-11 ENCOUNTER — OFFICE VISIT (OUTPATIENT)
Dept: CARDIOLOGY | Facility: CLINIC | Age: 71
End: 2021-01-11
Payer: MEDICARE

## 2021-01-11 VITALS
DIASTOLIC BLOOD PRESSURE: 83 MMHG | HEART RATE: 99 BPM | HEIGHT: 65 IN | BODY MASS INDEX: 29.24 KG/M2 | SYSTOLIC BLOOD PRESSURE: 156 MMHG | WEIGHT: 175.5 LBS

## 2021-01-11 DIAGNOSIS — I45.10 RBBB: ICD-10-CM

## 2021-01-11 DIAGNOSIS — I25.10 ATHEROSCLEROSIS OF NATIVE CORONARY ARTERY OF NATIVE HEART WITHOUT ANGINA PECTORIS: ICD-10-CM

## 2021-01-11 DIAGNOSIS — I21.3 ACUTE ST ELEVATION MYOCARDIAL INFARCTION (STEMI): ICD-10-CM

## 2021-01-11 DIAGNOSIS — I25.10 CORONARY ARTERY DISEASE INVOLVING NATIVE CORONARY ARTERY OF NATIVE HEART WITHOUT ANGINA PECTORIS: Primary | ICD-10-CM

## 2021-01-11 PROCEDURE — 93010 ELECTROCARDIOGRAM REPORT: CPT | Mod: S$GLB,,, | Performed by: INTERNAL MEDICINE

## 2021-01-11 PROCEDURE — 93005 ELECTROCARDIOGRAM TRACING: CPT | Mod: S$GLB,,, | Performed by: INTERNAL MEDICINE

## 2021-01-11 PROCEDURE — 99999 PR PBB SHADOW E&M-EST. PATIENT-LVL IV: CPT | Mod: PBBFAC,,, | Performed by: INTERNAL MEDICINE

## 2021-01-11 PROCEDURE — 99214 OFFICE O/P EST MOD 30 MIN: CPT | Mod: 25,S$GLB,, | Performed by: INTERNAL MEDICINE

## 2021-01-11 PROCEDURE — 93005 EKG 12-LEAD: ICD-10-PCS | Mod: S$GLB,,, | Performed by: INTERNAL MEDICINE

## 2021-01-11 PROCEDURE — 99214 PR OFFICE/OUTPT VISIT, EST, LEVL IV, 30-39 MIN: ICD-10-PCS | Mod: 25,S$GLB,, | Performed by: INTERNAL MEDICINE

## 2021-01-11 PROCEDURE — 99999 PR PBB SHADOW E&M-EST. PATIENT-LVL IV: ICD-10-PCS | Mod: PBBFAC,,, | Performed by: INTERNAL MEDICINE

## 2021-01-11 PROCEDURE — 93010 EKG 12-LEAD: ICD-10-PCS | Mod: S$GLB,,, | Performed by: INTERNAL MEDICINE

## 2021-01-11 PROCEDURE — 99214 OFFICE O/P EST MOD 30 MIN: CPT | Mod: PBBFAC,PN | Performed by: INTERNAL MEDICINE

## 2021-01-11 PROCEDURE — 93005 ELECTROCARDIOGRAM TRACING: CPT | Mod: PBBFAC,PN | Performed by: INTERNAL MEDICINE

## 2021-01-12 ENCOUNTER — TELEPHONE (OUTPATIENT)
Dept: ADMINISTRATIVE | Facility: OTHER | Age: 71
End: 2021-01-12

## 2021-03-11 ENCOUNTER — TELEPHONE (OUTPATIENT)
Dept: CARDIOLOGY | Facility: CLINIC | Age: 71
End: 2021-03-11

## 2021-03-11 DIAGNOSIS — R07.9 CHEST PAIN, UNSPECIFIED TYPE: Primary | ICD-10-CM

## 2021-04-16 ENCOUNTER — TELEPHONE (OUTPATIENT)
Dept: NEPHROLOGY | Facility: CLINIC | Age: 71
End: 2021-04-16

## 2021-04-16 DIAGNOSIS — I10 ESSENTIAL HYPERTENSION: Primary | ICD-10-CM

## 2021-04-16 DIAGNOSIS — E11.65 TYPE 2 DIABETES MELLITUS WITH HYPERGLYCEMIA, WITHOUT LONG-TERM CURRENT USE OF INSULIN: ICD-10-CM

## 2021-04-16 DIAGNOSIS — N17.9 ACUTE RENAL FAILURE, UNSPECIFIED ACUTE RENAL FAILURE TYPE: ICD-10-CM

## 2021-04-19 ENCOUNTER — LAB VISIT (OUTPATIENT)
Dept: LAB | Facility: HOSPITAL | Age: 71
End: 2021-04-19
Attending: INTERNAL MEDICINE
Payer: MEDICARE

## 2021-04-19 DIAGNOSIS — I10 ESSENTIAL HYPERTENSION: ICD-10-CM

## 2021-04-19 DIAGNOSIS — E11.65 TYPE 2 DIABETES MELLITUS WITH HYPERGLYCEMIA, WITHOUT LONG-TERM CURRENT USE OF INSULIN: ICD-10-CM

## 2021-04-19 DIAGNOSIS — N17.9 ACUTE RENAL FAILURE, UNSPECIFIED ACUTE RENAL FAILURE TYPE: ICD-10-CM

## 2021-04-19 LAB
ALBUMIN SERPL BCP-MCNC: 4.1 G/DL (ref 3.5–5.2)
ALP SERPL-CCNC: 62 U/L (ref 55–135)
ALT SERPL W/O P-5'-P-CCNC: 18 U/L (ref 10–44)
ANION GAP SERPL CALC-SCNC: 15 MMOL/L (ref 8–16)
AST SERPL-CCNC: 21 U/L (ref 10–40)
BASOPHILS # BLD AUTO: 0.06 K/UL (ref 0–0.2)
BASOPHILS NFR BLD: 0.6 % (ref 0–1.9)
BILIRUB SERPL-MCNC: 0.6 MG/DL (ref 0.1–1)
BUN SERPL-MCNC: 40 MG/DL (ref 8–23)
CALCIUM SERPL-MCNC: 8.7 MG/DL (ref 8.7–10.5)
CHLORIDE SERPL-SCNC: 97 MMOL/L (ref 95–110)
CO2 SERPL-SCNC: 24 MMOL/L (ref 23–29)
CREAT SERPL-MCNC: 3.2 MG/DL (ref 0.5–1.4)
DIFFERENTIAL METHOD: ABNORMAL
EOSINOPHIL # BLD AUTO: 0.6 K/UL (ref 0–0.5)
EOSINOPHIL NFR BLD: 5.5 % (ref 0–8)
ERYTHROCYTE [DISTWIDTH] IN BLOOD BY AUTOMATED COUNT: 16.1 % (ref 11.5–14.5)
EST. GFR  (AFRICAN AMERICAN): 16 ML/MIN/1.73 M^2
EST. GFR  (NON AFRICAN AMERICAN): 14 ML/MIN/1.73 M^2
GLUCOSE SERPL-MCNC: 144 MG/DL (ref 70–110)
HCT VFR BLD AUTO: 33.1 % (ref 37–48.5)
HGB BLD-MCNC: 10.8 G/DL (ref 12–16)
IMM GRANULOCYTES # BLD AUTO: 0.02 K/UL (ref 0–0.04)
IMM GRANULOCYTES NFR BLD AUTO: 0.2 % (ref 0–0.5)
LYMPHOCYTES # BLD AUTO: 4.3 K/UL (ref 1–4.8)
LYMPHOCYTES NFR BLD: 42.7 % (ref 18–48)
MAGNESIUM SERPL-MCNC: 1 MG/DL (ref 1.6–2.6)
MCH RBC QN AUTO: 29.3 PG (ref 27–31)
MCHC RBC AUTO-ENTMCNC: 32.6 G/DL (ref 32–36)
MCV RBC AUTO: 90 FL (ref 82–98)
MONOCYTES # BLD AUTO: 0.6 K/UL (ref 0.3–1)
MONOCYTES NFR BLD: 5.9 % (ref 4–15)
NEUTROPHILS # BLD AUTO: 4.5 K/UL (ref 1.8–7.7)
NEUTROPHILS NFR BLD: 45.1 % (ref 38–73)
NRBC BLD-RTO: 0 /100 WBC
PHOSPHATE SERPL-MCNC: 3.3 MG/DL (ref 2.7–4.5)
PLATELET # BLD AUTO: 266 K/UL (ref 150–450)
PMV BLD AUTO: 11.4 FL (ref 9.2–12.9)
POTASSIUM SERPL-SCNC: 3.8 MMOL/L (ref 3.5–5.1)
PROT SERPL-MCNC: 8.1 G/DL (ref 6–8.4)
RBC # BLD AUTO: 3.68 M/UL (ref 4–5.4)
SODIUM SERPL-SCNC: 136 MMOL/L (ref 136–145)
WBC # BLD AUTO: 9.99 K/UL (ref 3.9–12.7)

## 2021-04-19 PROCEDURE — 83735 ASSAY OF MAGNESIUM: CPT | Performed by: INTERNAL MEDICINE

## 2021-04-19 PROCEDURE — 85025 COMPLETE CBC W/AUTO DIFF WBC: CPT | Performed by: INTERNAL MEDICINE

## 2021-04-19 PROCEDURE — 80053 COMPREHEN METABOLIC PANEL: CPT | Performed by: INTERNAL MEDICINE

## 2021-04-19 PROCEDURE — 84100 ASSAY OF PHOSPHORUS: CPT | Performed by: INTERNAL MEDICINE

## 2021-04-22 ENCOUNTER — OFFICE VISIT (OUTPATIENT)
Dept: NEPHROLOGY | Facility: CLINIC | Age: 71
End: 2021-04-22
Payer: MEDICARE

## 2021-04-22 VITALS
WEIGHT: 172.81 LBS | DIASTOLIC BLOOD PRESSURE: 77 MMHG | HEART RATE: 84 BPM | HEIGHT: 63 IN | BODY MASS INDEX: 30.62 KG/M2 | SYSTOLIC BLOOD PRESSURE: 134 MMHG

## 2021-04-22 DIAGNOSIS — I10 ESSENTIAL HYPERTENSION: Primary | ICD-10-CM

## 2021-04-22 DIAGNOSIS — N18.4 CKD (CHRONIC KIDNEY DISEASE) STAGE 4, GFR 15-29 ML/MIN: ICD-10-CM

## 2021-04-22 DIAGNOSIS — R73.03 PREDIABETES: ICD-10-CM

## 2021-04-22 PROCEDURE — 99203 OFFICE O/P NEW LOW 30 MIN: CPT | Mod: S$GLB,,, | Performed by: INTERNAL MEDICINE

## 2021-04-22 PROCEDURE — 99999 PR PBB SHADOW E&M-EST. PATIENT-LVL III: ICD-10-PCS | Mod: PBBFAC,,, | Performed by: INTERNAL MEDICINE

## 2021-04-22 PROCEDURE — 99213 OFFICE O/P EST LOW 20 MIN: CPT | Mod: PBBFAC,PN | Performed by: INTERNAL MEDICINE

## 2021-04-22 PROCEDURE — 99203 PR OFFICE/OUTPT VISIT, NEW, LEVL III, 30-44 MIN: ICD-10-PCS | Mod: S$GLB,,, | Performed by: INTERNAL MEDICINE

## 2021-04-22 PROCEDURE — 99999 PR PBB SHADOW E&M-EST. PATIENT-LVL III: CPT | Mod: PBBFAC,,, | Performed by: INTERNAL MEDICINE

## 2021-04-22 RX ORDER — LANOLIN ALCOHOL/MO/W.PET/CERES
400 CREAM (GRAM) TOPICAL 2 TIMES DAILY
Status: SHIPPED | OUTPATIENT
Start: 2021-04-22 | End: 2021-05-22

## 2021-06-24 NOTE — PLAN OF CARE
Please advise.   Problem: Patient Care Overview  Goal: Plan of Care Review  Outcome: Ongoing (interventions implemented as appropriate)  Pt stable. NO distress noted. POC reviewed with pt. Pt verbalized understanding. Pt remains SR on the monitor. NO true red alarms noted. Pt denied pain. Coreg administered. Fall precautions maintained. Bed in lowest position, call light in reach and bed alarm on.

## 2021-07-12 ENCOUNTER — OFFICE VISIT (OUTPATIENT)
Dept: CARDIOLOGY | Facility: CLINIC | Age: 71
End: 2021-07-12
Payer: MEDICARE

## 2021-07-12 VITALS
SYSTOLIC BLOOD PRESSURE: 112 MMHG | DIASTOLIC BLOOD PRESSURE: 71 MMHG | BODY MASS INDEX: 29.8 KG/M2 | WEIGHT: 168.19 LBS | HEART RATE: 98 BPM | HEIGHT: 63 IN

## 2021-07-12 DIAGNOSIS — N18.4 CKD (CHRONIC KIDNEY DISEASE) STAGE 4, GFR 15-29 ML/MIN: ICD-10-CM

## 2021-07-12 DIAGNOSIS — I45.10 RBBB: Primary | ICD-10-CM

## 2021-07-12 DIAGNOSIS — I25.10 CORONARY ARTERY DISEASE INVOLVING NATIVE CORONARY ARTERY OF NATIVE HEART WITHOUT ANGINA PECTORIS: ICD-10-CM

## 2021-07-12 DIAGNOSIS — I25.10 ATHEROSCLEROSIS OF NATIVE CORONARY ARTERY OF NATIVE HEART WITHOUT ANGINA PECTORIS: ICD-10-CM

## 2021-07-12 PROCEDURE — 99999 PR PBB SHADOW E&M-EST. PATIENT-LVL III: ICD-10-PCS | Mod: PBBFAC,,, | Performed by: INTERNAL MEDICINE

## 2021-07-12 PROCEDURE — 99999 PR PBB SHADOW E&M-EST. PATIENT-LVL III: CPT | Mod: PBBFAC,,, | Performed by: INTERNAL MEDICINE

## 2021-07-12 PROCEDURE — 1159F MED LIST DOCD IN RCRD: CPT | Mod: S$GLB,,, | Performed by: INTERNAL MEDICINE

## 2021-07-12 PROCEDURE — 99214 PR OFFICE/OUTPT VISIT, EST, LEVL IV, 30-39 MIN: ICD-10-PCS | Mod: S$GLB,,, | Performed by: INTERNAL MEDICINE

## 2021-07-12 PROCEDURE — 99214 OFFICE O/P EST MOD 30 MIN: CPT | Mod: S$GLB,,, | Performed by: INTERNAL MEDICINE

## 2021-07-12 PROCEDURE — 1101F PT FALLS ASSESS-DOCD LE1/YR: CPT | Mod: CPTII,S$GLB,, | Performed by: INTERNAL MEDICINE

## 2021-07-12 PROCEDURE — 1159F PR MEDICATION LIST DOCUMENTED IN MEDICAL RECORD: ICD-10-PCS | Mod: S$GLB,,, | Performed by: INTERNAL MEDICINE

## 2021-07-12 PROCEDURE — 1101F PR PT FALLS ASSESS DOC 0-1 FALLS W/OUT INJ PAST YR: ICD-10-PCS | Mod: CPTII,S$GLB,, | Performed by: INTERNAL MEDICINE

## 2021-07-12 PROCEDURE — 3008F BODY MASS INDEX DOCD: CPT | Mod: CPTII,S$GLB,, | Performed by: INTERNAL MEDICINE

## 2021-07-12 PROCEDURE — 3288F PR FALLS RISK ASSESSMENT DOCUMENTED: ICD-10-PCS | Mod: CPTII,S$GLB,, | Performed by: INTERNAL MEDICINE

## 2021-07-12 PROCEDURE — 3008F PR BODY MASS INDEX (BMI) DOCUMENTED: ICD-10-PCS | Mod: CPTII,S$GLB,, | Performed by: INTERNAL MEDICINE

## 2021-07-12 PROCEDURE — 3288F FALL RISK ASSESSMENT DOCD: CPT | Mod: CPTII,S$GLB,, | Performed by: INTERNAL MEDICINE

## 2021-08-03 ENCOUNTER — OFFICE VISIT (OUTPATIENT)
Dept: FAMILY MEDICINE | Facility: HOSPITAL | Age: 71
End: 2021-08-03
Attending: INTERNAL MEDICINE
Payer: MEDICARE

## 2021-08-03 VITALS
WEIGHT: 169.31 LBS | HEART RATE: 75 BPM | DIASTOLIC BLOOD PRESSURE: 82 MMHG | BODY MASS INDEX: 30 KG/M2 | HEIGHT: 63 IN | SYSTOLIC BLOOD PRESSURE: 138 MMHG

## 2021-08-03 DIAGNOSIS — R73.03 PREDIABETES: ICD-10-CM

## 2021-08-03 DIAGNOSIS — N18.4 CKD (CHRONIC KIDNEY DISEASE) STAGE 4, GFR 15-29 ML/MIN: ICD-10-CM

## 2021-08-03 DIAGNOSIS — Z12.11 SCREENING FOR MALIGNANT NEOPLASM OF COLON: ICD-10-CM

## 2021-08-03 DIAGNOSIS — Z12.39 ENCOUNTER FOR SCREENING FOR MALIGNANT NEOPLASM OF BREAST, UNSPECIFIED SCREENING MODALITY: ICD-10-CM

## 2021-08-03 DIAGNOSIS — F32.A DEPRESSION, UNSPECIFIED DEPRESSION TYPE: ICD-10-CM

## 2021-08-03 DIAGNOSIS — I10 ESSENTIAL HYPERTENSION: ICD-10-CM

## 2021-08-03 DIAGNOSIS — E11.65 TYPE 2 DIABETES MELLITUS WITH HYPERGLYCEMIA, WITHOUT LONG-TERM CURRENT USE OF INSULIN: Primary | ICD-10-CM

## 2021-08-03 DIAGNOSIS — Z12.31 ENCOUNTER FOR SCREENING MAMMOGRAM FOR MALIGNANT NEOPLASM OF BREAST: ICD-10-CM

## 2021-08-03 DIAGNOSIS — Z00.00 HEALTHCARE MAINTENANCE: ICD-10-CM

## 2021-08-03 PROCEDURE — G0009 ADMIN PNEUMOCOCCAL VACCINE: HCPCS

## 2021-08-03 PROCEDURE — 99215 OFFICE O/P EST HI 40 MIN: CPT | Mod: 25 | Performed by: STUDENT IN AN ORGANIZED HEALTH CARE EDUCATION/TRAINING PROGRAM

## 2021-08-03 PROCEDURE — 90732 PPSV23 VACC 2 YRS+ SUBQ/IM: CPT

## 2021-08-03 RX ORDER — INSULIN PUMP SYRINGE, 3 ML
EACH MISCELLANEOUS
Qty: 1 EACH | Refills: 0 | Status: SHIPPED | OUTPATIENT
Start: 2021-08-03 | End: 2023-01-10 | Stop reason: SDUPTHER

## 2021-08-03 RX ORDER — INSULIN GLARGINE 100 [IU]/ML
12 INJECTION, SOLUTION SUBCUTANEOUS NIGHTLY
Qty: 10.8 ML | Refills: 0 | Status: SHIPPED | OUTPATIENT
Start: 2021-08-03 | End: 2021-12-02

## 2021-08-03 RX ORDER — SERTRALINE HYDROCHLORIDE 50 MG/1
50 TABLET, FILM COATED ORAL DAILY
Qty: 30 TABLET | Refills: 2 | Status: SHIPPED | OUTPATIENT
Start: 2021-08-03 | End: 2021-10-20 | Stop reason: SDUPTHER

## 2021-08-04 ENCOUNTER — TELEPHONE (OUTPATIENT)
Dept: DIABETES | Facility: CLINIC | Age: 71
End: 2021-08-04

## 2021-08-04 ENCOUNTER — LAB VISIT (OUTPATIENT)
Dept: LAB | Facility: HOSPITAL | Age: 71
End: 2021-08-04
Attending: STUDENT IN AN ORGANIZED HEALTH CARE EDUCATION/TRAINING PROGRAM
Payer: MEDICARE

## 2021-08-04 ENCOUNTER — TELEPHONE (OUTPATIENT)
Dept: FAMILY MEDICINE | Facility: HOSPITAL | Age: 71
End: 2021-08-04

## 2021-08-04 DIAGNOSIS — E11.65 TYPE 2 DIABETES MELLITUS WITH HYPERGLYCEMIA, WITHOUT LONG-TERM CURRENT USE OF INSULIN: ICD-10-CM

## 2021-08-04 LAB
ALBUMIN SERPL BCP-MCNC: 3.9 G/DL (ref 3.5–5.2)
ALP SERPL-CCNC: 56 U/L (ref 55–135)
ALT SERPL W/O P-5'-P-CCNC: 23 U/L (ref 10–44)
ANION GAP SERPL CALC-SCNC: 13 MMOL/L (ref 8–16)
AST SERPL-CCNC: 30 U/L (ref 10–40)
BASOPHILS # BLD AUTO: 0.06 K/UL (ref 0–0.2)
BASOPHILS NFR BLD: 0.5 % (ref 0–1.9)
BILIRUB SERPL-MCNC: 0.7 MG/DL (ref 0.1–1)
BUN SERPL-MCNC: 43 MG/DL (ref 8–23)
CALCIUM SERPL-MCNC: 9 MG/DL (ref 8.7–10.5)
CHLORIDE SERPL-SCNC: 104 MMOL/L (ref 95–110)
CHOLEST SERPL-MCNC: 155 MG/DL (ref 120–199)
CHOLEST/HDLC SERPL: 5.3 {RATIO} (ref 2–5)
CO2 SERPL-SCNC: 21 MMOL/L (ref 23–29)
CREAT SERPL-MCNC: 4.1 MG/DL (ref 0.5–1.4)
DIFFERENTIAL METHOD: ABNORMAL
EOSINOPHIL # BLD AUTO: 0.5 K/UL (ref 0–0.5)
EOSINOPHIL NFR BLD: 3.8 % (ref 0–8)
ERYTHROCYTE [DISTWIDTH] IN BLOOD BY AUTOMATED COUNT: 15.9 % (ref 11.5–14.5)
EST. GFR  (AFRICAN AMERICAN): 12 ML/MIN/1.73 M^2
EST. GFR  (NON AFRICAN AMERICAN): 11 ML/MIN/1.73 M^2
ESTIMATED AVG GLUCOSE: 160 MG/DL (ref 68–131)
GLUCOSE SERPL-MCNC: 161 MG/DL (ref 70–110)
HBA1C MFR BLD: 7.2 % (ref 4–5.6)
HCT VFR BLD AUTO: 30.9 % (ref 37–48.5)
HDLC SERPL-MCNC: 29 MG/DL (ref 40–75)
HDLC SERPL: 18.7 % (ref 20–50)
HGB BLD-MCNC: 10.2 G/DL (ref 12–16)
IMM GRANULOCYTES # BLD AUTO: 0.03 K/UL (ref 0–0.04)
IMM GRANULOCYTES NFR BLD AUTO: 0.2 % (ref 0–0.5)
LDLC SERPL CALC-MCNC: ABNORMAL MG/DL (ref 63–159)
LYMPHOCYTES # BLD AUTO: 3.2 K/UL (ref 1–4.8)
LYMPHOCYTES NFR BLD: 26.5 % (ref 18–48)
MCH RBC QN AUTO: 29.2 PG (ref 27–31)
MCHC RBC AUTO-ENTMCNC: 33 G/DL (ref 32–36)
MCV RBC AUTO: 89 FL (ref 82–98)
MONOCYTES # BLD AUTO: 0.9 K/UL (ref 0.3–1)
MONOCYTES NFR BLD: 7.4 % (ref 4–15)
NEUTROPHILS # BLD AUTO: 7.5 K/UL (ref 1.8–7.7)
NEUTROPHILS NFR BLD: 61.6 % (ref 38–73)
NONHDLC SERPL-MCNC: 126 MG/DL
NRBC BLD-RTO: 0 /100 WBC
PLATELET # BLD AUTO: 220 K/UL (ref 150–450)
PMV BLD AUTO: 11.2 FL (ref 9.2–12.9)
POTASSIUM SERPL-SCNC: 3.6 MMOL/L (ref 3.5–5.1)
PROT SERPL-MCNC: 8.1 G/DL (ref 6–8.4)
RBC # BLD AUTO: 3.49 M/UL (ref 4–5.4)
SODIUM SERPL-SCNC: 138 MMOL/L (ref 136–145)
TRIGL SERPL-MCNC: 449 MG/DL (ref 30–150)
TSH SERPL DL<=0.005 MIU/L-ACNC: 2.13 UIU/ML (ref 0.4–4)
WBC # BLD AUTO: 12.17 K/UL (ref 3.9–12.7)

## 2021-08-04 PROCEDURE — 80061 LIPID PANEL: CPT | Performed by: STUDENT IN AN ORGANIZED HEALTH CARE EDUCATION/TRAINING PROGRAM

## 2021-08-04 PROCEDURE — 36415 COLL VENOUS BLD VENIPUNCTURE: CPT | Performed by: STUDENT IN AN ORGANIZED HEALTH CARE EDUCATION/TRAINING PROGRAM

## 2021-08-04 PROCEDURE — 83036 HEMOGLOBIN GLYCOSYLATED A1C: CPT | Performed by: STUDENT IN AN ORGANIZED HEALTH CARE EDUCATION/TRAINING PROGRAM

## 2021-08-04 PROCEDURE — 80053 COMPREHEN METABOLIC PANEL: CPT | Performed by: STUDENT IN AN ORGANIZED HEALTH CARE EDUCATION/TRAINING PROGRAM

## 2021-08-04 PROCEDURE — 85025 COMPLETE CBC W/AUTO DIFF WBC: CPT | Performed by: STUDENT IN AN ORGANIZED HEALTH CARE EDUCATION/TRAINING PROGRAM

## 2021-08-04 PROCEDURE — 84443 ASSAY THYROID STIM HORMONE: CPT | Performed by: STUDENT IN AN ORGANIZED HEALTH CARE EDUCATION/TRAINING PROGRAM

## 2021-08-04 RX ORDER — PEN NEEDLE, DIABETIC 30 GX3/16"
1 NEEDLE, DISPOSABLE MISCELLANEOUS NIGHTLY
Qty: 100 EACH | Refills: 1 | Status: SHIPPED | OUTPATIENT
Start: 2021-08-04 | End: 2021-11-02

## 2021-08-04 RX ORDER — LANCETS
1 EACH MISCELLANEOUS 2 TIMES DAILY
Qty: 200 EACH | Refills: 1 | Status: SHIPPED | OUTPATIENT
Start: 2021-08-04 | End: 2021-11-02

## 2021-08-05 ENCOUNTER — TELEPHONE (OUTPATIENT)
Dept: FAMILY MEDICINE | Facility: HOSPITAL | Age: 71
End: 2021-08-05

## 2021-08-11 ENCOUNTER — TELEPHONE (OUTPATIENT)
Dept: ADMINISTRATIVE | Facility: HOSPITAL | Age: 71
End: 2021-08-11

## 2021-08-15 ENCOUNTER — HOSPITAL ENCOUNTER (INPATIENT)
Facility: HOSPITAL | Age: 71
LOS: 1 days | Discharge: HOME | DRG: 694 | End: 2021-08-18
Attending: EMERGENCY MEDICINE | Admitting: FAMILY MEDICINE
Payer: MEDICARE

## 2021-08-15 DIAGNOSIS — Q62.11 HYDRONEPHROSIS WITH URETEROPELVIC JUNCTION (UPJ) OBSTRUCTION: ICD-10-CM

## 2021-08-15 DIAGNOSIS — R10.9 FLANK PAIN: Primary | ICD-10-CM

## 2021-08-15 DIAGNOSIS — R73.9 HYPERGLYCEMIA: ICD-10-CM

## 2021-08-15 DIAGNOSIS — Z01.818 PREOP EXAMINATION: ICD-10-CM

## 2021-08-15 DIAGNOSIS — R11.2 INTRACTABLE VOMITING WITH NAUSEA: ICD-10-CM

## 2021-08-15 DIAGNOSIS — N20.0 KIDNEY STONE: ICD-10-CM

## 2021-08-15 DIAGNOSIS — R10.13 EPIGASTRIC ABDOMINAL PAIN: ICD-10-CM

## 2021-08-15 LAB
ALBUMIN SERPL BCP-MCNC: 4.2 G/DL (ref 3.5–5.2)
ALP SERPL-CCNC: 73 U/L (ref 55–135)
ALT SERPL W/O P-5'-P-CCNC: 46 U/L (ref 10–44)
ANION GAP SERPL CALC-SCNC: 15 MMOL/L (ref 8–16)
AST SERPL-CCNC: 48 U/L (ref 10–40)
BACTERIA #/AREA URNS HPF: ABNORMAL /HPF
BASOPHILS # BLD AUTO: 0.07 K/UL (ref 0–0.2)
BASOPHILS NFR BLD: 0.7 % (ref 0–1.9)
BILIRUB SERPL-MCNC: 0.6 MG/DL (ref 0.1–1)
BILIRUB UR QL STRIP: NEGATIVE
BUN SERPL-MCNC: 26 MG/DL (ref 8–23)
CALCIUM SERPL-MCNC: 9.6 MG/DL (ref 8.7–10.5)
CHLORIDE SERPL-SCNC: 100 MMOL/L (ref 95–110)
CLARITY UR: CLEAR
CO2 SERPL-SCNC: 23 MMOL/L (ref 23–29)
COLOR UR: YELLOW
CREAT SERPL-MCNC: 3.5 MG/DL (ref 0.5–1.4)
CREAT UR-MCNC: 42.8 MG/DL (ref 15–325)
CTP QC/QA: YES
DIFFERENTIAL METHOD: ABNORMAL
EOSINOPHIL # BLD AUTO: 0.5 K/UL (ref 0–0.5)
EOSINOPHIL NFR BLD: 4.6 % (ref 0–8)
ERYTHROCYTE [DISTWIDTH] IN BLOOD BY AUTOMATED COUNT: 16 % (ref 11.5–14.5)
EST. GFR  (AFRICAN AMERICAN): 15 ML/MIN/1.73 M^2
EST. GFR  (NON AFRICAN AMERICAN): 13 ML/MIN/1.73 M^2
ESTIMATED AVG GLUCOSE: 166 MG/DL (ref 68–131)
GLUCOSE SERPL-MCNC: 261 MG/DL (ref 70–110)
GLUCOSE UR QL STRIP: NEGATIVE
HBA1C MFR BLD: 7.4 % (ref 4–5.6)
HCT VFR BLD AUTO: 33.2 % (ref 37–48.5)
HGB BLD-MCNC: 11 G/DL (ref 12–16)
HGB UR QL STRIP: ABNORMAL
IMM GRANULOCYTES # BLD AUTO: 0.03 K/UL (ref 0–0.04)
IMM GRANULOCYTES NFR BLD AUTO: 0.3 % (ref 0–0.5)
KETONES UR QL STRIP: NEGATIVE
LEUKOCYTE ESTERASE UR QL STRIP: ABNORMAL
LIPASE SERPL-CCNC: 80 U/L (ref 4–60)
LYMPHOCYTES # BLD AUTO: 4.2 K/UL (ref 1–4.8)
LYMPHOCYTES NFR BLD: 42.6 % (ref 18–48)
MAGNESIUM SERPL-MCNC: 1.5 MG/DL (ref 1.6–2.6)
MCH RBC QN AUTO: 29.7 PG (ref 27–31)
MCHC RBC AUTO-ENTMCNC: 33.1 G/DL (ref 32–36)
MCV RBC AUTO: 90 FL (ref 82–98)
MICROSCOPIC COMMENT: ABNORMAL
MONOCYTES # BLD AUTO: 0.7 K/UL (ref 0.3–1)
MONOCYTES NFR BLD: 7.2 % (ref 4–15)
NEUTROPHILS # BLD AUTO: 4.4 K/UL (ref 1.8–7.7)
NEUTROPHILS NFR BLD: 44.6 % (ref 38–73)
NITRITE UR QL STRIP: NEGATIVE
NRBC BLD-RTO: 0 /100 WBC
OSMOLALITY SERPL: 311 MOSM/KG (ref 275–295)
PH UR STRIP: 6 [PH] (ref 5–8)
PHOSPHATE SERPL-MCNC: 3.6 MG/DL (ref 2.7–4.5)
PLATELET # BLD AUTO: 267 K/UL (ref 150–450)
PMV BLD AUTO: 11.4 FL (ref 9.2–12.9)
POCT GLUCOSE: 239 MG/DL (ref 70–110)
POTASSIUM SERPL-SCNC: 3.3 MMOL/L (ref 3.5–5.1)
POTASSIUM UR-SCNC: 34 MMOL/L (ref 15–95)
PROT SERPL-MCNC: 8.7 G/DL (ref 6–8.4)
PROT UR QL STRIP: ABNORMAL
RBC # BLD AUTO: 3.7 M/UL (ref 4–5.4)
RBC #/AREA URNS HPF: 5 /HPF (ref 0–4)
SARS-COV-2 RDRP RESP QL NAA+PROBE: NEGATIVE
SODIUM SERPL-SCNC: 138 MMOL/L (ref 136–145)
SODIUM UR-SCNC: 95 MMOL/L (ref 20–250)
SP GR UR STRIP: 1.01 (ref 1–1.03)
TROPONIN I SERPL DL<=0.01 NG/ML-MCNC: 0.03 NG/ML (ref 0–0.03)
TROPONIN I SERPL DL<=0.01 NG/ML-MCNC: 0.04 NG/ML (ref 0–0.03)
TSH SERPL DL<=0.005 MIU/L-ACNC: 1.19 UIU/ML (ref 0.4–4)
URN SPEC COLLECT METH UR: ABNORMAL
UROBILINOGEN UR STRIP-ACNC: NEGATIVE EU/DL
WBC # BLD AUTO: 9.86 K/UL (ref 3.9–12.7)
WBC #/AREA URNS HPF: 5 /HPF (ref 0–5)

## 2021-08-15 PROCEDURE — 82570 ASSAY OF URINE CREATININE: CPT | Performed by: PHYSICIAN ASSISTANT

## 2021-08-15 PROCEDURE — 96365 THER/PROPH/DIAG IV INF INIT: CPT

## 2021-08-15 PROCEDURE — 25000003 PHARM REV CODE 250: Performed by: STUDENT IN AN ORGANIZED HEALTH CARE EDUCATION/TRAINING PROGRAM

## 2021-08-15 PROCEDURE — 93010 EKG 12-LEAD: ICD-10-PCS | Mod: ,,, | Performed by: INTERNAL MEDICINE

## 2021-08-15 PROCEDURE — 84133 ASSAY OF URINE POTASSIUM: CPT | Performed by: PHYSICIAN ASSISTANT

## 2021-08-15 PROCEDURE — 84300 ASSAY OF URINE SODIUM: CPT | Performed by: PHYSICIAN ASSISTANT

## 2021-08-15 PROCEDURE — 25000003 PHARM REV CODE 250: Performed by: PHYSICIAN ASSISTANT

## 2021-08-15 PROCEDURE — 84484 ASSAY OF TROPONIN QUANT: CPT | Mod: 91 | Performed by: EMERGENCY MEDICINE

## 2021-08-15 PROCEDURE — 96361 HYDRATE IV INFUSION ADD-ON: CPT

## 2021-08-15 PROCEDURE — 84100 ASSAY OF PHOSPHORUS: CPT | Performed by: STUDENT IN AN ORGANIZED HEALTH CARE EDUCATION/TRAINING PROGRAM

## 2021-08-15 PROCEDURE — G0378 HOSPITAL OBSERVATION PER HR: HCPCS

## 2021-08-15 PROCEDURE — 63600175 PHARM REV CODE 636 W HCPCS: Performed by: PHYSICIAN ASSISTANT

## 2021-08-15 PROCEDURE — 85025 COMPLETE CBC W/AUTO DIFF WBC: CPT | Performed by: PHYSICIAN ASSISTANT

## 2021-08-15 PROCEDURE — 83690 ASSAY OF LIPASE: CPT | Performed by: PHYSICIAN ASSISTANT

## 2021-08-15 PROCEDURE — 83735 ASSAY OF MAGNESIUM: CPT | Performed by: STUDENT IN AN ORGANIZED HEALTH CARE EDUCATION/TRAINING PROGRAM

## 2021-08-15 PROCEDURE — 93010 ELECTROCARDIOGRAM REPORT: CPT | Mod: ,,, | Performed by: INTERNAL MEDICINE

## 2021-08-15 PROCEDURE — 80053 COMPREHEN METABOLIC PANEL: CPT | Performed by: PHYSICIAN ASSISTANT

## 2021-08-15 PROCEDURE — U0002 COVID-19 LAB TEST NON-CDC: HCPCS | Performed by: PHYSICIAN ASSISTANT

## 2021-08-15 PROCEDURE — 82962 GLUCOSE BLOOD TEST: CPT

## 2021-08-15 PROCEDURE — 63600175 PHARM REV CODE 636 W HCPCS: Performed by: EMERGENCY MEDICINE

## 2021-08-15 PROCEDURE — 83935 ASSAY OF URINE OSMOLALITY: CPT | Performed by: PHYSICIAN ASSISTANT

## 2021-08-15 PROCEDURE — 63600175 PHARM REV CODE 636 W HCPCS: Performed by: STUDENT IN AN ORGANIZED HEALTH CARE EDUCATION/TRAINING PROGRAM

## 2021-08-15 PROCEDURE — 99285 EMERGENCY DEPT VISIT HI MDM: CPT | Mod: 25

## 2021-08-15 PROCEDURE — 25000003 PHARM REV CODE 250: Performed by: EMERGENCY MEDICINE

## 2021-08-15 PROCEDURE — 84484 ASSAY OF TROPONIN QUANT: CPT | Performed by: PHYSICIAN ASSISTANT

## 2021-08-15 PROCEDURE — 83930 ASSAY OF BLOOD OSMOLALITY: CPT | Performed by: PHYSICIAN ASSISTANT

## 2021-08-15 PROCEDURE — 96374 THER/PROPH/DIAG INJ IV PUSH: CPT

## 2021-08-15 PROCEDURE — 96375 TX/PRO/DX INJ NEW DRUG ADDON: CPT | Performed by: EMERGENCY MEDICINE

## 2021-08-15 PROCEDURE — 84443 ASSAY THYROID STIM HORMONE: CPT | Performed by: STUDENT IN AN ORGANIZED HEALTH CARE EDUCATION/TRAINING PROGRAM

## 2021-08-15 PROCEDURE — 84484 ASSAY OF TROPONIN QUANT: CPT | Mod: 91 | Performed by: STUDENT IN AN ORGANIZED HEALTH CARE EDUCATION/TRAINING PROGRAM

## 2021-08-15 PROCEDURE — 93005 ELECTROCARDIOGRAM TRACING: CPT

## 2021-08-15 PROCEDURE — 83036 HEMOGLOBIN GLYCOSYLATED A1C: CPT | Performed by: STUDENT IN AN ORGANIZED HEALTH CARE EDUCATION/TRAINING PROGRAM

## 2021-08-15 PROCEDURE — 81000 URINALYSIS NONAUTO W/SCOPE: CPT | Performed by: PHYSICIAN ASSISTANT

## 2021-08-15 RX ORDER — ACETAMINOPHEN 325 MG/1
650 TABLET ORAL EVERY 8 HOURS PRN
Status: DISCONTINUED | OUTPATIENT
Start: 2021-08-15 | End: 2021-08-18 | Stop reason: HOSPADM

## 2021-08-15 RX ORDER — ATORVASTATIN CALCIUM 40 MG/1
80 TABLET, FILM COATED ORAL DAILY
Status: DISCONTINUED | OUTPATIENT
Start: 2021-08-16 | End: 2021-08-18 | Stop reason: HOSPADM

## 2021-08-15 RX ORDER — ONDANSETRON 2 MG/ML
4 INJECTION INTRAMUSCULAR; INTRAVENOUS
Status: COMPLETED | OUTPATIENT
Start: 2021-08-15 | End: 2021-08-15

## 2021-08-15 RX ORDER — PROCHLORPERAZINE EDISYLATE 5 MG/ML
5 INJECTION INTRAMUSCULAR; INTRAVENOUS EVERY 6 HOURS PRN
Status: DISCONTINUED | OUTPATIENT
Start: 2021-08-15 | End: 2021-08-18 | Stop reason: HOSPADM

## 2021-08-15 RX ORDER — IBUPROFEN 200 MG
16 TABLET ORAL
Status: DISCONTINUED | OUTPATIENT
Start: 2021-08-15 | End: 2021-08-18 | Stop reason: HOSPADM

## 2021-08-15 RX ORDER — CLOPIDOGREL BISULFATE 75 MG/1
75 TABLET ORAL DAILY
Status: DISCONTINUED | OUTPATIENT
Start: 2021-08-16 | End: 2021-08-15

## 2021-08-15 RX ORDER — TALC
6 POWDER (GRAM) TOPICAL NIGHTLY PRN
Status: DISCONTINUED | OUTPATIENT
Start: 2021-08-15 | End: 2021-08-18 | Stop reason: HOSPADM

## 2021-08-15 RX ORDER — ACETAMINOPHEN 325 MG/1
650 TABLET ORAL EVERY 4 HOURS PRN
Status: DISCONTINUED | OUTPATIENT
Start: 2021-08-15 | End: 2021-08-18 | Stop reason: HOSPADM

## 2021-08-15 RX ORDER — SERTRALINE HYDROCHLORIDE 50 MG/1
50 TABLET, FILM COATED ORAL DAILY
Status: DISCONTINUED | OUTPATIENT
Start: 2021-08-16 | End: 2021-08-18 | Stop reason: HOSPADM

## 2021-08-15 RX ORDER — METOPROLOL SUCCINATE 50 MG/1
50 TABLET, EXTENDED RELEASE ORAL DAILY
Status: DISCONTINUED | OUTPATIENT
Start: 2021-08-16 | End: 2021-08-16

## 2021-08-15 RX ORDER — ONDANSETRON 8 MG/1
8 TABLET, ORALLY DISINTEGRATING ORAL EVERY 6 HOURS PRN
Status: DISCONTINUED | OUTPATIENT
Start: 2021-08-15 | End: 2021-08-18 | Stop reason: HOSPADM

## 2021-08-15 RX ORDER — INSULIN ASPART 100 [IU]/ML
1-10 INJECTION, SOLUTION INTRAVENOUS; SUBCUTANEOUS
Status: DISCONTINUED | OUTPATIENT
Start: 2021-08-15 | End: 2021-08-18 | Stop reason: HOSPADM

## 2021-08-15 RX ORDER — LOSARTAN POTASSIUM 50 MG/1
50 TABLET ORAL DAILY
Status: DISCONTINUED | OUTPATIENT
Start: 2021-08-16 | End: 2021-08-18 | Stop reason: HOSPADM

## 2021-08-15 RX ORDER — GLUCAGON 1 MG
1 KIT INJECTION
Status: DISCONTINUED | OUTPATIENT
Start: 2021-08-15 | End: 2021-08-18 | Stop reason: HOSPADM

## 2021-08-15 RX ORDER — POTASSIUM CHLORIDE 20 MEQ/1
20 TABLET, EXTENDED RELEASE ORAL
Status: COMPLETED | OUTPATIENT
Start: 2021-08-15 | End: 2021-08-15

## 2021-08-15 RX ORDER — ALLOPURINOL 100 MG/1
100 TABLET ORAL DAILY
Status: DISCONTINUED | OUTPATIENT
Start: 2021-08-16 | End: 2021-08-18 | Stop reason: HOSPADM

## 2021-08-15 RX ORDER — FUROSEMIDE 40 MG/1
40 TABLET ORAL 2 TIMES DAILY
Status: DISCONTINUED | OUTPATIENT
Start: 2021-08-15 | End: 2021-08-18 | Stop reason: HOSPADM

## 2021-08-15 RX ORDER — IBUPROFEN 200 MG
24 TABLET ORAL
Status: DISCONTINUED | OUTPATIENT
Start: 2021-08-15 | End: 2021-08-18 | Stop reason: HOSPADM

## 2021-08-15 RX ORDER — ONDANSETRON 4 MG/1
4 TABLET, ORALLY DISINTEGRATING ORAL
Status: COMPLETED | OUTPATIENT
Start: 2021-08-15 | End: 2021-08-15

## 2021-08-15 RX ORDER — AMOXICILLIN 250 MG
1 CAPSULE ORAL 2 TIMES DAILY
Status: DISCONTINUED | OUTPATIENT
Start: 2021-08-15 | End: 2021-08-18 | Stop reason: HOSPADM

## 2021-08-15 RX ORDER — SODIUM CHLORIDE 0.9 % (FLUSH) 0.9 %
5 SYRINGE (ML) INJECTION
Status: DISCONTINUED | OUTPATIENT
Start: 2021-08-15 | End: 2021-08-18 | Stop reason: HOSPADM

## 2021-08-15 RX ORDER — AMLODIPINE BESYLATE 5 MG/1
5 TABLET ORAL DAILY
Status: DISCONTINUED | OUTPATIENT
Start: 2021-08-16 | End: 2021-08-16

## 2021-08-15 RX ADMIN — ONDANSETRON 4 MG: 2 INJECTION INTRAMUSCULAR; INTRAVENOUS at 02:08

## 2021-08-15 RX ADMIN — SODIUM CHLORIDE 500 ML: 0.9 INJECTION, SOLUTION INTRAVENOUS at 03:08

## 2021-08-15 RX ADMIN — ONDANSETRON 8 MG: 8 TABLET, ORALLY DISINTEGRATING ORAL at 09:08

## 2021-08-15 RX ADMIN — ONDANSETRON 4 MG: 4 TABLET, ORALLY DISINTEGRATING ORAL at 02:08

## 2021-08-15 RX ADMIN — PROMETHAZINE HYDROCHLORIDE 12.5 MG: 25 INJECTION INTRAMUSCULAR; INTRAVENOUS at 03:08

## 2021-08-15 RX ADMIN — PROCHLORPERAZINE EDISYLATE 5 MG: 5 INJECTION INTRAMUSCULAR; INTRAVENOUS at 11:08

## 2021-08-15 RX ADMIN — POTASSIUM CHLORIDE 20 MEQ: 1500 TABLET, EXTENDED RELEASE ORAL at 03:08

## 2021-08-15 RX ADMIN — DOCUSATE SODIUM 50 MG AND SENNOSIDES 8.6 MG 1 TABLET: 8.6; 5 TABLET, FILM COATED ORAL at 09:08

## 2021-08-15 RX ADMIN — FUROSEMIDE 40 MG: 40 TABLET ORAL at 09:08

## 2021-08-16 ENCOUNTER — ANESTHESIA EVENT (OUTPATIENT)
Dept: MEDSURG UNIT | Facility: HOSPITAL | Age: 71
End: 2021-08-16

## 2021-08-16 PROBLEM — Q62.11 HYDRONEPHROSIS WITH URETEROPELVIC JUNCTION (UPJ) OBSTRUCTION: Status: ACTIVE | Noted: 2021-08-16

## 2021-08-16 PROBLEM — F32.9 MDD (MAJOR DEPRESSIVE DISORDER): Status: ACTIVE | Noted: 2021-08-16

## 2021-08-16 PROBLEM — I48.0 PAROXYSMAL A-FIB: Status: ACTIVE | Noted: 2020-12-24

## 2021-08-16 PROBLEM — N18.4 CKD (CHRONIC KIDNEY DISEASE), STAGE IV: Status: ACTIVE | Noted: 2021-08-16

## 2021-08-16 LAB
ALBUMIN SERPL BCP-MCNC: 3.9 G/DL (ref 3.5–5.2)
ALP SERPL-CCNC: 63 U/L (ref 55–135)
ALT SERPL W/O P-5'-P-CCNC: 36 U/L (ref 10–44)
ANION GAP SERPL CALC-SCNC: 19 MMOL/L (ref 8–16)
AST SERPL-CCNC: 30 U/L (ref 10–40)
BACTERIA #/AREA URNS HPF: NORMAL /HPF
BASOPHILS # BLD AUTO: 0.02 K/UL (ref 0–0.2)
BASOPHILS NFR BLD: 0.2 % (ref 0–1.9)
BILIRUB SERPL-MCNC: 0.6 MG/DL (ref 0.1–1)
BILIRUB UR QL STRIP: NEGATIVE
BUN SERPL-MCNC: 30 MG/DL (ref 8–23)
CALCIUM SERPL-MCNC: 9.9 MG/DL (ref 8.7–10.5)
CHLORIDE SERPL-SCNC: 101 MMOL/L (ref 95–110)
CLARITY UR: CLEAR
CO2 SERPL-SCNC: 19 MMOL/L (ref 23–29)
COLOR UR: COLORLESS
CREAT SERPL-MCNC: 3.7 MG/DL (ref 0.5–1.4)
DIFFERENTIAL METHOD: ABNORMAL
EOSINOPHIL # BLD AUTO: 0 K/UL (ref 0–0.5)
EOSINOPHIL NFR BLD: 0 % (ref 0–8)
ERYTHROCYTE [DISTWIDTH] IN BLOOD BY AUTOMATED COUNT: 15.7 % (ref 11.5–14.5)
EST. GFR  (AFRICAN AMERICAN): 14 ML/MIN/1.73 M^2
EST. GFR  (NON AFRICAN AMERICAN): 12 ML/MIN/1.73 M^2
GLUCOSE SERPL-MCNC: 292 MG/DL (ref 70–110)
GLUCOSE UR QL STRIP: ABNORMAL
HCT VFR BLD AUTO: 32.1 % (ref 37–48.5)
HGB BLD-MCNC: 10.7 G/DL (ref 12–16)
HGB UR QL STRIP: ABNORMAL
HYALINE CASTS #/AREA URNS LPF: 0 /LPF
IMM GRANULOCYTES # BLD AUTO: 0.04 K/UL (ref 0–0.04)
IMM GRANULOCYTES NFR BLD AUTO: 0.3 % (ref 0–0.5)
KETONES UR QL STRIP: NEGATIVE
LEUKOCYTE ESTERASE UR QL STRIP: NEGATIVE
LYMPHOCYTES # BLD AUTO: 1.1 K/UL (ref 1–4.8)
LYMPHOCYTES NFR BLD: 9.5 % (ref 18–48)
MAGNESIUM SERPL-MCNC: 1.5 MG/DL (ref 1.6–2.6)
MCH RBC QN AUTO: 29.6 PG (ref 27–31)
MCHC RBC AUTO-ENTMCNC: 33.3 G/DL (ref 32–36)
MCV RBC AUTO: 89 FL (ref 82–98)
MICROSCOPIC COMMENT: NORMAL
MONOCYTES # BLD AUTO: 0.3 K/UL (ref 0.3–1)
MONOCYTES NFR BLD: 2.8 % (ref 4–15)
NEUTROPHILS # BLD AUTO: 10.4 K/UL (ref 1.8–7.7)
NEUTROPHILS NFR BLD: 87.2 % (ref 38–73)
NITRITE UR QL STRIP: NEGATIVE
NRBC BLD-RTO: 0 /100 WBC
OSMOLALITY UR: 352 MOSM/KG (ref 50–1200)
PH UR STRIP: 7 [PH] (ref 5–8)
PHOSPHATE SERPL-MCNC: 4.3 MG/DL (ref 2.7–4.5)
PLATELET # BLD AUTO: 222 K/UL (ref 150–450)
PMV BLD AUTO: 10.8 FL (ref 9.2–12.9)
POCT GLUCOSE: 153 MG/DL (ref 70–110)
POCT GLUCOSE: 203 MG/DL (ref 70–110)
POCT GLUCOSE: 236 MG/DL (ref 70–110)
POCT GLUCOSE: 311 MG/DL (ref 70–110)
POTASSIUM SERPL-SCNC: 3.5 MMOL/L (ref 3.5–5.1)
PROT SERPL-MCNC: 8.1 G/DL (ref 6–8.4)
PROT UR QL STRIP: ABNORMAL
RBC # BLD AUTO: 3.61 M/UL (ref 4–5.4)
RBC #/AREA URNS HPF: 0 /HPF (ref 0–4)
SODIUM SERPL-SCNC: 139 MMOL/L (ref 136–145)
SP GR UR STRIP: 1.01 (ref 1–1.03)
SQUAMOUS #/AREA URNS HPF: 2 /HPF
URN SPEC COLLECT METH UR: ABNORMAL
UROBILINOGEN UR STRIP-ACNC: NEGATIVE EU/DL
WBC # BLD AUTO: 11.94 K/UL (ref 3.9–12.7)
WBC #/AREA URNS HPF: 1 /HPF (ref 0–5)
YEAST URNS QL MICRO: NORMAL

## 2021-08-16 PROCEDURE — G0378 HOSPITAL OBSERVATION PER HR: HCPCS

## 2021-08-16 PROCEDURE — 63600175 PHARM REV CODE 636 W HCPCS: Performed by: STUDENT IN AN ORGANIZED HEALTH CARE EDUCATION/TRAINING PROGRAM

## 2021-08-16 PROCEDURE — 25000003 PHARM REV CODE 250: Performed by: NURSE PRACTITIONER

## 2021-08-16 PROCEDURE — 96374 THER/PROPH/DIAG INJ IV PUSH: CPT | Mod: 59 | Performed by: EMERGENCY MEDICINE

## 2021-08-16 PROCEDURE — 36415 COLL VENOUS BLD VENIPUNCTURE: CPT | Performed by: STUDENT IN AN ORGANIZED HEALTH CARE EDUCATION/TRAINING PROGRAM

## 2021-08-16 PROCEDURE — 81000 URINALYSIS NONAUTO W/SCOPE: CPT | Performed by: STUDENT IN AN ORGANIZED HEALTH CARE EDUCATION/TRAINING PROGRAM

## 2021-08-16 PROCEDURE — 99204 OFFICE O/P NEW MOD 45 MIN: CPT | Mod: ,,, | Performed by: STUDENT IN AN ORGANIZED HEALTH CARE EDUCATION/TRAINING PROGRAM

## 2021-08-16 PROCEDURE — 83735 ASSAY OF MAGNESIUM: CPT | Performed by: STUDENT IN AN ORGANIZED HEALTH CARE EDUCATION/TRAINING PROGRAM

## 2021-08-16 PROCEDURE — 80053 COMPREHEN METABOLIC PANEL: CPT | Performed by: STUDENT IN AN ORGANIZED HEALTH CARE EDUCATION/TRAINING PROGRAM

## 2021-08-16 PROCEDURE — 85025 COMPLETE CBC W/AUTO DIFF WBC: CPT | Performed by: STUDENT IN AN ORGANIZED HEALTH CARE EDUCATION/TRAINING PROGRAM

## 2021-08-16 PROCEDURE — 99204 PR OFFICE/OUTPT VISIT, NEW, LEVL IV, 45-59 MIN: ICD-10-PCS | Mod: ,,, | Performed by: STUDENT IN AN ORGANIZED HEALTH CARE EDUCATION/TRAINING PROGRAM

## 2021-08-16 PROCEDURE — 96372 THER/PROPH/DIAG INJ SC/IM: CPT | Mod: 59 | Performed by: EMERGENCY MEDICINE

## 2021-08-16 PROCEDURE — 84100 ASSAY OF PHOSPHORUS: CPT | Performed by: STUDENT IN AN ORGANIZED HEALTH CARE EDUCATION/TRAINING PROGRAM

## 2021-08-16 PROCEDURE — 25000003 PHARM REV CODE 250: Performed by: STUDENT IN AN ORGANIZED HEALTH CARE EDUCATION/TRAINING PROGRAM

## 2021-08-16 RX ORDER — AMLODIPINE BESYLATE 5 MG/1
5 TABLET ORAL DAILY
Status: DISCONTINUED | OUTPATIENT
Start: 2021-08-16 | End: 2021-08-18 | Stop reason: HOSPADM

## 2021-08-16 RX ORDER — METOPROLOL SUCCINATE 50 MG/1
50 TABLET, EXTENDED RELEASE ORAL DAILY
Status: DISCONTINUED | OUTPATIENT
Start: 2021-08-16 | End: 2021-08-18 | Stop reason: HOSPADM

## 2021-08-16 RX ADMIN — ALLOPURINOL 100 MG: 100 TABLET ORAL at 10:08

## 2021-08-16 RX ADMIN — AMLODIPINE BESYLATE 5 MG: 5 TABLET ORAL at 05:08

## 2021-08-16 RX ADMIN — FUROSEMIDE 40 MG: 40 TABLET ORAL at 10:08

## 2021-08-16 RX ADMIN — FUROSEMIDE 40 MG: 40 TABLET ORAL at 08:08

## 2021-08-16 RX ADMIN — LOSARTAN POTASSIUM 50 MG: 50 TABLET, FILM COATED ORAL at 10:08

## 2021-08-16 RX ADMIN — INSULIN ASPART 4 UNITS: 100 INJECTION, SOLUTION INTRAVENOUS; SUBCUTANEOUS at 04:08

## 2021-08-16 RX ADMIN — ONDANSETRON 8 MG: 8 TABLET, ORALLY DISINTEGRATING ORAL at 04:08

## 2021-08-16 RX ADMIN — INSULIN ASPART 8 UNITS: 100 INJECTION, SOLUTION INTRAVENOUS; SUBCUTANEOUS at 05:08

## 2021-08-16 RX ADMIN — ATORVASTATIN CALCIUM 80 MG: 40 TABLET, FILM COATED ORAL at 10:08

## 2021-08-16 RX ADMIN — METOPROLOL SUCCINATE 50 MG: 50 TABLET, EXTENDED RELEASE ORAL at 05:08

## 2021-08-16 RX ADMIN — PROCHLORPERAZINE EDISYLATE 5 MG: 5 INJECTION INTRAMUSCULAR; INTRAVENOUS at 05:08

## 2021-08-16 RX ADMIN — SERTRALINE HYDROCHLORIDE 50 MG: 50 TABLET ORAL at 10:08

## 2021-08-17 PROBLEM — R10.9 FLANK PAIN: Status: ACTIVE | Noted: 2021-08-17

## 2021-08-17 LAB
ALBUMIN SERPL BCP-MCNC: 3.6 G/DL (ref 3.5–5.2)
ALP SERPL-CCNC: 59 U/L (ref 55–135)
ALT SERPL W/O P-5'-P-CCNC: 28 U/L (ref 10–44)
ANION GAP SERPL CALC-SCNC: 16 MMOL/L (ref 8–16)
AST SERPL-CCNC: 24 U/L (ref 10–40)
BACTERIA #/AREA URNS HPF: ABNORMAL /HPF
BASOPHILS # BLD AUTO: 0.07 K/UL (ref 0–0.2)
BASOPHILS NFR BLD: 0.5 % (ref 0–1.9)
BILIRUB SERPL-MCNC: 0.6 MG/DL (ref 0.1–1)
BILIRUB UR QL STRIP: NEGATIVE
BUN SERPL-MCNC: 45 MG/DL (ref 8–23)
CALCIUM SERPL-MCNC: 9.8 MG/DL (ref 8.7–10.5)
CHLORIDE SERPL-SCNC: 101 MMOL/L (ref 95–110)
CLARITY UR: ABNORMAL
CO2 SERPL-SCNC: 24 MMOL/L (ref 23–29)
COLOR UR: ABNORMAL
CREAT SERPL-MCNC: 5.5 MG/DL (ref 0.5–1.4)
DIFFERENTIAL METHOD: ABNORMAL
EOSINOPHIL # BLD AUTO: 0.2 K/UL (ref 0–0.5)
EOSINOPHIL NFR BLD: 1.1 % (ref 0–8)
ERYTHROCYTE [DISTWIDTH] IN BLOOD BY AUTOMATED COUNT: 16.1 % (ref 11.5–14.5)
EST. GFR  (AFRICAN AMERICAN): 9 ML/MIN/1.73 M^2
EST. GFR  (NON AFRICAN AMERICAN): 7 ML/MIN/1.73 M^2
GLUCOSE SERPL-MCNC: 148 MG/DL (ref 70–110)
GLUCOSE UR QL STRIP: NEGATIVE
HCT VFR BLD AUTO: 29.3 % (ref 37–48.5)
HGB BLD-MCNC: 9.4 G/DL (ref 12–16)
HGB UR QL STRIP: ABNORMAL
HYALINE CASTS #/AREA URNS LPF: 0 /LPF
IMM GRANULOCYTES # BLD AUTO: 0.04 K/UL (ref 0–0.04)
IMM GRANULOCYTES NFR BLD AUTO: 0.3 % (ref 0–0.5)
KETONES UR QL STRIP: NEGATIVE
LEUKOCYTE ESTERASE UR QL STRIP: ABNORMAL
LYMPHOCYTES # BLD AUTO: 3.1 K/UL (ref 1–4.8)
LYMPHOCYTES NFR BLD: 22.8 % (ref 18–48)
MAGNESIUM SERPL-MCNC: 1.7 MG/DL (ref 1.6–2.6)
MCH RBC QN AUTO: 29.4 PG (ref 27–31)
MCHC RBC AUTO-ENTMCNC: 32.1 G/DL (ref 32–36)
MCV RBC AUTO: 92 FL (ref 82–98)
MICROSCOPIC COMMENT: ABNORMAL
MONOCYTES # BLD AUTO: 0.9 K/UL (ref 0.3–1)
MONOCYTES NFR BLD: 6.3 % (ref 4–15)
NEUTROPHILS # BLD AUTO: 9.3 K/UL (ref 1.8–7.7)
NEUTROPHILS NFR BLD: 69 % (ref 38–73)
NITRITE UR QL STRIP: POSITIVE
NRBC BLD-RTO: 0 /100 WBC
PH UR STRIP: 5 [PH] (ref 5–8)
PHOSPHATE SERPL-MCNC: 4.9 MG/DL (ref 2.7–4.5)
PLATELET # BLD AUTO: 213 K/UL (ref 150–450)
PMV BLD AUTO: 11.9 FL (ref 9.2–12.9)
POCT GLUCOSE: 168 MG/DL (ref 70–110)
POCT GLUCOSE: 204 MG/DL (ref 70–110)
POCT GLUCOSE: 205 MG/DL (ref 70–110)
POCT GLUCOSE: 273 MG/DL (ref 70–110)
POTASSIUM SERPL-SCNC: 4 MMOL/L (ref 3.5–5.1)
PROT SERPL-MCNC: 7.2 G/DL (ref 6–8.4)
PROT UR QL STRIP: ABNORMAL
RBC # BLD AUTO: 3.2 M/UL (ref 4–5.4)
RBC #/AREA URNS HPF: >100 /HPF (ref 0–4)
SODIUM SERPL-SCNC: 141 MMOL/L (ref 136–145)
SP GR UR STRIP: 1.02 (ref 1–1.03)
SQUAMOUS #/AREA URNS HPF: 10 /HPF
URN SPEC COLLECT METH UR: ABNORMAL
UROBILINOGEN UR STRIP-ACNC: NEGATIVE EU/DL
WBC # BLD AUTO: 13.43 K/UL (ref 3.9–12.7)
WBC #/AREA URNS HPF: 20 /HPF (ref 0–5)
YEAST URNS QL MICRO: ABNORMAL

## 2021-08-17 PROCEDURE — 96372 THER/PROPH/DIAG INJ SC/IM: CPT | Mod: 59 | Performed by: EMERGENCY MEDICINE

## 2021-08-17 PROCEDURE — 80053 COMPREHEN METABOLIC PANEL: CPT | Performed by: STUDENT IN AN ORGANIZED HEALTH CARE EDUCATION/TRAINING PROGRAM

## 2021-08-17 PROCEDURE — 84100 ASSAY OF PHOSPHORUS: CPT | Performed by: STUDENT IN AN ORGANIZED HEALTH CARE EDUCATION/TRAINING PROGRAM

## 2021-08-17 PROCEDURE — 11000001 HC ACUTE MED/SURG PRIVATE ROOM

## 2021-08-17 PROCEDURE — 85025 COMPLETE CBC W/AUTO DIFF WBC: CPT | Performed by: STUDENT IN AN ORGANIZED HEALTH CARE EDUCATION/TRAINING PROGRAM

## 2021-08-17 PROCEDURE — 93010 ELECTROCARDIOGRAM REPORT: CPT | Mod: ,,, | Performed by: INTERNAL MEDICINE

## 2021-08-17 PROCEDURE — 36415 COLL VENOUS BLD VENIPUNCTURE: CPT | Performed by: STUDENT IN AN ORGANIZED HEALTH CARE EDUCATION/TRAINING PROGRAM

## 2021-08-17 PROCEDURE — 96375 TX/PRO/DX INJ NEW DRUG ADDON: CPT | Mod: 59 | Performed by: EMERGENCY MEDICINE

## 2021-08-17 PROCEDURE — 87086 URINE CULTURE/COLONY COUNT: CPT | Performed by: FAMILY MEDICINE

## 2021-08-17 PROCEDURE — 63600175 PHARM REV CODE 636 W HCPCS: Performed by: STUDENT IN AN ORGANIZED HEALTH CARE EDUCATION/TRAINING PROGRAM

## 2021-08-17 PROCEDURE — 93005 ELECTROCARDIOGRAM TRACING: CPT

## 2021-08-17 PROCEDURE — 93010 EKG 12-LEAD: ICD-10-PCS | Mod: ,,, | Performed by: INTERNAL MEDICINE

## 2021-08-17 PROCEDURE — 63600175 PHARM REV CODE 636 W HCPCS: Performed by: RADIOLOGY

## 2021-08-17 PROCEDURE — 25000003 PHARM REV CODE 250: Performed by: STUDENT IN AN ORGANIZED HEALTH CARE EDUCATION/TRAINING PROGRAM

## 2021-08-17 PROCEDURE — 25000003 PHARM REV CODE 250: Performed by: NURSE PRACTITIONER

## 2021-08-17 PROCEDURE — 96376 TX/PRO/DX INJ SAME DRUG ADON: CPT | Mod: 59 | Performed by: EMERGENCY MEDICINE

## 2021-08-17 PROCEDURE — 83735 ASSAY OF MAGNESIUM: CPT | Performed by: STUDENT IN AN ORGANIZED HEALTH CARE EDUCATION/TRAINING PROGRAM

## 2021-08-17 PROCEDURE — 81000 URINALYSIS NONAUTO W/SCOPE: CPT | Performed by: FAMILY MEDICINE

## 2021-08-17 RX ORDER — HYDRALAZINE HYDROCHLORIDE 20 MG/ML
10 INJECTION INTRAMUSCULAR; INTRAVENOUS EVERY 8 HOURS PRN
Status: DISCONTINUED | OUTPATIENT
Start: 2021-08-17 | End: 2021-08-18 | Stop reason: HOSPADM

## 2021-08-17 RX ORDER — FENTANYL CITRATE 50 UG/ML
INJECTION, SOLUTION INTRAMUSCULAR; INTRAVENOUS CODE/TRAUMA/SEDATION MEDICATION
Status: COMPLETED | OUTPATIENT
Start: 2021-08-17 | End: 2021-08-17

## 2021-08-17 RX ORDER — CIPROFLOXACIN 2 MG/ML
INJECTION, SOLUTION INTRAVENOUS
Status: COMPLETED | OUTPATIENT
Start: 2021-08-17 | End: 2021-08-17

## 2021-08-17 RX ORDER — PROMETHAZINE HYDROCHLORIDE 25 MG/ML
INJECTION, SOLUTION INTRAMUSCULAR; INTRAVENOUS CODE/TRAUMA/SEDATION MEDICATION
Status: COMPLETED | OUTPATIENT
Start: 2021-08-17 | End: 2021-08-17

## 2021-08-17 RX ADMIN — CIPROFLOXACIN 400 MG: 2 INJECTION, SOLUTION INTRAVENOUS at 05:08

## 2021-08-17 RX ADMIN — HYDRALAZINE HYDROCHLORIDE 10 MG: 20 INJECTION, SOLUTION INTRAMUSCULAR; INTRAVENOUS at 04:08

## 2021-08-17 RX ADMIN — INSULIN ASPART 3 UNITS: 100 INJECTION, SOLUTION INTRAVENOUS; SUBCUTANEOUS at 10:08

## 2021-08-17 RX ADMIN — FENTANYL CITRATE 50 MCG: 50 INJECTION INTRAMUSCULAR; INTRAVENOUS at 05:08

## 2021-08-17 RX ADMIN — INSULIN ASPART 4 UNITS: 100 INJECTION, SOLUTION INTRAVENOUS; SUBCUTANEOUS at 12:08

## 2021-08-17 RX ADMIN — SERTRALINE HYDROCHLORIDE 50 MG: 50 TABLET ORAL at 08:08

## 2021-08-17 RX ADMIN — ATORVASTATIN CALCIUM 80 MG: 40 TABLET, FILM COATED ORAL at 08:08

## 2021-08-17 RX ADMIN — FUROSEMIDE 40 MG: 40 TABLET ORAL at 09:08

## 2021-08-17 RX ADMIN — ONDANSETRON 8 MG: 8 TABLET, ORALLY DISINTEGRATING ORAL at 03:08

## 2021-08-17 RX ADMIN — PROCHLORPERAZINE EDISYLATE 5 MG: 5 INJECTION INTRAMUSCULAR; INTRAVENOUS at 04:08

## 2021-08-17 RX ADMIN — FUROSEMIDE 40 MG: 40 TABLET ORAL at 08:08

## 2021-08-17 RX ADMIN — INSULIN ASPART 2 UNITS: 100 INJECTION, SOLUTION INTRAVENOUS; SUBCUTANEOUS at 05:08

## 2021-08-17 RX ADMIN — METOPROLOL SUCCINATE 50 MG: 50 TABLET, EXTENDED RELEASE ORAL at 08:08

## 2021-08-17 RX ADMIN — FENTANYL CITRATE 25 MCG: 50 INJECTION INTRAMUSCULAR; INTRAVENOUS at 06:08

## 2021-08-17 RX ADMIN — AMLODIPINE BESYLATE 5 MG: 5 TABLET ORAL at 08:08

## 2021-08-17 RX ADMIN — LOSARTAN POTASSIUM 50 MG: 50 TABLET, FILM COATED ORAL at 08:08

## 2021-08-17 RX ADMIN — PROMETHAZINE HYDROCHLORIDE 12.5 MG: 25 INJECTION INTRAMUSCULAR; INTRAVENOUS at 05:08

## 2021-08-17 RX ADMIN — ALLOPURINOL 100 MG: 100 TABLET ORAL at 08:08

## 2021-08-18 ENCOUNTER — ANESTHESIA (OUTPATIENT)
Dept: MEDSURG UNIT | Facility: HOSPITAL | Age: 71
End: 2021-08-18

## 2021-08-18 ENCOUNTER — PATIENT OUTREACH (OUTPATIENT)
Dept: ADMINISTRATIVE | Facility: OTHER | Age: 71
End: 2021-08-18

## 2021-08-18 ENCOUNTER — DOCUMENTATION ONLY (OUTPATIENT)
Dept: CARDIOLOGY | Facility: CLINIC | Age: 71
End: 2021-08-18

## 2021-08-18 VITALS
OXYGEN SATURATION: 97 % | HEART RATE: 86 BPM | DIASTOLIC BLOOD PRESSURE: 79 MMHG | BODY MASS INDEX: 29.57 KG/M2 | SYSTOLIC BLOOD PRESSURE: 166 MMHG | RESPIRATION RATE: 18 BRPM | HEIGHT: 63 IN | TEMPERATURE: 98 F | WEIGHT: 166.88 LBS

## 2021-08-18 DIAGNOSIS — N20.0 NEPHROLITHIASIS: Primary | ICD-10-CM

## 2021-08-18 LAB
ALBUMIN SERPL BCP-MCNC: 3.8 G/DL (ref 3.5–5.2)
ALP SERPL-CCNC: 66 U/L (ref 55–135)
ALT SERPL W/O P-5'-P-CCNC: 24 U/L (ref 10–44)
ANION GAP SERPL CALC-SCNC: 19 MMOL/L (ref 8–16)
AST SERPL-CCNC: 23 U/L (ref 10–40)
BASOPHILS # BLD AUTO: 0.02 K/UL (ref 0–0.2)
BASOPHILS NFR BLD: 0.1 % (ref 0–1.9)
BILIRUB SERPL-MCNC: 0.5 MG/DL (ref 0.1–1)
BUN SERPL-MCNC: 48 MG/DL (ref 8–23)
CALCIUM SERPL-MCNC: 10.1 MG/DL (ref 8.7–10.5)
CHLORIDE SERPL-SCNC: 98 MMOL/L (ref 95–110)
CO2 SERPL-SCNC: 21 MMOL/L (ref 23–29)
CREAT SERPL-MCNC: 5 MG/DL (ref 0.5–1.4)
DIFFERENTIAL METHOD: ABNORMAL
EOSINOPHIL # BLD AUTO: 0 K/UL (ref 0–0.5)
EOSINOPHIL NFR BLD: 0 % (ref 0–8)
ERYTHROCYTE [DISTWIDTH] IN BLOOD BY AUTOMATED COUNT: 15.9 % (ref 11.5–14.5)
EST. GFR  (AFRICAN AMERICAN): 10 ML/MIN/1.73 M^2
EST. GFR  (NON AFRICAN AMERICAN): 8 ML/MIN/1.73 M^2
GLUCOSE SERPL-MCNC: 252 MG/DL (ref 70–110)
HCT VFR BLD AUTO: 32.5 % (ref 37–48.5)
HGB BLD-MCNC: 10.8 G/DL (ref 12–16)
IMM GRANULOCYTES # BLD AUTO: 0.07 K/UL (ref 0–0.04)
IMM GRANULOCYTES NFR BLD AUTO: 0.5 % (ref 0–0.5)
LYMPHOCYTES # BLD AUTO: 1.3 K/UL (ref 1–4.8)
LYMPHOCYTES NFR BLD: 9 % (ref 18–48)
MAGNESIUM SERPL-MCNC: 1.7 MG/DL (ref 1.6–2.6)
MCH RBC QN AUTO: 29.6 PG (ref 27–31)
MCHC RBC AUTO-ENTMCNC: 33.2 G/DL (ref 32–36)
MCV RBC AUTO: 89 FL (ref 82–98)
MONOCYTES # BLD AUTO: 0.3 K/UL (ref 0.3–1)
MONOCYTES NFR BLD: 2.2 % (ref 4–15)
NEUTROPHILS # BLD AUTO: 12.6 K/UL (ref 1.8–7.7)
NEUTROPHILS NFR BLD: 88.2 % (ref 38–73)
NRBC BLD-RTO: 0 /100 WBC
PHOSPHATE SERPL-MCNC: 5 MG/DL (ref 2.7–4.5)
PLATELET # BLD AUTO: 257 K/UL (ref 150–450)
PMV BLD AUTO: 11.5 FL (ref 9.2–12.9)
POCT GLUCOSE: 241 MG/DL (ref 70–110)
POCT GLUCOSE: 260 MG/DL (ref 70–110)
POTASSIUM SERPL-SCNC: 3.3 MMOL/L (ref 3.5–5.1)
PROT SERPL-MCNC: 8.1 G/DL (ref 6–8.4)
RBC # BLD AUTO: 3.65 M/UL (ref 4–5.4)
SODIUM SERPL-SCNC: 138 MMOL/L (ref 136–145)
WBC # BLD AUTO: 14.32 K/UL (ref 3.9–12.7)

## 2021-08-18 PROCEDURE — 63600175 PHARM REV CODE 636 W HCPCS: Performed by: FAMILY MEDICINE

## 2021-08-18 PROCEDURE — 25000003 PHARM REV CODE 250: Performed by: STUDENT IN AN ORGANIZED HEALTH CARE EDUCATION/TRAINING PROGRAM

## 2021-08-18 PROCEDURE — 80053 COMPREHEN METABOLIC PANEL: CPT | Performed by: STUDENT IN AN ORGANIZED HEALTH CARE EDUCATION/TRAINING PROGRAM

## 2021-08-18 PROCEDURE — 36415 COLL VENOUS BLD VENIPUNCTURE: CPT | Performed by: STUDENT IN AN ORGANIZED HEALTH CARE EDUCATION/TRAINING PROGRAM

## 2021-08-18 PROCEDURE — 85025 COMPLETE CBC W/AUTO DIFF WBC: CPT | Performed by: STUDENT IN AN ORGANIZED HEALTH CARE EDUCATION/TRAINING PROGRAM

## 2021-08-18 PROCEDURE — 84100 ASSAY OF PHOSPHORUS: CPT | Performed by: STUDENT IN AN ORGANIZED HEALTH CARE EDUCATION/TRAINING PROGRAM

## 2021-08-18 PROCEDURE — 99233 SBSQ HOSP IP/OBS HIGH 50: CPT | Mod: ,,, | Performed by: STUDENT IN AN ORGANIZED HEALTH CARE EDUCATION/TRAINING PROGRAM

## 2021-08-18 PROCEDURE — 99233 PR SUBSEQUENT HOSPITAL CARE,LEVL III: ICD-10-PCS | Mod: ,,, | Performed by: STUDENT IN AN ORGANIZED HEALTH CARE EDUCATION/TRAINING PROGRAM

## 2021-08-18 PROCEDURE — 25000003 PHARM REV CODE 250: Performed by: NURSE PRACTITIONER

## 2021-08-18 PROCEDURE — 83735 ASSAY OF MAGNESIUM: CPT | Performed by: STUDENT IN AN ORGANIZED HEALTH CARE EDUCATION/TRAINING PROGRAM

## 2021-08-18 PROCEDURE — 0001A HC IMMUNIZ ADMIN, SARS-COV-2 COVID-19 VACC, 30MCG/0.3ML, 1ST DOSE: CPT | Performed by: FAMILY MEDICINE

## 2021-08-18 PROCEDURE — 91300 PHARM REV CODE 636 W HCPCS: CPT | Performed by: FAMILY MEDICINE

## 2021-08-18 PROCEDURE — 63600175 PHARM REV CODE 636 W HCPCS: Performed by: STUDENT IN AN ORGANIZED HEALTH CARE EDUCATION/TRAINING PROGRAM

## 2021-08-18 RX ORDER — LEVOFLOXACIN 750 MG/1
750 TABLET ORAL DAILY
Status: COMPLETED | OUTPATIENT
Start: 2021-08-18 | End: 2021-08-18

## 2021-08-18 RX ORDER — CIPROFLOXACIN 2 MG/ML
400 INJECTION, SOLUTION INTRAVENOUS
Status: CANCELLED | OUTPATIENT
Start: 2021-08-18

## 2021-08-18 RX ORDER — PROMETHAZINE HYDROCHLORIDE 12.5 MG/1
12.5 TABLET ORAL EVERY 6 HOURS PRN
Qty: 15 TABLET | Refills: 0 | Status: SHIPPED | OUTPATIENT
Start: 2021-08-18 | End: 2021-08-26

## 2021-08-18 RX ORDER — ONDANSETRON 8 MG/1
8 TABLET, ORALLY DISINTEGRATING ORAL EVERY 6 HOURS PRN
Qty: 30 TABLET | Refills: 0 | Status: SHIPPED | OUTPATIENT
Start: 2021-08-18 | End: 2021-08-26

## 2021-08-18 RX ORDER — SODIUM CHLORIDE 9 MG/ML
INJECTION, SOLUTION INTRAVENOUS CONTINUOUS
Status: CANCELLED | OUTPATIENT
Start: 2021-08-18

## 2021-08-18 RX ORDER — LEVOFLOXACIN 500 MG/1
500 TABLET, FILM COATED ORAL
Qty: 2 TABLET | Refills: 0 | Status: SHIPPED | OUTPATIENT
Start: 2021-08-20 | End: 2021-08-23

## 2021-08-18 RX ORDER — LEVOFLOXACIN 500 MG/1
500 TABLET, FILM COATED ORAL
Status: DISCONTINUED | OUTPATIENT
Start: 2021-08-20 | End: 2021-08-18 | Stop reason: HOSPADM

## 2021-08-18 RX ADMIN — FUROSEMIDE 40 MG: 40 TABLET ORAL at 09:08

## 2021-08-18 RX ADMIN — HYDRALAZINE HYDROCHLORIDE 10 MG: 20 INJECTION, SOLUTION INTRAMUSCULAR; INTRAVENOUS at 05:08

## 2021-08-18 RX ADMIN — AMLODIPINE BESYLATE 5 MG: 5 TABLET ORAL at 09:08

## 2021-08-18 RX ADMIN — METOPROLOL SUCCINATE 50 MG: 50 TABLET, EXTENDED RELEASE ORAL at 09:08

## 2021-08-18 RX ADMIN — LOSARTAN POTASSIUM 50 MG: 50 TABLET, FILM COATED ORAL at 09:08

## 2021-08-18 RX ADMIN — SERTRALINE HYDROCHLORIDE 50 MG: 50 TABLET ORAL at 09:08

## 2021-08-18 RX ADMIN — PROCHLORPERAZINE EDISYLATE 5 MG: 5 INJECTION INTRAMUSCULAR; INTRAVENOUS at 09:08

## 2021-08-18 RX ADMIN — INSULIN ASPART 4 UNITS: 100 INJECTION, SOLUTION INTRAVENOUS; SUBCUTANEOUS at 05:08

## 2021-08-18 RX ADMIN — RNA INGREDIENT BNT-162B2 0.3 ML: 0.23 INJECTION, SUSPENSION INTRAMUSCULAR at 04:08

## 2021-08-18 RX ADMIN — ATORVASTATIN CALCIUM 80 MG: 40 TABLET, FILM COATED ORAL at 09:08

## 2021-08-18 RX ADMIN — LEVOFLOXACIN 750 MG: 750 TABLET, FILM COATED ORAL at 04:08

## 2021-08-18 RX ADMIN — INSULIN ASPART 6 UNITS: 100 INJECTION, SOLUTION INTRAVENOUS; SUBCUTANEOUS at 11:08

## 2021-08-18 RX ADMIN — ALLOPURINOL 100 MG: 100 TABLET ORAL at 09:08

## 2021-08-19 ENCOUNTER — TELEPHONE (OUTPATIENT)
Dept: UROLOGY | Facility: CLINIC | Age: 71
End: 2021-08-19

## 2021-08-19 ENCOUNTER — PATIENT OUTREACH (OUTPATIENT)
Dept: ADMINISTRATIVE | Facility: OTHER | Age: 71
End: 2021-08-19

## 2021-08-19 LAB — BACTERIA UR CULT: NO GROWTH

## 2021-08-22 ENCOUNTER — LAB VISIT (OUTPATIENT)
Dept: URGENT CARE | Facility: CLINIC | Age: 71
End: 2021-08-22
Payer: MEDICARE

## 2021-08-22 DIAGNOSIS — N20.0 NEPHROLITHIASIS: ICD-10-CM

## 2021-08-22 PROCEDURE — U0005 INFEC AGEN DETEC AMPLI PROBE: HCPCS | Performed by: STUDENT IN AN ORGANIZED HEALTH CARE EDUCATION/TRAINING PROGRAM

## 2021-08-22 PROCEDURE — U0003 INFECTIOUS AGENT DETECTION BY NUCLEIC ACID (DNA OR RNA); SEVERE ACUTE RESPIRATORY SYNDROME CORONAVIRUS 2 (SARS-COV-2) (CORONAVIRUS DISEASE [COVID-19]), AMPLIFIED PROBE TECHNIQUE, MAKING USE OF HIGH THROUGHPUT TECHNOLOGIES AS DESCRIBED BY CMS-2020-01-R: HCPCS | Performed by: STUDENT IN AN ORGANIZED HEALTH CARE EDUCATION/TRAINING PROGRAM

## 2021-08-22 PROCEDURE — 99211 OFF/OP EST MAY X REQ PHY/QHP: CPT | Mod: S$GLB,CS,, | Performed by: NURSE PRACTITIONER

## 2021-08-22 PROCEDURE — 99211 PR OFFICE/OUTPT VISIT, EST, LEVL I: ICD-10-PCS | Mod: S$GLB,CS,, | Performed by: NURSE PRACTITIONER

## 2021-08-23 LAB
SARS-COV-2 RNA RESP QL NAA+PROBE: NOT DETECTED
SARS-COV-2- CYCLE NUMBER: NORMAL

## 2021-08-24 ENCOUNTER — ANESTHESIA EVENT (OUTPATIENT)
Dept: SURGERY | Facility: HOSPITAL | Age: 71
DRG: 988 | End: 2021-08-24
Payer: MEDICARE

## 2021-08-25 ENCOUNTER — ANESTHESIA (OUTPATIENT)
Dept: SURGERY | Facility: HOSPITAL | Age: 71
DRG: 988 | End: 2021-08-25
Payer: MEDICARE

## 2021-08-25 DIAGNOSIS — N20.0 NEPHROLITHIASIS: Primary | ICD-10-CM

## 2021-08-25 PROCEDURE — 63600175 PHARM REV CODE 636 W HCPCS: Performed by: STUDENT IN AN ORGANIZED HEALTH CARE EDUCATION/TRAINING PROGRAM

## 2021-08-25 PROCEDURE — 25000003 PHARM REV CODE 250: Performed by: STUDENT IN AN ORGANIZED HEALTH CARE EDUCATION/TRAINING PROGRAM

## 2021-08-25 RX ORDER — NEOSTIGMINE METHYLSULFATE 1 MG/ML
INJECTION, SOLUTION INTRAVENOUS
Status: DISCONTINUED | OUTPATIENT
Start: 2021-08-25 | End: 2021-08-25

## 2021-08-25 RX ORDER — ONDANSETRON 2 MG/ML
INJECTION INTRAMUSCULAR; INTRAVENOUS
Status: DISCONTINUED | OUTPATIENT
Start: 2021-08-25 | End: 2021-08-25

## 2021-08-25 RX ORDER — PROPOFOL 10 MG/ML
VIAL (ML) INTRAVENOUS
Status: DISCONTINUED | OUTPATIENT
Start: 2021-08-25 | End: 2021-08-25

## 2021-08-25 RX ORDER — SODIUM CHLORIDE, SODIUM LACTATE, POTASSIUM CHLORIDE, CALCIUM CHLORIDE 600; 310; 30; 20 MG/100ML; MG/100ML; MG/100ML; MG/100ML
INJECTION, SOLUTION INTRAVENOUS CONTINUOUS PRN
Status: DISCONTINUED | OUTPATIENT
Start: 2021-08-25 | End: 2021-08-25

## 2021-08-25 RX ORDER — ACETAMINOPHEN 10 MG/ML
INJECTION, SOLUTION INTRAVENOUS
Status: DISCONTINUED | OUTPATIENT
Start: 2021-08-25 | End: 2021-08-25

## 2021-08-25 RX ORDER — DEXAMETHASONE SODIUM PHOSPHATE 4 MG/ML
INJECTION, SOLUTION INTRA-ARTICULAR; INTRALESIONAL; INTRAMUSCULAR; INTRAVENOUS; SOFT TISSUE
Status: DISCONTINUED | OUTPATIENT
Start: 2021-08-25 | End: 2021-08-25

## 2021-08-25 RX ORDER — PHENYLEPHRINE HYDROCHLORIDE 10 MG/ML
INJECTION INTRAVENOUS
Status: DISCONTINUED | OUTPATIENT
Start: 2021-08-25 | End: 2021-08-25

## 2021-08-25 RX ORDER — LIDOCAINE HCL/PF 100 MG/5ML
SYRINGE (ML) INTRAVENOUS
Status: DISCONTINUED | OUTPATIENT
Start: 2021-08-25 | End: 2021-08-25

## 2021-08-25 RX ORDER — ROCURONIUM BROMIDE 10 MG/ML
INJECTION, SOLUTION INTRAVENOUS
Status: DISCONTINUED | OUTPATIENT
Start: 2021-08-25 | End: 2021-08-25

## 2021-08-25 RX ORDER — SUCCINYLCHOLINE CHLORIDE 20 MG/ML
INJECTION INTRAMUSCULAR; INTRAVENOUS
Status: DISCONTINUED | OUTPATIENT
Start: 2021-08-25 | End: 2021-08-25

## 2021-08-25 RX ADMIN — PROPOFOL 130 MG: 10 INJECTION, EMULSION INTRAVENOUS at 08:08

## 2021-08-25 RX ADMIN — GLYCOPYRROLATE 0.8 MG: 0.2 INJECTION, SOLUTION INTRAMUSCULAR; INTRAVITREAL at 09:08

## 2021-08-25 RX ADMIN — ACETAMINOPHEN 1000 MG: 10 INJECTION, SOLUTION INTRAVENOUS at 08:08

## 2021-08-25 RX ADMIN — ROCURONIUM BROMIDE 20 MG: 10 INJECTION, SOLUTION INTRAVENOUS at 08:08

## 2021-08-25 RX ADMIN — DEXAMETHASONE SODIUM PHOSPHATE 4 MG: 4 INJECTION, SOLUTION INTRA-ARTICULAR; INTRALESIONAL; INTRAMUSCULAR; INTRAVENOUS; SOFT TISSUE at 09:08

## 2021-08-25 RX ADMIN — SODIUM CHLORIDE, SODIUM LACTATE, POTASSIUM CHLORIDE, AND CALCIUM CHLORIDE: .6; .31; .03; .02 INJECTION, SOLUTION INTRAVENOUS at 08:08

## 2021-08-25 RX ADMIN — PHENYLEPHRINE HYDROCHLORIDE 100 MCG: 10 INJECTION INTRAVENOUS at 08:08

## 2021-08-25 RX ADMIN — SUCCINYLCHOLINE CHLORIDE 100 MG: 20 INJECTION, SOLUTION INTRAMUSCULAR; INTRAVENOUS at 08:08

## 2021-08-25 RX ADMIN — ONDANSETRON 4 MG: 2 INJECTION, SOLUTION INTRAMUSCULAR; INTRAVENOUS at 09:08

## 2021-08-25 RX ADMIN — NEOSTIGMINE METHYLSULFATE 5 MG: 1 INJECTION INTRAVENOUS at 09:08

## 2021-08-25 RX ADMIN — ROCURONIUM BROMIDE 10 MG: 10 INJECTION, SOLUTION INTRAVENOUS at 09:08

## 2021-08-25 RX ADMIN — LIDOCAINE HYDROCHLORIDE 80 MG: 20 INJECTION, SOLUTION INTRAVENOUS at 08:08

## 2021-08-25 RX ADMIN — CIPROFLOXACIN 400 MG: 2 INJECTION, SOLUTION INTRAVENOUS at 08:08

## 2021-08-26 ENCOUNTER — HOSPITAL ENCOUNTER (INPATIENT)
Facility: HOSPITAL | Age: 71
LOS: 3 days | Discharge: HOME | DRG: 988 | End: 2021-08-30
Attending: EMERGENCY MEDICINE | Admitting: STUDENT IN AN ORGANIZED HEALTH CARE EDUCATION/TRAINING PROGRAM
Payer: MEDICARE

## 2021-08-26 DIAGNOSIS — R10.13 EPIGASTRIC PAIN: ICD-10-CM

## 2021-08-26 DIAGNOSIS — I25.10 ATHEROSCLEROSIS OF NATIVE CORONARY ARTERY OF NATIVE HEART WITHOUT ANGINA PECTORIS: ICD-10-CM

## 2021-08-26 DIAGNOSIS — R11.2 NON-INTRACTABLE VOMITING WITH NAUSEA, UNSPECIFIED VOMITING TYPE: ICD-10-CM

## 2021-08-26 DIAGNOSIS — I48.0 PAROXYSMAL A-FIB: ICD-10-CM

## 2021-08-26 DIAGNOSIS — E87.20 LACTIC ACID ACIDOSIS: ICD-10-CM

## 2021-08-26 DIAGNOSIS — I45.2 RBBB (RIGHT BUNDLE BRANCH BLOCK WITH LEFT ANTERIOR FASCICULAR BLOCK): ICD-10-CM

## 2021-08-26 DIAGNOSIS — R10.13 EPIGASTRIC ABDOMINAL PAIN: Primary | ICD-10-CM

## 2021-08-26 DIAGNOSIS — E11.65 TYPE 2 DIABETES MELLITUS WITH HYPERGLYCEMIA, WITHOUT LONG-TERM CURRENT USE OF INSULIN: ICD-10-CM

## 2021-08-26 DIAGNOSIS — R79.89 ELEVATED TROPONIN: ICD-10-CM

## 2021-08-26 DIAGNOSIS — D63.1 ANEMIA DUE TO STAGE 4 CHRONIC KIDNEY DISEASE: ICD-10-CM

## 2021-08-26 DIAGNOSIS — N18.4 ANEMIA DUE TO STAGE 4 CHRONIC KIDNEY DISEASE: ICD-10-CM

## 2021-08-26 DIAGNOSIS — Z95.5 STENTED CORONARY ARTERY: ICD-10-CM

## 2021-08-26 DIAGNOSIS — I10 ESSENTIAL HYPERTENSION: ICD-10-CM

## 2021-08-26 PROBLEM — R10.11 RIGHT UPPER QUADRANT ABDOMINAL PAIN: Status: ACTIVE | Noted: 2021-08-26

## 2021-08-26 LAB
ALBUMIN SERPL BCP-MCNC: 3.7 G/DL (ref 3.5–5.2)
ALP SERPL-CCNC: 68 U/L (ref 55–135)
ALT SERPL W/O P-5'-P-CCNC: 30 U/L (ref 10–44)
ANION GAP SERPL CALC-SCNC: 17 MMOL/L (ref 8–16)
AST SERPL-CCNC: 27 U/L (ref 10–40)
BACTERIA #/AREA URNS HPF: ABNORMAL /HPF
BILIRUB SERPL-MCNC: 0.6 MG/DL (ref 0.1–1)
BILIRUB UR QL STRIP: NEGATIVE
BUN SERPL-MCNC: 26 MG/DL (ref 8–23)
CALCIUM SERPL-MCNC: 9.2 MG/DL (ref 8.7–10.5)
CHLORIDE SERPL-SCNC: 101 MMOL/L (ref 95–110)
CLARITY UR: ABNORMAL
CO2 SERPL-SCNC: 20 MMOL/L (ref 23–29)
COLOR UR: COLORLESS
CREAT SERPL-MCNC: 3.1 MG/DL (ref 0.5–1.4)
CTP QC/QA: YES
ERYTHROCYTE [DISTWIDTH] IN BLOOD BY AUTOMATED COUNT: 15.3 % (ref 11.5–14.5)
EST. GFR  (AFRICAN AMERICAN): 17 ML/MIN/1.73 M^2
EST. GFR  (NON AFRICAN AMERICAN): 15 ML/MIN/1.73 M^2
GLUCOSE SERPL-MCNC: 261 MG/DL (ref 70–110)
GLUCOSE UR QL STRIP: ABNORMAL
HCT VFR BLD AUTO: 30.9 % (ref 37–48.5)
HGB BLD-MCNC: 10.3 G/DL (ref 12–16)
HGB UR QL STRIP: ABNORMAL
HYALINE CASTS #/AREA URNS LPF: 3 /LPF
KETONES UR QL STRIP: NEGATIVE
LACTATE SERPL-SCNC: 2.5 MMOL/L (ref 0.5–2.2)
LACTATE SERPL-SCNC: 3.6 MMOL/L (ref 0.5–2.2)
LACTATE SERPL-SCNC: 3.9 MMOL/L (ref 0.5–2.2)
LACTATE SERPL-SCNC: 4.6 MMOL/L (ref 0.5–2.2)
LEUKOCYTE ESTERASE UR QL STRIP: ABNORMAL
LIPASE SERPL-CCNC: 38 U/L (ref 4–60)
MAGNESIUM SERPL-MCNC: 1.3 MG/DL (ref 1.6–2.6)
MCH RBC QN AUTO: 30.2 PG (ref 27–31)
MCHC RBC AUTO-ENTMCNC: 33.3 G/DL (ref 32–36)
MCV RBC AUTO: 91 FL (ref 82–98)
MICROSCOPIC COMMENT: ABNORMAL
NITRITE UR QL STRIP: NEGATIVE
PH UR STRIP: 6 [PH] (ref 5–8)
PHOSPHATE SERPL-MCNC: 3.5 MG/DL (ref 2.7–4.5)
PLATELET # BLD AUTO: 305 K/UL (ref 150–450)
PMV BLD AUTO: 11 FL (ref 9.2–12.9)
POCT GLUCOSE: 164 MG/DL (ref 70–110)
POCT GLUCOSE: 193 MG/DL (ref 70–110)
POCT GLUCOSE: 252 MG/DL (ref 70–110)
POCT GLUCOSE: 282 MG/DL (ref 70–110)
POTASSIUM SERPL-SCNC: 3.8 MMOL/L (ref 3.5–5.1)
PROT SERPL-MCNC: 7.6 G/DL (ref 6–8.4)
PROT UR QL STRIP: ABNORMAL
RBC # BLD AUTO: 3.41 M/UL (ref 4–5.4)
RBC #/AREA URNS HPF: >100 /HPF (ref 0–4)
SARS-COV-2 RDRP RESP QL NAA+PROBE: NEGATIVE
SODIUM SERPL-SCNC: 138 MMOL/L (ref 136–145)
SP GR UR STRIP: 1.01 (ref 1–1.03)
SQUAMOUS #/AREA URNS HPF: 1 /HPF
TSH SERPL DL<=0.005 MIU/L-ACNC: 1 UIU/ML (ref 0.4–4)
UNIDENT CRYS URNS QL MICRO: 27
URN SPEC COLLECT METH UR: ABNORMAL
UROBILINOGEN UR STRIP-ACNC: NEGATIVE EU/DL
WBC # BLD AUTO: 16.25 K/UL (ref 3.9–12.7)
WBC #/AREA URNS HPF: 92 /HPF (ref 0–5)
YEAST URNS QL MICRO: ABNORMAL

## 2021-08-26 PROCEDURE — G0378 HOSPITAL OBSERVATION PER HR: HCPCS

## 2021-08-26 PROCEDURE — 63600175 PHARM REV CODE 636 W HCPCS: Performed by: EMERGENCY MEDICINE

## 2021-08-26 PROCEDURE — 25000003 PHARM REV CODE 250: Performed by: EMERGENCY MEDICINE

## 2021-08-26 PROCEDURE — 25000003 PHARM REV CODE 250: Performed by: STUDENT IN AN ORGANIZED HEALTH CARE EDUCATION/TRAINING PROGRAM

## 2021-08-26 PROCEDURE — 93010 ELECTROCARDIOGRAM REPORT: CPT | Mod: ,,, | Performed by: INTERNAL MEDICINE

## 2021-08-26 PROCEDURE — 93010 EKG 12-LEAD: ICD-10-PCS | Mod: ,,, | Performed by: INTERNAL MEDICINE

## 2021-08-26 PROCEDURE — U0002 COVID-19 LAB TEST NON-CDC: HCPCS | Performed by: EMERGENCY MEDICINE

## 2021-08-26 PROCEDURE — 83605 ASSAY OF LACTIC ACID: CPT | Performed by: STUDENT IN AN ORGANIZED HEALTH CARE EDUCATION/TRAINING PROGRAM

## 2021-08-26 PROCEDURE — 63600175 PHARM REV CODE 636 W HCPCS: Performed by: STUDENT IN AN ORGANIZED HEALTH CARE EDUCATION/TRAINING PROGRAM

## 2021-08-26 PROCEDURE — 84100 ASSAY OF PHOSPHORUS: CPT | Performed by: STUDENT IN AN ORGANIZED HEALTH CARE EDUCATION/TRAINING PROGRAM

## 2021-08-26 PROCEDURE — 83605 ASSAY OF LACTIC ACID: CPT | Mod: 91 | Performed by: STUDENT IN AN ORGANIZED HEALTH CARE EDUCATION/TRAINING PROGRAM

## 2021-08-26 PROCEDURE — 99222 1ST HOSP IP/OBS MODERATE 55: CPT | Mod: ,,, | Performed by: STUDENT IN AN ORGANIZED HEALTH CARE EDUCATION/TRAINING PROGRAM

## 2021-08-26 PROCEDURE — 83605 ASSAY OF LACTIC ACID: CPT | Mod: 91 | Performed by: EMERGENCY MEDICINE

## 2021-08-26 PROCEDURE — 36415 COLL VENOUS BLD VENIPUNCTURE: CPT | Performed by: STUDENT IN AN ORGANIZED HEALTH CARE EDUCATION/TRAINING PROGRAM

## 2021-08-26 PROCEDURE — 85027 COMPLETE CBC AUTOMATED: CPT | Performed by: EMERGENCY MEDICINE

## 2021-08-26 PROCEDURE — 83735 ASSAY OF MAGNESIUM: CPT | Performed by: STUDENT IN AN ORGANIZED HEALTH CARE EDUCATION/TRAINING PROGRAM

## 2021-08-26 PROCEDURE — 99222 PR INITIAL HOSPITAL CARE,LEVL II: ICD-10-PCS | Mod: ,,, | Performed by: STUDENT IN AN ORGANIZED HEALTH CARE EDUCATION/TRAINING PROGRAM

## 2021-08-26 PROCEDURE — C9399 UNCLASSIFIED DRUGS OR BIOLOG: HCPCS | Performed by: STUDENT IN AN ORGANIZED HEALTH CARE EDUCATION/TRAINING PROGRAM

## 2021-08-26 PROCEDURE — 96366 THER/PROPH/DIAG IV INF ADDON: CPT

## 2021-08-26 PROCEDURE — 84443 ASSAY THYROID STIM HORMONE: CPT | Performed by: STUDENT IN AN ORGANIZED HEALTH CARE EDUCATION/TRAINING PROGRAM

## 2021-08-26 PROCEDURE — 87086 URINE CULTURE/COLONY COUNT: CPT | Performed by: EMERGENCY MEDICINE

## 2021-08-26 PROCEDURE — 99285 EMERGENCY DEPT VISIT HI MDM: CPT | Mod: 25

## 2021-08-26 PROCEDURE — 80053 COMPREHEN METABOLIC PANEL: CPT | Performed by: EMERGENCY MEDICINE

## 2021-08-26 PROCEDURE — 83690 ASSAY OF LIPASE: CPT | Performed by: EMERGENCY MEDICINE

## 2021-08-26 PROCEDURE — 81000 URINALYSIS NONAUTO W/SCOPE: CPT | Performed by: EMERGENCY MEDICINE

## 2021-08-26 PROCEDURE — 96365 THER/PROPH/DIAG IV INF INIT: CPT

## 2021-08-26 PROCEDURE — 93005 ELECTROCARDIOGRAM TRACING: CPT

## 2021-08-26 RX ORDER — ONDANSETRON 8 MG/1
8 TABLET, ORALLY DISINTEGRATING ORAL EVERY 6 HOURS PRN
Status: DISCONTINUED | OUTPATIENT
Start: 2021-08-26 | End: 2021-08-28

## 2021-08-26 RX ORDER — INSULIN ASPART 100 [IU]/ML
1-10 INJECTION, SOLUTION INTRAVENOUS; SUBCUTANEOUS
Status: DISCONTINUED | OUTPATIENT
Start: 2021-08-26 | End: 2021-08-30 | Stop reason: HOSPADM

## 2021-08-26 RX ORDER — METFORMIN HYDROCHLORIDE 1000 MG/1
1000 TABLET ORAL 2 TIMES DAILY WITH MEALS
COMMUNITY
End: 2021-10-14

## 2021-08-26 RX ORDER — GLUCAGON 1 MG
1 KIT INJECTION
Status: DISCONTINUED | OUTPATIENT
Start: 2021-08-26 | End: 2021-08-30 | Stop reason: HOSPADM

## 2021-08-26 RX ORDER — ALLOPURINOL 100 MG/1
100 TABLET ORAL DAILY
Status: DISCONTINUED | OUTPATIENT
Start: 2021-08-27 | End: 2021-08-30 | Stop reason: HOSPADM

## 2021-08-26 RX ORDER — AMLODIPINE BESYLATE 5 MG/1
5 TABLET ORAL DAILY
Status: DISCONTINUED | OUTPATIENT
Start: 2021-08-27 | End: 2021-08-30 | Stop reason: HOSPADM

## 2021-08-26 RX ORDER — ATORVASTATIN CALCIUM 40 MG/1
80 TABLET, FILM COATED ORAL DAILY
Status: DISCONTINUED | OUTPATIENT
Start: 2021-08-27 | End: 2021-08-30 | Stop reason: HOSPADM

## 2021-08-26 RX ORDER — ACETAMINOPHEN 325 MG/1
650 TABLET ORAL EVERY 8 HOURS PRN
Status: DISCONTINUED | OUTPATIENT
Start: 2021-08-26 | End: 2021-08-28

## 2021-08-26 RX ORDER — PANTOPRAZOLE SODIUM 40 MG/1
40 TABLET, DELAYED RELEASE ORAL DAILY
Status: DISCONTINUED | OUTPATIENT
Start: 2021-08-26 | End: 2021-08-30 | Stop reason: HOSPADM

## 2021-08-26 RX ORDER — TALC
6 POWDER (GRAM) TOPICAL NIGHTLY PRN
Status: DISCONTINUED | OUTPATIENT
Start: 2021-08-26 | End: 2021-08-30 | Stop reason: HOSPADM

## 2021-08-26 RX ORDER — ACETAMINOPHEN 325 MG/1
650 TABLET ORAL EVERY 4 HOURS PRN
Status: DISCONTINUED | OUTPATIENT
Start: 2021-08-26 | End: 2021-08-28

## 2021-08-26 RX ORDER — TALC
8 POWDER (GRAM) TOPICAL NIGHTLY PRN
Status: DISCONTINUED | OUTPATIENT
Start: 2021-08-26 | End: 2021-08-26 | Stop reason: DRUGHIGH

## 2021-08-26 RX ORDER — METOPROLOL SUCCINATE 50 MG/1
50 TABLET, EXTENDED RELEASE ORAL DAILY
Status: DISCONTINUED | OUTPATIENT
Start: 2021-08-27 | End: 2021-08-29

## 2021-08-26 RX ORDER — NITROGLYCERIN 0.4 MG/1
0.4 TABLET SUBLINGUAL EVERY 5 MIN PRN
COMMUNITY

## 2021-08-26 RX ORDER — IBUPROFEN 200 MG
16 TABLET ORAL
Status: DISCONTINUED | OUTPATIENT
Start: 2021-08-26 | End: 2021-08-30 | Stop reason: HOSPADM

## 2021-08-26 RX ORDER — IBUPROFEN 200 MG
24 TABLET ORAL
Status: DISCONTINUED | OUTPATIENT
Start: 2021-08-26 | End: 2021-08-30 | Stop reason: HOSPADM

## 2021-08-26 RX ORDER — AMOXICILLIN 250 MG
1 CAPSULE ORAL 2 TIMES DAILY
Status: DISCONTINUED | OUTPATIENT
Start: 2021-08-26 | End: 2021-08-30 | Stop reason: HOSPADM

## 2021-08-26 RX ORDER — SULFAMETHOXAZOLE AND TRIMETHOPRIM 800; 160 MG/1; MG/1
1 TABLET ORAL 2 TIMES DAILY
Status: DISCONTINUED | OUTPATIENT
Start: 2021-08-26 | End: 2021-08-27

## 2021-08-26 RX ORDER — TAMSULOSIN HYDROCHLORIDE 0.4 MG/1
0.4 CAPSULE ORAL DAILY
Status: DISCONTINUED | OUTPATIENT
Start: 2021-08-27 | End: 2021-08-30 | Stop reason: HOSPADM

## 2021-08-26 RX ORDER — SODIUM CHLORIDE 0.9 % (FLUSH) 0.9 %
5 SYRINGE (ML) INJECTION
Status: DISCONTINUED | OUTPATIENT
Start: 2021-08-26 | End: 2021-08-30 | Stop reason: HOSPADM

## 2021-08-26 RX ORDER — MORPHINE SULFATE 4 MG/ML
4 INJECTION, SOLUTION INTRAMUSCULAR; INTRAVENOUS
Status: COMPLETED | OUTPATIENT
Start: 2021-08-26 | End: 2021-08-26

## 2021-08-26 RX ORDER — ONDANSETRON 4 MG/1
4 TABLET, FILM COATED ORAL EVERY 8 HOURS PRN
Qty: 30 TABLET | Refills: 0 | Status: SHIPPED | OUTPATIENT
Start: 2021-08-26 | End: 2022-08-01

## 2021-08-26 RX ORDER — SERTRALINE HYDROCHLORIDE 50 MG/1
50 TABLET, FILM COATED ORAL DAILY
Status: DISCONTINUED | OUTPATIENT
Start: 2021-08-27 | End: 2021-08-30 | Stop reason: HOSPADM

## 2021-08-26 RX ORDER — MAGNESIUM SULFATE 1 G/100ML
1 INJECTION INTRAVENOUS
Status: DISPENSED | OUTPATIENT
Start: 2021-08-26 | End: 2021-08-26

## 2021-08-26 RX ORDER — FUROSEMIDE 40 MG/1
40 TABLET ORAL 2 TIMES DAILY
Status: DISCONTINUED | OUTPATIENT
Start: 2021-08-26 | End: 2021-08-30 | Stop reason: HOSPADM

## 2021-08-26 RX ORDER — ONDANSETRON 2 MG/ML
4 INJECTION INTRAMUSCULAR; INTRAVENOUS
Status: COMPLETED | OUTPATIENT
Start: 2021-08-26 | End: 2021-08-26

## 2021-08-26 RX ADMIN — LIDOCAINE HYDROCHLORIDE: 20 SOLUTION ORAL; TOPICAL at 05:08

## 2021-08-26 RX ADMIN — INSULIN ASPART 1 UNITS: 100 INJECTION, SOLUTION INTRAVENOUS; SUBCUTANEOUS at 09:08

## 2021-08-26 RX ADMIN — INSULIN ASPART 2 UNITS: 100 INJECTION, SOLUTION INTRAVENOUS; SUBCUTANEOUS at 04:08

## 2021-08-26 RX ADMIN — MAGNESIUM SULFATE 1 G: 1 INJECTION INTRAVENOUS at 01:08

## 2021-08-26 RX ADMIN — FUROSEMIDE 40 MG: 40 TABLET ORAL at 09:08

## 2021-08-26 RX ADMIN — PANTOPRAZOLE SODIUM 40 MG: 40 TABLET, DELAYED RELEASE ORAL at 06:08

## 2021-08-26 RX ADMIN — Medication 1 TABLET: at 09:08

## 2021-08-26 RX ADMIN — MAGNESIUM SULFATE 1 G: 1 INJECTION INTRAVENOUS at 05:08

## 2021-08-26 RX ADMIN — MAGNESIUM SULFATE 1 G: 1 INJECTION INTRAVENOUS at 03:08

## 2021-08-26 RX ADMIN — MORPHINE SULFATE 4 MG: 4 INJECTION, SOLUTION INTRAMUSCULAR; INTRAVENOUS at 06:08

## 2021-08-26 RX ADMIN — PIPERACILLIN AND TAZOBACTAM 4.5 G: 4; .5 INJECTION, POWDER, FOR SOLUTION INTRAVENOUS at 08:08

## 2021-08-26 RX ADMIN — Medication 1 TABLET: at 08:08

## 2021-08-26 RX ADMIN — SODIUM CHLORIDE, SODIUM LACTATE, POTASSIUM CHLORIDE, AND CALCIUM CHLORIDE 1000 ML: .6; .31; .03; .02 INJECTION, SOLUTION INTRAVENOUS at 07:08

## 2021-08-26 RX ADMIN — ONDANSETRON 4 MG: 2 INJECTION INTRAMUSCULAR; INTRAVENOUS at 06:08

## 2021-08-26 RX ADMIN — INSULIN DETEMIR 5 UNITS: 100 INJECTION, SOLUTION SUBCUTANEOUS at 09:08

## 2021-08-26 RX ADMIN — SULFAMETHOXAZOLE AND TRIMETHOPRIM 1 TABLET: 800; 160 TABLET ORAL at 09:08

## 2021-08-26 RX ADMIN — SODIUM CHLORIDE, SODIUM LACTATE, POTASSIUM CHLORIDE, AND CALCIUM CHLORIDE 1000 ML: .6; .31; .03; .02 INJECTION, SOLUTION INTRAVENOUS at 11:08

## 2021-08-27 PROBLEM — R79.89 ELEVATED TROPONIN: Status: ACTIVE | Noted: 2021-08-27

## 2021-08-27 LAB
ALBUMIN SERPL BCP-MCNC: 3.4 G/DL (ref 3.5–5.2)
ALP SERPL-CCNC: 66 U/L (ref 55–135)
ALT SERPL W/O P-5'-P-CCNC: 21 U/L (ref 10–44)
ANION GAP SERPL CALC-SCNC: 13 MMOL/L (ref 8–16)
APTT BLDCRRT: 24.2 SEC (ref 21–32)
AST SERPL-CCNC: 22 U/L (ref 10–40)
BACTERIA UR CULT: NO GROWTH
BASOPHILS # BLD AUTO: 0.03 K/UL (ref 0–0.2)
BASOPHILS # BLD AUTO: 0.04 K/UL (ref 0–0.2)
BASOPHILS NFR BLD: 0.2 % (ref 0–1.9)
BASOPHILS NFR BLD: 0.3 % (ref 0–1.9)
BILIRUB SERPL-MCNC: 0.4 MG/DL (ref 0.1–1)
BUN SERPL-MCNC: 19 MG/DL (ref 8–23)
CALCIUM SERPL-MCNC: 8.7 MG/DL (ref 8.7–10.5)
CHLORIDE SERPL-SCNC: 98 MMOL/L (ref 95–110)
CO2 SERPL-SCNC: 26 MMOL/L (ref 23–29)
CREAT SERPL-MCNC: 2.5 MG/DL (ref 0.5–1.4)
DIFFERENTIAL METHOD: ABNORMAL
DIFFERENTIAL METHOD: ABNORMAL
EOSINOPHIL # BLD AUTO: 0 K/UL (ref 0–0.5)
EOSINOPHIL # BLD AUTO: 0 K/UL (ref 0–0.5)
EOSINOPHIL NFR BLD: 0.1 % (ref 0–8)
EOSINOPHIL NFR BLD: 0.2 % (ref 0–8)
ERYTHROCYTE [DISTWIDTH] IN BLOOD BY AUTOMATED COUNT: 15.5 % (ref 11.5–14.5)
ERYTHROCYTE [DISTWIDTH] IN BLOOD BY AUTOMATED COUNT: 15.6 % (ref 11.5–14.5)
EST. GFR  (AFRICAN AMERICAN): 22 ML/MIN/1.73 M^2
EST. GFR  (NON AFRICAN AMERICAN): 19 ML/MIN/1.73 M^2
GLUCOSE SERPL-MCNC: 231 MG/DL (ref 70–110)
HCT VFR BLD AUTO: 26.5 % (ref 37–48.5)
HCT VFR BLD AUTO: 28.7 % (ref 37–48.5)
HGB BLD-MCNC: 8.5 G/DL (ref 12–16)
HGB BLD-MCNC: 9.7 G/DL (ref 12–16)
IMM GRANULOCYTES # BLD AUTO: 0.04 K/UL (ref 0–0.04)
IMM GRANULOCYTES # BLD AUTO: 0.07 K/UL (ref 0–0.04)
IMM GRANULOCYTES NFR BLD AUTO: 0.3 % (ref 0–0.5)
IMM GRANULOCYTES NFR BLD AUTO: 0.5 % (ref 0–0.5)
INR PPP: 1.1 (ref 0.8–1.2)
LYMPHOCYTES # BLD AUTO: 2 K/UL (ref 1–4.8)
LYMPHOCYTES # BLD AUTO: 2 K/UL (ref 1–4.8)
LYMPHOCYTES NFR BLD: 15.1 % (ref 18–48)
LYMPHOCYTES NFR BLD: 17.7 % (ref 18–48)
MCH RBC QN AUTO: 29.1 PG (ref 27–31)
MCH RBC QN AUTO: 30.2 PG (ref 27–31)
MCHC RBC AUTO-ENTMCNC: 32.1 G/DL (ref 32–36)
MCHC RBC AUTO-ENTMCNC: 33.8 G/DL (ref 32–36)
MCV RBC AUTO: 89 FL (ref 82–98)
MCV RBC AUTO: 91 FL (ref 82–98)
MONOCYTES # BLD AUTO: 0.6 K/UL (ref 0.3–1)
MONOCYTES # BLD AUTO: 0.7 K/UL (ref 0.3–1)
MONOCYTES NFR BLD: 4.3 % (ref 4–15)
MONOCYTES NFR BLD: 5.6 % (ref 4–15)
NEUTROPHILS # BLD AUTO: 10.6 K/UL (ref 1.8–7.7)
NEUTROPHILS # BLD AUTO: 8.8 K/UL (ref 1.8–7.7)
NEUTROPHILS NFR BLD: 75.9 % (ref 38–73)
NEUTROPHILS NFR BLD: 79.8 % (ref 38–73)
NRBC BLD-RTO: 0 /100 WBC
NRBC BLD-RTO: 0 /100 WBC
PLATELET # BLD AUTO: 225 K/UL (ref 150–450)
PLATELET # BLD AUTO: 247 K/UL (ref 150–450)
PMV BLD AUTO: 10.9 FL (ref 9.2–12.9)
PMV BLD AUTO: 10.9 FL (ref 9.2–12.9)
POCT GLUCOSE: 153 MG/DL (ref 70–110)
POCT GLUCOSE: 203 MG/DL (ref 70–110)
POCT GLUCOSE: 226 MG/DL (ref 70–110)
POCT GLUCOSE: 268 MG/DL (ref 70–110)
POTASSIUM SERPL-SCNC: 3.7 MMOL/L (ref 3.5–5.1)
PROT SERPL-MCNC: 7.1 G/DL (ref 6–8.4)
PROTHROMBIN TIME: 11.1 SEC (ref 9–12.5)
RBC # BLD AUTO: 2.92 M/UL (ref 4–5.4)
RBC # BLD AUTO: 3.21 M/UL (ref 4–5.4)
SODIUM SERPL-SCNC: 137 MMOL/L (ref 136–145)
TROPONIN I SERPL DL<=0.01 NG/ML-MCNC: 0.19 NG/ML (ref 0–0.03)
TROPONIN I SERPL DL<=0.01 NG/ML-MCNC: 0.3 NG/ML (ref 0–0.03)
TROPONIN I SERPL DL<=0.01 NG/ML-MCNC: 0.39 NG/ML (ref 0–0.03)
WBC # BLD AUTO: 11.55 K/UL (ref 3.9–12.7)
WBC # BLD AUTO: 13.27 K/UL (ref 3.9–12.7)

## 2021-08-27 PROCEDURE — 11000001 HC ACUTE MED/SURG PRIVATE ROOM

## 2021-08-27 PROCEDURE — 85610 PROTHROMBIN TIME: CPT | Performed by: STUDENT IN AN ORGANIZED HEALTH CARE EDUCATION/TRAINING PROGRAM

## 2021-08-27 PROCEDURE — 25000003 PHARM REV CODE 250: Performed by: STUDENT IN AN ORGANIZED HEALTH CARE EDUCATION/TRAINING PROGRAM

## 2021-08-27 PROCEDURE — 99222 1ST HOSP IP/OBS MODERATE 55: CPT | Mod: ,,, | Performed by: STUDENT IN AN ORGANIZED HEALTH CARE EDUCATION/TRAINING PROGRAM

## 2021-08-27 PROCEDURE — 63600175 PHARM REV CODE 636 W HCPCS: Performed by: STUDENT IN AN ORGANIZED HEALTH CARE EDUCATION/TRAINING PROGRAM

## 2021-08-27 PROCEDURE — 36415 COLL VENOUS BLD VENIPUNCTURE: CPT | Performed by: STUDENT IN AN ORGANIZED HEALTH CARE EDUCATION/TRAINING PROGRAM

## 2021-08-27 PROCEDURE — 80053 COMPREHEN METABOLIC PANEL: CPT | Performed by: STUDENT IN AN ORGANIZED HEALTH CARE EDUCATION/TRAINING PROGRAM

## 2021-08-27 PROCEDURE — 85025 COMPLETE CBC W/AUTO DIFF WBC: CPT | Performed by: STUDENT IN AN ORGANIZED HEALTH CARE EDUCATION/TRAINING PROGRAM

## 2021-08-27 PROCEDURE — C9399 UNCLASSIFIED DRUGS OR BIOLOG: HCPCS | Performed by: STUDENT IN AN ORGANIZED HEALTH CARE EDUCATION/TRAINING PROGRAM

## 2021-08-27 PROCEDURE — 84484 ASSAY OF TROPONIN QUANT: CPT | Performed by: STUDENT IN AN ORGANIZED HEALTH CARE EDUCATION/TRAINING PROGRAM

## 2021-08-27 PROCEDURE — 93010 ELECTROCARDIOGRAM REPORT: CPT | Mod: 76,,, | Performed by: INTERNAL MEDICINE

## 2021-08-27 PROCEDURE — 84484 ASSAY OF TROPONIN QUANT: CPT | Mod: 91 | Performed by: STUDENT IN AN ORGANIZED HEALTH CARE EDUCATION/TRAINING PROGRAM

## 2021-08-27 PROCEDURE — 85730 THROMBOPLASTIN TIME PARTIAL: CPT | Performed by: STUDENT IN AN ORGANIZED HEALTH CARE EDUCATION/TRAINING PROGRAM

## 2021-08-27 PROCEDURE — 93005 ELECTROCARDIOGRAM TRACING: CPT

## 2021-08-27 PROCEDURE — 93010 EKG 12-LEAD: ICD-10-PCS | Mod: 76,,, | Performed by: INTERNAL MEDICINE

## 2021-08-27 PROCEDURE — 99222 PR INITIAL HOSPITAL CARE,LEVL II: ICD-10-PCS | Mod: ,,, | Performed by: STUDENT IN AN ORGANIZED HEALTH CARE EDUCATION/TRAINING PROGRAM

## 2021-08-27 PROCEDURE — 85025 COMPLETE CBC W/AUTO DIFF WBC: CPT | Mod: 91 | Performed by: STUDENT IN AN ORGANIZED HEALTH CARE EDUCATION/TRAINING PROGRAM

## 2021-08-27 RX ORDER — PANTOPRAZOLE SODIUM 40 MG/1
40 TABLET, DELAYED RELEASE ORAL DAILY
Qty: 30 TABLET | Refills: 11 | Status: SHIPPED | OUTPATIENT
Start: 2021-08-27 | End: 2022-08-01

## 2021-08-27 RX ORDER — SULFAMETHOXAZOLE AND TRIMETHOPRIM 800; 160 MG/1; MG/1
1 TABLET ORAL EVERY OTHER DAY
Status: DISCONTINUED | OUTPATIENT
Start: 2021-08-28 | End: 2021-08-27

## 2021-08-27 RX ORDER — SUCRALFATE 1 G/1
1 TABLET ORAL
Qty: 120 TABLET | Refills: 0 | Status: SHIPPED | OUTPATIENT
Start: 2021-08-27 | End: 2021-08-27 | Stop reason: SDUPTHER

## 2021-08-27 RX ORDER — SULFAMETHOXAZOLE AND TRIMETHOPRIM 800; 160 MG/1; MG/1
1 TABLET ORAL DAILY
Qty: 7 TABLET | Refills: 0 | Status: SHIPPED | OUTPATIENT
Start: 2021-08-27 | End: 2021-09-03

## 2021-08-27 RX ORDER — DICYCLOMINE HYDROCHLORIDE 10 MG/1
10 CAPSULE ORAL
Qty: 120 CAPSULE | Refills: 0 | Status: SHIPPED | OUTPATIENT
Start: 2021-08-27 | End: 2021-09-26

## 2021-08-27 RX ORDER — SUCRALFATE 1 G/1
1 TABLET ORAL
Qty: 120 TABLET | Refills: 0 | Status: SHIPPED | OUTPATIENT
Start: 2021-08-27 | End: 2021-09-26

## 2021-08-27 RX ORDER — DICYCLOMINE HYDROCHLORIDE 10 MG/1
10 CAPSULE ORAL
Qty: 120 CAPSULE | Refills: 0 | Status: SHIPPED | OUTPATIENT
Start: 2021-08-27 | End: 2021-08-27 | Stop reason: SDUPTHER

## 2021-08-27 RX ORDER — HEPARIN SODIUM,PORCINE/D5W 25000/250
0-40 INTRAVENOUS SOLUTION INTRAVENOUS CONTINUOUS
Status: DISCONTINUED | OUTPATIENT
Start: 2021-08-27 | End: 2021-08-28

## 2021-08-27 RX ORDER — SULFAMETHOXAZOLE AND TRIMETHOPRIM 800; 160 MG/1; MG/1
1 TABLET ORAL DAILY
Status: DISCONTINUED | OUTPATIENT
Start: 2021-08-28 | End: 2021-08-30 | Stop reason: HOSPADM

## 2021-08-27 RX ADMIN — Medication 1 TABLET: at 08:08

## 2021-08-27 RX ADMIN — SULFAMETHOXAZOLE AND TRIMETHOPRIM 1 TABLET: 800; 160 TABLET ORAL at 09:08

## 2021-08-27 RX ADMIN — LIDOCAINE HYDROCHLORIDE: 20 SOLUTION ORAL; TOPICAL at 05:08

## 2021-08-27 RX ADMIN — ATORVASTATIN CALCIUM 80 MG: 40 TABLET, FILM COATED ORAL at 09:08

## 2021-08-27 RX ADMIN — FUROSEMIDE 40 MG: 40 TABLET ORAL at 09:08

## 2021-08-27 RX ADMIN — INSULIN DETEMIR 5 UNITS: 100 INJECTION, SOLUTION SUBCUTANEOUS at 08:08

## 2021-08-27 RX ADMIN — PANTOPRAZOLE SODIUM 40 MG: 40 TABLET, DELAYED RELEASE ORAL at 09:08

## 2021-08-27 RX ADMIN — FUROSEMIDE 40 MG: 40 TABLET ORAL at 08:08

## 2021-08-27 RX ADMIN — SERTRALINE HYDROCHLORIDE 50 MG: 50 TABLET ORAL at 09:08

## 2021-08-27 RX ADMIN — TAMSULOSIN HYDROCHLORIDE 0.4 MG: 0.4 CAPSULE ORAL at 09:08

## 2021-08-27 RX ADMIN — METOPROLOL SUCCINATE 50 MG: 50 TABLET, EXTENDED RELEASE ORAL at 09:08

## 2021-08-27 RX ADMIN — HEPARIN SODIUM 12 UNITS/KG/HR: 10000 INJECTION, SOLUTION INTRAVENOUS at 09:08

## 2021-08-27 RX ADMIN — ALLOPURINOL 100 MG: 100 TABLET ORAL at 09:08

## 2021-08-27 RX ADMIN — PROMETHAZINE HYDROCHLORIDE 6.25 MG: 25 INJECTION INTRAMUSCULAR; INTRAVENOUS at 04:08

## 2021-08-27 RX ADMIN — Medication 1 TABLET: at 09:08

## 2021-08-27 RX ADMIN — INSULIN ASPART 2 UNITS: 100 INJECTION, SOLUTION INTRAVENOUS; SUBCUTANEOUS at 08:08

## 2021-08-27 RX ADMIN — ONDANSETRON 8 MG: 8 TABLET, ORALLY DISINTEGRATING ORAL at 03:08

## 2021-08-27 RX ADMIN — AMLODIPINE BESYLATE 5 MG: 5 TABLET ORAL at 09:08

## 2021-08-28 PROBLEM — I45.2 RBBB (RIGHT BUNDLE BRANCH BLOCK WITH LEFT ANTERIOR FASCICULAR BLOCK): Status: ACTIVE | Noted: 2021-08-28

## 2021-08-28 PROBLEM — Z95.5 STENTED CORONARY ARTERY: Status: ACTIVE | Noted: 2017-11-28

## 2021-08-28 PROBLEM — D63.1 ANEMIA DUE TO STAGE 4 CHRONIC KIDNEY DISEASE: Status: ACTIVE | Noted: 2021-08-16

## 2021-08-28 LAB
ALBUMIN SERPL BCP-MCNC: 2.9 G/DL (ref 3.5–5.2)
ALP SERPL-CCNC: 54 U/L (ref 55–135)
ALT SERPL W/O P-5'-P-CCNC: 16 U/L (ref 10–44)
ANION GAP SERPL CALC-SCNC: 11 MMOL/L (ref 8–16)
APTT BLDCRRT: 31.3 SEC (ref 21–32)
APTT BLDCRRT: 53.6 SEC (ref 21–32)
APTT BLDCRRT: 77.6 SEC (ref 21–32)
AST SERPL-CCNC: 20 U/L (ref 10–40)
BASOPHILS # BLD AUTO: 0.06 K/UL (ref 0–0.2)
BASOPHILS # BLD AUTO: 0.06 K/UL (ref 0–0.2)
BASOPHILS NFR BLD: 0.5 % (ref 0–1.9)
BASOPHILS NFR BLD: 0.5 % (ref 0–1.9)
BILIRUB SERPL-MCNC: 0.5 MG/DL (ref 0.1–1)
BUN SERPL-MCNC: 23 MG/DL (ref 8–23)
CALCIUM SERPL-MCNC: 8.6 MG/DL (ref 8.7–10.5)
CHLORIDE SERPL-SCNC: 98 MMOL/L (ref 95–110)
CO2 SERPL-SCNC: 28 MMOL/L (ref 23–29)
CREAT SERPL-MCNC: 3 MG/DL (ref 0.5–1.4)
DIFFERENTIAL METHOD: ABNORMAL
DIFFERENTIAL METHOD: ABNORMAL
EOSINOPHIL # BLD AUTO: 0.2 K/UL (ref 0–0.5)
EOSINOPHIL # BLD AUTO: 0.2 K/UL (ref 0–0.5)
EOSINOPHIL NFR BLD: 1.6 % (ref 0–8)
EOSINOPHIL NFR BLD: 1.6 % (ref 0–8)
ERYTHROCYTE [DISTWIDTH] IN BLOOD BY AUTOMATED COUNT: 15.5 % (ref 11.5–14.5)
ERYTHROCYTE [DISTWIDTH] IN BLOOD BY AUTOMATED COUNT: 15.5 % (ref 11.5–14.5)
EST. GFR  (AFRICAN AMERICAN): 18 ML/MIN/1.73 M^2
EST. GFR  (NON AFRICAN AMERICAN): 15 ML/MIN/1.73 M^2
GLUCOSE SERPL-MCNC: 116 MG/DL (ref 70–110)
HCT VFR BLD AUTO: 26.5 % (ref 37–48.5)
HCT VFR BLD AUTO: 26.5 % (ref 37–48.5)
HGB BLD-MCNC: 8.5 G/DL (ref 12–16)
HGB BLD-MCNC: 8.5 G/DL (ref 12–16)
IMM GRANULOCYTES # BLD AUTO: 0.04 K/UL (ref 0–0.04)
IMM GRANULOCYTES # BLD AUTO: 0.04 K/UL (ref 0–0.04)
IMM GRANULOCYTES NFR BLD AUTO: 0.4 % (ref 0–0.5)
IMM GRANULOCYTES NFR BLD AUTO: 0.4 % (ref 0–0.5)
LYMPHOCYTES # BLD AUTO: 3.8 K/UL (ref 1–4.8)
LYMPHOCYTES # BLD AUTO: 3.8 K/UL (ref 1–4.8)
LYMPHOCYTES NFR BLD: 33.4 % (ref 18–48)
LYMPHOCYTES NFR BLD: 33.4 % (ref 18–48)
MAGNESIUM SERPL-MCNC: 1.7 MG/DL (ref 1.6–2.6)
MCH RBC QN AUTO: 29.3 PG (ref 27–31)
MCH RBC QN AUTO: 29.3 PG (ref 27–31)
MCHC RBC AUTO-ENTMCNC: 32.1 G/DL (ref 32–36)
MCHC RBC AUTO-ENTMCNC: 32.1 G/DL (ref 32–36)
MCV RBC AUTO: 91 FL (ref 82–98)
MCV RBC AUTO: 91 FL (ref 82–98)
MONOCYTES # BLD AUTO: 0.9 K/UL (ref 0.3–1)
MONOCYTES # BLD AUTO: 0.9 K/UL (ref 0.3–1)
MONOCYTES NFR BLD: 7.9 % (ref 4–15)
MONOCYTES NFR BLD: 7.9 % (ref 4–15)
NEUTROPHILS # BLD AUTO: 6.4 K/UL (ref 1.8–7.7)
NEUTROPHILS # BLD AUTO: 6.4 K/UL (ref 1.8–7.7)
NEUTROPHILS NFR BLD: 56.2 % (ref 38–73)
NEUTROPHILS NFR BLD: 56.2 % (ref 38–73)
NRBC BLD-RTO: 0 /100 WBC
NRBC BLD-RTO: 0 /100 WBC
PHOSPHATE SERPL-MCNC: 3.4 MG/DL (ref 2.7–4.5)
PLATELET # BLD AUTO: 219 K/UL (ref 150–450)
PLATELET # BLD AUTO: 219 K/UL (ref 150–450)
PMV BLD AUTO: 11.4 FL (ref 9.2–12.9)
PMV BLD AUTO: 11.4 FL (ref 9.2–12.9)
POCT GLUCOSE: 132 MG/DL (ref 70–110)
POCT GLUCOSE: 194 MG/DL (ref 70–110)
POCT GLUCOSE: 220 MG/DL (ref 70–110)
POCT GLUCOSE: 238 MG/DL (ref 70–110)
POCT GLUCOSE: 285 MG/DL (ref 70–110)
POTASSIUM SERPL-SCNC: 3.4 MMOL/L (ref 3.5–5.1)
PROT SERPL-MCNC: 6 G/DL (ref 6–8.4)
RBC # BLD AUTO: 2.9 M/UL (ref 4–5.4)
RBC # BLD AUTO: 2.9 M/UL (ref 4–5.4)
SODIUM SERPL-SCNC: 137 MMOL/L (ref 136–145)
TROPONIN I SERPL DL<=0.01 NG/ML-MCNC: 0.19 NG/ML (ref 0–0.03)
TROPONIN I SERPL DL<=0.01 NG/ML-MCNC: 0.28 NG/ML (ref 0–0.03)
TROPONIN I SERPL DL<=0.01 NG/ML-MCNC: 0.39 NG/ML (ref 0–0.03)
WBC # BLD AUTO: 11.33 K/UL (ref 3.9–12.7)
WBC # BLD AUTO: 11.33 K/UL (ref 3.9–12.7)

## 2021-08-28 PROCEDURE — 36415 COLL VENOUS BLD VENIPUNCTURE: CPT | Performed by: STUDENT IN AN ORGANIZED HEALTH CARE EDUCATION/TRAINING PROGRAM

## 2021-08-28 PROCEDURE — 63600175 PHARM REV CODE 636 W HCPCS: Performed by: STUDENT IN AN ORGANIZED HEALTH CARE EDUCATION/TRAINING PROGRAM

## 2021-08-28 PROCEDURE — 25000003 PHARM REV CODE 250: Performed by: STUDENT IN AN ORGANIZED HEALTH CARE EDUCATION/TRAINING PROGRAM

## 2021-08-28 PROCEDURE — 93010 ELECTROCARDIOGRAM REPORT: CPT | Mod: ,,, | Performed by: INTERNAL MEDICINE

## 2021-08-28 PROCEDURE — 93005 ELECTROCARDIOGRAM TRACING: CPT

## 2021-08-28 PROCEDURE — 84100 ASSAY OF PHOSPHORUS: CPT | Performed by: STUDENT IN AN ORGANIZED HEALTH CARE EDUCATION/TRAINING PROGRAM

## 2021-08-28 PROCEDURE — 85730 THROMBOPLASTIN TIME PARTIAL: CPT | Mod: 91 | Performed by: STUDENT IN AN ORGANIZED HEALTH CARE EDUCATION/TRAINING PROGRAM

## 2021-08-28 PROCEDURE — 83735 ASSAY OF MAGNESIUM: CPT | Performed by: STUDENT IN AN ORGANIZED HEALTH CARE EDUCATION/TRAINING PROGRAM

## 2021-08-28 PROCEDURE — 80053 COMPREHEN METABOLIC PANEL: CPT | Performed by: STUDENT IN AN ORGANIZED HEALTH CARE EDUCATION/TRAINING PROGRAM

## 2021-08-28 PROCEDURE — 93010 EKG 12-LEAD: ICD-10-PCS | Mod: ,,, | Performed by: INTERNAL MEDICINE

## 2021-08-28 PROCEDURE — 84484 ASSAY OF TROPONIN QUANT: CPT | Mod: 91 | Performed by: STUDENT IN AN ORGANIZED HEALTH CARE EDUCATION/TRAINING PROGRAM

## 2021-08-28 PROCEDURE — 11000001 HC ACUTE MED/SURG PRIVATE ROOM

## 2021-08-28 PROCEDURE — 84484 ASSAY OF TROPONIN QUANT: CPT | Performed by: STUDENT IN AN ORGANIZED HEALTH CARE EDUCATION/TRAINING PROGRAM

## 2021-08-28 PROCEDURE — 85025 COMPLETE CBC W/AUTO DIFF WBC: CPT | Performed by: STUDENT IN AN ORGANIZED HEALTH CARE EDUCATION/TRAINING PROGRAM

## 2021-08-28 RX ORDER — DILTIAZEM HCL/D5W 125 MG/125
5 PLASTIC BAG, INJECTION (ML) INTRAVENOUS CONTINUOUS
Status: DISCONTINUED | OUTPATIENT
Start: 2021-08-28 | End: 2021-08-29

## 2021-08-28 RX ORDER — ACETAMINOPHEN 325 MG/1
650 TABLET ORAL EVERY 8 HOURS PRN
Status: DISCONTINUED | OUTPATIENT
Start: 2021-08-28 | End: 2021-08-30 | Stop reason: HOSPADM

## 2021-08-28 RX ORDER — DILTIAZEM HYDROCHLORIDE 5 MG/ML
15 INJECTION INTRAVENOUS ONCE
Status: DISCONTINUED | OUTPATIENT
Start: 2021-08-28 | End: 2021-08-28

## 2021-08-28 RX ORDER — DIGOXIN 0.25 MG/ML
250 INJECTION INTRAMUSCULAR; INTRAVENOUS ONCE
Status: COMPLETED | OUTPATIENT
Start: 2021-08-28 | End: 2021-08-28

## 2021-08-28 RX ORDER — ONDANSETRON 2 MG/ML
4 INJECTION INTRAMUSCULAR; INTRAVENOUS ONCE
Status: COMPLETED | OUTPATIENT
Start: 2021-08-28 | End: 2021-08-28

## 2021-08-28 RX ORDER — DICYCLOMINE HYDROCHLORIDE 10 MG/1
10 CAPSULE ORAL 4 TIMES DAILY
Status: DISCONTINUED | OUTPATIENT
Start: 2021-08-28 | End: 2021-08-30 | Stop reason: HOSPADM

## 2021-08-28 RX ORDER — LABETALOL HYDROCHLORIDE 5 MG/ML
10 INJECTION, SOLUTION INTRAVENOUS ONCE
Status: CANCELLED | OUTPATIENT
Start: 2021-08-28

## 2021-08-28 RX ORDER — DILTIAZEM HYDROCHLORIDE 5 MG/ML
10 INJECTION INTRAVENOUS ONCE
Status: COMPLETED | OUTPATIENT
Start: 2021-08-28 | End: 2021-08-28

## 2021-08-28 RX ORDER — POTASSIUM CHLORIDE 20 MEQ/1
40 TABLET, EXTENDED RELEASE ORAL ONCE
Status: COMPLETED | OUTPATIENT
Start: 2021-08-28 | End: 2021-08-28

## 2021-08-28 RX ORDER — ONDANSETRON 2 MG/ML
8 INJECTION INTRAMUSCULAR; INTRAVENOUS EVERY 6 HOURS PRN
Status: DISCONTINUED | OUTPATIENT
Start: 2021-08-28 | End: 2021-08-30 | Stop reason: HOSPADM

## 2021-08-28 RX ORDER — HYDRALAZINE HYDROCHLORIDE 20 MG/ML
10 INJECTION INTRAMUSCULAR; INTRAVENOUS EVERY 8 HOURS PRN
Status: DISCONTINUED | OUTPATIENT
Start: 2021-08-28 | End: 2021-08-30 | Stop reason: HOSPADM

## 2021-08-28 RX ADMIN — Medication 1 TABLET: at 08:08

## 2021-08-28 RX ADMIN — HEPARIN SODIUM 12 UNITS/KG/HR: 10000 INJECTION, SOLUTION INTRAVENOUS at 08:08

## 2021-08-28 RX ADMIN — SERTRALINE HYDROCHLORIDE 50 MG: 50 TABLET ORAL at 09:08

## 2021-08-28 RX ADMIN — AMLODIPINE BESYLATE 5 MG: 5 TABLET ORAL at 09:08

## 2021-08-28 RX ADMIN — POTASSIUM CHLORIDE 40 MEQ: 1500 TABLET, EXTENDED RELEASE ORAL at 09:08

## 2021-08-28 RX ADMIN — SULFAMETHOXAZOLE AND TRIMETHOPRIM 1 TABLET: 800; 160 TABLET ORAL at 09:08

## 2021-08-28 RX ADMIN — HEPARIN SODIUM 15 UNITS/KG/HR: 10000 INJECTION, SOLUTION INTRAVENOUS at 06:08

## 2021-08-28 RX ADMIN — PANTOPRAZOLE SODIUM 40 MG: 40 TABLET, DELAYED RELEASE ORAL at 09:08

## 2021-08-28 RX ADMIN — DILTIAZEM HYDROCHLORIDE 5 MG/HR: 5 INJECTION INTRAVENOUS at 10:08

## 2021-08-28 RX ADMIN — INSULIN DETEMIR 8 UNITS: 100 INJECTION, SOLUTION SUBCUTANEOUS at 08:08

## 2021-08-28 RX ADMIN — DILTIAZEM HYDROCHLORIDE 10 MG: 5 INJECTION INTRAVENOUS at 07:08

## 2021-08-28 RX ADMIN — DICYCLOMINE HYDROCHLORIDE 10 MG: 10 CAPSULE ORAL at 08:08

## 2021-08-28 RX ADMIN — ONDANSETRON 4 MG: 2 INJECTION INTRAMUSCULAR; INTRAVENOUS at 10:08

## 2021-08-28 RX ADMIN — DIGOXIN 250 MCG: 0.25 INJECTION INTRAMUSCULAR; INTRAVENOUS at 12:08

## 2021-08-28 RX ADMIN — TAMSULOSIN HYDROCHLORIDE 0.4 MG: 0.4 CAPSULE ORAL at 09:08

## 2021-08-28 RX ADMIN — FUROSEMIDE 40 MG: 40 TABLET ORAL at 08:08

## 2021-08-28 RX ADMIN — METOPROLOL SUCCINATE 50 MG: 50 TABLET, EXTENDED RELEASE ORAL at 09:08

## 2021-08-28 RX ADMIN — FUROSEMIDE 40 MG: 40 TABLET ORAL at 09:08

## 2021-08-28 RX ADMIN — ATORVASTATIN CALCIUM 80 MG: 40 TABLET, FILM COATED ORAL at 09:08

## 2021-08-28 RX ADMIN — ALLOPURINOL 100 MG: 100 TABLET ORAL at 09:08

## 2021-08-28 RX ADMIN — INSULIN ASPART 6 UNITS: 100 INJECTION, SOLUTION INTRAVENOUS; SUBCUTANEOUS at 05:08

## 2021-08-28 RX ADMIN — PROMETHAZINE HYDROCHLORIDE 12.5 MG: 25 INJECTION INTRAMUSCULAR; INTRAVENOUS at 05:08

## 2021-08-29 LAB
ALBUMIN SERPL BCP-MCNC: 3.3 G/DL (ref 3.5–5.2)
ALP SERPL-CCNC: 67 U/L (ref 55–135)
ALT SERPL W/O P-5'-P-CCNC: 18 U/L (ref 10–44)
ANION GAP SERPL CALC-SCNC: 11 MMOL/L (ref 8–16)
APTT BLDCRRT: 23.8 SEC (ref 21–32)
AST SERPL-CCNC: 21 U/L (ref 10–40)
BASOPHILS # BLD AUTO: 0.03 K/UL (ref 0–0.2)
BASOPHILS NFR BLD: 0.2 % (ref 0–1.9)
BILIRUB SERPL-MCNC: 0.5 MG/DL (ref 0.1–1)
BUN SERPL-MCNC: 22 MG/DL (ref 8–23)
CALCIUM SERPL-MCNC: 8.4 MG/DL (ref 8.7–10.5)
CHLORIDE SERPL-SCNC: 96 MMOL/L (ref 95–110)
CO2 SERPL-SCNC: 30 MMOL/L (ref 23–29)
CREAT SERPL-MCNC: 3 MG/DL (ref 0.5–1.4)
DIFFERENTIAL METHOD: ABNORMAL
EOSINOPHIL # BLD AUTO: 0 K/UL (ref 0–0.5)
EOSINOPHIL NFR BLD: 0 % (ref 0–8)
ERYTHROCYTE [DISTWIDTH] IN BLOOD BY AUTOMATED COUNT: 15 % (ref 11.5–14.5)
EST. GFR  (AFRICAN AMERICAN): 18 ML/MIN/1.73 M^2
EST. GFR  (NON AFRICAN AMERICAN): 15 ML/MIN/1.73 M^2
GLUCOSE SERPL-MCNC: 190 MG/DL (ref 70–110)
HCT VFR BLD AUTO: 32.7 % (ref 37–48.5)
HGB BLD-MCNC: 10.5 G/DL (ref 12–16)
IMM GRANULOCYTES # BLD AUTO: 0.06 K/UL (ref 0–0.04)
IMM GRANULOCYTES NFR BLD AUTO: 0.4 % (ref 0–0.5)
LYMPHOCYTES # BLD AUTO: 2 K/UL (ref 1–4.8)
LYMPHOCYTES NFR BLD: 13.1 % (ref 18–48)
MAGNESIUM SERPL-MCNC: 1.6 MG/DL (ref 1.6–2.6)
MCH RBC QN AUTO: 28.8 PG (ref 27–31)
MCHC RBC AUTO-ENTMCNC: 32.1 G/DL (ref 32–36)
MCV RBC AUTO: 90 FL (ref 82–98)
MONOCYTES # BLD AUTO: 1 K/UL (ref 0.3–1)
MONOCYTES NFR BLD: 6.5 % (ref 4–15)
NEUTROPHILS # BLD AUTO: 12.2 K/UL (ref 1.8–7.7)
NEUTROPHILS NFR BLD: 79.8 % (ref 38–73)
NRBC BLD-RTO: 0 /100 WBC
PHOSPHATE SERPL-MCNC: 3.4 MG/DL (ref 2.7–4.5)
PLATELET # BLD AUTO: 270 K/UL (ref 150–450)
PMV BLD AUTO: 10.9 FL (ref 9.2–12.9)
POCT GLUCOSE: 179 MG/DL (ref 70–110)
POCT GLUCOSE: 208 MG/DL (ref 70–110)
POCT GLUCOSE: 215 MG/DL (ref 70–110)
POCT GLUCOSE: 225 MG/DL (ref 70–110)
POCT GLUCOSE: 247 MG/DL (ref 70–110)
POTASSIUM SERPL-SCNC: 3.6 MMOL/L (ref 3.5–5.1)
PROT SERPL-MCNC: 7.1 G/DL (ref 6–8.4)
RBC # BLD AUTO: 3.64 M/UL (ref 4–5.4)
SODIUM SERPL-SCNC: 137 MMOL/L (ref 136–145)
WBC # BLD AUTO: 15.29 K/UL (ref 3.9–12.7)

## 2021-08-29 PROCEDURE — 36415 COLL VENOUS BLD VENIPUNCTURE: CPT | Performed by: STUDENT IN AN ORGANIZED HEALTH CARE EDUCATION/TRAINING PROGRAM

## 2021-08-29 PROCEDURE — 25000003 PHARM REV CODE 250: Performed by: STUDENT IN AN ORGANIZED HEALTH CARE EDUCATION/TRAINING PROGRAM

## 2021-08-29 PROCEDURE — C9399 UNCLASSIFIED DRUGS OR BIOLOG: HCPCS | Performed by: STUDENT IN AN ORGANIZED HEALTH CARE EDUCATION/TRAINING PROGRAM

## 2021-08-29 PROCEDURE — 80053 COMPREHEN METABOLIC PANEL: CPT | Performed by: STUDENT IN AN ORGANIZED HEALTH CARE EDUCATION/TRAINING PROGRAM

## 2021-08-29 PROCEDURE — 85730 THROMBOPLASTIN TIME PARTIAL: CPT | Performed by: STUDENT IN AN ORGANIZED HEALTH CARE EDUCATION/TRAINING PROGRAM

## 2021-08-29 PROCEDURE — 85025 COMPLETE CBC W/AUTO DIFF WBC: CPT | Performed by: STUDENT IN AN ORGANIZED HEALTH CARE EDUCATION/TRAINING PROGRAM

## 2021-08-29 PROCEDURE — 84100 ASSAY OF PHOSPHORUS: CPT | Performed by: STUDENT IN AN ORGANIZED HEALTH CARE EDUCATION/TRAINING PROGRAM

## 2021-08-29 PROCEDURE — 11000001 HC ACUTE MED/SURG PRIVATE ROOM

## 2021-08-29 PROCEDURE — 83735 ASSAY OF MAGNESIUM: CPT | Performed by: STUDENT IN AN ORGANIZED HEALTH CARE EDUCATION/TRAINING PROGRAM

## 2021-08-29 PROCEDURE — 63600175 PHARM REV CODE 636 W HCPCS: Performed by: STUDENT IN AN ORGANIZED HEALTH CARE EDUCATION/TRAINING PROGRAM

## 2021-08-29 RX ORDER — SODIUM CHLORIDE, SODIUM LACTATE, POTASSIUM CHLORIDE, CALCIUM CHLORIDE 600; 310; 30; 20 MG/100ML; MG/100ML; MG/100ML; MG/100ML
INJECTION, SOLUTION INTRAVENOUS CONTINUOUS
Status: ACTIVE | OUTPATIENT
Start: 2021-08-29 | End: 2021-08-29

## 2021-08-29 RX ORDER — METOPROLOL SUCCINATE 50 MG/1
100 TABLET, EXTENDED RELEASE ORAL DAILY
Status: DISCONTINUED | OUTPATIENT
Start: 2021-08-29 | End: 2021-08-30 | Stop reason: HOSPADM

## 2021-08-29 RX ADMIN — SODIUM CHLORIDE, SODIUM LACTATE, POTASSIUM CHLORIDE, AND CALCIUM CHLORIDE: .6; .31; .03; .02 INJECTION, SOLUTION INTRAVENOUS at 08:08

## 2021-08-29 RX ADMIN — AMLODIPINE BESYLATE 5 MG: 5 TABLET ORAL at 08:08

## 2021-08-29 RX ADMIN — METOPROLOL SUCCINATE 100 MG: 50 TABLET, EXTENDED RELEASE ORAL at 08:08

## 2021-08-29 RX ADMIN — FUROSEMIDE 40 MG: 40 TABLET ORAL at 08:08

## 2021-08-29 RX ADMIN — DICYCLOMINE HYDROCHLORIDE 10 MG: 10 CAPSULE ORAL at 12:08

## 2021-08-29 RX ADMIN — SERTRALINE HYDROCHLORIDE 50 MG: 50 TABLET ORAL at 08:08

## 2021-08-29 RX ADMIN — INSULIN ASPART 4 UNITS: 100 INJECTION, SOLUTION INTRAVENOUS; SUBCUTANEOUS at 12:08

## 2021-08-29 RX ADMIN — Medication 1 TABLET: at 08:08

## 2021-08-29 RX ADMIN — ALLOPURINOL 100 MG: 100 TABLET ORAL at 08:08

## 2021-08-29 RX ADMIN — INSULIN ASPART 4 UNITS: 100 INJECTION, SOLUTION INTRAVENOUS; SUBCUTANEOUS at 08:08

## 2021-08-29 RX ADMIN — TAMSULOSIN HYDROCHLORIDE 0.4 MG: 0.4 CAPSULE ORAL at 08:08

## 2021-08-29 RX ADMIN — ATORVASTATIN CALCIUM 80 MG: 40 TABLET, FILM COATED ORAL at 08:08

## 2021-08-29 RX ADMIN — DICYCLOMINE HYDROCHLORIDE 10 MG: 10 CAPSULE ORAL at 08:08

## 2021-08-29 RX ADMIN — INSULIN DETEMIR 8 UNITS: 100 INJECTION, SOLUTION SUBCUTANEOUS at 08:08

## 2021-08-29 RX ADMIN — DICYCLOMINE HYDROCHLORIDE 10 MG: 10 CAPSULE ORAL at 04:08

## 2021-08-29 RX ADMIN — PANTOPRAZOLE SODIUM 40 MG: 40 TABLET, DELAYED RELEASE ORAL at 08:08

## 2021-08-29 RX ADMIN — INSULIN ASPART 4 UNITS: 100 INJECTION, SOLUTION INTRAVENOUS; SUBCUTANEOUS at 04:08

## 2021-08-30 VITALS
SYSTOLIC BLOOD PRESSURE: 139 MMHG | OXYGEN SATURATION: 97 % | HEIGHT: 63 IN | DIASTOLIC BLOOD PRESSURE: 84 MMHG | TEMPERATURE: 99 F | WEIGHT: 167 LBS | HEART RATE: 98 BPM | BODY MASS INDEX: 29.59 KG/M2 | RESPIRATION RATE: 18 BRPM

## 2021-08-30 LAB
ALBUMIN SERPL BCP-MCNC: 3.1 G/DL (ref 3.5–5.2)
ALP SERPL-CCNC: 56 U/L (ref 55–135)
ALT SERPL W/O P-5'-P-CCNC: 16 U/L (ref 10–44)
ANION GAP SERPL CALC-SCNC: 12 MMOL/L (ref 8–16)
AST SERPL-CCNC: 19 U/L (ref 10–40)
BASOPHILS # BLD AUTO: 0.03 K/UL (ref 0–0.2)
BASOPHILS NFR BLD: 0.3 % (ref 0–1.9)
BILIRUB SERPL-MCNC: 0.6 MG/DL (ref 0.1–1)
BUN SERPL-MCNC: 20 MG/DL (ref 8–23)
CALCIUM SERPL-MCNC: 9.2 MG/DL (ref 8.7–10.5)
CHLORIDE SERPL-SCNC: 93 MMOL/L (ref 95–110)
CO2 SERPL-SCNC: 28 MMOL/L (ref 23–29)
CREAT SERPL-MCNC: 2.5 MG/DL (ref 0.5–1.4)
DIFFERENTIAL METHOD: ABNORMAL
EOSINOPHIL # BLD AUTO: 0 K/UL (ref 0–0.5)
EOSINOPHIL NFR BLD: 0.4 % (ref 0–8)
ERYTHROCYTE [DISTWIDTH] IN BLOOD BY AUTOMATED COUNT: 14.9 % (ref 11.5–14.5)
EST. GFR  (AFRICAN AMERICAN): 22.1 ML/MIN/1.73 M^2
EST. GFR  (NON AFRICAN AMERICAN): 19.2 ML/MIN/1.73 M^2
GLUCOSE SERPL-MCNC: 162 MG/DL (ref 70–110)
HCT VFR BLD AUTO: 30 % (ref 37–48.5)
HGB BLD-MCNC: 10 G/DL (ref 12–16)
IMM GRANULOCYTES # BLD AUTO: 0.03 K/UL (ref 0–0.04)
IMM GRANULOCYTES NFR BLD AUTO: 0.3 % (ref 0–0.5)
LYMPHOCYTES # BLD AUTO: 2 K/UL (ref 1–4.8)
LYMPHOCYTES NFR BLD: 22 % (ref 18–48)
MAGNESIUM SERPL-MCNC: 1.4 MG/DL (ref 1.6–2.6)
MCH RBC QN AUTO: 29.9 PG (ref 27–31)
MCHC RBC AUTO-ENTMCNC: 33.3 G/DL (ref 32–36)
MCV RBC AUTO: 90 FL (ref 82–98)
MONOCYTES # BLD AUTO: 0.7 K/UL (ref 0.3–1)
MONOCYTES NFR BLD: 7.4 % (ref 4–15)
NEUTROPHILS # BLD AUTO: 6.3 K/UL (ref 1.8–7.7)
NEUTROPHILS NFR BLD: 69.6 % (ref 38–73)
NRBC BLD-RTO: 0 /100 WBC
PHOSPHATE SERPL-MCNC: 3.8 MG/DL (ref 2.7–4.5)
PLATELET # BLD AUTO: 251 K/UL (ref 150–450)
PMV BLD AUTO: 10.8 FL (ref 9.2–12.9)
POCT GLUCOSE: 166 MG/DL (ref 70–110)
POCT GLUCOSE: 220 MG/DL (ref 70–110)
POCT GLUCOSE: 241 MG/DL (ref 70–110)
POTASSIUM SERPL-SCNC: 3.1 MMOL/L (ref 3.5–5.1)
PROT SERPL-MCNC: 6.5 G/DL (ref 6–8.4)
RBC # BLD AUTO: 3.35 M/UL (ref 4–5.4)
SODIUM SERPL-SCNC: 133 MMOL/L (ref 136–145)
WBC # BLD AUTO: 9.04 K/UL (ref 3.9–12.7)

## 2021-08-30 PROCEDURE — 25000003 PHARM REV CODE 250: Performed by: STUDENT IN AN ORGANIZED HEALTH CARE EDUCATION/TRAINING PROGRAM

## 2021-08-30 PROCEDURE — 36415 COLL VENOUS BLD VENIPUNCTURE: CPT | Performed by: STUDENT IN AN ORGANIZED HEALTH CARE EDUCATION/TRAINING PROGRAM

## 2021-08-30 PROCEDURE — 83735 ASSAY OF MAGNESIUM: CPT | Performed by: STUDENT IN AN ORGANIZED HEALTH CARE EDUCATION/TRAINING PROGRAM

## 2021-08-30 PROCEDURE — 84100 ASSAY OF PHOSPHORUS: CPT | Performed by: STUDENT IN AN ORGANIZED HEALTH CARE EDUCATION/TRAINING PROGRAM

## 2021-08-30 PROCEDURE — 85025 COMPLETE CBC W/AUTO DIFF WBC: CPT | Performed by: STUDENT IN AN ORGANIZED HEALTH CARE EDUCATION/TRAINING PROGRAM

## 2021-08-30 PROCEDURE — 80053 COMPREHEN METABOLIC PANEL: CPT | Performed by: STUDENT IN AN ORGANIZED HEALTH CARE EDUCATION/TRAINING PROGRAM

## 2021-08-30 RX ORDER — POTASSIUM CHLORIDE 20 MEQ/1
40 TABLET, EXTENDED RELEASE ORAL EVERY 4 HOURS
Status: DISCONTINUED | OUTPATIENT
Start: 2021-08-30 | End: 2021-08-30 | Stop reason: HOSPADM

## 2021-08-30 RX ORDER — METOPROLOL SUCCINATE 100 MG/1
100 TABLET, EXTENDED RELEASE ORAL DAILY
Qty: 90 TABLET | Refills: 0 | Status: SHIPPED | OUTPATIENT
Start: 2021-08-31 | End: 2021-08-30

## 2021-08-30 RX ORDER — METOPROLOL SUCCINATE 100 MG/1
100 TABLET, EXTENDED RELEASE ORAL DAILY
Qty: 3 TABLET | Refills: 0 | Status: SHIPPED | OUTPATIENT
Start: 2021-08-31 | End: 2021-10-20 | Stop reason: SDUPTHER

## 2021-08-30 RX ADMIN — INSULIN ASPART 4 UNITS: 100 INJECTION, SOLUTION INTRAVENOUS; SUBCUTANEOUS at 11:08

## 2021-08-30 RX ADMIN — AMLODIPINE BESYLATE 5 MG: 5 TABLET ORAL at 08:08

## 2021-08-30 RX ADMIN — FUROSEMIDE 40 MG: 40 TABLET ORAL at 08:08

## 2021-08-30 RX ADMIN — DICYCLOMINE HYDROCHLORIDE 10 MG: 10 CAPSULE ORAL at 12:08

## 2021-08-30 RX ADMIN — SULFAMETHOXAZOLE AND TRIMETHOPRIM 1 TABLET: 800; 160 TABLET ORAL at 08:08

## 2021-08-30 RX ADMIN — PANTOPRAZOLE SODIUM 40 MG: 40 TABLET, DELAYED RELEASE ORAL at 08:08

## 2021-08-30 RX ADMIN — APIXABAN 5 MG: 5 TABLET, FILM COATED ORAL at 08:08

## 2021-08-30 RX ADMIN — METOPROLOL SUCCINATE 100 MG: 50 TABLET, EXTENDED RELEASE ORAL at 08:08

## 2021-08-30 RX ADMIN — ATORVASTATIN CALCIUM 80 MG: 40 TABLET, FILM COATED ORAL at 08:08

## 2021-08-30 RX ADMIN — SERTRALINE HYDROCHLORIDE 50 MG: 50 TABLET ORAL at 08:08

## 2021-08-30 RX ADMIN — Medication 1 TABLET: at 08:08

## 2021-08-30 RX ADMIN — DICYCLOMINE HYDROCHLORIDE 10 MG: 10 CAPSULE ORAL at 08:08

## 2021-08-30 RX ADMIN — POTASSIUM CHLORIDE 40 MEQ: 1500 TABLET, EXTENDED RELEASE ORAL at 02:08

## 2021-08-30 RX ADMIN — INSULIN ASPART 2 UNITS: 100 INJECTION, SOLUTION INTRAVENOUS; SUBCUTANEOUS at 07:08

## 2021-08-30 RX ADMIN — TAMSULOSIN HYDROCHLORIDE 0.4 MG: 0.4 CAPSULE ORAL at 08:08

## 2021-09-03 ENCOUNTER — TELEPHONE (OUTPATIENT)
Dept: NEPHROLOGY | Facility: CLINIC | Age: 71
End: 2021-09-03

## 2021-09-03 DIAGNOSIS — I10 ESSENTIAL HYPERTENSION: Primary | ICD-10-CM

## 2021-09-03 DIAGNOSIS — E11.65 TYPE 2 DIABETES MELLITUS WITH HYPERGLYCEMIA, WITHOUT LONG-TERM CURRENT USE OF INSULIN: ICD-10-CM

## 2021-09-03 DIAGNOSIS — N18.4 CKD (CHRONIC KIDNEY DISEASE) STAGE 4, GFR 15-29 ML/MIN: ICD-10-CM

## 2021-09-03 DIAGNOSIS — N17.9 ACUTE RENAL FAILURE, UNSPECIFIED ACUTE RENAL FAILURE TYPE: ICD-10-CM

## 2021-09-21 ENCOUNTER — OFFICE VISIT (OUTPATIENT)
Dept: GASTROENTEROLOGY | Facility: CLINIC | Age: 71
End: 2021-09-21
Payer: MEDICARE

## 2021-09-21 ENCOUNTER — PROCEDURE VISIT (OUTPATIENT)
Dept: UROLOGY | Facility: CLINIC | Age: 71
End: 2021-09-21
Payer: MEDICARE

## 2021-09-21 VITALS — BODY MASS INDEX: 29.18 KG/M2 | WEIGHT: 164.69 LBS | HEIGHT: 63 IN

## 2021-09-21 DIAGNOSIS — R10.13 EPIGASTRIC PAIN: Primary | ICD-10-CM

## 2021-09-21 DIAGNOSIS — R11.2 NAUSEA AND VOMITING, INTRACTABILITY OF VOMITING NOT SPECIFIED, UNSPECIFIED VOMITING TYPE: ICD-10-CM

## 2021-09-21 DIAGNOSIS — K80.20 CALCULUS OF CYSTIC DUCT: ICD-10-CM

## 2021-09-21 DIAGNOSIS — Q62.11 HYDRONEPHROSIS WITH URETEROPELVIC JUNCTION (UPJ) OBSTRUCTION: ICD-10-CM

## 2021-09-21 DIAGNOSIS — N20.0 NEPHROLITHIASIS: ICD-10-CM

## 2021-09-21 PROCEDURE — 3066F NEPHROPATHY DOC TX: CPT | Mod: CPTII,S$GLB,, | Performed by: NURSE PRACTITIONER

## 2021-09-21 PROCEDURE — 1111F DSCHRG MED/CURRENT MED MERGE: CPT | Mod: CPTII,S$GLB,, | Performed by: NURSE PRACTITIONER

## 2021-09-21 PROCEDURE — 1126F AMNT PAIN NOTED NONE PRSNT: CPT | Mod: CPTII,S$GLB,, | Performed by: NURSE PRACTITIONER

## 2021-09-21 PROCEDURE — 3008F PR BODY MASS INDEX (BMI) DOCUMENTED: ICD-10-PCS | Mod: CPTII,S$GLB,, | Performed by: NURSE PRACTITIONER

## 2021-09-21 PROCEDURE — 1111F PR DISCHARGE MEDS RECONCILED W/ CURRENT OUTPATIENT MED LIST: ICD-10-PCS | Mod: CPTII,S$GLB,, | Performed by: NURSE PRACTITIONER

## 2021-09-21 PROCEDURE — 99999 PR PBB SHADOW E&M-EST. PATIENT-LVL III: ICD-10-PCS | Mod: PBBFAC,,, | Performed by: NURSE PRACTITIONER

## 2021-09-21 PROCEDURE — 3288F FALL RISK ASSESSMENT DOCD: CPT | Mod: CPTII,S$GLB,, | Performed by: NURSE PRACTITIONER

## 2021-09-21 PROCEDURE — 3051F HG A1C>EQUAL 7.0%<8.0%: CPT | Mod: CPTII,S$GLB,, | Performed by: NURSE PRACTITIONER

## 2021-09-21 PROCEDURE — 52310 PR CYSTOSCOPY,REMV CALCULUS,SIMPLE: ICD-10-PCS | Mod: S$GLB,,, | Performed by: STUDENT IN AN ORGANIZED HEALTH CARE EDUCATION/TRAINING PROGRAM

## 2021-09-21 PROCEDURE — 3066F PR DOCUMENTATION OF TREATMENT FOR NEPHROPATHY: ICD-10-PCS | Mod: CPTII,S$GLB,, | Performed by: NURSE PRACTITIONER

## 2021-09-21 PROCEDURE — 3051F PR MOST RECENT HEMOGLOBIN A1C LEVEL 7.0 - < 8.0%: ICD-10-PCS | Mod: CPTII,S$GLB,, | Performed by: NURSE PRACTITIONER

## 2021-09-21 PROCEDURE — 52310 CYSTOSCOPY AND TREATMENT: CPT | Mod: S$GLB,,, | Performed by: STUDENT IN AN ORGANIZED HEALTH CARE EDUCATION/TRAINING PROGRAM

## 2021-09-21 PROCEDURE — 99213 OFFICE O/P EST LOW 20 MIN: CPT | Mod: S$GLB,,, | Performed by: NURSE PRACTITIONER

## 2021-09-21 PROCEDURE — 1101F PT FALLS ASSESS-DOCD LE1/YR: CPT | Mod: CPTII,S$GLB,, | Performed by: NURSE PRACTITIONER

## 2021-09-21 PROCEDURE — 1126F PR PAIN SEVERITY QUANTIFIED, NO PAIN PRESENT: ICD-10-PCS | Mod: CPTII,S$GLB,, | Performed by: NURSE PRACTITIONER

## 2021-09-21 PROCEDURE — 1101F PR PT FALLS ASSESS DOC 0-1 FALLS W/OUT INJ PAST YR: ICD-10-PCS | Mod: CPTII,S$GLB,, | Performed by: NURSE PRACTITIONER

## 2021-09-21 PROCEDURE — 3288F PR FALLS RISK ASSESSMENT DOCUMENTED: ICD-10-PCS | Mod: CPTII,S$GLB,, | Performed by: NURSE PRACTITIONER

## 2021-09-21 PROCEDURE — 3008F BODY MASS INDEX DOCD: CPT | Mod: CPTII,S$GLB,, | Performed by: NURSE PRACTITIONER

## 2021-09-21 PROCEDURE — 99999 PR PBB SHADOW E&M-EST. PATIENT-LVL III: CPT | Mod: PBBFAC,,, | Performed by: NURSE PRACTITIONER

## 2021-09-21 PROCEDURE — 99213 PR OFFICE/OUTPT VISIT, EST, LEVL III, 20-29 MIN: ICD-10-PCS | Mod: S$GLB,,, | Performed by: NURSE PRACTITIONER

## 2021-09-23 ENCOUNTER — IMMUNIZATION (OUTPATIENT)
Dept: INTERNAL MEDICINE | Facility: CLINIC | Age: 71
End: 2021-09-23
Payer: MEDICARE

## 2021-09-23 DIAGNOSIS — Z23 NEED FOR VACCINATION: Primary | ICD-10-CM

## 2021-09-23 PROCEDURE — 91300 COVID-19, MRNA, LNP-S, PF, 30 MCG/0.3 ML DOSE VACCINE: CPT | Mod: PBBFAC | Performed by: INTERNAL MEDICINE

## 2021-09-23 PROCEDURE — 0002A COVID-19, MRNA, LNP-S, PF, 30 MCG/0.3 ML DOSE VACCINE: CPT | Mod: PBBFAC | Performed by: INTERNAL MEDICINE

## 2021-09-28 ENCOUNTER — OFFICE VISIT (OUTPATIENT)
Dept: NEPHROLOGY | Facility: CLINIC | Age: 71
End: 2021-09-28
Payer: MEDICARE

## 2021-09-28 VITALS
SYSTOLIC BLOOD PRESSURE: 129 MMHG | DIASTOLIC BLOOD PRESSURE: 67 MMHG | HEIGHT: 63 IN | RESPIRATION RATE: 18 BRPM | BODY MASS INDEX: 28.97 KG/M2 | WEIGHT: 163.5 LBS | HEART RATE: 62 BPM | TEMPERATURE: 98 F

## 2021-09-28 DIAGNOSIS — I10 ESSENTIAL HYPERTENSION: Primary | ICD-10-CM

## 2021-09-28 DIAGNOSIS — E11.29 TYPE 2 DIABETES MELLITUS WITH OTHER DIABETIC KIDNEY COMPLICATION, WITH LONG-TERM CURRENT USE OF INSULIN: ICD-10-CM

## 2021-09-28 DIAGNOSIS — Z79.4 TYPE 2 DIABETES MELLITUS WITH OTHER DIABETIC KIDNEY COMPLICATION, WITH LONG-TERM CURRENT USE OF INSULIN: ICD-10-CM

## 2021-09-28 PROCEDURE — 3066F PR DOCUMENTATION OF TREATMENT FOR NEPHROPATHY: ICD-10-PCS | Mod: CPTII,S$GLB,, | Performed by: INTERNAL MEDICINE

## 2021-09-28 PROCEDURE — 1159F PR MEDICATION LIST DOCUMENTED IN MEDICAL RECORD: ICD-10-PCS | Mod: CPTII,S$GLB,, | Performed by: INTERNAL MEDICINE

## 2021-09-28 PROCEDURE — 1100F PR PT FALLS ASSESS DOC 2+ FALLS/FALL W/INJURY/YR: ICD-10-PCS | Mod: CPTII,S$GLB,, | Performed by: INTERNAL MEDICINE

## 2021-09-28 PROCEDURE — 3074F SYST BP LT 130 MM HG: CPT | Mod: CPTII,S$GLB,, | Performed by: INTERNAL MEDICINE

## 2021-09-28 PROCEDURE — 1111F PR DISCHARGE MEDS RECONCILED W/ CURRENT OUTPATIENT MED LIST: ICD-10-PCS | Mod: CPTII,S$GLB,, | Performed by: INTERNAL MEDICINE

## 2021-09-28 PROCEDURE — 99999 PR PBB SHADOW E&M-EST. PATIENT-LVL IV: ICD-10-PCS | Mod: PBBFAC,,, | Performed by: INTERNAL MEDICINE

## 2021-09-28 PROCEDURE — 3051F PR MOST RECENT HEMOGLOBIN A1C LEVEL 7.0 - < 8.0%: ICD-10-PCS | Mod: CPTII,S$GLB,, | Performed by: INTERNAL MEDICINE

## 2021-09-28 PROCEDURE — 1159F MED LIST DOCD IN RCRD: CPT | Mod: CPTII,S$GLB,, | Performed by: INTERNAL MEDICINE

## 2021-09-28 PROCEDURE — 3288F FALL RISK ASSESSMENT DOCD: CPT | Mod: CPTII,S$GLB,, | Performed by: INTERNAL MEDICINE

## 2021-09-28 PROCEDURE — 1126F PR PAIN SEVERITY QUANTIFIED, NO PAIN PRESENT: ICD-10-PCS | Mod: CPTII,S$GLB,, | Performed by: INTERNAL MEDICINE

## 2021-09-28 PROCEDURE — 3008F PR BODY MASS INDEX (BMI) DOCUMENTED: ICD-10-PCS | Mod: CPTII,S$GLB,, | Performed by: INTERNAL MEDICINE

## 2021-09-28 PROCEDURE — 3051F HG A1C>EQUAL 7.0%<8.0%: CPT | Mod: CPTII,S$GLB,, | Performed by: INTERNAL MEDICINE

## 2021-09-28 PROCEDURE — 99215 PR OFFICE/OUTPT VISIT, EST, LEVL V, 40-54 MIN: ICD-10-PCS | Mod: S$GLB,,, | Performed by: INTERNAL MEDICINE

## 2021-09-28 PROCEDURE — 3288F PR FALLS RISK ASSESSMENT DOCUMENTED: ICD-10-PCS | Mod: CPTII,S$GLB,, | Performed by: INTERNAL MEDICINE

## 2021-09-28 PROCEDURE — 99999 PR PBB SHADOW E&M-EST. PATIENT-LVL IV: CPT | Mod: PBBFAC,,, | Performed by: INTERNAL MEDICINE

## 2021-09-28 PROCEDURE — 3066F NEPHROPATHY DOC TX: CPT | Mod: CPTII,S$GLB,, | Performed by: INTERNAL MEDICINE

## 2021-09-28 PROCEDURE — 3078F DIAST BP <80 MM HG: CPT | Mod: CPTII,S$GLB,, | Performed by: INTERNAL MEDICINE

## 2021-09-28 PROCEDURE — 1126F AMNT PAIN NOTED NONE PRSNT: CPT | Mod: CPTII,S$GLB,, | Performed by: INTERNAL MEDICINE

## 2021-09-28 PROCEDURE — 1100F PTFALLS ASSESS-DOCD GE2>/YR: CPT | Mod: CPTII,S$GLB,, | Performed by: INTERNAL MEDICINE

## 2021-09-28 PROCEDURE — 3074F PR MOST RECENT SYSTOLIC BLOOD PRESSURE < 130 MM HG: ICD-10-PCS | Mod: CPTII,S$GLB,, | Performed by: INTERNAL MEDICINE

## 2021-09-28 PROCEDURE — 99215 OFFICE O/P EST HI 40 MIN: CPT | Mod: S$GLB,,, | Performed by: INTERNAL MEDICINE

## 2021-09-28 PROCEDURE — 3078F PR MOST RECENT DIASTOLIC BLOOD PRESSURE < 80 MM HG: ICD-10-PCS | Mod: CPTII,S$GLB,, | Performed by: INTERNAL MEDICINE

## 2021-09-28 PROCEDURE — 3008F BODY MASS INDEX DOCD: CPT | Mod: CPTII,S$GLB,, | Performed by: INTERNAL MEDICINE

## 2021-09-28 PROCEDURE — 1111F DSCHRG MED/CURRENT MED MERGE: CPT | Mod: CPTII,S$GLB,, | Performed by: INTERNAL MEDICINE

## 2021-09-28 RX ORDER — LANOLIN ALCOHOL/MO/W.PET/CERES
400 CREAM (GRAM) TOPICAL 2 TIMES DAILY
Qty: 60 TABLET | Refills: 6 | Status: SHIPPED | OUTPATIENT
Start: 2021-09-28

## 2021-09-29 ENCOUNTER — OFFICE VISIT (OUTPATIENT)
Dept: CARDIOLOGY | Facility: CLINIC | Age: 71
End: 2021-09-29
Payer: MEDICARE

## 2021-09-29 ENCOUNTER — LAB VISIT (OUTPATIENT)
Dept: LAB | Facility: HOSPITAL | Age: 71
End: 2021-09-29
Attending: INTERNAL MEDICINE
Payer: MEDICARE

## 2021-09-29 VITALS
WEIGHT: 163 LBS | DIASTOLIC BLOOD PRESSURE: 81 MMHG | SYSTOLIC BLOOD PRESSURE: 130 MMHG | BODY MASS INDEX: 28.88 KG/M2 | HEART RATE: 66 BPM | HEIGHT: 63 IN

## 2021-09-29 DIAGNOSIS — Z95.5 STENTED CORONARY ARTERY: ICD-10-CM

## 2021-09-29 DIAGNOSIS — N18.4 CKD (CHRONIC KIDNEY DISEASE) STAGE 4, GFR 15-29 ML/MIN: ICD-10-CM

## 2021-09-29 DIAGNOSIS — I48.0 PAROXYSMAL A-FIB: ICD-10-CM

## 2021-09-29 DIAGNOSIS — I45.2 RBBB (RIGHT BUNDLE BRANCH BLOCK WITH LEFT ANTERIOR FASCICULAR BLOCK): Primary | ICD-10-CM

## 2021-09-29 DIAGNOSIS — E11.65 TYPE 2 DIABETES MELLITUS WITH HYPERGLYCEMIA, WITHOUT LONG-TERM CURRENT USE OF INSULIN: ICD-10-CM

## 2021-09-29 DIAGNOSIS — I10 ESSENTIAL HYPERTENSION: ICD-10-CM

## 2021-09-29 DIAGNOSIS — N17.9 ACUTE RENAL FAILURE, UNSPECIFIED ACUTE RENAL FAILURE TYPE: ICD-10-CM

## 2021-09-29 LAB
ALBUMIN SERPL BCP-MCNC: 3.9 G/DL (ref 3.5–5.2)
ALP SERPL-CCNC: 92 U/L (ref 55–135)
ALT SERPL W/O P-5'-P-CCNC: 19 U/L (ref 10–44)
ANION GAP SERPL CALC-SCNC: 14 MMOL/L (ref 8–16)
AST SERPL-CCNC: 24 U/L (ref 10–40)
BASOPHILS # BLD AUTO: 0.06 K/UL (ref 0–0.2)
BASOPHILS NFR BLD: 0.7 % (ref 0–1.9)
BILIRUB SERPL-MCNC: 0.4 MG/DL (ref 0.1–1)
BUN SERPL-MCNC: 33 MG/DL (ref 8–23)
CALCIUM SERPL-MCNC: 9.7 MG/DL (ref 8.7–10.5)
CHLORIDE SERPL-SCNC: 103 MMOL/L (ref 95–110)
CO2 SERPL-SCNC: 22 MMOL/L (ref 23–29)
CREAT SERPL-MCNC: 3.1 MG/DL (ref 0.5–1.4)
DIFFERENTIAL METHOD: ABNORMAL
EOSINOPHIL # BLD AUTO: 0.4 K/UL (ref 0–0.5)
EOSINOPHIL NFR BLD: 4.4 % (ref 0–8)
ERYTHROCYTE [DISTWIDTH] IN BLOOD BY AUTOMATED COUNT: 14.5 % (ref 11.5–14.5)
EST. GFR  (AFRICAN AMERICAN): 17 ML/MIN/1.73 M^2
EST. GFR  (NON AFRICAN AMERICAN): 15 ML/MIN/1.73 M^2
GLUCOSE SERPL-MCNC: 245 MG/DL (ref 70–110)
HCT VFR BLD AUTO: 31.8 % (ref 37–48.5)
HGB BLD-MCNC: 10.2 G/DL (ref 12–16)
IMM GRANULOCYTES # BLD AUTO: 0.03 K/UL (ref 0–0.04)
IMM GRANULOCYTES NFR BLD AUTO: 0.4 % (ref 0–0.5)
LYMPHOCYTES # BLD AUTO: 2.9 K/UL (ref 1–4.8)
LYMPHOCYTES NFR BLD: 35.5 % (ref 18–48)
MAGNESIUM SERPL-MCNC: 1.5 MG/DL (ref 1.6–2.6)
MCH RBC QN AUTO: 29 PG (ref 27–31)
MCHC RBC AUTO-ENTMCNC: 32.1 G/DL (ref 32–36)
MCV RBC AUTO: 90 FL (ref 82–98)
MONOCYTES # BLD AUTO: 0.6 K/UL (ref 0.3–1)
MONOCYTES NFR BLD: 6.8 % (ref 4–15)
NEUTROPHILS # BLD AUTO: 4.3 K/UL (ref 1.8–7.7)
NEUTROPHILS NFR BLD: 52.2 % (ref 38–73)
NRBC BLD-RTO: 0 /100 WBC
PHOSPHATE SERPL-MCNC: 4.2 MG/DL (ref 2.7–4.5)
PLATELET # BLD AUTO: 223 K/UL (ref 150–450)
PMV BLD AUTO: 12.1 FL (ref 9.2–12.9)
POTASSIUM SERPL-SCNC: 4.2 MMOL/L (ref 3.5–5.1)
PROT SERPL-MCNC: 7.7 G/DL (ref 6–8.4)
RBC # BLD AUTO: 3.52 M/UL (ref 4–5.4)
SODIUM SERPL-SCNC: 139 MMOL/L (ref 136–145)
URATE SERPL-MCNC: 8.9 MG/DL (ref 2.4–5.7)
WBC # BLD AUTO: 8.22 K/UL (ref 3.9–12.7)

## 2021-09-29 PROCEDURE — 3079F PR MOST RECENT DIASTOLIC BLOOD PRESSURE 80-89 MM HG: ICD-10-PCS | Mod: CPTII,S$GLB,, | Performed by: INTERNAL MEDICINE

## 2021-09-29 PROCEDURE — 3051F PR MOST RECENT HEMOGLOBIN A1C LEVEL 7.0 - < 8.0%: ICD-10-PCS | Mod: CPTII,S$GLB,, | Performed by: INTERNAL MEDICINE

## 2021-09-29 PROCEDURE — 3008F PR BODY MASS INDEX (BMI) DOCUMENTED: ICD-10-PCS | Mod: CPTII,S$GLB,, | Performed by: INTERNAL MEDICINE

## 2021-09-29 PROCEDURE — 84100 ASSAY OF PHOSPHORUS: CPT | Performed by: INTERNAL MEDICINE

## 2021-09-29 PROCEDURE — 3288F FALL RISK ASSESSMENT DOCD: CPT | Mod: CPTII,S$GLB,, | Performed by: INTERNAL MEDICINE

## 2021-09-29 PROCEDURE — 1111F PR DISCHARGE MEDS RECONCILED W/ CURRENT OUTPATIENT MED LIST: ICD-10-PCS | Mod: CPTII,S$GLB,, | Performed by: INTERNAL MEDICINE

## 2021-09-29 PROCEDURE — 1101F PR PT FALLS ASSESS DOC 0-1 FALLS W/OUT INJ PAST YR: ICD-10-PCS | Mod: CPTII,S$GLB,, | Performed by: INTERNAL MEDICINE

## 2021-09-29 PROCEDURE — 85025 COMPLETE CBC W/AUTO DIFF WBC: CPT | Performed by: INTERNAL MEDICINE

## 2021-09-29 PROCEDURE — 36415 COLL VENOUS BLD VENIPUNCTURE: CPT | Performed by: INTERNAL MEDICINE

## 2021-09-29 PROCEDURE — 84550 ASSAY OF BLOOD/URIC ACID: CPT | Performed by: INTERNAL MEDICINE

## 2021-09-29 PROCEDURE — 3075F SYST BP GE 130 - 139MM HG: CPT | Mod: CPTII,S$GLB,, | Performed by: INTERNAL MEDICINE

## 2021-09-29 PROCEDURE — 3066F PR DOCUMENTATION OF TREATMENT FOR NEPHROPATHY: ICD-10-PCS | Mod: CPTII,S$GLB,, | Performed by: INTERNAL MEDICINE

## 2021-09-29 PROCEDURE — 99214 PR OFFICE/OUTPT VISIT, EST, LEVL IV, 30-39 MIN: ICD-10-PCS | Mod: S$GLB,,, | Performed by: INTERNAL MEDICINE

## 2021-09-29 PROCEDURE — 99999 PR PBB SHADOW E&M-EST. PATIENT-LVL III: ICD-10-PCS | Mod: PBBFAC,,, | Performed by: INTERNAL MEDICINE

## 2021-09-29 PROCEDURE — 3288F PR FALLS RISK ASSESSMENT DOCUMENTED: ICD-10-PCS | Mod: CPTII,S$GLB,, | Performed by: INTERNAL MEDICINE

## 2021-09-29 PROCEDURE — 1160F PR REVIEW ALL MEDS BY PRESCRIBER/CLIN PHARMACIST DOCUMENTED: ICD-10-PCS | Mod: CPTII,S$GLB,, | Performed by: INTERNAL MEDICINE

## 2021-09-29 PROCEDURE — 99214 OFFICE O/P EST MOD 30 MIN: CPT | Mod: S$GLB,,, | Performed by: INTERNAL MEDICINE

## 2021-09-29 PROCEDURE — 99999 PR PBB SHADOW E&M-EST. PATIENT-LVL III: CPT | Mod: PBBFAC,,, | Performed by: INTERNAL MEDICINE

## 2021-09-29 PROCEDURE — 3075F PR MOST RECENT SYSTOLIC BLOOD PRESS GE 130-139MM HG: ICD-10-PCS | Mod: CPTII,S$GLB,, | Performed by: INTERNAL MEDICINE

## 2021-09-29 PROCEDURE — 1111F DSCHRG MED/CURRENT MED MERGE: CPT | Mod: CPTII,S$GLB,, | Performed by: INTERNAL MEDICINE

## 2021-09-29 PROCEDURE — 1159F PR MEDICATION LIST DOCUMENTED IN MEDICAL RECORD: ICD-10-PCS | Mod: CPTII,S$GLB,, | Performed by: INTERNAL MEDICINE

## 2021-09-29 PROCEDURE — 1159F MED LIST DOCD IN RCRD: CPT | Mod: CPTII,S$GLB,, | Performed by: INTERNAL MEDICINE

## 2021-09-29 PROCEDURE — 1101F PT FALLS ASSESS-DOCD LE1/YR: CPT | Mod: CPTII,S$GLB,, | Performed by: INTERNAL MEDICINE

## 2021-09-29 PROCEDURE — 3066F NEPHROPATHY DOC TX: CPT | Mod: CPTII,S$GLB,, | Performed by: INTERNAL MEDICINE

## 2021-09-29 PROCEDURE — 3008F BODY MASS INDEX DOCD: CPT | Mod: CPTII,S$GLB,, | Performed by: INTERNAL MEDICINE

## 2021-09-29 PROCEDURE — 3051F HG A1C>EQUAL 7.0%<8.0%: CPT | Mod: CPTII,S$GLB,, | Performed by: INTERNAL MEDICINE

## 2021-09-29 PROCEDURE — 1160F RVW MEDS BY RX/DR IN RCRD: CPT | Mod: CPTII,S$GLB,, | Performed by: INTERNAL MEDICINE

## 2021-09-29 PROCEDURE — 3079F DIAST BP 80-89 MM HG: CPT | Mod: CPTII,S$GLB,, | Performed by: INTERNAL MEDICINE

## 2021-09-29 PROCEDURE — 80053 COMPREHEN METABOLIC PANEL: CPT | Performed by: INTERNAL MEDICINE

## 2021-09-29 PROCEDURE — 83735 ASSAY OF MAGNESIUM: CPT | Performed by: INTERNAL MEDICINE

## 2021-10-01 ENCOUNTER — TELEPHONE (OUTPATIENT)
Dept: NEPHROLOGY | Facility: CLINIC | Age: 71
End: 2021-10-01

## 2021-10-01 DIAGNOSIS — Z79.4 TYPE 2 DIABETES MELLITUS WITH OTHER DIABETIC KIDNEY COMPLICATION, WITH LONG-TERM CURRENT USE OF INSULIN: ICD-10-CM

## 2021-10-01 DIAGNOSIS — I10 HYPERTENSION, UNSPECIFIED TYPE: Primary | ICD-10-CM

## 2021-10-01 DIAGNOSIS — E11.29 TYPE 2 DIABETES MELLITUS WITH OTHER DIABETIC KIDNEY COMPLICATION, WITH LONG-TERM CURRENT USE OF INSULIN: ICD-10-CM

## 2021-10-01 DIAGNOSIS — N18.4 CKD (CHRONIC KIDNEY DISEASE) STAGE 4, GFR 15-29 ML/MIN: ICD-10-CM

## 2021-10-01 DIAGNOSIS — N17.9 ACUTE RENAL FAILURE, UNSPECIFIED ACUTE RENAL FAILURE TYPE: ICD-10-CM

## 2021-10-06 VITALS
HEART RATE: 71 BPM | DIASTOLIC BLOOD PRESSURE: 75 MMHG | BODY MASS INDEX: 29.16 KG/M2 | HEIGHT: 63 IN | WEIGHT: 164.56 LBS | SYSTOLIC BLOOD PRESSURE: 130 MMHG

## 2021-10-14 ENCOUNTER — OFFICE VISIT (OUTPATIENT)
Dept: FAMILY MEDICINE | Facility: HOSPITAL | Age: 71
End: 2021-10-14
Attending: FAMILY MEDICINE
Payer: MEDICARE

## 2021-10-14 VITALS
BODY MASS INDEX: 29.64 KG/M2 | SYSTOLIC BLOOD PRESSURE: 113 MMHG | HEART RATE: 91 BPM | HEIGHT: 63 IN | WEIGHT: 167.31 LBS | DIASTOLIC BLOOD PRESSURE: 68 MMHG

## 2021-10-14 DIAGNOSIS — E11.65 TYPE 2 DIABETES MELLITUS WITH HYPERGLYCEMIA, WITHOUT LONG-TERM CURRENT USE OF INSULIN: Primary | ICD-10-CM

## 2021-10-14 DIAGNOSIS — H91.93 BILATERAL HEARING LOSS, UNSPECIFIED HEARING LOSS TYPE: ICD-10-CM

## 2021-10-14 PROCEDURE — 99215 OFFICE O/P EST HI 40 MIN: CPT | Performed by: STUDENT IN AN ORGANIZED HEALTH CARE EDUCATION/TRAINING PROGRAM

## 2021-10-19 PROBLEM — Q62.11 HYDRONEPHROSIS WITH URETEROPELVIC JUNCTION (UPJ) OBSTRUCTION: Status: RESOLVED | Noted: 2021-08-16 | Resolved: 2021-10-19

## 2021-10-19 PROBLEM — Z95.5 STENTED CORONARY ARTERY: Chronic | Status: ACTIVE | Noted: 2017-11-28

## 2021-10-19 PROBLEM — D63.1 ANEMIA DUE TO STAGE 4 CHRONIC KIDNEY DISEASE: Chronic | Status: ACTIVE | Noted: 2021-08-16

## 2021-10-19 PROBLEM — E87.20 LACTIC ACID ACIDOSIS: Status: RESOLVED | Noted: 2021-08-26 | Resolved: 2021-10-19

## 2021-10-19 PROBLEM — N18.4 STAGE 4 CHRONIC KIDNEY DISEASE: Status: ACTIVE | Noted: 2021-10-19

## 2021-10-19 PROBLEM — I50.33 ACUTE ON CHRONIC DIASTOLIC HEART FAILURE: Status: RESOLVED | Noted: 2020-12-22 | Resolved: 2021-10-19

## 2021-10-19 PROBLEM — I25.10 ATHEROSCLEROSIS OF NATIVE CORONARY ARTERY OF NATIVE HEART WITHOUT ANGINA PECTORIS: Chronic | Status: ACTIVE | Noted: 2021-01-11

## 2021-10-19 PROBLEM — N18.4 ANEMIA DUE TO STAGE 4 CHRONIC KIDNEY DISEASE: Chronic | Status: ACTIVE | Noted: 2021-08-16

## 2021-10-19 PROBLEM — K21.9 GERD (GASTROESOPHAGEAL REFLUX DISEASE): Chronic | Status: ACTIVE | Noted: 2018-02-20

## 2021-10-19 PROBLEM — I21.3 ACUTE ST ELEVATION MYOCARDIAL INFARCTION (STEMI): Status: RESOLVED | Noted: 2017-07-07 | Resolved: 2021-10-19

## 2021-10-19 PROBLEM — I48.0 PAROXYSMAL A-FIB: Chronic | Status: ACTIVE | Noted: 2020-12-24

## 2021-10-19 PROBLEM — J96.01 ACUTE HYPOXEMIC RESPIRATORY FAILURE: Status: RESOLVED | Noted: 2020-12-21 | Resolved: 2021-10-19

## 2021-10-19 PROBLEM — R10.13 EPIGASTRIC PAIN: Status: RESOLVED | Noted: 2021-08-26 | Resolved: 2021-10-19

## 2021-10-19 PROBLEM — E83.42 HYPOMAGNESEMIA: Chronic | Status: ACTIVE | Noted: 2020-12-22

## 2021-10-19 PROBLEM — R10.9 FLANK PAIN: Status: RESOLVED | Noted: 2021-08-17 | Resolved: 2021-10-19

## 2021-10-19 PROBLEM — R73.03 PREDIABETES: Status: RESOLVED | Noted: 2017-07-02 | Resolved: 2021-10-19

## 2021-10-19 PROBLEM — N18.4 STAGE 4 CHRONIC KIDNEY DISEASE: Chronic | Status: ACTIVE | Noted: 2021-10-19

## 2021-10-19 PROBLEM — R10.13 EPIGASTRIC ABDOMINAL PAIN: Status: RESOLVED | Noted: 2021-08-15 | Resolved: 2021-10-19

## 2021-10-19 PROBLEM — K21.9 GASTROESOPHAGEAL REFLUX DISEASE WITHOUT ESOPHAGITIS: Status: RESOLVED | Noted: 2018-02-20 | Resolved: 2021-10-19

## 2021-10-19 PROBLEM — N39.41 URGE INCONTINENCE OF URINE: Chronic | Status: ACTIVE | Noted: 2018-03-22

## 2021-10-19 PROBLEM — J18.9 CAP (COMMUNITY ACQUIRED PNEUMONIA): Status: RESOLVED | Noted: 2020-12-23 | Resolved: 2021-10-19

## 2021-10-19 PROBLEM — F32.9 MDD (MAJOR DEPRESSIVE DISORDER): Chronic | Status: ACTIVE | Noted: 2021-08-16

## 2021-10-19 PROBLEM — R79.89 ELEVATED TROPONIN: Status: RESOLVED | Noted: 2021-08-27 | Resolved: 2021-10-19

## 2021-10-19 PROBLEM — I45.2 RBBB (RIGHT BUNDLE BRANCH BLOCK WITH LEFT ANTERIOR FASCICULAR BLOCK): Chronic | Status: ACTIVE | Noted: 2021-08-28

## 2021-10-19 PROBLEM — N17.9 ARF (ACUTE RENAL FAILURE): Status: RESOLVED | Noted: 2020-12-21 | Resolved: 2021-10-19

## 2021-10-19 PROBLEM — Z87.442 HISTORY OF NEPHROLITHIASIS: Chronic | Status: ACTIVE | Noted: 2021-10-19

## 2021-10-19 PROBLEM — I10 HYPERTENSION: Chronic | Status: ACTIVE | Noted: 2017-07-02

## 2021-10-19 PROBLEM — N20.0 NEPHROLITHIASIS: Status: RESOLVED | Noted: 2021-08-25 | Resolved: 2021-10-19

## 2021-10-20 ENCOUNTER — OFFICE VISIT (OUTPATIENT)
Dept: FAMILY MEDICINE | Facility: HOSPITAL | Age: 71
End: 2021-10-20
Attending: SPECIALIST
Payer: MEDICARE

## 2021-10-20 VITALS
WEIGHT: 166.69 LBS | DIASTOLIC BLOOD PRESSURE: 50 MMHG | BODY MASS INDEX: 29.54 KG/M2 | SYSTOLIC BLOOD PRESSURE: 128 MMHG | HEIGHT: 63 IN

## 2021-10-20 DIAGNOSIS — F32.9 MAJOR DEPRESSIVE DISORDER, REMISSION STATUS UNSPECIFIED, UNSPECIFIED WHETHER RECURRENT: Chronic | ICD-10-CM

## 2021-10-20 DIAGNOSIS — E11.65 TYPE 2 DIABETES MELLITUS WITH HYPERGLYCEMIA, WITH LONG-TERM CURRENT USE OF INSULIN: Primary | Chronic | ICD-10-CM

## 2021-10-20 DIAGNOSIS — I48.0 PAROXYSMAL A-FIB: Chronic | ICD-10-CM

## 2021-10-20 DIAGNOSIS — E83.42 HYPOMAGNESEMIA: ICD-10-CM

## 2021-10-20 DIAGNOSIS — E87.6 HYPOKALEMIA: Chronic | ICD-10-CM

## 2021-10-20 DIAGNOSIS — N18.4 STAGE 4 CHRONIC KIDNEY DISEASE: Chronic | ICD-10-CM

## 2021-10-20 DIAGNOSIS — Z79.4 TYPE 2 DIABETES MELLITUS WITH HYPERGLYCEMIA, WITH LONG-TERM CURRENT USE OF INSULIN: Primary | Chronic | ICD-10-CM

## 2021-10-20 DIAGNOSIS — I25.10 ATHEROSCLEROSIS OF NATIVE CORONARY ARTERY OF NATIVE HEART WITHOUT ANGINA PECTORIS: Chronic | ICD-10-CM

## 2021-10-20 DIAGNOSIS — K21.9 GASTROESOPHAGEAL REFLUX DISEASE, UNSPECIFIED WHETHER ESOPHAGITIS PRESENT: Chronic | ICD-10-CM

## 2021-10-20 DIAGNOSIS — I10 PRIMARY HYPERTENSION: Chronic | ICD-10-CM

## 2021-10-20 DIAGNOSIS — I50.32 CHRONIC HEART FAILURE WITH PRESERVED EJECTION FRACTION: ICD-10-CM

## 2021-10-20 DIAGNOSIS — I45.2 RBBB (RIGHT BUNDLE BRANCH BLOCK WITH LEFT ANTERIOR FASCICULAR BLOCK): Chronic | ICD-10-CM

## 2021-10-20 DIAGNOSIS — Z87.442 HISTORY OF NEPHROLITHIASIS: Chronic | ICD-10-CM

## 2021-10-20 DIAGNOSIS — F32.A DEPRESSION, UNSPECIFIED DEPRESSION TYPE: ICD-10-CM

## 2021-10-20 DIAGNOSIS — E78.5 HYPERLIPIDEMIA, UNSPECIFIED HYPERLIPIDEMIA TYPE: ICD-10-CM

## 2021-10-20 DIAGNOSIS — Z95.5 STENTED CORONARY ARTERY: Chronic | ICD-10-CM

## 2021-10-20 PROCEDURE — 99214 OFFICE O/P EST MOD 30 MIN: CPT | Performed by: STUDENT IN AN ORGANIZED HEALTH CARE EDUCATION/TRAINING PROGRAM

## 2021-10-20 RX ORDER — METOPROLOL SUCCINATE 100 MG/1
100 TABLET, EXTENDED RELEASE ORAL DAILY
Qty: 30 TABLET | Refills: 11 | Status: SHIPPED | OUTPATIENT
Start: 2021-10-20 | End: 2022-01-26 | Stop reason: SDUPTHER

## 2021-10-20 RX ORDER — FUROSEMIDE 40 MG/1
40 TABLET ORAL 2 TIMES DAILY
Qty: 60 TABLET | Refills: 11 | Status: SHIPPED | OUTPATIENT
Start: 2021-10-20 | End: 2022-01-26 | Stop reason: SDUPTHER

## 2021-10-20 RX ORDER — ROSUVASTATIN CALCIUM 40 MG/1
40 TABLET, COATED ORAL DAILY
Qty: 90 TABLET | Refills: 3 | Status: SHIPPED | OUTPATIENT
Start: 2021-10-20 | End: 2022-08-01 | Stop reason: SDUPTHER

## 2021-10-20 RX ORDER — SERTRALINE HYDROCHLORIDE 50 MG/1
50 TABLET, FILM COATED ORAL DAILY
Qty: 30 TABLET | Refills: 11 | Status: SHIPPED | OUTPATIENT
Start: 2021-10-20 | End: 2022-01-26 | Stop reason: SDUPTHER

## 2021-10-20 RX ORDER — AMLODIPINE BESYLATE 5 MG/1
5 TABLET ORAL DAILY
Qty: 30 TABLET | Refills: 11 | Status: SHIPPED | OUTPATIENT
Start: 2021-10-20 | End: 2022-01-26 | Stop reason: SDUPTHER

## 2021-10-20 RX ORDER — POTASSIUM CHLORIDE 20 MEQ/1
20 TABLET, EXTENDED RELEASE ORAL DAILY
Qty: 30 TABLET | Refills: 11 | Status: SHIPPED | OUTPATIENT
Start: 2021-10-20 | End: 2022-10-20

## 2021-10-20 RX ORDER — ASPIRIN 81 MG/1
81 TABLET ORAL DAILY
Qty: 90 TABLET | Refills: 3 | Status: SHIPPED | OUTPATIENT
Start: 2021-10-20 | End: 2023-11-28

## 2021-10-21 ENCOUNTER — LAB VISIT (OUTPATIENT)
Dept: LAB | Facility: HOSPITAL | Age: 71
End: 2021-10-21
Attending: INTERNAL MEDICINE
Payer: MEDICARE

## 2021-10-21 DIAGNOSIS — Z79.4 TYPE 2 DIABETES MELLITUS WITH HYPERGLYCEMIA, WITH LONG-TERM CURRENT USE OF INSULIN: Chronic | ICD-10-CM

## 2021-10-21 DIAGNOSIS — E11.29 TYPE 2 DIABETES MELLITUS WITH OTHER DIABETIC KIDNEY COMPLICATION, WITH LONG-TERM CURRENT USE OF INSULIN: ICD-10-CM

## 2021-10-21 DIAGNOSIS — E11.65 TYPE 2 DIABETES MELLITUS WITH HYPERGLYCEMIA, WITH LONG-TERM CURRENT USE OF INSULIN: Chronic | ICD-10-CM

## 2021-10-21 DIAGNOSIS — N18.4 CKD (CHRONIC KIDNEY DISEASE) STAGE 4, GFR 15-29 ML/MIN: ICD-10-CM

## 2021-10-21 DIAGNOSIS — I10 HYPERTENSION, UNSPECIFIED TYPE: ICD-10-CM

## 2021-10-21 DIAGNOSIS — Z79.4 TYPE 2 DIABETES MELLITUS WITH OTHER DIABETIC KIDNEY COMPLICATION, WITH LONG-TERM CURRENT USE OF INSULIN: ICD-10-CM

## 2021-10-21 LAB
ALBUMIN/CREAT UR: 444.2 UG/MG (ref 0–30)
BACTERIA #/AREA URNS HPF: ABNORMAL /HPF
BILIRUB UR QL STRIP: NEGATIVE
CLARITY UR: CLEAR
COLOR UR: YELLOW
CREAT UR-MCNC: 91 MG/DL (ref 15–325)
CREAT UR-MCNC: 95 MG/DL (ref 15–325)
GLUCOSE UR QL STRIP: NEGATIVE
HGB UR QL STRIP: ABNORMAL
HYALINE CASTS #/AREA URNS LPF: 0 /LPF
KETONES UR QL STRIP: NEGATIVE
LEUKOCYTE ESTERASE UR QL STRIP: ABNORMAL
MICROALBUMIN UR DL<=1MG/L-MCNC: 422 UG/ML
MICROSCOPIC COMMENT: ABNORMAL
NITRITE UR QL STRIP: NEGATIVE
PH UR STRIP: 6 [PH] (ref 5–8)
PROT UR QL STRIP: ABNORMAL
PROT UR-MCNC: 59 MG/DL (ref 0–15)
PROT/CREAT UR: 0.65 MG/G{CREAT} (ref 0–0.2)
RBC #/AREA URNS HPF: 13 /HPF (ref 0–4)
SP GR UR STRIP: 1.01 (ref 1–1.03)
SQUAMOUS #/AREA URNS HPF: 2 /HPF
URN SPEC COLLECT METH UR: ABNORMAL
UROBILINOGEN UR STRIP-ACNC: NEGATIVE EU/DL
WBC #/AREA URNS HPF: 5 /HPF (ref 0–5)

## 2021-10-21 PROCEDURE — 82570 ASSAY OF URINE CREATININE: CPT | Mod: 91 | Performed by: STUDENT IN AN ORGANIZED HEALTH CARE EDUCATION/TRAINING PROGRAM

## 2021-10-21 PROCEDURE — 81000 URINALYSIS NONAUTO W/SCOPE: CPT | Performed by: INTERNAL MEDICINE

## 2021-10-21 PROCEDURE — 82570 ASSAY OF URINE CREATININE: CPT | Performed by: INTERNAL MEDICINE

## 2021-10-27 NOTE — PLAN OF CARE
Problem: Cardiac Catheterization (Diagnostic/Interventional) (Adult)  Goal: Signs and Symptoms of Listed Potential Problems Will be Absent, Minimized or Managed (Cardiac Catheterization)  Signs and symptoms of listed potential problems will be absent, minimized or managed by discharge/transition of care (reference Cardiac Catheterization (Diagnostic/Interventional) (Adult) CPG).   Outcome: Ongoing (interventions implemented as appropriate)  Pt SBP remains elevated by the morning at 179/75, HR 78, NSR on tele, pt asymptomatic and resting comfortably in bed. Rn made Dr. Felipe tristan MD instructed Rn to administer scheduled 9am cardiac medications at 8am. Rn will pass this info to oncoming Rn. Pt cath site to R groin remains c/d/i/, free of bruising and no evidence of hematoma present. Pt R radial site, c/d/i. Pt ambulated to the bathroom overnight, no evidence of bleeding present after pt ambulated.      Tele Recap:  NSR, 60-70's  No ectopy noted.  ST depression noted.     Rn to continue to monitor.          Risks/benefits discussed with patient/surrogate

## 2021-11-01 ENCOUNTER — OFFICE VISIT (OUTPATIENT)
Dept: FAMILY MEDICINE | Facility: HOSPITAL | Age: 71
End: 2021-11-01
Attending: FAMILY MEDICINE
Payer: MEDICARE

## 2021-11-01 VITALS
DIASTOLIC BLOOD PRESSURE: 50 MMHG | HEIGHT: 63 IN | SYSTOLIC BLOOD PRESSURE: 112 MMHG | BODY MASS INDEX: 29.72 KG/M2 | HEART RATE: 60 BPM | WEIGHT: 167.75 LBS

## 2021-11-01 DIAGNOSIS — E11.65 TYPE 2 DIABETES MELLITUS WITH HYPERGLYCEMIA, WITH LONG-TERM CURRENT USE OF INSULIN: Chronic | ICD-10-CM

## 2021-11-01 DIAGNOSIS — Z79.4 TYPE 2 DIABETES MELLITUS WITH HYPERGLYCEMIA, WITH LONG-TERM CURRENT USE OF INSULIN: Chronic | ICD-10-CM

## 2021-11-01 PROCEDURE — 99214 OFFICE O/P EST MOD 30 MIN: CPT | Performed by: STUDENT IN AN ORGANIZED HEALTH CARE EDUCATION/TRAINING PROGRAM

## 2021-12-02 ENCOUNTER — OFFICE VISIT (OUTPATIENT)
Dept: FAMILY MEDICINE | Facility: HOSPITAL | Age: 71
End: 2021-12-02
Attending: FAMILY MEDICINE
Payer: MEDICARE

## 2021-12-02 ENCOUNTER — OFFICE VISIT (OUTPATIENT)
Dept: NEPHROLOGY | Facility: CLINIC | Age: 71
End: 2021-12-02
Payer: MEDICARE

## 2021-12-02 VITALS
HEART RATE: 68 BPM | BODY MASS INDEX: 30.16 KG/M2 | DIASTOLIC BLOOD PRESSURE: 71 MMHG | HEIGHT: 63 IN | SYSTOLIC BLOOD PRESSURE: 132 MMHG | WEIGHT: 170.19 LBS

## 2021-12-02 VITALS
HEART RATE: 72 BPM | DIASTOLIC BLOOD PRESSURE: 76 MMHG | SYSTOLIC BLOOD PRESSURE: 149 MMHG | HEIGHT: 63 IN | BODY MASS INDEX: 30.51 KG/M2 | WEIGHT: 172.19 LBS

## 2021-12-02 DIAGNOSIS — I10 PRIMARY HYPERTENSION: Primary | ICD-10-CM

## 2021-12-02 DIAGNOSIS — E11.65 TYPE 2 DIABETES MELLITUS WITH HYPERGLYCEMIA, WITHOUT LONG-TERM CURRENT USE OF INSULIN: Primary | ICD-10-CM

## 2021-12-02 DIAGNOSIS — E11.65 TYPE 2 DIABETES MELLITUS WITH HYPERGLYCEMIA, WITH LONG-TERM CURRENT USE OF INSULIN: ICD-10-CM

## 2021-12-02 DIAGNOSIS — E83.42 HYPOMAGNESEMIA: ICD-10-CM

## 2021-12-02 DIAGNOSIS — Z79.4 TYPE 2 DIABETES MELLITUS WITH HYPERGLYCEMIA, WITH LONG-TERM CURRENT USE OF INSULIN: ICD-10-CM

## 2021-12-02 DIAGNOSIS — H91.93 BILATERAL HEARING LOSS, UNSPECIFIED HEARING LOSS TYPE: ICD-10-CM

## 2021-12-02 DIAGNOSIS — N18.4 CKD (CHRONIC KIDNEY DISEASE) STAGE 4, GFR 15-29 ML/MIN: ICD-10-CM

## 2021-12-02 PROCEDURE — 99215 OFFICE O/P EST HI 40 MIN: CPT | Performed by: STUDENT IN AN ORGANIZED HEALTH CARE EDUCATION/TRAINING PROGRAM

## 2021-12-02 PROCEDURE — 99213 PR OFFICE/OUTPT VISIT, EST, LEVL III, 20-29 MIN: ICD-10-PCS | Mod: S$PBB,,, | Performed by: INTERNAL MEDICINE

## 2021-12-02 PROCEDURE — 99999 PR PBB SHADOW E&M-EST. PATIENT-LVL IV: ICD-10-PCS | Mod: PBBFAC,,, | Performed by: INTERNAL MEDICINE

## 2021-12-02 PROCEDURE — 99213 OFFICE O/P EST LOW 20 MIN: CPT | Mod: S$PBB,,, | Performed by: INTERNAL MEDICINE

## 2021-12-02 PROCEDURE — 99999 PR PBB SHADOW E&M-EST. PATIENT-LVL IV: CPT | Mod: PBBFAC,,, | Performed by: INTERNAL MEDICINE

## 2021-12-02 RX ORDER — INSULIN GLARGINE 100 [IU]/ML
8 INJECTION, SOLUTION SUBCUTANEOUS NIGHTLY
Qty: 10.8 ML | Refills: 0 | Status: SHIPPED | OUTPATIENT
Start: 2021-12-02 | End: 2021-12-27 | Stop reason: SDUPTHER

## 2021-12-06 ENCOUNTER — TELEPHONE (OUTPATIENT)
Dept: OTOLARYNGOLOGY | Facility: CLINIC | Age: 71
End: 2021-12-06

## 2021-12-09 ENCOUNTER — PATIENT MESSAGE (OUTPATIENT)
Dept: FAMILY MEDICINE | Facility: HOSPITAL | Age: 71
End: 2021-12-09

## 2021-12-09 DIAGNOSIS — I10 PRIMARY HYPERTENSION: ICD-10-CM

## 2021-12-09 DIAGNOSIS — E11.65 TYPE 2 DIABETES MELLITUS WITH HYPERGLYCEMIA, WITHOUT LONG-TERM CURRENT USE OF INSULIN: Primary | ICD-10-CM

## 2021-12-10 ENCOUNTER — CLINICAL SUPPORT (OUTPATIENT)
Dept: OTOLARYNGOLOGY | Facility: CLINIC | Age: 71
End: 2021-12-10
Payer: MEDICARE

## 2021-12-10 ENCOUNTER — PATIENT MESSAGE (OUTPATIENT)
Dept: FAMILY MEDICINE | Facility: HOSPITAL | Age: 71
End: 2021-12-10

## 2021-12-10 DIAGNOSIS — E11.65 TYPE 2 DIABETES MELLITUS WITH HYPERGLYCEMIA, WITH LONG-TERM CURRENT USE OF INSULIN: Chronic | ICD-10-CM

## 2021-12-10 DIAGNOSIS — Z79.4 TYPE 2 DIABETES MELLITUS WITH HYPERGLYCEMIA, WITH LONG-TERM CURRENT USE OF INSULIN: Chronic | ICD-10-CM

## 2021-12-10 DIAGNOSIS — H90.A32 MIXED CONDUCTIVE AND SENSORINEURAL HEARING LOSS OF LEFT EAR WITH RESTRICTED HEARING OF RIGHT EAR: Primary | ICD-10-CM

## 2021-12-10 PROCEDURE — 92557 COMPREHENSIVE HEARING TEST: CPT | Mod: S$GLB,,, | Performed by: AUDIOLOGIST

## 2021-12-10 PROCEDURE — 92567 PR TYMPA2METRY: ICD-10-PCS | Mod: S$GLB,,, | Performed by: AUDIOLOGIST

## 2021-12-10 PROCEDURE — 92557 PR COMPREHENSIVE HEARING TEST: ICD-10-PCS | Mod: S$GLB,,, | Performed by: AUDIOLOGIST

## 2021-12-10 PROCEDURE — 92567 TYMPANOMETRY: CPT | Mod: S$GLB,,, | Performed by: AUDIOLOGIST

## 2021-12-10 RX ORDER — LOSARTAN POTASSIUM 25 MG/1
25 TABLET ORAL DAILY
Qty: 30 TABLET | Refills: 0 | Status: SHIPPED | OUTPATIENT
Start: 2021-12-10 | End: 2022-01-26 | Stop reason: SDUPTHER

## 2021-12-22 DIAGNOSIS — E11.65 TYPE 2 DIABETES MELLITUS WITH HYPERGLYCEMIA, WITHOUT LONG-TERM CURRENT USE OF INSULIN: ICD-10-CM

## 2021-12-22 RX ORDER — INSULIN GLARGINE 100 [IU]/ML
8 INJECTION, SOLUTION SUBCUTANEOUS NIGHTLY
Qty: 10.8 ML | Refills: 0 | Status: CANCELLED | OUTPATIENT
Start: 2021-12-22 | End: 2022-03-22

## 2021-12-27 DIAGNOSIS — E11.65 TYPE 2 DIABETES MELLITUS WITH HYPERGLYCEMIA, WITHOUT LONG-TERM CURRENT USE OF INSULIN: ICD-10-CM

## 2021-12-28 RX ORDER — INSULIN GLARGINE 100 [IU]/ML
8 INJECTION, SOLUTION SUBCUTANEOUS NIGHTLY
Qty: 10.8 ML | Refills: 0 | Status: SHIPPED | OUTPATIENT
Start: 2021-12-28 | End: 2022-08-01 | Stop reason: SDUPTHER

## 2021-12-30 ENCOUNTER — HOSPITAL ENCOUNTER (EMERGENCY)
Facility: HOSPITAL | Age: 71
Discharge: HOME | End: 2021-12-30
Attending: EMERGENCY MEDICINE
Payer: MEDICARE

## 2021-12-30 VITALS
RESPIRATION RATE: 18 BRPM | SYSTOLIC BLOOD PRESSURE: 174 MMHG | OXYGEN SATURATION: 99 % | DIASTOLIC BLOOD PRESSURE: 84 MMHG | TEMPERATURE: 99 F | HEART RATE: 64 BPM
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
## 2021-12-30 PROCEDURE — 63600175 PHARM REV CODE 636 W HCPCS: Performed by: EMERGENCY MEDICINE

## 2021-12-30 PROCEDURE — 90471 IMMUNIZATION ADMIN: CPT | Performed by: EMERGENCY MEDICINE

## 2021-12-30 PROCEDURE — 90715 TDAP VACCINE 7 YRS/> IM: CPT | Performed by: EMERGENCY MEDICINE

## 2021-12-30 PROCEDURE — 25000003 PHARM REV CODE 250: Performed by: EMERGENCY MEDICINE

## 2021-12-30 PROCEDURE — 99284 EMERGENCY DEPT VISIT MOD MDM: CPT | Mod: 25

## 2021-12-30 RX ORDER — AMOXICILLIN AND CLAVULANATE POTASSIUM 875; 125 MG/1; MG/1
1 TABLET, FILM COATED ORAL
Status: COMPLETED | OUTPATIENT
Start: 2021-12-30 | End: 2021-12-30

## 2021-12-30 RX ADMIN — TETANUS TOXOID, REDUCED DIPHTHERIA TOXOID AND ACELLULAR PERTUSSIS VACCINE, ADSORBED 0.5 ML: 5; 2.5; 8; 8; 2.5 SUSPENSION INTRAMUSCULAR at 10:12

## 2021-12-30 RX ADMIN — AMOXICILLIN AND CLAVULANATE POTASSIUM 1 TABLET: 875; 125 TABLET, FILM COATED ORAL at 10:12

## 2021-12-31 NOTE — ED PROVIDER NOTES
Encounter Date: 12/30/2021       History     Chief Complaint   Patient presents with    Animal Bite     Noted to right forearm, several small puncture sites noted on anterior/posterior arm, bleeding controled, not UTD on tetanus      Patient with a animal bite to her volar forearm by her son's dog which got excited because fire crackers were going off.  Therefore it is a provoked attack.  Up-to-date on its immunizations.  Bleeding at the site dressed by nursing triage.  The dog was a pit bull.  She has puncture wounds on both her volar and dorsal forearm with bleeding and pain.  Patient is on Eliquis.        Review of patient's allergies indicates:   Allergen Reactions    Ace inhibitors Other (See Comments)     Cough     Past Medical History:   Diagnosis Date    (HFpEF) heart failure with preserved ejection fraction     TTE (2020) w/ EF 55% and grade II diastolic dysfunction    Anemia due to stage 4 chronic kidney disease 8/16/2021    Gastric ulcer due to Helicobacter pylori 07/2017    Gastrointestinal hemorrhage with melena 07/2017    GERD (gastroesophageal reflux disease)     HLD (hyperlipidemia)     Hydronephrosis with ureteropelvic junction (UPJ) obstruction 8/16/2021    Hypertension     MDD (major depressive disorder) 8/16/2021    Nephrolithiasis 8/25/2021    NSTEMI (non-ST elevated myocardial infarction) 07/2017    Paroxysmal A-fib 12/24/2020    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/2020    Stage 4 chronic kidney disease 10/19/2021    STEMI (ST elevation myocardial infarction) 12/2020    Type 2 diabetes mellitus     Urge incontinence of urine      Past Surgical History:   Procedure Laterality Date    CARDIAC CATHETERIZATION  07/2017    RCA w/ 95% stenosis w/ stent placement    RETROGRADE PYELOGRAPHY  8/25/2021    Procedure: PYELOGRAM, RETROGRADE;  Surgeon: Rody Smith MD;  Location: [facility] OR;  Service: Urology;;    URETEROSCOPIC REMOVAL OF URETERIC  CALCULUS Left 8/25/2021    Procedure: left ureteroscopy, stone basketing, stent placement;  left nephrostomy tube removal;  Surgeon: Rody Smith MD;  Location: [facility];  Service: Urology;  Laterality: Left;     No family history on file.  Social History     Tobacco Use    Smoking status: Never Smoker    Smokeless tobacco: Never Used   Substance Use Topics    Alcohol use: No    Drug use: No     Review of Systems   Skin: Positive for wound.   Neurological: Negative for weakness and numbness.   Hematological: Bruises/bleeds easily.       Physical Exam     Initial Vitals [12/30/21 1838]   BP Pulse Resp Temp SpO2   (!) 174/84 64 18 98.7 °F (37.1 °C) 99 %      MAP       --         Physical Exam      Skin:   Puncture to slightly larger deeper puncture wounds to the volar mid forearm and 2 more superficial puncture wounds to the dorsal mid forearm corresponding to the areas where the dog's teeth it the patient.  No numbness distally.  Some swelling locally.  Full range of motion and full normal  with normal sensation of the hand.         ED Course   Procedures  Labs Reviewed - No data to display       Imaging Results    None          Medications   Tdap (BOOSTRIX) vaccine injection 0.5 mL (has no administration in time range)   amoxicillin-clavulanate 875-125mg per tablet 1 tablet (has no administration in time range)     Medical Decision Making:   Initial Assessment:   Will not close as this increases the likelihood of infection but will irrigate  ED Management:  All puncture wounds very, very copiously irrigated under pressure with normal saline then covered with gauze and wrapped with Coban.  After the Coban wrap perfusion distally is good with good capillary refill of the nail beds.  Normal sensation also.                      Clinical Impression:   Final diagnoses:  [W54.0XXA] Dog bite, initial encounter (Primary)          ED Disposition Condition    Discharge Stable        ED Prescriptions     None         Follow-up Information     Follow up With Specialties Details Why Contact Info    [facility] Emergency Dept Emergency Medicine  As needed [address] [facility] [id number] [phone]      M*Modal Fluency dictation system has been used to dictate either all or a portion of this chart. Despite review of the chart, some dictation errors may still exist due to imperfections in this system.     Claus Sorenson MD  12/30/21 1297

## 2021-12-31 NOTE — ED NOTES
Wounds irrigated with sterile water, cleaned, new dressing applied. New sling given to patient with instructions for use.

## 2022-01-05 RX ORDER — ALLOPURINOL 100 MG/1
100 TABLET ORAL DAILY
Qty: 30 TABLET | Refills: 0 | OUTPATIENT
Start: 2022-01-05 | End: 2023-01-05

## 2022-01-26 ENCOUNTER — OFFICE VISIT (OUTPATIENT)
Dept: OTOLARYNGOLOGY | Facility: CLINIC | Age: 72
End: 2022-01-26
Payer: MEDICARE

## 2022-01-26 VITALS
WEIGHT: 162.25 LBS | SYSTOLIC BLOOD PRESSURE: 123 MMHG | HEIGHT: 63 IN | BODY MASS INDEX: 28.75 KG/M2 | HEART RATE: 61 BPM | DIASTOLIC BLOOD PRESSURE: 69 MMHG

## 2022-01-26 DIAGNOSIS — F32.A DEPRESSION, UNSPECIFIED DEPRESSION TYPE: ICD-10-CM

## 2022-01-26 DIAGNOSIS — Z79.4 TYPE 2 DIABETES MELLITUS WITH HYPERGLYCEMIA, WITH LONG-TERM CURRENT USE OF INSULIN: Chronic | ICD-10-CM

## 2022-01-26 DIAGNOSIS — H90.A32 MIXED CONDUCTIVE AND SENSORINEURAL HEARING LOSS OF LEFT EAR WITH RESTRICTED HEARING OF RIGHT EAR: Chronic | ICD-10-CM

## 2022-01-26 DIAGNOSIS — I50.32 CHRONIC HEART FAILURE WITH PRESERVED EJECTION FRACTION: ICD-10-CM

## 2022-01-26 DIAGNOSIS — I10 PRIMARY HYPERTENSION: Chronic | ICD-10-CM

## 2022-01-26 DIAGNOSIS — H90.A21 SENSORINEURAL HEARING LOSS (SNHL) OF RIGHT EAR WITH RESTRICTED HEARING OF LEFT EAR: Primary | Chronic | ICD-10-CM

## 2022-01-26 DIAGNOSIS — I48.0 PAROXYSMAL A-FIB: Chronic | ICD-10-CM

## 2022-01-26 DIAGNOSIS — H93.13 TINNITUS OF BOTH EARS: Chronic | ICD-10-CM

## 2022-01-26 DIAGNOSIS — J30.9 ALLERGIC RHINITIS, UNSPECIFIED SEASONALITY, UNSPECIFIED TRIGGER: Chronic | ICD-10-CM

## 2022-01-26 DIAGNOSIS — E11.65 TYPE 2 DIABETES MELLITUS WITH HYPERGLYCEMIA, WITH LONG-TERM CURRENT USE OF INSULIN: Chronic | ICD-10-CM

## 2022-01-26 DIAGNOSIS — H69.93 DYSFUNCTION OF BOTH EUSTACHIAN TUBES: Chronic | ICD-10-CM

## 2022-01-26 PROCEDURE — 1101F PT FALLS ASSESS-DOCD LE1/YR: CPT | Mod: CPTII,S$GLB,, | Performed by: OTOLARYNGOLOGY

## 2022-01-26 PROCEDURE — 3074F PR MOST RECENT SYSTOLIC BLOOD PRESSURE < 130 MM HG: ICD-10-PCS | Mod: CPTII,S$GLB,, | Performed by: OTOLARYNGOLOGY

## 2022-01-26 PROCEDURE — 1160F PR REVIEW ALL MEDS BY PRESCRIBER/CLIN PHARMACIST DOCUMENTED: ICD-10-PCS | Mod: CPTII,S$GLB,, | Performed by: OTOLARYNGOLOGY

## 2022-01-26 PROCEDURE — 3008F PR BODY MASS INDEX (BMI) DOCUMENTED: ICD-10-PCS | Mod: CPTII,S$GLB,, | Performed by: OTOLARYNGOLOGY

## 2022-01-26 PROCEDURE — 1126F PR PAIN SEVERITY QUANTIFIED, NO PAIN PRESENT: ICD-10-PCS | Mod: CPTII,S$GLB,, | Performed by: OTOLARYNGOLOGY

## 2022-01-26 PROCEDURE — 1159F PR MEDICATION LIST DOCUMENTED IN MEDICAL RECORD: ICD-10-PCS | Mod: CPTII,S$GLB,, | Performed by: OTOLARYNGOLOGY

## 2022-01-26 PROCEDURE — 3288F FALL RISK ASSESSMENT DOCD: CPT | Mod: CPTII,S$GLB,, | Performed by: OTOLARYNGOLOGY

## 2022-01-26 PROCEDURE — 3078F PR MOST RECENT DIASTOLIC BLOOD PRESSURE < 80 MM HG: ICD-10-PCS | Mod: CPTII,S$GLB,, | Performed by: OTOLARYNGOLOGY

## 2022-01-26 PROCEDURE — 99999 PR PBB SHADOW E&M-EST. PATIENT-LVL V: ICD-10-PCS | Mod: PBBFAC,,, | Performed by: OTOLARYNGOLOGY

## 2022-01-26 PROCEDURE — 1101F PR PT FALLS ASSESS DOC 0-1 FALLS W/OUT INJ PAST YR: ICD-10-PCS | Mod: CPTII,S$GLB,, | Performed by: OTOLARYNGOLOGY

## 2022-01-26 PROCEDURE — 3074F SYST BP LT 130 MM HG: CPT | Mod: CPTII,S$GLB,, | Performed by: OTOLARYNGOLOGY

## 2022-01-26 PROCEDURE — 99204 OFFICE O/P NEW MOD 45 MIN: CPT | Mod: S$GLB,,, | Performed by: OTOLARYNGOLOGY

## 2022-01-26 PROCEDURE — 1126F AMNT PAIN NOTED NONE PRSNT: CPT | Mod: CPTII,S$GLB,, | Performed by: OTOLARYNGOLOGY

## 2022-01-26 PROCEDURE — 3078F DIAST BP <80 MM HG: CPT | Mod: CPTII,S$GLB,, | Performed by: OTOLARYNGOLOGY

## 2022-01-26 PROCEDURE — 3288F PR FALLS RISK ASSESSMENT DOCUMENTED: ICD-10-PCS | Mod: CPTII,S$GLB,, | Performed by: OTOLARYNGOLOGY

## 2022-01-26 PROCEDURE — 4010F ACE/ARB THERAPY RXD/TAKEN: CPT | Mod: CPTII,S$GLB,, | Performed by: OTOLARYNGOLOGY

## 2022-01-26 PROCEDURE — 99204 PR OFFICE/OUTPT VISIT, NEW, LEVL IV, 45-59 MIN: ICD-10-PCS | Mod: S$GLB,,, | Performed by: OTOLARYNGOLOGY

## 2022-01-26 PROCEDURE — 3008F BODY MASS INDEX DOCD: CPT | Mod: CPTII,S$GLB,, | Performed by: OTOLARYNGOLOGY

## 2022-01-26 PROCEDURE — 4010F PR ACE/ARB THEARPY RXD/TAKEN: ICD-10-PCS | Mod: CPTII,S$GLB,, | Performed by: OTOLARYNGOLOGY

## 2022-01-26 PROCEDURE — 1160F RVW MEDS BY RX/DR IN RCRD: CPT | Mod: CPTII,S$GLB,, | Performed by: OTOLARYNGOLOGY

## 2022-01-26 PROCEDURE — 99999 PR PBB SHADOW E&M-EST. PATIENT-LVL V: CPT | Mod: PBBFAC,,, | Performed by: OTOLARYNGOLOGY

## 2022-01-26 PROCEDURE — 1159F MED LIST DOCD IN RCRD: CPT | Mod: CPTII,S$GLB,, | Performed by: OTOLARYNGOLOGY

## 2022-01-26 RX ORDER — SERTRALINE HYDROCHLORIDE 50 MG/1
50 TABLET, FILM COATED ORAL DAILY
Qty: 90 TABLET | Refills: 3 | Status: SHIPPED | OUTPATIENT
Start: 2022-01-26 | End: 2024-01-05

## 2022-01-26 RX ORDER — FUROSEMIDE 40 MG/1
40 TABLET ORAL 2 TIMES DAILY
Qty: 180 TABLET | Refills: 3 | Status: SHIPPED | OUTPATIENT
Start: 2022-01-26 | End: 2022-11-10 | Stop reason: SDUPTHER

## 2022-01-26 RX ORDER — FLUTICASONE PROPIONATE 50 MCG
1 SPRAY, SUSPENSION (ML) NASAL 2 TIMES DAILY
Qty: 11.1 ML | Refills: 11 | Status: SHIPPED | OUTPATIENT
Start: 2022-01-26 | End: 2022-08-01

## 2022-01-26 RX ORDER — METOPROLOL SUCCINATE 100 MG/1
100 TABLET, EXTENDED RELEASE ORAL DAILY
Qty: 90 TABLET | Refills: 3 | Status: SHIPPED | OUTPATIENT
Start: 2022-01-26 | End: 2023-01-30

## 2022-01-26 RX ORDER — LOSARTAN POTASSIUM 25 MG/1
25 TABLET ORAL DAILY
Qty: 90 TABLET | Refills: 3 | Status: SHIPPED | OUTPATIENT
Start: 2022-01-26 | End: 2022-08-01

## 2022-01-26 RX ORDER — METOPROLOL SUCCINATE 100 MG/1
100 TABLET, EXTENDED RELEASE ORAL DAILY
Qty: 90 TABLET | Refills: 3 | Status: SHIPPED | OUTPATIENT
Start: 2022-01-26 | End: 2022-01-26 | Stop reason: SDUPTHER

## 2022-01-26 RX ORDER — AMLODIPINE BESYLATE 5 MG/1
5 TABLET ORAL DAILY
Qty: 90 TABLET | Refills: 3 | Status: SHIPPED | OUTPATIENT
Start: 2022-01-26 | End: 2023-01-19

## 2022-01-26 RX ORDER — AZELASTINE 1 MG/ML
1 SPRAY, METERED NASAL 2 TIMES DAILY
Qty: 30 ML | Refills: 11 | Status: SHIPPED | OUTPATIENT
Start: 2022-01-26 | End: 2022-08-01

## 2022-01-26 NOTE — PROGRESS NOTES
Chief Complaint   Patient presents with    Hearing Loss     bilateral       HPI:  Eliza Louie is a very pleasant 71 y.o. female here to see me today for the first time for evaluation of hearing loss. She reports hearing loss that has been gradually progressing over the last few years.  She has noted any difference in hearing between the ears, with the left ear being the worse hearing ear.  She has noted any tinnitus in both ears. She has not had any recent issues with ear pain or ear drainage.  She denies a family history of hearing loss, and has not had any previous otologic surgery. She denies any history of significant loud noise exposure.She denies issues with dizziness.        Past Medical History:   Diagnosis Date    (HFpEF) heart failure with preserved ejection fraction     TTE (2020) w/ EF 55% and grade II diastolic dysfunction    Anemia due to stage 4 chronic kidney disease 8/16/2021    Gastric ulcer due to Helicobacter pylori 07/2017    Gastrointestinal hemorrhage with melena 07/2017    GERD (gastroesophageal reflux disease)     HLD (hyperlipidemia)     Hydronephrosis with ureteropelvic junction (UPJ) obstruction 8/16/2021    Hypertension     MDD (major depressive disorder) 8/16/2021    Nephrolithiasis 8/25/2021    NSTEMI (non-ST elevated myocardial infarction) 07/2017    Paroxysmal A-fib 12/24/2020    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/2020    Stage 4 chronic kidney disease 10/19/2021    STEMI (ST elevation myocardial infarction) 12/2020    Type 2 diabetes mellitus     Urge incontinence of urine      Social History     Socioeconomic History    Marital status:    Tobacco Use    Smoking status: Never Smoker    Smokeless tobacco: Never Used   Substance and Sexual Activity    Alcohol use: No    Drug use: No    Sexual activity: Not Currently     Social Determinants of Health     Financial Resource Strain: Low Risk     Difficulty of Paying  Living Expenses: Not very hard   Food Insecurity: No Food Insecurity    Worried About Running Out of Food in the Last Year: Never true    Ran Out of Food in the Last Year: Never true   Transportation Needs: No Transportation Needs    Lack of Transportation (Medical): No    Lack of Transportation (Non-Medical): No   Physical Activity: Insufficiently Active    Days of Exercise per Week: 2 days    Minutes of Exercise per Session: 20 min   Stress: No Stress Concern Present    Feeling of Stress : Not at all   Social Connections: Moderately Integrated    Frequency of Communication with Friends and Family: More than three times a week    Frequency of Social Gatherings with Friends and Family: Twice a week    Attends Sabianist Services: More than 4 times per year    Active Member of Clubs or Organizations: No    Attends Club or Organization Meetings: Never    Marital Status:    Housing Stability: Low Risk     Unable to Pay for Housing in the Last Year: No    Number of Places Lived in the Last Year: 1    Unstable Housing in the Last Year: No     Past Surgical History:   Procedure Laterality Date    CARDIAC CATHETERIZATION  07/2017    RCA w/ 95% stenosis w/ stent placement    RETROGRADE PYELOGRAPHY  8/25/2021    Procedure: PYELOGRAM, RETROGRADE;  Surgeon: Rody Smith MD;  Location: [facility];  Service: Urology;;    URETEROSCOPIC REMOVAL OF URETERIC CALCULUS Left 8/25/2021    Procedure: left ureteroscopy, stone basketing, stent placement;  left nephrostomy tube removal;  Surgeon: Rody Smith MD;  Location: [facility];  Service: Urology;  Laterality: Left;     No family history on file.      Review of Systems  General: negative for chills, fever or weight loss  Psychological: negative for mood changes or depression  Ophthalmic: negative for blurry vision, photophobia, eye pain or swelling  ENT: see HPI  Respiratory: no cough, shortness of breath, or wheezing  Cardiovascular: no chest pain or dyspnea  on exertion  Gastrointestinal: no abdominal pain, change in bowel habits, or black/ bloody stools  Musculoskeletal: negative for gait disturbance or muscular weakness  Neurological: no syncope or seizures; no ataxia  Dermatological: negative for pruritis,  rash and jaundice  Hematologic/lymphatic: no easy bruising, no new adenopathy      Physical Exam:    Vitals:    01/26/22 0946   BP: 123/69   Pulse: 61       Constitutional: Well appearing / communicating without difficutly.  NAD.  Eyes: EOM I Bilaterally  Head/Face: Normocephalic.  Negative paranasal sinus pressure/tenderness.  Salivary glands WNL.  House Brackmann I Bilaterally.    Right Ear: Auricle normal appearance. External Auditory Canal within normal limits no lesions or masses,TM w/o masses/lesions/perforations. TM mobility restricted, insufflate with Valsalva.  Left Ear: Auricle normal appearance. External Auditory Canal within normal limits no lesions or masses,TM w/o masses/lesions/perforations. TM mobility restricted, insufflate with Valsalva, serous middle ear effusion.    Nose: No gross nasal septal deviation. Inferior Turbinates 3+ bilaterally.  Erythematous and allergic mucosa throughout, scant thick secretions, mostly clear.  No septal perforation. No masses/lesions. External nasal skin appears normal without masses/lesions.  Oral Cavity: Gingiva/lips within normal limits.  Dentition/gingiva healthy appearing. Mucus membranes moist. Floor of mouth soft, no masses palpated. Oral Tongue mobile. Hard Palate appears normal.    Oropharynx: Base of tongue appears normal. No masses/lesions noted. Tonsillar fossa/pharyngeal wall without lesions. Posterior oropharynx WNL.  Soft palate without masses. Midline uvula.   Neck/Lymphatic: No LAD I-VI bilaterally.  No thyromegaly.  No masses noted on exam.    Mirror laryngoscopy/nasopharyngoscopy: Active gag reflex.  Unable to perform.    Neuro/Psychiatric: AOx3.  Normal mood and affect.   Cardiovascular: Normal  carotid pulses bilaterally, no increasing jugular venous distention noted at cervical region bilaterally.    Respiratory: Normal respiratory effort, no stridor, no retractions noted.      Audiogram:  Interpreted personally by myself and discussed in detail with the patient today.  Mixed hearing loss on the left with flat tympanogram.  Moderate to severe hearing loss.  Right side with moderate to severe sensorineural loss.  Discrim good bilaterally.          Assessment:    ICD-10-CM ICD-9-CM    1. Sensorineural hearing loss (SNHL) of right ear with restricted hearing of left ear  H90.A21 389.22 Ambulatory referral/consult to Audiology   2. Mixed conductive and sensorineural hearing loss of left ear with restricted hearing of right ear  H90.A32 389.22    3. Tinnitus of both ears  H93.13 388.30    4. Allergic rhinitis, unspecified seasonality, unspecified trigger  J30.9 477.9    5. Dysfunction of both eustachian tubes  H69.83 381.81      The primary encounter diagnosis was Sensorineural hearing loss (SNHL) of right ear with restricted hearing of left ear. Diagnoses of Mixed conductive and sensorineural hearing loss of left ear with restricted hearing of right ear, Tinnitus of both ears, Allergic rhinitis, unspecified seasonality, unspecified trigger, and Dysfunction of both eustachian tubes were also pertinent to this visit.      Plan:  No orders of the defined types were placed in this encounter.        1.  I discussed the documented hearing loss in this setting, as well as candidacy for amplification.   2.  The patient is interested in hearing aids and will call her insurance carrier for any benefits or discounts before deciding upon where to pursue hearing aids.  3.   Annual audiograms recommended, as well as ear protection when exposed to loud noise.  4.  We had a long discussion regarding the anatomy and function of the eustachian tube.  We discussed that the eustachian tube acts as a pump to keep the appropriate  amount of pressure behind the ear drum.  I gave the patient a prescription for a nasal steroid spray to be used on a daily basis, and we discussed that it will take 2-3 weeks of daily use to achieve maximal effectiveness.  We also discussed otologic valsalva daily for gently depressurizing the middle ear.    5. The patient will try a course of nasal steroid and/or nasal antihistamine for the rhinitis issues.  she will use it as instructed daily.  she was instructed that these medications may take 2-3 weeks of consistent use before they will be at peak efficacy.  She may also add oral antihistamines as needed for control of symptoms.    Follow up in 4 mos for tymps and recheck ears, annual audiogram in one year.       Tomeka Paredes MD

## 2022-01-26 NOTE — PATIENT INSTRUCTIONS
We reviewed the patient's recent audiogram and hearing loss in detail.  We also discussed that she is a good candidate for hearing aids, if and when she the patient is motivated.  She was given handouts with information and pricing of hearing aids, and will contact audiology when ready to proceed.  We also discussed the use hearing protection when exposed to loud noise, including lawn equipment.     The patient was given a prescription for a nasal steroid and/or nasal antihistamine nasal spray and we discussed in detail the proper mechanism of use directing the spray away from the nasal septum.  The patient was also instructed to take a daily oral antihistamine (Claritin, Zyrtec, Allegra, or Xyzal).    In addition, we also discussed that it will take 3-4 weeks of daily use to achieve maximal effectiveness.  The patient will please call in 3-4 weeks with their progress.  If allergy symptoms persist at that time, we could consider additional medications and possibly allergy testing.    We had a long discussion regarding the anatomy and function of the eustachian tube.  We discussed that the eustachian tube acts as a pump to keep the appropriate amount of pressure behind the ear drum.  I gave the patient a prescription for a nasal steroid spray to be used on a daily basis, and we discussed that it will take 2-3 weeks of daily use to achieve maximal effectiveness.  We also discussed otologic valsalva daily for gently depressurizing the middle ear.        How do you use a Nasal Corticosteroid Spray?    Make sure you understand your dosing instructions. Spray only the number of prescribed sprays in each nostril. Read the package instructions before using your spray the first time.    Most corticosteroid sprays suggest the following steps:    Wash your hands well.    Gently blow your nose to clear the passageway.    Shake the container several times.    Tilt your head slightly downward.  Use the opposite hand from the  nostril you will be spraying to hold the spray bottle.    Block one nostril with your finger.  Insert the nasal applicator into the other nostril.    Aim the spray toward the outer wall of the nostril.  Inhale slowly through the nose and press the .    Breathe out and repeat to apply the prescribed number of sprays.  Repeat these steps for the other nostril.     Avoid sneezing or blowing your nose right after spraying.

## 2022-05-31 ENCOUNTER — PATIENT MESSAGE (OUTPATIENT)
Dept: NEPHROLOGY | Facility: CLINIC | Age: 72
End: 2022-05-31
Payer: MEDICARE

## 2022-06-02 ENCOUNTER — LAB VISIT (OUTPATIENT)
Dept: LAB | Facility: HOSPITAL | Age: 72
End: 2022-06-02
Attending: INTERNAL MEDICINE
Payer: MEDICARE

## 2022-06-02 ENCOUNTER — OFFICE VISIT (OUTPATIENT)
Dept: NEPHROLOGY | Facility: CLINIC | Age: 72
End: 2022-06-02
Payer: MEDICARE

## 2022-06-02 VITALS
WEIGHT: 183.19 LBS | SYSTOLIC BLOOD PRESSURE: 160 MMHG | BODY MASS INDEX: 32.46 KG/M2 | TEMPERATURE: 98 F | RESPIRATION RATE: 18 BRPM | HEART RATE: 66 BPM | DIASTOLIC BLOOD PRESSURE: 78 MMHG | HEIGHT: 63 IN

## 2022-06-02 DIAGNOSIS — E11.65 TYPE 2 DIABETES MELLITUS WITH HYPERGLYCEMIA, WITH LONG-TERM CURRENT USE OF INSULIN: ICD-10-CM

## 2022-06-02 DIAGNOSIS — Z87.442 HISTORY OF NEPHROLITHIASIS: ICD-10-CM

## 2022-06-02 DIAGNOSIS — N18.4 STAGE 4 CHRONIC KIDNEY DISEASE: Primary | ICD-10-CM

## 2022-06-02 DIAGNOSIS — I10 PRIMARY HYPERTENSION: ICD-10-CM

## 2022-06-02 DIAGNOSIS — Z79.4 TYPE 2 DIABETES MELLITUS WITH HYPERGLYCEMIA, WITH LONG-TERM CURRENT USE OF INSULIN: ICD-10-CM

## 2022-06-02 DIAGNOSIS — N18.4 STAGE 4 CHRONIC KIDNEY DISEASE: ICD-10-CM

## 2022-06-02 LAB
ALBUMIN SERPL BCP-MCNC: 3.8 G/DL (ref 3.5–5.2)
ALP SERPL-CCNC: 88 U/L (ref 55–135)
ALT SERPL W/O P-5'-P-CCNC: 17 U/L (ref 10–44)
ANION GAP SERPL CALC-SCNC: 12 MMOL/L (ref 8–16)
AST SERPL-CCNC: 22 U/L (ref 10–40)
BASOPHILS # BLD AUTO: 0.06 K/UL (ref 0–0.2)
BASOPHILS NFR BLD: 0.7 % (ref 0–1.9)
BILIRUB SERPL-MCNC: 0.5 MG/DL (ref 0.1–1)
BUN SERPL-MCNC: 36 MG/DL (ref 8–23)
CALCIUM SERPL-MCNC: 9.6 MG/DL (ref 8.7–10.5)
CHLORIDE SERPL-SCNC: 97 MMOL/L (ref 95–110)
CO2 SERPL-SCNC: 26 MMOL/L (ref 23–29)
CREAT SERPL-MCNC: 2.6 MG/DL (ref 0.5–1.4)
DIFFERENTIAL METHOD: NORMAL
EOSINOPHIL # BLD AUTO: 0.5 K/UL (ref 0–0.5)
EOSINOPHIL NFR BLD: 5.5 % (ref 0–8)
ERYTHROCYTE [DISTWIDTH] IN BLOOD BY AUTOMATED COUNT: 13.8 % (ref 11.5–14.5)
EST. GFR  (AFRICAN AMERICAN): 21 ML/MIN/1.73 M^2
EST. GFR  (NON AFRICAN AMERICAN): 18 ML/MIN/1.73 M^2
ESTIMATED AVG GLUCOSE: 269 MG/DL (ref 68–131)
GLUCOSE SERPL-MCNC: 312 MG/DL (ref 70–110)
HBA1C MFR BLD: 11 % (ref 4–5.6)
HCT VFR BLD AUTO: 38 % (ref 37–48.5)
HGB BLD-MCNC: 12.5 G/DL (ref 12–16)
IMM GRANULOCYTES # BLD AUTO: 0.02 K/UL (ref 0–0.04)
IMM GRANULOCYTES NFR BLD AUTO: 0.2 % (ref 0–0.5)
LYMPHOCYTES # BLD AUTO: 3.5 K/UL (ref 1–4.8)
LYMPHOCYTES NFR BLD: 41.5 % (ref 18–48)
MAGNESIUM SERPL-MCNC: 2.1 MG/DL (ref 1.6–2.6)
MCH RBC QN AUTO: 27.8 PG (ref 27–31)
MCHC RBC AUTO-ENTMCNC: 32.9 G/DL (ref 32–36)
MCV RBC AUTO: 84 FL (ref 82–98)
MONOCYTES # BLD AUTO: 0.7 K/UL (ref 0.3–1)
MONOCYTES NFR BLD: 8.1 % (ref 4–15)
NEUTROPHILS # BLD AUTO: 3.7 K/UL (ref 1.8–7.7)
NEUTROPHILS NFR BLD: 44 % (ref 38–73)
NRBC BLD-RTO: 0 /100 WBC
PHOSPHATE SERPL-MCNC: 3.4 MG/DL (ref 2.7–4.5)
PLATELET # BLD AUTO: 264 K/UL (ref 150–450)
PMV BLD AUTO: 11.1 FL (ref 9.2–12.9)
POTASSIUM SERPL-SCNC: 4.4 MMOL/L (ref 3.5–5.1)
PROT SERPL-MCNC: 8.6 G/DL (ref 6–8.4)
RBC # BLD AUTO: 4.5 M/UL (ref 4–5.4)
SODIUM SERPL-SCNC: 135 MMOL/L (ref 136–145)
URATE SERPL-MCNC: 10.9 MG/DL (ref 2.4–5.7)
WBC # BLD AUTO: 8.31 K/UL (ref 3.9–12.7)

## 2022-06-02 PROCEDURE — 3077F PR MOST RECENT SYSTOLIC BLOOD PRESSURE >= 140 MM HG: ICD-10-PCS | Mod: CPTII,S$GLB,, | Performed by: INTERNAL MEDICINE

## 2022-06-02 PROCEDURE — 1125F AMNT PAIN NOTED PAIN PRSNT: CPT | Mod: CPTII,S$GLB,, | Performed by: INTERNAL MEDICINE

## 2022-06-02 PROCEDURE — 3288F PR FALLS RISK ASSESSMENT DOCUMENTED: ICD-10-PCS | Mod: CPTII,S$GLB,, | Performed by: INTERNAL MEDICINE

## 2022-06-02 PROCEDURE — 99999 PR PBB SHADOW E&M-EST. PATIENT-LVL IV: CPT | Mod: PBBFAC,,, | Performed by: INTERNAL MEDICINE

## 2022-06-02 PROCEDURE — 85025 COMPLETE CBC W/AUTO DIFF WBC: CPT | Performed by: INTERNAL MEDICINE

## 2022-06-02 PROCEDURE — 3077F SYST BP >= 140 MM HG: CPT | Mod: CPTII,S$GLB,, | Performed by: INTERNAL MEDICINE

## 2022-06-02 PROCEDURE — 84100 ASSAY OF PHOSPHORUS: CPT | Performed by: INTERNAL MEDICINE

## 2022-06-02 PROCEDURE — 3008F BODY MASS INDEX DOCD: CPT | Mod: CPTII,S$GLB,, | Performed by: INTERNAL MEDICINE

## 2022-06-02 PROCEDURE — 3066F NEPHROPATHY DOC TX: CPT | Mod: CPTII,S$GLB,, | Performed by: INTERNAL MEDICINE

## 2022-06-02 PROCEDURE — 83036 HEMOGLOBIN GLYCOSYLATED A1C: CPT | Performed by: INTERNAL MEDICINE

## 2022-06-02 PROCEDURE — 3078F PR MOST RECENT DIASTOLIC BLOOD PRESSURE < 80 MM HG: ICD-10-PCS | Mod: CPTII,S$GLB,, | Performed by: INTERNAL MEDICINE

## 2022-06-02 PROCEDURE — 99213 PR OFFICE/OUTPT VISIT, EST, LEVL III, 20-29 MIN: ICD-10-PCS | Mod: S$GLB,,, | Performed by: INTERNAL MEDICINE

## 2022-06-02 PROCEDURE — 1101F PR PT FALLS ASSESS DOC 0-1 FALLS W/OUT INJ PAST YR: ICD-10-PCS | Mod: CPTII,S$GLB,, | Performed by: INTERNAL MEDICINE

## 2022-06-02 PROCEDURE — 3288F FALL RISK ASSESSMENT DOCD: CPT | Mod: CPTII,S$GLB,, | Performed by: INTERNAL MEDICINE

## 2022-06-02 PROCEDURE — 3051F PR MOST RECENT HEMOGLOBIN A1C LEVEL 7.0 - < 8.0%: ICD-10-PCS | Mod: CPTII,S$GLB,, | Performed by: INTERNAL MEDICINE

## 2022-06-02 PROCEDURE — 4010F ACE/ARB THERAPY RXD/TAKEN: CPT | Mod: CPTII,S$GLB,, | Performed by: INTERNAL MEDICINE

## 2022-06-02 PROCEDURE — 83735 ASSAY OF MAGNESIUM: CPT | Performed by: INTERNAL MEDICINE

## 2022-06-02 PROCEDURE — 3008F PR BODY MASS INDEX (BMI) DOCUMENTED: ICD-10-PCS | Mod: CPTII,S$GLB,, | Performed by: INTERNAL MEDICINE

## 2022-06-02 PROCEDURE — 99999 PR PBB SHADOW E&M-EST. PATIENT-LVL IV: ICD-10-PCS | Mod: PBBFAC,,, | Performed by: INTERNAL MEDICINE

## 2022-06-02 PROCEDURE — 3066F PR DOCUMENTATION OF TREATMENT FOR NEPHROPATHY: ICD-10-PCS | Mod: CPTII,S$GLB,, | Performed by: INTERNAL MEDICINE

## 2022-06-02 PROCEDURE — 36415 COLL VENOUS BLD VENIPUNCTURE: CPT | Performed by: INTERNAL MEDICINE

## 2022-06-02 PROCEDURE — 80053 COMPREHEN METABOLIC PANEL: CPT | Performed by: INTERNAL MEDICINE

## 2022-06-02 PROCEDURE — 1159F PR MEDICATION LIST DOCUMENTED IN MEDICAL RECORD: ICD-10-PCS | Mod: CPTII,S$GLB,, | Performed by: INTERNAL MEDICINE

## 2022-06-02 PROCEDURE — 4010F PR ACE/ARB THEARPY RXD/TAKEN: ICD-10-PCS | Mod: CPTII,S$GLB,, | Performed by: INTERNAL MEDICINE

## 2022-06-02 PROCEDURE — 99213 OFFICE O/P EST LOW 20 MIN: CPT | Mod: S$GLB,,, | Performed by: INTERNAL MEDICINE

## 2022-06-02 PROCEDURE — 1101F PT FALLS ASSESS-DOCD LE1/YR: CPT | Mod: CPTII,S$GLB,, | Performed by: INTERNAL MEDICINE

## 2022-06-02 PROCEDURE — 1159F MED LIST DOCD IN RCRD: CPT | Mod: CPTII,S$GLB,, | Performed by: INTERNAL MEDICINE

## 2022-06-02 PROCEDURE — 1125F PR PAIN SEVERITY QUANTIFIED, PAIN PRESENT: ICD-10-PCS | Mod: CPTII,S$GLB,, | Performed by: INTERNAL MEDICINE

## 2022-06-02 PROCEDURE — 3078F DIAST BP <80 MM HG: CPT | Mod: CPTII,S$GLB,, | Performed by: INTERNAL MEDICINE

## 2022-06-02 PROCEDURE — 3051F HG A1C>EQUAL 7.0%<8.0%: CPT | Mod: CPTII,S$GLB,, | Performed by: INTERNAL MEDICINE

## 2022-06-02 PROCEDURE — 84550 ASSAY OF BLOOD/URIC ACID: CPT | Performed by: INTERNAL MEDICINE

## 2022-06-02 RX ORDER — AMLODIPINE BESYLATE 10 MG/1
10 TABLET ORAL DAILY
Qty: 90 TABLET | Refills: 3 | Status: SHIPPED | OUTPATIENT
Start: 2022-06-02 | End: 2022-08-01

## 2022-06-02 NOTE — PROGRESS NOTES
[facility] Consult      History of Present Illness:  Reason for Consultation: CKD 4  Referring Provider: Dr. Fan     History of Present Illness:  Eliza Louie is a 70 y.o. female with past medical history of Htn who presents for CKD 4 and hypertension.  No adverse sequele from fall injury on last visit. She is without c/o CP, SOB, N/V, C/F, D/C. No new episodes with kidney stones.  Sts got COVID Vaccine X 2, no booster.The patient denies to bad taste in mouth, denies any itching or involuntary movements. She has significant hx of diabetes and hypertension, CAD. Pt without labs. Obtain today/    Past Medical History:  Past Medical History:   Diagnosis Date    (HFpEF) heart failure with preserved ejection fraction     TTE (2020) w/ EF 55% and grade II diastolic dysfunction    Anemia due to stage 4 chronic kidney disease 8/16/2021    Gastric ulcer due to Helicobacter pylori 07/2017    Gastrointestinal hemorrhage with melena 07/2017    GERD (gastroesophageal reflux disease)     HLD (hyperlipidemia)     Hydronephrosis with ureteropelvic junction (UPJ) obstruction 8/16/2021    Hypertension     MDD (major depressive disorder) 8/16/2021    Nephrolithiasis 8/25/2021    NSTEMI (non-ST elevated myocardial infarction) 07/2017    Paroxysmal A-fib 12/24/2020    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/2020    Stage 4 chronic kidney disease 10/19/2021    STEMI (ST elevation myocardial infarction) 12/2020    Type 2 diabetes mellitus     Urge incontinence of urine        Past Surgical History:  Past Surgical History:   Procedure Laterality Date    CARDIAC CATHETERIZATION  07/2017    RCA w/ 95% stenosis w/ stent placement    RETROGRADE PYELOGRAPHY  8/25/2021    Procedure: PYELOGRAM, RETROGRADE;  Surgeon: Rody Smith MD;  Location: [facility];  Service: Urology;;    URETEROSCOPIC REMOVAL OF URETERIC CALCULUS Left 8/25/2021    Procedure: left ureteroscopy, stone basketing,  stent placement;  left nephrostomy tube removal;  Surgeon: Rody Smith MD;  Location[facility];  Service: Urology;  Laterality: Left;       Family History:  History reviewed. No pertinent family history.    Social History:  Social History     Socioeconomic History    Marital status:    Tobacco Use    Smoking status: Never Smoker    Smokeless tobacco: Never Used   Substance and Sexual Activity    Alcohol use: No    Drug use: No    Sexual activity: Not Currently     Social Determinants of Health     Financial Resource Strain: Low Risk     Difficulty of Paying Living Expenses: Not very hard   Food Insecurity: No Food Insecurity    Worried About Running Out of Food in the Last Year: Never true    Ran Out of Food in the Last Year: Never true   Transportation Needs: No Transportation Needs    Lack of Transportation (Medical): No    Lack of Transportation (Non-Medical): No   Physical Activity: Insufficiently Active    Days of Exercise per Week: 2 days    Minutes of Exercise per Session: 20 min   Stress: No Stress Concern Present    Feeling of Stress : Not at all   Social Connections: Moderately Integrated    Frequency of Communication with Friends and Family: More than three times a week    Frequency of Social Gatherings with Friends and Family: Twice a week    Attends Rastafari Services: More than 4 times per year    Active Member of Clubs or Organizations: No    Attends Club or Organization Meetings: Never    Marital Status:    Housing Stability: Low Risk     Unable to Pay for Housing in the Last Year: No    Number of Places Lived in the Last Year: 1    Unstable Housing in the Last Year: No       Home Medications:   (Not in a hospital admission)      Allergies:  Ace inhibitors    Review of Systems:  10 point review of systems was conducted and was negative except as mentioned in the HPI.    Physical Exam:  Vitals:  Vitals:    06/02/22 1316   BP: (!) 160/78   Pulse: 66   Resp: 18    Temp: 98.2 °F (36.8 °C)     [unfilled]      Exam    General: No acute distress, well groomed, alert and oriented x 3  HEENT: Normocephalic, atraumatic, EOM's intact bilaterally, external inspection of ears and nose normal, moist mucous membranes, no oral ulcerations/lesions  Neck: Supple, symmetrical, trachea midline, no thyromegaly, no JVD  Respiratory: Clear to auscultation bilaterally, respirations unlabored, no rales/rhonchi/wheezing  Cardiovacular: Regular rate and rhythm, S1, S2 normal, no murmurs, rubs or gallops  Gastrointestinal: Soft, non-tender, bowel sounds normal, no hepatosplenomegaly  Musculoskeletal: No knee or ankle joint tenderness or swelling.   Extremities: No clubbing or cyanosis of bilateral upper extremities; no lower extremity edema bilaterally, radial pulses 2+ bilaterally, symmetric  Skin: warm and dry; no rash on exposed skin  Neurologic: CN grossly intact. No asterixis.    Labs:  A1C:  Recent Labs   Lab 08/04/21  0752 08/15/21  1919 10/21/21  0800   Hemoglobin A1C 7.2 H 7.4 H 7.7 H     CBC:  Recent Labs   Lab 08/30/21  0533 09/29/21  0826 10/21/21  0800   WBC 9.04 8.22 7.27   RBC 3.35 L 3.52 L 3.83 L   Hemoglobin 10.0 L 10.2 L 11.3 L   Hematocrit 30.0 L 31.8 L 35.0 L   Platelets 251 223 225   MCV 90 90 91   MCH 29.9 29.0 29.5   MCHC 33.3 32.1 32.3     CMP:  Recent Labs   Lab 10/21/21  0800   Glucose 198 H   Calcium 9.6   Albumin 4.0   Total Protein 7.8   Sodium 138   Potassium 4.2   CO2 23   Chloride 102   BUN 37 H   Creatinine 2.9 H   Alkaline Phosphatase 75   ALT 19   AST 25   Total Bilirubin 0.4   eGFR if  18 A     LIPIDS:  Recent Labs   Lab 06/04/19  0920 12/21/20  0619 08/04/21  0752 08/15/21  1919 08/26/21  0841   TSH  --    < > 2.134 1.193 0.999   HDL 36 L  --  29 L  --   --    Cholesterol 134  --  155  --   --    Triglycerides 258 H  --  449 H  --   --    LDL Cholesterol 46.4 L  --  Invalid, Trig>400.0  --   --    HDL/Cholesterol Ratio 26.9  --  18.7 L  --   --     Non-HDL Cholesterol 98  --  126  --   --    Total Cholesterol/HDL Ratio 3.7  --  5.3 H  --   --     < > = values in this interval not displayed.     TSH:  Recent Labs   Lab 08/04/21  0752 08/15/21  1919 08/26/21  0841   TSH 2.134 1.193 0.999     URINALYSIS:  No results for input(s): COLORU, CLARITYU, SPECGRAV, PHUR, PROTEINUA, GLUCOSEU, BILIRUBINCON, BLOODU, WBCU, RBCU, BACTERIA, MUCUS, NITRITE, LEUKOCYTESUR, UROBILINOGEN, HYALINECASTS in the last 2160 hours.     Lab Results   Component Value Date    WBC 7.27 10/21/2021    HGB 11.3 (L) 10/21/2021    HCT 35.0 (L) 10/21/2021     10/21/2021    K 4.2 10/21/2021     10/21/2021    CO2 23 10/21/2021    BUN 37 (H) 10/21/2021    CREATININE 2.9 (H) 10/21/2021    EGFRNONAA 16 (A) 10/21/2021    CALCIUM 9.6 10/21/2021    PHOS 4.7 (H) 10/21/2021    MG 2.0 10/21/2021    ALBUMIN 4.0 10/21/2021    AST 25 10/21/2021    ALT 19 10/21/2021       No results found for: EXTANC, EXTWBC, EXTSEGS, EXTPLATELETS, EXTHEMOGLOBI, EXTHEMATOCRI, EXTCREATININ, EXTSODIUM, EXTPOTASSIUM, EXTBUN, EXTCO2, EXTCALCIUM, EXTPHOSPHORU, EXTGLUCOSE, EXTALBUMIN, EXTAST, EXTALT, EXTBILITOTAL, EXTLIPASE, EXTAMYLASE    No results found for: EXTCYCLOSLVL, EXTSIROLIMUS, EXTTACROLVL, EXTPROTCRE, EXTPTHINTACT, EXTPROTEINUA, EXTWBCUA, EXTRBCUA    Imaging Studies: n/a  ?    Assessment/Plan:  70 y.o. female with:     1- CKD 4 - Pt is noted with significant renal failure. Renal function is stable with GFR of 15.  She is non-oliguric.  Avoid NSAIDS/PPI/Increase fluid intake.     2. Proteinuria - Patient needs to be on an ARB if possible. She is supposedly allergic to ACEI, due to it causing cough.  It should be OK to take ARB/Losartan     3. Hypertension - essential.  Appears to be controlled with meds.     4. Diabetes 2 - Needs tighter control.  Currently on Insulin. Seeing Dr. Fan.     5. Hypomagnesemia - Replete with Mag Oxide 400 mg bid. .      6. CAD - Plan f/u with Dr. Hernández      RTC in 2  months  Tino Shields,   [facility]

## 2022-07-10 NOTE — PROGRESS NOTES
Emergency Department Encounter      NAME: Cyril Galdamez  AGE: 85 year old male  YOB: 1936  MRN: [id number]  EVALUATION DATE & TIME: 7/10/2022  4:34 PM    PCP: Angel Sung    ED PROVIDER: Carmine Pelletier M.D.      Chief Complaint   Patient presents with     Hip Pain         FINAL IMPRESSION:  1. Hip pain, right        MEDICAL DECISION MAKIN:42 PM I met with the patient and his wife, obtained history, performed an initial exam, and discussed options and plan for diagnostics and treatment here in the ED.   8:02 PM RN updated me that the patient completed a successful ambulation trial. He is safe for discharge. Reviewed supportive cares, symptomatic treatment, outpatient follow up, and reasons to return to the Emergency Department.     This patient is a 85-year-old male who presents with right hip pain.  It sounds like he had a fairly minimal mechanism of injury at home.  He says that he was walking and bumped his right hip into the edge of a table.  He uses a walker at home but even using the walker he is unable to ambulate well because of his right hip pain.  He says the pain is worse with any movement of the hip.  He does have chronic low back pain but this is not changed.  On exam he had tenderness in the right hip.  I ordered a x-ray of the hip and pelvis.  This did not show any fracture or dislocation.  Patient's pain was treated in the ER with a dose of IM Dilaudid which worked well for him.  I will send him home with a prescription for oxycodone to use for the pain in addition to the Tylenol.    Pertinent Labs & Imaging studies reviewed. (See chart for details)    The importance of close follow up was discussed. We reviewed warning signs and symptoms, and I instructed Mr. Galdamez to return to the emergency department immediately if he develops any new or worsening symptoms. I provided additional verbal discharge instructions. Mr. Galdamez expressed understanding and  Feels great and without any complaints. Ambulating in room without effort   VS: BP's in the 140-150 range  I/O: over 2 liters out  Lungs: still with few fine crackles bilateral bases   Heart: no rubs noted; no murmurs nored    Labs: GFR worse K 3.0    Meds: ASA  plavix  eliquis  Lasix 40 BID  Insulin  Losartan 100 mg which was started on the 24th at 50  Metoprolol succiinate 25 mg  crestor 40 mg    Imp: Acute MI: stabe    Recs: continue asa, plavix and eliquis for one week more and discontinue ASA. Since she does not have a new stent, no need to keep the triple therapy for one month  2. Consider holding losartan in view of the abnormal renal function which is worsening and consider amlodipine for blood pressure control  3. From cardiac point of view, once she is discharged, she would need a stress test for risk stratification.   agreement with this plan of care, his questions were answered, and he was discharged in stable condition.       MEDICATIONS GIVEN IN THE EMERGENCY:  Medications   HYDROmorphone (DILAUDID) injection 1 mg (1 mg Intramuscular Given 7/10/22 1754)   acetaminophen (TYLENOL) tablet 975 mg (975 mg Oral Given 7/10/22 1751)       NEW PRESCRIPTIONS STARTED AT TODAY'S ER VISIT:  Discharge Medication List as of 7/10/2022  8:33 PM      START taking these medications    Details   oxyCODONE (ROXICODONE) 5 MG tablet Take 1 tablet (5 mg) by mouth every 4 hours as needed for pain, Disp-12 tablet, R-0, Local Print                =================================================================    HPI    Patient information was obtained from: patient    Use of : N/A         Cyril Galdamez is a 85 year old male with a past medical history of spinal stenosis of lumbar region, BPPV, and anemia, who presents via private vehicle with wife for evaluation of hip pain.    Patient reports onset of 9-10/10 right hip pain that began after he bumped into a table. He uses a walker at baseline and has been ambulate with this assistance since symptom onset. He did not take any medications PTA. Patient also endorses chronic lower back pain that is unchanged today. Otherwise denies any other symptoms or concerns at this time.        REVIEW OF SYSTEMS   Review of Systems   Constitutional: Negative for chills and fever.   Respiratory: Negative for chest tightness and shortness of breath.    Cardiovascular: Negative for chest pain.   Gastrointestinal: Negative for abdominal pain, diarrhea, nausea and vomiting.   Genitourinary: Negative for difficulty urinating, enuresis, hematuria and urgency.   Musculoskeletal: Positive for arthralgias (right hip) and back pain (chronic lower). Negative for gait problem, joint swelling, myalgias and neck pain.   Neurological: Negative for weakness and numbness.   All other systems reviewed and are  negative.       PAST MEDICAL HISTORY:  No past medical history on file.    PAST SURGICAL HISTORY:  No past surgical history on file.    CURRENT MEDICATIONS:    No current facility-administered medications for this encounter.    Current Outpatient Medications:      oxyCODONE (ROXICODONE) 5 MG tablet, Take 1 tablet (5 mg) by mouth every 4 hours as needed for pain, Disp: 12 tablet, Rfl: 0     Acetaminophen 325 MG CAPS, Take 2 tablets by mouth, Disp: , Rfl:      albuterol (PROAIR HFA/PROVENTIL HFA/VENTOLIN HFA) 108 (90 Base) MCG/ACT inhaler, Inhale 1 puff into the lungs, Disp: , Rfl:      amLODIPine (NORVASC) 5 MG tablet, Take 1 tablet (5 mg) by mouth daily, Disp: 90 tablet, Rfl: 11     atorvastatin (LIPITOR) 10 MG tablet, Take 1 tablet (10 mg) by mouth daily, Disp: 90 tablet, Rfl: 11     chlorthalidone (HYGROTON) 25 MG tablet, Take 1 tablet (25 mg) by mouth daily, Disp: 90 tablet, Rfl: 11     DULoxetine (CYMBALTA) 20 MG capsule, Take 1 capsule (20 mg) by mouth 2 times daily, Disp: 60 capsule, Rfl: 1     lisinopril (ZESTRIL) 40 MG tablet, Take 1 tablet (40 mg) by mouth daily, Disp: 90 tablet, Rfl: 11     Lutein 20 MG TABS, , Disp: , Rfl:      ramelteon (ROZEREM) 8 MG tablet, Take 1 tablet (8 mg) by mouth At Bedtime, Disp: 30 tablet, Rfl: 3    ALLERGIES:  Allergies   Allergen Reactions     Shellfish Allergy Anaphylaxis       FAMILY HISTORY:  No family history on file.    SOCIAL HISTORY:   Social History     Socioeconomic History     Marital status:    Tobacco Use     Smoking status: Never Smoker     Smokeless tobacco: Never Used       PHYSICAL EXAM:    Vitals: /60   Pulse 65   Temp 98.6  F (37  C) (Oral)   Resp 16   Wt 82.6 kg (182 lb)   SpO2 98%   BMI 24.68 kg/m     Constitutional: Well developed, well nourished. Comfortable appearing.  HEAD:Normocephalic, atraumatic,   Eyes: PERRLA, EOM intact, conjunctiva clear, no discharge  ENT: mucous membranes moist, nose normal.   Neck- Supple, gross ROM  intact.  No JVD.  No palpable nodes.  Pulmonary: Clear to auscultation bilaterally, no respiratory distress, no wheezing, speaks full sentences easily.  Chest: No chest wall tenderness  Cardiovascular: Normal heart rate, regular rhythm, no murmurs. No lower extremity edema, 2+ DP pulses.   GI: Soft, no tenderness to deep palpation in all quadrants, not distended, no masses.  No hepatosplenomegaly.  Musculoskeletal: Moving all 4 extremities intentionally. Tenderness over the right hip, worse with log roll. Pelvis is stable and nontender. No calf tenderness or swelling. Toes are neurovascularly intact. No obvious deformity.  Back: No CVA tenderness  Skin: Warm, dry, no rash.  Neurologic: Alert & oriented x 3, speech clear, moving all extremities spontaneously   Psychiatric: Affect normal, cooperative.       RADIOLOGY:  XR Pelvis and Hip Right 2 Views   Final Result   IMPRESSION: No evidence of a right hip fracture. No dislocation. Moderate hip degenerative change.      Postoperative changes lumbar spine from prior Coflex device placement.            I, Joaquina Harp, am serving as a scribe to document services personally performed by Dr. Carmine Pelletier based on my observation and the provider's statements to me. ICarmine M.D. attest that Joaquina Harp is acting in a scribe capacity, has observed my performance of the services and has documented them in accordance with my direction.     Carmine Pelletier M.D.  Emergency Medicine  [facility] EMERGENCY ROOM  [address] [id number] [phone]  Dept: [phone]      Carmine Pelletier MD  07/10/22 9816

## 2022-08-01 ENCOUNTER — OFFICE VISIT (OUTPATIENT)
Dept: FAMILY MEDICINE | Facility: HOSPITAL | Age: 72
End: 2022-08-01
Attending: FAMILY MEDICINE
Payer: MEDICARE

## 2022-08-01 VITALS
BODY MASS INDEX: 33.43 KG/M2 | WEIGHT: 188.69 LBS | HEIGHT: 63 IN | HEART RATE: 58 BPM | DIASTOLIC BLOOD PRESSURE: 67 MMHG | SYSTOLIC BLOOD PRESSURE: 134 MMHG

## 2022-08-01 DIAGNOSIS — E11.65 TYPE 2 DIABETES MELLITUS WITH HYPERGLYCEMIA, WITH LONG-TERM CURRENT USE OF INSULIN: Chronic | ICD-10-CM

## 2022-08-01 DIAGNOSIS — Z79.4 TYPE 2 DIABETES MELLITUS WITH HYPERGLYCEMIA, WITH LONG-TERM CURRENT USE OF INSULIN: Chronic | ICD-10-CM

## 2022-08-01 DIAGNOSIS — N18.4 STAGE 4 CHRONIC KIDNEY DISEASE: ICD-10-CM

## 2022-08-01 DIAGNOSIS — I25.10 ATHEROSCLEROSIS OF NATIVE CORONARY ARTERY OF NATIVE HEART WITHOUT ANGINA PECTORIS: Chronic | ICD-10-CM

## 2022-08-01 DIAGNOSIS — I10 PRIMARY HYPERTENSION: ICD-10-CM

## 2022-08-01 DIAGNOSIS — E78.5 HYPERLIPIDEMIA, UNSPECIFIED HYPERLIPIDEMIA TYPE: ICD-10-CM

## 2022-08-01 DIAGNOSIS — E11.22 TYPE 2 DIABETES MELLITUS WITH STAGE 4 CHRONIC KIDNEY DISEASE, WITH LONG-TERM CURRENT USE OF INSULIN: Primary | ICD-10-CM

## 2022-08-01 DIAGNOSIS — N18.4 TYPE 2 DIABETES MELLITUS WITH STAGE 4 CHRONIC KIDNEY DISEASE, WITH LONG-TERM CURRENT USE OF INSULIN: Primary | ICD-10-CM

## 2022-08-01 DIAGNOSIS — Z79.4 TYPE 2 DIABETES MELLITUS WITH STAGE 4 CHRONIC KIDNEY DISEASE, WITH LONG-TERM CURRENT USE OF INSULIN: Primary | ICD-10-CM

## 2022-08-01 DIAGNOSIS — Z95.5 STENTED CORONARY ARTERY: Chronic | ICD-10-CM

## 2022-08-01 LAB — GLUCOSE SERPL-MCNC: 275 MG/DL (ref 70–110)

## 2022-08-01 PROCEDURE — 82962 GLUCOSE BLOOD TEST: CPT | Performed by: STUDENT IN AN ORGANIZED HEALTH CARE EDUCATION/TRAINING PROGRAM

## 2022-08-01 PROCEDURE — 99214 OFFICE O/P EST MOD 30 MIN: CPT | Performed by: STUDENT IN AN ORGANIZED HEALTH CARE EDUCATION/TRAINING PROGRAM

## 2022-08-01 RX ORDER — INSULIN GLARGINE 100 [IU]/ML
8 INJECTION, SOLUTION SUBCUTANEOUS NIGHTLY
Qty: 10.8 ML | Refills: 1 | Status: SHIPPED | OUTPATIENT
Start: 2022-08-01 | End: 2022-08-01 | Stop reason: SDUPTHER

## 2022-08-01 RX ORDER — INSULIN GLARGINE 100 [IU]/ML
8 INJECTION, SOLUTION SUBCUTANEOUS NIGHTLY
Qty: 10.8 ML | Refills: 1 | Status: SHIPPED | OUTPATIENT
Start: 2022-08-01 | End: 2022-08-01

## 2022-08-01 RX ORDER — DULAGLUTIDE 0.75 MG/.5ML
0.75 INJECTION, SOLUTION SUBCUTANEOUS
Qty: 4 PEN | Refills: 0 | Status: SHIPPED | OUTPATIENT
Start: 2022-08-01 | End: 2023-02-14 | Stop reason: DRUGHIGH

## 2022-08-01 RX ORDER — INSULIN GLARGINE 100 [IU]/ML
8 INJECTION, SOLUTION SUBCUTANEOUS NIGHTLY
Qty: 10.8 ML | Refills: 1 | Status: SHIPPED | OUTPATIENT
Start: 2022-08-01 | End: 2022-08-04 | Stop reason: SDUPTHER

## 2022-08-01 RX ORDER — LOSARTAN POTASSIUM 25 MG/1
25 TABLET ORAL DAILY
Qty: 90 TABLET | Refills: 3 | Status: SHIPPED | OUTPATIENT
Start: 2022-08-01 | End: 2023-01-10

## 2022-08-01 RX ORDER — ROSUVASTATIN CALCIUM 40 MG/1
40 TABLET, COATED ORAL DAILY
Qty: 90 TABLET | Refills: 3 | Status: SHIPPED | OUTPATIENT
Start: 2022-08-01 | End: 2023-01-10 | Stop reason: SDUPTHER

## 2022-08-01 NOTE — PROGRESS NOTES
Progress Note  [facility]    Subjective:     Eliza Louie is a 71 y.o. year old female with PMHx of HTN, Stage 4 CKD, DM who presents to clinic for:    Annual exam. Patient is a type 2 diabetic currently on insulin. She reports taking 8 units of Lantus q.h.s. (down from 12 due to hypoglycemia concerns). Last A1c 2 months ago 11.8. Patient presents today requesting a Dexcom CGM. Patient reports that she does not check her blood sugar had all. No fasting levels reported. Patient reports that she stopped taking Jardiance due to the expense. Patient reports some numbness and tingling on her feet that have been going on for the past 3 months. She has not consumed too concerned about the symptoms but would like to get it evaluated. In terms of CKD patient follows with Nephrology. In terms of CAD, patient follows with Cardiology. She denies chest pain, shortness a breath, NVD.      Patient Active Problem List    Diagnosis Date Noted    Paroxysmal A-fib 12/24/2020    Anemia due to stage 4 chronic kidney disease 08/16/2021    (HFpEF) heart failure with preserved ejection fraction     Stage 4 chronic kidney disease 10/19/2021    History of nephrolithiasis 10/19/2021    GERD (gastroesophageal reflux disease)     RBBB (right bundle branch block with left anterior fascicular block) 08/28/2021    MDD (major depressive disorder) 08/16/2021    Atherosclerosis of native coronary artery of native heart without angina pectoris 01/11/2021    Hypomagnesemia 12/22/2020    Type 2 diabetes mellitus with hyperglycemia, with long-term current use of insulin     Hypokalemia     Urge incontinence of urine 03/22/2018    Stented coronary artery 11/28/2017    Primary hypertension 07/02/2017        (Not in a hospital admission)    Review of patient's allergies indicates:   Allergen Reactions    Ace inhibitors Other (See Comments)     Cough        Past Medical History:   Diagnosis Date    (HFpEF) heart failure with  preserved ejection fraction     TTE (2020) w/ EF 55% and grade II diastolic dysfunction    Anemia due to stage 4 chronic kidney disease 8/16/2021    Gastric ulcer due to Helicobacter pylori 07/2017    Gastrointestinal hemorrhage with melena 07/2017    GERD (gastroesophageal reflux disease)     HLD (hyperlipidemia)     Hydronephrosis with ureteropelvic junction (UPJ) obstruction 8/16/2021    Hypertension     MDD (major depressive disorder) 8/16/2021    Nephrolithiasis 8/25/2021    NSTEMI (non-ST elevated myocardial infarction) 07/2017    Paroxysmal A-fib 12/24/2020    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/2020    Stage 4 chronic kidney disease 10/19/2021    STEMI (ST elevation myocardial infarction) 12/2020    Type 2 diabetes mellitus     Urge incontinence of urine       Past Surgical History:   Procedure Laterality Date    CARDIAC CATHETERIZATION  07/2017    RCA w/ 95% stenosis w/ stent placement    RETROGRADE PYELOGRAPHY  8/25/2021    Procedure: PYELOGRAM, RETROGRADE;  Surgeon: Rody Smith MD;  Location: [facility] OR;  Service: Urology;;    URETEROSCOPIC REMOVAL OF URETERIC CALCULUS Left 8/25/2021    Procedure: left ureteroscopy, stone basketing, stent placement;  left nephrostomy tube removal;  Surgeon: Rody Smith MD;  Location: [facility] OR;  Service: Urology;  Laterality: Left;      No family history on file.   Social History     Tobacco Use    Smoking status: Never Smoker    Smokeless tobacco: Never Used   Substance Use Topics    Alcohol use: No      Review of Systems   Constitutional: Negative for fever and malaise/fatigue.   HENT: Negative for congestion and sore throat.    Eyes: Negative for blurred vision and visual disturbance.   Cardiovascular: Negative for chest pain and leg swelling.   Respiratory: Negative for cough and shortness of breath.    Musculoskeletal: Negative for arthritis, back pain and stiffness.   Gastrointestinal: Negative for abdominal  "pain, diarrhea, nausea and vomiting.   Genitourinary: Negative for dysuria and hematuria.   Neurological: Positive for numbness and paresthesias. Negative for dizziness, headaches and light-headedness.   Psychiatric/Behavioral: Negative for depression. The patient does not have insomnia and is not nervous/anxious.        Objective:     Vitals:    08/01/22 0954   BP: 134/67   Pulse: (!) 58   Weight: 85.6 kg (188 lb 11.4 oz)   Height: 5' 3" (1.6 m)     Estimated body mass index is 33.43 kg/m² as calculated from the following:    Height as of this encounter: 5' 3" (1.6 m).    Weight as of this encounter: 85.6 kg (188 lb 11.4 oz).     Physical Exam  Constitutional:       Appearance: Normal appearance. She is normal weight.   HENT:      Head: Normocephalic and atraumatic.      Right Ear: External ear normal.      Left Ear: External ear normal.      Mouth/Throat:      Mouth: Mucous membranes are moist.      Pharynx: Oropharynx is clear.   Eyes:      Extraocular Movements: Extraocular movements intact.      Conjunctiva/sclera: Conjunctivae normal.      Pupils: Pupils are equal, round, and reactive to light.   Cardiovascular:      Rate and Rhythm: Normal rate and regular rhythm.      Pulses: Normal pulses.      Heart sounds: Normal heart sounds.   Pulmonary:      Effort: Pulmonary effort is normal.      Breath sounds: Normal breath sounds.   Abdominal:      General: Abdomen is flat. Bowel sounds are normal. There is no distension.      Tenderness: There is no abdominal tenderness. There is no guarding.   Musculoskeletal:      Cervical back: Normal range of motion.      Right lower leg: No edema.      Left lower leg: No edema.      Right foot: Normal range of motion. No deformity.      Left foot: Normal range of motion. No deformity.   Feet:      Right foot:      Skin integrity: No ulcer.      Left foot:      Skin integrity: No ulcer.   Neurological:      Mental Status: She is alert and oriented to person, place, and time. " Mental status is at baseline.      Sensory: Sensation is intact.      Motor: No weakness.      Comments: Sensation w/ microfilament intact on foot exam.   Psychiatric:         Mood and Affect: Mood normal.         Behavior: Behavior normal.         Thought Content: Thought content normal.         Assessment/Plan:     Type 2 diabetes mellitus with stage 4 chronic kidney disease, with long-term current use of insulin  -     POCT Glucose, Hand-Held Device  -     dulaglutide (TRULICITY) 0.75 mg/0.5 mL pen injector; Inject 0.75 mg into the skin every 7 days.  Dispense: 4 pen; Refill: 0  -     insulin (LANTUS SOLOSTAR U-100 INSULIN) glargine 100 units/mL SubQ pen; Inject 8 Units into the skin every evening.  Dispense: 10.8 mL; Refill: 1    Primary hypertension  -     losartan (COZAAR) 25 MG tablet; Take 1 tablet (25 mg total) by mouth once daily.  Dispense: 90 tablet; Refill: 3    Stage 4 chronic kidney disease    Hyperlipidemia, unspecified hyperlipidemia type  -     rosuvastatin (CRESTOR) 40 MG Tab; Take 1 tablet (40 mg total) by mouth once daily.  Dispense: 90 tablet; Refill: 3      Patient requesting Dexcom CGM. Blood glucose to 75 today. Educated patient on requirements of CGM is or her insurance. Encouraged patient to check her blood sugar every day. Further educated to have a fasting blood sugar as well as a postprandial blood sugar. She should check this every day for the next 2 weeks and bring in the log. Patient to keep taking 8 units q.h.s. of Lantus. Patient has been on insulin long-term. Cannot use metformin due to her CKD. Given insurance, qualifies and would benefit from starting GLP-1 Agonist. Ordered Trulicity. In regards to patient's numbness and tingling, likely 2/2 to uncontrolled diabetes. PE reassuring, will likely resolve when diabetes under control. In terms of hypertension, /67. Per Nephro okay to start Ace/Arb. With will start losartan. In terms of CKD, patient will continue following with  Nephrology. In terms of CAD, patient continue following with cards.      Follow-up: 2 weeks for DM check      Case discussed with staff: Dr. Moraes      This note was partially created using [employer] Voice Recognition software. Typographical and content errors may occur with this process. While efforts are made to detect and correct such errors, in some cases errors will persist. For this reason, wording in this document should be considered in the proper context and not strictly verbatim.

## 2022-08-04 DIAGNOSIS — Z79.4 TYPE 2 DIABETES MELLITUS WITH STAGE 4 CHRONIC KIDNEY DISEASE, WITH LONG-TERM CURRENT USE OF INSULIN: ICD-10-CM

## 2022-08-04 DIAGNOSIS — E11.22 TYPE 2 DIABETES MELLITUS WITH STAGE 4 CHRONIC KIDNEY DISEASE, WITH LONG-TERM CURRENT USE OF INSULIN: ICD-10-CM

## 2022-08-04 DIAGNOSIS — N18.4 TYPE 2 DIABETES MELLITUS WITH STAGE 4 CHRONIC KIDNEY DISEASE, WITH LONG-TERM CURRENT USE OF INSULIN: ICD-10-CM

## 2022-08-04 RX ORDER — INSULIN GLARGINE 100 [IU]/ML
8 INJECTION, SOLUTION SUBCUTANEOUS NIGHTLY
Qty: 15 ML | Refills: 1 | Status: SHIPPED | OUTPATIENT
Start: 2022-08-04 | End: 2022-08-15 | Stop reason: SDUPTHER

## 2022-08-15 ENCOUNTER — OFFICE VISIT (OUTPATIENT)
Dept: FAMILY MEDICINE | Facility: HOSPITAL | Age: 72
End: 2022-08-15
Payer: MEDICARE

## 2022-08-15 VITALS
BODY MASS INDEX: 32.61 KG/M2 | SYSTOLIC BLOOD PRESSURE: 136 MMHG | HEART RATE: 62 BPM | HEIGHT: 63 IN | DIASTOLIC BLOOD PRESSURE: 78 MMHG | WEIGHT: 184.06 LBS

## 2022-08-15 DIAGNOSIS — Z79.4 TYPE 2 DIABETES MELLITUS WITH STAGE 4 CHRONIC KIDNEY DISEASE, WITH LONG-TERM CURRENT USE OF INSULIN: ICD-10-CM

## 2022-08-15 DIAGNOSIS — M79.671 PAIN IN BOTH FEET: ICD-10-CM

## 2022-08-15 DIAGNOSIS — N18.4 TYPE 2 DIABETES MELLITUS WITH STAGE 4 CHRONIC KIDNEY DISEASE, WITH LONG-TERM CURRENT USE OF INSULIN: ICD-10-CM

## 2022-08-15 DIAGNOSIS — M10.9 GOUT OF MULTIPLE SITES, UNSPECIFIED CAUSE, UNSPECIFIED CHRONICITY: Primary | ICD-10-CM

## 2022-08-15 DIAGNOSIS — E11.22 TYPE 2 DIABETES MELLITUS WITH STAGE 4 CHRONIC KIDNEY DISEASE, WITH LONG-TERM CURRENT USE OF INSULIN: ICD-10-CM

## 2022-08-15 DIAGNOSIS — M79.672 PAIN IN BOTH FEET: ICD-10-CM

## 2022-08-15 PROCEDURE — 99214 OFFICE O/P EST MOD 30 MIN: CPT | Performed by: STUDENT IN AN ORGANIZED HEALTH CARE EDUCATION/TRAINING PROGRAM

## 2022-08-15 RX ORDER — INSULIN GLARGINE 100 [IU]/ML
12 INJECTION, SOLUTION SUBCUTANEOUS NIGHTLY
Qty: 15 ML | Refills: 1
Start: 2022-08-15 | End: 2023-01-10 | Stop reason: SDUPTHER

## 2022-08-15 RX ORDER — PREDNISONE 20 MG/1
20 TABLET ORAL DAILY
Qty: 7 TABLET | Refills: 0 | Status: SHIPPED | OUTPATIENT
Start: 2022-08-15 | End: 2023-02-14

## 2022-08-15 NOTE — PROGRESS NOTES
"Progress Note  [facility]    Subjective:     Eliza Louie is a 71 y.o. year old female with PMHx of HTN, Stage 4 CKD, DM who presents to clinic for:    bilateral foot and ankle pain as well as DM follow up. She presented with her daughter today. Patient has concerns of gout. Patient believes that she is having a gout attack and has bilateral pain on her feet (right greater than left) that started about 2 weeks ago. Patient believes that it was due to the losartan she started taking. She reports swelling and tenderness to touch especially on the big toe. She reports that she has had a history of the symptoms "a long time ago" and believes that it has always been gout. She has tried different supplements and ointments to try to relieve the pain. She reports that it has helped in the past, but this time it is not helping. Patient also reports a family history of gout. In regards to her diabetes, patient reports that she has not been checking her blood sugars as previously discussed. She denies chest pain, shortness a breath, NVD.      Patient Active Problem List    Diagnosis Date Noted    Paroxysmal A-fib 12/24/2020    Anemia due to stage 4 chronic kidney disease 08/16/2021    (HFpEF) heart failure with preserved ejection fraction     Stage 4 chronic kidney disease 10/19/2021    History of nephrolithiasis 10/19/2021    GERD (gastroesophageal reflux disease)     RBBB (right bundle branch block with left anterior fascicular block) 08/28/2021    MDD (major depressive disorder) 08/16/2021    Atherosclerosis of native coronary artery of native heart without angina pectoris 01/11/2021    Hypomagnesemia 12/22/2020    Type 2 diabetes mellitus with hyperglycemia, with long-term current use of insulin     Hypokalemia     Urge incontinence of urine 03/22/2018    Stented coronary artery 11/28/2017    Primary hypertension 07/02/2017        (Not in a hospital admission)    Review of patient's allergies " indicates:   Allergen Reactions    Ace inhibitors Other (See Comments)     Cough        Past Medical History:   Diagnosis Date    (HFpEF) heart failure with preserved ejection fraction     TTE (2020) w/ EF 55% and grade II diastolic dysfunction    Anemia due to stage 4 chronic kidney disease 8/16/2021    Gastric ulcer due to Helicobacter pylori 07/2017    Gastrointestinal hemorrhage with melena 07/2017    GERD (gastroesophageal reflux disease)     HLD (hyperlipidemia)     Hydronephrosis with ureteropelvic junction (UPJ) obstruction 8/16/2021    Hypertension     MDD (major depressive disorder) 8/16/2021    Nephrolithiasis 8/25/2021    NSTEMI (non-ST elevated myocardial infarction) 07/2017    Paroxysmal A-fib 12/24/2020    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/2020    Stage 4 chronic kidney disease 10/19/2021    STEMI (ST elevation myocardial infarction) 12/2020    Type 2 diabetes mellitus     Urge incontinence of urine       Past Surgical History:   Procedure Laterality Date    CARDIAC CATHETERIZATION  07/2017    RCA w/ 95% stenosis w/ stent placement    RETROGRADE PYELOGRAPHY  8/25/2021    Procedure: PYELOGRAM, RETROGRADE;  Surgeon: Rody Smith MD;  Location: [facility] OR;  Service: Urology;;    URETEROSCOPIC REMOVAL OF URETERIC CALCULUS Left 8/25/2021    Procedure: left ureteroscopy, stone basketing, stent placement;  left nephrostomy tube removal;  Surgeon: Rody Smith MD;  Location: [facility] OR;  Service: Urology;  Laterality: Left;      No family history on file.   Social History     Tobacco Use    Smoking status: Never Smoker    Smokeless tobacco: Never Used   Substance Use Topics    Alcohol use: No      Review of Systems   Constitutional: Negative for fever and malaise/fatigue.   HENT: Negative for congestion and sore throat.    Eyes: Negative for blurred vision and visual disturbance.   Cardiovascular: Negative for chest pain and leg swelling.  "  Respiratory: Negative for cough and shortness of breath.    Musculoskeletal: Positive for joint pain, joint swelling and stiffness. Negative for arthritis and back pain.   Gastrointestinal: Negative for abdominal pain, diarrhea, nausea and vomiting.   Genitourinary: Negative for dysuria and hematuria.   Neurological: Positive for numbness and paresthesias. Negative for dizziness, headaches and light-headedness.   Psychiatric/Behavioral: Negative for depression. The patient does not have insomnia and is not nervous/anxious.        Objective:     Vitals:    08/15/22 1107   BP: 136/78   Pulse: 62   Weight: 83.5 kg (184 lb 1.4 oz)   Height: 5' 3" (1.6 m)     Estimated body mass index is 32.61 kg/m² as calculated from the following:    Height as of this encounter: 5' 3" (1.6 m).    Weight as of this encounter: 83.5 kg (184 lb 1.4 oz).     Physical Exam  Constitutional:       Appearance: Normal appearance. She is normal weight.   HENT:      Head: Normocephalic and atraumatic.      Right Ear: External ear normal.      Left Ear: External ear normal.      Mouth/Throat:      Mouth: Mucous membranes are moist.      Pharynx: Oropharynx is clear.   Eyes:      Extraocular Movements: Extraocular movements intact.      Conjunctiva/sclera: Conjunctivae normal.      Pupils: Pupils are equal, round, and reactive to light.   Cardiovascular:      Rate and Rhythm: Normal rate and regular rhythm.      Pulses: Normal pulses.           Dorsalis pedis pulses are 2+ on the right side and 2+ on the left side.        Posterior tibial pulses are 2+ on the right side and 2+ on the left side.      Heart sounds: Normal heart sounds.   Pulmonary:      Effort: Pulmonary effort is normal.      Breath sounds: Normal breath sounds.   Abdominal:      General: Abdomen is flat. Bowel sounds are normal. There is no distension.      Tenderness: There is no abdominal tenderness. There is no guarding.   Musculoskeletal:      Cervical back: Normal range of " motion.      Right lower leg: No edema.      Left lower leg: No edema.      Right foot: Decreased range of motion. No deformity.      Left foot: Decreased range of motion. No deformity.   Feet:      Right foot:      Skin integrity: Warmth (R>L) present. No ulcer.      Left foot:      Skin integrity: Warmth (R>L) present. No ulcer.      Comments: Tenderness to palpation around most bony structures and joint spaces. The most tenderness on palpation of the Metatarsophalangeal joint.  Neurological:      Mental Status: She is alert and oriented to person, place, and time. Mental status is at baseline.      Sensory: Sensation is intact.      Motor: No weakness.   Psychiatric:         Mood and Affect: Mood normal.         Behavior: Behavior normal.         Thought Content: Thought content normal.         Assessment/Plan:     Gout of multiple sites, unspecified cause, unspecified chronicity  -     predniSONE (DELTASONE) 20 MG tablet; Take 1 tablet (20 mg total) by mouth once daily.  Dispense: 7 tablet; Refill: 0  -     URIC ACID; Future; Expected date: 08/15/2022    Pain in both feet  -     X-Ray Foot AP Bilateral; Future; Expected date: 08/15/2022    Type 2 diabetes mellitus with stage 4 chronic kidney disease, with long-term current use of insulin  -     insulin (LANTUS SOLOSTAR U-100 INSULIN) glargine 100 units/mL SubQ pen; Inject 12 Units into the skin every evening.  Dispense: 15 mL; Refill: 1    Given presenting features patient likely in having a gout attack. PE points to gout. There is no indication of stress fracture on any of the bony structures of the foot. Due to her CKD cannot treat with NSAIDs. Instead will prescribe a course of prednisone for 7 days. Will be adjusting insulin accordingly. Offered patient gout education and offered lifestyle modifications. Will check uric acid levels an order B/L foot x-ray for evaluation of the bony structures of the feet. Patient would benefit from long-term gout prevention  with allopurinol. Will discuss at next visit after resolution of gout flare. In terms of diabetes, patient would not qualify for CGM as she has not been checking her blood sugars. Educated patient on the importance of checking her blood sugars as well as following appropriate lifestyle modifications. This includes following a healthy diet and exercising. Daughter emphasized my recommendations and agreed that her mother should take better control of her health. Patient verbalized understanding and agreed to take more of an active role in her health management.    Follow-up: 1 week for Gout follow-up      Case discussed with staff: Dr. Lee      This note was partially created using Orche[employer]ate Voice Recognition software. Typographical and content errors may occur with this process. While efforts are made to detect and correct such errors, in some cases errors will persist. For this reason, wording in this document should be considered in the proper context and not strictly verbatim.

## 2022-08-16 PROBLEM — M10.9 GOUT OF MULTIPLE SITES: Status: ACTIVE | Noted: 2022-08-16

## 2022-08-19 NOTE — PROGRESS NOTES
I have reviewed the notes, assessments, and/or procedures performed by Dr. Jay, I concur with her/his documentation of Eliza Louie. I examined her and she has tender warm swollen 1st MTP bilaterally. Maybe some other MTP and ankle involvement. Past hx of elevated uric acid.  Gout is likely and she may need prevention if she has recurrences. I am not aware that losartan triggers gout but she is on lasix.

## 2022-09-29 DIAGNOSIS — N18.4 STAGE 4 CHRONIC KIDNEY DISEASE: ICD-10-CM

## 2022-09-29 DIAGNOSIS — E83.42 HYPOMAGNESEMIA: ICD-10-CM

## 2022-09-29 DIAGNOSIS — Z79.4 TYPE 2 DIABETES MELLITUS WITH HYPERGLYCEMIA, WITH LONG-TERM CURRENT USE OF INSULIN: ICD-10-CM

## 2022-09-29 DIAGNOSIS — E11.65 TYPE 2 DIABETES MELLITUS WITH HYPERGLYCEMIA, WITH LONG-TERM CURRENT USE OF INSULIN: ICD-10-CM

## 2022-09-29 DIAGNOSIS — Z87.442 HISTORY OF NEPHROLITHIASIS: ICD-10-CM

## 2022-09-29 DIAGNOSIS — I10 PRIMARY HYPERTENSION: Primary | ICD-10-CM

## 2022-11-09 DIAGNOSIS — I48.0 PAROXYSMAL A-FIB: Chronic | ICD-10-CM

## 2022-11-10 DIAGNOSIS — I10 PRIMARY HYPERTENSION: Chronic | ICD-10-CM

## 2022-11-10 DIAGNOSIS — I50.32 CHRONIC HEART FAILURE WITH PRESERVED EJECTION FRACTION: ICD-10-CM

## 2022-11-10 DIAGNOSIS — I48.0 PAROXYSMAL A-FIB: Primary | ICD-10-CM

## 2022-11-10 RX ORDER — FUROSEMIDE 40 MG/1
40 TABLET ORAL 2 TIMES DAILY
Qty: 180 TABLET | Refills: 3 | Status: SHIPPED | OUTPATIENT
Start: 2022-11-10 | End: 2024-02-16 | Stop reason: SDUPTHER

## 2022-11-10 NOTE — TELEPHONE ENCOUNTER
Pt calling again for this refill.   This is her third call regarding this refill.  Would like it taken care of by end of the day.

## 2022-11-10 NOTE — TELEPHONE ENCOUNTER
----- Message from Eliza Mathew MA sent at 11/9/2022  9:50 AM CST -----  Contact: Patient [phone]  Requesting refill on eloquis be sent to [facility] next door.  Thanks.

## 2022-11-10 NOTE — TELEPHONE ENCOUNTER
Medication was refilled for 2 months by Dr. Denis with instructions for patient to come for an appointment.

## 2022-11-10 NOTE — TELEPHONE ENCOUNTER
----- Message from Eliza Mathew MA sent at 11/10/2022  9:50 AM CST -----  Contact: Patient [phone]  Patient called again requesting same.  Thanks.        ----- Message -----  From: Eliza Mathew MA  Sent: 11/9/2022   9:50 AM CST  To: [facility] Staff    Requesting refill on eloquis be sent to [facility] next door.  Thanks.

## 2023-01-10 ENCOUNTER — OFFICE VISIT (OUTPATIENT)
Dept: FAMILY MEDICINE | Facility: HOSPITAL | Age: 73
End: 2023-01-10
Payer: MEDICARE

## 2023-01-10 ENCOUNTER — LAB VISIT (OUTPATIENT)
Dept: LAB | Facility: HOSPITAL | Age: 73
End: 2023-01-10
Payer: MEDICARE

## 2023-01-10 VITALS
BODY MASS INDEX: 33.83 KG/M2 | HEIGHT: 63 IN | HEART RATE: 74 BPM | WEIGHT: 190.94 LBS | DIASTOLIC BLOOD PRESSURE: 71 MMHG | SYSTOLIC BLOOD PRESSURE: 148 MMHG

## 2023-01-10 DIAGNOSIS — I48.0 PAROXYSMAL A-FIB: ICD-10-CM

## 2023-01-10 DIAGNOSIS — E11.65 TYPE 2 DIABETES MELLITUS WITH HYPERGLYCEMIA, WITHOUT LONG-TERM CURRENT USE OF INSULIN: ICD-10-CM

## 2023-01-10 DIAGNOSIS — N18.4 TYPE 2 DIABETES MELLITUS WITH STAGE 4 CHRONIC KIDNEY DISEASE, WITH LONG-TERM CURRENT USE OF INSULIN: ICD-10-CM

## 2023-01-10 DIAGNOSIS — I10 PRIMARY HYPERTENSION: ICD-10-CM

## 2023-01-10 DIAGNOSIS — Z79.4 TYPE 2 DIABETES MELLITUS WITH HYPERGLYCEMIA, WITH LONG-TERM CURRENT USE OF INSULIN: Chronic | ICD-10-CM

## 2023-01-10 DIAGNOSIS — Z95.5 STENTED CORONARY ARTERY: Chronic | ICD-10-CM

## 2023-01-10 DIAGNOSIS — E11.22 TYPE 2 DIABETES MELLITUS WITH STAGE 4 CHRONIC KIDNEY DISEASE, WITH LONG-TERM CURRENT USE OF INSULIN: ICD-10-CM

## 2023-01-10 DIAGNOSIS — I25.10 ATHEROSCLEROSIS OF NATIVE CORONARY ARTERY OF NATIVE HEART WITHOUT ANGINA PECTORIS: Chronic | ICD-10-CM

## 2023-01-10 DIAGNOSIS — Z79.4 TYPE 2 DIABETES MELLITUS WITH STAGE 4 CHRONIC KIDNEY DISEASE, WITH LONG-TERM CURRENT USE OF INSULIN: ICD-10-CM

## 2023-01-10 DIAGNOSIS — E11.65 TYPE 2 DIABETES MELLITUS WITH HYPERGLYCEMIA, WITH LONG-TERM CURRENT USE OF INSULIN: Chronic | ICD-10-CM

## 2023-01-10 DIAGNOSIS — E87.6 HYPOKALEMIA DUE TO EXCESSIVE RENAL LOSS OF POTASSIUM: Primary | ICD-10-CM

## 2023-01-10 DIAGNOSIS — E78.5 HYPERLIPIDEMIA, UNSPECIFIED HYPERLIPIDEMIA TYPE: ICD-10-CM

## 2023-01-10 LAB
ESTIMATED AVG GLUCOSE: 318 MG/DL (ref 68–131)
HBA1C MFR BLD: 12.7 % (ref 4–5.6)

## 2023-01-10 PROCEDURE — 36415 COLL VENOUS BLD VENIPUNCTURE: CPT

## 2023-01-10 PROCEDURE — 99213 OFFICE O/P EST LOW 20 MIN: CPT

## 2023-01-10 PROCEDURE — 83036 HEMOGLOBIN GLYCOSYLATED A1C: CPT

## 2023-01-10 RX ORDER — INSULIN PUMP SYRINGE, 3 ML
EACH MISCELLANEOUS
Qty: 1 EACH | Refills: 0 | Status: SHIPPED | OUTPATIENT
Start: 2023-01-10 | End: 2024-01-10

## 2023-01-10 RX ORDER — LOSARTAN POTASSIUM 25 MG/1
25 TABLET ORAL DAILY
Qty: 90 TABLET | Refills: 3 | Status: SHIPPED | OUTPATIENT
Start: 2023-01-10 | End: 2023-02-28 | Stop reason: SINTOL

## 2023-01-10 RX ORDER — POTASSIUM CHLORIDE 20 MEQ/1
20 TABLET, EXTENDED RELEASE ORAL DAILY
Qty: 30 TABLET | Refills: 3 | Status: SHIPPED | OUTPATIENT
Start: 2023-01-10 | End: 2023-02-09

## 2023-01-10 RX ORDER — INSULIN GLARGINE 100 [IU]/ML
12 INJECTION, SOLUTION SUBCUTANEOUS NIGHTLY
Qty: 15 ML | Refills: 1
Start: 2023-01-10 | End: 2023-01-10 | Stop reason: SDUPTHER

## 2023-01-10 RX ORDER — ROSUVASTATIN CALCIUM 40 MG/1
40 TABLET, COATED ORAL DAILY
Qty: 90 TABLET | Refills: 3 | Status: SHIPPED | OUTPATIENT
Start: 2023-01-10 | End: 2024-01-25 | Stop reason: SDUPTHER

## 2023-01-10 RX ORDER — INSULIN GLARGINE 100 [IU]/ML
12 INJECTION, SOLUTION SUBCUTANEOUS NIGHTLY
Qty: 15 ML | Refills: 7 | Status: SHIPPED | OUTPATIENT
Start: 2023-01-10 | End: 2023-02-14

## 2023-01-11 ENCOUNTER — TELEPHONE (OUTPATIENT)
Dept: FAMILY MEDICINE | Facility: HOSPITAL | Age: 73
End: 2023-01-11
Payer: MEDICARE

## 2023-01-11 NOTE — PROGRESS NOTES
[facility]    Subjective:     Eliza Louie is a 72 y.o. year old female with PMHx of HTN, Stage 4 CKD, T2DM on insulin, CHF, gout who presents to clinic for who presents to clinic for medication refills.     She denies any complaints today. She states she just needs medication refills at this time. Denies any symptoms or side effects from her medications.  Discussed with patient that her BP was elevated at 148/71. Patient reports it may because she stopped her Losartan  due to Cardiology recommendations. However, unclear if this was actually what was told to patient. Would like patient to start back on this as she is not checking her BP at home.    T2DM: Patient reports she has not been checking her blood sugars at home. Unsure of compliance with medications. Discussed with her that we need to recheck her A1c given it was elevated at 11 in June 2022. Patient endorses polyuria, but states it is her baseline.     Patient Active Problem List    Diagnosis Date Noted    Gout of multiple sites 08/16/2022    (HFpEF) heart failure with preserved ejection fraction     Stage 4 chronic kidney disease 10/19/2021    History of nephrolithiasis 10/19/2021    GERD (gastroesophageal reflux disease)     RBBB (right bundle branch block with left anterior fascicular block) 08/28/2021    MDD (major depressive disorder) 08/16/2021    Anemia due to stage 4 chronic kidney disease 08/16/2021    Atherosclerosis of native coronary artery of native heart without angina pectoris 01/11/2021    Paroxysmal A-fib 12/24/2020    Hypomagnesemia 12/22/2020    Type 2 diabetes mellitus with stage 4 chronic kidney disease, with long-term current use of insulin     Hypokalemia     Urge incontinence of urine 03/22/2018    Stented coronary artery 11/28/2017    Primary hypertension 07/02/2017        Review of patient's allergies indicates:   Allergen Reactions    Ace inhibitors Other (See Comments)     Cough        Past Medical History:   Diagnosis  "Date    (HFpEF) heart failure with preserved ejection fraction     TTE (2020) w/ EF 55% and grade II diastolic dysfunction    Anemia due to stage 4 chronic kidney disease 8/16/2021    Gastric ulcer due to Helicobacter pylori 07/2017    Gastrointestinal hemorrhage with melena 07/2017    GERD (gastroesophageal reflux disease)     HLD (hyperlipidemia)     Hydronephrosis with ureteropelvic junction (UPJ) obstruction 8/16/2021    Hypertension     MDD (major depressive disorder) 8/16/2021    Nephrolithiasis 8/25/2021    NSTEMI (non-ST elevated myocardial infarction) 07/2017    Paroxysmal A-fib 12/24/2020    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/2020    Stage 4 chronic kidney disease 10/19/2021    STEMI (ST elevation myocardial infarction) 12/2020    Type 2 diabetes mellitus     Urge incontinence of urine       Past Surgical History:   Procedure Laterality Date    CARDIAC CATHETERIZATION  07/2017    RCA w/ 95% stenosis w/ stent placement    RETROGRADE PYELOGRAPHY  8/25/2021    Procedure: PYELOGRAM, RETROGRADE;  Surgeon: Rody Smith MD;  Location: [facility] OR;  Service: Urology;;    URETEROSCOPIC REMOVAL OF URETERIC CALCULUS Left 8/25/2021    Procedure: left ureteroscopy, stone basketing, stent placement;  left nephrostomy tube removal;  Surgeon: Rody Smith MD;  Location: [facility] OR;  Service: Urology;  Laterality: Left;      No family history on file.   Social History     Tobacco Use    Smoking status: Never    Smokeless tobacco: Never   Substance Use Topics    Alcohol use: No        Objective:     Vitals:    01/10/23 1345   BP: (!) 148/71   Pulse: 74   Weight: 86.6 kg (190 lb 14.7 oz)   Height: 5' 3" (1.6 m)     Gen: No apparent distress, well nourished and developed, appears stated age  CV: RRR, S1 and S2 present  Resp: CTAB, normal respiratory effort    Assessment/Plan:     Eliza Louie is a 72 y.o. year old female with PMHx of HTN, Stage 4 CKD, T2DM on insulin, CHF, gout who presents to " clinic for who presents to clinic for medication refills.     Hypokalemia due to excessive renal loss of potassium  -Patient denies any muscle cramps  -Refilled potassium chloride SA daily which I told her to take if she takes her Lasix    Type 2 diabetes mellitus with stage 4 chronic kidney disease, with long-term current use of insulin  -Hemoglobin A1C - increased from  11 to 12.7 this visit  -Unsure of medication compliance  -Not checking her sugars at home  -On Insulin nightly  -Advised patient to keep log of her BS in AM so that we can get her better controlled    Paroxysmal A-fib  -Refilled apixaban (ELIQUIS) 5 mg BID  -Rate controlled in clinic today    Primary hypertension  -Not at goal 148/71  -Advised patient she can continue back her losartan 25 MG daily for better BP control  -Advised her to keep a BP log for next visit    Follow-up:1 month for T2DM, HTN    A total of 30 minutes was spent on patient care during this encounter which included chart review, examining the patient, formulating a treatment plan and documentation.     Medical decision making straight forward and not complex during this visit.     Case discussed with staff: Dr. Georgina Garcia MD  [facility], PGY-1  01/11/2023

## 2023-01-11 NOTE — TELEPHONE ENCOUNTER
Attempted to call patient to inform her of her A1c increase. Her diabetes is uncontrolled and unsure if she is taking her Insulin or medications. Ideally would like her to check her blood sugars so that we can titrate her insulin up as needed or look into starting other medications. Patient has multiple co-morbidities and risk factors. Would also like to discuss her re-starting Losartan given Nephrology's note states it is OK to have her on it. Would like her BP better controlled. Please have patient follow up with me in 1 month or sooner if needed.    Casi Garcia MD  [facility], PGY-1  01/11/2023

## 2023-02-14 ENCOUNTER — OFFICE VISIT (OUTPATIENT)
Dept: FAMILY MEDICINE | Facility: HOSPITAL | Age: 73
End: 2023-02-14
Payer: MEDICARE

## 2023-02-14 VITALS
HEART RATE: 65 BPM | SYSTOLIC BLOOD PRESSURE: 150 MMHG | WEIGHT: 191.13 LBS | DIASTOLIC BLOOD PRESSURE: 75 MMHG | BODY MASS INDEX: 33.87 KG/M2 | HEIGHT: 63 IN

## 2023-02-14 DIAGNOSIS — N18.4 TYPE 2 DIABETES MELLITUS WITH STAGE 4 CHRONIC KIDNEY DISEASE, WITH LONG-TERM CURRENT USE OF INSULIN: Primary | ICD-10-CM

## 2023-02-14 DIAGNOSIS — I50.32 CHRONIC HEART FAILURE WITH PRESERVED EJECTION FRACTION: Chronic | ICD-10-CM

## 2023-02-14 DIAGNOSIS — Z79.4 TYPE 2 DIABETES MELLITUS WITH STAGE 4 CHRONIC KIDNEY DISEASE, WITH LONG-TERM CURRENT USE OF INSULIN: Primary | ICD-10-CM

## 2023-02-14 DIAGNOSIS — I10 PRIMARY HYPERTENSION: Chronic | ICD-10-CM

## 2023-02-14 DIAGNOSIS — E11.22 TYPE 2 DIABETES MELLITUS WITH STAGE 4 CHRONIC KIDNEY DISEASE, WITH LONG-TERM CURRENT USE OF INSULIN: Primary | ICD-10-CM

## 2023-02-14 PROCEDURE — 99214 OFFICE O/P EST MOD 30 MIN: CPT

## 2023-02-14 RX ORDER — INSULIN GLARGINE 100 [IU]/ML
15 INJECTION, SOLUTION SUBCUTANEOUS NIGHTLY
Qty: 15 ML | Refills: 11 | Status: SHIPPED | OUTPATIENT
Start: 2023-02-14 | End: 2023-02-28

## 2023-02-14 RX ORDER — POTASSIUM CHLORIDE 20 MEQ/1
20 TABLET, EXTENDED RELEASE ORAL
COMMUNITY
Start: 2023-02-09 | End: 2023-05-12 | Stop reason: SDUPTHER

## 2023-02-14 RX ORDER — DULAGLUTIDE 1.5 MG/.5ML
1.5 INJECTION, SOLUTION SUBCUTANEOUS
Qty: 4 PEN | Refills: 11 | Status: SHIPPED | OUTPATIENT
Start: 2023-02-14 | End: 2023-08-07 | Stop reason: ALTCHOICE

## 2023-02-14 RX ORDER — AMLODIPINE BESYLATE 10 MG/1
10 TABLET ORAL DAILY
Qty: 30 TABLET | Refills: 11 | Status: SHIPPED | OUTPATIENT
Start: 2023-02-14 | End: 2024-02-29 | Stop reason: SDUPTHER

## 2023-02-14 NOTE — PROGRESS NOTES
[facility]    Subjective:     Eliza Louie is a 72 y.o. year old female with PMHx of HTN, Stage 4 CKD, T2DM on insulin, CHF, gout who presents to clinic for who presents to clinic for HTN, DM.     HTN: BP today 150/75. Still not controlled. Patient denies any SOB, lightheadedness, dizziness, palpitations, chest pain, or vision changes. Patient endorses compliance with medication regimen, including Amlodipine 5 mg, Metoprolol 100 mg, and Losartan 25 mg. Patient reports since starting Losartan 1 month ago, she feels like her gout flares have been worse and c/o more SOB/cough. She states Cardiology stopped it previously. However, most recent Nephrology note says to continue.     DM: Poorly controlled; states her glucose runs approx 160s-180s but does not check a fasting in the morning; Last A1c 12.7% (01/2023); patient does not complain of numbness/tingling in bilateral feet. Patient does not endorse smoking. Patient denies polyuria, polydipsia, polyphagia. Patient endorses compliance with medication regimen, including Trulicity and 12 units of Lantus. Denies any episodes of hypoglycemia.    Last Urine Microalbumin- 10/2021  Last eye exam- unsure of last time; c/o worsening vision in right eye; floaters  Last foot exam- 02/14/2023      Patient Active Problem List    Diagnosis Date Noted    Gout of multiple sites 08/16/2022    (HFpEF) heart failure with preserved ejection fraction     Stage 4 chronic kidney disease 10/19/2021    History of nephrolithiasis 10/19/2021    GERD (gastroesophageal reflux disease)     RBBB (right bundle branch block with left anterior fascicular block) 08/28/2021    MDD (major depressive disorder) 08/16/2021    Anemia due to stage 4 chronic kidney disease 08/16/2021    Atherosclerosis of native coronary artery of native heart without angina pectoris 01/11/2021    Paroxysmal A-fib 12/24/2020    Hypomagnesemia 12/22/2020    Type 2 diabetes mellitus with stage 4 chronic kidney  disease, with long-term current use of insulin     Hypokalemia     Urge incontinence of urine 03/22/2018    Stented coronary artery 11/28/2017    Primary hypertension 07/02/2017        Review of patient's allergies indicates:   Allergen Reactions    Ace inhibitors Other (See Comments)     Cough        Past Medical History:   Diagnosis Date    (HFpEF) heart failure with preserved ejection fraction     TTE (2020) w/ EF 55% and grade II diastolic dysfunction    Anemia due to stage 4 chronic kidney disease 8/16/2021    Gastric ulcer due to Helicobacter pylori 07/2017    Gastrointestinal hemorrhage with melena 07/2017    GERD (gastroesophageal reflux disease)     HLD (hyperlipidemia)     Hydronephrosis with ureteropelvic junction (UPJ) obstruction 8/16/2021    Hypertension     MDD (major depressive disorder) 8/16/2021    Nephrolithiasis 8/25/2021    NSTEMI (non-ST elevated myocardial infarction) 07/2017    Paroxysmal A-fib 12/24/2020    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/2020    Stage 4 chronic kidney disease 10/19/2021    STEMI (ST elevation myocardial infarction) 12/2020    Type 2 diabetes mellitus     Urge incontinence of urine       Past Surgical History:   Procedure Laterality Date    CARDIAC CATHETERIZATION  07/2017    RCA w/ 95% stenosis w/ stent placement    RETROGRADE PYELOGRAPHY  8/25/2021    Procedure: PYELOGRAM, RETROGRADE;  Surgeon: Rody Smith MD;  Location: [facility] OR;  Service: Urology;;    URETEROSCOPIC REMOVAL OF URETERIC CALCULUS Left 8/25/2021    Procedure: left ureteroscopy, stone basketing, stent placement;  left nephrostomy tube removal;  Surgeon: Rody Smith MD;  Location: [facility] OR;  Service: Urology;  Laterality: Left;      No family history on file.   Social History     Tobacco Use    Smoking status: Never    Smokeless tobacco: Never   Substance Use Topics    Alcohol use: No        Objective:     Vitals:    02/14/23 0949   BP:  "(!) 150/75   Pulse: 65   Weight: 86.7 kg (191 lb 2.2 oz)   Height: 5' 3" (1.6 m)     BMI: 33.86    Physical Exam  Vitals reviewed.   Constitutional:       General: She is not in acute distress.     Appearance: She is not toxic-appearing.   Eyes:      General:         Right eye: No discharge.         Left eye: No discharge.      Extraocular Movements: Extraocular movements intact.      Conjunctiva/sclera: Conjunctivae normal.   Cardiovascular:      Rate and Rhythm: Normal rate and regular rhythm.   Pulmonary:      Effort: Pulmonary effort is normal. No respiratory distress.      Breath sounds: Normal breath sounds.   Musculoskeletal:      Right lower leg: No edema.      Left lower leg: No edema.   Feet:      Right foot:      Protective Sensation: 5 sites tested.  5 sites sensed.      Skin integrity: Skin integrity normal.      Left foot:      Protective Sensation: 5 sites tested.  5 sites sensed.      Skin integrity: Skin integrity normal.   Skin:     General: Skin is warm.   Neurological:      Mental Status: She is alert. Mental status is at baseline.   Psychiatric:         Mood and Affect: Mood normal.         Behavior: Behavior normal.         Thought Content: Thought content normal.       Assessment/Plan:     Eliza Louie is a 72 y.o. year old female HTN, Stage 4 CKD, T2DM on insulin, CHF, gout who presents to clinic for HTN and DM follow up.    1. Type 2 diabetes mellitus with stage 4 chronic kidney disease, with long-term current use of insulin  - Continue Insulin 12 units with sliding scale based on blood sugar before night-time insulin. Sliding scale chart given to patient and her daughter. Will check at next visit in 2 weeks to see how many additional units of insulin needed per day. Advised patient to keep a log of the blood sugars and how many additional insulin was given. Daughter verbalized she thinks they will be able to do this to get her better controlled.   - Ambulatory referral/consult to " Ophthalmology due to c/o right vision issues and floaters; likely 2/2 diabetic retinopathy vs hypertensive retinopathy.   - Increased dulaglutide (TRULICITY) to 1.5 mg/0.5 mL pen injector weekly for better glucose control     2. Primary hypertension  - Increased amLODIPine from 5 mg to 10 mg at today's visit for better BP control    3. Chronic heart failure with preserved ejection fraction  -Patient has not seen Cardiology in a while  -Discussed that would like Cardiology to weigh in on options for her chronic diastolic heart failure and HTN complicated by CKD stage 4; on beta-blocker and not able to tolerate ACE/ARB due to side effects. Not on a MRA or SGLT-2 likely due to her CrCl <30, 19 based on today's labs   -Continue Lasix 40 mg daily    Follow-up: 2 weeks for DM     A total of 30 minutes was spent on patient care during this encounter which included chart review, examining the patient, formulating a treatment plan and documentation.     Medical decision making straight forward and not complex during this visit.     Case discussed with staff: Dr. Georgina Garcia MD  [facility], PGY-1  02/14/2023

## 2023-02-17 ENCOUNTER — TELEPHONE (OUTPATIENT)
Dept: FAMILY MEDICINE | Facility: HOSPITAL | Age: 73
End: 2023-02-17
Payer: MEDICARE

## 2023-02-28 ENCOUNTER — OFFICE VISIT (OUTPATIENT)
Dept: FAMILY MEDICINE | Facility: HOSPITAL | Age: 73
End: 2023-02-28
Payer: MEDICARE

## 2023-02-28 VITALS
HEART RATE: 54 BPM | DIASTOLIC BLOOD PRESSURE: 54 MMHG | BODY MASS INDEX: 33.6 KG/M2 | HEIGHT: 63 IN | SYSTOLIC BLOOD PRESSURE: 113 MMHG | WEIGHT: 189.63 LBS

## 2023-02-28 DIAGNOSIS — E11.22 TYPE 2 DIABETES MELLITUS WITH STAGE 4 CHRONIC KIDNEY DISEASE, WITH LONG-TERM CURRENT USE OF INSULIN: Primary | ICD-10-CM

## 2023-02-28 DIAGNOSIS — N18.4 TYPE 2 DIABETES MELLITUS WITH STAGE 4 CHRONIC KIDNEY DISEASE, WITH LONG-TERM CURRENT USE OF INSULIN: Primary | ICD-10-CM

## 2023-02-28 DIAGNOSIS — I10 PRIMARY HYPERTENSION: Chronic | ICD-10-CM

## 2023-02-28 DIAGNOSIS — M10.9 GOUT, UNSPECIFIED CAUSE, UNSPECIFIED CHRONICITY, UNSPECIFIED SITE: ICD-10-CM

## 2023-02-28 DIAGNOSIS — Z79.4 TYPE 2 DIABETES MELLITUS WITH STAGE 4 CHRONIC KIDNEY DISEASE, WITH LONG-TERM CURRENT USE OF INSULIN: Primary | ICD-10-CM

## 2023-02-28 PROCEDURE — 99214 OFFICE O/P EST MOD 30 MIN: CPT

## 2023-02-28 RX ORDER — INSULIN GLARGINE 100 [IU]/ML
20 INJECTION, SOLUTION SUBCUTANEOUS NIGHTLY
Qty: 15 ML | Refills: 7 | Status: SHIPPED | OUTPATIENT
Start: 2023-02-28 | End: 2024-01-05

## 2023-02-28 RX ORDER — LANCETS
EACH MISCELLANEOUS
Qty: 100 EACH | Refills: 7 | Status: SHIPPED | OUTPATIENT
Start: 2023-02-28

## 2023-02-28 NOTE — PROGRESS NOTES
[facility]    Subjective:     Eliza Louie is a 72 y.o. year old female with PMHx of HTN, Stage 4 CKD, T2DM on insulin, CHF, gout who presents to clinic for who presents to clinic for HTN, DM follow up.     DM: Not at goal but improving, states her glucose runs approx 200s-260s. Lowest was 140s. Last A1c 12.7% (01/2023); patient does not complain of numbness/tingling in bilateral feet. Patient does not endorse smoking. Patient denies polyuria, polydipsia, polyphagia. Patient endorses compliance with medication regimen, including Trulicity and 12 units of Lantus. Denies any episodes of hypoglycemia.  SSI at nightly 4-12 units. Lost 2 lb. Has made dietary changes.    HTN: BP today 113/54. Well-controlled after increasing Amlodipine from 5 to 10 mg and stopping Losartan 25 mg. Patient denies any SOB, lightheadedness, dizziness, palpitations, chest pain, or vision changes. Patient endorses compliance with medication regimen.   Still has not made appointment with eye doctor  Nephrology appointment-earliest was May; would like to weigh in on ARB/ACE inhibitor    Patient Active Problem List    Diagnosis Date Noted    Gout of multiple sites 08/16/2022    (HFpEF) heart failure with preserved ejection fraction     Stage 4 chronic kidney disease 10/19/2021    History of nephrolithiasis 10/19/2021    GERD (gastroesophageal reflux disease)     RBBB (right bundle branch block with left anterior fascicular block) 08/28/2021    MDD (major depressive disorder) 08/16/2021    Anemia due to stage 4 chronic kidney disease 08/16/2021    Atherosclerosis of native coronary artery of native heart without angina pectoris 01/11/2021    Paroxysmal A-fib 12/24/2020    Hypomagnesemia 12/22/2020    Type 2 diabetes mellitus with stage 4 chronic kidney disease, with long-term current use of insulin     Hypokalemia     Urge incontinence of urine 03/22/2018    Stented coronary artery 11/28/2017    Primary hypertension 07/02/2017        Review  "of patient's allergies indicates:   Allergen Reactions    Ace inhibitors Other (See Comments)     Cough        Past Medical History:   Diagnosis Date    (HFpEF) heart failure with preserved ejection fraction     TTE (2020) w/ EF 55% and grade II diastolic dysfunction    Anemia due to stage 4 chronic kidney disease 8/16/2021    Gastric ulcer due to Helicobacter pylori 07/2017    Gastrointestinal hemorrhage with melena 07/2017    GERD (gastroesophageal reflux disease)     HLD (hyperlipidemia)     Hydronephrosis with ureteropelvic junction (UPJ) obstruction 8/16/2021    Hypertension     MDD (major depressive disorder) 8/16/2021    Nephrolithiasis 8/25/2021    NSTEMI (non-ST elevated myocardial infarction) 07/2017    Paroxysmal A-fib 12/24/2020    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/2020    Stage 4 chronic kidney disease 10/19/2021    STEMI (ST elevation myocardial infarction) 12/2020    Type 2 diabetes mellitus     Urge incontinence of urine       Past Surgical History:   Procedure Laterality Date    CARDIAC CATHETERIZATION  07/2017    RCA w/ 95% stenosis w/ stent placement    RETROGRADE PYELOGRAPHY  8/25/2021    Procedure: PYELOGRAM, RETROGRADE;  Surgeon: Rody Smith MD;  Location: [facility] OR;  Service: Urology;;    URETEROSCOPIC REMOVAL OF URETERIC CALCULUS Left 8/25/2021    Procedure: left ureteroscopy, stone basketing, stent placement;  left nephrostomy tube removal;  Surgeon: Rody Smith MD;  Location: [facility] OR;  Service: Urology;  Laterality: Left;      No family history on file.   Social History     Tobacco Use    Smoking status: Never    Smokeless tobacco: Never   Substance Use Topics    Alcohol use: No        Objective:     Vitals:    02/28/23 0931   BP: (!) 113/54   Pulse: (!) 54   Weight: 86 kg (189 lb 9.5 oz)   Height: 5' 3" (1.6 m)     BMI: 33.59    Gen: No apparent distress, well nourished and developed, appears stated age  CV: RRR, S1 and S2 present, no LE " edema  Resp: CTAB, normal respiratory effort    Assessment/Plan:     Eilza Louie is a 72 y.o. year old female with PMHx of HTN, Stage 4 CKD, T2DM on insulin, CHF, gout who presents to clinic for who presents to clinic for HTN, DM follow up.     1. Type 2 diabetes mellitus with stage 4 chronic kidney disease, with long-term current use of insulin  -Improving slowly  -Will increase insulin from 12 to 20 units nightly based on her BS log today and SSI log. Continue sliding scale insulin at home and keep log  -Refilled insulin strips and lancets   -Will check A1c at next visit, consider increasing Trulicity at that time     2. HTN  -well controlled; continue Amlodipine 10 mg   -Will continue to hold off on Losartan for now given side effects    3. Gout  -Elevated uric acid 11  -Not on allopurinol, unsure why. Will discuss next visit with patient. Attempted to call but went to . OK to initiate 50 mg every other day to prevent gout flares  -No complaints today    Follow-up: 1 month for DM, A1c check    A total of 30 minutes was spent on patient care during this encounter which included chart review, examining the patient, formulating a treatment plan and documentation.     Medical decision making straight forward and not complex during this visit.     Case discussed with staff: Dr. Georgina Garcia MD  [facility], PGY-1  02/28/2023

## 2023-03-06 ENCOUNTER — OFFICE VISIT (OUTPATIENT)
Dept: CARDIOLOGY | Facility: CLINIC | Age: 73
End: 2023-03-06
Payer: MEDICARE

## 2023-03-06 VITALS
DIASTOLIC BLOOD PRESSURE: 60 MMHG | HEIGHT: 63 IN | SYSTOLIC BLOOD PRESSURE: 132 MMHG | HEART RATE: 65 BPM | WEIGHT: 189 LBS | OXYGEN SATURATION: 96 % | BODY MASS INDEX: 33.49 KG/M2

## 2023-03-06 DIAGNOSIS — E11.65 TYPE 2 DIABETES MELLITUS WITH HYPERGLYCEMIA, WITHOUT LONG-TERM CURRENT USE OF INSULIN: ICD-10-CM

## 2023-03-06 DIAGNOSIS — I48.0 PAROXYSMAL A-FIB: ICD-10-CM

## 2023-03-06 DIAGNOSIS — I45.2 RBBB (RIGHT BUNDLE BRANCH BLOCK WITH LEFT ANTERIOR FASCICULAR BLOCK): Primary | ICD-10-CM

## 2023-03-06 DIAGNOSIS — Z95.5 STENTED CORONARY ARTERY: ICD-10-CM

## 2023-03-06 DIAGNOSIS — N18.4 CKD (CHRONIC KIDNEY DISEASE) STAGE 4, GFR 15-29 ML/MIN: ICD-10-CM

## 2023-03-06 PROCEDURE — 99999 PR PBB SHADOW E&M-EST. PATIENT-LVL III: CPT | Mod: PBBFAC,,, | Performed by: INTERNAL MEDICINE

## 2023-03-06 PROCEDURE — 3078F PR MOST RECENT DIASTOLIC BLOOD PRESSURE < 80 MM HG: ICD-10-PCS | Mod: CPTII,S$GLB,, | Performed by: INTERNAL MEDICINE

## 2023-03-06 PROCEDURE — 99999 PR PBB SHADOW E&M-EST. PATIENT-LVL III: ICD-10-PCS | Mod: PBBFAC,,, | Performed by: INTERNAL MEDICINE

## 2023-03-06 PROCEDURE — 1159F MED LIST DOCD IN RCRD: CPT | Mod: CPTII,S$GLB,, | Performed by: INTERNAL MEDICINE

## 2023-03-06 PROCEDURE — 3288F PR FALLS RISK ASSESSMENT DOCUMENTED: ICD-10-PCS | Mod: CPTII,S$GLB,, | Performed by: INTERNAL MEDICINE

## 2023-03-06 PROCEDURE — 3078F DIAST BP <80 MM HG: CPT | Mod: CPTII,S$GLB,, | Performed by: INTERNAL MEDICINE

## 2023-03-06 PROCEDURE — 3046F PR MOST RECENT HEMOGLOBIN A1C LEVEL > 9.0%: ICD-10-PCS | Mod: CPTII,S$GLB,, | Performed by: INTERNAL MEDICINE

## 2023-03-06 PROCEDURE — 4010F PR ACE/ARB THEARPY RXD/TAKEN: ICD-10-PCS | Mod: CPTII,S$GLB,, | Performed by: INTERNAL MEDICINE

## 2023-03-06 PROCEDURE — 1126F AMNT PAIN NOTED NONE PRSNT: CPT | Mod: CPTII,S$GLB,, | Performed by: INTERNAL MEDICINE

## 2023-03-06 PROCEDURE — 99214 PR OFFICE/OUTPT VISIT, EST, LEVL IV, 30-39 MIN: ICD-10-PCS | Mod: S$GLB,,, | Performed by: INTERNAL MEDICINE

## 2023-03-06 PROCEDURE — 1159F PR MEDICATION LIST DOCUMENTED IN MEDICAL RECORD: ICD-10-PCS | Mod: CPTII,S$GLB,, | Performed by: INTERNAL MEDICINE

## 2023-03-06 PROCEDURE — 3075F SYST BP GE 130 - 139MM HG: CPT | Mod: CPTII,S$GLB,, | Performed by: INTERNAL MEDICINE

## 2023-03-06 PROCEDURE — 1160F PR REVIEW ALL MEDS BY PRESCRIBER/CLIN PHARMACIST DOCUMENTED: ICD-10-PCS | Mod: CPTII,S$GLB,, | Performed by: INTERNAL MEDICINE

## 2023-03-06 PROCEDURE — 3008F PR BODY MASS INDEX (BMI) DOCUMENTED: ICD-10-PCS | Mod: CPTII,S$GLB,, | Performed by: INTERNAL MEDICINE

## 2023-03-06 PROCEDURE — 1126F PR PAIN SEVERITY QUANTIFIED, NO PAIN PRESENT: ICD-10-PCS | Mod: CPTII,S$GLB,, | Performed by: INTERNAL MEDICINE

## 2023-03-06 PROCEDURE — 3075F PR MOST RECENT SYSTOLIC BLOOD PRESS GE 130-139MM HG: ICD-10-PCS | Mod: CPTII,S$GLB,, | Performed by: INTERNAL MEDICINE

## 2023-03-06 PROCEDURE — 4010F ACE/ARB THERAPY RXD/TAKEN: CPT | Mod: CPTII,S$GLB,, | Performed by: INTERNAL MEDICINE

## 2023-03-06 PROCEDURE — 1160F RVW MEDS BY RX/DR IN RCRD: CPT | Mod: CPTII,S$GLB,, | Performed by: INTERNAL MEDICINE

## 2023-03-06 PROCEDURE — 3288F FALL RISK ASSESSMENT DOCD: CPT | Mod: CPTII,S$GLB,, | Performed by: INTERNAL MEDICINE

## 2023-03-06 PROCEDURE — 99214 OFFICE O/P EST MOD 30 MIN: CPT | Mod: S$GLB,,, | Performed by: INTERNAL MEDICINE

## 2023-03-06 PROCEDURE — 3008F BODY MASS INDEX DOCD: CPT | Mod: CPTII,S$GLB,, | Performed by: INTERNAL MEDICINE

## 2023-03-06 PROCEDURE — 1101F PR PT FALLS ASSESS DOC 0-1 FALLS W/OUT INJ PAST YR: ICD-10-PCS | Mod: CPTII,S$GLB,, | Performed by: INTERNAL MEDICINE

## 2023-03-06 PROCEDURE — 1101F PT FALLS ASSESS-DOCD LE1/YR: CPT | Mod: CPTII,S$GLB,, | Performed by: INTERNAL MEDICINE

## 2023-03-06 PROCEDURE — 3046F HEMOGLOBIN A1C LEVEL >9.0%: CPT | Mod: CPTII,S$GLB,, | Performed by: INTERNAL MEDICINE

## 2023-03-06 NOTE — PROGRESS NOTES
[facility] [address]     SUBJECTIVE:     History of Present Illness:  Patient is a 72 y.o. female presents with CAD and here for followup. Not seen since 9/21.     Primary Diagnosis:  1. DM  2. CAD: s/p stent right; occluded OM2  3. GI bleed   4. hypertension.         5. CKD 4     6. Paroxysmal atrial fibrillation on eliquis   7. S/p gall bladder surgery 2013   ROS  Since 9/21:     A. No chest pains  B. Does get short of breath with walking; no orthopnea; no PND; no change from before   C. No bleeding   D. No syncope  E. No palpitations  F. Activity: walks in the store 3 times a week; takes care of the house without chest pains; limited by leg pain in knees and back          Past Hospitalization  1. Admitted 12/21/20 and discharged 12/27/20: admitted for vomiting and nausea; EKG with ST elevation lead III only; ST depression noted; renal function decreased; echo with inferobasal akinesis; K 3.1; troponin was elevated 38 in the ER. She was treated medically; had episode of atrial fibrillation in the ICU and failure requiring diuresis   Review of patient's allergies indicates:   Allergen Reactions    Ace inhibitors Other (See Comments)     Cough       Past Medical History:   Diagnosis Date    (HFpEF) heart failure with preserved ejection fraction     TTE (2020) w/ EF 55% and grade II diastolic dysfunction    Anemia due to stage 4 chronic kidney disease 8/16/2021    Gastric ulcer due to Helicobacter pylori 07/2017    Gastrointestinal hemorrhage with melena 07/2017    GERD (gastroesophageal reflux disease)     HLD (hyperlipidemia)     Hydronephrosis with ureteropelvic junction (UPJ) obstruction 8/16/2021    Hypertension     MDD (major depressive disorder) 8/16/2021    Nephrolithiasis 8/25/2021    NSTEMI (non-ST elevated myocardial infarction) 07/2017    Paroxysmal A-fib 12/24/2020    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/2020    Stage 4 chronic kidney disease 10/19/2021    STEMI (ST  elevation myocardial infarction) 12/2020    Type 2 diabetes mellitus     Urge incontinence of urine        Past Surgical History:   Procedure Laterality Date    CARDIAC CATHETERIZATION  07/2017    RCA w/ 95% stenosis w/ stent placement    RETROGRADE PYELOGRAPHY  8/25/2021    Procedure: PYELOGRAM, RETROGRADE;  Surgeon: Rody Smith MD;  Location: [facility] OR;  Service: Urology;;    URETEROSCOPIC REMOVAL OF URETERIC CALCULUS Left 8/25/2021    Procedure: left ureteroscopy, stone basketing, stent placement;  left nephrostomy tube removal;  Surgeon: Rody Smith MD;  Location: [facility] OR;  Service: Urology;  Laterality: Left;       No family history on file.    Social History     Tobacco Use    Smoking status: Never    Smokeless tobacco: Never   Substance Use Topics    Alcohol use: No    Drug use: No        Home meds:  Current Outpatient Medications on File Prior to Visit   Medication Sig Dispense Refill    amLODIPine (NORVASC) 10 MG tablet Take 1 tablet (10 mg total) by mouth once daily. 30 tablet 11    apixaban (ELIQUIS) 5 mg Tab Take 1 tablet (5 mg total) by mouth 2 (two) times daily. 60 tablet 1    blood sugar diagnostic Strp To check BG 2 times daily, to use with insurance preferred meter 100 each 7    dulaglutide (TRULICITY) 1.5 mg/0.5 mL pen injector Inject 1.5 mg into the skin every 7 days. 4 pen 11    furosemide (LASIX) 40 MG tablet Take 1 tablet (40 mg total) by mouth 2 (two) times daily. 180 tablet 3    insulin (LANTUS SOLOSTAR U-100 INSULIN) glargine 100 units/mL SubQ pen Inject 20 Units into the skin every evening. 15 mL 7    lancets Misc To check BG 2 times daily, to use with insurance preferred meter 100 each 7    magnesium oxide (MAG-OX) 400 mg (241.3 mg magnesium) tablet Take 1 tablet (400 mg total) by mouth 2 (two) times daily. 60 tablet 6    metoprolol succinate (TOPROL-XL) 100 MG 24 hr tablet Take 1 tablet by mouth once daily 90 tablet 0    nitroGLYCERIN (NITROSTAT) 0.4 MG SL tablet Place 0.4 mg under  the tongue every 5 (five) minutes as needed for Chest pain.      potassium chloride SA (K-DUR,KLOR-CON) 20 MEQ tablet Take 20 mEq by mouth.      rosuvastatin (CRESTOR) 40 MG Tab Take 1 tablet (40 mg total) by mouth once daily. 90 tablet 3    aspirin (ECOTRIN) 81 MG EC tablet Take 1 tablet (81 mg total) by mouth once daily. 90 tablet 3    blood-glucose meter (FREESTYLE SYSTEM KIT) kit Use as instructed (Patient not taking: Reported on 3/6/2023) 1 each 0    sertraline (ZOLOFT) 50 MG tablet Take 1 tablet (50 mg total) by mouth once daily. 90 tablet 3     No current facility-administered medications on file prior to visit.       Cardiac meds:     ASA 81 mg - not on this   crestor 40 mg   Amlodipine 10 mg   eliquis 5 mg BID  metorpolol succiante 100 mg      Lasix 40 mg BID    K 20 meq    OBJECTIVE:     Vital Signs (Most Recent)  Pulse: 65 (03/06/23 0920)  BP: 132/60 (03/06/23 0920)  SpO2: 96 % (03/06/23 0920)      Physical Exam:  BP normal at home  Neck: normal carotids, nobruits  lungs: clear  Heart: RR, normal S1,S2, AI murmur noted loudest RSB; along LSB as well; no MR noted   Exts: normal pulses ; no edema             LABS    CBC  Hemoglobin (g/dL)   Date Value   02/14/2023 12.6   06/02/2022 12.5   10/21/2021 11.3 (L)     POC Hematocrit (%PCV)   Date Value   12/23/2020 41     Hematocrit (%)   Date Value   02/14/2023 38.4   06/02/2022 38.0   10/21/2021 35.0 (L)          BMP  Sodium (mmol/L)   Date Value   02/14/2023 133 (L)   06/02/2022 135 (L)   10/21/2021 138     Potassium (mmol/L)   Date Value   02/14/2023 3.9   06/02/2022 4.4   10/21/2021 4.2     CO2 (mmol/L)   Date Value   02/14/2023 27   06/02/2022 26   10/21/2021 23     Chloride (mmol/L)   Date Value   02/14/2023 94 (L)   06/02/2022 97   10/21/2021 102     BUN (mg/dL)   Date Value   02/14/2023 38 (H)   06/02/2022 36 (H)   10/21/2021 37 (H)     Creatinine (mg/dL)   Date Value   02/14/2023 3.1 (H)   06/02/2022 2.6 (H)   10/21/2021 2.9 (H)     Glucose (mg/dL)    Date Value   2023 414 (H)   2022 312 (H)   10/21/2021 198 (H)     eGFR if non African American (mL/min/1.73 m^2)   Date Value   2022 18 (A)   10/21/2021 16 (A)   2021 15 (A)       BNP  BNP (pg/mL)   Date Value   2020 935 (H)   2020 44   2017 21       Troponin panel:   No results found for: TROPONIN      COAGS  INR (no units)   Date Value   2021 1.1   2020 1.1   2017 1.0     aPTT (sec)   Date Value   2021 23.8   2021 77.6 (H)   2021 31.3       Lipid panel:    Lab Results   Component Value Date    CHOL 155 2021    CHOL 134 2019     Lab Results   Component Value Date    HDL 29 (L) 2021    HDL 36 (L) 2019     Lab Results   Component Value Date    LDLCALC Invalid, Trig>400.0 2021    LDLCALC 46.4 (L) 2019     Lab Results   Component Value Date    TRIG 449 (H) 2021    TRIG 258 (H) 2019     Lab Results   Component Value Date    CHOLHDL 18.7 (L) 2021    CHOLHDL 26.9 2019     GFR 14 in  - worse than before   : GFR 16   Diagnostic Results:    1. EKGs- sinus to atrial fibrillation to sinus   1b. EKG's : atrial fibrillation and sinus; T wave changes anterolateral leads, RBBB which was new ; minimal ST elevation lead III  1c. EK2021:sinus with PAC's, RBBB; ST upcoving with T wave inversion with q waves inferiorly   2. Echos- 7/3/17: mild LAE, normal EF, LVH   2b. Echo: 6/15/20: normal EF, moderate AI, LVH, diastolic dysfunction; no wall motion changes   2c. Echo: : EF 55%; LVE, LAE, diastolic dysfuncdtion; mild MR, mild TR, mild AI, normal RV function; inferobasal akinesis   3. Stress Test- [  ]  4. Cath- 7/3/17: Left main: normal; LAD normal; Cx: normal; Right 95% 2 discrete lesions; OM2 occluded; intervention 17: proximal and  mid right 4X30  5. Device- [  ].         ASSESSMENT/PLAN:     1. S/p acute MI : no intervention since presentation was delayed with  renal issues; stable now  Most likely from the right  2. RBBB   3. Abnormal EKG - age indeterminate inferior infarct   4. GFR 29 12/29/20; GFR 14 in 4/21; GFR 15 9/29/21; 2022 same  - followed by     7. Abnormal TG's - metabolic syndrome may benefit from vascepa   Plan: 1.continue all medications  2: follow up with   3. Return 4 months     Lul Thompson MD

## 2023-03-16 ENCOUNTER — TELEPHONE (OUTPATIENT)
Dept: NEUROLOGY | Facility: CLINIC | Age: 73
End: 2023-03-16
Payer: MEDICARE

## 2023-04-17 ENCOUNTER — OFFICE VISIT (OUTPATIENT)
Dept: FAMILY MEDICINE | Facility: HOSPITAL | Age: 73
End: 2023-04-17
Payer: MEDICARE

## 2023-04-17 VITALS
WEIGHT: 191.56 LBS | DIASTOLIC BLOOD PRESSURE: 68 MMHG | SYSTOLIC BLOOD PRESSURE: 130 MMHG | HEIGHT: 63 IN | HEART RATE: 76 BPM | BODY MASS INDEX: 33.94 KG/M2

## 2023-04-17 DIAGNOSIS — N18.4 TYPE 2 DIABETES MELLITUS WITH STAGE 4 CHRONIC KIDNEY DISEASE, WITH LONG-TERM CURRENT USE OF INSULIN: Primary | ICD-10-CM

## 2023-04-17 DIAGNOSIS — I25.10 ATHEROSCLEROSIS OF NATIVE CORONARY ARTERY OF NATIVE HEART WITHOUT ANGINA PECTORIS: ICD-10-CM

## 2023-04-17 DIAGNOSIS — Z79.4 TYPE 2 DIABETES MELLITUS WITH STAGE 4 CHRONIC KIDNEY DISEASE, WITH LONG-TERM CURRENT USE OF INSULIN: Primary | ICD-10-CM

## 2023-04-17 DIAGNOSIS — M1A.3710 CHRONIC GOUT OF RIGHT FOOT DUE TO RENAL IMPAIRMENT WITHOUT TOPHUS: ICD-10-CM

## 2023-04-17 DIAGNOSIS — E11.22 TYPE 2 DIABETES MELLITUS WITH STAGE 4 CHRONIC KIDNEY DISEASE, WITH LONG-TERM CURRENT USE OF INSULIN: Primary | ICD-10-CM

## 2023-04-17 PROCEDURE — 99215 OFFICE O/P EST HI 40 MIN: CPT

## 2023-04-17 NOTE — PROGRESS NOTES
[facility]    Subjective:     Eliza Louie is a 72 y.o. year old female with PMHx of HTN, Stage 4 CKD, T2DM on insulin, HFpEF, gout  who presents to clinic for DM follow up.    DM: Fasting at goal 110s-140s. Lowest was 115 in AM. Postprandial glucose in evening 200s-240s. Last A1c 12.7% (01/2023); patient does not complain of numbness/tingling in bilateral feet. Patient denies polyuria, polydipsia, polyphagia. Patient endorses compliance with medication regimen, including Trulicity 1.5 and 20 units of Lantus. Denies any episodes of hypoglycemia. Patient did not bring log of SSI today, daughter not present.     HTN: BP today 130/68. Well-controlled. Patient denies any SOB, lightheadedness, dizziness, palpitations, chest pain, or vision changes. Patient endorses compliance with medication regimen Amlodipine 10 mg. Still has not made appointment with eye doctor.    Nephrology apt. earliest was May that patient could get appointment; would like to weigh in on ARB/ACE inhibitor and allopurinol (for uric acid/gout) given worsening GFR.     Patient Active Problem List    Diagnosis Date Noted    Gout of multiple sites 08/16/2022    (HFpEF) heart failure with preserved ejection fraction     Stage 4 chronic kidney disease 10/19/2021    History of nephrolithiasis 10/19/2021    GERD (gastroesophageal reflux disease)     RBBB (right bundle branch block with left anterior fascicular block) 08/28/2021    MDD (major depressive disorder) 08/16/2021    Anemia due to stage 4 chronic kidney disease 08/16/2021    Atherosclerosis of native coronary artery of native heart without angina pectoris 01/11/2021    Paroxysmal A-fib 12/24/2020    Hypomagnesemia 12/22/2020    Type 2 diabetes mellitus with stage 4 chronic kidney disease, with long-term current use of insulin     Hypokalemia     Urge incontinence of urine 03/22/2018    Stented coronary artery 11/28/2017    Primary hypertension 07/02/2017        Review of patient's allergies  "indicates:   Allergen Reactions    Ace inhibitors Other (See Comments)     Cough        Past Medical History:   Diagnosis Date    (HFpEF) heart failure with preserved ejection fraction     TTE (2020) w/ EF 55% and grade II diastolic dysfunction    Anemia due to stage 4 chronic kidney disease 8/16/2021    Gastric ulcer due to Helicobacter pylori 07/2017    Gastrointestinal hemorrhage with melena 07/2017    GERD (gastroesophageal reflux disease)     HLD (hyperlipidemia)     Hydronephrosis with ureteropelvic junction (UPJ) obstruction 8/16/2021    Hypertension     MDD (major depressive disorder) 8/16/2021    Nephrolithiasis 8/25/2021    NSTEMI (non-ST elevated myocardial infarction) 07/2017    Paroxysmal A-fib 12/24/2020    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/2020    Stage 4 chronic kidney disease 10/19/2021    STEMI (ST elevation myocardial infarction) 12/2020    Type 2 diabetes mellitus     Urge incontinence of urine       Past Surgical History:   Procedure Laterality Date    CARDIAC CATHETERIZATION  07/2017    RCA w/ 95% stenosis w/ stent placement    RETROGRADE PYELOGRAPHY  8/25/2021    Procedure: PYELOGRAM, RETROGRADE;  Surgeon: Rody Smith MD;  Location: [facility] OR;  Service: Urology;;    URETEROSCOPIC REMOVAL OF URETERIC CALCULUS Left 8/25/2021    Procedure: left ureteroscopy, stone basketing, stent placement;  left nephrostomy tube removal;  Surgeon: Rody Smith MD;  Location: [facility] OR;  Service: Urology;  Laterality: Left;      No family history on file.   Social History     Tobacco Use    Smoking status: Never    Smokeless tobacco: Never   Substance Use Topics    Alcohol use: No        Objective:     Vitals:    04/17/23 0929   BP: 130/68   Pulse: 76   Weight: 86.9 kg (191 lb 9.3 oz)   Height: 5' 3" (1.6 m)     BMI: 33.94    Physical Exam  Constitutional:       General: She is not in acute distress.     Appearance: She is not toxic-appearing.   Cardiovascular:      Rate " and Rhythm: Normal rate.      Pulses: Normal pulses.   Pulmonary:      Effort: Pulmonary effort is normal. No respiratory distress.      Breath sounds: Normal breath sounds.   Skin:     General: Skin is warm.   Neurological:      Mental Status: She is alert. Mental status is at baseline.   Psychiatric:         Mood and Affect: Mood normal.         Behavior: Behavior normal.         Thought Content: Thought content normal.       Assessment/Plan:     Eliza Louie is a 72 y.o. year old female with PMH of HTN, Stage 4 CKD, T2DM on insulin, HFpEF, gout who presents to clinic for DM follow up.     1. Type 2 diabetes mellitus with stage 4 chronic kidney disease, with long-term current use of insulin  - Advised patient to continue to keep log of blood sugars including sliding scale insulin  - Continue Lantus 20 units evening with SSI  - Continue Trulicity 1.5   - Discussed with patient given elevated post-prandials, may need to add on short-acting but patient would like to avoid that if possible given she needs assistance from family with insulin  - Will check Hemoglobin A1C today  - Comprehensive Metabolic Panel; Future  - Microalbumin/creatinine urine ratio; Future    2. Chronic gout of right foot due to renal impairment without tophus  - Will hold off on Allopurinol 50 every other day at this time; will check GFR first   - Gout flares seem to be controlled at this point; no flares in past few months    3. Atherosclerosis of native coronary artery of native heart without angina pectoris  - Followed by Dr. Correa; no meds changed  - Will check Lipid Panel today    Follow-up: 1 month for DM    A total of 20 minutes was spent on patient care during this encounter which included chart review, examining the patient, formulating a treatment plan and documentation.     Medical decision making straight forward and not complex during this visit.     Case discussed with staff: Dr. Rich Garcia MD  [facility],  PGY-1  04/17/2023

## 2023-04-18 NOTE — PROGRESS NOTES
I assume primary medical responsibility for this patient. I have reviewed the history, physical, and assessment & treatment plan with the resident and agree that the care is reasonable and necessary. This service has been performed by a resident without the presence of a teaching physician under the primary care exception. If necessary, an addendum of additional findings or evaluation beyond the resident documentation will be noted below.        Blaine Villanueva Jr., DO    [facility]

## 2023-04-21 ENCOUNTER — TELEPHONE (OUTPATIENT)
Dept: DIABETES | Facility: CLINIC | Age: 73
End: 2023-04-21
Payer: MEDICARE

## 2023-04-28 ENCOUNTER — TELEPHONE (OUTPATIENT)
Dept: FAMILY MEDICINE | Facility: HOSPITAL | Age: 73
End: 2023-04-28
Payer: MEDICARE

## 2023-05-03 DIAGNOSIS — I10 PRIMARY HYPERTENSION: Chronic | ICD-10-CM

## 2023-05-03 DIAGNOSIS — I50.32 CHRONIC HEART FAILURE WITH PRESERVED EJECTION FRACTION: ICD-10-CM

## 2023-05-03 DIAGNOSIS — I48.0 PAROXYSMAL A-FIB: Chronic | ICD-10-CM

## 2023-05-03 RX ORDER — METOPROLOL SUCCINATE 100 MG/1
100 TABLET, EXTENDED RELEASE ORAL DAILY
Qty: 90 TABLET | Refills: 0 | Status: SHIPPED | OUTPATIENT
Start: 2023-05-03 | End: 2023-08-04 | Stop reason: SDUPTHER

## 2023-05-12 RX ORDER — POTASSIUM CHLORIDE 20 MEQ/1
20 TABLET, EXTENDED RELEASE ORAL DAILY
Qty: 90 TABLET | Refills: 0 | Status: SHIPPED | OUTPATIENT
Start: 2023-05-12 | End: 2023-08-07 | Stop reason: SDUPTHER

## 2023-05-16 DIAGNOSIS — Z79.4 TYPE 2 DIABETES MELLITUS WITH HYPERGLYCEMIA, WITH LONG-TERM CURRENT USE OF INSULIN: ICD-10-CM

## 2023-05-16 DIAGNOSIS — N18.4 STAGE 4 CHRONIC KIDNEY DISEASE: ICD-10-CM

## 2023-05-16 DIAGNOSIS — I10 PRIMARY HYPERTENSION: Primary | ICD-10-CM

## 2023-05-16 DIAGNOSIS — Z87.442 HISTORY OF NEPHROLITHIASIS: ICD-10-CM

## 2023-05-16 DIAGNOSIS — E11.65 TYPE 2 DIABETES MELLITUS WITH HYPERGLYCEMIA, WITH LONG-TERM CURRENT USE OF INSULIN: ICD-10-CM

## 2023-05-23 ENCOUNTER — LAB VISIT (OUTPATIENT)
Dept: LAB | Facility: HOSPITAL | Age: 73
End: 2023-05-23
Attending: INTERNAL MEDICINE
Payer: MEDICARE

## 2023-05-23 DIAGNOSIS — E11.65 TYPE 2 DIABETES MELLITUS WITH HYPERGLYCEMIA, WITH LONG-TERM CURRENT USE OF INSULIN: ICD-10-CM

## 2023-05-23 DIAGNOSIS — I10 PRIMARY HYPERTENSION: ICD-10-CM

## 2023-05-23 DIAGNOSIS — N18.4 STAGE 4 CHRONIC KIDNEY DISEASE: ICD-10-CM

## 2023-05-23 DIAGNOSIS — Z87.442 HISTORY OF NEPHROLITHIASIS: ICD-10-CM

## 2023-05-23 DIAGNOSIS — Z79.4 TYPE 2 DIABETES MELLITUS WITH HYPERGLYCEMIA, WITH LONG-TERM CURRENT USE OF INSULIN: ICD-10-CM

## 2023-05-23 LAB
BILIRUB UR QL STRIP: NEGATIVE
CLARITY UR: CLEAR
COLOR UR: YELLOW
CREAT UR-MCNC: 130.3 MG/DL (ref 15–325)
GLUCOSE UR QL STRIP: NEGATIVE
HGB UR QL STRIP: NEGATIVE
KETONES UR QL STRIP: NEGATIVE
LEUKOCYTE ESTERASE UR QL STRIP: ABNORMAL
MICROSCOPIC COMMENT: NORMAL
NITRITE UR QL STRIP: NEGATIVE
PH UR STRIP: 5 [PH] (ref 5–8)
PROT UR QL STRIP: ABNORMAL
PROT UR-MCNC: 22 MG/DL (ref 0–15)
PROT/CREAT UR: 0.17 MG/G{CREAT} (ref 0–0.2)
SP GR UR STRIP: 1.01 (ref 1–1.03)
URN SPEC COLLECT METH UR: ABNORMAL
UROBILINOGEN UR STRIP-ACNC: NEGATIVE EU/DL
WBC #/AREA URNS HPF: 0 /HPF (ref 0–5)

## 2023-05-23 PROCEDURE — 82570 ASSAY OF URINE CREATININE: CPT | Performed by: INTERNAL MEDICINE

## 2023-05-23 PROCEDURE — 81000 URINALYSIS NONAUTO W/SCOPE: CPT | Performed by: INTERNAL MEDICINE

## 2023-05-25 ENCOUNTER — PATIENT MESSAGE (OUTPATIENT)
Dept: FAMILY MEDICINE | Facility: HOSPITAL | Age: 73
End: 2023-05-25
Payer: MEDICARE

## 2023-05-25 ENCOUNTER — OFFICE VISIT (OUTPATIENT)
Dept: NEPHROLOGY | Facility: CLINIC | Age: 73
End: 2023-05-25
Payer: MEDICARE

## 2023-05-25 VITALS
HEIGHT: 63 IN | HEART RATE: 64 BPM | BODY MASS INDEX: 32.35 KG/M2 | SYSTOLIC BLOOD PRESSURE: 130 MMHG | WEIGHT: 182.56 LBS | DIASTOLIC BLOOD PRESSURE: 82 MMHG

## 2023-05-25 DIAGNOSIS — N18.4 STAGE 4 CHRONIC KIDNEY DISEASE: Primary | Chronic | ICD-10-CM

## 2023-05-25 DIAGNOSIS — Z79.4 TYPE 2 DIABETES MELLITUS WITH STAGE 4 CHRONIC KIDNEY DISEASE, WITH LONG-TERM CURRENT USE OF INSULIN: ICD-10-CM

## 2023-05-25 DIAGNOSIS — I48.0 PAROXYSMAL A-FIB: ICD-10-CM

## 2023-05-25 DIAGNOSIS — I10 PRIMARY HYPERTENSION: Chronic | ICD-10-CM

## 2023-05-25 DIAGNOSIS — Z87.442 HISTORY OF NEPHROLITHIASIS: Chronic | ICD-10-CM

## 2023-05-25 DIAGNOSIS — E11.22 TYPE 2 DIABETES MELLITUS WITH STAGE 4 CHRONIC KIDNEY DISEASE, WITH LONG-TERM CURRENT USE OF INSULIN: ICD-10-CM

## 2023-05-25 DIAGNOSIS — N18.4 TYPE 2 DIABETES MELLITUS WITH STAGE 4 CHRONIC KIDNEY DISEASE, WITH LONG-TERM CURRENT USE OF INSULIN: ICD-10-CM

## 2023-05-25 PROCEDURE — 3060F POS MICROALBUMINURIA REV: CPT | Mod: CPTII,S$GLB,, | Performed by: INTERNAL MEDICINE

## 2023-05-25 PROCEDURE — 3008F PR BODY MASS INDEX (BMI) DOCUMENTED: ICD-10-PCS | Mod: CPTII,S$GLB,, | Performed by: INTERNAL MEDICINE

## 2023-05-25 PROCEDURE — 1159F PR MEDICATION LIST DOCUMENTED IN MEDICAL RECORD: ICD-10-PCS | Mod: CPTII,S$GLB,, | Performed by: INTERNAL MEDICINE

## 2023-05-25 PROCEDURE — 3288F PR FALLS RISK ASSESSMENT DOCUMENTED: ICD-10-PCS | Mod: CPTII,S$GLB,, | Performed by: INTERNAL MEDICINE

## 2023-05-25 PROCEDURE — 3075F SYST BP GE 130 - 139MM HG: CPT | Mod: CPTII,S$GLB,, | Performed by: INTERNAL MEDICINE

## 2023-05-25 PROCEDURE — 3066F NEPHROPATHY DOC TX: CPT | Mod: CPTII,S$GLB,, | Performed by: INTERNAL MEDICINE

## 2023-05-25 PROCEDURE — 99214 OFFICE O/P EST MOD 30 MIN: CPT | Mod: S$GLB,,, | Performed by: INTERNAL MEDICINE

## 2023-05-25 PROCEDURE — 99999 PR PBB SHADOW E&M-EST. PATIENT-LVL III: ICD-10-PCS | Mod: PBBFAC,,, | Performed by: INTERNAL MEDICINE

## 2023-05-25 PROCEDURE — 3046F HEMOGLOBIN A1C LEVEL >9.0%: CPT | Mod: CPTII,S$GLB,, | Performed by: INTERNAL MEDICINE

## 2023-05-25 PROCEDURE — 3288F FALL RISK ASSESSMENT DOCD: CPT | Mod: CPTII,S$GLB,, | Performed by: INTERNAL MEDICINE

## 2023-05-25 PROCEDURE — 1125F PR PAIN SEVERITY QUANTIFIED, PAIN PRESENT: ICD-10-PCS | Mod: CPTII,S$GLB,, | Performed by: INTERNAL MEDICINE

## 2023-05-25 PROCEDURE — 1101F PT FALLS ASSESS-DOCD LE1/YR: CPT | Mod: CPTII,S$GLB,, | Performed by: INTERNAL MEDICINE

## 2023-05-25 PROCEDURE — 1125F AMNT PAIN NOTED PAIN PRSNT: CPT | Mod: CPTII,S$GLB,, | Performed by: INTERNAL MEDICINE

## 2023-05-25 PROCEDURE — 4010F PR ACE/ARB THEARPY RXD/TAKEN: ICD-10-PCS | Mod: CPTII,S$GLB,, | Performed by: INTERNAL MEDICINE

## 2023-05-25 PROCEDURE — 99999 PR PBB SHADOW E&M-EST. PATIENT-LVL III: CPT | Mod: PBBFAC,,, | Performed by: INTERNAL MEDICINE

## 2023-05-25 PROCEDURE — 3079F DIAST BP 80-89 MM HG: CPT | Mod: CPTII,S$GLB,, | Performed by: INTERNAL MEDICINE

## 2023-05-25 PROCEDURE — 3046F PR MOST RECENT HEMOGLOBIN A1C LEVEL > 9.0%: ICD-10-PCS | Mod: CPTII,S$GLB,, | Performed by: INTERNAL MEDICINE

## 2023-05-25 PROCEDURE — 3079F PR MOST RECENT DIASTOLIC BLOOD PRESSURE 80-89 MM HG: ICD-10-PCS | Mod: CPTII,S$GLB,, | Performed by: INTERNAL MEDICINE

## 2023-05-25 PROCEDURE — 4010F ACE/ARB THERAPY RXD/TAKEN: CPT | Mod: CPTII,S$GLB,, | Performed by: INTERNAL MEDICINE

## 2023-05-25 PROCEDURE — 1159F MED LIST DOCD IN RCRD: CPT | Mod: CPTII,S$GLB,, | Performed by: INTERNAL MEDICINE

## 2023-05-25 PROCEDURE — 3008F BODY MASS INDEX DOCD: CPT | Mod: CPTII,S$GLB,, | Performed by: INTERNAL MEDICINE

## 2023-05-25 PROCEDURE — 99214 PR OFFICE/OUTPT VISIT, EST, LEVL IV, 30-39 MIN: ICD-10-PCS | Mod: S$GLB,,, | Performed by: INTERNAL MEDICINE

## 2023-05-25 PROCEDURE — 3060F PR POS MICROALBUMINURIA RESULT DOCUMENTED/REVIEW: ICD-10-PCS | Mod: CPTII,S$GLB,, | Performed by: INTERNAL MEDICINE

## 2023-05-25 PROCEDURE — 3066F PR DOCUMENTATION OF TREATMENT FOR NEPHROPATHY: ICD-10-PCS | Mod: CPTII,S$GLB,, | Performed by: INTERNAL MEDICINE

## 2023-05-25 PROCEDURE — 1101F PR PT FALLS ASSESS DOC 0-1 FALLS W/OUT INJ PAST YR: ICD-10-PCS | Mod: CPTII,S$GLB,, | Performed by: INTERNAL MEDICINE

## 2023-05-25 PROCEDURE — 3075F PR MOST RECENT SYSTOLIC BLOOD PRESS GE 130-139MM HG: ICD-10-PCS | Mod: CPTII,S$GLB,, | Performed by: INTERNAL MEDICINE

## 2023-05-25 RX ORDER — LOSARTAN POTASSIUM 25 MG/1
25 TABLET ORAL DAILY
Qty: 90 TABLET | Refills: 3 | Status: SHIPPED | OUTPATIENT
Start: 2023-05-25 | End: 2024-02-29 | Stop reason: SDUPTHER

## 2023-05-25 RX ORDER — LOSARTAN POTASSIUM 25 MG/1
25 TABLET ORAL DAILY
Qty: 90 TABLET | Refills: 3 | Status: SHIPPED | OUTPATIENT
Start: 2023-05-25 | End: 2023-05-25 | Stop reason: SDUPTHER

## 2023-05-25 RX ORDER — COLCHICINE 0.6 MG/1
0.6 TABLET ORAL DAILY
Qty: 30 TABLET | Refills: 11 | Status: SHIPPED | OUTPATIENT
Start: 2023-05-25 | End: 2024-05-24

## 2023-05-25 NOTE — PROGRESS NOTES
Name: Eliza Louie  Medical Record Number: [id number]  Date of Service: 05/25/2023  Note By: Tino Shields DO    [facility]    History of Present Illness:  Reason for Consultation: CKD 4  Referring Provider: Dr. Casi Garcia     History of Present Illness:  Eliza Louie is a 70 y.o. female with past medical history of Htn who presents for CKD 4 and hypertension.  No adverse sequele from fall injury on last visit but she is c/o severe (L) shoulder pain.  She is without c/o CP, SOB, N/V, C/F, D/C. No new episodes with kidney stones.  Sts got COVID Vaccine X 2, no booster.The patient c/o dry mouth and bad taste in mouth, denies any itching or involuntary movements. She has significant hx of diabetes and hypertension, CAD. Pt without labs.     Past Medical History:  Past Medical History:   Diagnosis Date    (HFpEF) heart failure with preserved ejection fraction     TTE (2020) w/ EF 55% and grade II diastolic dysfunction    Anemia due to stage 4 chronic kidney disease 8/16/2021    Gastric ulcer due to Helicobacter pylori 07/2017    Gastrointestinal hemorrhage with melena 07/2017    GERD (gastroesophageal reflux disease)     HLD (hyperlipidemia)     Hydronephrosis with ureteropelvic junction (UPJ) obstruction 8/16/2021    Hypertension     MDD (major depressive disorder) 8/16/2021    Nephrolithiasis 8/25/2021    NSTEMI (non-ST elevated myocardial infarction) 07/2017    Paroxysmal A-fib 12/24/2020    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/2020    Stage 4 chronic kidney disease 10/19/2021    STEMI (ST elevation myocardial infarction) 12/2020    Type 2 diabetes mellitus     Urge incontinence of urine        Past Surgical History:  Past Surgical History:   Procedure Laterality Date    CARDIAC CATHETERIZATION  07/2017    RCA w/ 95% stenosis w/ stent placement    RETROGRADE PYELOGRAPHY  8/25/2021    Procedure: PYELOGRAM, RETROGRADE;  Surgeon: Rody Smith MD;  Location: [facility] OR;   Service: Urology;;    URETEROSCOPIC REMOVAL OF URETERIC CALCULUS Left 8/25/2021    Procedure: left ureteroscopy, stone basketing, stent placement;  left nephrostomy tube removal;  Surgeon: Rody Smith MD;  Location: [facility];  Service: Urology;  Laterality: Left;       Family History:  History reviewed. No pertinent family history.    Social History:  Social History     Socioeconomic History    Marital status:    Tobacco Use    Smoking status: Never    Smokeless tobacco: Never   Substance and Sexual Activity    Alcohol use: No    Drug use: No    Sexual activity: Not Currently       Home Medications:   (Not in a hospital admission)      Allergies:  Ace inhibitors    Review of Systems:  10 point review of systems was conducted and was negative except as mentioned in the HPI.    Physical Exam:  Vitals:  Vitals:    05/25/23 1638   BP: 130/82   Pulse: 64     [unfilled]      Exam  General: No acute distress, well groomed, alert and oriented x 3  HEENT: Normocephalic, atraumatic, EOM's intact bilaterally, external inspection of ears and nose normal, moist mucous membranes, no oral ulcerations/lesions  Neck: Supple, symmetrical, trachea midline, no thyromegaly, no JVD  Respiratory: Clear to auscultation bilaterally, respirations unlabored, no rales/rhonchi/wheezing  Cardiovacular: Regular rate and rhythm, S1, S2 normal, no murmurs, rubs or gallops  Gastrointestinal: Soft, non-tender, bowel sounds normal, no hepatosplenomegaly  Musculoskeletal: No knee or ankle joint tenderness or swelling.   Extremities: No clubbing or cyanosis of bilateral upper extremities; no lower extremity edema bilaterally, radial pulses 2+ bilaterally, symmetric  Skin: warm and dry; no rash on exposed skin  Neurologic: CN grossly intact    Labs:  A1C:  Recent Labs   Lab 06/02/22  1417 01/10/23  1443 04/17/23  1005   Hemoglobin A1C 11.0 H 12.7 H 9.4 H     CBC:  Recent Labs   Lab 06/02/22  1417 02/14/23  1049 05/23/23  0836   WBC 8.31 7.36  7.95   RBC 4.50 4.47 5.04   Hemoglobin 12.5 12.6 13.5   Hematocrit 38.0 38.4 41.8   Platelets 264 209 375   MCV 84 86 83   MCH 27.8 28.2 26.8 L   MCHC 32.9 32.8 32.3     CMP:  Recent Labs   Lab 06/02/22  1417 02/14/23  1049 05/23/23  0836   Glucose 312 H   < > 152 H   Calcium 9.6   < > 9.6   Albumin 3.8   < > 3.6   Total Protein 8.6 H   < > 8.6 H   Sodium 135 L   < > 137   Potassium 4.4   < > 4.1   CO2 26   < > 27   Chloride 97   < > 100   BUN 36 H   < > 32 H   Creatinine 2.6 H   < > 2.4 H   Alkaline Phosphatase 88   < > 86   ALT 17   < > 31   AST 22   < > 45 H   Total Bilirubin 0.5   < > 0.5   eGFR if  21 A  --   --     < > = values in this interval not displayed.     LIPIDS:  Recent Labs   Lab 08/04/21  0752 08/15/21  1919 08/26/21  0841 04/17/23  1005   TSH 2.134 1.193 0.999  --    HDL 29 L  --   --  35 L   Cholesterol 155  --   --  180   Triglycerides 449 H  --   --  244 H   LDL Cholesterol Invalid, Trig>400.0  --   --  96.2   HDL/Cholesterol Ratio 18.7 L  --   --  19.4 L   Non-HDL Cholesterol 126  --   --  145   Total Cholesterol/HDL Ratio 5.3 H  --   --  5.1 H     TSH:  Recent Labs   Lab 08/04/21  0752 08/15/21  1919 08/26/21  0841   TSH 2.134 1.193 0.999     URINALYSIS:  Recent Labs   Lab Result Units 05/23/23  0939   Color, UA  Yellow   Specific Gravity, UA  1.010   pH, UA  5.0   Protein, UA  Trace*   Nitrite, UA  Negative   Leukocytes, UA  Trace*   Urobilinogen, UA EU/dL Negative        Lab Results   Component Value Date    WBC 7.95 05/23/2023    HGB 13.5 05/23/2023    HCT 41.8 05/23/2023     05/23/2023    K 4.1 05/23/2023     05/23/2023    CO2 27 05/23/2023    BUN 32 (H) 05/23/2023    CREATININE 2.4 (H) 05/23/2023    EGFRNONAA 18 (A) 06/02/2022    CALCIUM 9.6 05/23/2023    PHOS 3.9 05/23/2023    MG 2.3 05/23/2023    ALBUMIN 3.6 05/23/2023    AST 45 (H) 05/23/2023    ALT 31 05/23/2023       No results found for: EXTANC, EXTWBC, EXTSEGS, EXTPLATELETS, EXTHEMOGLOBI, EXTHEMATOCRI,  EXTCREATININ, EXTSODIUM, EXTPOTASSIUM, EXTBUN, EXTCO2, EXTCALCIUM, EXTPHOSPHORU, EXTGLUCOSE, EXTALBUMIN, EXTAST, EXTALT, EXTBILITOTAL, EXTLIPASE, EXTAMYLASE    No results found for: EXTCYCLOSLVL, EXTSIROLIMUS, EXTTACROLVL, EXTPROTCRE, EXTPTHINTACT, EXTPROTEINUA, EXTWBCUA, EXTRBCUA    Imaging Studies: none  ?    Assessment/Plan:  72 y.o. female with:       1- CKD 4 - Pt is noted with significant renal failure. Renal function is stable with GFR of 21.  She is non-oliguric.  Avoid NSAIDS/PPI/Increase fluid intake.     2. Proteinuria - Much better. Patient needs to be on an ARB if possible. She is supposedly allergic to ACEI, due to it causing cough.  It should be OK to take ARB/Losartan 25 mg.      3. Hypertension - essential.  Appears to be controlled with meds.     4. Diabetes 2 - Needs tighter control.  Currently on Insulin. Seeing Dr. Garcia     5. Hypomagnesemia - Replete with Mag Oxide 400 mg bid. .      6. CAD - Plan f/u with Dr. Gautam.     Clearance: current BUN/Cr 32/2.4.       Tino Shields,   [facility]

## 2023-06-05 ENCOUNTER — TELEPHONE (OUTPATIENT)
Dept: CARDIOLOGY | Facility: CLINIC | Age: 73
End: 2023-06-05
Payer: MEDICARE

## 2023-06-05 ENCOUNTER — PATIENT MESSAGE (OUTPATIENT)
Dept: CARDIOLOGY | Facility: CLINIC | Age: 73
End: 2023-06-05
Payer: MEDICARE

## 2023-06-05 NOTE — TELEPHONE ENCOUNTER
I have called pt several times to reschedul her an appt but was not able to reach her , so I'm mailing an appt out to the pt and also I'm going to send her a message on her portal.

## 2023-07-10 NOTE — PROGRESS NOTES
[facility] [address]     SUBJECTIVE:     History of Present Illness:  Patient is a 72 y.o. female presents with CAD and here for followup. Not seen since 9/21.     Primary Diagnosis:  1. DM  2. CAD: s/p stent right; occluded OM2  3. GI bleed   4. hypertension.         5. CKD 4     6. Paroxysmal atrial fibrillation on eliquis   7. S/p gall bladder surgery 2013     ROS  Since 3/23: no change   A. No chest pains  B. Does get short of breath with walking; no orthopnea; no PND; no change from before   C. No bleeding   D. No syncope  E. No palpitations  F. Activity: walks in the store 3 times a week; takes care of the house without chest pains; limited by leg pain in knees and back          Past Hospitalization  1. Admitted 12/21/20 and discharged 12/27/20: admitted for vomiting and nausea; EKG with ST elevation lead III only; ST depression noted; renal function decreased; echo with inferobasal akinesis; K 3.1; troponin was elevated 38 in the ER. She was treated medically; had episode of atrial fibrillation in the ICU and failure requiring diuresis   Review of patient's allergies indicates:   Allergen Reactions    Ace inhibitors Other (See Comments)     Cough       Past Medical History:   Diagnosis Date    (HFpEF) heart failure with preserved ejection fraction     TTE (2020) w/ EF 55% and grade II diastolic dysfunction    Anemia due to stage 4 chronic kidney disease 8/16/2021    Gastric ulcer due to Helicobacter pylori 07/2017    Gastrointestinal hemorrhage with melena 07/2017    GERD (gastroesophageal reflux disease)     HLD (hyperlipidemia)     Hydronephrosis with ureteropelvic junction (UPJ) obstruction 8/16/2021    Hypertension     MDD (major depressive disorder) 8/16/2021    Nephrolithiasis 8/25/2021    NSTEMI (non-ST elevated myocardial infarction) 07/2017    Paroxysmal A-fib 12/24/2020    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/2020    Stage 4 chronic kidney disease 10/19/2021     STEMI (ST elevation myocardial infarction) 12/2020    Type 2 diabetes mellitus     Urge incontinence of urine        Past Surgical History:   Procedure Laterality Date    CARDIAC CATHETERIZATION  07/2017    RCA w/ 95% stenosis w/ stent placement    RETROGRADE PYELOGRAPHY  8/25/2021    Procedure: PYELOGRAM, RETROGRADE;  Surgeon: Rody Smith MD;  Location: [facility] OR;  Service: Urology;;    URETEROSCOPIC REMOVAL OF URETERIC CALCULUS Left 8/25/2021    Procedure: left ureteroscopy, stone basketing, stent placement;  left nephrostomy tube removal;  Surgeon: Rody Smith MD;  Location: [facility] OR;  Service: Urology;  Laterality: Left;       No family history on file.    Social History     Tobacco Use    Smoking status: Never    Smokeless tobacco: Never   Substance Use Topics    Alcohol use: No    Drug use: No        Home meds:  Current Outpatient Medications on File Prior to Visit   Medication Sig Dispense Refill    amLODIPine (NORVASC) 10 MG tablet Take 1 tablet (10 mg total) by mouth once daily. 30 tablet 11    apixaban (ELIQUIS) 5 mg Tab Take 1 tablet (5 mg total) by mouth 2 (two) times daily. 60 tablet 1    aspirin (ECOTRIN) 81 MG EC tablet Take 1 tablet (81 mg total) by mouth once daily. 90 tablet 3    blood sugar diagnostic Strp To check BG 2 times daily, to use with insurance preferred meter 100 each 7    blood-glucose meter (FREESTYLE SYSTEM KIT) kit Use as instructed 1 each 0    colchicine (COLCRYS) 0.6 mg tablet Take 1 tablet (0.6 mg total) by mouth once daily. 30 tablet 11    dulaglutide (TRULICITY) 1.5 mg/0.5 mL pen injector Inject 1.5 mg into the skin every 7 days. 4 pen 11    furosemide (LASIX) 40 MG tablet Take 1 tablet (40 mg total) by mouth 2 (two) times daily. 180 tablet 3    insulin (LANTUS SOLOSTAR U-100 INSULIN) glargine 100 units/mL SubQ pen Inject 20 Units into the skin every evening. 15 mL 7    lancets Misc To check BG 2 times daily, to use with insurance preferred meter 100 each 7    losartan  (COZAAR) 25 MG tablet Take 1 tablet (25 mg total) by mouth once daily. 90 tablet 3    magnesium oxide (MAG-OX) 400 mg (241.3 mg magnesium) tablet Take 1 tablet (400 mg total) by mouth 2 (two) times daily. 60 tablet 6    metoprolol succinate (TOPROL-XL) 100 MG 24 hr tablet Take 1 tablet (100 mg total) by mouth once daily. 90 tablet 0    nitroGLYCERIN (NITROSTAT) 0.4 MG SL tablet Place 0.4 mg under the tongue every 5 (five) minutes as needed for Chest pain.      potassium chloride SA (K-DUR,KLOR-CON) 20 MEQ tablet Take 1 tablet (20 mEq total) by mouth once daily. 90 tablet 0    rosuvastatin (CRESTOR) 40 MG Tab Take 1 tablet (40 mg total) by mouth once daily. 90 tablet 3    sertraline (ZOLOFT) 50 MG tablet Take 1 tablet (50 mg total) by mouth once daily. (Patient not taking: Reported on 2023) 90 tablet 3     No current facility-administered medications on file prior to visit.       Cardiac meds:     ASA 81 mg - not on this   crestor 40 mg   Amlodipine 10 mg   eliquis 5 mg BID  metorpolol succiante 100 mg      Lasix 40 mg BID    K 20 meq  Losartan 25 mg     OBJECTIVE:     Vital Signs (Most Recent)  Pulse: 68 (23 0956)  BP: 124/82 (23 0956)  SpO2: 97 % (23 09)  Weight down few lbs     Physical Exam:  BP normal at home  Neck: normal carotids, nobruits  lungs: clear  Heart: RR, normal S1,S2, AI murmur noted loudest RSB; along LSB as well; no MR noted   Exts: normal pulses ; no edema             LABS    : GFR 16   : LDL 96;  ; A1c 9.4  : GFR 21; K 4.1  Diagnostic Results:    1. EKGs- sinus to atrial fibrillation to sinus   1b. EKG's : atrial fibrillation and sinus; T wave changes anterolateral leads, RBBB which was new ; minimal ST elevation lead III  1c. EK2021:sinus with PAC's, RBBB; ST upcoving with T wave inversion with q waves inferiorly   2. Echos- 7/3/17: mild LAE, normal EF, LVH   2b. Echo: 6/15/20: normal EF, moderate AI, LVH, diastolic dysfunction; no wall  motion changes   2c. Echo: 12/20: EF 55%; LVE, LAE, diastolic dysfuncdtion; mild MR, mild TR, mild AI, normal RV function; inferobasal akinesis   3. Stress Test- [  ]  4. Cath- 7/3/17: Left main: normal; LAD normal; Cx: normal; Right 95% 2 discrete lesions; OM2 occluded; intervention 7/7/17: proximal and  mid right 4X30  5. Device- [  ].         ASSESSMENT/PLAN:     1. S/p acute MI 12/20: no intervention since presentation was delayed with renal issues; stable now. Most likely from the right  2. RBBB   3. Abnormal EKG - age indeterminate inferior infarct   4. CKD 4: followed by    5. Abnormal TG's - metabolic syndrome - however has changed diet and will see where the TG go   6. Chronic diastolic  heart failure   Plan: 1.continue all medications  2: follow up with   3. Weight loss   4. Return 4 months     Lul Thompson MD

## 2023-07-11 ENCOUNTER — OFFICE VISIT (OUTPATIENT)
Dept: CARDIOLOGY | Facility: CLINIC | Age: 73
End: 2023-07-11
Payer: MEDICARE

## 2023-07-11 VITALS
SYSTOLIC BLOOD PRESSURE: 124 MMHG | DIASTOLIC BLOOD PRESSURE: 82 MMHG | HEIGHT: 63 IN | BODY MASS INDEX: 32.64 KG/M2 | OXYGEN SATURATION: 97 % | HEART RATE: 68 BPM | WEIGHT: 184.19 LBS

## 2023-07-11 DIAGNOSIS — E11.65 TYPE 2 DIABETES MELLITUS WITH HYPERGLYCEMIA, WITHOUT LONG-TERM CURRENT USE OF INSULIN: ICD-10-CM

## 2023-07-11 DIAGNOSIS — I45.2 RBBB (RIGHT BUNDLE BRANCH BLOCK WITH LEFT ANTERIOR FASCICULAR BLOCK): Primary | ICD-10-CM

## 2023-07-11 DIAGNOSIS — Z95.5 STENTED CORONARY ARTERY: ICD-10-CM

## 2023-07-11 PROCEDURE — 4010F ACE/ARB THERAPY RXD/TAKEN: CPT | Mod: CPTII,S$GLB,, | Performed by: INTERNAL MEDICINE

## 2023-07-11 PROCEDURE — 99214 PR OFFICE/OUTPT VISIT, EST, LEVL IV, 30-39 MIN: ICD-10-PCS | Mod: S$GLB,,, | Performed by: INTERNAL MEDICINE

## 2023-07-11 PROCEDURE — 1160F PR REVIEW ALL MEDS BY PRESCRIBER/CLIN PHARMACIST DOCUMENTED: ICD-10-PCS | Mod: CPTII,S$GLB,, | Performed by: INTERNAL MEDICINE

## 2023-07-11 PROCEDURE — 3074F PR MOST RECENT SYSTOLIC BLOOD PRESSURE < 130 MM HG: ICD-10-PCS | Mod: CPTII,S$GLB,, | Performed by: INTERNAL MEDICINE

## 2023-07-11 PROCEDURE — 99214 OFFICE O/P EST MOD 30 MIN: CPT | Mod: S$GLB,,, | Performed by: INTERNAL MEDICINE

## 2023-07-11 PROCEDURE — 1126F PR PAIN SEVERITY QUANTIFIED, NO PAIN PRESENT: ICD-10-PCS | Mod: CPTII,S$GLB,, | Performed by: INTERNAL MEDICINE

## 2023-07-11 PROCEDURE — 3060F POS MICROALBUMINURIA REV: CPT | Mod: CPTII,S$GLB,, | Performed by: INTERNAL MEDICINE

## 2023-07-11 PROCEDURE — 3074F SYST BP LT 130 MM HG: CPT | Mod: CPTII,S$GLB,, | Performed by: INTERNAL MEDICINE

## 2023-07-11 PROCEDURE — 1101F PR PT FALLS ASSESS DOC 0-1 FALLS W/OUT INJ PAST YR: ICD-10-PCS | Mod: CPTII,S$GLB,, | Performed by: INTERNAL MEDICINE

## 2023-07-11 PROCEDURE — 3079F PR MOST RECENT DIASTOLIC BLOOD PRESSURE 80-89 MM HG: ICD-10-PCS | Mod: CPTII,S$GLB,, | Performed by: INTERNAL MEDICINE

## 2023-07-11 PROCEDURE — 3066F NEPHROPATHY DOC TX: CPT | Mod: CPTII,S$GLB,, | Performed by: INTERNAL MEDICINE

## 2023-07-11 PROCEDURE — 1159F PR MEDICATION LIST DOCUMENTED IN MEDICAL RECORD: ICD-10-PCS | Mod: CPTII,S$GLB,, | Performed by: INTERNAL MEDICINE

## 2023-07-11 PROCEDURE — 99999 PR PBB SHADOW E&M-EST. PATIENT-LVL IV: CPT | Mod: PBBFAC,,, | Performed by: INTERNAL MEDICINE

## 2023-07-11 PROCEDURE — 3008F PR BODY MASS INDEX (BMI) DOCUMENTED: ICD-10-PCS | Mod: CPTII,S$GLB,, | Performed by: INTERNAL MEDICINE

## 2023-07-11 PROCEDURE — 99999 PR PBB SHADOW E&M-EST. PATIENT-LVL IV: ICD-10-PCS | Mod: PBBFAC,,, | Performed by: INTERNAL MEDICINE

## 2023-07-11 PROCEDURE — 3008F BODY MASS INDEX DOCD: CPT | Mod: CPTII,S$GLB,, | Performed by: INTERNAL MEDICINE

## 2023-07-11 PROCEDURE — 3046F PR MOST RECENT HEMOGLOBIN A1C LEVEL > 9.0%: ICD-10-PCS | Mod: CPTII,S$GLB,, | Performed by: INTERNAL MEDICINE

## 2023-07-11 PROCEDURE — 1126F AMNT PAIN NOTED NONE PRSNT: CPT | Mod: CPTII,S$GLB,, | Performed by: INTERNAL MEDICINE

## 2023-07-11 PROCEDURE — 3066F PR DOCUMENTATION OF TREATMENT FOR NEPHROPATHY: ICD-10-PCS | Mod: CPTII,S$GLB,, | Performed by: INTERNAL MEDICINE

## 2023-07-11 PROCEDURE — 1159F MED LIST DOCD IN RCRD: CPT | Mod: CPTII,S$GLB,, | Performed by: INTERNAL MEDICINE

## 2023-07-11 PROCEDURE — 1160F RVW MEDS BY RX/DR IN RCRD: CPT | Mod: CPTII,S$GLB,, | Performed by: INTERNAL MEDICINE

## 2023-07-11 PROCEDURE — 3046F HEMOGLOBIN A1C LEVEL >9.0%: CPT | Mod: CPTII,S$GLB,, | Performed by: INTERNAL MEDICINE

## 2023-07-11 PROCEDURE — 4010F PR ACE/ARB THEARPY RXD/TAKEN: ICD-10-PCS | Mod: CPTII,S$GLB,, | Performed by: INTERNAL MEDICINE

## 2023-07-11 PROCEDURE — 3079F DIAST BP 80-89 MM HG: CPT | Mod: CPTII,S$GLB,, | Performed by: INTERNAL MEDICINE

## 2023-07-11 PROCEDURE — 3288F PR FALLS RISK ASSESSMENT DOCUMENTED: ICD-10-PCS | Mod: CPTII,S$GLB,, | Performed by: INTERNAL MEDICINE

## 2023-07-11 PROCEDURE — 3288F FALL RISK ASSESSMENT DOCD: CPT | Mod: CPTII,S$GLB,, | Performed by: INTERNAL MEDICINE

## 2023-07-11 PROCEDURE — 3060F PR POS MICROALBUMINURIA RESULT DOCUMENTED/REVIEW: ICD-10-PCS | Mod: CPTII,S$GLB,, | Performed by: INTERNAL MEDICINE

## 2023-07-11 PROCEDURE — 1101F PT FALLS ASSESS-DOCD LE1/YR: CPT | Mod: CPTII,S$GLB,, | Performed by: INTERNAL MEDICINE

## 2023-07-26 DIAGNOSIS — Z12.31 ENCOUNTER FOR SCREENING MAMMOGRAM FOR MALIGNANT NEOPLASM OF BREAST: Primary | ICD-10-CM

## 2023-08-01 ENCOUNTER — TELEPHONE (OUTPATIENT)
Dept: FAMILY MEDICINE | Facility: HOSPITAL | Age: 73
End: 2023-08-01
Payer: MEDICARE

## 2023-08-04 DIAGNOSIS — I50.32 CHRONIC HEART FAILURE WITH PRESERVED EJECTION FRACTION: ICD-10-CM

## 2023-08-04 DIAGNOSIS — I10 PRIMARY HYPERTENSION: Chronic | ICD-10-CM

## 2023-08-04 DIAGNOSIS — I48.0 PAROXYSMAL A-FIB: Chronic | ICD-10-CM

## 2023-08-04 RX ORDER — METOPROLOL SUCCINATE 100 MG/1
100 TABLET, EXTENDED RELEASE ORAL DAILY
Qty: 90 TABLET | Refills: 0 | Status: SHIPPED | OUTPATIENT
Start: 2023-08-04 | End: 2023-08-07 | Stop reason: SDUPTHER

## 2023-08-07 ENCOUNTER — OFFICE VISIT (OUTPATIENT)
Dept: FAMILY MEDICINE | Facility: HOSPITAL | Age: 73
End: 2023-08-07
Payer: MEDICARE

## 2023-08-07 VITALS
HEIGHT: 63 IN | WEIGHT: 191.13 LBS | BODY MASS INDEX: 33.87 KG/M2 | SYSTOLIC BLOOD PRESSURE: 113 MMHG | DIASTOLIC BLOOD PRESSURE: 67 MMHG | HEART RATE: 71 BPM

## 2023-08-07 DIAGNOSIS — N18.4 TYPE 2 DIABETES MELLITUS WITH STAGE 4 CHRONIC KIDNEY DISEASE, WITH LONG-TERM CURRENT USE OF INSULIN: Primary | ICD-10-CM

## 2023-08-07 DIAGNOSIS — I48.0 PAROXYSMAL A-FIB: Chronic | ICD-10-CM

## 2023-08-07 DIAGNOSIS — I50.32 CHRONIC HEART FAILURE WITH PRESERVED EJECTION FRACTION: ICD-10-CM

## 2023-08-07 DIAGNOSIS — Z79.4 TYPE 2 DIABETES MELLITUS WITH STAGE 4 CHRONIC KIDNEY DISEASE, WITH LONG-TERM CURRENT USE OF INSULIN: Primary | ICD-10-CM

## 2023-08-07 DIAGNOSIS — E11.22 TYPE 2 DIABETES MELLITUS WITH STAGE 4 CHRONIC KIDNEY DISEASE, WITH LONG-TERM CURRENT USE OF INSULIN: Primary | ICD-10-CM

## 2023-08-07 DIAGNOSIS — I10 PRIMARY HYPERTENSION: Chronic | ICD-10-CM

## 2023-08-07 PROCEDURE — 99214 OFFICE O/P EST MOD 30 MIN: CPT

## 2023-08-07 RX ORDER — POTASSIUM CHLORIDE 20 MEQ/1
20 TABLET, EXTENDED RELEASE ORAL DAILY
Qty: 90 TABLET | Refills: 0 | Status: SHIPPED | OUTPATIENT
Start: 2023-08-07 | End: 2023-11-05

## 2023-08-07 RX ORDER — METOPROLOL SUCCINATE 100 MG/1
100 TABLET, EXTENDED RELEASE ORAL DAILY
Qty: 90 TABLET | Refills: 0 | Status: SHIPPED | OUTPATIENT
Start: 2023-08-07 | End: 2024-02-16 | Stop reason: SDUPTHER

## 2023-08-07 NOTE — PROGRESS NOTES
[facility]    Subjective:     Eliza Louie is a 72 y.o. year old female with PMHx of HTN, Stage 4 CKD, T2DM on insulin, HFpEF, gout  who presents to clinic for follow up.     DM: Patient has not been checking her blood sugars due to c/o arthritis. Last A1c 9.4% (04/2023); patient does not complain of numbness/tingling in bilateral feet. Patient denies polyuria, polydipsia, polyphagia. Patient endorses compliance with medication regimen, including 20 units of Lantus. Denies any episodes of hypoglycemia. Patient did not bring log of SSI today, daughter not present. Patient reports she stopped taking her Trulicity due to her nausea about 1-2 months ago. Ideally would prefer PO meds over injections as she requires assistance from her family for injections. Would benefit from DEXCOM.      HTN: BP today 113/67. Well-controlled. Patient denies any SOB, lightheadedness, dizziness, palpitations, chest pain, or vision changes. Patient endorses compliance with medication regimen Amlodipine 10 mg.     Saw Nephrology recently Dr. Shields with no new changes in management. Advised to avoid NSAIDS/PPI    Patient Active Problem List    Diagnosis Date Noted    Gout of multiple sites 08/16/2022    (HFpEF) heart failure with preserved ejection fraction     Stage 4 chronic kidney disease 10/19/2021    History of nephrolithiasis 10/19/2021    GERD (gastroesophageal reflux disease)     RBBB (right bundle branch block with left anterior fascicular block) 08/28/2021    MDD (major depressive disorder) 08/16/2021    Anemia due to stage 4 chronic kidney disease 08/16/2021    Atherosclerosis of native coronary artery of native heart without angina pectoris 01/11/2021    Paroxysmal A-fib 12/24/2020    Hypomagnesemia 12/22/2020    Type 2 diabetes mellitus with stage 4 chronic kidney disease, with long-term current use of insulin     Hypokalemia     Urge incontinence of urine 03/22/2018    Stented coronary artery 11/28/2017    Primary  "hypertension 07/02/2017        Review of patient's allergies indicates:   Allergen Reactions    Ace inhibitors Other (See Comments)     Cough        Past Medical History:   Diagnosis Date    (HFpEF) heart failure with preserved ejection fraction     TTE (2020) w/ EF 55% and grade II diastolic dysfunction    Anemia due to stage 4 chronic kidney disease 8/16/2021    Gastric ulcer due to Helicobacter pylori 07/2017    Gastrointestinal hemorrhage with melena 07/2017    GERD (gastroesophageal reflux disease)     HLD (hyperlipidemia)     Hydronephrosis with ureteropelvic junction (UPJ) obstruction 8/16/2021    Hypertension     MDD (major depressive disorder) 8/16/2021    Nephrolithiasis 8/25/2021    NSTEMI (non-ST elevated myocardial infarction) 07/2017    Paroxysmal A-fib 12/24/2020    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/2020    Stage 4 chronic kidney disease 10/19/2021    STEMI (ST elevation myocardial infarction) 12/2020    Type 2 diabetes mellitus     Urge incontinence of urine       Past Surgical History:   Procedure Laterality Date    CARDIAC CATHETERIZATION  07/2017    RCA w/ 95% stenosis w/ stent placement    RETROGRADE PYELOGRAPHY  8/25/2021    Procedure: PYELOGRAM, RETROGRADE;  Surgeon: Rody Smith MD;  Location: [facility] OR;  Service: Urology;;    URETEROSCOPIC REMOVAL OF URETERIC CALCULUS Left 8/25/2021    Procedure: left ureteroscopy, stone basketing, stent placement;  left nephrostomy tube removal;  Surgeon: Rody Smith MD;  Location: [facility] OR;  Service: Urology;  Laterality: Left;      No family history on file.   Social History     Tobacco Use    Smoking status: Never    Smokeless tobacco: Never   Substance Use Topics    Alcohol use: No        Objective:     Vitals:    08/07/23 1556   BP: 113/67   Pulse: 71   Weight: 86.7 kg (191 lb 2.2 oz)   Height: 5' 3" (1.6 m)     BMI: 33.86    Physical Exam  Vitals reviewed.   Constitutional:       General: She is not in acute " distress.     Appearance: She is not toxic-appearing.   Eyes:      Extraocular Movements: Extraocular movements intact.   Cardiovascular:      Rate and Rhythm: Normal rate and regular rhythm.   Pulmonary:      Effort: Pulmonary effort is normal. No respiratory distress.   Neurological:      Mental Status: She is alert. Mental status is at baseline.   Psychiatric:         Mood and Affect: Mood normal.         Behavior: Behavior normal.         Thought Content: Thought content normal.       Assessment/Plan:     Eliza Louie is a 72 y.o. year old female HTN, Stage 4 CKD, T2DM on insulin, HFpEF, gout  who presents to clinic for follow up for her T2DM, HTN. No complaints, but A1c still not at goal. Still working on glucose control.     Primary hypertension  -Well controlled  -Continue Amlodipine 10 mg    Chronic heart failure with preserved ejection fraction  Paroxysmal A-fib  - Continue home metoprolol succinate (TOPROL-XL) 100 MG 24 hr tablet; Take 1 tablet (100 mg total) by mouth once daily.  Dispense: 90 tablet; Refill: 0    Type 2 diabetes mellitus with stage 4 chronic kidney disease, with long-term current use of insulin  - Hemoglobin A1C ordered, suspect patient's A1c is worse given she discontinued the Trulicity   - CBC Auto Differential; Future  - COMPREHENSIVE METABOLIC PANEL; Future  - Started patient on SITagliptin phosphate (JANUVIA) 25 MG daily, will up-titrate as tolerated   - Would benefit from DEXCOM, will discuss next visit with family  - Continue home Lantus 20 units nightly    Follow-up: 1 month for T2DM, HTN    A total of 20 minutes was spent on patient care during this encounter which included chart review, examining the patient, formulating a treatment plan and documentation.     Medical decision making straight forward and not complex during this visit.     Case discussed with staff: Dr. Adeel Garcia MD  \[facility]\"" Family Medicine, PGY-2  08/07/2023

## 2023-08-09 NOTE — PROGRESS NOTES
I assume primary medical responsibility for this patient. I have reviewed the history, physical, and assessement & treatment plan with the resident and agree that the care is reasonable and necessary. This service has been performed by a resident with the presence of a teaching physician under the primary care exception. If necessary, an addendum of additional findings or evaluation beyond the resident documentation will be noted below.    Started patient on sitagliptin 25mg. Given patient renal function, this is maximum dose tolerable. Patient did not tolerate trulicity due to nausea. Discussed decreasing portions to help alleviate nausea.      Afua Denis MD    [facility]

## 2023-08-11 DIAGNOSIS — I48.0 PAROXYSMAL A-FIB: ICD-10-CM

## 2023-08-11 NOTE — TELEPHONE ENCOUNTER
----- Message from Rivak Esparza sent at 8/11/2023 10:22 AM CDT -----  Patient requested refill on the following medicine      apixaban (ELIQUIS) 5 mg Tab      [facility] [address] [place] [address]   Phone:  [phone]  Fax:  [phone]

## 2023-09-07 ENCOUNTER — OFFICE VISIT (OUTPATIENT)
Dept: FAMILY MEDICINE | Facility: HOSPITAL | Age: 73
End: 2023-09-07
Payer: MEDICARE

## 2023-09-07 VITALS
SYSTOLIC BLOOD PRESSURE: 112 MMHG | WEIGHT: 182.31 LBS | DIASTOLIC BLOOD PRESSURE: 63 MMHG | HEIGHT: 63 IN | BODY MASS INDEX: 32.3 KG/M2 | HEART RATE: 72 BPM

## 2023-09-07 DIAGNOSIS — N18.4 TYPE 2 DIABETES MELLITUS WITH STAGE 4 CHRONIC KIDNEY DISEASE, WITH LONG-TERM CURRENT USE OF INSULIN: ICD-10-CM

## 2023-09-07 DIAGNOSIS — Z79.4 TYPE 2 DIABETES MELLITUS WITH STAGE 4 CHRONIC KIDNEY DISEASE, WITH LONG-TERM CURRENT USE OF INSULIN: ICD-10-CM

## 2023-09-07 DIAGNOSIS — I10 PRIMARY HYPERTENSION: Primary | ICD-10-CM

## 2023-09-07 DIAGNOSIS — E11.22 TYPE 2 DIABETES MELLITUS WITH STAGE 4 CHRONIC KIDNEY DISEASE, WITH LONG-TERM CURRENT USE OF INSULIN: ICD-10-CM

## 2023-09-07 PROBLEM — N39.41 URGE INCONTINENCE OF URINE: Chronic | Status: RESOLVED | Noted: 2018-03-22 | Resolved: 2023-09-07

## 2023-09-07 PROBLEM — E83.42 HYPOMAGNESEMIA: Chronic | Status: RESOLVED | Noted: 2020-12-22 | Resolved: 2023-09-07

## 2023-09-07 PROCEDURE — 99214 OFFICE O/P EST MOD 30 MIN: CPT

## 2023-09-07 RX ORDER — BLOOD-GLUCOSE SENSOR
1 EACH MISCELLANEOUS DAILY
Qty: 1 EACH | Refills: 0 | Status: SHIPPED | OUTPATIENT
Start: 2023-09-07 | End: 2023-12-05

## 2023-09-07 NOTE — PROGRESS NOTES
[facility]    Subjective:     Eliza Louie is a 72 y.o. year old female with PMHx of HTN, Stage 4 CKD, T2DM on insulin, HFpEF, gout who presents to clinic for follow up.    DM: Controlled. Last A1c 8.0% (08/2023); patient does not complain of numbness/tingling in bilateral feet. Patient denies polyuria, polydipsia, polyphagia. Patient endorses compliance with medication regimen, including 20 units of Lantus and Januvia 25. Tolerating Januvia without any side effects. Denies any episodes of hypoglycemia. Patient has not been checking her BS at home lately. She recently had her son move back in to help her with medical management. He has been making her veggie shakes. Patient has made a lot of dietary changes which seem to be helping control her symptoms. Interested in DEXCOM as she has trouble checking BS on her own, does not like pricking her finger. Has lost 9 lb since last visit 1 month ago.      HTN: BP today 112/62. Well-controlled. Patient denies any SOB, lightheadedness, dizziness, palpitations, chest pain, or vision changes. Patient endorses compliance with medication regimen Amlodipine 10 mg and Losartan 25 mg (started by Dr. Shields).     Patient Active Problem List    Diagnosis Date Noted    Gout of multiple sites 08/16/2022    (HFpEF) heart failure with preserved ejection fraction     Stage 4 chronic kidney disease 10/19/2021    History of nephrolithiasis 10/19/2021    GERD (gastroesophageal reflux disease)     RBBB (right bundle branch block with left anterior fascicular block) 08/28/2021    MDD (major depressive disorder) 08/16/2021    Anemia due to stage 4 chronic kidney disease 08/16/2021    Atherosclerosis of native coronary artery of native heart without angina pectoris 01/11/2021    Paroxysmal A-fib 12/24/2020    Type 2 diabetes mellitus with stage 4 chronic kidney disease, with long-term current use of insulin     Stented coronary artery 11/28/2017    Primary hypertension 07/02/2017     "    Review of patient's allergies indicates:   Allergen Reactions    Ace inhibitors Other (See Comments)     Cough        Past Medical History:   Diagnosis Date    (HFpEF) heart failure with preserved ejection fraction     TTE (2020) w/ EF 55% and grade II diastolic dysfunction    Anemia due to stage 4 chronic kidney disease 8/16/2021    Gastric ulcer due to Helicobacter pylori 07/2017    Gastrointestinal hemorrhage with melena 07/2017    GERD (gastroesophageal reflux disease)     HLD (hyperlipidemia)     Hydronephrosis with ureteropelvic junction (UPJ) obstruction 8/16/2021    Hypertension     MDD (major depressive disorder) 8/16/2021    Nephrolithiasis 8/25/2021    NSTEMI (non-ST elevated myocardial infarction) 07/2017    Paroxysmal A-fib 12/24/2020    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/2020    Stage 4 chronic kidney disease 10/19/2021    STEMI (ST elevation myocardial infarction) 12/2020    Type 2 diabetes mellitus     Urge incontinence of urine       Past Surgical History:   Procedure Laterality Date    CARDIAC CATHETERIZATION  07/2017    RCA w/ 95% stenosis w/ stent placement    RETROGRADE PYELOGRAPHY  8/25/2021    Procedure: PYELOGRAM, RETROGRADE;  Surgeon: Rody Smith MD;  Location: [facility] OR;  Service: Urology;;    URETEROSCOPIC REMOVAL OF URETERIC CALCULUS Left 8/25/2021    Procedure: left ureteroscopy, stone basketing, stent placement;  left nephrostomy tube removal;  Surgeon: Rody Smith MD;  Location: [facility] OR;  Service: Urology;  Laterality: Left;      No family history on file.   Social History     Tobacco Use    Smoking status: Never    Smokeless tobacco: Never   Substance Use Topics    Alcohol use: No        Objective:     Vitals:    09/07/23 0925   BP: 112/63   Pulse: 72   Weight: 82.7 kg (182 lb 5.1 oz)   Height: 5' 3" (1.6 m)     BMI: 32.30    Gen: No apparent distress, well nourished and developed, appears stated age  CV: RRR, S1 and S2 present, no LE " edema  Resp: CTAB, normal respiratory effort    Assessment/Plan:     Eliza Louie is a 72 y.o. year old female with PMH HTN, Stage 4 CKD, T2DM on insulin, HFpEF, gout who presents to clinic for follow up.     1. Type 2 diabetes mellitus with stage 4 chronic kidney disease, with long-term current use of insulin  - Last A1c 8.0, controlled  - Continue SITagliptin phosphate (JANUVIA) 25 MG daily. Not able to go up on this dose due to her CKD4  - Ordered blood-glucose sensor (DEXCOM G7 SENSOR) Sowmya; 1 each by Misc route once daily. Would benefit greatly as patient has history of not being able to check her sugars on her own. Has not brought in a log the last 2 visits and has been on insulin for a while now. Needs better medication management  - Advised importance of brining a log to next visit of her BS as she is still taking Lantus 20 mg nightly   - Continue dietary and lifestyle modifications as it seems to be helping with weight loss and control her DM  - If not controlled at next A1c check, can consider Farxiga addition. Patient did not tolerate Jardiance in past, states it made her gout flares worst. However, research shows it actually helps reduce gout flares. May have been due to her dietary choices at that time.     2. Primary hypertension  - Well controlled  - Continue Amlodipine 10 and Losartan 25    Follow-up: 1 month for DM    A total of 20 minutes was spent on patient care during this encounter which included chart review, examining the patient, formulating a treatment plan and documentation.     Medical decision making straight forward and not complex during this visit.     Case discussed with staff: Dr. Jamie Garcia MD  [facility], PGY-2  09/07/2023

## 2023-09-08 NOTE — PROGRESS NOTES
I assume primary medical responsibility for this patient. I have reviewed the history, physical, and assessement & treatment plan with the resident and agree that the care is reasonable and necessary. This service has been performed by a resident without the presence of a teaching physician under the primary care exception. If necessary, an addendum of additional findings or evaluation beyond the resident documentation will be noted below.     America Ayala MD

## 2023-11-27 NOTE — PROGRESS NOTES
[facility] [address]     SUBJECTIVE:     History of Present Illness:  Patient is a 73 y.o. female presents with CAD and here for followup. Primary Diagnosis:  1. DM  2. CAD: s/p stent right; occluded OM2  3. GI bleed   4. hypertension.         5. CKD 4     6. Paroxysmal atrial fibrillation on eliquis   7. S/p gall bladder surgery 2013     ROS  Since last visit  7/23: no change   A. No chest pains  B. Does get short of breath with walking; no orthopnea; no PND; no change from before   C. No bleeding   D. No syncope  E. No palpitations  F. Activity: walks in the store 3 times a week; takes care of the house without chest pains; limited by leg pain in knees and back          Past Hospitalization  1. Admitted 12/21/20 and discharged 12/27/20: admitted for vomiting and nausea; EKG with ST elevation lead III only; ST depression noted; renal function decreased; echo with inferobasal akinesis; K 3.1; troponin was elevated 38 in the ER. She was treated medically; had episode of atrial fibrillation in the ICU and failure requiring diuresis   Review of patient's allergies indicates:   Allergen Reactions    Ace inhibitors Other (See Comments)     Cough       Past Medical History:   Diagnosis Date    (HFpEF) heart failure with preserved ejection fraction     TTE (2020) w/ EF 55% and grade II diastolic dysfunction    Anemia due to stage 4 chronic kidney disease 8/16/2021    Gastric ulcer due to Helicobacter pylori 07/2017    Gastrointestinal hemorrhage with melena 07/2017    GERD (gastroesophageal reflux disease)     HLD (hyperlipidemia)     Hydronephrosis with ureteropelvic junction (UPJ) obstruction 8/16/2021    Hypertension     MDD (major depressive disorder) 8/16/2021    Nephrolithiasis 8/25/2021    NSTEMI (non-ST elevated myocardial infarction) 07/2017    Paroxysmal A-fib 12/24/2020    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/2020    Stage 4 chronic kidney disease 10/19/2021    STEMI (ST  elevation myocardial infarction) 12/2020    Type 2 diabetes mellitus     Urge incontinence of urine        Past Surgical History:   Procedure Laterality Date    CARDIAC CATHETERIZATION  07/2017    RCA w/ 95% stenosis w/ stent placement    RETROGRADE PYELOGRAPHY  8/25/2021    Procedure: PYELOGRAM, RETROGRADE;  Surgeon: Rody Smith MD;  Location: [facility] OR;  Service: Urology;;    URETEROSCOPIC REMOVAL OF URETERIC CALCULUS Left 8/25/2021    Procedure: left ureteroscopy, stone basketing, stent placement;  left nephrostomy tube removal;  Surgeon: Rody Smith MD;  Location: [facility] OR;  Service: Urology;  Laterality: Left;       No family history on file.    Social History     Tobacco Use    Smoking status: Never    Smokeless tobacco: Never   Substance Use Topics    Alcohol use: No    Drug use: No        Home meds:  Current Outpatient Medications on File Prior to Visit   Medication Sig Dispense Refill    amLODIPine (NORVASC) 10 MG tablet Take 1 tablet (10 mg total) by mouth once daily. 30 tablet 11    apixaban (ELIQUIS) 5 mg Tab Take 1 tablet (5 mg total) by mouth 2 (two) times daily. 60 tablet 1    blood sugar diagnostic Strp To check BG 2 times daily, to use with insurance preferred meter (Patient not taking: Reported on 8/7/2023) 100 each 7    blood-glucose meter (FREESTYLE SYSTEM KIT) kit Use as instructed (Patient not taking: Reported on 8/7/2023) 1 each 0    blood-glucose sensor (DEXCOM G7 SENSOR) Sowmya 1 each by Misc.(Non-Drug; Combo Route) route once daily. 1 each 0    colchicine (COLCRYS) 0.6 mg tablet Take 1 tablet (0.6 mg total) by mouth once daily. 30 tablet 11    furosemide (LASIX) 40 MG tablet Take 1 tablet (40 mg total) by mouth 2 (two) times daily. 180 tablet 3    insulin (LANTUS SOLOSTAR U-100 INSULIN) glargine 100 units/mL SubQ pen Inject 20 Units into the skin every evening. 15 mL 7    lancets Misc To check BG 2 times daily, to use with insurance preferred meter (Patient not taking: Reported on  2023) 100 each 7    losartan (COZAAR) 25 MG tablet Take 1 tablet (25 mg total) by mouth once daily. 90 tablet 3    magnesium oxide (MAG-OX) 400 mg (241.3 mg magnesium) tablet Take 1 tablet (400 mg total) by mouth 2 (two) times daily. 60 tablet 6    metoprolol succinate (TOPROL-XL) 100 MG 24 hr tablet Take 1 tablet (100 mg total) by mouth once daily. 90 tablet 0    nitroGLYCERIN (NITROSTAT) 0.4 MG SL tablet Place 0.4 mg under the tongue every 5 (five) minutes as needed for Chest pain.      rosuvastatin (CRESTOR) 40 MG Tab Take 1 tablet (40 mg total) by mouth once daily. 90 tablet 3    sertraline (ZOLOFT) 50 MG tablet Take 1 tablet (50 mg total) by mouth once daily. (Patient not taking: Reported on 2023) 90 tablet 3    SITagliptin phosphate (JANUVIA) 25 MG Tab Take 1 tablet (25 mg total) by mouth once daily. 120 tablet 0    [DISCONTINUED] aspirin (ECOTRIN) 81 MG EC tablet Take 1 tablet (81 mg total) by mouth once daily. 90 tablet 3     No current facility-administered medications on file prior to visit.       Cardiac meds:     ASA 81 mg - not on this   crestor 40 mg   Amlodipine 10 mg   eliquis 5 mg BID  metorpolol succiante 100 mg   Lasix 40 mg BID  K 10 meq  Losartan 25 mg     OBJECTIVE:     Vital Signs (Most Recent)  Pulse: 63 (23 1433)  BP: 123/65 (23 1433)  SpO2: 97 % (23 1433)  Weight down few lbs     Physical Exam:  BP normal at home  Neck: normal carotids, nobruits  lungs: clear  Heart: RR, normal S1,S2, AI murmur noted loudest RSB; along LSB as well; no MR noted   Exts: normal pulses ; no edema             LABS    : GFR 16   : LDL 96;  ; A1c 9.4  : GFR 21; K 4.1  : GFR 17; A1c 8.0  Diagnostic Results:    1. EKGs- sinus to atrial fibrillation to sinus 1b. EKG's : atrial fibrillation and sinus; T wave changes anterolateral leads, RBBB which was new ; minimal ST elevation lead III  1c. EK2021:sinus with PAC's, RBBB; ST upcoving with T wave inversion  with q waves inferiorly   2. Echos- 7/3/17: mild LAE, normal EF, LVH   2b. Echo: 6/15/20: normal EF,moderate AI, LVH, diastolic dysfunction; no wall motion changes   2c. Echo: 12/20: EF 55%; LVE, LAE, diastolic dysfuncdtion; mild MR, mild TR, mild AI, normal RV function; inferobasal akinesis   3. Stress Test- [  ]  4. Cath- 7/3/17: Left main: normal; LAD normal; Cx: normal; Right 95% 2 discrete lesions; OM2 occluded; intervention 7/7/17: proximal and  mid right 4X30  5. Device- [  ].         ASSESSMENT/PLAN:     1. S/p acute MI 12/20: no intervention since presentation was delayed with renal issues; stable now. Most likely from the right  2. RBBB   3. Abnormal EKG - age indeterminate inferior infarct   4. CKD 4: followed by  - needs new nephrology   5. Abnormal TG's - metabolic syndrome - however has changed diet and will see where the TG go   6. Chronic diastolic  heart failure   Plan: 1.continue all medications  2: Return 4 months     Lul Thompson MD

## 2023-11-28 ENCOUNTER — OFFICE VISIT (OUTPATIENT)
Dept: CARDIOLOGY | Facility: CLINIC | Age: 73
End: 2023-11-28
Payer: MEDICARE

## 2023-11-28 ENCOUNTER — TELEPHONE (OUTPATIENT)
Dept: CARDIOLOGY | Facility: CLINIC | Age: 73
End: 2023-11-28

## 2023-11-28 VITALS
DIASTOLIC BLOOD PRESSURE: 65 MMHG | HEIGHT: 63 IN | WEIGHT: 181.19 LBS | BODY MASS INDEX: 32.11 KG/M2 | HEART RATE: 63 BPM | OXYGEN SATURATION: 97 % | SYSTOLIC BLOOD PRESSURE: 123 MMHG

## 2023-11-28 DIAGNOSIS — I45.2 RBBB (RIGHT BUNDLE BRANCH BLOCK WITH LEFT ANTERIOR FASCICULAR BLOCK): Primary | ICD-10-CM

## 2023-11-28 DIAGNOSIS — E11.65 TYPE 2 DIABETES MELLITUS WITH HYPERGLYCEMIA, WITHOUT LONG-TERM CURRENT USE OF INSULIN: ICD-10-CM

## 2023-11-28 DIAGNOSIS — N18.4 CKD (CHRONIC KIDNEY DISEASE) STAGE 4, GFR 15-29 ML/MIN: ICD-10-CM

## 2023-11-28 PROCEDURE — 1101F PR PT FALLS ASSESS DOC 0-1 FALLS W/OUT INJ PAST YR: ICD-10-PCS | Mod: CPTII,S$GLB,, | Performed by: INTERNAL MEDICINE

## 2023-11-28 PROCEDURE — 1160F RVW MEDS BY RX/DR IN RCRD: CPT | Mod: CPTII,S$GLB,, | Performed by: INTERNAL MEDICINE

## 2023-11-28 PROCEDURE — 3074F SYST BP LT 130 MM HG: CPT | Mod: CPTII,S$GLB,, | Performed by: INTERNAL MEDICINE

## 2023-11-28 PROCEDURE — 3060F POS MICROALBUMINURIA REV: CPT | Mod: CPTII,S$GLB,, | Performed by: INTERNAL MEDICINE

## 2023-11-28 PROCEDURE — 1126F AMNT PAIN NOTED NONE PRSNT: CPT | Mod: CPTII,S$GLB,, | Performed by: INTERNAL MEDICINE

## 2023-11-28 PROCEDURE — 1126F PR PAIN SEVERITY QUANTIFIED, NO PAIN PRESENT: ICD-10-PCS | Mod: CPTII,S$GLB,, | Performed by: INTERNAL MEDICINE

## 2023-11-28 PROCEDURE — 3066F NEPHROPATHY DOC TX: CPT | Mod: CPTII,S$GLB,, | Performed by: INTERNAL MEDICINE

## 2023-11-28 PROCEDURE — 99214 OFFICE O/P EST MOD 30 MIN: CPT | Mod: S$GLB,,, | Performed by: INTERNAL MEDICINE

## 2023-11-28 PROCEDURE — 3066F PR DOCUMENTATION OF TREATMENT FOR NEPHROPATHY: ICD-10-PCS | Mod: CPTII,S$GLB,, | Performed by: INTERNAL MEDICINE

## 2023-11-28 PROCEDURE — 99214 PR OFFICE/OUTPT VISIT, EST, LEVL IV, 30-39 MIN: ICD-10-PCS | Mod: S$GLB,,, | Performed by: INTERNAL MEDICINE

## 2023-11-28 PROCEDURE — 4010F ACE/ARB THERAPY RXD/TAKEN: CPT | Mod: CPTII,S$GLB,, | Performed by: INTERNAL MEDICINE

## 2023-11-28 PROCEDURE — 99999 PR PBB SHADOW E&M-EST. PATIENT-LVL III: ICD-10-PCS | Mod: PBBFAC,,, | Performed by: INTERNAL MEDICINE

## 2023-11-28 PROCEDURE — 1159F MED LIST DOCD IN RCRD: CPT | Mod: CPTII,S$GLB,, | Performed by: INTERNAL MEDICINE

## 2023-11-28 PROCEDURE — 3008F PR BODY MASS INDEX (BMI) DOCUMENTED: ICD-10-PCS | Mod: CPTII,S$GLB,, | Performed by: INTERNAL MEDICINE

## 2023-11-28 PROCEDURE — 3052F PR MOST RECENT HEMOGLOBIN A1C LEVEL 8.0 - < 9.0%: ICD-10-PCS | Mod: CPTII,S$GLB,, | Performed by: INTERNAL MEDICINE

## 2023-11-28 PROCEDURE — 99999 PR PBB SHADOW E&M-EST. PATIENT-LVL III: CPT | Mod: PBBFAC,,, | Performed by: INTERNAL MEDICINE

## 2023-11-28 PROCEDURE — 3060F PR POS MICROALBUMINURIA RESULT DOCUMENTED/REVIEW: ICD-10-PCS | Mod: CPTII,S$GLB,, | Performed by: INTERNAL MEDICINE

## 2023-11-28 PROCEDURE — 3078F DIAST BP <80 MM HG: CPT | Mod: CPTII,S$GLB,, | Performed by: INTERNAL MEDICINE

## 2023-11-28 PROCEDURE — 3288F FALL RISK ASSESSMENT DOCD: CPT | Mod: CPTII,S$GLB,, | Performed by: INTERNAL MEDICINE

## 2023-11-28 PROCEDURE — 1159F PR MEDICATION LIST DOCUMENTED IN MEDICAL RECORD: ICD-10-PCS | Mod: CPTII,S$GLB,, | Performed by: INTERNAL MEDICINE

## 2023-11-28 PROCEDURE — 1160F PR REVIEW ALL MEDS BY PRESCRIBER/CLIN PHARMACIST DOCUMENTED: ICD-10-PCS | Mod: CPTII,S$GLB,, | Performed by: INTERNAL MEDICINE

## 2023-11-28 PROCEDURE — 3074F PR MOST RECENT SYSTOLIC BLOOD PRESSURE < 130 MM HG: ICD-10-PCS | Mod: CPTII,S$GLB,, | Performed by: INTERNAL MEDICINE

## 2023-11-28 PROCEDURE — 3078F PR MOST RECENT DIASTOLIC BLOOD PRESSURE < 80 MM HG: ICD-10-PCS | Mod: CPTII,S$GLB,, | Performed by: INTERNAL MEDICINE

## 2023-11-28 PROCEDURE — 3288F PR FALLS RISK ASSESSMENT DOCUMENTED: ICD-10-PCS | Mod: CPTII,S$GLB,, | Performed by: INTERNAL MEDICINE

## 2023-11-28 PROCEDURE — 1101F PT FALLS ASSESS-DOCD LE1/YR: CPT | Mod: CPTII,S$GLB,, | Performed by: INTERNAL MEDICINE

## 2023-11-28 PROCEDURE — 4010F PR ACE/ARB THEARPY RXD/TAKEN: ICD-10-PCS | Mod: CPTII,S$GLB,, | Performed by: INTERNAL MEDICINE

## 2023-11-28 PROCEDURE — 3052F HG A1C>EQUAL 8.0%<EQUAL 9.0%: CPT | Mod: CPTII,S$GLB,, | Performed by: INTERNAL MEDICINE

## 2023-11-28 PROCEDURE — 3008F BODY MASS INDEX DOCD: CPT | Mod: CPTII,S$GLB,, | Performed by: INTERNAL MEDICINE

## 2023-11-28 RX ORDER — POTASSIUM CHLORIDE 750 MG/1
10 CAPSULE, EXTENDED RELEASE ORAL ONCE
Qty: 90 CAPSULE | Refills: 1 | Status: SHIPPED | OUTPATIENT
Start: 2023-11-28 | End: 2023-11-28

## 2023-12-05 ENCOUNTER — OFFICE VISIT (OUTPATIENT)
Dept: FAMILY MEDICINE | Facility: HOSPITAL | Age: 73
End: 2023-12-05
Payer: MEDICARE

## 2023-12-05 VITALS
WEIGHT: 180.13 LBS | BODY MASS INDEX: 31.92 KG/M2 | SYSTOLIC BLOOD PRESSURE: 136 MMHG | HEIGHT: 63 IN | HEART RATE: 78 BPM | DIASTOLIC BLOOD PRESSURE: 76 MMHG

## 2023-12-05 DIAGNOSIS — M81.0 AGE-RELATED OSTEOPOROSIS WITHOUT CURRENT PATHOLOGICAL FRACTURE: ICD-10-CM

## 2023-12-05 DIAGNOSIS — N18.4 STAGE 4 CHRONIC KIDNEY DISEASE: Primary | ICD-10-CM

## 2023-12-05 DIAGNOSIS — Z79.4 TYPE 2 DIABETES MELLITUS WITH STAGE 4 CHRONIC KIDNEY DISEASE, WITH LONG-TERM CURRENT USE OF INSULIN: ICD-10-CM

## 2023-12-05 DIAGNOSIS — E11.22 TYPE 2 DIABETES MELLITUS WITH STAGE 4 CHRONIC KIDNEY DISEASE, WITH LONG-TERM CURRENT USE OF INSULIN: ICD-10-CM

## 2023-12-05 DIAGNOSIS — I10 PRIMARY HYPERTENSION: ICD-10-CM

## 2023-12-05 DIAGNOSIS — N18.4 TYPE 2 DIABETES MELLITUS WITH STAGE 4 CHRONIC KIDNEY DISEASE, WITH LONG-TERM CURRENT USE OF INSULIN: ICD-10-CM

## 2023-12-05 PROCEDURE — 99215 OFFICE O/P EST HI 40 MIN: CPT

## 2023-12-05 RX ORDER — BLOOD-GLUCOSE SENSOR
1 EACH MISCELLANEOUS DAILY
Qty: 1 EACH | Refills: 1 | Status: SHIPPED | OUTPATIENT
Start: 2023-12-05 | End: 2024-12-04

## 2023-12-05 NOTE — PROGRESS NOTES
[facility]    Subjective:     Eliza Louie is a 73 y.o. year old female with PMHx of HTN, Stage 4 CKD, T2DM on insulin, HFpEF, gout who presents to clinic for follow up. She no acute complaints today.     T2DM: Seems Well-Controlled. Brought BS logs today averaging 90-130s. Highest was 190s. Lowest was 80. Last A1c 8.0% (08/0/72023); patient does complain of numbness/tingling in bilateral feet. Patient denies polyuria, polydipsia, polyphagia. Patient endorses compliance with medication regimen, including 20 units of Lantus and Januvia 25 mg. Patient was not able to tolerate Trulicity in the past due to nausea. Denies any episodes of hypoglycemia. Still has not been able to get the DEXCOM and does not like needles and having to check her sugars herself. Son has been helping her in terms of making dietary changes, she drinks mostly water now.   Needs new eye doctor, states she is now out of network for the one she was seeing. Will refer her to a new one today.      HTN: BP today 136/76. Well-controlled. Patient denies any SOB, lightheadedness, dizziness, palpitations, chest pain, or vision changes. Patient endorses compliance with medication regimen Amlodipine 10 mg and Losartan 25 mg.     Has not been able to see Nephrology Dr. Shields as he is not currently actively practicing at this time, will refer for new Nephrologist for close monitoring of her CKD4.    HCM - declined COVID, Flu, colonoscopy, and mammogram. OK with DEXA scan.     Patient Active Problem List    Diagnosis Date Noted    Gout of multiple sites 08/16/2022    (HFpEF) heart failure with preserved ejection fraction     Stage 4 chronic kidney disease 10/19/2021    History of nephrolithiasis 10/19/2021    GERD (gastroesophageal reflux disease)     RBBB (right bundle branch block with left anterior fascicular block) 08/28/2021    MDD (major depressive disorder) 08/16/2021    Anemia due to stage 4 chronic kidney disease 08/16/2021     Atherosclerosis of native coronary artery of native heart without angina pectoris 01/11/2021    Paroxysmal A-fib 12/24/2020    Type 2 diabetes mellitus with stage 4 chronic kidney disease, with long-term current use of insulin     Stented coronary artery 11/28/2017    Primary hypertension 07/02/2017        Review of patient's allergies indicates:   Allergen Reactions    Ace inhibitors Other (See Comments)     Cough        Past Medical History:   Diagnosis Date    (HFpEF) heart failure with preserved ejection fraction     TTE (2020) w/ EF 55% and grade II diastolic dysfunction    Anemia due to stage 4 chronic kidney disease 8/16/2021    Gastric ulcer due to Helicobacter pylori 07/2017    Gastrointestinal hemorrhage with melena 07/2017    GERD (gastroesophageal reflux disease)     HLD (hyperlipidemia)     Hydronephrosis with ureteropelvic junction (UPJ) obstruction 8/16/2021    Hypertension     MDD (major depressive disorder) 8/16/2021    Nephrolithiasis 8/25/2021    NSTEMI (non-ST elevated myocardial infarction) 07/2017    Paroxysmal A-fib 12/24/2020    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/2020    Stage 4 chronic kidney disease 10/19/2021    STEMI (ST elevation myocardial infarction) 12/2020    Type 2 diabetes mellitus     Urge incontinence of urine       Past Surgical History:   Procedure Laterality Date    CARDIAC CATHETERIZATION  07/2017    RCA w/ 95% stenosis w/ stent placement    RETROGRADE PYELOGRAPHY  8/25/2021    Procedure: PYELOGRAM, RETROGRADE;  Surgeon: Rody Smith MD;  Location: [facility] OR;  Service: Urology;;    URETEROSCOPIC REMOVAL OF URETERIC CALCULUS Left 8/25/2021    Procedure: left ureteroscopy, stone basketing, stent placement;  left nephrostomy tube removal;  Surgeon: Rody Smith MD;  Location: [facility] OR;  Service: Urology;  Laterality: Left;      No family history on file.   Social History     Tobacco Use    Smoking status: Never     "Smokeless tobacco: Never   Substance Use Topics    Alcohol use: No        Objective:     Vitals:    12/05/23 1149   BP: 136/76   Pulse: 78   Weight: 81.7 kg (180 lb 1.9 oz)   Height: 5' 3" (1.6 m)     Body mass index is 31.91 kg/m².    Physical Exam  Vitals reviewed.   Constitutional:       General: She is not in acute distress.     Appearance: She is not toxic-appearing.   Eyes:      Extraocular Movements: Extraocular movements intact.   Cardiovascular:      Rate and Rhythm: Normal rate.   Pulmonary:      Effort: Pulmonary effort is normal. No respiratory distress.      Breath sounds: Normal breath sounds.   Musculoskeletal:      Right lower leg: No edema.      Left lower leg: No edema.   Skin:     General: Skin is warm.   Neurological:      Mental Status: She is alert and oriented to person, place, and time. Mental status is at baseline.   Psychiatric:         Mood and Affect: Mood normal.         Behavior: Behavior normal.         Thought Content: Thought content normal.         Assessment/Plan:     Eliza Louie is a 73 y.o. year old female with PMH HTN, Stage 4 CKD, T2DM on insulin, HFpEF, gout who presents to clinic for follow up.     1. Stage 4 chronic kidney disease  - Will re-check CMP today  - Ambulatory referral/consult to Nephrology; as patient needs to establish care with a new provider, used to see Dr. Tino Shields    2. Type 2 diabetes mellitus with stage 4 chronic kidney disease, with long-term current use of insulin  - Controlled based on her log  - Ambulatory referral/consult to Ophthalmology for annual diabetic eye exam  - Will re-check Hemoglobin A1C today  - Comprehensive Metabolic Panel; Future  - CBC Auto Differential; Future  - Will re-order blood-glucose sensor (DEXCOM G6 SENSOR) Sowmya; 1 each by Misc.(Non-Drug; Combo Route) and see if we are able to get it for patient since she does not like needles and is a long-term insulin user    3. Primary hypertension  - Controlled, no new medication " changes  - Continue Amlodipine and Losartan    4. Age-related osteoporosis without current pathological fracture  - DXA Bone Density Axial Skeleton 1 or more sites; Future    Follow-up: 1 month for T2DM    A total of 40 minutes was spent on patient care during this encounter which included chart review, examining the patient, formulating a treatment plan and documentation.     Medical decision making straight forward and not complex during this visit.     Case discussed with staff: Dr. Chuck Garcia MD  [facility], PGY-2  12/05/2023

## 2023-12-13 ENCOUNTER — HOSPITAL ENCOUNTER (OUTPATIENT)
Dept: RADIOLOGY | Facility: HOSPITAL | Age: 73
Discharge: HOME | End: 2023-12-13
Payer: MEDICARE

## 2023-12-13 DIAGNOSIS — M81.0 AGE-RELATED OSTEOPOROSIS WITHOUT CURRENT PATHOLOGICAL FRACTURE: ICD-10-CM

## 2023-12-13 PROCEDURE — 77080 DXA BONE DENSITY AXIAL SKELETON 1 OR MORE SITES: ICD-10-PCS | Mod: 26,,, | Performed by: RADIOLOGY

## 2023-12-13 PROCEDURE — 77080 DXA BONE DENSITY AXIAL: CPT | Mod: 26,,, | Performed by: RADIOLOGY

## 2023-12-13 PROCEDURE — 77080 DXA BONE DENSITY AXIAL: CPT | Mod: TC

## 2024-01-05 ENCOUNTER — OFFICE VISIT (OUTPATIENT)
Dept: FAMILY MEDICINE | Facility: HOSPITAL | Age: 74
End: 2024-01-05
Payer: MEDICARE

## 2024-01-05 VITALS
WEIGHT: 183.19 LBS | DIASTOLIC BLOOD PRESSURE: 65 MMHG | HEART RATE: 64 BPM | HEIGHT: 63 IN | SYSTOLIC BLOOD PRESSURE: 132 MMHG | BODY MASS INDEX: 32.46 KG/M2

## 2024-01-05 DIAGNOSIS — I48.0 PAROXYSMAL A-FIB: ICD-10-CM

## 2024-01-05 DIAGNOSIS — M85.851 OSTEOPENIA OF RIGHT HIP: Primary | ICD-10-CM

## 2024-01-05 DIAGNOSIS — N18.4 TYPE 2 DIABETES MELLITUS WITH STAGE 4 CHRONIC KIDNEY DISEASE, WITH LONG-TERM CURRENT USE OF INSULIN: ICD-10-CM

## 2024-01-05 DIAGNOSIS — Z79.4 TYPE 2 DIABETES MELLITUS WITH STAGE 4 CHRONIC KIDNEY DISEASE, WITH LONG-TERM CURRENT USE OF INSULIN: ICD-10-CM

## 2024-01-05 DIAGNOSIS — E11.22 TYPE 2 DIABETES MELLITUS WITH STAGE 4 CHRONIC KIDNEY DISEASE, WITH LONG-TERM CURRENT USE OF INSULIN: ICD-10-CM

## 2024-01-05 PROCEDURE — 99214 OFFICE O/P EST MOD 30 MIN: CPT

## 2024-01-05 RX ORDER — ERGOCALCIFEROL 1.25 MG/1
50000 CAPSULE ORAL
Qty: 4 CAPSULE | Refills: 3 | Status: SHIPPED | OUTPATIENT
Start: 2024-01-05 | End: 2024-05-04

## 2024-01-05 RX ORDER — INSULIN GLARGINE 100 [IU]/ML
15 INJECTION, SOLUTION SUBCUTANEOUS NIGHTLY
Qty: 13.5 ML | Refills: 3 | Status: SHIPPED | OUTPATIENT
Start: 2024-01-05 | End: 2025-01-04

## 2024-01-05 RX ORDER — FLASH GLUCOSE SENSOR
1 KIT MISCELLANEOUS DAILY
Qty: 1 KIT | Refills: 0 | Status: SHIPPED | OUTPATIENT
Start: 2024-01-05 | End: 2025-01-04

## 2024-01-05 NOTE — PROGRESS NOTES
[facility]    Subjective:     Eliza Louie is a 73 y.o. year old female with PMHx of  HTN, Stage 4 CKD, T2DM on insulin, HFpEF, gout who presents to clinic for follow up on diabetes.    T2DM: Well-Controlled. Brought BS logs today. Highest was 122. Lowest was 89. She averages 100 typically. Last A1c 5.9% (12/05/2023); patient does complain of numbness/tingling in bilateral feet. Patient denies polyuria, polydipsia, polyphagia. Patient endorses compliance with medication regimen, including 20 units of Lantus and Januvia 25 mg. Patient was not able to tolerate Trulicity in the past due to nausea. Denies any episodes of hypoglycemia. Still has not been able to get the DEXCOM and does not like needles and having to check her sugars herself. Has been walking often for exercise.    HTN: BP today 132/65. Well-controlled. Patient denies any SOB, lightheadedness, dizziness, palpitations, chest pain, or vision changes. Patient endorses compliance with medication regimen Amlodipine 10 mg and Losartan 25 mg.      Discussed DEXA results osteopenia of R hip -1.9    Patient Active Problem List    Diagnosis Date Noted    Gout of multiple sites 08/16/2022    (HFpEF) heart failure with preserved ejection fraction     Stage 4 chronic kidney disease 10/19/2021    History of nephrolithiasis 10/19/2021    GERD (gastroesophageal reflux disease)     RBBB (right bundle branch block with left anterior fascicular block) 08/28/2021    MDD (major depressive disorder) 08/16/2021    Anemia due to stage 4 chronic kidney disease 08/16/2021    Atherosclerosis of native coronary artery of native heart without angina pectoris 01/11/2021    Paroxysmal A-fib 12/24/2020    Type 2 diabetes mellitus with stage 4 chronic kidney disease, with long-term current use of insulin     Stented coronary artery 11/28/2017    Primary hypertension 07/02/2017        Review of patient's allergies indicates:   Allergen Reactions    Ace inhibitors Other (See  "Comments)     Cough        Past Medical History:   Diagnosis Date    (HFpEF) heart failure with preserved ejection fraction     TTE (2020) w/ EF 55% and grade II diastolic dysfunction    Anemia due to stage 4 chronic kidney disease 8/16/2021    Gastric ulcer due to Helicobacter pylori 07/2017    Gastrointestinal hemorrhage with melena 07/2017    GERD (gastroesophageal reflux disease)     HLD (hyperlipidemia)     Hydronephrosis with ureteropelvic junction (UPJ) obstruction 8/16/2021    Hypertension     MDD (major depressive disorder) 8/16/2021    Nephrolithiasis 8/25/2021    NSTEMI (non-ST elevated myocardial infarction) 07/2017    Paroxysmal A-fib 12/24/2020    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/2020    Stage 4 chronic kidney disease 10/19/2021    STEMI (ST elevation myocardial infarction) 12/2020    Type 2 diabetes mellitus     Urge incontinence of urine       Past Surgical History:   Procedure Laterality Date    CARDIAC CATHETERIZATION  07/2017    RCA w/ 95% stenosis w/ stent placement    RETROGRADE PYELOGRAPHY  8/25/2021    Procedure: PYELOGRAM, RETROGRADE;  Surgeon: Rody Smith MD;  Location: [facility] OR;  Service: Urology;;    URETEROSCOPIC REMOVAL OF URETERIC CALCULUS Left 8/25/2021    Procedure: left ureteroscopy, stone basketing, stent placement;  left nephrostomy tube removal;  Surgeon: Rody Smith MD;  Location: [facility] OR;  Service: Urology;  Laterality: Left;      No family history on file.   Social History     Tobacco Use    Smoking status: Never    Smokeless tobacco: Never   Substance Use Topics    Alcohol use: No        Objective:     Vitals:    01/05/24 0938   BP: 132/65   Pulse: 64   Weight: 83.1 kg (183 lb 3.2 oz)   Height: 5' 3" (1.6 m)     Body mass index is 32.45 kg/m².    Physical Exam  Vitals reviewed.   Constitutional:       General: She is not in acute distress.     Appearance: She is not toxic-appearing.   Eyes:      Extraocular Movements: Extraocular " movements intact.   Cardiovascular:      Rate and Rhythm: Normal rate. Rhythm irregular.   Pulmonary:      Effort: Pulmonary effort is normal. No respiratory distress.      Breath sounds: Normal breath sounds.   Musculoskeletal:      Right lower leg: No edema.      Left lower leg: No edema.   Skin:     General: Skin is warm.   Neurological:      Mental Status: She is alert and oriented to person, place, and time. Mental status is at baseline.   Psychiatric:         Mood and Affect: Mood normal.         Behavior: Behavior normal.         Thought Content: Thought content normal.          Assessment/Plan:     Eliza Louie is a 73 y.o. year old female who presents to clinic for follow up.    1. Osteopenia of right hip  - Patient prefers weekly vit D, will start that for 8-12 weeks and then transition to daily vit D for osteoporosis prevention  - Patient's calcium within normal range per chart review. Will attempt dietary intake of calcium first given patient does not want to take more pills   - ergocalciferol (ERGOCALCIFEROL) 50,000 unit Cap; Take 1 capsule (50,000 Units total) by mouth every 7 days.  Dispense: 4 capsule; Refill: 3  - Discussed importance of weight bearing exercise     2. Paroxysmal A-fib  - Rate controlled  - apixaban (ELIQUIS) 5 mg Tab; Take 1 tablet (5 mg total) by mouth 2 (two) times daily.  Dispense: 180 tablet; Refill: 3    3. Type 2 diabetes mellitus with stage 4 chronic kidney disease, with long-term current use of insulin  - Well controlled based on A1c of 5.9 and fasting BS log in 100s  - Will decrease insulin (Lantus) from 20 units to 15 units nightly insulin as patient slowly would like to get off if possible. Discussed with patient this may be a long tapering down journey as she will likely need it long-term unless her kidney function improves  - Will try to get patient flash glucose sensor (FREESTYLE FERCHO 2 SENSOR) Kit; 1 each by Misc.(Non-Drug; Combo Route) route once daily.  Dispense: 1  kit; Refill: 0 as she has not been able to get the DEXCOM. Patient does not like to stick herself with needles and needs to be able to check her blood sugar at least two times daily given her long-term use of insulin and chronic kidney disease 4. She would benefit greatly from a CGM in order to ensure compliance and prevent hypoglycemia.      Follow-up: 2 months for T2DM    A total of 35 minutes was spent on patient care during this encounter which included chart review, examining the patient, formulating a treatment plan and documentation.     Medical decision making straight forward and not complex during this visit.     Case discussed with staff: Dr. Rich Garcia MD  [facility], PGY-2  01/05/2024

## 2024-01-05 NOTE — PROGRESS NOTES
I assume primary medical responsibility for this patient. I have reviewed the history, physical, and assessment & treatment plan with the resident and agree that the care is reasonable and necessary. This service has been performed by a resident without the presence of a teaching physician under the primary care exception. If necessary, an addendum of additional findings or evaluation beyond the resident documentation will be noted below.        Blaine Villanueva Jr., DO    [facility]

## 2024-01-25 DIAGNOSIS — Z79.4 TYPE 2 DIABETES MELLITUS WITH HYPERGLYCEMIA, WITH LONG-TERM CURRENT USE OF INSULIN: Chronic | ICD-10-CM

## 2024-01-25 DIAGNOSIS — Z95.5 STENTED CORONARY ARTERY: Chronic | ICD-10-CM

## 2024-01-25 DIAGNOSIS — E11.65 TYPE 2 DIABETES MELLITUS WITH HYPERGLYCEMIA, WITH LONG-TERM CURRENT USE OF INSULIN: Chronic | ICD-10-CM

## 2024-01-25 DIAGNOSIS — E78.5 HYPERLIPIDEMIA, UNSPECIFIED HYPERLIPIDEMIA TYPE: ICD-10-CM

## 2024-01-25 DIAGNOSIS — I25.10 ATHEROSCLEROSIS OF NATIVE CORONARY ARTERY OF NATIVE HEART WITHOUT ANGINA PECTORIS: Chronic | ICD-10-CM

## 2024-01-25 RX ORDER — ROSUVASTATIN CALCIUM 40 MG/1
40 TABLET, COATED ORAL DAILY
Qty: 90 TABLET | Refills: 3 | Status: SHIPPED | OUTPATIENT
Start: 2024-01-25 | End: 2024-02-29 | Stop reason: SDUPTHER

## 2024-02-16 DIAGNOSIS — I50.32 CHRONIC HEART FAILURE WITH PRESERVED EJECTION FRACTION: ICD-10-CM

## 2024-02-16 DIAGNOSIS — I48.0 PAROXYSMAL A-FIB: Chronic | ICD-10-CM

## 2024-02-16 DIAGNOSIS — I10 PRIMARY HYPERTENSION: Chronic | ICD-10-CM

## 2024-02-16 NOTE — TELEPHONE ENCOUNTER
----- Message from Nicole Pittman sent at 2/16/2024 11:39 AM CST -----  Regarding: refill  Type:  RX Refill Request    Who Called: pt  Refill or New Rx:Refill  RX Name and Strength:amLODIPine (NORVASC) 10 MG tablet  furosemide (LASIX) 40 MG tablet  metoprolol succinate (TOPROL-XL) 100 MG 24 hr tablet  Preferred Pharmacy with phone number:[facility]  [place] [address] [address]  Phone: [phone] Fax: [phone]  Local or Mail Order:Local  Ordering Provider:Garcia  Would the patient rather a call back or a response via [facility]? Call  Best Call Back Number:[phone]  Additional Information:

## 2024-02-19 RX ORDER — METOPROLOL SUCCINATE 100 MG/1
100 TABLET, EXTENDED RELEASE ORAL DAILY
Qty: 90 TABLET | Refills: 0 | Status: SHIPPED | OUTPATIENT
Start: 2024-02-19 | End: 2024-06-07 | Stop reason: SDUPTHER

## 2024-02-19 RX ORDER — FUROSEMIDE 40 MG/1
40 TABLET ORAL 2 TIMES DAILY
Qty: 180 TABLET | Refills: 3 | Status: SHIPPED | OUTPATIENT
Start: 2024-02-19 | End: 2025-02-18

## 2024-02-21 ENCOUNTER — TELEPHONE (OUTPATIENT)
Dept: NEPHROLOGY | Facility: CLINIC | Age: 74
End: 2024-02-21
Payer: MEDICARE

## 2024-02-29 ENCOUNTER — OFFICE VISIT (OUTPATIENT)
Dept: FAMILY MEDICINE | Facility: HOSPITAL | Age: 74
End: 2024-02-29
Payer: MEDICARE

## 2024-02-29 VITALS
DIASTOLIC BLOOD PRESSURE: 86 MMHG | BODY MASS INDEX: 32.38 KG/M2 | SYSTOLIC BLOOD PRESSURE: 170 MMHG | HEART RATE: 73 BPM | WEIGHT: 182.75 LBS | HEIGHT: 63 IN

## 2024-02-29 DIAGNOSIS — N18.4 TYPE 2 DIABETES MELLITUS WITH STAGE 4 CHRONIC KIDNEY DISEASE, WITH LONG-TERM CURRENT USE OF INSULIN: ICD-10-CM

## 2024-02-29 DIAGNOSIS — I25.10 ATHEROSCLEROSIS OF NATIVE CORONARY ARTERY OF NATIVE HEART WITHOUT ANGINA PECTORIS: Chronic | ICD-10-CM

## 2024-02-29 DIAGNOSIS — E11.65 TYPE 2 DIABETES MELLITUS WITH HYPERGLYCEMIA, WITH LONG-TERM CURRENT USE OF INSULIN: Chronic | ICD-10-CM

## 2024-02-29 DIAGNOSIS — Z79.4 TYPE 2 DIABETES MELLITUS WITH HYPERGLYCEMIA, WITH LONG-TERM CURRENT USE OF INSULIN: Chronic | ICD-10-CM

## 2024-02-29 DIAGNOSIS — E11.22 TYPE 2 DIABETES MELLITUS WITH STAGE 4 CHRONIC KIDNEY DISEASE, WITH LONG-TERM CURRENT USE OF INSULIN: ICD-10-CM

## 2024-02-29 DIAGNOSIS — I10 PRIMARY HYPERTENSION: Chronic | ICD-10-CM

## 2024-02-29 DIAGNOSIS — N18.4 STAGE 4 CHRONIC KIDNEY DISEASE: Primary | Chronic | ICD-10-CM

## 2024-02-29 DIAGNOSIS — E78.5 HYPERLIPIDEMIA, UNSPECIFIED HYPERLIPIDEMIA TYPE: ICD-10-CM

## 2024-02-29 DIAGNOSIS — Z95.5 STENTED CORONARY ARTERY: Chronic | ICD-10-CM

## 2024-02-29 DIAGNOSIS — Z79.4 TYPE 2 DIABETES MELLITUS WITH STAGE 4 CHRONIC KIDNEY DISEASE, WITH LONG-TERM CURRENT USE OF INSULIN: ICD-10-CM

## 2024-02-29 PROCEDURE — 99214 OFFICE O/P EST MOD 30 MIN: CPT

## 2024-02-29 RX ORDER — INSULIN GLARGINE 100 [IU]/ML
10 INJECTION, SOLUTION SUBCUTANEOUS NIGHTLY
Qty: 9 ML | Refills: 3 | Status: SHIPPED | OUTPATIENT
Start: 2024-02-29 | End: 2024-05-09

## 2024-02-29 RX ORDER — POTASSIUM CHLORIDE 750 MG/1
10 CAPSULE, EXTENDED RELEASE ORAL
COMMUNITY
Start: 2023-11-28

## 2024-02-29 RX ORDER — ROSUVASTATIN CALCIUM 40 MG/1
40 TABLET, COATED ORAL DAILY
Qty: 90 TABLET | Refills: 3 | Status: SHIPPED | OUTPATIENT
Start: 2024-02-29 | End: 2025-02-28

## 2024-02-29 RX ORDER — LOSARTAN POTASSIUM 25 MG/1
25 TABLET ORAL DAILY
Qty: 90 TABLET | Refills: 3 | Status: SHIPPED | OUTPATIENT
Start: 2024-02-29 | End: 2025-02-28

## 2024-02-29 RX ORDER — AMLODIPINE BESYLATE 10 MG/1
10 TABLET ORAL DAILY
Qty: 90 TABLET | Refills: 3 | Status: SHIPPED | OUTPATIENT
Start: 2024-02-29 | End: 2025-02-28

## 2024-02-29 RX ORDER — POTASSIUM CHLORIDE 750 MG/1
10 CAPSULE, EXTENDED RELEASE ORAL
Status: CANCELLED | OUTPATIENT
Start: 2024-02-29

## 2024-02-29 NOTE — PROGRESS NOTES
[facility]    Subjective:     Eliza Louie is a 73 y.o. year old female with PMHx of HTN, Stage 4 CKD, T2DM on insulin, HFpEF, gout  who presents to clinic for follow up.    T2DM: Controlled; morning BGs approx 90s-110s; Last A1c 5.9% (12/2023); patient does complain of numbness/tingling in bilateral feet. Patient denies polyuria, polydipsia, polyphagia. Patient endorses compliance with medication regimen, including 15 units of Lantus and Januvia 25 mg. Denies any episodes of hypoglycemia.    HTN: BP today 170/86. Not controlled today. Patient denies any SOB, lightheadedness, dizziness, palpitations, chest pain, or vision changes. Patient endorses compliance with medication regimen, including  Amlodipine 10 mg and Losartan 25 mg.  Ran out of her Losartan yesterday. She has also been out of Amlodipine for about 2 weeks. States her BP is not usually high at home prior to running out.    Patient Active Problem List    Diagnosis Date Noted    Gout of multiple sites 08/16/2022    (HFpEF) heart failure with preserved ejection fraction     Stage 4 chronic kidney disease 10/19/2021    History of nephrolithiasis 10/19/2021    GERD (gastroesophageal reflux disease)     RBBB (right bundle branch block with left anterior fascicular block) 08/28/2021    MDD (major depressive disorder) 08/16/2021    Anemia due to stage 4 chronic kidney disease 08/16/2021    Atherosclerosis of native coronary artery of native heart without angina pectoris 01/11/2021    Paroxysmal A-fib 12/24/2020    Type 2 diabetes mellitus with stage 4 chronic kidney disease, with long-term current use of insulin     Stented coronary artery 11/28/2017    Primary hypertension 07/02/2017        Review of patient's allergies indicates:   Allergen Reactions    Ace inhibitors Other (See Comments)     Cough        Past Medical History:   Diagnosis Date    (HFpEF) heart failure with preserved ejection fraction     TTE (2020) w/ EF 55% and grade II diastolic  "dysfunction    Anemia due to stage 4 chronic kidney disease 8/16/2021    Gastric ulcer due to Helicobacter pylori 07/2017    Gastrointestinal hemorrhage with melena 07/2017    GERD (gastroesophageal reflux disease)     HLD (hyperlipidemia)     Hydronephrosis with ureteropelvic junction (UPJ) obstruction 8/16/2021    Hypertension     MDD (major depressive disorder) 8/16/2021    Nephrolithiasis 8/25/2021    NSTEMI (non-ST elevated myocardial infarction) 07/2017    Paroxysmal A-fib 12/24/2020    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/2020    Stage 4 chronic kidney disease 10/19/2021    STEMI (ST elevation myocardial infarction) 12/2020    Type 2 diabetes mellitus     Urge incontinence of urine       Past Surgical History:   Procedure Laterality Date    CARDIAC CATHETERIZATION  07/2017    RCA w/ 95% stenosis w/ stent placement    RETROGRADE PYELOGRAPHY  8/25/2021    Procedure: PYELOGRAM, RETROGRADE;  Surgeon: Rody Smith MD;  Location: [facility] OR;  Service: Urology;;    URETEROSCOPIC REMOVAL OF URETERIC CALCULUS Left 8/25/2021    Procedure: left ureteroscopy, stone basketing, stent placement;  left nephrostomy tube removal;  Surgeon: Royd Smith MD;  Location: [facility] OR;  Service: Urology;  Laterality: Left;      No family history on file.   Social History     Tobacco Use    Smoking status: Never    Smokeless tobacco: Never   Substance Use Topics    Alcohol use: No        Objective:     Vitals:    02/29/24 1554   BP: (!) 170/86   Pulse: 73   Weight: 82.9 kg (182 lb 12.2 oz)   Height: 5' 3" (1.6 m)     Body mass index is 32.37 kg/m².    Gen: No apparent distress, well nourished and developed, appears stated age  CV: RRR, S1 and S2 present, no LE edema  Resp: CTAB, normal respiratory effort    Assessment/Plan:     Eliza Louie is a 73 y.o. year old female who presents to clinic for follow up.    Type 2 diabetes mellitus with stage 4 chronic kidney disease, with long-term current use of " insulin  - Well controlled with current regimen  - Will decrease insulin further due to well-controlled based on log. Decreased glargine from 15 units qhs  to 10 units qhs today. Patient's ultimate goal is to be able to get off insulin if possible. However, discussed with her given her CKD4 she may still need insulin long-term if her A1c is not at a goal.   - Continue SITagliptin phosphate (JANUVIA) 25 MG daily.  Patient is NOT able to get any higher dose of this medication due to her CrCl.   - Encouraged patient to continue dietary changes as she is doing well with this and is working hard towards her goal of being off insulin.   - Will re-check A1c before next visit    Primary hypertension  - Not controlled today 2/2 running out of  her medications   - Refilled amlodipine 10 and losartan 25 mg today, will continue at this time  - Advised to bring BP log to next visit after resuming medications to  see if any adjustments need to be made    Stage 4 chronic kidney disease  - Stable at this time  - Patient reports her prior Nephrologist still is out, has not been able to get an appointment  - Ambulatory referral/consult to Nephrology; Future    Follow-up: 1 month for HTN and T2DM    A total of 35 minutes was spent on patient care during this encounter which included chart review, examining the patient, formulating a treatment plan and documentation.     Medical decision making straight forward and not complex during this visit.     Case discussed with staff: Dr. Annia Garcia MD  [facility], PGY-2  03/01/2024

## 2024-03-04 NOTE — PROGRESS NOTES
I assume primary medical responsibility for this patient. I have reviewed the history, physical, and assessement & treatment plan with the resident and agree that the care is reasonable and necessary. This service has been performed by a resident without the presence of a teaching physician under the primary care exception. If necessary, an addendum of additional findings or evaluation beyond the resident documentation will be noted below.    Agree with cautious decrease of insulin per patient goal with careful monitoring of BG. At goal.

## 2024-03-04 NOTE — PROGRESS NOTES
I assume primary medical responsibility for this patient. I have reviewed the history, physical, and assessement & treatment plan with the resident and agree that the care is reasonable and necessary. This service has been performed by a resident without the presence of a teaching physician under the primary care exception. If necessary, an addendum of additional findings or evaluation beyond the resident documentation will be noted below.    Counseled on BP control and need for follow up after not taking bp medicine.

## 2024-03-09 NOTE — PROGRESS NOTES
[facility] [id number]     SUBJECTIVE:     History of Present Illness:  Patient is a 73 y.o. female presents with CAD and here for followup.   Primary Diagnosis:  1. DM  2. CAD: s/p stent right; occluded OM2  3. GI bleed   4. hypertension.         5. CKD 4     6. Paroxysmal atrial fibrillation on eliquis   7. S/p gall bladder surgery 2013     ROS  Since last visit  11/23: doing better   A. No chest pains  B. Does get short of breath with walking; no orthopnea; no PND; no change from before   C. No bleeding   D. No syncope  E. No palpitations  F. Activity: walks in the store 3 times a week; takes care of the house without chest pains; limited by leg pain in knees and back ; doing more walking          Past Hospitalization  1. Admitted 12/21/20 and discharged 12/27/20: admitted for vomiting and nausea; EKG with ST elevation lead III only; ST depression noted; renal function decreased; echo with inferobasal akinesis; K 3.1; troponin was elevated 38 in the ER. She was treated medically; had episode of atrial fibrillation in the ICU and failure requiring diuresis   Review of patient's allergies indicates:   Allergen Reactions    Ace inhibitors Other (See Comments)     Cough       Past Medical History:   Diagnosis Date    (HFpEF) heart failure with preserved ejection fraction     TTE (2020) w/ EF 55% and grade II diastolic dysfunction    Anemia due to stage 4 chronic kidney disease 8/16/2021    Gastric ulcer due to Helicobacter pylori 07/2017    Gastrointestinal hemorrhage with melena 07/2017    GERD (gastroesophageal reflux disease)     HLD (hyperlipidemia)     Hydronephrosis with ureteropelvic junction (UPJ) obstruction 8/16/2021    Hypertension     MDD (major depressive disorder) 8/16/2021    Nephrolithiasis 8/25/2021    NSTEMI (non-ST elevated myocardial infarction) 07/2017    Paroxysmal A-fib 12/24/2020    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/2020    Stage 4 chronic kidney disease  10/19/2021    STEMI (ST elevation myocardial infarction) 12/2020    Type 2 diabetes mellitus     Urge incontinence of urine        Past Surgical History:   Procedure Laterality Date    CARDIAC CATHETERIZATION  07/2017    RCA w/ 95% stenosis w/ stent placement    RETROGRADE PYELOGRAPHY  8/25/2021    Procedure: PYELOGRAM, RETROGRADE;  Surgeon: Rody Smith MD;  Location: [facility] OR;  Service: Urology;;    URETEROSCOPIC REMOVAL OF URETERIC CALCULUS Left 8/25/2021    Procedure: left ureteroscopy, stone basketing, stent placement;  left nephrostomy tube removal;  Surgeon: Rody Smith MD;  Location: [facility] OR;  Service: Urology;  Laterality: Left;       History reviewed. No pertinent family history.    Social History     Tobacco Use    Smoking status: Never    Smokeless tobacco: Never   Substance Use Topics    Alcohol use: No    Drug use: No        Home meds:  Current Outpatient Medications on File Prior to Visit   Medication Sig Dispense Refill    amLODIPine (NORVASC) 10 MG tablet Take 1 tablet (10 mg total) by mouth once daily. 90 tablet 3    apixaban (ELIQUIS) 5 mg Tab Take 1 tablet (5 mg total) by mouth 2 (two) times daily. 180 tablet 3    blood sugar diagnostic Strp To check BG 2 times daily, to use with insurance preferred meter 100 each 7    blood-glucose meter (FREESTYLE SYSTEM KIT) kit Use as instructed 1 each 0    blood-glucose sensor (DEXCOM G6 SENSOR) [place] 1 each by Misc.(Non-Drug; Combo Route) route once daily. 1 each 1    colchicine (COLCRYS) 0.6 mg tablet Take 1 tablet (0.6 mg total) by mouth once daily. 30 tablet 11    ergocalciferol (ERGOCALCIFEROL) 50,000 unit Cap Take 1 capsule (50,000 Units total) by mouth every 7 days. 4 capsule 3    flash glucose sensor (FREESTYLE FERCHO 2 SENSOR) Kit 1 each by Misc.(Non-Drug; Combo Route) route once daily. 1 kit 0    furosemide (LASIX) 40 MG tablet Take 1 tablet (40 mg total) by mouth 2 (two) times daily. 180 tablet 3    insulin (LANTUS SOLOSTAR U-100 INSULIN)  glargine 100 units/mL SubQ pen Inject 10 Units into the skin every evening. 9 mL 3    lancets Misc To check BG 2 times daily, to use with insurance preferred meter 100 each 7    losartan (COZAAR) 25 MG tablet Take 1 tablet (25 mg total) by mouth once daily. 90 tablet 3    magnesium oxide (MAG-OX) 400 mg (241.3 mg magnesium) tablet Take 1 tablet (400 mg total) by mouth 2 (two) times daily. 60 tablet 6    metoprolol succinate (TOPROL-XL) 100 MG 24 hr tablet Take 1 tablet (100 mg total) by mouth once daily. 90 tablet 0    nitroGLYCERIN (NITROSTAT) 0.4 MG SL tablet Place 0.4 mg under the tongue every 5 (five) minutes as needed for Chest pain.      potassium chloride (MICRO-K) 10 MEQ CpSR Take 10 mEq by mouth.      rosuvastatin (CRESTOR) 40 MG Tab Take 1 tablet (40 mg total) by mouth once daily. 90 tablet 3    SITagliptin phosphate (JANUVIA) 25 MG Tab Take 1 tablet (25 mg total) by mouth once daily. 120 tablet 0     No current facility-administered medications on file prior to visit.       Cardiac meds:     ASA 81 mg - not on this   crestor 40 mg   Amlodipine 10 mg   eliquis 5 mg BID  metorpolol succiante 100 mg   Lasix 40 mg BID  K 10 meq  Losartan 25 mg     OBJECTIVE:     Vital Signs (Most Recent)  Pulse: 65 (24 0844)  BP: 132/80 (24 0844)  SpO2: 97 % (24 08)  Weight up 5 lbs     Physical Exam:  BP normal at home  Neck: normal carotids, nobruits  lungs: clear  Heart:i RR, normal S1,S2, AI murmur noted loudest RSB; along LSB as well; no MR noted   Exts: normal pulses ; no edema             LABS    : GFR 16   : LDL 96;  ; A1c 9.4  : GFR 21; K 4.1  : GFR 17; A1c 8.0  : GFR 19; A1c 5.9  Diagnostic Results:    1. EKGs- sinus to atrial fibrillation to sinus 1b. EKG's : atrial fibrillation and sinus; T wave changes anterolateral leads, RBBB which was new ; minimal ST elevation lead III  1c. EK2021:sinus with PAC's, RBBB; ST upcoving with T wave inversion with q waves  inferiorly   2. Echos- 7/3/17: mild LAE, normal EF, LVH   2b. Echo: 6/15/20: normal EF,moderate AI, LVH, diastolic dysfunction; no wall motion changes   2c. Echo: 12/20: EF 55%; LVE, LAE, diastolic dysfuncdtion; mild MR, mild TR, mild AI, normal RV function; inferobasal akinesis   3. Stress Test- [  ]  4. Cath- 7/3/17: Left main: normal; LAD normal; Cx: normal; Right 95% 2 discrete lesions; OM2 occluded; intervention 7/7/17: proximal and  mid right 4X30  5. Device- [  ].         ASSESSMENT/PLAN:     1. S/p acute MI 12/20: no intervention since presentation was delayed with renal issues; stable now. Most likely from the right  2. RBBB   3. Abnormal EKG - age indeterminate inferior infarct   4. CKD 4: followed by  - needs new nephrology - next appointment at [facility]   5. Abnormal TG's - metabolic syndrome - however has changed diet and will see where the TG go   6. Chronic diastolic  heart failure   Plan: 1.continue all medications  2. Check lipids with next blood test  3. Echocardiogram: check EF  4: Return 6 months     Lul Thompson MD

## 2024-03-11 ENCOUNTER — OFFICE VISIT (OUTPATIENT)
Dept: CARDIOLOGY | Facility: CLINIC | Age: 74
End: 2024-03-11
Payer: MEDICARE

## 2024-03-11 VITALS
WEIGHT: 187.38 LBS | DIASTOLIC BLOOD PRESSURE: 80 MMHG | SYSTOLIC BLOOD PRESSURE: 132 MMHG | OXYGEN SATURATION: 97 % | HEIGHT: 63 IN | HEART RATE: 65 BPM | BODY MASS INDEX: 33.2 KG/M2

## 2024-03-11 DIAGNOSIS — Z95.5 STENTED CORONARY ARTERY: Primary | ICD-10-CM

## 2024-03-11 DIAGNOSIS — I45.2 RBBB (RIGHT BUNDLE BRANCH BLOCK WITH LEFT ANTERIOR FASCICULAR BLOCK): ICD-10-CM

## 2024-03-11 DIAGNOSIS — I48.0 PAROXYSMAL A-FIB: ICD-10-CM

## 2024-03-11 DIAGNOSIS — N18.4 CKD (CHRONIC KIDNEY DISEASE) STAGE 4, GFR 15-29 ML/MIN: ICD-10-CM

## 2024-03-11 PROCEDURE — 99999 PR PBB SHADOW E&M-EST. PATIENT-LVL III: CPT | Mod: PBBFAC,,, | Performed by: INTERNAL MEDICINE

## 2024-03-11 PROCEDURE — 3008F BODY MASS INDEX DOCD: CPT | Mod: CPTII,S$GLB,, | Performed by: INTERNAL MEDICINE

## 2024-03-11 PROCEDURE — 4010F ACE/ARB THERAPY RXD/TAKEN: CPT | Mod: CPTII,S$GLB,, | Performed by: INTERNAL MEDICINE

## 2024-03-11 PROCEDURE — 3288F FALL RISK ASSESSMENT DOCD: CPT | Mod: CPTII,S$GLB,, | Performed by: INTERNAL MEDICINE

## 2024-03-11 PROCEDURE — 1159F MED LIST DOCD IN RCRD: CPT | Mod: CPTII,S$GLB,, | Performed by: INTERNAL MEDICINE

## 2024-03-11 PROCEDURE — 1126F AMNT PAIN NOTED NONE PRSNT: CPT | Mod: CPTII,S$GLB,, | Performed by: INTERNAL MEDICINE

## 2024-03-11 PROCEDURE — 1101F PT FALLS ASSESS-DOCD LE1/YR: CPT | Mod: CPTII,S$GLB,, | Performed by: INTERNAL MEDICINE

## 2024-03-11 PROCEDURE — 3079F DIAST BP 80-89 MM HG: CPT | Mod: CPTII,S$GLB,, | Performed by: INTERNAL MEDICINE

## 2024-03-11 PROCEDURE — 3075F SYST BP GE 130 - 139MM HG: CPT | Mod: CPTII,S$GLB,, | Performed by: INTERNAL MEDICINE

## 2024-03-11 PROCEDURE — 1160F RVW MEDS BY RX/DR IN RCRD: CPT | Mod: CPTII,S$GLB,, | Performed by: INTERNAL MEDICINE

## 2024-03-11 PROCEDURE — 99214 OFFICE O/P EST MOD 30 MIN: CPT | Mod: S$GLB,,, | Performed by: INTERNAL MEDICINE

## 2024-04-03 ENCOUNTER — OFFICE VISIT (OUTPATIENT)
Dept: OPTOMETRY | Facility: CLINIC | Age: 74
End: 2024-04-03
Payer: MEDICARE

## 2024-04-03 DIAGNOSIS — N18.4 TYPE 2 DIABETES MELLITUS WITH STAGE 4 CHRONIC KIDNEY DISEASE, WITH LONG-TERM CURRENT USE OF INSULIN: Primary | ICD-10-CM

## 2024-04-03 DIAGNOSIS — H25.21 AGE-RELATED HYPERMATURE CATARACT OF RIGHT EYE: ICD-10-CM

## 2024-04-03 DIAGNOSIS — H25.812 COMBINED FORMS OF AGE-RELATED CATARACT, LEFT EYE: ICD-10-CM

## 2024-04-03 DIAGNOSIS — Z79.4 TYPE 2 DIABETES MELLITUS WITH STAGE 4 CHRONIC KIDNEY DISEASE, WITH LONG-TERM CURRENT USE OF INSULIN: Primary | ICD-10-CM

## 2024-04-03 DIAGNOSIS — E11.22 TYPE 2 DIABETES MELLITUS WITH STAGE 4 CHRONIC KIDNEY DISEASE, WITH LONG-TERM CURRENT USE OF INSULIN: Primary | ICD-10-CM

## 2024-04-03 PROCEDURE — 99204 OFFICE O/P NEW MOD 45 MIN: CPT | Mod: S$GLB,,, | Performed by: OPTOMETRIST

## 2024-04-03 PROCEDURE — 3288F FALL RISK ASSESSMENT DOCD: CPT | Mod: CPTII,S$GLB,, | Performed by: OPTOMETRIST

## 2024-04-03 PROCEDURE — 1101F PT FALLS ASSESS-DOCD LE1/YR: CPT | Mod: CPTII,S$GLB,, | Performed by: OPTOMETRIST

## 2024-04-03 PROCEDURE — 4010F ACE/ARB THERAPY RXD/TAKEN: CPT | Mod: CPTII,S$GLB,, | Performed by: OPTOMETRIST

## 2024-04-03 PROCEDURE — 1126F AMNT PAIN NOTED NONE PRSNT: CPT | Mod: CPTII,S$GLB,, | Performed by: OPTOMETRIST

## 2024-04-03 PROCEDURE — 1159F MED LIST DOCD IN RCRD: CPT | Mod: CPTII,S$GLB,, | Performed by: OPTOMETRIST

## 2024-04-03 PROCEDURE — 99999 PR PBB SHADOW E&M-EST. PATIENT-LVL III: CPT | Mod: PBBFAC,,, | Performed by: OPTOMETRIST

## 2024-04-03 NOTE — PROGRESS NOTES
HPI    Pt is here today for diabetic eye exam. Patient denies pain/discomfort.  DLS: 20+ years ago   (-)Flashes   (-)Floaters   (+)Diplopia: OD Occasionally   (-)Headaches   (+)Itching   (+)Tearing  (-)Burning  (-)Dryness   (+)Photophobia  (+)Glare   (+)Blurred VA  Past Eye Sx: (-)  Eye Meds: (-)   Hemoglobin A1C       Date                     Value               Ref Range             Status                12/05/2023               5.9 (H)             4.0 - 5.6 %           Final            Last edited by Mary Ponce, OD on 4/3/2024 10:27 AM.            Assessment /Plan     For exam results, see Encounter Report.    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 No retinopathy noted today TO EXTENT SEEN.  Continued control with primary care physician and annual comprehensive eye exam.     2-3. Educated pt on today's findings of hypermature cataract OD and cataract OS. Referral to Dr. Arenas for cataract extraction evaluation *B scan OD. Reviewed surgical process with pt and pt's daughter.    RTC to me for cataract post operative care

## 2024-04-05 ENCOUNTER — TELEPHONE (OUTPATIENT)
Dept: OPHTHALMOLOGY | Facility: CLINIC | Age: 74
End: 2024-04-05
Payer: MEDICARE

## 2024-04-05 NOTE — TELEPHONE ENCOUNTER
Called to schedule pt for cataract evaluation per Dr. Ponce.    ----- Message from Mary Ponce, OD sent at 4/3/2024 11:00 AM CDT -----  Regarding: First available cat eval  Texted you about this pt--needing first available cat eval with Dr. Arenas due to hypermature cataract OD (needs B scan) and will need CE OS as well. The preferred # in her chart is pt's daughter they are very nice

## 2024-04-09 ENCOUNTER — HOSPITAL ENCOUNTER (OUTPATIENT)
Dept: CARDIOLOGY | Facility: HOSPITAL | Age: 74
Discharge: HOME | End: 2024-04-09
Attending: INTERNAL MEDICINE
Payer: MEDICARE

## 2024-04-09 VITALS — HEIGHT: 63 IN | WEIGHT: 187 LBS | BODY MASS INDEX: 33.13 KG/M2

## 2024-04-09 PROCEDURE — 93306 TTE W/DOPPLER COMPLETE: CPT

## 2024-04-10 LAB
ASCENDING AORTA: 3.05 CM
AV INDEX (PROSTH): 0.73
AV MEAN GRADIENT: 5 MMHG
AV PEAK GRADIENT: 7 MMHG
AV REGURGITATION PRESSURE HALF TIME: 613.11 MS
AV VALVE AREA BY VELOCITY RATIO: 2.71 CM²
AV VALVE AREA: 2.41 CM²
AV VELOCITY RATIO: 0.82
BSA FOR ECHO PROCEDURE: 1.94 M2
CV ECHO LV RWT: 0.54 CM
DOP CALC AO PEAK VEL: 1.34 M/S
DOP CALC AO VTI: 33 CM
DOP CALC LVOT AREA: 3.3 CM2
DOP CALC LVOT DIAMETER: 2.05 CM
DOP CALC LVOT PEAK VEL: 1.1 M/S
DOP CALC LVOT STROKE VOLUME: 79.5 CM3
DOP CALCLVOT PEAK VEL VTI: 24.1 CM
E/E' RATIO: 18.83 M/S
ECHO LV POSTERIOR WALL: 1.22 CM (ref 0.6–1.1)
FRACTIONAL SHORTENING: 21 % (ref 28–44)
INTERVENTRICULAR SEPTUM: 1.15 CM (ref 0.6–1.1)
IVC DIAMETER: 1.59 CM
LA MAJOR: 5.78 CM
LA MINOR: 6.93 CM
LA WIDTH: 3.8 CM
LEFT ATRIUM SIZE: 4.2 CM
LEFT ATRIUM VOLUME INDEX MOD: 38.1 ML/M2
LEFT ATRIUM VOLUME INDEX: 45.5 ML/M2
LEFT ATRIUM VOLUME MOD: 71.57 CM3
LEFT ATRIUM VOLUME: 85.51 CM3
LEFT INTERNAL DIMENSION IN SYSTOLE: 3.59 CM (ref 2.1–4)
LEFT VENTRICLE DIASTOLIC VOLUME INDEX: 50.63 ML/M2
LEFT VENTRICLE DIASTOLIC VOLUME: 95.19 ML
LEFT VENTRICLE MASS INDEX: 106 G/M2
LEFT VENTRICLE SYSTOLIC VOLUME INDEX: 28.7 ML/M2
LEFT VENTRICLE SYSTOLIC VOLUME: 53.94 ML
LEFT VENTRICULAR INTERNAL DIMENSION IN DIASTOLE: 4.56 CM (ref 3.5–6)
LEFT VENTRICULAR MASS: 198.61 G
LV LATERAL E/E' RATIO: 18.83 M/S
LV SEPTAL E/E' RATIO: 18.83 M/S
LVOT MG: 2.87 MMHG
LVOT MV: 0.81 CM/S
MV PEAK E VEL: 1.13 M/S
PISA AR MAX VEL: 3.28 M/S
PISA MRMAX VEL: 5 M/S
PISA TR MAX VEL: 2.64 M/S
PULM VEIN S/D RATIO: 0.43
PV PEAK D VEL: 0.68 M/S
PV PEAK GRADIENT: 2 MMHG
PV PEAK S VEL: 0.29 M/S
PV PEAK VELOCITY: 0.72 M/S
RA MAJOR: 5.86 CM
RA PRESSURE ESTIMATED: 8 MMHG
RA WIDTH: 3.96 CM
RV TB RVSP: 11 MMHG
RV TISSUE DOPPLER FREE WALL SYSTOLIC VELOCITY 1 (APICAL 4 CHAMBER VIEW): 10.37 CM/S
SINUS: 3.26 CM
STJ: 2.97 CM
TDI LATERAL: 0.06 M/S
TDI SEPTAL: 0.06 M/S
TDI: 0.06 M/S
TR MAX PG: 28 MMHG
TRICUSPID ANNULAR PLANE SYSTOLIC EXCURSION: 1.62 CM
TV REST PULMONARY ARTERY PRESSURE: 36 MMHG
Z-SCORE OF LEFT VENTRICULAR DIMENSION IN END DIASTOLE: -1.41
Z-SCORE OF LEFT VENTRICULAR DIMENSION IN END SYSTOLE: 0.82

## 2024-04-15 ENCOUNTER — OFFICE VISIT (OUTPATIENT)
Dept: OPHTHALMOLOGY | Facility: CLINIC | Age: 74
End: 2024-04-15
Payer: MEDICARE

## 2024-04-15 DIAGNOSIS — H25.11 NUCLEAR SCLEROTIC CATARACT OF RIGHT EYE: Primary | ICD-10-CM

## 2024-04-15 DIAGNOSIS — H25.12 NUCLEAR SCLEROTIC CATARACT OF LEFT EYE: ICD-10-CM

## 2024-04-15 PROCEDURE — 76519 ECHO EXAM OF EYE: CPT | Mod: RT,S$GLB,, | Performed by: OPHTHALMOLOGY

## 2024-04-15 PROCEDURE — 76512 OPH US DX B-SCAN: CPT | Mod: RT,S$GLB,, | Performed by: OPHTHALMOLOGY

## 2024-04-15 PROCEDURE — 3288F FALL RISK ASSESSMENT DOCD: CPT | Mod: CPTII,S$GLB,, | Performed by: OPHTHALMOLOGY

## 2024-04-15 PROCEDURE — 4010F ACE/ARB THERAPY RXD/TAKEN: CPT | Mod: CPTII,S$GLB,, | Performed by: OPHTHALMOLOGY

## 2024-04-15 PROCEDURE — 1126F AMNT PAIN NOTED NONE PRSNT: CPT | Mod: CPTII,S$GLB,, | Performed by: OPHTHALMOLOGY

## 2024-04-15 PROCEDURE — 1159F MED LIST DOCD IN RCRD: CPT | Mod: CPTII,S$GLB,, | Performed by: OPHTHALMOLOGY

## 2024-04-15 PROCEDURE — 99999 PR PBB SHADOW E&M-EST. PATIENT-LVL III: CPT | Mod: PBBFAC,,, | Performed by: OPHTHALMOLOGY

## 2024-04-15 PROCEDURE — 1101F PT FALLS ASSESS-DOCD LE1/YR: CPT | Mod: CPTII,S$GLB,, | Performed by: OPHTHALMOLOGY

## 2024-04-15 PROCEDURE — 99205 OFFICE O/P NEW HI 60 MIN: CPT | Mod: S$GLB,,, | Performed by: OPHTHALMOLOGY

## 2024-04-15 RX ORDER — PREDNISOLONE/MOXIFLOX/BROMFEN 1 %-0.5 %
1 SUSPENSION, DROPS(FINAL DOSAGE FORM)(ML) OPHTHALMIC (EYE) 3 TIMES DAILY
Qty: 5 ML | Refills: 3 | Status: SHIPPED | OUTPATIENT
Start: 2024-04-15

## 2024-04-15 NOTE — PROGRESS NOTES
HPI    Cataract Eval per Dr. Ponce    Age-related hypermature cataract of right eye  Combined forms of age-related cataract, left eye    No gtts    Pt c/o glare/photophobia when driving at night. Has not driven at night in   a while. Low vision OD for about a year now. Occasional diplopia OD. No   dry eye    Last edited by Maddy Arenas MD on 4/15/2024  9:35 AM.            Assessment /Plan     For exam results, see Encounter Report.    Nuclear sclerotic cataract of right eye  -     Axial Eye Length - OU - Both Eyes  -     B Scan    Nuclear sclerotic cataract of left eye    Visually significant nuclear sclerotic cataract   - Interfering with activities of daily living.  Pt desires cataract surgery for [facility] rehabilitation.   - R/B/A discussed and pt agrees to proceed with surgery.   - IOL options discussed according to patient's goals and concomitant ocular pathology; and pt content with monofocal lens.    - Target: plano.    Diboo 19.5 range OD  Ora - n/c  VB    (Diboo 21.0 OS)    Bscan OD - wnl

## 2024-04-16 ENCOUNTER — TELEPHONE (OUTPATIENT)
Dept: FAMILY MEDICINE | Facility: HOSPITAL | Age: 74
End: 2024-04-16
Payer: MEDICARE

## 2024-04-16 NOTE — TELEPHONE ENCOUNTER
Several messages to this number, frequent disconnects.  Dr. Milner's provider cluster has been sent to Dipika at [facility] for this problem.   ----- Message from Jose Jameson sent at 4/12/2024  9:55 AM CDT -----  Contact: Emma  .Type:  Needs Medical Advice    Who Called:  Emma irene /bhavin Preston     Call back   Best Call Back Number: [phone]  Additional Information:  Emma is calling regarding an appeal was denied for an appt. On 02/29/2024 pt. Was seen by Dr. Jon Milner and he is not come up as a provider in the cluster.  Trying to see if the provider is under Dr. Andrae Moraes.   Dr. Milner is coming up as a non participating provider in the clinic.

## 2024-04-24 DIAGNOSIS — H25.12 NUCLEAR SCLEROTIC CATARACT OF LEFT EYE: Primary | ICD-10-CM

## 2024-04-26 RX ORDER — PREDNISOLONE/MOXIFLOX/BROMFEN 1 %-0.5 %
1 SUSPENSION, DROPS(FINAL DOSAGE FORM)(ML) OPHTHALMIC (EYE) 3 TIMES DAILY
Qty: 5 ML | Refills: 3 | Status: SHIPPED | OUTPATIENT
Start: 2024-04-26

## 2024-05-08 ENCOUNTER — OFFICE VISIT (OUTPATIENT)
Dept: FAMILY MEDICINE | Facility: HOSPITAL | Age: 74
End: 2024-05-08
Payer: MEDICARE

## 2024-05-08 VITALS
HEIGHT: 63 IN | HEART RATE: 62 BPM | WEIGHT: 187.63 LBS | BODY MASS INDEX: 33.25 KG/M2 | DIASTOLIC BLOOD PRESSURE: 78 MMHG | SYSTOLIC BLOOD PRESSURE: 126 MMHG

## 2024-05-08 DIAGNOSIS — E11.22 TYPE 2 DIABETES MELLITUS WITH STAGE 4 CHRONIC KIDNEY DISEASE, WITH LONG-TERM CURRENT USE OF INSULIN: Primary | ICD-10-CM

## 2024-05-08 DIAGNOSIS — I10 PRIMARY HYPERTENSION: ICD-10-CM

## 2024-05-08 DIAGNOSIS — N18.4 STAGE 4 CHRONIC KIDNEY DISEASE: ICD-10-CM

## 2024-05-08 DIAGNOSIS — Z79.4 TYPE 2 DIABETES MELLITUS WITH STAGE 4 CHRONIC KIDNEY DISEASE, WITH LONG-TERM CURRENT USE OF INSULIN: Primary | ICD-10-CM

## 2024-05-08 DIAGNOSIS — N18.4 TYPE 2 DIABETES MELLITUS WITH STAGE 4 CHRONIC KIDNEY DISEASE, WITH LONG-TERM CURRENT USE OF INSULIN: Primary | ICD-10-CM

## 2024-05-08 PROCEDURE — 99214 OFFICE O/P EST MOD 30 MIN: CPT

## 2024-05-08 NOTE — PROGRESS NOTES
[facility]    Subjective:     Eliza Louie is a 73 y.o. year old female with PMHx of pAfib on Eliquis, HTN, Stage 4 CKD, T2DM on insulin, HFpEF, gout who presents to clinic for follow up.    T2DM: Controlled; morning BGs approx 90s-120s; Last A1c 5.9% (12/2023); patient does complain of numbness/tingling in bilateral feet. Patient denies polyuria, polydipsia, polyphagia. Patient endorses compliance with medication regimen, including 10 units of Lantus qhs and Januvia 25 mg. Denies any episodes of hypoglycemia.     HTN: BP today 126/78. Well-controlled. Patient denies any SOB, lightheadedness, dizziness, palpitations, chest pain, or vision changes. Patient endorses compliance with medication regimen, including Amlodipine 10 mg and Losartan 25 mg.     Declined mammogram.     Patient Active Problem List    Diagnosis Date Noted    Nuclear sclerotic cataract of right eye 04/15/2024    Gout of multiple sites 08/16/2022    (HFpEF) heart failure with preserved ejection fraction     Stage 4 chronic kidney disease 10/19/2021    History of nephrolithiasis 10/19/2021    GERD (gastroesophageal reflux disease)     RBBB (right bundle branch block with left anterior fascicular block) 08/28/2021    MDD (major depressive disorder) 08/16/2021    Anemia due to stage 4 chronic kidney disease 08/16/2021    Atherosclerosis of native coronary artery of native heart without angina pectoris 01/11/2021    Paroxysmal A-fib 12/24/2020    Type 2 diabetes mellitus with stage 4 chronic kidney disease, with long-term current use of insulin     Stented coronary artery 11/28/2017    Primary hypertension 07/02/2017        Review of patient's allergies indicates:   Allergen Reactions    Ace inhibitors Other (See Comments)     Cough        Past Medical History:   Diagnosis Date    (HFpEF) heart failure with preserved ejection fraction     TTE (2020) w/ EF 55% and grade II diastolic dysfunction    Anemia due to stage 4 chronic kidney disease  "8/16/2021    Gastric ulcer due to Helicobacter pylori 07/2017    Gastrointestinal hemorrhage with melena 07/2017    GERD (gastroesophageal reflux disease)     HLD (hyperlipidemia)     Hydronephrosis with ureteropelvic junction (UPJ) obstruction 8/16/2021    Hypertension     MDD (major depressive disorder) 8/16/2021    Nephrolithiasis 8/25/2021    NSTEMI (non-ST elevated myocardial infarction) 07/2017    Paroxysmal A-fib 12/24/2020    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/2020    Stage 4 chronic kidney disease 10/19/2021    STEMI (ST elevation myocardial infarction) 12/2020    Type 2 diabetes mellitus     Urge incontinence of urine       Past Surgical History:   Procedure Laterality Date    CARDIAC CATHETERIZATION  07/2017    RCA w/ 95% stenosis w/ stent placement    RETROGRADE PYELOGRAPHY  8/25/2021    Procedure: PYELOGRAM, RETROGRADE;  Surgeon: Rody Smith MD;  Location: [facility] OR;  Service: Urology;;    URETEROSCOPIC REMOVAL OF URETERIC CALCULUS Left 8/25/2021    Procedure: left ureteroscopy, stone basketing, stent placement;  left nephrostomy tube removal;  Surgeon: Rody Smith MD;  Location: [facility] OR;  Service: Urology;  Laterality: Left;      No family history on file.   Social History     Tobacco Use    Smoking status: Never    Smokeless tobacco: Never   Substance Use Topics    Alcohol use: No        Objective:     Vitals:    05/08/24 1539   BP: 126/78   Pulse: 62   Weight: 85.1 kg (187 lb 9.8 oz)   Height: 5' 3" (1.6 m)     Body mass index is 33.23 kg/m².    Physical Exam  Vitals reviewed.   Constitutional:       General: She is not in acute distress.     Appearance: She is not toxic-appearing.   Eyes:      Extraocular Movements: Extraocular movements intact.   Cardiovascular:      Rate and Rhythm: Normal rate. Rhythm irregular.   Pulmonary:      Effort: Pulmonary effort is normal. No respiratory distress.      Breath sounds: Normal breath sounds.   Musculoskeletal:      " Right lower leg: No edema.      Left lower leg: No edema.   Skin:     General: Skin is warm.   Neurological:      Mental Status: She is alert and oriented to person, place, and time. Mental status is at baseline.   Psychiatric:         Mood and Affect: Mood normal.         Behavior: Behavior normal.         Thought Content: Thought content normal.          Assessment/Plan:     Eliza Louie is a 73 y.o. year old female who presents to clinic for follow up.    1. Type 2 diabetes mellitus with stage 4 chronic kidney disease, with long-term current use of insulin  - At goal, repeat A1c 6.5 done this visit (given age, goal of A1c 7-8)  - Patient's goal is to get off of insulin as she does not like having to check her sugars or stick herself. Will decrease her Lantus from 10 units to 5 units qhs   - Advised to keep BS log to bring to next visit  - Microalbumin/creatinine urine ratio; Future    2. Primary hypertension  - Controlled with current regimen Amlodipine 10 mg and Losartan 25 mg  - Lipid Panel; Future    3. Stage 4 chronic kidney disease  - Worsening based on CMP collected this visit with Cr 3.3 and eGFR 14  - Called patient and discussed with her ultimately she needs to follow up again with Dr. Tino Shields as soon as possible. Gave her the number to call his office. If he is not seeing patients currently outpatient (as he was out for some time), advised her I can put in a new referral to establish care with new nephrologist.   - Will order repeat BASIC METABOLIC PANEL to obtain in 1 month    Follow-up: 2 month for T2DM    A total of 35 minutes was spent on patient care during this encounter which included chart review, examining the patient, formulating a treatment plan and documentation.     Medical decision making straight forward and not complex during this visit.     Case discussed with staff: Dr. Adeel Garcia MD  [facility], PGY-2  05/09/2024

## 2024-05-09 RX ORDER — INSULIN GLARGINE 100 [IU]/ML
5 INJECTION, SOLUTION SUBCUTANEOUS NIGHTLY
Qty: 4.5 ML | Refills: 3 | Status: SHIPPED | OUTPATIENT
Start: 2024-05-09 | End: 2025-05-09

## 2024-05-17 NOTE — PROGRESS NOTES
I assume primary medical responsibility for this patient. I have reviewed the history, physical, and assessement & treatment plan with the resident and agree that the care is reasonable and necessary. This service has been performed by a resident without the presence of a teaching physician under the primary care exception. If necessary, an addendum of additional findings or evaluation beyond the resident documentation will be noted below.      Afua Denis MD    [facility]

## 2024-05-28 ENCOUNTER — TELEPHONE (OUTPATIENT)
Dept: OPHTHALMOLOGY | Facility: CLINIC | Age: 74
End: 2024-05-28
Payer: MEDICARE

## 2024-05-28 DIAGNOSIS — H25.11 NUCLEAR SCLEROTIC CATARACT OF RIGHT EYE: Primary | ICD-10-CM

## 2024-05-28 RX ORDER — PREDNISOLONE ACETATE 10 MG/ML
1 SUSPENSION/ DROPS OPHTHALMIC 3 TIMES DAILY
Qty: 5 ML | Refills: 3 | Status: SHIPPED | OUTPATIENT
Start: 2024-05-28

## 2024-05-28 RX ORDER — KETOROLAC TROMETHAMINE 5 MG/ML
1 SOLUTION OPHTHALMIC 3 TIMES DAILY
Qty: 5 ML | Refills: 3 | Status: SHIPPED | OUTPATIENT
Start: 2024-05-28

## 2024-05-28 RX ORDER — OFLOXACIN 3 MG/ML
1 SOLUTION/ DROPS OPHTHALMIC 3 TIMES DAILY
Qty: 5 ML | Refills: 3 | Status: SHIPPED | OUTPATIENT
Start: 2024-05-28

## 2024-05-28 NOTE — H&P
History    Chief complaint:  Painless progressive vision loss    Present Ilness/Diagnosis: Nuclear sclerotic Cataract    Past Medical History:  has a past medical history of (HFpEF) heart failure with preserved ejection fraction, Anemia due to stage 4 chronic kidney disease (8/16/2021), Gastric ulcer due to Helicobacter pylori (07/2017), Gastrointestinal hemorrhage with melena (07/2017), GERD (gastroesophageal reflux disease), HLD (hyperlipidemia), Hydronephrosis with ureteropelvic junction (UPJ) obstruction (8/16/2021), Hypertension, MDD (major depressive disorder) (8/16/2021), Nephrolithiasis (8/25/2021), NSTEMI (non-ST elevated myocardial infarction) (07/2017), Paroxysmal A-fib (12/24/2020), RBBB (right bundle branch block with left anterior fascicular block) (12/2020), Stage 4 chronic kidney disease (10/19/2021), STEMI (ST elevation myocardial infarction) (12/2020), Type 2 diabetes mellitus, and Urge incontinence of urine.    Family History/Social History: refer to chart    Allergies:   Review of patient's allergies indicates:   Allergen Reactions    Ace inhibitors Other (See Comments)     Cough       Current Medications: No current facility-administered medications for this encounter.    Current Outpatient Medications:     amLODIPine (NORVASC) 10 MG tablet, Take 1 tablet (10 mg total) by mouth once daily., Disp: 90 tablet, Rfl: 3    apixaban (ELIQUIS) 5 mg Tab, Take 1 tablet (5 mg total) by mouth 2 (two) times daily., Disp: 180 tablet, Rfl: 3    blood sugar diagnostic Strp, To check BG 2 times daily, to use with insurance preferred meter (Patient not taking: Reported on 5/8/2024), Disp: 100 each, Rfl: 7    blood-glucose meter (FREESTYLE SYSTEM KIT) kit, Use as instructed, Disp: 1 each, Rfl: 0    blood-glucose sensor (DEXCOM G6 SENSOR) [place], 1 each by Misc.(Non-Drug; Combo Route) route once daily. (Patient not taking: Reported on 5/8/2024), Disp: 1 each, Rfl: 1    colchicine (COLCRYS) 0.6 mg tablet, Take 1  tablet (0.6 mg total) by mouth once daily., Disp: 30 tablet, Rfl: 11    flash glucose sensor (FREESTYLE FERCHO 2 SENSOR) Kit, 1 each by Misc.(Non-Drug; Combo Route) route once daily. (Patient not taking: Reported on 5/8/2024), Disp: 1 kit, Rfl: 0    furosemide (LASIX) 40 MG tablet, Take 1 tablet (40 mg total) by mouth 2 (two) times daily., Disp: 180 tablet, Rfl: 3    insulin glargine U-100, Lantus, (LANTUS SOLOSTAR U-100 INSULIN) 100 unit/mL (3 mL) InPn pen, Inject 5 Units into the skin every evening., Disp: 4.5 mL, Rfl: 3    lancets [facility], To check BG 2 times daily, to use with insurance preferred meter (Patient not taking: Reported on 5/8/2024), Disp: 100 each, Rfl: 7    losartan (COZAAR) 25 MG tablet, Take 1 tablet (25 mg total) by mouth once daily., Disp: 90 tablet, Rfl: 3    magnesium oxide (MAG-OX) 400 mg (241.3 mg magnesium) tablet, Take 1 tablet (400 mg total) by mouth 2 (two) times daily., Disp: 60 tablet, Rfl: 6    metoprolol succinate (TOPROL-XL) 100 MG 24 hr tablet, Take 1 tablet (100 mg total) by mouth once daily., Disp: 90 tablet, Rfl: 0    nitroGLYCERIN (NITROSTAT) 0.4 MG SL tablet, Place 0.4 mg under the tongue every 5 (five) minutes as needed for Chest pain. (Patient not taking: Reported on 5/8/2024), Disp: , Rfl:     potassium chloride (MICRO-K) 10 MEQ CpSR, Take 10 mEq by mouth., Disp: , Rfl:     prednisoLONE-moxiflox-bromfen 1-0.5-0.075 % DrpS, Apply 1 drop to eye 3 (three) times daily., Disp: 5 mL, Rfl: 3    prednisoLONE-moxiflox-bromfen 1-0.5-0.075 % DrpS, Apply 1 drop to eye 3 (three) times daily., Disp: 5 mL, Rfl: 3    rosuvastatin (CRESTOR) 40 MG Tab, Take 1 tablet (40 mg total) by mouth once daily., Disp: 90 tablet, Rfl: 3    SITagliptin phosphate (JANUVIA) 25 MG Tab, Take 1 tablet (25 mg total) by mouth once daily., Disp: 120 tablet, Rfl: 0    ASA Score: II     Mallampati Score: II     Plan for Sedation: Moderate Sedation     Patient or Family History of Anesthesia problems : No    Physical  Exam    BP: Vital signs stable  General: No apparent distress  HEENT: nuclear sclerotic cataract  Lungs: adequate respirations  Heart: + pulses  Abdomen: soft  Rectal/pelvic: deferred    Impression: Visually significant Cataract.    See previous clinic notes for surgical indications.    Plan: Phacoemulsification with implantation of Intraocular lens

## 2024-05-28 NOTE — TELEPHONE ENCOUNTER
Spoke with pt provided arrival time of 7:00 for sx on 5/29/24 with Dr. Arenas @ [place]. Pt has eyedrops and packet.

## 2024-05-28 NOTE — PRE-PROCEDURE INSTRUCTIONS
Unable to reach pt via phone.  Left voicemail with arrival time also informing pt of need for responsible  accompaniment and instructing pt to follow pre-procedure instructions provided via [facility] portal.  The following message was sent to pt's portal.      Dear Eliza     Below you will find basic pre-procedure instructions in preparation for your procedure on 5/29/24 with Dr. Arenas     You should have received your arrival time already from Dr's office.     - Nothing to eat or drink after midnight the night before your procedure until after your procedure, except AM meds with small sips of water.     - HOLD all oral Diabetic medications night before and morning of procedure  - HOLD all Insulin morning of procedure  - HOLD all Fluid pills morning of procedure  - HOLD all non-insulin shots until after surgery (Ozempic, Mounjaro, Trulicity, Victoza, Byetta, Wegovy and Adlyxin) (7 days prior)  - HOLD all vitamins, minerals and herbal supplements morning of procedure   - TAKE all B/P meds, EXCEPT those that contain a fluid pill (ex. Lasix, Hydroclorothiazide/HCTZ, Spirnolactone)  - USE inhalers as needed and bring AM of surgery  - USE EYE DROPS as directed  -TAKE blood thinner meds AM of surgery unless otherwise instructed by your provider to not take     - Shower and wash face with dial soap for 3 mins PM prior and AM of surgery  - No powder, lotions, creams, oils, gels, ointments, makeup,  or jewelry    - Wear comfortable clothing (button up shirt)     (Patient is required to have a responsible ride to transport home, ride may not leave while patient is in surgery)     -- [facility], 2nd floor Surgery Center, located   @ [address] [place] [id number]  2nd Floor Registration        If you have any questions or concerns please feel free to contact your surgeon's office.           Please reply to this message as receipt of delivery.     Catina, LPN [facility]  Pre-Admit - Anesthesia Dept

## 2024-05-29 ENCOUNTER — HOSPITAL ENCOUNTER (OUTPATIENT)
Facility: HOSPITAL | Age: 74
Discharge: HOME | End: 2024-05-29
Attending: OPHTHALMOLOGY | Admitting: OPHTHALMOLOGY
Payer: MEDICARE

## 2024-05-29 VITALS
TEMPERATURE: 68 F | OXYGEN SATURATION: 95 % | HEART RATE: 63 BPM | DIASTOLIC BLOOD PRESSURE: 62 MMHG | RESPIRATION RATE: 18 BRPM | SYSTOLIC BLOOD PRESSURE: 122 MMHG

## 2024-05-29 DIAGNOSIS — H25.11 NUCLEAR SCLEROTIC CATARACT OF RIGHT EYE: Primary | ICD-10-CM

## 2024-05-29 DIAGNOSIS — H25.10 AGE-RELATED NUCLEAR CATARACT: ICD-10-CM

## 2024-05-29 LAB — POCT GLUCOSE: 116 MG/DL (ref 70–110)

## 2024-05-29 PROCEDURE — 27000221 HC OXYGEN, UP TO 24 HOURS

## 2024-05-29 PROCEDURE — 63600175 PHARM REV CODE 636 W HCPCS: Performed by: OPHTHALMOLOGY

## 2024-05-29 PROCEDURE — 25000003 PHARM REV CODE 250

## 2024-05-29 PROCEDURE — 36000707: Performed by: OPHTHALMOLOGY

## 2024-05-29 PROCEDURE — 36000706: Performed by: OPHTHALMOLOGY

## 2024-05-29 PROCEDURE — 82962 GLUCOSE BLOOD TEST: CPT | Performed by: OPHTHALMOLOGY

## 2024-05-29 PROCEDURE — 94760 N-INVAS EAR/PLS OXIMETRY 1: CPT | Mod: 59

## 2024-05-29 PROCEDURE — V2632 POST CHMBR INTRAOCULAR LENS: HCPCS | Performed by: OPHTHALMOLOGY

## 2024-05-29 PROCEDURE — 71000015 HC POSTOP RECOV 1ST HR: Performed by: OPHTHALMOLOGY

## 2024-05-29 PROCEDURE — 25000003 PHARM REV CODE 250: Performed by: OPHTHALMOLOGY

## 2024-05-29 PROCEDURE — 99900035 HC TECH TIME PER 15 MIN (STAT)

## 2024-05-29 PROCEDURE — 99152 MOD SED SAME PHYS/QHP 5/>YRS: CPT | Performed by: OPHTHALMOLOGY

## 2024-05-29 PROCEDURE — 66982 XCAPSL CTRC RMVL CPLX WO ECP: CPT | Mod: RT,,, | Performed by: OPHTHALMOLOGY

## 2024-05-29 PROCEDURE — 99152 MOD SED SAME PHYS/QHP 5/>YRS: CPT | Mod: ,,, | Performed by: OPHTHALMOLOGY

## 2024-05-29 PROCEDURE — 27201423 OPTIME MED/SURG SUP & DEVICES STERILE SUPPLY: Performed by: OPHTHALMOLOGY

## 2024-05-29 DEVICE — LENS EYHANCE +19.5D: Type: IMPLANTABLE DEVICE | Site: EYE | Status: FUNCTIONAL

## 2024-05-29 RX ORDER — LIDOCAINE HYDROCHLORIDE 10 MG/ML
INJECTION, SOLUTION EPIDURAL; INFILTRATION; INTRACAUDAL; PERINEURAL
Status: DISCONTINUED | OUTPATIENT
Start: 2024-05-29 | End: 2024-05-29 | Stop reason: HOSPADM

## 2024-05-29 RX ORDER — PHENYLEPHRINE HYDROCHLORIDE 100 MG/ML
1 SOLUTION/ DROPS OPHTHALMIC
Status: DISCONTINUED | OUTPATIENT
Start: 2024-05-29 | End: 2024-05-29 | Stop reason: HOSPADM

## 2024-05-29 RX ORDER — MIDAZOLAM HYDROCHLORIDE 1 MG/ML
2 INJECTION, SOLUTION INTRAMUSCULAR; INTRAVENOUS
Status: DISCONTINUED | OUTPATIENT
Start: 2024-05-29 | End: 2024-05-29 | Stop reason: HOSPADM

## 2024-05-29 RX ORDER — MOXIFLOXACIN 5 MG/ML
1 SOLUTION/ DROPS OPHTHALMIC
Status: COMPLETED | OUTPATIENT
Start: 2024-05-29 | End: 2024-05-29

## 2024-05-29 RX ORDER — PHENYLEPHRINE HYDROCHLORIDE 25 MG/ML
1 SOLUTION/ DROPS OPHTHALMIC
Status: COMPLETED | OUTPATIENT
Start: 2024-05-29 | End: 2024-05-29

## 2024-05-29 RX ORDER — TROPICAMIDE 10 MG/ML
1 SOLUTION/ DROPS OPHTHALMIC
Status: COMPLETED | OUTPATIENT
Start: 2024-05-29 | End: 2024-05-29

## 2024-05-29 RX ORDER — PROPARACAINE HYDROCHLORIDE 5 MG/ML
1 SOLUTION/ DROPS OPHTHALMIC
Status: DISCONTINUED | OUTPATIENT
Start: 2024-05-29 | End: 2024-05-29 | Stop reason: HOSPADM

## 2024-05-29 RX ORDER — ACETAMINOPHEN 325 MG/1
650 TABLET ORAL EVERY 4 HOURS PRN
Status: DISCONTINUED | OUTPATIENT
Start: 2024-05-29 | End: 2024-05-29 | Stop reason: HOSPADM

## 2024-05-29 RX ORDER — FENTANYL CITRATE 50 UG/ML
25 INJECTION, SOLUTION INTRAMUSCULAR; INTRAVENOUS EVERY 5 MIN PRN
Status: DISCONTINUED | OUTPATIENT
Start: 2024-05-29 | End: 2024-05-29 | Stop reason: HOSPADM

## 2024-05-29 RX ORDER — PREDNISOLONE ACETATE 10 MG/ML
SUSPENSION/ DROPS OPHTHALMIC
Status: DISCONTINUED | OUTPATIENT
Start: 2024-05-29 | End: 2024-05-29 | Stop reason: HOSPADM

## 2024-05-29 RX ORDER — PROPARACAINE HYDROCHLORIDE 5 MG/ML
SOLUTION/ DROPS OPHTHALMIC
Status: DISCONTINUED | OUTPATIENT
Start: 2024-05-29 | End: 2024-05-29 | Stop reason: HOSPADM

## 2024-05-29 RX ORDER — TETRACAINE HYDROCHLORIDE 5 MG/ML
1 SOLUTION OPHTHALMIC
Status: COMPLETED | OUTPATIENT
Start: 2024-05-29 | End: 2024-05-29

## 2024-05-29 RX ORDER — LIDOCAINE HYDROCHLORIDE 40 MG/ML
INJECTION, SOLUTION RETROBULBAR
Status: DISCONTINUED | OUTPATIENT
Start: 2024-05-29 | End: 2024-05-29 | Stop reason: HOSPADM

## 2024-05-29 RX ORDER — MOXIFLOXACIN 5 MG/ML
SOLUTION/ DROPS OPHTHALMIC
Status: DISCONTINUED | OUTPATIENT
Start: 2024-05-29 | End: 2024-05-29 | Stop reason: HOSPADM

## 2024-05-29 RX ADMIN — PHENYLEPHRINE HYDROCHLORIDE 1 DROP: 25 SOLUTION/ DROPS OPHTHALMIC at 07:05

## 2024-05-29 RX ADMIN — MOXIFLOXACIN OPHTHALMIC 1 DROP: 5 SOLUTION/ DROPS OPHTHALMIC at 07:05

## 2024-05-29 RX ADMIN — TROPICAMIDE 1 DROP: 10 SOLUTION/ DROPS OPHTHALMIC at 07:05

## 2024-05-29 RX ADMIN — TETRACAINE HYDROCHLORIDE 1 DROP: 5 SOLUTION OPHTHALMIC at 07:05

## 2024-05-29 RX ADMIN — MOXIFLOXACIN 1 DROP: 5 SOLUTION/ DROPS OPHTHALMIC at 09:05

## 2024-05-29 RX ADMIN — MIDAZOLAM HYDROCHLORIDE 2 MG: 1 INJECTION, SOLUTION INTRAMUSCULAR; INTRAVENOUS at 08:05

## 2024-05-29 RX ADMIN — MOXIFLOXACIN 1 DROP: 5 SOLUTION/ DROPS OPHTHALMIC at 08:05

## 2024-05-29 NOTE — DISCHARGE INSTRUCTIONS
Dr. Arenas     Cataract Post-Operative Instructions       Day of surgery:     -Resume drops THREE times daily into the operative eye.     -Do not rub your eye     -Wear protective sunglasses during the day.     -Resume moderate activity.     -Bathe/shower/wash face normally     -Do not apply makeup around the operative eye for 1 week.     -You should expect:     Blurry vision and halos for 24-48 hours     Dilated pupil for 24-48 hours     Scratchy feeling in the eye for 1-2 days     Curved shadow in your peripheral vision for 2-3 weeks     Occasional flickering of lights for up to 1 week     -If you experience severe pain or nausea, call Dr. Arenas or the on-call doctor at [phone].       Plan to see Dr. Arenas at:     [facility] [address]     [address]st [place] [address]    **Most patients can drive the next morning.  If you do not feel comfortable driving, please arrange for transportation. **

## 2024-05-29 NOTE — DISCHARGE SUMMARY
[facility]  Discharge Note  Short Stay    Procedure(s) (LRB):  EXTRACTION, CATARACT, WITH IOL INSERTION (Right)  BRIEF DISCHARGE NOTE:    Date of discharge: 05/29/2024    Reason for hospitalization -  Cataract surgery     Final Diagnosis - Visually significant Cataract    Procedures and treatment provided - Status post phacoemulsification with placement of intraocular lens     Diet - Advance to regular as tolerated    Activity - as tolerated    Disposition at the end of the case - Good.    Discharge: to home    The patient tolerated the procedure well and knows to follow up with me tomorrow morning in the eye clinic, sooner if needed.    Patient and family instructions (as appropriate) - Given to patient on discharge    Maddy Arenas MD

## 2024-05-29 NOTE — OP NOTE
DATE OF PROCEdURE: 05/29/2024    SURGEON: ANTOINE HYMAN MD    PREOPERATIVE DIAGNOSIS:  Mature brunescent senile nuclear sclerotic cataract right eye.     POSTOPERATIVE DIAGNOSIS: Mature brunescent senile nuclear sclerotic cataract right eye.     PROCEDURE PERFORMED:  Complex phacoemulsification with placement of intraocular lens, right eye, with trypan blue    IMPLANT: DIB00  19.5    Anesthesia: Moderate sedation with topical Lidocaine. Patient ID confirmed and re-evaluated the patient and anesthesia plan confirming it is suitable for the patient's condition and procedure.     COMPLICATIONS: none    ESTIMATED BLOOD LOSS: <1cc    SPECIMENS: none    INDICATIONS FOR PROCEDURE:   The patient has a history of painless progressive vision loss.  The patient has described difficulties with activities of daily living, which specifically include driving, which is secondary to cataract formation and progression. After we had a thorough discussion about risks, benefits, and alternatives to cataract surgery, the patient agreed to proceed with phacoemulsification and implantation of a lens in the right eye.  These risks include, but are not limited to, hemorrhage, pain, infection, need for additional surgery, need for glasses or contacts, loss of vision, or even loss of the eye.    PROCEDURE IN DETAIL:  The patient was met in the preop holding area.  Consent was confirmed to be signed.  The operative site was marked.  The patient was brought into the operating room by the anesthesia team and placed under monitored anesthesia care.  The right eye was prepped and draped in a sterile ophthalmic fashion.  A Shazia speculum was placed into the right eye.   A paracentesis site was made and 1% preservative-free lidocaine was injected into the anterior chamber.  Trypan blue was then injected and allowed to sit for 1 minute.  Then BSS was used to wash out the trypan blue. Viscoelastic material was injected into the anterior chamber.   A keratome blade was used to make a clear corneal incision.  A cystotome was used to initiate the continuous curvilinear capsulorrhexis which was completed with Utrata forceps.  BSS on a salas cannula was used to perform hydrodissection.  The phacoemulsification tip was introduced into the eye and the nucleus was removed in a standard divide-and-conquer fashion.  Remaining cortical material was removed from the eye using irrigation-aspiration.  The capsular bag was filled with viscoelastic material and the intraocular lens was injected and positioned into place. Remaining viscoelastic material was removed from the eye using irrigation and aspiration.  The corneal wounds were hydrated.  The eye was filled to physiologic pressure. The wounds were found to be watertight. Drops of Vigamox and prednisilone were placed into the eye.  The eye was washed, dried, and shielded.  The patient tolerated the procedure well and knows to follow up with me tomorrow morning, sooner if needed.      Under my direct supervision, intravenous moderate sedation was administered during the course of this procedure, with continuous monitoring of hemodynamic parameters.  Monitoring of the patient's vital signs and respiratory status was provided by trained nursing staff during the entire course of the procedure and under my supervision and recorded in the patient's medical record.  The total time for sedation was 11 minutes.

## 2024-05-30 ENCOUNTER — OFFICE VISIT (OUTPATIENT)
Dept: OPHTHALMOLOGY | Facility: CLINIC | Age: 74
End: 2024-05-30
Payer: MEDICARE

## 2024-05-30 DIAGNOSIS — Z96.1 STATUS POST CATARACT EXTRACTION AND INSERTION OF INTRAOCULAR LENS, RIGHT: Primary | ICD-10-CM

## 2024-05-30 DIAGNOSIS — Z98.41 STATUS POST CATARACT EXTRACTION AND INSERTION OF INTRAOCULAR LENS, RIGHT: Primary | ICD-10-CM

## 2024-05-30 DIAGNOSIS — H25.12 NUCLEAR SCLEROSIS OF LEFT EYE: ICD-10-CM

## 2024-05-30 PROCEDURE — 3288F FALL RISK ASSESSMENT DOCD: CPT | Mod: CPTII,S$GLB,, | Performed by: OPHTHALMOLOGY

## 2024-05-30 PROCEDURE — 1159F MED LIST DOCD IN RCRD: CPT | Mod: CPTII,S$GLB,, | Performed by: OPHTHALMOLOGY

## 2024-05-30 PROCEDURE — 1126F AMNT PAIN NOTED NONE PRSNT: CPT | Mod: CPTII,S$GLB,, | Performed by: OPHTHALMOLOGY

## 2024-05-30 PROCEDURE — 99999 PR PBB SHADOW E&M-EST. PATIENT-LVL III: CPT | Mod: PBBFAC,,, | Performed by: OPHTHALMOLOGY

## 2024-05-30 PROCEDURE — 3044F HG A1C LEVEL LT 7.0%: CPT | Mod: CPTII,S$GLB,, | Performed by: OPHTHALMOLOGY

## 2024-05-30 PROCEDURE — 4010F ACE/ARB THERAPY RXD/TAKEN: CPT | Mod: CPTII,S$GLB,, | Performed by: OPHTHALMOLOGY

## 2024-05-30 PROCEDURE — 99024 POSTOP FOLLOW-UP VISIT: CPT | Mod: S$GLB,,, | Performed by: OPHTHALMOLOGY

## 2024-05-30 PROCEDURE — 1101F PT FALLS ASSESS-DOCD LE1/YR: CPT | Mod: CPTII,S$GLB,, | Performed by: OPHTHALMOLOGY

## 2024-05-31 NOTE — PROGRESS NOTES
HPI    Dr. Ponce     S/p  Complex phacoemulsification with placement of intraocular lens, right   eye, with trypan blue      Patient present today for 1 day Post Op OD  Pt state no complaints at this time     Pred TID OD   Oflox  TID OD   Keto TID OD       Last edited by Vic Calixto on 5/30/2024  3:12 PM.            Assessment /Plan     For exam results, see Encounter Report.    Status post cataract extraction and insertion of intraocular lens, right    Nuclear sclerosis of left eye                       Slit Lamp Exam  L/L - normal  C/s - quiet  Cornea - clear  A/C - 1+ cell  Lens - PCIOL    POD #1 s/p phaco/IOL  - doing well  - continue the following drops:    vigamox or ocuflox TID x 1 wk then stop  Pred forte TID x  4 wks  Ketorolac TID until runs out    Versus:    Combination drop - 1 drop TID x total of 1 month    Appropriate precautions and post op medications reviewed.  Patient instructed to call or come in if symptoms of redness, decreased vision, or pain are experienced.    -f/up 1-2 wks, sooner PRN. Or 4 wks with optom for mrx if needed.   85M Atrial Fibrillation, HTN, HLD, Anxiety and Depression admitted for Right Femur Fracture    Right Femur Fracture S/P ORIF IM Jonah 3/4/23  Overall slow progressing due to advanced age and lack of motivation; Suspect Psych component   Pain regimen changed to Dilaudid and patient is tolerating increased dosing; Will add valium tonight  Incentive Spirometer for lung expansion;   Monitor Hgb and follow up electrolytes.      Acute Blood Loss Anemia   Most likely perioperative losses;   Assymptomatic; Goal HgB >8.0  Iron studies noted  EBL Intraoperative was 100cc    Atrial Fibrillation / HTN / HLD   Currently Sinus and has underlying RBBB; Has history of SVT  Hold home dose of Felcainide 100mg BID as per Cardiology  Metoprolol 25mg BID + Statin  No AC and suspect patient is fall risk given the multiple rib fractures     Asthma  Albuterol PRN      Anxiety / Depression  Xanax BID PRN   Psych Consulted     Diet  Regular  Severe Protein - Calorie Malnutrtion  Nutrition consulted    DVT Prophylaxis   SCD    Disposition  Work with PT and will need to make arrangement for [facility]

## 2024-06-07 DIAGNOSIS — I48.0 PAROXYSMAL A-FIB: Chronic | ICD-10-CM

## 2024-06-07 DIAGNOSIS — I50.32 CHRONIC HEART FAILURE WITH PRESERVED EJECTION FRACTION: ICD-10-CM

## 2024-06-07 DIAGNOSIS — I10 PRIMARY HYPERTENSION: Chronic | ICD-10-CM

## 2024-06-07 RX ORDER — METOPROLOL SUCCINATE 100 MG/1
100 TABLET, EXTENDED RELEASE ORAL DAILY
Qty: 90 TABLET | Refills: 0 | Status: SHIPPED | OUTPATIENT
Start: 2024-06-07 | End: 2024-06-10 | Stop reason: SDUPTHER

## 2024-06-10 DIAGNOSIS — I50.32 CHRONIC HEART FAILURE WITH PRESERVED EJECTION FRACTION: ICD-10-CM

## 2024-06-10 DIAGNOSIS — I48.0 PAROXYSMAL A-FIB: Chronic | ICD-10-CM

## 2024-06-10 DIAGNOSIS — I10 PRIMARY HYPERTENSION: Chronic | ICD-10-CM

## 2024-06-11 RX ORDER — METOPROLOL SUCCINATE 100 MG/1
100 TABLET, EXTENDED RELEASE ORAL DAILY
Qty: 90 TABLET | Refills: 0 | Status: SHIPPED | OUTPATIENT
Start: 2024-06-11

## 2024-06-13 ENCOUNTER — OFFICE VISIT (OUTPATIENT)
Dept: OPHTHALMOLOGY | Facility: CLINIC | Age: 74
End: 2024-06-13
Payer: MEDICARE

## 2024-06-13 DIAGNOSIS — H25.12 NUCLEAR SCLEROSIS OF LEFT EYE: ICD-10-CM

## 2024-06-13 DIAGNOSIS — Z96.1 STATUS POST CATARACT EXTRACTION AND INSERTION OF INTRAOCULAR LENS, RIGHT: Primary | ICD-10-CM

## 2024-06-13 DIAGNOSIS — Z98.41 STATUS POST CATARACT EXTRACTION AND INSERTION OF INTRAOCULAR LENS, RIGHT: Primary | ICD-10-CM

## 2024-06-13 PROCEDURE — 1159F MED LIST DOCD IN RCRD: CPT | Mod: CPTII,S$GLB,, | Performed by: OPHTHALMOLOGY

## 2024-06-13 PROCEDURE — 92136 OPHTHALMIC BIOMETRY: CPT | Mod: LT,S$GLB,, | Performed by: OPHTHALMOLOGY

## 2024-06-13 PROCEDURE — 1101F PT FALLS ASSESS-DOCD LE1/YR: CPT | Mod: CPTII,S$GLB,, | Performed by: OPHTHALMOLOGY

## 2024-06-13 PROCEDURE — 3044F HG A1C LEVEL LT 7.0%: CPT | Mod: CPTII,S$GLB,, | Performed by: OPHTHALMOLOGY

## 2024-06-13 PROCEDURE — 99024 POSTOP FOLLOW-UP VISIT: CPT | Mod: S$GLB,,, | Performed by: OPHTHALMOLOGY

## 2024-06-13 PROCEDURE — 99999 PR PBB SHADOW E&M-EST. PATIENT-LVL III: CPT | Mod: PBBFAC,,, | Performed by: OPHTHALMOLOGY

## 2024-06-13 PROCEDURE — 3288F FALL RISK ASSESSMENT DOCD: CPT | Mod: CPTII,S$GLB,, | Performed by: OPHTHALMOLOGY

## 2024-06-13 PROCEDURE — 4010F ACE/ARB THERAPY RXD/TAKEN: CPT | Mod: CPTII,S$GLB,, | Performed by: OPHTHALMOLOGY

## 2024-06-14 NOTE — PROGRESS NOTES
HPI    Dr. Ponce     S/p  Complex phacoemulsification with placement of intraocular lens, right   eye, with trypan blue      Patient present today for 2 wk Post Op OD  Pt state no complaints at this time     Pred TID OD   Oflox  TID OD   Keto TID OD       Last edited by Rickey Madrigal on 6/13/2024  1:44 PM.            Assessment /Plan     For exam results, see Encounter Report.    Status post cataract extraction and insertion of intraocular lens, right    Nuclear sclerosis of left eye  -     IOL Master - OU - Both Eyes      PO week #1 s/p phaco/IOL -    - doing well, no issues    Continue combo drops for a total of 1 month versus: d/c abx gtt, continue PF/ketorolocTID for total of 1 month    Visually significant nuclear sclerotic cataract   - Interfering with activities of daily living.  Pt desires cataract surgery for [facility] rehabilitation.   - R/B/A discussed and pt agrees to proceed with surgery.   - IOL options discussed according to patient's goals and concomitant ocular pathology; and pt content with monofocal lens.    - Target: plano.       (Diboo 21.0 OS)

## 2024-06-20 ENCOUNTER — TELEPHONE (OUTPATIENT)
Dept: OPHTHALMOLOGY | Facility: CLINIC | Age: 74
End: 2024-06-20
Payer: MEDICARE

## 2024-06-20 NOTE — TELEPHONE ENCOUNTER
Spoke with pt provided arrival time of 7:00 for sx on 6/26/24 with Dr. Arenas @ [place]. Pt has eyedrops and packet.

## 2024-06-22 RX ORDER — POTASSIUM CHLORIDE 750 MG/1
10 CAPSULE, EXTENDED RELEASE ORAL DAILY
Qty: 90 CAPSULE | Refills: 1 | Status: ON HOLD | OUTPATIENT
Start: 2024-06-22

## 2024-06-24 NOTE — H&P
History    Chief complaint:  Painless progressive vision loss    Present Ilness/Diagnosis: Nuclear sclerotic Cataract    Past Medical History:  has a past medical history of (HFpEF) heart failure with preserved ejection fraction, Anemia due to stage 4 chronic kidney disease (8/16/2021), Gastric ulcer due to Helicobacter pylori (07/2017), Gastrointestinal hemorrhage with melena (07/2017), GERD (gastroesophageal reflux disease), HLD (hyperlipidemia), Hydronephrosis with ureteropelvic junction (UPJ) obstruction (8/16/2021), Hypertension, MDD (major depressive disorder) (8/16/2021), Nephrolithiasis (8/25/2021), NSTEMI (non-ST elevated myocardial infarction) (07/2017), Paroxysmal A-fib (12/24/2020), RBBB (right bundle branch block with left anterior fascicular block) (12/2020), Stage 4 chronic kidney disease (10/19/2021), STEMI (ST elevation myocardial infarction) (12/2020), Type 2 diabetes mellitus, and Urge incontinence of urine.    Family History/Social History: refer to chart    Allergies:   Review of patient's allergies indicates:   Allergen Reactions    Ace inhibitors Other (See Comments)     Cough       Current Medications: No current facility-administered medications for this encounter.    Current Outpatient Medications:     amLODIPine (NORVASC) 10 MG tablet, Take 1 tablet (10 mg total) by mouth once daily., Disp: 90 tablet, Rfl: 3    apixaban (ELIQUIS) 5 mg Tab, Take 1 tablet (5 mg total) by mouth 2 (two) times daily., Disp: 180 tablet, Rfl: 3    blood sugar diagnostic Strp, To check BG 2 times daily, to use with insurance preferred meter, Disp: 100 each, Rfl: 7    blood-glucose meter (FREESTYLE SYSTEM KIT) kit, Use as instructed, Disp: 1 each, Rfl: 0    blood-glucose sensor (DEXCOM G6 SENSOR) [place], 1 each by Misc.(Non-Drug; Combo Route) route once daily., Disp: 1 each, Rfl: 1    colchicine (COLCRYS) 0.6 mg tablet, Take 1 tablet (0.6 mg total) by mouth once daily., Disp: 30 tablet, Rfl: 11    flash glucose  sensor (FREESTYLE FERCHO 2 SENSOR) Kit, 1 each by Misc.(Non-Drug; Combo Route) route once daily., Disp: 1 kit, Rfl: 0    furosemide (LASIX) 40 MG tablet, Take 1 tablet (40 mg total) by mouth 2 (two) times daily., Disp: 180 tablet, Rfl: 3    insulin glargine U-100, Lantus, (LANTUS SOLOSTAR U-100 INSULIN) 100 unit/mL (3 mL) InPn pen, Inject 5 Units into the skin every evening., Disp: 4.5 mL, Rfl: 3    ketorolac 0.5% (ACULAR) 0.5 % Drop, Place 1 drop into the right eye 3 (three) times daily., Disp: 5 mL, Rfl: 3    lancets [facility], To check BG 2 times daily, to use with insurance preferred meter, Disp: 100 each, Rfl: 7    losartan (COZAAR) 25 MG tablet, Take 1 tablet (25 mg total) by mouth once daily., Disp: 90 tablet, Rfl: 3    magnesium oxide (MAG-OX) 400 mg (241.3 mg magnesium) tablet, Take 1 tablet (400 mg total) by mouth 2 (two) times daily., Disp: 60 tablet, Rfl: 6    metoprolol succinate (TOPROL-XL) 100 MG 24 hr tablet, Take 1 tablet (100 mg total) by mouth once daily., Disp: 90 tablet, Rfl: 0    nitroGLYCERIN (NITROSTAT) 0.4 MG SL tablet, Place 0.4 mg under the tongue every 5 (five) minutes as needed for Chest pain., Disp: , Rfl:     ofloxacin (OCUFLOX) 0.3 % ophthalmic solution, Place 1 drop into the right eye 3 (three) times daily., Disp: 5 mL, Rfl: 3    potassium chloride (MICRO-K) 10 MEQ CpSR, Take 1 capsule (10 mEq total) by mouth once daily., Disp: 90 capsule, Rfl: 1    prednisoLONE acetate (PRED FORTE) 1 % DrpS, Place 1 drop into the right eye 3 (three) times daily., Disp: 5 mL, Rfl: 3    prednisoLONE-moxiflox-bromfen 1-0.5-0.075 % DrpS, Apply 1 drop to eye 3 (three) times daily., Disp: 5 mL, Rfl: 3    prednisoLONE-moxiflox-bromfen 1-0.5-0.075 % DrpS, Apply 1 drop to eye 3 (three) times daily., Disp: 5 mL, Rfl: 3    rosuvastatin (CRESTOR) 40 MG Tab, Take 1 tablet (40 mg total) by mouth once daily., Disp: 90 tablet, Rfl: 3    SITagliptin phosphate (JANUVIA) 25 MG Tab, Take 1 tablet (25 mg total) by mouth once  daily., Disp: 120 tablet, Rfl: 0    ASA Score: II     Mallampati Score: II     Plan for Sedation: Moderate Sedation     Patient or Family History of Anesthesia problems : No    Physical Exam    BP: Vital signs stable  General: No apparent distress  HEENT: nuclear sclerotic cataract  Lungs: adequate respirations  Heart: + pulses  Abdomen: soft  Rectal/pelvic: deferred    Impression: Visually significant Cataract.    See previous clinic notes for surgical indications.    Plan: Phacoemulsification with implantation of Intraocular lens

## 2024-06-25 NOTE — PRE-PROCEDURE INSTRUCTIONS
Unable to reach pt via phone.  Mailbox is full. The following message was sent to pt's portal.      Dear Eliza     Below you will find basic pre-procedure instructions in preparation for your procedure on 6/26/24 with Dr. Arenas     You should have received your arrival time already from Dr's office.     - Nothing to eat or drink after midnight the night before your procedure until after your procedure, except AM meds with small sips of water.     - HOLD all oral Diabetic medications night before and morning of procedure  - HOLD all Insulin morning of procedure  - HOLD all Fluid pills morning of procedure  - HOLD all non-insulin shots until after surgery (Ozempic, Mounjaro, Trulicity, Victoza, Byetta, Wegovy and Adlyxin) (7 days prior)  - HOLD all vitamins, minerals and herbal supplements morning of procedure   - TAKE all B/P meds, EXCEPT those that contain a fluid pill (ex. Lasix, Hydroclorothiazide/HCTZ, Spirnolactone)  - USE inhalers as needed and bring AM of surgery  - USE EYE DROPS as directed  -TAKE blood thinner meds AM of surgery unless otherwise instructed by your provider to not take     - Shower and wash face with antibacterial soap (ex. Dial) for 3 mins PM prior and AM of surgery  - No powder, lotions, creams, oils, gels, ointments, makeup,  or jewelry    - Wear comfortable clothing (button up shirt)     (Patient is required to have a responsible ride to transport home, ride may not leave while patient is in surgery)     -- [facility], 2nd floor Surgery Center, located   @ [address] [place] [address]  2nd Floor Registration        If you have any questions or concerns please feel free to contact your surgeon's office.           Please reply to this message as receipt of delivery.     Cat[place], LPN [facility]  Pre-Admit - Anesthesia Dept

## 2024-06-26 ENCOUNTER — HOSPITAL ENCOUNTER (OUTPATIENT)
Facility: HOSPITAL | Age: 74
Discharge: HOME | End: 2024-06-26
Attending: OPHTHALMOLOGY | Admitting: OPHTHALMOLOGY
Payer: MEDICARE

## 2024-06-26 VITALS
SYSTOLIC BLOOD PRESSURE: 126 MMHG | DIASTOLIC BLOOD PRESSURE: 60 MMHG | HEIGHT: 63 IN | RESPIRATION RATE: 18 BRPM | HEART RATE: 76 BPM | OXYGEN SATURATION: 100 % | BODY MASS INDEX: 33.13 KG/M2 | TEMPERATURE: 98 F | WEIGHT: 187 LBS

## 2024-06-26 DIAGNOSIS — H25.12 AGE-RELATED NUCLEAR CATARACT, LEFT: ICD-10-CM

## 2024-06-26 DIAGNOSIS — H25.12 NUCLEAR SCLEROTIC CATARACT OF LEFT EYE: Primary | ICD-10-CM

## 2024-06-26 LAB — POCT GLUCOSE: 170 MG/DL (ref 70–110)

## 2024-06-26 PROCEDURE — 27201423 OPTIME MED/SURG SUP & DEVICES STERILE SUPPLY: Performed by: OPHTHALMOLOGY

## 2024-06-26 PROCEDURE — 36000706: Performed by: OPHTHALMOLOGY

## 2024-06-26 PROCEDURE — 99152 MOD SED SAME PHYS/QHP 5/>YRS: CPT | Performed by: OPHTHALMOLOGY

## 2024-06-26 PROCEDURE — 71000015 HC POSTOP RECOV 1ST HR: Performed by: OPHTHALMOLOGY

## 2024-06-26 PROCEDURE — 99900035 HC TECH TIME PER 15 MIN (STAT)

## 2024-06-26 PROCEDURE — 66984 XCAPSL CTRC RMVL W/O ECP: CPT | Mod: 79,LT,, | Performed by: OPHTHALMOLOGY

## 2024-06-26 PROCEDURE — 82962 GLUCOSE BLOOD TEST: CPT | Performed by: OPHTHALMOLOGY

## 2024-06-26 PROCEDURE — 63600175 PHARM REV CODE 636 W HCPCS: Performed by: OPHTHALMOLOGY

## 2024-06-26 PROCEDURE — 36000707: Performed by: OPHTHALMOLOGY

## 2024-06-26 PROCEDURE — 94761 N-INVAS EAR/PLS OXIMETRY MLT: CPT

## 2024-06-26 PROCEDURE — V2632 POST CHMBR INTRAOCULAR LENS: HCPCS | Performed by: OPHTHALMOLOGY

## 2024-06-26 PROCEDURE — 25000003 PHARM REV CODE 250: Performed by: OPHTHALMOLOGY

## 2024-06-26 PROCEDURE — 99152 MOD SED SAME PHYS/QHP 5/>YRS: CPT | Mod: ,,, | Performed by: OPHTHALMOLOGY

## 2024-06-26 DEVICE — LENS EYHANCE +21.0D: Type: IMPLANTABLE DEVICE | Site: EYE | Status: FUNCTIONAL

## 2024-06-26 RX ORDER — PHENYLEPHRINE HYDROCHLORIDE 100 MG/ML
1 SOLUTION/ DROPS OPHTHALMIC
Status: DISCONTINUED | OUTPATIENT
Start: 2024-06-26 | End: 2024-06-26 | Stop reason: HOSPADM

## 2024-06-26 RX ORDER — MOXIFLOXACIN 5 MG/ML
1 SOLUTION/ DROPS OPHTHALMIC
Status: COMPLETED | OUTPATIENT
Start: 2024-06-26 | End: 2024-06-26

## 2024-06-26 RX ORDER — LIDOCAINE HYDROCHLORIDE 10 MG/ML
INJECTION, SOLUTION EPIDURAL; INFILTRATION; INTRACAUDAL; PERINEURAL
Status: DISCONTINUED | OUTPATIENT
Start: 2024-06-26 | End: 2024-06-26 | Stop reason: HOSPADM

## 2024-06-26 RX ORDER — TETRACAINE HYDROCHLORIDE 5 MG/ML
1 SOLUTION OPHTHALMIC EVERY 5 MIN PRN
Status: COMPLETED | OUTPATIENT
Start: 2024-06-26 | End: 2024-06-26

## 2024-06-26 RX ORDER — FENTANYL CITRATE 50 UG/ML
25 INJECTION, SOLUTION INTRAMUSCULAR; INTRAVENOUS EVERY 5 MIN PRN
Status: DISCONTINUED | OUTPATIENT
Start: 2024-06-26 | End: 2024-06-26 | Stop reason: HOSPADM

## 2024-06-26 RX ORDER — MOXIFLOXACIN 5 MG/ML
SOLUTION/ DROPS OPHTHALMIC
Status: DISCONTINUED | OUTPATIENT
Start: 2024-06-26 | End: 2024-06-26 | Stop reason: HOSPADM

## 2024-06-26 RX ORDER — LIDOCAINE HYDROCHLORIDE 40 MG/ML
INJECTION, SOLUTION RETROBULBAR
Status: DISCONTINUED | OUTPATIENT
Start: 2024-06-26 | End: 2024-06-26 | Stop reason: HOSPADM

## 2024-06-26 RX ORDER — PREDNISOLONE ACETATE 10 MG/ML
SUSPENSION/ DROPS OPHTHALMIC
Status: DISCONTINUED | OUTPATIENT
Start: 2024-06-26 | End: 2024-06-26 | Stop reason: HOSPADM

## 2024-06-26 RX ORDER — PHENYLEPHRINE HYDROCHLORIDE 25 MG/ML
1 SOLUTION/ DROPS OPHTHALMIC
Status: COMPLETED | OUTPATIENT
Start: 2024-06-26 | End: 2024-06-26

## 2024-06-26 RX ORDER — ACETAMINOPHEN 325 MG/1
650 TABLET ORAL EVERY 4 HOURS PRN
Status: DISCONTINUED | OUTPATIENT
Start: 2024-06-26 | End: 2024-06-26 | Stop reason: HOSPADM

## 2024-06-26 RX ORDER — PROPARACAINE HYDROCHLORIDE 5 MG/ML
SOLUTION/ DROPS OPHTHALMIC
Status: DISCONTINUED | OUTPATIENT
Start: 2024-06-26 | End: 2024-06-26 | Stop reason: HOSPADM

## 2024-06-26 RX ORDER — PROPARACAINE HYDROCHLORIDE 5 MG/ML
1 SOLUTION/ DROPS OPHTHALMIC
Status: DISCONTINUED | OUTPATIENT
Start: 2024-06-26 | End: 2024-06-26 | Stop reason: HOSPADM

## 2024-06-26 RX ORDER — MIDAZOLAM HYDROCHLORIDE 1 MG/ML
2 INJECTION, SOLUTION INTRAMUSCULAR; INTRAVENOUS
Status: DISCONTINUED | OUTPATIENT
Start: 2024-06-26 | End: 2024-06-26 | Stop reason: HOSPADM

## 2024-06-26 RX ORDER — TROPICAMIDE 10 MG/ML
1 SOLUTION/ DROPS OPHTHALMIC
Status: COMPLETED | OUTPATIENT
Start: 2024-06-26 | End: 2024-06-26

## 2024-06-26 RX ADMIN — TROPICAMIDE 1 DROP: 10 SOLUTION/ DROPS OPHTHALMIC at 07:06

## 2024-06-26 RX ADMIN — MIDAZOLAM HYDROCHLORIDE 1 MG: 1 INJECTION, SOLUTION INTRAMUSCULAR; INTRAVENOUS at 08:06

## 2024-06-26 RX ADMIN — TETRACAINE HYDROCHLORIDE 1 DROP: 5 SOLUTION OPHTHALMIC at 07:06

## 2024-06-26 RX ADMIN — MOXIFLOXACIN 1 DROP: 5 SOLUTION/ DROPS OPHTHALMIC at 08:06

## 2024-06-26 RX ADMIN — PHENYLEPHRINE HYDROCHLORIDE 1 DROP: 25 SOLUTION/ DROPS OPHTHALMIC at 07:06

## 2024-06-26 RX ADMIN — MOXIFLOXACIN OPHTHALMIC 1 DROP: 5 SOLUTION/ DROPS OPHTHALMIC at 07:06

## 2024-06-26 NOTE — OP NOTE
DATE OF PROCEDURE: 06/26/2024    SURGEON: ANTOINE HYMAN MD    PREOPERATIVE DIAGNOSIS:  Senile nuclear sclerotic cataract left eye.     POSTOPERATIVE DIAGNOSIS: Senile nuclear sclerotic cataract left eye.     PROCEDURE PERFORMED:  Phacoemulsification with placement of intraocular lens, left eye.    IMPLANT:  DIB00 21.0    Anesthesia: Moderate sedation with topical Lidocaine. Patient ID confirmed and re-evaluated the patient and anesthesia plan confirming it is suitable for the patient's condition and procedure.     COMPLICATIONS: none    ESTIMATED BLOOD LOSS: <1cc    SPECIMENS: none    INDICATIONS FOR PROCEDURE:   The patient has a history of painless progressive vision loss.  The patient has described difficulties with activities of daily living, which specifically include driving, which is secondary to cataract formation and progression. After we had a thorough discussion about risks, benefits, and alternatives to cataract surgery, the patient agreed to proceed with phacoemulsification and implantation of a lens in the left eye.  These risks include, but are not limited to, hemorrhage, pain, infection, need for additional surgery, need for glasses or contacts, loss of vision, or even loss of the eye.    PROCEDURE IN DETAIL:  The patient was met in the preop holding area.  Consent was confirmed to be signed.  The operative site was marked.  The patient was brought into the operating room by the anesthesia team and placed under monitored anesthesia care.  The left eye was prepped and draped in a sterile ophthalmic fashion.  A Shazia speculum was placed into the left eye.   A paracentesis site was made and 1% preservative-free lidocaine was injected into the anterior chamber.  Viscoelastic  material was injected into the anterior chamber.  A keratome blade was used to make a clear corneal incision.  A cystotome was used to initiate the continuous curvilinear capsulorrhexis which was completed with Utrata forceps.   BSS on a salas cannula was used to perform hydrodissection.  The phacoemulsification tip was introduced into the eye and the nucleus was removed in a standard divide-and-conquer fashion.  Remaining cortical material was removed from the eye using irrigation-aspiration.  The capsular bag was filled with viscoelastic material and the intraocular lens was injected and positioned into place. Remaining viscoelastic material was removed from the eye using irrigation and aspiration.  The corneal wounds were hydrated.  The eye was filled to physiologic pressure. The wounds were found to be watertight. Drops of Vigamox and prednisilone were placed into the eye.  The eye was washed, dried, and shielded.  The patient tolerated the procedure well and knows to follow up with me tomorrow morning, sooner if needed.      Under my direct supervision, intravenous moderate sedation was administered during the course of this procedure, with continuous monitoring of hemodynamic parameters.  Monitoring of the patient's vital signs and respiratory status was provided by trained nursing staff during the entire course of the procedure and under my supervision and recorded in the patient's medical record.  The total time for sedation was 13 minutes.

## 2024-06-26 NOTE — DISCHARGE SUMMARY
[facility]  Discharge Note  Short Stay    Procedure(s) (LRB):  EXTRACTION, CATARACT, WITH IOL INSERTION (Left)    BRIEF DISCHARGE NOTE:    Date of discharge: 06/26/2024    Reason for hospitalization -  Cataract surgery     Final Diagnosis - Visually significant Cataract    Procedures and treatment provided - Status post phacoemulsification with placement of intraocular lens     Diet - Advance to regular as tolerated    Activity - as tolerated    Disposition at the end of the case - Good.    Discharge: to home    The patient tolerated the procedure well and knows to follow up with me tomorrow in the eye clinic, sooner if needed.    Patient and family instructions (as appropriate) - Given to patient on discharge    Maddy Arenas MD

## 2024-06-26 NOTE — PLAN OF CARE
Dr. Arenas notified patient not motivated to move or participate in her care. Patient states she feels fine she is just stiff from not moving at home.Denies Chest pain or shortness of breath . Patient is in a fib and has history of a fib.Skin tear to left posterior hip area pressure relieving dressing applied and family instructed to followup with primary care doctor.

## 2024-06-26 NOTE — DISCHARGE INSTRUCTIONS
Dr. Arenas     Cataract Post-Operative Instructions       Day of surgery:     -Resume drops THREE times daily into the operative eye.     -Do not rub your eye     -Wear protective sunglasses during the day.     -Resume moderate activity.     -Bathe/shower/wash face normally     -Do not apply makeup around the operative eye for 1 week.     -You should expect:     Blurry vision and halos for 24-48 hours     Dilated pupil for 24-48 hours     Scratchy feeling in the eye for 1-2 days     Curved shadow in your peripheral vision for 2-3 weeks     Occasional flickering of lights for up to 1 week     -If you experience severe pain or nausea, call Dr. Arenas or the on-call doctor at [phone].       Plan to see Dr. Arenas at:     [facility] [address]     1st [place] [address]    **Most patients can drive the next morning.  If you do not feel comfortable driving, please arrange for transportation. **

## 2024-06-27 ENCOUNTER — PATIENT MESSAGE (OUTPATIENT)
Dept: OPHTHALMOLOGY | Facility: CLINIC | Age: 74
End: 2024-06-27
Payer: MEDICARE

## 2024-06-27 ENCOUNTER — HOSPITAL ENCOUNTER (OUTPATIENT)
Facility: HOSPITAL | Age: 74
LOS: 1 days | Discharge: HOME | End: 2024-06-30
Attending: EMERGENCY MEDICINE | Admitting: STUDENT IN AN ORGANIZED HEALTH CARE EDUCATION/TRAINING PROGRAM
Payer: MEDICARE

## 2024-06-27 DIAGNOSIS — Z79.4 TYPE 2 DIABETES MELLITUS WITH STAGE 4 CHRONIC KIDNEY DISEASE, WITH LONG-TERM CURRENT USE OF INSULIN: ICD-10-CM

## 2024-06-27 DIAGNOSIS — I48.0 PAROXYSMAL A-FIB: Chronic | ICD-10-CM

## 2024-06-27 DIAGNOSIS — I50.32 CHRONIC HEART FAILURE WITH PRESERVED EJECTION FRACTION: ICD-10-CM

## 2024-06-27 DIAGNOSIS — M25.50 DIFFUSE ARTHRALGIA: Primary | ICD-10-CM

## 2024-06-27 DIAGNOSIS — N17.9 AKI (ACUTE KIDNEY INJURY): ICD-10-CM

## 2024-06-27 DIAGNOSIS — E11.22 TYPE 2 DIABETES MELLITUS WITH STAGE 4 CHRONIC KIDNEY DISEASE, WITH LONG-TERM CURRENT USE OF INSULIN: ICD-10-CM

## 2024-06-27 DIAGNOSIS — M10.9 ACUTE GOUT OF MULTIPLE SITES, UNSPECIFIED CAUSE: ICD-10-CM

## 2024-06-27 DIAGNOSIS — N18.9 ACUTE KIDNEY INJURY SUPERIMPOSED ON CHRONIC KIDNEY DISEASE: ICD-10-CM

## 2024-06-27 DIAGNOSIS — I10 PRIMARY HYPERTENSION: Chronic | ICD-10-CM

## 2024-06-27 DIAGNOSIS — N19 UREMIA: ICD-10-CM

## 2024-06-27 DIAGNOSIS — N17.9 ACUTE KIDNEY INJURY SUPERIMPOSED ON CHRONIC KIDNEY DISEASE: ICD-10-CM

## 2024-06-27 DIAGNOSIS — R53.1 GENERALIZED WEAKNESS: ICD-10-CM

## 2024-06-27 DIAGNOSIS — N18.4 TYPE 2 DIABETES MELLITUS WITH STAGE 4 CHRONIC KIDNEY DISEASE, WITH LONG-TERM CURRENT USE OF INSULIN: ICD-10-CM

## 2024-06-27 LAB
ALBUMIN SERPL BCP-MCNC: 3.2 G/DL (ref 3.5–5.2)
ALP SERPL-CCNC: 80 U/L (ref 55–135)
ALT SERPL W/O P-5'-P-CCNC: 79 U/L (ref 10–44)
ANION GAP SERPL CALC-SCNC: 15 MMOL/L (ref 8–16)
AST SERPL-CCNC: 119 U/L (ref 10–40)
BASOPHILS # BLD AUTO: 0.02 K/UL (ref 0–0.2)
BASOPHILS NFR BLD: 0.2 % (ref 0–1.9)
BILIRUB SERPL-MCNC: 0.9 MG/DL (ref 0.1–1)
BUN SERPL-MCNC: 67 MG/DL (ref 8–23)
CALCIUM SERPL-MCNC: 10.1 MG/DL (ref 8.7–10.5)
CCP AB SER IA-ACNC: 1.2 U/ML
CHLORIDE SERPL-SCNC: 100 MMOL/L (ref 95–110)
CO2 SERPL-SCNC: 21 MMOL/L (ref 23–29)
CREAT SERPL-MCNC: 3.5 MG/DL (ref 0.5–1.4)
CREAT UR-MCNC: 105.8 MG/DL (ref 15–325)
CRP SERPL-MCNC: 92.7 MG/L (ref 0–8.2)
DIFFERENTIAL METHOD BLD: ABNORMAL
EOSINOPHIL # BLD AUTO: 0 K/UL (ref 0–0.5)
EOSINOPHIL NFR BLD: 0.2 % (ref 0–8)
ERYTHROCYTE [DISTWIDTH] IN BLOOD BY AUTOMATED COUNT: 14.5 % (ref 11.5–14.5)
ERYTHROCYTE [SEDIMENTATION RATE] IN BLOOD BY PHOTOMETRIC METHOD: 85 MM/HR (ref 0–36)
EST. GFR  (NO RACE VARIABLE): 13 ML/MIN/1.73 M^2
GLUCOSE SERPL-MCNC: 218 MG/DL (ref 70–110)
HCT VFR BLD AUTO: 40.7 % (ref 37–48.5)
HGB BLD-MCNC: 13.3 G/DL (ref 12–16)
IMM GRANULOCYTES # BLD AUTO: 0.05 K/UL (ref 0–0.04)
IMM GRANULOCYTES NFR BLD AUTO: 0.4 % (ref 0–0.5)
LYMPHOCYTES # BLD AUTO: 0.9 K/UL (ref 1–4.8)
LYMPHOCYTES NFR BLD: 7.2 % (ref 18–48)
MCH RBC QN AUTO: 28.3 PG (ref 27–31)
MCHC RBC AUTO-ENTMCNC: 32.7 G/DL (ref 32–36)
MCV RBC AUTO: 87 FL (ref 82–98)
MONOCYTES # BLD AUTO: 1 K/UL (ref 0.3–1)
MONOCYTES NFR BLD: 8.2 % (ref 4–15)
NEUTROPHILS # BLD AUTO: 10.6 K/UL (ref 1.8–7.7)
NEUTROPHILS NFR BLD: 83.8 % (ref 38–73)
NRBC BLD-RTO: 0 /100 WBC
PLATELET # BLD AUTO: 251 K/UL (ref 150–450)
PMV BLD AUTO: 10.8 FL (ref 9.2–12.9)
POCT GLUCOSE: 196 MG/DL (ref 70–110)
POCT GLUCOSE: 235 MG/DL (ref 70–110)
POCT GLUCOSE: 260 MG/DL (ref 70–110)
POCT GLUCOSE: 262 MG/DL (ref 70–110)
POTASSIUM SERPL-SCNC: 4.7 MMOL/L (ref 3.5–5.1)
PROT SERPL-MCNC: 8.4 G/DL (ref 6–8.4)
RBC # BLD AUTO: 4.7 M/UL (ref 4–5.4)
RHEUMATOID FACT SERPL-ACNC: <13 IU/ML (ref 0–15)
SODIUM SERPL-SCNC: 136 MMOL/L (ref 136–145)
SODIUM UR-SCNC: <20 MMOL/L (ref 20–250)
URATE SERPL-MCNC: 10.1 MG/DL (ref 2.4–5.7)
UUN UR-MCNC: 778 MG/DL (ref 140–1050)
WBC # BLD AUTO: 12.66 K/UL (ref 3.9–12.7)

## 2024-06-27 PROCEDURE — 82570 ASSAY OF URINE CREATININE: CPT | Performed by: STUDENT IN AN ORGANIZED HEALTH CARE EDUCATION/TRAINING PROGRAM

## 2024-06-27 PROCEDURE — 96372 THER/PROPH/DIAG INJ SC/IM: CPT | Performed by: STUDENT IN AN ORGANIZED HEALTH CARE EDUCATION/TRAINING PROGRAM

## 2024-06-27 PROCEDURE — 85652 RBC SED RATE AUTOMATED: CPT | Performed by: STUDENT IN AN ORGANIZED HEALTH CARE EDUCATION/TRAINING PROGRAM

## 2024-06-27 PROCEDURE — 82962 GLUCOSE BLOOD TEST: CPT

## 2024-06-27 PROCEDURE — 25000003 PHARM REV CODE 250: Performed by: STUDENT IN AN ORGANIZED HEALTH CARE EDUCATION/TRAINING PROGRAM

## 2024-06-27 PROCEDURE — 99285 EMERGENCY DEPT VISIT HI MDM: CPT | Mod: 25

## 2024-06-27 PROCEDURE — 84540 ASSAY OF URINE/UREA-N: CPT | Performed by: STUDENT IN AN ORGANIZED HEALTH CARE EDUCATION/TRAINING PROGRAM

## 2024-06-27 PROCEDURE — 86140 C-REACTIVE PROTEIN: CPT | Performed by: STUDENT IN AN ORGANIZED HEALTH CARE EDUCATION/TRAINING PROGRAM

## 2024-06-27 PROCEDURE — 84165 PROTEIN E-PHORESIS SERUM: CPT | Performed by: STUDENT IN AN ORGANIZED HEALTH CARE EDUCATION/TRAINING PROGRAM

## 2024-06-27 PROCEDURE — 86431 RHEUMATOID FACTOR QUANT: CPT | Performed by: STUDENT IN AN ORGANIZED HEALTH CARE EDUCATION/TRAINING PROGRAM

## 2024-06-27 PROCEDURE — 84165 PROTEIN E-PHORESIS SERUM: CPT | Mod: 26,,, | Performed by: PATHOLOGY

## 2024-06-27 PROCEDURE — 85025 COMPLETE CBC W/AUTO DIFF WBC: CPT | Performed by: EMERGENCY MEDICINE

## 2024-06-27 PROCEDURE — 80053 COMPREHEN METABOLIC PANEL: CPT | Performed by: EMERGENCY MEDICINE

## 2024-06-27 PROCEDURE — 86334 IMMUNOFIX E-PHORESIS SERUM: CPT | Mod: 26,,, | Performed by: PATHOLOGY

## 2024-06-27 PROCEDURE — G0378 HOSPITAL OBSERVATION PER HR: HCPCS

## 2024-06-27 PROCEDURE — 84550 ASSAY OF BLOOD/URIC ACID: CPT | Performed by: STUDENT IN AN ORGANIZED HEALTH CARE EDUCATION/TRAINING PROGRAM

## 2024-06-27 PROCEDURE — 86334 IMMUNOFIX E-PHORESIS SERUM: CPT | Performed by: STUDENT IN AN ORGANIZED HEALTH CARE EDUCATION/TRAINING PROGRAM

## 2024-06-27 PROCEDURE — 25000003 PHARM REV CODE 250: Performed by: EMERGENCY MEDICINE

## 2024-06-27 PROCEDURE — 84300 ASSAY OF URINE SODIUM: CPT | Performed by: STUDENT IN AN ORGANIZED HEALTH CARE EDUCATION/TRAINING PROGRAM

## 2024-06-27 PROCEDURE — 25000003 PHARM REV CODE 250: Performed by: INTERNAL MEDICINE

## 2024-06-27 PROCEDURE — 63600175 PHARM REV CODE 636 W HCPCS: Performed by: STUDENT IN AN ORGANIZED HEALTH CARE EDUCATION/TRAINING PROGRAM

## 2024-06-27 PROCEDURE — 86038 ANTINUCLEAR ANTIBODIES: CPT | Performed by: STUDENT IN AN ORGANIZED HEALTH CARE EDUCATION/TRAINING PROGRAM

## 2024-06-27 PROCEDURE — 86200 CCP ANTIBODY: CPT | Performed by: STUDENT IN AN ORGANIZED HEALTH CARE EDUCATION/TRAINING PROGRAM

## 2024-06-27 RX ORDER — PREDNISONE 20 MG/1
40 TABLET ORAL DAILY
Status: DISCONTINUED | OUTPATIENT
Start: 2024-06-27 | End: 2024-06-30 | Stop reason: HOSPADM

## 2024-06-27 RX ORDER — SODIUM BICARBONATE 650 MG/1
650 TABLET ORAL 4 TIMES DAILY
Status: DISCONTINUED | OUTPATIENT
Start: 2024-06-27 | End: 2024-06-28

## 2024-06-27 RX ORDER — POLYETHYLENE GLYCOL 3350 17 G/17G
17 POWDER, FOR SOLUTION ORAL
Status: DISCONTINUED | OUTPATIENT
Start: 2024-06-27 | End: 2024-06-30 | Stop reason: HOSPADM

## 2024-06-27 RX ORDER — INSULIN ASPART 100 [IU]/ML
1-10 INJECTION, SOLUTION INTRAVENOUS; SUBCUTANEOUS
Status: DISCONTINUED | OUTPATIENT
Start: 2024-06-27 | End: 2024-06-30 | Stop reason: HOSPADM

## 2024-06-27 RX ORDER — ATORVASTATIN CALCIUM 20 MG/1
40 TABLET, FILM COATED ORAL DAILY
Status: DISCONTINUED | OUTPATIENT
Start: 2024-06-27 | End: 2024-06-27

## 2024-06-27 RX ORDER — TALC
9 POWDER (GRAM) TOPICAL NIGHTLY PRN
Status: DISCONTINUED | OUTPATIENT
Start: 2024-06-27 | End: 2024-06-30 | Stop reason: HOSPADM

## 2024-06-27 RX ORDER — GLUCAGON 1 MG
1 KIT INJECTION
Status: DISCONTINUED | OUTPATIENT
Start: 2024-06-27 | End: 2024-06-30 | Stop reason: HOSPADM

## 2024-06-27 RX ORDER — IBUPROFEN 200 MG
24 TABLET ORAL
Status: DISCONTINUED | OUTPATIENT
Start: 2024-06-27 | End: 2024-06-30 | Stop reason: HOSPADM

## 2024-06-27 RX ORDER — INSULIN GLARGINE 100 [IU]/ML
5 INJECTION, SOLUTION SUBCUTANEOUS DAILY
Status: DISCONTINUED | OUTPATIENT
Start: 2024-06-27 | End: 2024-06-27

## 2024-06-27 RX ORDER — HYDROCODONE BITARTRATE AND ACETAMINOPHEN 5; 325 MG/1; MG/1
1 TABLET ORAL
Status: COMPLETED | OUTPATIENT
Start: 2024-06-27 | End: 2024-06-27

## 2024-06-27 RX ORDER — FUROSEMIDE 40 MG/1
40 TABLET ORAL 2 TIMES DAILY
Status: DISCONTINUED | OUTPATIENT
Start: 2024-06-27 | End: 2024-06-27

## 2024-06-27 RX ORDER — HYDROCODONE BITARTRATE AND ACETAMINOPHEN 5; 325 MG/1; MG/1
1 TABLET ORAL EVERY 6 HOURS PRN
Status: DISCONTINUED | OUTPATIENT
Start: 2024-06-27 | End: 2024-06-30 | Stop reason: HOSPADM

## 2024-06-27 RX ORDER — ACETAMINOPHEN 325 MG/1
650 TABLET ORAL EVERY 8 HOURS PRN
Status: DISCONTINUED | OUTPATIENT
Start: 2024-06-27 | End: 2024-06-27

## 2024-06-27 RX ORDER — AMOXICILLIN 250 MG
1 CAPSULE ORAL 2 TIMES DAILY PRN
Status: DISCONTINUED | OUTPATIENT
Start: 2024-06-27 | End: 2024-06-30 | Stop reason: HOSPADM

## 2024-06-27 RX ORDER — IBUPROFEN 200 MG
16 TABLET ORAL
Status: DISCONTINUED | OUTPATIENT
Start: 2024-06-27 | End: 2024-06-30 | Stop reason: HOSPADM

## 2024-06-27 RX ORDER — ACETAMINOPHEN 325 MG/1
650 TABLET ORAL EVERY 6 HOURS PRN
Status: DISCONTINUED | OUTPATIENT
Start: 2024-06-27 | End: 2024-06-30 | Stop reason: HOSPADM

## 2024-06-27 RX ORDER — SODIUM CHLORIDE 0.9 % (FLUSH) 0.9 %
5 SYRINGE (ML) INJECTION
Status: DISCONTINUED | OUTPATIENT
Start: 2024-06-27 | End: 2024-06-30 | Stop reason: HOSPADM

## 2024-06-27 RX ORDER — LIDOCAINE 50 MG/G
1 PATCH TOPICAL DAILY PRN
Status: DISCONTINUED | OUTPATIENT
Start: 2024-06-27 | End: 2024-06-30 | Stop reason: HOSPADM

## 2024-06-27 RX ORDER — METOPROLOL SUCCINATE 50 MG/1
100 TABLET, EXTENDED RELEASE ORAL DAILY
Status: DISCONTINUED | OUTPATIENT
Start: 2024-06-27 | End: 2024-06-30 | Stop reason: HOSPADM

## 2024-06-27 RX ORDER — INSULIN GLARGINE 100 [IU]/ML
5 INJECTION, SOLUTION SUBCUTANEOUS 2 TIMES DAILY
Status: DISCONTINUED | OUTPATIENT
Start: 2024-06-27 | End: 2024-06-29

## 2024-06-27 RX ORDER — AMLODIPINE BESYLATE 5 MG/1
10 TABLET ORAL DAILY
Status: DISCONTINUED | OUTPATIENT
Start: 2024-06-27 | End: 2024-06-30 | Stop reason: HOSPADM

## 2024-06-27 RX ADMIN — SODIUM BICARBONATE 650 MG: 650 TABLET ORAL at 09:06

## 2024-06-27 RX ADMIN — APIXABAN 5 MG: 5 TABLET, FILM COATED ORAL at 12:06

## 2024-06-27 RX ADMIN — SODIUM BICARBONATE 650 MG: 650 TABLET ORAL at 02:06

## 2024-06-27 RX ADMIN — APIXABAN 5 MG: 5 TABLET, FILM COATED ORAL at 09:06

## 2024-06-27 RX ADMIN — INSULIN ASPART 4 UNITS: 100 INJECTION, SOLUTION INTRAVENOUS; SUBCUTANEOUS at 02:06

## 2024-06-27 RX ADMIN — INSULIN ASPART 6 UNITS: 100 INJECTION, SOLUTION INTRAVENOUS; SUBCUTANEOUS at 06:06

## 2024-06-27 RX ADMIN — AMLODIPINE BESYLATE 10 MG: 5 TABLET ORAL at 12:06

## 2024-06-27 RX ADMIN — HYDROCODONE BITARTRATE AND ACETAMINOPHEN 1 TABLET: 5; 325 TABLET ORAL at 09:06

## 2024-06-27 RX ADMIN — SODIUM CHLORIDE 1000 ML: 9 INJECTION, SOLUTION INTRAVENOUS at 11:06

## 2024-06-27 RX ADMIN — INSULIN GLARGINE 5 UNITS: 100 INJECTION, SOLUTION SUBCUTANEOUS at 12:06

## 2024-06-27 RX ADMIN — INSULIN GLARGINE 5 UNITS: 100 INJECTION, SOLUTION SUBCUTANEOUS at 09:06

## 2024-06-27 RX ADMIN — METOPROLOL SUCCINATE 100 MG: 50 TABLET, EXTENDED RELEASE ORAL at 12:06

## 2024-06-27 RX ADMIN — INSULIN ASPART 3 UNITS: 100 INJECTION, SOLUTION INTRAVENOUS; SUBCUTANEOUS at 09:06

## 2024-06-27 RX ADMIN — PREDNISONE 40 MG: 20 TABLET ORAL at 12:06

## 2024-06-27 NOTE — ED NOTES
Pt. Arrives via ems from home. She reports pain in her joints-lower back, (L) elbow/wrist, bilateral ankles (left is worse than right). Pain has been progressive for 5 days and today she was unable to stand or walk. She describes the pain as similar to her previous Gout flares. Pt. Appears weak and chronically ill.she reports normal appetite and intake. Denies urinary symptoms, last void this morning. There is mild swelling to (L) ankle joint with warmth, no redness. The patient states her prescription for previous medication prescribed for gout pain was .

## 2024-06-27 NOTE — HPI
Eliza Louie is a 73 year old female with a PMHx of pAfib on Eliquis, HTN, Stage 4 CKD, T2DM on insulin, HFpEF, gout presents today with her daughter for generalized weakness to the lower extremities since Friday. She also admits diffuse arthralgias to the upper extremities and back since Friday as well. She states her ankles and arms feel weak and tender. Previously she was able to ambulate on her own, but since Friday has needed assistance walking around at home. Denies tingling, loss of sensation to the extremities, headache, palpitations, chest pain or urinary symptoms. Yesterday she had cataract surgery to the left eye and denies any post-op complications. She endorses not taking any of her medications this morning.     In the ED her vitals are HR 82, /74, resp 14, temp 98.9, spo2 99%. CBC was unremarkable. CMP consisted of CO2 21, glucose 218, BUN/Cr 67/3.5 (baseline Cr 2.1-2.6), albumin 3.2, AST//79. Rheumatologic workup pending. Pt was [facility] for generalized weakness, diffuse arthralgias, and JOHN CKD Stage 4.      Current medications:  Crestor 40 mg daily  Amlodipine 10 mg daily  Losartan 25 mg daily  Lasix 40 mg twice daily  Eliquis 5 mg twice daily  Metoprolol succinate 100 mg daily  Potassium supplementation  Prednisolone eyedrops to both eyes

## 2024-06-27 NOTE — ED PROVIDER NOTES
"Emergency Department Encounter  Provider Note  Encounter Date: 2024    Patient Name: Eliza Louie  MRN: [id number]    History of Present Illness   HPI  History of Present Illness:    Chief Complaint:   Chief Complaint   Patient presents with    Joint Pain     Arrives via ems with c/o joint pain in elbows, back and legs from "gout flare-up". Worsening of pain x5 days. Pt. Was unable to ambulate today.      73-year-old female with a gout flare for the last 4-5 days, typical of her gout flares.  Patient tried to refill her colchicine, but it had , and was unable to reach her PCP for a refill.  Denies any fever or chills, trauma.      The following PMH/PSH/SocHx/FamHx has been reviewed by myself:  Past Medical History:   Diagnosis Date    (HFpEF) heart failure with preserved ejection fraction     TTE () w/ EF 55% and grade II diastolic dysfunction    Anemia due to stage 4 chronic kidney disease 2021    Gastric ulcer due to Helicobacter pylori 2017    Gastrointestinal hemorrhage with melena 2017    GERD (gastroesophageal reflux disease)     HLD (hyperlipidemia)     Hydronephrosis with ureteropelvic junction (UPJ) obstruction 2021    Hypertension     MDD (major depressive disorder) 2021    Nephrolithiasis 2021    NSTEMI (non-ST elevated myocardial infarction) 2017    Paroxysmal A-fib 2020    RBBB (right bundle branch block with left anterior fascicular block) 2020    Stage 4 chronic kidney disease 10/19/2021    STEMI (ST elevation myocardial infarction) 2020    Type 2 diabetes mellitus     Urge incontinence of urine      Past Surgical History:   Procedure Laterality Date    CARDIAC CATHETERIZATION  2017    RCA w/ 95% stenosis w/ stent placement    CATARACT EXTRACTION W/  INTRAOCULAR LENS IMPLANT Right 2024    Procedure: EXTRACTION, CATARACT, WITH IOL INSERTION;  Surgeon: Maddy Arenas MD;  Location: [facility];  Service: Ophthalmology;  Laterality: Right;    " CATARACT EXTRACTION W/  INTRAOCULAR LENS IMPLANT Left 6/26/2024    Procedure: EXTRACTION, CATARACT, WITH IOL INSERTION;  Surgeon: Maddy Arenas MD;  Location: [facility] OR;  Service: Ophthalmology;  Laterality: Left;    RETROGRADE PYELOGRAPHY  8/25/2021    Procedure: PYELOGRAM, RETROGRADE;  Surgeon: Rody Smith MD;  Location: [facility] OR;  Service: Urology;;    URETEROSCOPIC REMOVAL OF URETERIC CALCULUS Left 8/25/2021    Procedure: left ureteroscopy, stone basketing, stent placement;  left nephrostomy tube removal;  Surgeon: Rody Smith MD;  Location: [facility] OR;  Service: Urology;  Laterality: Left;     Social History     Tobacco Use    Smoking status: Never    Smokeless tobacco: Never   Substance Use Topics    Alcohol use: No    Drug use: No     No family history on file.  Allergies reviewed:  Review of patient's allergies indicates:   Allergen Reactions    Ace inhibitors Other (See Comments)     Cough     Medications reviewed:  Medication List with Changes/Refills   Current Medications    AMLODIPINE (NORVASC) 10 MG TABLET    Take 1 tablet (10 mg total) by mouth once daily.    APIXABAN (ELIQUIS) 5 MG TAB    Take 1 tablet (5 mg total) by mouth 2 (two) times daily.    BLOOD SUGAR DIAGNOSTIC STRP    To check BG 2 times daily, to use with insurance preferred meter    BLOOD-GLUCOSE METER (FREESTYLE SYSTEM KIT) KIT    Use as instructed    BLOOD-GLUCOSE SENSOR (DEXCOM G6 SENSOR) [place]ACE    1 each by Misc.(Non-Drug; Combo Route) route once daily.    COLCHICINE (COLCRYS) 0.6 MG TABLET    Take 1 tablet (0.6 mg total) by mouth once daily.    FLASH GLUCOSE SENSOR (FREESTYLE FERCHO 2 SENSOR) KIT    1 each by Misc.(Non-Drug; Combo Route) route once daily.    FUROSEMIDE (LASIX) 40 MG TABLET    Take 1 tablet (40 mg total) by mouth 2 (two) times daily.    INSULIN GLARGINE U-100, LANTUS, (LANTUS SOLOSTAR U-100 INSULIN) 100 UNIT/ML (3 ML) INPN PEN    Inject 5 Units into the skin every evening.    KETOROLAC 0.5% (ACULAR) 0.5 % DROP     Place 1 drop into the right eye 3 (three) times daily.    LANCETS MISC    To check BG 2 times daily, to use with insurance preferred meter    LOSARTAN (COZAAR) 25 MG TABLET    Take 1 tablet (25 mg total) by mouth once daily.    MAGNESIUM OXIDE (MAG-OX) 400 MG (241.3 MG MAGNESIUM) TABLET    Take 1 tablet (400 mg total) by mouth 2 (two) times daily.    METOPROLOL SUCCINATE (TOPROL-XL) 100 MG 24 HR TABLET    Take 1 tablet (100 mg total) by mouth once daily.    NITROGLYCERIN (NITROSTAT) 0.4 MG SL TABLET    Place 0.4 mg under the tongue every 5 (five) minutes as needed for Chest pain.    OFLOXACIN (OCUFLOX) 0.3 % OPHTHALMIC SOLUTION    Place 1 drop into the right eye 3 (three) times daily.    POTASSIUM CHLORIDE (MICRO-K) 10 MEQ CPSR    Take 1 capsule (10 mEq total) by mouth once daily.    PREDNISOLONE ACETATE (PRED FORTE) 1 % DRPS    Place 1 drop into the right eye 3 (three) times daily.    PREDNISOLONE-MOXIFLOX-BROMFEN 1-0.5-0.075 % DRPS    Apply 1 drop to eye 3 (three) times daily.    PREDNISOLONE-MOXIFLOX-BROMFEN 1-0.5-0.075 % DRPS    Apply 1 drop to eye 3 (three) times daily.    ROSUVASTATIN (CRESTOR) 40 MG TAB    Take 1 tablet (40 mg total) by mouth once daily.    SITAGLIPTIN PHOSPHATE (JANUVIA) 25 MG TAB    Take 1 tablet (25 mg total) by mouth once daily.       Physical Exam     Initial Vitals [06/27/24 0836]   BP Pulse Resp Temp SpO2   (!) 150/71 82 14 98.9 °F (37.2 °C) 99 %      MAP       --           Triage vital signs reviewed.    Constitutional: Well-nourished, well-developed. Slightly sleepy, alert and oriented.  HENT: Normocephalic, atraumatic. Moist mucous membranes.  Eyes: No conjunctival injection.  Resp: Normal respiratory effort, breathing unlabored.  Cardio: Regular rate and rhythm.  GI: Abdomen non-distended.   MSK: Extremities without any deformities noted. No lower extremity edema. Pain in L ankle, L wrist, L elbow, able to range all. No erythema. No warmth in any of those joints.   Skin: Warm and  dry. No rashes noted.  Neuro: Sleepy, wakes to voice. Moves all extremities.    ED Course   Procedures    Medical Decision Making    History Acquisition     The history is provided by the patient.   Assessment requiring an independent historian and why historian was needed: EMS giving report, supplementing hx    Review of prior external/non ED notes: hx gout, CKD, saw PCP last month, labs rechecked     Differential Diagnoses   Based on available information and initial assessment, the working Differential Diagnosis includes, but is not limited to:  Fracture, dislocation, compartment syndrome, nerve injury/palsy, vascular injury, DVT, rhabdomyolysis, hemarthrosis, septic joint, cellulitis, bursitis, muscle strain, ligament tear/sprain, laceration, foreign body, abrasion, soft tissue contusion, osteoarthritis.    EKG   EKG ordered and independently reviewed by me:     Labs   Lab tests ordered and independently reviewed by me:    Labs Reviewed   CBC W/ AUTO DIFFERENTIAL - Abnormal; Notable for the following components:       Result Value    Gran # (ANC) 10.6 (*)     Immature Grans (Abs) 0.05 (*)     Lymph # 0.9 (*)     Gran % 83.8 (*)     Lymph % 7.2 (*)     All other components within normal limits   COMPREHENSIVE METABOLIC PANEL - Abnormal; Notable for the following components:    CO2 21 (*)     Glucose 218 (*)     BUN 67 (*)     Creatinine 3.5 (*)     Albumin 3.2 (*)      (*)     ALT 79 (*)     eGFR 13 (*)     All other components within normal limits   UREA NITROGEN, URINE, RANDOM   SODIUM, URINE, RANDOM   CREATININE, URINE, RANDOM       Imaging   Imaging ordered and independently reviewed by me:   Imaging Results    None            Additional Consideration   Eliza Louie  presents to the Emergency Department today with gout. Slightly sleepy on exam. Able to range the affected joints, no erythema or warmth or rash, less likely infected joint. No indication for imaging. Will give dose of vicodin for pain  control, check labs; last CMP w worsening kidney function, would hesitate to restart colchicine unless kidney fxn improved. Dispo pending.     Additional testing considered but not indicated during the course of this workup: further imaging and labwork, not indicated  Co-morbidities/chronic illness/exacerbation of chronic illness considered which impacted clinical decision making: ckd, advanced age  Procedures done in the ED or plan for the OR: No  Social determinants of care considered during development of treatment plan include: Decreased medical literacy  Discussion of management or test interpretation with external provider: Yes, describe: family medicine, uremia, worsening kidney fxn  DNR discussion: No    The patient's list of active medications and allergies as documented per RN staff has been reviewed.  Medications given in the ED and/or prescribed:   Medications   sodium chloride 0.9% bolus 1,000 mL 1,000 mL (has no administration in time range)   HYDROcodone-acetaminophen 5-325 mg per tablet 1 tablet (1 tablet Oral Given 6/27/24 0938)             ED Course as of 06/27/24 1045   Thu Jun 27, 2024   1014 CBC auto differential(!)  Independently interpreted by me; unremarkable or consistent with the patient's baseline   [CS]   1014 Comprehensive metabolic panel(!)  Worsening renal function, will need admission [CS]   1014 Admit to [facility] [CS]      ED Course User Index  [CS] Lara Fonseca MD       Explanation of disposition: Admit  Critical Care:    I have personally provided 30 minutes of critical care time exclusive of time spent on separately billable procedures. Time includes review of laboratory data, radiology results, discussion with consultants, and monitoring for potential decompensation. Interventions were performed as documented above.      Clinical Impression:     1. JOHN (acute kidney injury)    2. Uremia         ED Disposition Condition    Admit Stable               Lara Fonseca MD  06/27/24  1043

## 2024-06-27 NOTE — PHARMACY MED REC
"  [facility]           Pharmacy  Admission Medication History     The home medication history was taken by Tiana Gracia.      Medication history obtained from Medications listed below were obtained from: Patient/family and Medications brought from home    Based on information gathered for medication list, you may go to "Admission" then "Reconcile Home Medications" tabs to review and/or act upon those items.     The home medication list has been updated by the Pharmacy department.   Please read ALL comments highlighted in yellow.   Please address this information as you see fit.    Feel free to contact us if you have any questions or require assistance.    The medications listed below were removed from the home medication list.  Please reorder if appropriate:    Patient reports NOT TAKING the following medication(s):  Prednisolone-moxiflox-bromfen 1-0.5-0.075% drps      No current facility-administered medications on file prior to encounter.     Current Outpatient Medications on File Prior to Encounter   Medication Sig Dispense Refill    amLODIPine (NORVASC) 10 MG tablet Take 1 tablet (10 mg total) by mouth once daily. 90 tablet 3    apixaban (ELIQUIS) 5 mg Tab Take 1 tablet (5 mg total) by mouth 2 (two) times daily. 180 tablet 3    furosemide (LASIX) 40 MG tablet Take 1 tablet (40 mg total) by mouth 2 (two) times daily. 180 tablet 3    insulin glargine U-100, Lantus, (LANTUS SOLOSTAR U-100 INSULIN) 100 unit/mL (3 mL) InPn pen Inject 5 Units into the skin every evening. 4.5 mL 3    ketorolac 0.5% (ACULAR) 0.5 % Drop Place 1 drop into the right eye 3 (three) times daily. 5 mL 3    losartan (COZAAR) 25 MG tablet Take 1 tablet (25 mg total) by mouth once daily. 90 tablet 3    magnesium oxide (MAG-OX) 400 mg (241.3 mg magnesium) tablet Take 1 tablet (400 mg total) by mouth 2 (two) times daily. 60 tablet 6    metoprolol succinate (TOPROL-XL) 100 MG 24 hr tablet Take 1 tablet (100 mg total) by mouth " once daily. 90 tablet 0    ofloxacin (OCUFLOX) 0.3 % ophthalmic solution Place 1 drop into the right eye 3 (three) times daily. 5 mL 3    potassium chloride (MICRO-K) 10 MEQ CpSR Take 1 capsule (10 mEq total) by mouth once daily. 90 capsule 1    prednisoLONE acetate (PRED FORTE) 1 % DrpS Place 1 drop into the right eye 3 (three) times daily. 5 mL 3    rosuvastatin (CRESTOR) 40 MG Tab Take 1 tablet (40 mg total) by mouth once daily. 90 tablet 3    SITagliptin phosphate (JANUVIA) 25 MG Tab Take 1 tablet (25 mg total) by mouth once daily. 120 tablet 0    blood sugar diagnostic Strp To check BG 2 times daily, to use with insurance preferred meter 100 each 7    blood-glucose meter (FREESTYLE SYSTEM KIT MISC) by Misc.(Non-Drug; Combo Route) route.      blood-glucose sensor (DEXCOM G6 SENSOR) [place] 1 each by Misc.(Non-Drug; Combo Route) route once daily. 1 each 1    flash glucose sensor (FREESTYLE FERCHO 2 SENSOR) Kit 1 each by Misc.(Non-Drug; Combo Route) route once daily. 1 kit 0    lancets Misc To check BG 2 times daily, to use with insurance preferred meter 100 each 7       Please address this information as you see fit.  Feel free to contact us if you have any questions or require assistance.    Tiana Garcia  [phone]                .

## 2024-06-27 NOTE — ED NOTES
Attempted to call report,  stated that the charge nurse is talking to the director at this time, trying to figure out assignments. Will call back.

## 2024-06-27 NOTE — CONSULTS
[facility] - Emergency Dept  Wound Care    Patient Name:  Eliza Louie   MRN:  [id number]  Date: 6/27/2024  Diagnosis: Acute gout of multiple sites    History:     Past Medical History:   Diagnosis Date    (HFpEF) heart failure with preserved ejection fraction     TTE (2020) w/ EF 55% and grade II diastolic dysfunction    Anemia due to stage 4 chronic kidney disease 8/16/2021    Gastric ulcer due to Helicobacter pylori 07/2017    Gastrointestinal hemorrhage with melena 07/2017    GERD (gastroesophageal reflux disease)     HLD (hyperlipidemia)     Hydronephrosis with ureteropelvic junction (UPJ) obstruction 8/16/2021    Hypertension     MDD (major depressive disorder) 8/16/2021    Nephrolithiasis 8/25/2021    NSTEMI (non-ST elevated myocardial infarction) 07/2017    Paroxysmal A-fib 12/24/2020    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/2020    Stage 4 chronic kidney disease 10/19/2021    STEMI (ST elevation myocardial infarction) 12/2020    Type 2 diabetes mellitus     Urge incontinence of urine        Social History     Socioeconomic History    Marital status:    Tobacco Use    Smoking status: Never    Smokeless tobacco: Never   Substance and Sexual Activity    Alcohol use: No    Drug use: No    Sexual activity: Not Currently     Social Determinants of Health     Financial Resource Strain: Low Risk  (8/18/2021)    Overall Financial Resource Strain (CARDIA)     Difficulty of Paying Living Expenses: Not very hard   Food Insecurity: No Food Insecurity (8/18/2021)    Hunger Vital Sign     Worried About Running Out of Food in the Last Year: Never true     Ran Out of Food in the Last Year: Never true   Transportation Needs: No Transportation Needs (8/18/2021)    PRAPARE - Transportation     Lack of Transportation (Medical): No     Lack of Transportation (Non-Medical): No   Physical Activity: Insufficiently Active (8/18/2021)    Exercise Vital Sign     Days of Exercise per Week: 2 days      Minutes of Exercise per Session: 20 min   Stress: No Stress Concern Present (8/18/2021)    [employer] - Occupational Stress Questionnaire     Feeling of Stress : Not at all   Housing Stability: Low Risk  (8/18/2021)    Housing Stability Vital Sign     Unable to Pay for Housing in the Last Year: No     Number of Places Lived in the Last Year: 1     Unstable Housing in the Last Year: No       Precautions:     Allergies as of 06/27/2024 - Reviewed 06/27/2024   Allergen Reaction Noted    Ace inhibitors Other (See Comments) 01/22/2018       WO Assessment Details/Treatment   Pt seen in ED- daughter at bedside    L side of abdomen/lower back- partial thickness skin tear- mepilex in place  Buttocks/sacrum- intact with no redness  Bilateral heels intact with no redness     Recommend skin tear standing orders and pressure injury prevention interventions with waffle overlay when pt transfers to floor unit.    06/27/2024

## 2024-06-27 NOTE — ASSESSMENT & PLAN NOTE
Creatine stable for now. BMP reviewed- noted Estimated Creatinine Clearance: 15.1 mL/min (A) (based on SCr of 3.5 mg/dL (H)). according to latest data. Based on current GFR, CKD stage is stage 4 - GFR 15-29.  Monitor UOP and serial BMP and adjust therapy as needed. Renally dose meds. Avoid nephrotoxic medications and procedures.  Current Home medications include Lantus 5 units nightly and Januvia 25 mg   Hold home oral medications    Plan:  Start Lantus 5 units nightly  - moderate sliding scale insulin  - will titrate PRN  - diet consistent carbohydrate  - blood glucose goal: 140-180 while inpatient  - POCT Glucose 4 times daily before meals and at bedtime

## 2024-06-27 NOTE — ASSESSMENT & PLAN NOTE
Reported over the past week.  Previously patient able to walk around her house into the bathroom without difficulty unassisted.  Patient now with generalized weakness and inability to ambulate without assistance.  Plan to consult PT/OT during hospitalization

## 2024-06-27 NOTE — ASSESSMENT & PLAN NOTE
Patient with Persistent (7 days or more) atrial fibrillation which is controlled currently with Beta Blocker. Patient is currently in atrial fibrillation.[id number] Score: 4. Anticoagulation indicated. Anticoagulation done with Eliquis 5 mg twice daily .  Will continue home medications Eliquis 5 mg twice daily and metoprolol.

## 2024-06-27 NOTE — ASSESSMENT & PLAN NOTE
Chronic, controlled. Latest blood pressure and vitals reviewed-     Temp:  [98.4 °F (36.9 °C)-98.9 °F (37.2 °C)]   Pulse:  [78-85]   Resp:  [14-18]   BP: (116-150)/(57-71)   SpO2:  [98 %-100 %] .   Home meds for hypertension were reviewed and noted below.   Hypertension Medications               amLODIPine (NORVASC) 10 MG tablet Take 1 tablet (10 mg total) by mouth once daily.    furosemide (LASIX) 40 MG tablet Take 1 tablet (40 mg total) by mouth 2 (two) times daily.    losartan (COZAAR) 25 MG tablet Take 1 tablet (25 mg total) by mouth once daily.    metoprolol succinate (TOPROL-XL) 100 MG 24 hr tablet Take 1 tablet (100 mg total) by mouth once daily.    nitroGLYCERIN (NITROSTAT) 0.4 MG SL tablet Place 0.4 mg under the tongue every 5 (five) minutes as needed for Chest pain.          Will plan to continue amlodipine 10 mg daily and metoprolol.  Hold losartan given JOHN.    While in the hospital, will manage blood pressure as follows; Continue home antihypertensive regimen    Will utilize p.r.n. blood pressure medication only if patient's blood pressure greater than 180/110 and she develops symptoms such as worsening chest pain or shortness of breath.

## 2024-06-27 NOTE — ASSESSMENT & PLAN NOTE
Euvolemic on exam  Will continue home Lasix 40 mg twice daily  Continue to monitor urine output and renal function

## 2024-06-27 NOTE — CONSULTS
Dictation #1  MRN:[id number]  CSN:[id number] NEPHROLOGY CONSULT NOTE    HPI & INTERVAL HISTORY:    Past Medical History:   Diagnosis Date    (HFpEF) heart failure with preserved ejection fraction     TTE (2020) w/ EF 55% and grade II diastolic dysfunction    Anemia due to stage 4 chronic kidney disease 8/16/2021    Gastric ulcer due to Helicobacter pylori 07/2017    Gastrointestinal hemorrhage with melena 07/2017    GERD (gastroesophageal reflux disease)     HLD (hyperlipidemia)     Hydronephrosis with ureteropelvic junction (UPJ) obstruction 8/16/2021    Hypertension     MDD (major depressive disorder) 8/16/2021    Nephrolithiasis 8/25/2021    NSTEMI (non-ST elevated myocardial infarction) 07/2017    Paroxysmal A-fib 12/24/2020    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/2020    Stage 4 chronic kidney disease 10/19/2021    STEMI (ST elevation myocardial infarction) 12/2020    Type 2 diabetes mellitus     Urge incontinence of urine       Past Surgical History:   Procedure Laterality Date    CARDIAC CATHETERIZATION  07/2017    RCA w/ 95% stenosis w/ stent placement    CATARACT EXTRACTION W/  INTRAOCULAR LENS IMPLANT Right 5/29/2024    Procedure: EXTRACTION, CATARACT, WITH IOL INSERTION;  Surgeon: Maddy Arenas MD;  [facility];  Service: Ophthalmology;  Laterality: Right;    CATARACT EXTRACTION W/  INTRAOCULAR LENS IMPLANT Left 6/26/2024    Procedure: EXTRACTION, CATARACT, WITH IOL INSERTION;  Surgeon: Maddy Arenas MD;  [facility];  Service: Ophthalmology;  Laterality: Left;    RETROGRADE PYELOGRAPHY  8/25/2021    Procedure: PYELOGRAM, RETROGRADE;  Surgeon: Rody Smith MD;  Location: [facility] OR;  Service: Urology;;    URETEROSCOPIC REMOVAL OF URETERIC CALCULUS Left 8/25/2021    Procedure: left ureteroscopy, stone basketing, stent placement;  left nephrostomy tube removal;  Surgeon: Rody Smith MD;  Location: [facility] OR;  Service: Urology;  Laterality: Left;       Review of patient's allergies indicates:   Allergen Reactions    Ace inhibitors Other (See Comments)     Cough      (Not in a hospital admission)      Social History     Socioeconomic History    Marital status:    Tobacco Use    Smoking status: Never    Smokeless tobacco: Never   Substance and Sexual Activity    Alcohol use: No    Drug use: No    Sexual activity: Not Currently     Social Determinants of Health     Financial Resource Strain: Low Risk  (8/18/2021)    Overall Financial Resource Strain (CARDIA)     Difficulty of Paying Living Expenses: Not very hard   Food Insecurity: No Food Insecurity (8/18/2021)    Hunger Vital Sign     Worried About Running Out of Food in the Last Year: Never true     Ran Out of Food in the Last Year: Never true   Transportation Needs: No Transportation Needs (8/18/2021)    PRAPARE - Transportation     Lack of Transportation (Medical): No     Lack of Transportation (Non-Medical): No   Physical Activity: Insufficiently Active (8/18/2021)    Exercise Vital Sign     Days of Exercise per Week: 2 days     Minutes of Exercise per Session: 20 min   Stress: No Stress Concern Present (8/18/2021)    [employer] - Occupational Stress Questionnaire     Feeling of Stress : Not at all   Housing Stability: Low Risk  (8/18/2021)    Housing Stability Vital Sign     Unable to Pay for Housing in the Last Year: No     Number of Places Lived in the Last Year: 1     Unstable Housing in the Last Year: No        MEDS   amLODIPine  10 mg Oral Daily    apixaban  5 mg Oral BID    insulin glargine U-100  5 Units Subcutaneous Daily    metoprolol succinate  100 mg Oral Daily    predniSONE  40 mg Oral Daily             CONTINOUS INFUSIONS:      Intake/Output Summary (Last 24 hours) at 6/27/2024 1311  Last data filed at 6/27/2024 1224  Gross per 24 hour   Intake 999 ml   Output --   Net 999 ml        HEMODYNAMICS:    Temp:  [98.4 °F (36.9 °C)-98.9 °F (37.2 °C)] 98.4 °F (36.9  "°C)  Pulse:  [78-85] 78  Resp:  [14-18] 16  SpO2:  [98 %-100 %] 98 %  BP: (116-150)/(57-71) 116/68    General :    Weakness   Fatigue    Joint pain   No CP   No SOB   No cough   No vomiting   No diarrhea   No fever   No chills  Cardiology : pulse 78  Pulmonary : RR 14  Pulse oximeter 98 % O2  Abdomen soft   Extremities no edema   Skin:dry   No tremor  LABS   Lab Results   Component Value Date    WBC 12.66 06/27/2024    HGB 13.3 06/27/2024    HCT 40.7 06/27/2024    MCV 87 06/27/2024     06/27/2024        Recent Labs   Lab 06/27/24  0936   *   CALCIUM 10.1   ALBUMIN 3.2*   PROT 8.4      K 4.7   CO2 21*      BUN 67*   CREATININE 3.5*   ALKPHOS 80   ALT 79*   *   BILITOT 0.9      Lab Results   Component Value Date    CALCIUM 10.1 06/27/2024    PHOS 3.9 05/23/2023      No results found for: "IRON", "TIBC", "FERRITIN"     ABG  No results for input(s): "PH", "PO2", "PCO2", "HCO3", "BE" in the last 168 hours.      IMAGING:  CXR    ASSESSMENT / PLAN    JOHN/ CKD 4-5  Diabetes mellitus  On Jardiance  EF 55% - 60 %  Diastolic dysfunction  On lasix 40 bid   History nephrolithiasis, obstructive uropathy,  2021 -left  nephrostomy  UA protein trace  UA microalbumin/creatinine 167  Creatinine 3.5  GFR 13 cc/min  BUN 67  NAG metabolic acidosis  Metabolic bone disease  Hb 13.3  Poor nutrition  Blood pressure 116/68  Echo  Left Ventricle: There is normal systolic function with a visually estimated ejection fraction of 55 - 60%. Grade II diastolic dysfunction. Elevated left ventricular filling pressure.    Right Ventricle: Systolic function is normal.    Mitral Valve: There is mild regurgitation.    Left Atrium: Left atrium is moderately dilated.    Tricuspid Valve: There is mild regurgitation.    IVC/SVC: Intermediate venous pressure at 8 mmHg.  Weight daily  I and O  Renal diet as tolerated.  Avoid nephrotoxic agents, hypotension, hypovolemia.  Start sodium bicarbonate  Hold lasix , jardiance.     Will " follow up on renal US, UA .

## 2024-06-27 NOTE — ASSESSMENT & PLAN NOTE
History of gout with previous labs showing uric acid greater than 11.  Reported joint pain in ankles wrists and elbows.  Unclear if this is a acute gouty flare up versus reactive arthritis.  For further evaluation will order CRP, ESR, anti CCP, rheumatoid factor, MINA with reflex to titer.  Will us will also evaluate PT/OT for her generalized weakness.  Plan to start prednisone 40 mg daily.

## 2024-06-27 NOTE — PT/OT/SLP PROGRESS
Occupational Therapy  Missed Eval    Patient Name:  Eliza Louie   MRN:  [id number]    Patient not seen today secondary to Patient unwilling to participate, Pain. Will follow-up .    6/27/2024

## 2024-06-27 NOTE — H&P
"[facility] Medicine  History & Physical    Patient Name: Eliza Louie  MRN: [id number]  Patient Class: IP- Inpatient  Admission Date: 6/27/2024  Attending Physician: Afua Denis MD   Primary Care Provider: Casi Garcia MD    Patient information was obtained from patient, relative(s), past medical records, and ER records.     Subjective:     Principal Problem:Acute gout of multiple sites    Chief Complaint:   Chief Complaint   Patient presents with    Joint Pain     Arrives via ems with c/o joint pain in elbows, back and legs from "gout flare-up". Worsening of pain x5 days. Pt. Was unable to ambulate today.         HPI: Eliza Louie is a 73 year old female with a PMHx of pAfib on Eliquis, HTN, Stage 4 CKD, T2DM on insulin, HFpEF, gout presents today with her daughter for generalized weakness to the lower extremities since Friday. She also admits diffuse arthralgias to the upper extremities and back since Friday as well. She states her ankles and arms feel weak and tender. Previously she was able to ambulate on her own, but since Friday has needed assistance walking around at home. Denies tingling, loss of sensation to the extremities, headache, palpitations, chest pain or urinary symptoms. Yesterday she had cataract surgery to the left eye and denies any post-op complications. She endorses not taking any of her medications this morning.     In the ED her vitals are HR 82, /74, resp 14, temp 98.9, spo2 99%. CBC was unremarkable. CMP consisted of CO2 21, glucose 218, BUN/Cr 67/3.5 (baseline Cr 2.1-2.6), albumin 3.2, AST//79. Rheumatologic workup pending. Pt was [facility] for generalized weakness, diffuse arthralgias, and JOHN CKD Stage 4.      Current medications:  Crestor 40 mg daily  Amlodipine 10 mg daily  Losartan 25 mg daily  Lasix 40 mg twice daily  Eliquis 5 mg twice daily  Metoprolol succinate 100 mg daily  Potassium " supplementation  Prednisolone eyedrops to both eyes    Past Medical History:   Diagnosis Date    (HFpEF) heart failure with preserved ejection fraction     TTE (2020) w/ EF 55% and grade II diastolic dysfunction    Anemia due to stage 4 chronic kidney disease 8/16/2021    Gastric ulcer due to Helicobacter pylori 07/2017    Gastrointestinal hemorrhage with melena 07/2017    GERD (gastroesophageal reflux disease)     HLD (hyperlipidemia)     Hydronephrosis with ureteropelvic junction (UPJ) obstruction 8/16/2021    Hypertension     MDD (major depressive disorder) 8/16/2021    Nephrolithiasis 8/25/2021    NSTEMI (non-ST elevated myocardial infarction) 07/2017    Paroxysmal A-fib 12/24/2020    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/2020    Stage 4 chronic kidney disease 10/19/2021    STEMI (ST elevation myocardial infarction) 12/2020    Type 2 diabetes mellitus     Urge incontinence of urine        Past Surgical History:   Procedure Laterality Date    CARDIAC CATHETERIZATION  07/2017    RCA w/ 95% stenosis w/ stent placement    CATARACT EXTRACTION W/  INTRAOCULAR LENS IMPLANT Right 5/29/2024    Procedure: EXTRACTION, CATARACT, WITH IOL INSERTION;  Surgeon: Maddy Arenas MD;  Location: [facility] OR;  Service: Ophthalmology;  Laterality: Right;    CATARACT EXTRACTION W/  INTRAOCULAR LENS IMPLANT Left 6/26/2024    Procedure: EXTRACTION, CATARACT, WITH IOL INSERTION;  Surgeon: Maddy Arenas MD;  Location: [facility] OR;  Service: Ophthalmology;  Laterality: Left;    RETROGRADE PYELOGRAPHY  8/25/2021    Procedure: PYELOGRAM, RETROGRADE;  Surgeon: Rody Smith MD;  Location: [facility] OR;  Service: Urology;;    URETEROSCOPIC REMOVAL OF URETERIC CALCULUS Left 8/25/2021    Procedure: left ureteroscopy, stone basketing, stent placement;  left nephrostomy tube removal;  Surgeon: Rody Smith MD;  Location: [facility] OR;  Service: Urology;  Laterality: Left;       Review of patient's allergies indicates:    Allergen Reactions    Ace inhibitors Other (See Comments)     Cough       No current facility-administered medications on file prior to encounter.     Current Outpatient Medications on File Prior to Encounter   Medication Sig    amLODIPine (NORVASC) 10 MG tablet Take 1 tablet (10 mg total) by mouth once daily.    apixaban (ELIQUIS) 5 mg Tab Take 1 tablet (5 mg total) by mouth 2 (two) times daily.    blood sugar diagnostic Strp To check BG 2 times daily, to use with insurance preferred meter    blood-glucose meter (FREESTYLE SYSTEM KIT) kit Use as instructed    blood-glucose sensor (DEXCOM G6 SENSOR) Sowmya 1 each by Misc.(Non-Drug; Combo Route) route once daily.    colchicine (COLCRYS) 0.6 mg tablet Take 1 tablet (0.6 mg total) by mouth once daily.    flash glucose sensor (FREESTYLE FERCHO 2 SENSOR) Kit 1 each by Misc.(Non-Drug; Combo Route) route once daily.    furosemide (LASIX) 40 MG tablet Take 1 tablet (40 mg total) by mouth 2 (two) times daily.    insulin glargine U-100, Lantus, (LANTUS SOLOSTAR U-100 INSULIN) 100 unit/mL (3 mL) InPn pen Inject 5 Units into the skin every evening.    ketorolac 0.5% (ACULAR) 0.5 % Drop Place 1 drop into the right eye 3 (three) times daily.    lancets Misc To check BG 2 times daily, to use with insurance preferred meter    losartan (COZAAR) 25 MG tablet Take 1 tablet (25 mg total) by mouth once daily.    magnesium oxide (MAG-OX) 400 mg (241.3 mg magnesium) tablet Take 1 tablet (400 mg total) by mouth 2 (two) times daily.    metoprolol succinate (TOPROL-XL) 100 MG 24 hr tablet Take 1 tablet (100 mg total) by mouth once daily.    nitroGLYCERIN (NITROSTAT) 0.4 MG SL tablet Place 0.4 mg under the tongue every 5 (five) minutes as needed for Chest pain.    ofloxacin (OCUFLOX) 0.3 % ophthalmic solution Place 1 drop into the right eye 3 (three) times daily.    potassium chloride (MICRO-K) 10 MEQ CpSR Take 1 capsule (10 mEq total) by mouth once daily.    prednisoLONE acetate (PRED FORTE)  1 % DrpS Place 1 drop into the right eye 3 (three) times daily.    prednisoLONE-moxiflox-bromfen 1-0.5-0.075 % DrpS Apply 1 drop to eye 3 (three) times daily.    prednisoLONE-moxiflox-bromfen 1-0.5-0.075 % DrpS Apply 1 drop to eye 3 (three) times daily.    rosuvastatin (CRESTOR) 40 MG Tab Take 1 tablet (40 mg total) by mouth once daily.    SITagliptin phosphate (JANUVIA) 25 MG Tab Take 1 tablet (25 mg total) by mouth once daily.     Family History    None       Tobacco Use    Smoking status: Never    Smokeless tobacco: Never   Substance and Sexual Activity    Alcohol use: No    Drug use: No    Sexual activity: Not Currently     Review of Systems   Constitutional:  Positive for chills and fatigue. Negative for fever.   Eyes:  Negative for visual disturbance.   Gastrointestinal:  Negative for nausea.   Musculoskeletal:  Positive for arthralgias, back pain, gait problem and joint swelling. Negative for myalgias.   Skin:  Positive for wound.   Neurological:  Positive for weakness. Negative for dizziness, numbness and headaches.   Psychiatric/Behavioral:  Positive for confusion.      Objective:     Vital Signs (Most Recent):  Temp: 98.8 °F (37.1 °C) (06/27/24 1120)  Pulse: 85 (06/27/24 1120)  Resp: 16 (06/27/24 1120)  BP: (!) 118/57 (06/27/24 1120)  SpO2: 100 % (06/27/24 1120) Vital Signs (24h Range):  Temp:  [98.8 °F (37.1 °C)-98.9 °F (37.2 °C)] 98.8 °F (37.1 °C)  Pulse:  [82-85] 85  Resp:  [14-18] 16  SpO2:  [99 %-100 %] 100 %  BP: (118-150)/(57-71) 118/57     Weight: 83.9 kg (185 lb)  Body mass index is 32.77 kg/m².     Physical Exam  Vitals reviewed.   Constitutional:       Appearance: Normal appearance. She is obese.   HENT:      Head: Normocephalic and atraumatic.   Eyes:      Pupils: Pupils are equal, round, and reactive to light.   Cardiovascular:      Rate and Rhythm: Normal rate. Rhythm irregular.   Pulmonary:      Effort: Pulmonary effort is normal.      Breath sounds: Normal breath sounds.   Musculoskeletal:          General: Swelling and tenderness present.      Right lower leg: No edema.      Left lower leg: No edema.      Comments: Significant TTP to the lateral malleolus to the L ankle. Dorsal radial aspect of left wrist swollen and TTP. Limited ability to flex and extend the L elbow.    Skin:     General: Skin is warm.      Findings: No rash.   Neurological:      Mental Status: She is alert and oriented to person, place, and time.   Psychiatric:         Mood and Affect: Mood normal.         Behavior: Behavior normal.       CRANIAL NERVES     CN III, IV, VI   Pupils are equal, round, and reactive to light.       Significant Labs: All pertinent labs within the past 24 hours have been reviewed.  CBC:   Recent Labs   Lab 06/27/24  0936   WBC 12.66   HGB 13.3   HCT 40.7        CMP:   Recent Labs   Lab 06/27/24  0936      K 4.7      CO2 21*   *   BUN 67*   CREATININE 3.5*   CALCIUM 10.1   PROT 8.4   ALBUMIN 3.2*   BILITOT 0.9   ALKPHOS 80   *   ALT 79*   ANIONGAP 15       Significant Imaging: I have reviewed all pertinent imaging results/findings within the past 24 hours.    Assessment/Plan:     Acute gout of multiple sites  Diffuse arthralgia  History of gout with previous labs showing uric acid greater than 11.  Reported joint pain in ankles wrists and elbows.  Unclear if this is an acute gouty flare up versus reactive arthritis.  For further evaluation will order uric acid level, CRP, ESR, anti CCP, rheumatoid factor, MINA with reflex to titer. Plan to start prednisone 40 mg daily. PT/OT consulted.     Generalized weakness  Reported over the past week. Previously, patient able to walk around her house into the bathroom without difficulty unassisted. Patient now with generalized weakness and inability to ambulate without assistance. Plan to consult PT/OT during hospitalization    Acute kidney injury superimposed on chronic kidney disease  Patient with acute kidney injury/acute renal  failure likely due to pre-renal azotemia due to dehydration JOHN is currently stable. Baseline creatinine  2.1-2.6  - Labs reviewed- Renal function/electrolytes with Estimated Creatinine Clearance: 15.1 mL/min (A) (based on SCr of 3.5 mg/dL (H)). according to latest data. Monitor urine output and serial BMP and adjust therapy as needed. Avoid nephrotoxins and renally dose meds for GFR listed above.  Will hold losartan given JOHN on CKD. This is likely prerenal etiology. Will also further evaluate with urine studies.    Protein gap       8.4-3.2=5.2       Likely related to acute inflammation       Plan for further workup with SPEP and UPEP     Elevated protein (HFpEF) heart failure with preserved ejection fraction  Euvolemic on exam  Will continue home Lasix 40 mg twice daily  Continue to monitor urine output and renal function    Paroxysmal A-fib   Will continue home medications Eliquis 5 mg twice daily and metoprolol.    Type 2 diabetes mellitus with stage 4 chronic kidney disease, with long-term current use of insulin  Creatine stable for now. BMP reviewed- noted Estimated Creatinine Clearance: 15.1 mL/min (A) (based on SCr of 3.5 mg/dL (H)). according to latest data. Based on current GFR, CKD stage is stage 4 - GFR 15-29.  Monitor UOP and serial BMP and adjust therapy as needed. Renally dose meds. Avoid nephrotoxic medications and procedures.  Current Home medications include Lantus 5 units nightly and Januvia 25 mg   Hold home oral medications    Plan:  Start Lantus 5 units nightly  moderate sliding scale insulin  will titrate PRN as starting steroids  diet consistent carbohydrate  blood glucose goal: 140-180 while inpatient  POCT Glucose 4 times daily before meals and at bedtime    Cataracts  - patient with recent surgery with Ophthalmology yesterday.  Spoke with ophthalmologist Dr. Arenas. Plan to continue home medications prednisolone TID both eyes, ketorolac TID both eyes, and ocuflox TID left eye only. Okay  to give home medications as not on formulary.      Primary hypertension  Chronic, controlled. Latest blood pressure and vitals reviewed-     Temp:  [98.4 °F (36.9 °C)-98.9 °F (37.2 °C)]   Pulse:  [78-85]   Resp:  [14-18]   BP: (116-150)/(57-71)   SpO2:  [98 %-100 %] .   Home meds for hypertension were reviewed and noted below.   Hypertension Medications               amLODIPine (NORVASC) 10 MG tablet Take 1 tablet (10 mg total) by mouth once daily.    furosemide (LASIX) 40 MG tablet Take 1 tablet (40 mg total) by mouth 2 (two) times daily.    losartan (COZAAR) 25 MG tablet Take 1 tablet (25 mg total) by mouth once daily.    metoprolol succinate (TOPROL-XL) 100 MG 24 hr tablet Take 1 tablet (100 mg total) by mouth once daily.    nitroGLYCERIN (NITROSTAT) 0.4 MG SL tablet Place 0.4 mg under the tongue every 5 (five) minutes as needed for Chest pain.          Will plan to continue amlodipine 10 mg daily and metoprolol. Hold losartan given JOHN.    While in the hospital, will manage blood pressure as follows; Continue home antihypertensive regimen    Will utilize p.r.n. blood pressure medication only if patient's blood pressure greater than 180/110 and she develops symptoms such as worsening chest pain or shortness of breath.      VTE Risk Mitigation (From admission, onward)           Ordered     apixaban tablet 5 mg  2 times daily         06/27/24 1125     IP VTE HIGH RISK PATIENT  Once         06/27/24 1125     Place sequential compression device  Until discontinued         06/27/24 1125     Reason for No Pharmacological VTE Prophylaxis  Once        Question:  Reasons:  Answer:  Already adequately anticoagulated on oral Anticoagulants    06/27/24 1125                            Juaquin Henson DO  Department of Hospital Medicine  [facility] - Emergency Dept

## 2024-06-27 NOTE — SUBJECTIVE & OBJECTIVE
Past Medical History:   Diagnosis Date    (HFpEF) heart failure with preserved ejection fraction     TTE (2020) w/ EF 55% and grade II diastolic dysfunction    Anemia due to stage 4 chronic kidney disease 8/16/2021    Gastric ulcer due to Helicobacter pylori 07/2017    Gastrointestinal hemorrhage with melena 07/2017    GERD (gastroesophageal reflux disease)     HLD (hyperlipidemia)     Hydronephrosis with ureteropelvic junction (UPJ) obstruction 8/16/2021    Hypertension     MDD (major depressive disorder) 8/16/2021    Nephrolithiasis 8/25/2021    NSTEMI (non-ST elevated myocardial infarction) 07/2017    Paroxysmal A-fib 12/24/2020    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/2020    Stage 4 chronic kidney disease 10/19/2021    STEMI (ST elevation myocardial infarction) 12/2020    Type 2 diabetes mellitus     Urge incontinence of urine        Past Surgical History:   Procedure Laterality Date    CARDIAC CATHETERIZATION  07/2017    RCA w/ 95% stenosis w/ stent placement    CATARACT EXTRACTION W/  INTRAOCULAR LENS IMPLANT Right 5/29/2024    Procedure: EXTRACTION, CATARACT, WITH IOL INSERTION;  Surgeon: Maddy Arenas MD;  Location: [facility] OR;  Service: Ophthalmology;  Laterality: Right;    CATARACT EXTRACTION W/  INTRAOCULAR LENS IMPLANT Left 6/26/2024    Procedure: EXTRACTION, CATARACT, WITH IOL INSERTION;  Surgeon: Maddy Arenas MD;  Location: [facility] OR;  Service: Ophthalmology;  Laterality: Left;    RETROGRADE PYELOGRAPHY  8/25/2021    Procedure: PYELOGRAM, RETROGRADE;  Surgeon: Rody Smith MD;  Location: [facility] OR;  Service: Urology;;    URETEROSCOPIC REMOVAL OF URETERIC CALCULUS Left 8/25/2021    Procedure: left ureteroscopy, stone basketing, stent placement;  left nephrostomy tube removal;  Surgeon: Rody Smith MD;  Location: [facility] OR;  Service: Urology;  Laterality: Left;       Review of patient's allergies indicates:   Allergen Reactions    Ace inhibitors Other (See  Comments)     Cough       No current facility-administered medications on file prior to encounter.     Current Outpatient Medications on File Prior to Encounter   Medication Sig    amLODIPine (NORVASC) 10 MG tablet Take 1 tablet (10 mg total) by mouth once daily.    apixaban (ELIQUIS) 5 mg Tab Take 1 tablet (5 mg total) by mouth 2 (two) times daily.    blood sugar diagnostic Strp To check BG 2 times daily, to use with insurance preferred meter    blood-glucose meter (FREESTYLE SYSTEM KIT) kit Use as instructed    blood-glucose sensor (DEXCOM G6 SENSOR) Sowmya 1 each by Misc.(Non-Drug; Combo Route) route once daily.    colchicine (COLCRYS) 0.6 mg tablet Take 1 tablet (0.6 mg total) by mouth once daily.    flash glucose sensor (FREESTYLE FERCHO 2 SENSOR) Kit 1 each by Misc.(Non-Drug; Combo Route) route once daily.    furosemide (LASIX) 40 MG tablet Take 1 tablet (40 mg total) by mouth 2 (two) times daily.    insulin glargine U-100, Lantus, (LANTUS SOLOSTAR U-100 INSULIN) 100 unit/mL (3 mL) InPn pen Inject 5 Units into the skin every evening.    ketorolac 0.5% (ACULAR) 0.5 % Drop Place 1 drop into the right eye 3 (three) times daily.    lancets Misc To check BG 2 times daily, to use with insurance preferred meter    losartan (COZAAR) 25 MG tablet Take 1 tablet (25 mg total) by mouth once daily.    magnesium oxide (MAG-OX) 400 mg (241.3 mg magnesium) tablet Take 1 tablet (400 mg total) by mouth 2 (two) times daily.    metoprolol succinate (TOPROL-XL) 100 MG 24 hr tablet Take 1 tablet (100 mg total) by mouth once daily.    nitroGLYCERIN (NITROSTAT) 0.4 MG SL tablet Place 0.4 mg under the tongue every 5 (five) minutes as needed for Chest pain.    ofloxacin (OCUFLOX) 0.3 % ophthalmic solution Place 1 drop into the right eye 3 (three) times daily.    potassium chloride (MICRO-K) 10 MEQ CpSR Take 1 capsule (10 mEq total) by mouth once daily.    prednisoLONE acetate (PRED FORTE) 1 % DrpS Place 1 drop into the right eye 3 (three)  times daily.    prednisoLONE-moxiflox-bromfen 1-0.5-0.075 % DrpS Apply 1 drop to eye 3 (three) times daily.    prednisoLONE-moxiflox-bromfen 1-0.5-0.075 % DrpS Apply 1 drop to eye 3 (three) times daily.    rosuvastatin (CRESTOR) 40 MG Tab Take 1 tablet (40 mg total) by mouth once daily.    SITagliptin phosphate (JANUVIA) 25 MG Tab Take 1 tablet (25 mg total) by mouth once daily.     Family History    None       Tobacco Use    Smoking status: Never    Smokeless tobacco: Never   Substance and Sexual Activity    Alcohol use: No    Drug use: No    Sexual activity: Not Currently     Review of Systems   Constitutional:  Positive for chills and fatigue. Negative for fever.   Eyes:  Negative for visual disturbance.   Gastrointestinal:  Negative for nausea.   Musculoskeletal:  Positive for arthralgias, back pain, gait problem and joint swelling. Negative for myalgias.   Skin:  Positive for wound.   Neurological:  Positive for weakness. Negative for dizziness, numbness and headaches.   Psychiatric/Behavioral:  Positive for confusion.      Objective:     Vital Signs (Most Recent):  Temp: 98.8 °F (37.1 °C) (06/27/24 1120)  Pulse: 85 (06/27/24 1120)  Resp: 16 (06/27/24 1120)  BP: (!) 118/57 (06/27/24 1120)  SpO2: 100 % (06/27/24 1120) Vital Signs (24h Range):  Temp:  [98.8 °F (37.1 °C)-98.9 °F (37.2 °C)] 98.8 °F (37.1 °C)  Pulse:  [82-85] 85  Resp:  [14-18] 16  SpO2:  [99 %-100 %] 100 %  BP: (118-150)/(57-71) 118/57     Weight: 83.9 kg (185 lb)  Body mass index is 32.77 kg/m².     Physical Exam  Vitals reviewed.   Constitutional:       Appearance: Normal appearance. She is obese.   HENT:      Head: Normocephalic and atraumatic.   Eyes:      Pupils: Pupils are equal, round, and reactive to light.   Cardiovascular:      Rate and Rhythm: Normal rate. Rhythm irregular.   Pulmonary:      Effort: Pulmonary effort is normal.      Breath sounds: Normal breath sounds.   Musculoskeletal:         General: Swelling and tenderness present.       Right lower leg: No edema.      Left lower leg: No edema.      Comments: Significant TTP to the lateral malleolus to the L ankle. Dorsal radial aspect of left wrist swollen and TTP. Limited ability to flex and extend the L elbow. Erythematous to palpation.    Skin:     General: Skin is warm.      Findings: No rash.   Neurological:      Mental Status: She is alert and oriented to person, place, and time.   Psychiatric:         Mood and Affect: Mood normal.         Behavior: Behavior normal.              CRANIAL NERVES     CN III, IV, VI   Pupils are equal, round, and reactive to light.       Significant Labs: All pertinent labs within the past 24 hours have been reviewed.  CBC:   Recent Labs   Lab 06/27/24  0936   WBC 12.66   HGB 13.3   HCT 40.7        CMP:   Recent Labs   Lab 06/27/24  0936      K 4.7      CO2 21*   *   BUN 67*   CREATININE 3.5*   CALCIUM 10.1   PROT 8.4   ALBUMIN 3.2*   BILITOT 0.9   ALKPHOS 80   *   ALT 79*   ANIONGAP 15       Significant Imaging: I have reviewed all pertinent imaging results/findings within the past 24 hours.

## 2024-06-27 NOTE — Clinical Note
Diagnosis: Uremia [[id number]]   Future Attending Provider: BROOKE MERRITT [[id number]]   Reason for IP Medical Treatment  (Clinical interventions that can only be accomplished in the IP setting? ) :: uremia, worsening kidney function   I certify that Inpatient services for greater than or equal to 2 midnights are medically necessary:: Yes   Plans for Post-Acute care--if anticipated (pick the single best option):: A. No post acute care anticipated at this time

## 2024-06-27 NOTE — ASSESSMENT & PLAN NOTE
Patient with acute kidney injury/acute renal failure likely due to pre-renal azotemia due to dehydration JOHN is currently stable. Baseline creatinine  2.1-2.6  - Labs reviewed- Renal function/electrolytes with Estimated Creatinine Clearance: 15.1 mL/min (A) (based on SCr of 3.5 mg/dL (H)). according to latest data. Monitor urine output and serial BMP and adjust therapy as needed. Avoid nephrotoxins and renally dose meds for GFR listed above.  Will hold losartan given JOHN and CKD.  This is likely prerenal etiology.  Will also further evaluate with urine studies.

## 2024-06-28 LAB
ALBUMIN SERPL BCP-MCNC: 2.6 G/DL (ref 3.5–5.2)
ALBUMIN SERPL ELPH-MCNC: 2.72 G/DL (ref 3.35–5.55)
ALBUMIN/CREAT UR: 14.7 UG/MG (ref 0–30)
ALP SERPL-CCNC: 76 U/L (ref 55–135)
ALPHA1 GLOB SERPL ELPH-MCNC: 0.45 G/DL (ref 0.17–0.41)
ALPHA2 GLOB SERPL ELPH-MCNC: 0.93 G/DL (ref 0.43–0.99)
ALT SERPL W/O P-5'-P-CCNC: 64 U/L (ref 10–44)
ANA SER QL IF: NORMAL
ANION GAP SERPL CALC-SCNC: 11 MMOL/L (ref 8–16)
AST SERPL-CCNC: 59 U/L (ref 10–40)
B-GLOBULIN SERPL ELPH-MCNC: 0.97 G/DL (ref 0.5–1.1)
BASOPHILS # BLD AUTO: 0.01 K/UL (ref 0–0.2)
BASOPHILS NFR BLD: 0.1 % (ref 0–1.9)
BILIRUB SERPL-MCNC: 0.5 MG/DL (ref 0.1–1)
BILIRUB UR QL STRIP: NEGATIVE
BNP SERPL-MCNC: 490 PG/ML (ref 0–99)
BUN SERPL-MCNC: 75 MG/DL (ref 8–23)
CALCIUM SERPL-MCNC: 9.7 MG/DL (ref 8.7–10.5)
CHLORIDE SERPL-SCNC: 104 MMOL/L (ref 95–110)
CLARITY UR: CLEAR
CO2 SERPL-SCNC: 19 MMOL/L (ref 23–29)
COLOR UR: YELLOW
CREAT SERPL-MCNC: 3 MG/DL (ref 0.5–1.4)
CREAT UR-MCNC: 75 MG/DL (ref 15–325)
DIFFERENTIAL METHOD BLD: ABNORMAL
EOSINOPHIL # BLD AUTO: 0 K/UL (ref 0–0.5)
EOSINOPHIL NFR BLD: 0 % (ref 0–8)
ERYTHROCYTE [DISTWIDTH] IN BLOOD BY AUTOMATED COUNT: 14.2 % (ref 11.5–14.5)
EST. GFR  (NO RACE VARIABLE): 16 ML/MIN/1.73 M^2
GAMMA GLOB SERPL ELPH-MCNC: 1.23 G/DL (ref 0.67–1.58)
GLUCOSE SERPL-MCNC: 169 MG/DL (ref 70–110)
GLUCOSE UR QL STRIP: NEGATIVE
HCT VFR BLD AUTO: 35.3 % (ref 37–48.5)
HGB BLD-MCNC: 11.8 G/DL (ref 12–16)
HGB UR QL STRIP: NEGATIVE
IMM GRANULOCYTES # BLD AUTO: 0.06 K/UL (ref 0–0.04)
IMM GRANULOCYTES NFR BLD AUTO: 0.5 % (ref 0–0.5)
KETONES UR QL STRIP: NEGATIVE
LEUKOCYTE ESTERASE UR QL STRIP: NEGATIVE
LYMPHOCYTES # BLD AUTO: 1 K/UL (ref 1–4.8)
LYMPHOCYTES NFR BLD: 8.1 % (ref 18–48)
MAGNESIUM SERPL-MCNC: 2.3 MG/DL (ref 1.6–2.6)
MCH RBC QN AUTO: 28.6 PG (ref 27–31)
MCHC RBC AUTO-ENTMCNC: 33.4 G/DL (ref 32–36)
MCV RBC AUTO: 86 FL (ref 82–98)
MICROALBUMIN UR DL<=1MG/L-MCNC: 11 UG/ML
MONOCYTES # BLD AUTO: 0.6 K/UL (ref 0.3–1)
MONOCYTES NFR BLD: 5 % (ref 4–15)
NEUTROPHILS # BLD AUTO: 11.1 K/UL (ref 1.8–7.7)
NEUTROPHILS NFR BLD: 86.3 % (ref 38–73)
NITRITE UR QL STRIP: NEGATIVE
NRBC BLD-RTO: 0 /100 WBC
PH UR STRIP: 5 [PH] (ref 5–8)
PHOSPHATE SERPL-MCNC: 4.3 MG/DL (ref 2.7–4.5)
PLATELET # BLD AUTO: 229 K/UL (ref 150–450)
PMV BLD AUTO: 11 FL (ref 9.2–12.9)
POCT GLUCOSE: 162 MG/DL (ref 70–110)
POCT GLUCOSE: 163 MG/DL (ref 70–110)
POCT GLUCOSE: 209 MG/DL (ref 70–110)
POCT GLUCOSE: 301 MG/DL (ref 70–110)
POTASSIUM SERPL-SCNC: 4.8 MMOL/L (ref 3.5–5.1)
PROT SERPL-MCNC: 6.3 G/DL (ref 6–8.4)
PROT SERPL-MCNC: 7 G/DL (ref 6–8.4)
PROT UR QL STRIP: NEGATIVE
RBC # BLD AUTO: 4.12 M/UL (ref 4–5.4)
SODIUM SERPL-SCNC: 134 MMOL/L (ref 136–145)
SP GR UR STRIP: 1.01 (ref 1–1.03)
URATE SERPL-MCNC: 11.5 MG/DL (ref 2.4–5.7)
URN SPEC COLLECT METH UR: NORMAL
UROBILINOGEN UR STRIP-ACNC: NEGATIVE EU/DL
WBC # BLD AUTO: 12.8 K/UL (ref 3.9–12.7)

## 2024-06-28 PROCEDURE — 97116 GAIT TRAINING THERAPY: CPT

## 2024-06-28 PROCEDURE — 97530 THERAPEUTIC ACTIVITIES: CPT

## 2024-06-28 PROCEDURE — 97161 PT EVAL LOW COMPLEX 20 MIN: CPT

## 2024-06-28 PROCEDURE — 83880 ASSAY OF NATRIURETIC PEPTIDE: CPT | Performed by: INTERNAL MEDICINE

## 2024-06-28 PROCEDURE — 84550 ASSAY OF BLOOD/URIC ACID: CPT | Performed by: INTERNAL MEDICINE

## 2024-06-28 PROCEDURE — 82570 ASSAY OF URINE CREATININE: CPT

## 2024-06-28 PROCEDURE — G0378 HOSPITAL OBSERVATION PER HR: HCPCS

## 2024-06-28 PROCEDURE — 97165 OT EVAL LOW COMPLEX 30 MIN: CPT

## 2024-06-28 PROCEDURE — 81003 URINALYSIS AUTO W/O SCOPE: CPT

## 2024-06-28 PROCEDURE — 85025 COMPLETE CBC W/AUTO DIFF WBC: CPT | Performed by: STUDENT IN AN ORGANIZED HEALTH CARE EDUCATION/TRAINING PROGRAM

## 2024-06-28 PROCEDURE — 25000003 PHARM REV CODE 250: Performed by: STUDENT IN AN ORGANIZED HEALTH CARE EDUCATION/TRAINING PROGRAM

## 2024-06-28 PROCEDURE — 80053 COMPREHEN METABOLIC PANEL: CPT | Performed by: STUDENT IN AN ORGANIZED HEALTH CARE EDUCATION/TRAINING PROGRAM

## 2024-06-28 PROCEDURE — 84100 ASSAY OF PHOSPHORUS: CPT | Performed by: STUDENT IN AN ORGANIZED HEALTH CARE EDUCATION/TRAINING PROGRAM

## 2024-06-28 PROCEDURE — 97535 SELF CARE MNGMENT TRAINING: CPT

## 2024-06-28 PROCEDURE — 36415 COLL VENOUS BLD VENIPUNCTURE: CPT | Performed by: STUDENT IN AN ORGANIZED HEALTH CARE EDUCATION/TRAINING PROGRAM

## 2024-06-28 PROCEDURE — 25000003 PHARM REV CODE 250

## 2024-06-28 PROCEDURE — 63600175 PHARM REV CODE 636 W HCPCS: Performed by: STUDENT IN AN ORGANIZED HEALTH CARE EDUCATION/TRAINING PROGRAM

## 2024-06-28 PROCEDURE — 25000003 PHARM REV CODE 250: Performed by: INTERNAL MEDICINE

## 2024-06-28 PROCEDURE — 83735 ASSAY OF MAGNESIUM: CPT | Performed by: STUDENT IN AN ORGANIZED HEALTH CARE EDUCATION/TRAINING PROGRAM

## 2024-06-28 PROCEDURE — 82043 UR ALBUMIN QUANTITATIVE: CPT

## 2024-06-28 RX ORDER — POLYETHYLENE GLYCOL 3350 17 G/17G
17 POWDER, FOR SOLUTION ORAL DAILY
Status: DISCONTINUED | OUTPATIENT
Start: 2024-06-28 | End: 2024-06-30 | Stop reason: HOSPADM

## 2024-06-28 RX ORDER — OFLOXACIN 3 MG/ML
1 SOLUTION/ DROPS OPHTHALMIC 3 TIMES DAILY
Status: DISCONTINUED | OUTPATIENT
Start: 2024-06-28 | End: 2024-06-30 | Stop reason: HOSPADM

## 2024-06-28 RX ORDER — SODIUM BICARBONATE 650 MG/1
1300 TABLET ORAL 4 TIMES DAILY
Status: DISCONTINUED | OUTPATIENT
Start: 2024-06-28 | End: 2024-06-30 | Stop reason: HOSPADM

## 2024-06-28 RX ORDER — PREDNISOLONE ACETATE 10 MG/ML
1 SUSPENSION/ DROPS OPHTHALMIC 3 TIMES DAILY
Status: DISCONTINUED | OUTPATIENT
Start: 2024-06-28 | End: 2024-06-30 | Stop reason: HOSPADM

## 2024-06-28 RX ORDER — FUROSEMIDE 40 MG/1
40 TABLET ORAL DAILY
Status: DISCONTINUED | OUTPATIENT
Start: 2024-06-28 | End: 2024-06-28

## 2024-06-28 RX ORDER — KETOROLAC TROMETHAMINE 4 MG/ML
1 SOLUTION/ DROPS OPHTHALMIC 3 TIMES DAILY
Status: DISCONTINUED | OUTPATIENT
Start: 2024-06-28 | End: 2024-06-30 | Stop reason: HOSPADM

## 2024-06-28 RX ADMIN — SODIUM BICARBONATE 1300 MG: 650 TABLET ORAL at 05:06

## 2024-06-28 RX ADMIN — INSULIN ASPART 4 UNITS: 100 INJECTION, SOLUTION INTRAVENOUS; SUBCUTANEOUS at 09:06

## 2024-06-28 RX ADMIN — OFLOXACIN 1 DROP: 3 SOLUTION/ DROPS OPHTHALMIC at 09:06

## 2024-06-28 RX ADMIN — SODIUM BICARBONATE 650 MG: 650 TABLET ORAL at 06:06

## 2024-06-28 RX ADMIN — SODIUM BICARBONATE 1300 MG: 650 TABLET ORAL at 09:06

## 2024-06-28 RX ADMIN — HYDROCODONE BITARTRATE AND ACETAMINOPHEN 1 TABLET: 5; 325 TABLET ORAL at 06:06

## 2024-06-28 RX ADMIN — APIXABAN 5 MG: 5 TABLET, FILM COATED ORAL at 09:06

## 2024-06-28 RX ADMIN — INSULIN ASPART 4 UNITS: 100 INJECTION, SOLUTION INTRAVENOUS; SUBCUTANEOUS at 04:06

## 2024-06-28 RX ADMIN — PREDNISOLONE ACETATE 1 DROP: 10 SUSPENSION/ DROPS OPHTHALMIC at 09:06

## 2024-06-28 RX ADMIN — INSULIN ASPART 2 UNITS: 100 INJECTION, SOLUTION INTRAVENOUS; SUBCUTANEOUS at 12:06

## 2024-06-28 RX ADMIN — FUROSEMIDE 40 MG: 40 TABLET ORAL at 10:06

## 2024-06-28 RX ADMIN — PREDNISOLONE ACETATE 1 DROP: 10 SUSPENSION/ DROPS OPHTHALMIC at 06:06

## 2024-06-28 RX ADMIN — METOPROLOL SUCCINATE 100 MG: 50 TABLET, EXTENDED RELEASE ORAL at 10:06

## 2024-06-28 RX ADMIN — INSULIN GLARGINE 5 UNITS: 100 INJECTION, SOLUTION SUBCUTANEOUS at 09:06

## 2024-06-28 RX ADMIN — INSULIN ASPART 2 UNITS: 100 INJECTION, SOLUTION INTRAVENOUS; SUBCUTANEOUS at 06:06

## 2024-06-28 RX ADMIN — KETOROLAC TROMETHAMINE 1 DROP: 4 SOLUTION/ DROPS OPHTHALMIC at 06:06

## 2024-06-28 RX ADMIN — KETOROLAC TROMETHAMINE 1 DROP: 4 SOLUTION/ DROPS OPHTHALMIC at 08:06

## 2024-06-28 RX ADMIN — AMLODIPINE BESYLATE 10 MG: 5 TABLET ORAL at 10:06

## 2024-06-28 RX ADMIN — PREDNISONE 40 MG: 20 TABLET ORAL at 10:06

## 2024-06-28 RX ADMIN — POLYETHYLENE GLYCOL 3350 17 G: 17 POWDER, FOR SOLUTION ORAL at 10:06

## 2024-06-28 RX ADMIN — SODIUM BICARBONATE 1300 MG: 650 TABLET ORAL at 12:06

## 2024-06-28 NOTE — PLAN OF CARE
Problem: Occupational Therapy  Goal: Occupational Therapy Goal  Description: Goals to be met by: 07/28     Patient will increase functional independence with ADLs by performing:    LE Dressing with Stand-by Assistance.  Grooming while standing at sink with Modified Carter and Supervision.  Toileting from toilet with Modified Carter and Supervision for hygiene and clothing management.   Toilet transfer to toilet with Modified Carter and Supervision.  Increased functional strength to WFL for ADLs.    Outcome: Progressing   Pt found in SF & agreeable to OT eval/tx this date.  Pt c/o 7/10 pain at L foot/UE & perf the following:  -sup-->EOB via bed rail w/ S-SBA & tolerated w/ Sup  -standing via RW w/ CGA, however required Mod A to recover from post LOB during mid-transition to stance  -fxnl mobility via RW around room for ADLs w/ CGA for balance & PRN Min A for DME mgmt  -toilet t/f via RW w/ Min A for controlled descent  -toileting tasks w/ Mod A for clothing mgmt  -t/f via RW-->b/s chair w/ CGA  Edu/tx re: general safety techs & HEP. Pt verbalized understanding.  Pt left UIC w/ alarm & nsg made aware.    Pt presents w/ decreased overall endurance/conditioning, balance/mobility & coordination w/ subsequent decline in (I)/safety w/ BADLs, fxnl mobility & fxnl t/f's.  OT 3x/wk to increase phys/fxnl status & maximize potential to achieve established goals for d/c-->low intenisty tx w/ rec reacher.

## 2024-06-28 NOTE — PT/OT/SLP EVAL
"Physical Therapy Evaluation    Patient Name:  Eliza Louie   MRN:  [id number]    Recommendations:     Discharge Recommendations: Low Intensity Therapy (OP PT)   Discharge Equipment Recommendations: walker, rolling   Barriers to discharge:  pain limiting mobility    Assessment:     Eliza Louie is a 73 y.o. female admitted with a medical diagnosis of Acute gout of multiple sites.  She presents with the following impairments/functional limitations: weakness, pain, impaired endurance, impaired balance, gait instability, impaired functional mobility, impaired self care skills, decreased lower extremity function, impaired skin, decreased upper extremity function .Pt ambulated ~20 ft with RW and CGA-SBA. Pt is limited by L ankle pain. Educated pt on 3 pt gait with RW and use of BUE to assist with offloading LLE, however, pt is also limited by L elbow and wrist pain. Practiced ascending/descending a 4" step with CGA-Kay and use of HHA and R bed rail. Recommending low intensity therapy (OP PT).      The mobility limitation cannot be sufficiently resolved by the use of a cane.   Patient's functional mobility deficit can be sufficiently resolved with the use of a rolling walker. Patient's mobility limitation significantly impairs their ability to participate in one of more activities of daily living. The use of a rolling walker will significantly improve the patient's ability to participate in MRADLS and the patient will use it on regular basis in the home.       Rehab Prognosis: Good; patient would benefit from acute skilled PT services to address these deficits and reach maximum level of function.    Recent Surgery: * No surgery found *      Plan:     During this hospitalization, patient to be seen 3 x/week to address the identified rehab impairments via gait training, therapeutic activities, therapeutic exercises, neuromuscular re-education and progress toward the following goals:    Plan of Care Expires:  " 07/28/24    Subjective     Chief Complaint: L ankle, elbow, and wrist pain  Patient/Family Comments/goals: reduce pain and return to PLOF  Pain/Comfort:  Pain Rating 1: 5/10  Location - Side 1: Left  Location 1: ankle (L elbow and wrist)  Pain Addressed 1: Reposition, Distraction, Nurse notified, Cessation of Activity (hot packs)  Pain Rating Post-Intervention 1: 6/10    Patients cultural, spiritual, Methodist conflicts given the current situation:      Living Environment:  Pt lives with spouse in a [facility], [facility] with B HR far apart, and T/S with shower chair  Prior to admission, patients level of function was Mod I with use of rollator PRN; assist for LBD and bathing; does not drive; denies falls.  Equipment used at home: shower chair, rollator.  DME owned (not currently used): none.  Upon discharge, patient will have assistance from spouse.    Objective:     Communicated with nsg prior to session.  Patient found up in chair with chair check  upon PT entry to room.    General Precautions: Standard, fall  Orthopedic Precautions:N/A   Braces: N/A  Respiratory Status: Room air    Exams:  Cognitive Exam:  Patient is oriented to Person, Place, Time, and Situation  Postural Exam:  Patient presented with the following abnormalities:    -       Rounded shoulders  -       Forward head  Skin Integrity/Edema:      -       Skin integrity: Wound L side of abdomen / low back   -       Edema: None noted BLE  RLE ROM: WFL  RLE Strength: WFL  LLE ROM: WFL except L ankle guarding noted  LLE Strength: WFL except 3+ ankle DF/PF, limited by pain    Functional Mobility:  Bed Mobility:     Sit to Supine: contact guard assistance  Transfers:     Sit to Stand:  contact guard assistance and minimum assistance with rolling walker  Gait: Pt ambulated ~20 ft with RW and CGA-SBA. Pt is limited by L ankle pain. Educated pt on 3 pt gait sequencing with RW and use of BUE to assist with offloading LLE with fair carryover as pt is also limited by L  "elbow and wrist pain. Decreased speed/belen noted; VC's provided for increasing step length L>R       AM-PAC 6 CLICK MOBILITY  Total Score:18       Treatment & Education:  Educated pt on role of PT/POC and pt agreeable to participate  Assessed chair transfer X 2 trials due to pt attempting stand without AD and with increased difficulty  Educated pt on hand placement and sequencing with use of RW for sit <> stands to reduce pain through L side  Educated pt on 3 pt gait sequencing with use of RW to reduce pain, see above  Practiced ascending/descending a 4" step X 2 trials with VC's for proper sequencing to decrease pain through L ankle and LUE; use of R rail due to pain and HHA / CGA-Kay for stability  Pt requesting to return supine  Educated pt on ice/heat for pain and hot packs provided to L ankle/elbow/wrist   Educated pt to keep on for 10 mins and nurse Corinne notified to assist with removing  Discussed d/c therapy recs    Patient left HOB elevated with all lines intact, call button in reach, bed alarm on, and nsg notified.    GOALS:   Multidisciplinary Problems       Physical Therapy Goals          Problem: Physical Therapy    Goal Priority Disciplines Outcome Goal Variances Interventions   Physical Therapy Goal     PT, PT/OT Progressing     Description: Goals to be met by: 24     Patient will increase functional independence with mobility by performin. Supine to sit with Modified [place]  2. Sit to supine with Modified [place]  3. Sit to stand transfer with Modified [place]  4. Bed to chair transfer with Modified [place] using rollator  5. Gait  x 150 feet with Modified [place] using rollator.   6. Ascend/descend 7 steps/stairs with right Handrails Contact Guard Assistance using HHA from spouse.                          History:     Past Medical History:   Diagnosis Date    (HFpEF) heart failure with preserved ejection fraction     TTE () w/ EF 55% and grade II " diastolic dysfunction    Anemia due to stage 4 chronic kidney disease 8/16/2021    Gastric ulcer due to Helicobacter pylori 07/2017    Gastrointestinal hemorrhage with melena 07/2017    GERD (gastroesophageal reflux disease)     HLD (hyperlipidemia)     Hydronephrosis with ureteropelvic junction (UPJ) obstruction 8/16/2021    Hypertension     MDD (major depressive disorder) 8/16/2021    Nephrolithiasis 8/25/2021    NSTEMI (non-ST elevated myocardial infarction) 07/2017    Paroxysmal A-fib 12/24/2020    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/2020    Stage 4 chronic kidney disease 10/19/2021    STEMI (ST elevation myocardial infarction) 12/2020    Type 2 diabetes mellitus     Urge incontinence of urine        Past Surgical History:   Procedure Laterality Date    CARDIAC CATHETERIZATION  07/2017    RCA w/ 95% stenosis w/ stent placement    CATARACT EXTRACTION W/  INTRAOCULAR LENS IMPLANT Right 5/29/2024    Procedure: EXTRACTION, CATARACT, WITH IOL INSERTION;  Surgeon: Maddy Arenas MD;  Location: [facility] OR;  Service: Ophthalmology;  Laterality: Right;    CATARACT EXTRACTION W/  INTRAOCULAR LENS IMPLANT Left 6/26/2024    Procedure: EXTRACTION, CATARACT, WITH IOL INSERTION;  Surgeon: Maddy Arenas MD;  Location: [facility] OR;  Service: Ophthalmology;  Laterality: Left;    RETROGRADE PYELOGRAPHY  8/25/2021    Procedure: PYELOGRAM, RETROGRADE;  Surgeon: Rody Smith MD;  Location: [facility] OR;  Service: Urology;;    URETEROSCOPIC REMOVAL OF URETERIC CALCULUS Left 8/25/2021    Procedure: left ureteroscopy, stone basketing, stent placement;  left nephrostomy tube removal;  Surgeon: Rody Smith MD;  Location: [facility] OR;  Service: Urology;  Laterality: Left;       Time Tracking:     PT Received On: 06/28/24  PT Start Time: 1154     PT Stop Time: 1225  PT Total Time (min): 31 min     Billable Minutes: Evaluation 8, Gait Training 13, and Therapeutic Activity 10      06/28/2024

## 2024-06-28 NOTE — PROGRESS NOTES
[facility] Medicine  Progress Note    Patient Name: Eliza Louie  MRN: [id number]  Patient Class: OP- Observation   Admission Date: 6/27/2024  Length of Stay: 1 days  Attending Physician: Afua Denis MD  Primary Care Provider: Casi Garcia MD        Subjective:     Principal Problem:Acute gout of multiple sites        HPI:  Eliza Louie is a 73 year old female with a PMHx of pAfib on Eliquis, HTN, Stage 4 CKD, T2DM on insulin, HFpEF, gout presents today with her daughter for generalized weakness to the lower extremities since Friday. She also admits diffuse arthralgias to the upper extremities and back since Friday as well. She states her ankles and arms feel weak and tender. Previously she was able to ambulate on her own, but since Friday has needed assistance walking around at home. Denies tingling, loss of sensation to the extremities, headache, palpitations, chest pain or urinary symptoms. Yesterday she had cataract surgery to the left eye and denies any post-op complications. She endorses not taking any of her medications this morning.     In the ED her vitals are HR 82, /74, resp 14, temp 98.9, spo2 99%. CBC was unremarkable. CMP consisted of CO2 21, glucose 218, BUN/Cr 67/3.5 (baseline Cr 2.1-2.6), albumin 3.2, AST//79. Rheumatologic workup pending. Pt [facility] for generalized weakness, diffuse arthralgias, and JOHN CKD Stage 4.      Current medications:  Crestor 40 mg daily  Amlodipine 10 mg daily  Losartan 25 mg daily  Lasix 40 mg twice daily  Eliquis 5 mg twice daily  Metoprolol succinate 100 mg daily  Potassium supplementation  Prednisolone eyedrops to both eyes    Overview/Hospital Course:  No notes on file    Interval History:   NAEON. VSSAF RA. Patient's pain improved. Slept well. CRP and ESR elevated.   Other labs pending.    Review of Systems   Eyes:  Negative for pain.   Musculoskeletal:  Positive for arthralgias.   Skin:  Negative  for color change and rash.   Neurological:  Negative for headaches.   All other systems reviewed and are negative.    Objective:     Vital Signs (Most Recent):  Temp: 97.4 °F (36.3 °C) (06/28/24 0751)  Pulse: 66 (06/28/24 0751)  Resp: 18 (06/28/24 0751)  BP: 136/64 (06/28/24 0751)  SpO2: 95 % (06/28/24 0751) Vital Signs (24h Range):  Temp:  [97.4 °F (36.3 °C)-98.8 °F (37.1 °C)] 97.4 °F (36.3 °C)  Pulse:  [66-85] 66  Resp:  [15-20] 18  SpO2:  [95 %-100 %] 95 %  BP: ()/(52-69) 136/64     Weight: 83.4 kg (183 lb 13.8 oz)  Body mass index is 32.57 kg/m².    Intake/Output Summary (Last 24 hours) at 6/28/2024 0846  Last data filed at 6/27/2024 1224  Gross per 24 hour   Intake 999 ml   Output --   Net 999 ml         Physical Exam  Constitutional:       Appearance: Normal appearance.   HENT:      Head: Normocephalic and atraumatic.   Eyes:      Extraocular Movements: Extraocular movements intact.   Cardiovascular:      Rate and Rhythm: Normal rate and regular rhythm.      Pulses: Normal pulses.      Heart sounds: Normal heart sounds. No murmur heard.  Pulmonary:      Effort: Pulmonary effort is normal. No respiratory distress.      Breath sounds: Normal breath sounds. No stridor. No wheezing, rhonchi or rales.   Abdominal:      General: Abdomen is flat.      Palpations: Abdomen is soft.      Tenderness: There is no abdominal tenderness.   Musculoskeletal:      Right lower leg: No edema.      Left lower leg: No edema.   Skin:     Findings: No rash.   Neurological:      General: No focal deficit present.      Mental Status: She is alert and oriented to person, place, and time.   Psychiatric:         Mood and Affect: Mood normal.         Behavior: Behavior normal.         Thought Content: Thought content normal.         Judgment: Judgment normal.             Significant Labs: All pertinent labs within the past 24 hours have been reviewed.  CBC:   Recent Labs   Lab 06/27/24  0936 06/28/24  0624   WBC 12.66 12.80*   HGB  13.3 11.8*   HCT 40.7 35.3*    229     CMP:   Recent Labs   Lab 06/27/24  0936 06/28/24  0624    134*   K 4.7 4.8    104   CO2 21* 19*   * 169*   BUN 67* 75*   CREATININE 3.5* 3.0*   CALCIUM 10.1 9.7   PROT 8.4 7.0   ALBUMIN 3.2* 2.6*   BILITOT 0.9 0.5   ALKPHOS 80 76   * 59*   ALT 79* 64*   ANIONGAP 15 11       Significant Imaging: I have reviewed all pertinent imaging results/findings within the past 24 hours.    Assessment/Plan:      * Acute gout of multiple sites  History of gout with previous labs showing uric acid greater than 11.  Reported joint pain in ankles wrists and elbows... as well as shoulders/back.  Unclear if this is a acute gouty flare up versus reactive arthritis.  CRP, ESR elevated. Others pending: anti CCP, rheumatoid factor, MINA with reflex to titer.  Will us will also evaluate PT/OT for her generalized weakness.  Plan to start prednisone 40 mg daily. Today day 2/5.    PLAN:  PT        Uremia        Diffuse arthralgia  History of gout with previous labs showing uric acid greater than 11.  Reported joint pain in ankles wrists and elbows.  Unclear if this is a acute gouty flare up versus reactive arthritis.  For further evaluation will order CRP, ESR, anti CCP, rheumatoid factor, MINA with reflex to titer.  Will us will also evaluate PT/OT for her generalized weakness.  Plan to start prednisone 40 mg daily.      Acute kidney injury superimposed on chronic kidney disease  Patient with acute kidney injury/acute renal failure likely due to pre-renal azotemia due to dehydration JOHN is currently stable. Baseline creatinine  2.1-2.6  - Labs reviewed- Renal function/electrolytes with Estimated Creatinine Clearance: 17.6 mL/min (A) (based on SCr of 3 mg/dL (H)). according to latest data. Monitor urine output and serial BMP and adjust therapy as needed. Avoid nephrotoxins and renally dose meds for GFR listed above.  Will hold losartan given JOHN and CKD.  This is likely  prerenal etiology.  Will also further evaluate with urine studies.    Cr 3.0 today from 3.5    Generalized weakness  Reported over the past week.  Previously patient able to walk around her house into the bathroom without difficulty unassisted.  Patient now with generalized weakness and inability to ambulate without assistance.  Plan to consult PT/OT during hospitalization      (HFpEF) heart failure with preserved ejection fraction  Euvolemic on exam  Will continue home Lasix 40 mg twice daily  Continue to monitor urine output and renal function    Paroxysmal A-fib  Patient with Persistent (7 days or more) atrial fibrillation which is controlled currently with Beta Blocker. Patient is currently in atrial fibrillation.[id number] Score: 4. Anticoagulation indicated. Anticoagulation done with Eliquis 5 mg twice daily .  Will continue home medications Eliquis 5 mg twice daily and metoprolol.    Type 2 diabetes mellitus with stage 4 chronic kidney disease, with long-term current use of insulin  Creatine stable for now. BMP reviewed- noted Estimated Creatinine Clearance: 15.1 mL/min (A) (based on SCr of 3.5 mg/dL (H)). according to latest data. Based on current GFR, CKD stage is stage 4 - GFR 15-29.  Monitor UOP and serial BMP and adjust therapy as needed. Renally dose meds. Avoid nephrotoxic medications and procedures.  Current Home medications include Lantus 5 units nightly and Januvia 25 mg   Hold home oral medications    Plan:  Start Lantus 5 units nightly  - moderate sliding scale insulin  - will titrate PRN  - diet consistent carbohydrate  - blood glucose goal: 140-180 while inpatient  - POCT Glucose 4 times daily before meals and at bedtime       Primary hypertension  Chronic, controlled. Latest blood pressure and vitals reviewed-     Temp:  [97.4 °F (36.3 °C)-98.8 °F (37.1 °C)]   Pulse:  [66-85]   Resp:  [15-20]   BP: ()/(52-69)   SpO2:  [95 %-100 %] .   Home meds for hypertension were reviewed and noted  below.   Hypertension Medications               amLODIPine (NORVASC) 10 MG tablet Take 1 tablet (10 mg total) by mouth once daily.    furosemide (LASIX) 40 MG tablet Take 1 tablet (40 mg total) by mouth 2 (two) times daily.    losartan (COZAAR) 25 MG tablet Take 1 tablet (25 mg total) by mouth once daily.    metoprolol succinate (TOPROL-XL) 100 MG 24 hr tablet Take 1 tablet (100 mg total) by mouth once daily.    nitroGLYCERIN (NITROSTAT) 0.4 MG SL tablet Place 0.4 mg under the tongue every 5 (five) minutes as needed for Chest pain.          Will plan to continue amlodipine 10 mg daily and metoprolol.  Hold losartan given JOHN.    While in the hospital, will manage blood pressure as follows; Continue home antihypertensive regimen    Will utilize p.r.n. blood pressure medication only if patient's blood pressure greater than 180/110 and she develops symptoms such as worsening chest pain or shortness of breath.      VTE Risk Mitigation (From admission, onward)           Ordered     apixaban tablet 5 mg  2 times daily         06/27/24 1125     IP VTE HIGH RISK PATIENT  Once         06/27/24 1125     Place sequential compression device  Until discontinued         06/27/24 1125     Reason for No Pharmacological VTE Prophylaxis  Once        Question:  Reasons:  Answer:  Already adequately anticoagulated on oral Anticoagulants    06/27/24 1125                    Discharge Planning   EMILIA:      Code Status: Full Code   Is the patient medically ready for discharge?:     Reason for patient still in hospital (select all that apply): Patient trending condition                     Scott Damon MD  Department of Hospital Medicine   [facility]

## 2024-06-28 NOTE — PLAN OF CARE
Problem: Adult Inpatient Plan of Care  Goal: Plan of Care Review  Outcome: Progressing  Goal: Patient-Specific Goal (Individualized)  Outcome: Progressing  Goal: Absence of Hospital-Acquired Illness or Injury  Outcome: Progressing  Goal: Optimal Comfort and Wellbeing  Outcome: Progressing  Goal: Readiness for Transition of Care  Outcome: Progressing     Problem: Diabetes Comorbidity  Goal: Blood Glucose Level Within Targeted Range  Outcome: Progressing     Problem: Acute Kidney Injury/Impairment  Goal: Fluid and Electrolyte Balance  Outcome: Progressing  Goal: Improved Oral Intake  Outcome: Progressing  Goal: Effective Renal Function  Outcome: Progressing     Problem: Wound  Goal: Optimal Coping  Outcome: Progressing  Goal: Optimal Functional Ability  Outcome: Progressing  Goal: Absence of Infection Signs and Symptoms  Outcome: Progressing  Goal: Improved Oral Intake  Outcome: Progressing  Goal: Optimal Pain Control and Function  Outcome: Progressing  Goal: Skin Health and Integrity  Outcome: Progressing  Goal: Optimal Wound Healing  Outcome: Progressing     Problem: Skin Injury Risk Increased  Goal: Skin Health and Integrity  Outcome: Progressing

## 2024-06-28 NOTE — ASSESSMENT & PLAN NOTE
Patient with acute kidney injury/acute renal failure likely due to pre-renal azotemia due to dehydration JOHN is currently stable. Baseline creatinine  2.1-2.6  - Labs reviewed- Renal function/electrolytes with Estimated Creatinine Clearance: 17.6 mL/min (A) (based on SCr of 3 mg/dL (H)). according to latest data. Monitor urine output and serial BMP and adjust therapy as needed. Avoid nephrotoxins and renally dose meds for GFR listed above.  Will hold losartan given JOHN and CKD.  This is likely prerenal etiology.  Will also further evaluate with urine studies.    Cr 3.0 today from 3.5

## 2024-06-28 NOTE — PT/OT/SLP EVAL
Occupational Therapy   Evaluation/tx    Name: Eliza Louie  MRN: [id number]  Admitting Diagnosis: Acute gout of multiple sites  Recent Surgery: * No surgery found *      Recommendations:     Discharge Recommendations: Low Intensity Therapy  Discharge Equipment Recommendations:  dressing device  Barriers to discharge:  None    Assessment:   Pt presents w/ decreased overall endurance/conditioning, balance/mobility & coordination w/ subsequent decline in (I)/safety w/ BADLs, fxnl mobility & fxnl t/f's.  OT 3x/wk to increase phys/fxnl status & maximize potential to achieve established goals for d/c-->low intenisty tx w/ rec reacher.    Eliza Louie is a 73 y.o. female with a medical diagnosis of Acute gout of multiple sites.  She presents with performance deficits affecting function: weakness, impaired endurance, impaired self care skills, impaired functional mobility, gait instability, impaired balance, decreased coordination, decreased safety awareness.      Rehab Prognosis: Good; patient would benefit from acute skilled OT services to address these deficits and reach maximum level of function.       Plan:     Patient to be seen 3 x/week to address the above listed problems via self-care/home management, therapeutic activities, therapeutic exercises  Plan of Care Expires: 07/28/24  Plan of Care Reviewed with: patient    Subjective     Chief Complaint: gout exacerbation  Patient/Family Comments/goals: return to PLOF    Occupational Profile:  Living Environment: w/ spouse in [facility] w/ 7STE & BHR inaccessible simultaneously; t/s w/ [facility]  Previous level of function: MI via rollator w/ PRN A w/ LBD/bathing  Roles and Routines: light IADLs  Equipment Used at Home: shower chair, rollator  Assistance upon Discharge: spouse    Pain/Comfort:  Pain Rating 1: 7/10  Location - Side 1: Left  Location - Orientation 1: generalized  Location 1: foot  Pain Addressed 1: Pre-medicate for activity, Reposition, Distraction  Pain Rating  Post-Intervention 1: 7/10  Pain Rating 2: 7/10  Location - Side 2: Left  Location - Orientation 2: generalized  Location 2: arm  Pain Addressed 2: Pre-medicate for activity, Reposition, Distraction  Pain Rating Post-Intervention 2: 7/10    Patients cultural, spiritual, Rastafarian conflicts given the current situation:      Objective:     Communicated with: riki prior to session.  Patient found HOB elevated with bed alarm, peripheral IV, PureWick upon OT entry to room.    General Precautions: Standard, fall  Orthopedic Precautions: N/A  Braces: N/A  Respiratory Status: Room air    Occupational Performance:    Bed Mobility:    Patient completed Supine to Sit with supervision and stand by assistance    Functional Mobility/Transfers:  Patient completed Sit <> Stand Transfer with contact guard assistance and moderate assistance  with  rolling walker   Patient completed Bed <> Chair Transfer using Step Transfer technique with contact guard assistance with rolling walker  Patient completed Toilet Transfer Step Transfer technique with minimum assistance with  rolling walker  Functional Mobility: see tx note below    Activities of Daily Living:  Grooming: stand by assistance for standing safety  Toileting: moderate assistance for clothing mgmt    Cognitive/Visual Perceptual:  AO4    No signif deficits noted    Physical Exam:  BUEs grossly WFL at 4-/5 prox; 4/5 distally    Sit balance: F+ to G-  Stand balance: F-    AMPAC 6 Click ADL:  AMPAC Total Score: 17    Treatment & Education:   Pt found in SF & agreeable to OT eval/tx this date.  Pt c/o 7/10 pain at L foot/UE & perf the following:  -sup-->EOB via bed rail w/ S-SBA & tolerated w/ Sup  -standing via RW w/ CGA, however required Mod A to recover from post LOB during mid-transition to stance  -fxnl mobility via RW around room for ADLs w/ CGA for balance & PRN Min A for DME mgmt  -toilet t/f via RW w/ Min A for controlled descent  -toileting tasks w/ Mod A for clothing  mgmt  -G/H standing at sink w/ SBA for standing safety  -t/f via RW-->b/s chair w/ CGA  Edu/tx re: general safety techs & HEP. Pt verbalized understanding.  Pt left UIC w/ alarm & nsg made aware.      Patient left up in chair with all lines intact, call button in reach, chair alarm on, and nsg notified    GOALS:   Multidisciplinary Problems       Occupational Therapy Goals          Problem: Occupational Therapy    Goal Priority Disciplines Outcome Interventions   Occupational Therapy Goal     OT, PT/OT Progressing    Description: Goals to be met by: 07/28     Patient will increase functional independence with ADLs by performing:    LE Dressing with Stand-by Assistance.  Grooming while standing at sink with Modified [place] and Supervision.  Toileting from toilet with Modified [place] and Supervision for hygiene and clothing management.   Toilet transfer to toilet with Modified [place] and Supervision.  Increased functional strength to WFL for ADLs.                         History:     Past Medical History:   Diagnosis Date    (HFpEF) heart failure with preserved ejection fraction     TTE (2020) w/ EF 55% and grade II diastolic dysfunction    Anemia due to stage 4 chronic kidney disease 8/16/2021    Gastric ulcer due to Helicobacter pylori 07/2017    Gastrointestinal hemorrhage with melena 07/2017    GERD (gastroesophageal reflux disease)     HLD (hyperlipidemia)     Hydronephrosis with ureteropelvic junction (UPJ) obstruction 8/16/2021    Hypertension     MDD (major depressive disorder) 8/16/2021    Nephrolithiasis 8/25/2021    NSTEMI (non-ST elevated myocardial infarction) 07/2017    Paroxysmal A-fib 12/24/2020    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/2020    Stage 4 chronic kidney disease 10/19/2021    STEMI (ST elevation myocardial infarction) 12/2020    Type 2 diabetes mellitus     Urge incontinence of urine          Past Surgical History:   Procedure Laterality  Date    CARDIAC CATHETERIZATION  07/2017    RCA w/ 95% stenosis w/ stent placement    CATARACT EXTRACTION W/  INTRAOCULAR LENS IMPLANT Right 5/29/2024    Procedure: EXTRACTION, CATARACT, WITH IOL INSERTION;  Surgeon: Maddy Arenas MD;  Location: [facility] OR;  Service: Ophthalmology;  Laterality: Right;    CATARACT EXTRACTION W/  INTRAOCULAR LENS IMPLANT Left 6/26/2024    Procedure: EXTRACTION, CATARACT, WITH IOL INSERTION;  Surgeon: Maddy Arenas MD;  Location: [facility] OR;  Service: Ophthalmology;  Laterality: Left;    RETROGRADE PYELOGRAPHY  8/25/2021    Procedure: PYELOGRAM, RETROGRADE;  Surgeon: Rody Smith MD;  Location: [facility] OR;  Service: Urology;;    URETEROSCOPIC REMOVAL OF URETERIC CALCULUS Left 8/25/2021    Procedure: left ureteroscopy, stone basketing, stent placement;  left nephrostomy tube removal;  Surgeon: Rody Smith MD;  Location: [facility] OR;  Service: Urology;  Laterality: Left;       Time Tracking:     OT Date of Treatment: 06/28/24  OT Start Time: 0927  OT Stop Time: 0953  OT Total Time (min): 26 min    Billable Minutes:Evaluation 10  Self Care/Home Management 8  Therapeutic Activity 8  Total Time 26    6/28/2024

## 2024-06-28 NOTE — ASSESSMENT & PLAN NOTE
Chronic, controlled. Latest blood pressure and vitals reviewed-     Temp:  [97.4 °F (36.3 °C)-98.8 °F (37.1 °C)]   Pulse:  [66-85]   Resp:  [15-20]   BP: ()/(52-69)   SpO2:  [95 %-100 %] .   Home meds for hypertension were reviewed and noted below.   Hypertension Medications               amLODIPine (NORVASC) 10 MG tablet Take 1 tablet (10 mg total) by mouth once daily.    furosemide (LASIX) 40 MG tablet Take 1 tablet (40 mg total) by mouth 2 (two) times daily.    losartan (COZAAR) 25 MG tablet Take 1 tablet (25 mg total) by mouth once daily.    metoprolol succinate (TOPROL-XL) 100 MG 24 hr tablet Take 1 tablet (100 mg total) by mouth once daily.    nitroGLYCERIN (NITROSTAT) 0.4 MG SL tablet Place 0.4 mg under the tongue every 5 (five) minutes as needed for Chest pain.          Will plan to continue amlodipine 10 mg daily and metoprolol.  Hold losartan given JOHN.    While in the hospital, will manage blood pressure as follows; Continue home antihypertensive regimen    Will utilize p.r.n. blood pressure medication only if patient's blood pressure greater than 180/110 and she develops symptoms such as worsening chest pain or shortness of breath.

## 2024-06-28 NOTE — PROGRESS NOTES
Progress Note  Nephrology      Consult Requested By: Afua Denis MD      SUBJECTIVE:     Overnight events  Patient is a 73 y.o. female     Patient Active Problem List   Diagnosis    Primary hypertension    Type 2 diabetes mellitus with stage 4 chronic kidney disease, with long-term current use of insulin    Paroxysmal A-fib    Atherosclerosis of native coronary artery of native heart without angina pectoris    MDD (major depressive disorder)    Anemia due to stage 4 chronic kidney disease    RBBB (right bundle branch block with left anterior fascicular block)    Stented coronary artery    Stage 4 chronic kidney disease    GERD (gastroesophageal reflux disease)    History of nephrolithiasis    (HFpEF) heart failure with preserved ejection fraction    Gout of multiple sites    Nuclear sclerotic cataract of right eye    Generalized weakness    Acute gout of multiple sites    Acute kidney injury superimposed on chronic kidney disease    Diffuse arthralgia    Uremia     Past Medical History:   Diagnosis Date    (HFpEF) heart failure with preserved ejection fraction     TTE (2020) w/ EF 55% and grade II diastolic dysfunction    Anemia due to stage 4 chronic kidney disease 8/16/2021    Gastric ulcer due to Helicobacter pylori 07/2017    Gastrointestinal hemorrhage with melena 07/2017    GERD (gastroesophageal reflux disease)     HLD (hyperlipidemia)     Hydronephrosis with ureteropelvic junction (UPJ) obstruction 8/16/2021    Hypertension     MDD (major depressive disorder) 8/16/2021    Nephrolithiasis 8/25/2021    NSTEMI (non-ST elevated myocardial infarction) 07/2017    Paroxysmal A-fib 12/24/2020    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/2020    Stage 4 chronic kidney disease 10/19/2021    STEMI (ST elevation myocardial infarction) 12/2020    Type 2 diabetes mellitus     Urge incontinence of urine               OBJECTIVE:     Vitals:    06/28/24 0603 06/28/24 0720 06/28/24 0751  "06/28/24 1150   BP:   136/64 (!) 123/58   BP Location:    Right arm   Patient Position:   Lying Lying   Pulse:   66 74   Resp: 20  18 18   Temp:   97.4 °F (36.3 °C) 98.4 °F (36.9 °C)   TempSrc:   Oral Oral   SpO2:   95% 99%   Weight:  83.4 kg (183 lb 13.8 oz)     Height:           Temp: 98.4 °F (36.9 °C) (06/28/24 1150)  Pulse: 74 (06/28/24 1150)  Resp: 18 (06/28/24 1150)  BP: (!) 123/58 (06/28/24 1150)  SpO2: 99 % (06/28/24 1150)              Medications:   amLODIPine  10 mg Oral Daily    apixaban  5 mg Oral BID    insulin glargine U-100  5 Units Subcutaneous BID    metoprolol succinate  100 mg Oral Daily    polyethylene glycol  17 g Oral Daily    predniSONE  40 mg Oral Daily    sodium bicarbonate  1,300 mg Oral QID     Physical Exam:  General appearance:NAD  Lungs: RR 18  Pulse oximeter 99 %  Heart: Pulse 74  Abdomen: soft  Extremities: no edema  Skin: dry  Laboratory:  ABG  Labs reviewed  No results found for this or any previous visit (from the past 336 hour(s)).  Recent Results (from the past 336 hour(s))   CBC with Automated Differential    Collection Time: 06/28/24  6:24 AM   Result Value Ref Range    WBC 12.80 (H) 3.90 - 12.70 K/uL    Hemoglobin 11.8 (L) 12.0 - 16.0 g/dL    Hematocrit 35.3 (L) 37.0 - 48.5 %    Platelets 229 150 - 450 K/uL   CBC auto differential    Collection Time: 06/27/24  9:36 AM   Result Value Ref Range    WBC 12.66 3.90 - 12.70 K/uL    Hemoglobin 13.3 12.0 - 16.0 g/dL    Hematocrit 40.7 37.0 - 48.5 %    Platelets 251 150 - 450 K/uL     Urinalysis  No results for input(s): "COLORU", "CLARITYU", "SPECGRAV", "PHUR", "PROTEINUA", "GLUCOSEU", "BILIRUBINCON", "BLOODU", "WBCU", "RBCU", "BACTERIA", "MUCUS", "NITRITE", "LEUKOCYTESUR", "UROBILINOGEN", "HYALINECASTS" in the last 24 hours.    Diagnostic Results:  X-Ray: Reviewed  US: Reviewed  Echo: Reviewed  ACCESS    ASSESSMENT/PLAN:       JOHN/ CKD 4-5  Diabetes mellitus  On Jardiance  EF 55% - 60 %  Diastolic dysfunction  On lasix 40 bid "   History nephrolithiasis, obstructive uropathy,  2021 - left  nephrostomy  UA protein trace  UA Sodium < 20  US -  Right kidney: The right kidney measures 8.5 cm. No cortical thinning. No loss of corticomedullary distinction. Resistive index measures 0.88.  No mass. No renal stone. No hydronephrosis.  Left kidney: The left kidney measures 9.5 cm. No cortical thinning. No loss of corticomedullary distinction. Resistive index measures 0.88.  No mass. 0.4 cm echogenic focus with twinkle artifact.  No hydronephrosis.  Spleen resistive index measures 0.68.  Bladder is unremarkable  UA protein trace  UA microalbumin/creatinine 167  Creatinine 3.5 - 3  GFR 13 - 16 cc/min  BUN 67- 75  NAG metabolic acidosis  Sodium bicarbonate.  Metabolic bone disease  Hb 13.3- 11.8  Poor nutrition  Uric acid 11.5  Blood pressure 116/68  Echo  Left Ventricle: There is normal systolic function with a visually estimated ejection fraction of 55 - 60%. Grade II diastolic dysfunction. Elevated left ventricular filling pressure.    Right Ventricle: Systolic function is normal.    Mitral Valve: There is mild regurgitation.    Left Atrium: Left atrium is moderately dilated.    Tricuspid Valve: There is mild regurgitation.    IVC/SVC: Intermediate venous pressure at 8 mmHg.  Weight daily  I and O  Renal diet as tolerated.  Avoid nephrotoxic agents, hypotension, hypovolemia.

## 2024-06-28 NOTE — ASSESSMENT & PLAN NOTE
Patient with Persistent (7 days or more) atrial fibrillation which is controlled currently with Beta Blocker. Patient is currently in atrial fibrillation.[id number] Score: 4. Anticoagulation indicated. Anticoagulation done with Eliquis 5 mg twice daily .  Will continue home medications Eliquis 5 mg twice daily and metoprolol.

## 2024-06-28 NOTE — NURSING
Received report from Lisa, received the patient lying supine in bed awake and alert, bed locked in low with alarm on and call light in reach, telesitter in progress at bedside.

## 2024-06-28 NOTE — ED NOTES
Handoff report rcvd from MINDY Jose. Pt care assumed. Pt currently resting in bed, respirations even and unlabored. VSS. Pt currently awaiting admit inpatient bed.

## 2024-06-28 NOTE — ED NOTES
Handoff report rcvd from Mary EMT. Pt care assumed. Pt AAOX4, speech clear, VSS, pt awaitng report to be called for admit inpatient bed.

## 2024-06-28 NOTE — ASSESSMENT & PLAN NOTE
History of gout with previous labs showing uric acid greater than 11.  Reported joint pain in ankles wrists and elbows... as well as shoulders/back.  Unclear if this is a acute gouty flare up versus reactive arthritis.  CRP, ESR elevated. Others pending: anti CCP, rheumatoid factor, MINA with reflex to titer.  Will us will also evaluate PT/OT for her generalized weakness.  Plan to start prednisone 40 mg daily. Today day 2/5.    PLAN:  PT

## 2024-06-28 NOTE — PLAN OF CARE
CM met with pt - lives with  Noah  - [phone]    Daughter Katie  [phone] lives nearby - either  or daughter will transport to home at discharge   Therapy recc low inten - OP Physical Tx  --- dresser/grabber and RW   Pt owns RW, st cane, elevated toilet seat and shower chair   Pt told CM she doesn't want grabber/dresser - will purchase on her own; already has a RW    CM to ask MD to place referral for OP PT   No HH prior to admit   - pt wants OP PT at discharge      [facility]  Family Medicine [phone] [address], [address] [place] [address]      Next Steps: Follow up  Instructions: you will be contacted with a hospital follow up apt per  Ivania Roger MD  Nephrology [phone] [address] [facility] [place] [address]     Next Steps: Follow up on 7/5/2024  Instructions: 11:00 am          06/28/24 1545   Discharge Planning   Assessment Type Discharge Planning Assessment   Resource/Environmental Concerns none   Support Systems Spouse/significant other  ( Noah; daughter Katei  [phone])   Equipment Currently Used at Home cane, straight;walker, rolling  (elevated toilet seat)   Current Living Arrangements home   Patient/Family Anticipates Transition to home;home with family   Patient/Family Anticipated Services at Transition none  (outpt physical therapy)   DME Needed Upon Discharge  none   Discharge Plan A Home;Home with family  (outpt physical therapy)   Discharge Plan B Home;Home with family;Home Health   Financial Resource Strain   How hard is it for you to pay for the very basics like food, housing, medical care, and heating? Not very   Housing Stability   In the last 12 months, was there a time when you were not able to pay the mortgage or rent on time? N   At any time in the past 12 months, were you homeless or living in a shelter (including now)? N   Alcohol Use    Q1: How often do you have a drink containing alcohol? Never   Utilities   In the past 12 months has the electric, gas, oil, or water company threatened to shut off services in your home? No

## 2024-06-28 NOTE — PLAN OF CARE
"  Problem: Physical Therapy  Goal: Physical Therapy Goal  Description: Goals to be met by: 24     Patient will increase functional independence with mobility by performin. Supine to sit with Modified [place]  2. Sit to supine with Modified [place]  3. Sit to stand transfer with Modified [place]  4. Bed to chair transfer with Modified [place] using rollator  5. Gait  x 150 feet with Modified [place] using rollator.   6. Ascend/descend 7 steps/stairs with right Handrails Contact Guard Assistance using HHA from spouse.     Outcome: Progressing     PT Eval completed, note to follow. Pt ambulated ~20 ft with RW and CGA-SBA. Pt is limited by L ankle pain. Educated pt on 3 pt gait with RW and use of BUE to assist with offloading LLE, however, pt is also limited by L elbow and wrist pain. Practiced ascending/descending a 4" step with CGA-Kay and use of HHA and R bed rail. Recommending low intensity therapy (OP PT).    "

## 2024-06-28 NOTE — PROGRESS NOTES
"[facility]           Pharmacy  Admission Medication Reconciliation     Based on information gathered for medication list, you may go to "Admission" then "Reconcile Home Medications" tabs to review and/or act upon those items.     The home medication list has been updated by the Pharmacy department.   Please read ALL comments highlighted in red.   Please address this information as you see fit.    Feel free to contact us if you have any questions or require assistance.    Home medication list has been compared to current inpatient medications. Please review the following discrepancies noted below:      Patient reports STILL TAKING the following medication(s) which was not ordered upon admit  Furosemide  Losartan  Potassium chloride  Rosuvastatin    Feel free to contact us if you have any questions or require assistance.    Adrienne Vu, PharmD  [phone]        "

## 2024-06-28 NOTE — NURSING
VIRTUAL NURSE: Pt arrived to unit. Permission received to turn camera to view patient. VIP model explained; Patient informed this VN will be working with bedside nurse and the rest of the care team.      Admission questions completed.  Plan of care reviewed with patient.  Educated patient on VTE and fall risk. Safety precautions in place. Call light within reach, side rails up x2.     Patient instucted to ask staff for assistance. Patient verbalized complete understanding.  Will continue to be available and intervene as needed.    Labs, notes, orders, and careplan reviewed.      06/27/24 2124   Admission   Initial VN Admission Questions Complete   Shift   Virtual Nurse - Rounding Complete   Virtual Nurse - Patient Verbalized Approval Of Camera Use;VN Rounding   Type of Frequent Check   Type Patient Rounds   Safety/Activity   Patient Rounds bed in low position;call light in patient/parent reach;clutter free environment maintained;ID band on;visualized patient;placement of personal items at bedside;bed wheels locked   Safety Promotion/Fall Prevention assistive device/personal item within reach;side rails raised x 2   Activity Assistance Provided assistance, 1 person   Positioning   Body Position position changed independently   Head of Bed (HOB) Positioning HOB elevated   Pain/Comfort/Sleep   Preferred Pain Scale number (Numeric Rating Pain Scale)

## 2024-06-28 NOTE — SUBJECTIVE & OBJECTIVE
Interval History:   NAEON. VSSAF RA. Patient's pain improved. Slept well. CRP and ESR elevated.   Other labs pending.    Review of Systems   Eyes:  Negative for pain.   Musculoskeletal:  Positive for arthralgias.   Skin:  Negative for color change and rash.   Neurological:  Negative for headaches.   All other systems reviewed and are negative.    Objective:     Vital Signs (Most Recent):  Temp: 97.4 °F (36.3 °C) (06/28/24 0751)  Pulse: 66 (06/28/24 0751)  Resp: 18 (06/28/24 0751)  BP: 136/64 (06/28/24 0751)  SpO2: 95 % (06/28/24 0751) Vital Signs (24h Range):  Temp:  [97.4 °F (36.3 °C)-98.8 °F (37.1 °C)] 97.4 °F (36.3 °C)  Pulse:  [66-85] 66  Resp:  [15-20] 18  SpO2:  [95 %-100 %] 95 %  BP: ()/(52-69) 136/64     Weight: 83.4 kg (183 lb 13.8 oz)  Body mass index is 32.57 kg/m².    Intake/Output Summary (Last 24 hours) at 6/28/2024 0846  Last data filed at 6/27/2024 1224  Gross per 24 hour   Intake 999 ml   Output --   Net 999 ml         Physical Exam  Constitutional:       Appearance: Normal appearance.   HENT:      Head: Normocephalic and atraumatic.   Eyes:      Extraocular Movements: Extraocular movements intact.   Cardiovascular:      Rate and Rhythm: Normal rate and regular rhythm.      Pulses: Normal pulses.      Heart sounds: Normal heart sounds. No murmur heard.  Pulmonary:      Effort: Pulmonary effort is normal. No respiratory distress.      Breath sounds: Normal breath sounds. No stridor. No wheezing, rhonchi or rales.   Abdominal:      General: Abdomen is flat.      Palpations: Abdomen is soft.      Tenderness: There is no abdominal tenderness.   Musculoskeletal:      Right lower leg: No edema.      Left lower leg: No edema.   Skin:     Findings: No rash.   Neurological:      General: No focal deficit present.      Mental Status: She is alert and oriented to person, place, and time.   Psychiatric:         Mood and Affect: Mood normal.         Behavior: Behavior normal.         Thought Content:  Thought content normal.         Judgment: Judgment normal.             Significant Labs: All pertinent labs within the past 24 hours have been reviewed.  CBC:   Recent Labs   Lab 06/27/24  0936 06/28/24 0624   WBC 12.66 12.80*   HGB 13.3 11.8*   HCT 40.7 35.3*    229     CMP:   Recent Labs   Lab 06/27/24  0936 06/28/24 0624    134*   K 4.7 4.8    104   CO2 21* 19*   * 169*   BUN 67* 75*   CREATININE 3.5* 3.0*   CALCIUM 10.1 9.7   PROT 8.4 7.0   ALBUMIN 3.2* 2.6*   BILITOT 0.9 0.5   ALKPHOS 80 76   * 59*   ALT 79* 64*   ANIONGAP 15 11       Significant Imaging: I have reviewed all pertinent imaging results/findings within the past 24 hours.

## 2024-06-29 LAB
ALBUMIN SERPL BCP-MCNC: 2.4 G/DL (ref 3.5–5.2)
ALP SERPL-CCNC: 88 U/L (ref 55–135)
ALT SERPL W/O P-5'-P-CCNC: 95 U/L (ref 10–44)
ANION GAP SERPL CALC-SCNC: 11 MMOL/L (ref 8–16)
AST SERPL-CCNC: 88 U/L (ref 10–40)
BASOPHILS # BLD AUTO: 0.01 K/UL (ref 0–0.2)
BASOPHILS NFR BLD: 0.1 % (ref 0–1.9)
BILIRUB SERPL-MCNC: 0.3 MG/DL (ref 0.1–1)
BUN SERPL-MCNC: 90 MG/DL (ref 8–23)
CALCIUM SERPL-MCNC: 9.5 MG/DL (ref 8.7–10.5)
CHLORIDE SERPL-SCNC: 101 MMOL/L (ref 95–110)
CO2 SERPL-SCNC: 20 MMOL/L (ref 23–29)
CREAT SERPL-MCNC: 3 MG/DL (ref 0.5–1.4)
DIFFERENTIAL METHOD BLD: ABNORMAL
EOSINOPHIL # BLD AUTO: 0 K/UL (ref 0–0.5)
EOSINOPHIL NFR BLD: 0 % (ref 0–8)
ERYTHROCYTE [DISTWIDTH] IN BLOOD BY AUTOMATED COUNT: 14 % (ref 11.5–14.5)
EST. GFR  (NO RACE VARIABLE): 16 ML/MIN/1.73 M^2
GLUCOSE SERPL-MCNC: 215 MG/DL (ref 70–110)
HCT VFR BLD AUTO: 34.8 % (ref 37–48.5)
HGB BLD-MCNC: 11.8 G/DL (ref 12–16)
IMM GRANULOCYTES # BLD AUTO: 0.07 K/UL (ref 0–0.04)
IMM GRANULOCYTES NFR BLD AUTO: 0.6 % (ref 0–0.5)
LYMPHOCYTES # BLD AUTO: 1.2 K/UL (ref 1–4.8)
LYMPHOCYTES NFR BLD: 10.4 % (ref 18–48)
MAGNESIUM SERPL-MCNC: 2.2 MG/DL (ref 1.6–2.6)
MCH RBC QN AUTO: 28.4 PG (ref 27–31)
MCHC RBC AUTO-ENTMCNC: 33.9 G/DL (ref 32–36)
MCV RBC AUTO: 84 FL (ref 82–98)
MONOCYTES # BLD AUTO: 0.6 K/UL (ref 0.3–1)
MONOCYTES NFR BLD: 5.3 % (ref 4–15)
NEUTROPHILS # BLD AUTO: 9.7 K/UL (ref 1.8–7.7)
NEUTROPHILS NFR BLD: 83.6 % (ref 38–73)
NRBC BLD-RTO: 0 /100 WBC
PHOSPHATE SERPL-MCNC: 4.4 MG/DL (ref 2.7–4.5)
PLATELET # BLD AUTO: 262 K/UL (ref 150–450)
PMV BLD AUTO: 10.6 FL (ref 9.2–12.9)
POCT GLUCOSE: 184 MG/DL (ref 70–110)
POCT GLUCOSE: 202 MG/DL (ref 70–110)
POCT GLUCOSE: 237 MG/DL (ref 70–110)
POCT GLUCOSE: 267 MG/DL (ref 70–110)
POTASSIUM SERPL-SCNC: 4.3 MMOL/L (ref 3.5–5.1)
PROT 24H UR-MRATE: NORMAL MG/SPEC (ref 0–100)
PROT SERPL-MCNC: 6.7 G/DL (ref 6–8.4)
PROT UR-MCNC: <7 MG/DL (ref 0–15)
RBC # BLD AUTO: 4.15 M/UL (ref 4–5.4)
SODIUM SERPL-SCNC: 132 MMOL/L (ref 136–145)
URINE COLLECTION DURATION: 24 HR
URINE VOLUME: 1500 ML
WBC # BLD AUTO: 11.64 K/UL (ref 3.9–12.7)

## 2024-06-29 PROCEDURE — 25000003 PHARM REV CODE 250

## 2024-06-29 PROCEDURE — 84156 ASSAY OF PROTEIN URINE: CPT | Performed by: STUDENT IN AN ORGANIZED HEALTH CARE EDUCATION/TRAINING PROGRAM

## 2024-06-29 PROCEDURE — 25000003 PHARM REV CODE 250: Performed by: INTERNAL MEDICINE

## 2024-06-29 PROCEDURE — 84166 PROTEIN E-PHORESIS/URINE/CSF: CPT | Mod: 26,,, | Performed by: PATHOLOGY

## 2024-06-29 PROCEDURE — 25000242 PHARM REV CODE 250 ALT 637 W/ HCPCS: Performed by: INTERNAL MEDICINE

## 2024-06-29 PROCEDURE — 85025 COMPLETE CBC W/AUTO DIFF WBC: CPT | Performed by: STUDENT IN AN ORGANIZED HEALTH CARE EDUCATION/TRAINING PROGRAM

## 2024-06-29 PROCEDURE — 83735 ASSAY OF MAGNESIUM: CPT | Performed by: STUDENT IN AN ORGANIZED HEALTH CARE EDUCATION/TRAINING PROGRAM

## 2024-06-29 PROCEDURE — 84166 PROTEIN E-PHORESIS/URINE/CSF: CPT | Performed by: STUDENT IN AN ORGANIZED HEALTH CARE EDUCATION/TRAINING PROGRAM

## 2024-06-29 PROCEDURE — 96372 THER/PROPH/DIAG INJ SC/IM: CPT | Performed by: STUDENT IN AN ORGANIZED HEALTH CARE EDUCATION/TRAINING PROGRAM

## 2024-06-29 PROCEDURE — 36415 COLL VENOUS BLD VENIPUNCTURE: CPT | Performed by: STUDENT IN AN ORGANIZED HEALTH CARE EDUCATION/TRAINING PROGRAM

## 2024-06-29 PROCEDURE — 25000003 PHARM REV CODE 250: Performed by: STUDENT IN AN ORGANIZED HEALTH CARE EDUCATION/TRAINING PROGRAM

## 2024-06-29 PROCEDURE — 84100 ASSAY OF PHOSPHORUS: CPT | Performed by: STUDENT IN AN ORGANIZED HEALTH CARE EDUCATION/TRAINING PROGRAM

## 2024-06-29 PROCEDURE — 63600175 PHARM REV CODE 636 W HCPCS: Performed by: STUDENT IN AN ORGANIZED HEALTH CARE EDUCATION/TRAINING PROGRAM

## 2024-06-29 PROCEDURE — G0378 HOSPITAL OBSERVATION PER HR: HCPCS

## 2024-06-29 PROCEDURE — 80053 COMPREHEN METABOLIC PANEL: CPT | Performed by: STUDENT IN AN ORGANIZED HEALTH CARE EDUCATION/TRAINING PROGRAM

## 2024-06-29 RX ORDER — ALLOPURINOL 100 MG/1
100 TABLET ORAL DAILY
Status: DISCONTINUED | OUTPATIENT
Start: 2024-06-29 | End: 2024-06-29

## 2024-06-29 RX ORDER — FUROSEMIDE 20 MG/1
20 TABLET ORAL DAILY
Status: DISCONTINUED | OUTPATIENT
Start: 2024-06-30 | End: 2024-06-30 | Stop reason: HOSPADM

## 2024-06-29 RX ORDER — ALLOPURINOL 100 MG/1
100 TABLET ORAL
Status: DISCONTINUED | OUTPATIENT
Start: 2024-06-29 | End: 2024-06-30 | Stop reason: HOSPADM

## 2024-06-29 RX ORDER — INSULIN GLARGINE 100 [IU]/ML
7 INJECTION, SOLUTION SUBCUTANEOUS 2 TIMES DAILY
Status: DISCONTINUED | OUTPATIENT
Start: 2024-06-29 | End: 2024-06-30 | Stop reason: HOSPADM

## 2024-06-29 RX ADMIN — KETOROLAC TROMETHAMINE 1 DROP: 4 SOLUTION/ DROPS OPHTHALMIC at 09:06

## 2024-06-29 RX ADMIN — OFLOXACIN 1 DROP: 3 SOLUTION/ DROPS OPHTHALMIC at 09:06

## 2024-06-29 RX ADMIN — METOPROLOL SUCCINATE 100 MG: 50 TABLET, EXTENDED RELEASE ORAL at 08:06

## 2024-06-29 RX ADMIN — OFLOXACIN 1 DROP: 3 SOLUTION/ DROPS OPHTHALMIC at 02:06

## 2024-06-29 RX ADMIN — APIXABAN 5 MG: 5 TABLET, FILM COATED ORAL at 08:06

## 2024-06-29 RX ADMIN — OFLOXACIN 1 DROP: 3 SOLUTION/ DROPS OPHTHALMIC at 08:06

## 2024-06-29 RX ADMIN — INSULIN GLARGINE 7 UNITS: 100 INJECTION, SOLUTION SUBCUTANEOUS at 08:06

## 2024-06-29 RX ADMIN — SODIUM BICARBONATE 1300 MG: 650 TABLET ORAL at 12:06

## 2024-06-29 RX ADMIN — INSULIN ASPART 4 UNITS: 100 INJECTION, SOLUTION INTRAVENOUS; SUBCUTANEOUS at 06:06

## 2024-06-29 RX ADMIN — PREDNISOLONE ACETATE 1 DROP: 10 SUSPENSION/ DROPS OPHTHALMIC at 02:06

## 2024-06-29 RX ADMIN — INSULIN ASPART 3 UNITS: 100 INJECTION, SOLUTION INTRAVENOUS; SUBCUTANEOUS at 08:06

## 2024-06-29 RX ADMIN — EMPAGLIFLOZIN 10 MG: 10 TABLET, FILM COATED ORAL at 01:06

## 2024-06-29 RX ADMIN — PREDNISOLONE ACETATE 1 DROP: 10 SUSPENSION/ DROPS OPHTHALMIC at 08:06

## 2024-06-29 RX ADMIN — SODIUM BICARBONATE 1300 MG: 650 TABLET ORAL at 06:06

## 2024-06-29 RX ADMIN — INSULIN ASPART 2 UNITS: 100 INJECTION, SOLUTION INTRAVENOUS; SUBCUTANEOUS at 12:06

## 2024-06-29 RX ADMIN — INSULIN ASPART 4 UNITS: 100 INJECTION, SOLUTION INTRAVENOUS; SUBCUTANEOUS at 04:06

## 2024-06-29 RX ADMIN — ALLOPURINOL 100 MG: 100 TABLET ORAL at 01:06

## 2024-06-29 RX ADMIN — KETOROLAC TROMETHAMINE 1 DROP: 4 SOLUTION/ DROPS OPHTHALMIC at 03:06

## 2024-06-29 RX ADMIN — SODIUM BICARBONATE 1300 MG: 650 TABLET ORAL at 10:06

## 2024-06-29 RX ADMIN — INSULIN GLARGINE 5 UNITS: 100 INJECTION, SOLUTION SUBCUTANEOUS at 08:06

## 2024-06-29 RX ADMIN — PREDNISOLONE ACETATE 1 DROP: 10 SUSPENSION/ DROPS OPHTHALMIC at 09:06

## 2024-06-29 RX ADMIN — AMLODIPINE BESYLATE 10 MG: 5 TABLET ORAL at 08:06

## 2024-06-29 RX ADMIN — PREDNISONE 40 MG: 20 TABLET ORAL at 08:06

## 2024-06-29 NOTE — ASSESSMENT & PLAN NOTE
History of gout with previous labs showing uric acid greater than 11.  Reported joint pain in ankles wrists and elbows... as well as shoulders/back.  Unclear if this is a acute gouty flare up versus reactive arthritis.  CRP, ESR elevated. Others pending: anti CCP, rheumatoid factor, MINA with reflex to titer.  Will us will also evaluate PT/OT for her generalized weakness.  Plan to start prednisone 40 mg daily. Today day 3/5.    PLAN:  PT

## 2024-06-29 NOTE — PROGRESS NOTES
[facility] Medicine  Progress Note    Patient Name: Eliza Louie  MRN: [id number]  Patient Class: OP- Observation   Admission Date: 6/27/2024  Length of Stay: 1 days  Attending Physician: Afua Denis MD  Primary Care Provider: Casi Garcia MD        Subjective:     Principal Problem:Acute gout of multiple sites        HPI:  Eliza Louie is a 73 year old female with a PMHx of pAfib on Eliquis, HTN, Stage 4 CKD, T2DM on insulin, HFpEF, gout presents today with her daughter for generalized weakness to the lower extremities since Friday. She also admits diffuse arthralgias to the upper extremities and back since Friday as well. She states her ankles and arms feel weak and tender. Previously she was able to ambulate on her own, but since Friday has needed assistance walking around at home. Denies tingling, loss of sensation to the extremities, headache, palpitations, chest pain or urinary symptoms. Yesterday she had cataract surgery to the left eye and denies any post-op complications. She endorses not taking any of her medications this morning.     In the ED her vitals are HR 82, /74, resp 14, temp 98.9, spo2 99%. CBC was unremarkable. CMP consisted of CO2 21, glucose 218, BUN/Cr 67/3.5 (baseline Cr 2.1-2.6), albumin 3.2, AST//79. Rheumatologic workup pending. Pt was [facility] for generalized weakness, diffuse arthralgias, and JOHN CKD Stage 4.      Current medications:  Crestor 40 mg daily  Amlodipine 10 mg daily  Losartan 25 mg daily  Lasix 40 mg twice daily  Eliquis 5 mg twice daily  Metoprolol succinate 100 mg daily  Potassium supplementation  Prednisolone eyedrops to both eyes    Overview/Hospital Course:  No notes on file    Interval History:   NAEON. VSSAF RA. Feeling better, kidney function trending to baseline. Feels ready to go home.  24hr urine electrophoresis pending. Likely DC tomorrow.    Review of Systems   Musculoskeletal:  Negative for  arthralgias, joint swelling and myalgias.   All other systems reviewed and are negative.    Objective:     Vital Signs (Most Recent):  Temp: 97.6 °F (36.4 °C) (06/29/24 0748)  Pulse: 65 (06/29/24 0748)  Resp: 18 (06/29/24 0748)  BP: 124/60 (06/29/24 0748)  SpO2: 97 % (06/29/24 0748) Vital Signs (24h Range):  Temp:  [97.6 °F (36.4 °C)-98.4 °F (36.9 °C)] 97.6 °F (36.4 °C)  Pulse:  [57-84] 65  Resp:  [17-18] 18  SpO2:  [97 %-99 %] 97 %  BP: (113-132)/(58-70) 124/60     Weight: 86.3 kg (190 lb 4.1 oz)  Body mass index is 33.7 kg/m².    Intake/Output Summary (Last 24 hours) at 6/29/2024 1105  Last data filed at 6/29/2024 1030  Gross per 24 hour   Intake --   Output 800 ml   Net -800 ml         Physical Exam  Constitutional:       Appearance: Normal appearance. She is obese.   HENT:      Head: Normocephalic and atraumatic.   Eyes:      Extraocular Movements: Extraocular movements intact.   Cardiovascular:      Rate and Rhythm: Normal rate and regular rhythm.      Pulses: Normal pulses.      Heart sounds: Normal heart sounds. No murmur heard.  Pulmonary:      Effort: Pulmonary effort is normal. No respiratory distress.      Breath sounds: Normal breath sounds. No stridor. No wheezing, rhonchi or rales.   Abdominal:      General: Abdomen is flat.      Palpations: Abdomen is soft.      Tenderness: There is no abdominal tenderness.   Musculoskeletal:         General: No tenderness. Normal range of motion.      Right lower leg: No edema.      Left lower leg: No edema.   Skin:     Findings: No erythema or rash.   Neurological:      General: No focal deficit present.      Mental Status: She is alert and oriented to person, place, and time.   Psychiatric:         Mood and Affect: Mood normal.         Behavior: Behavior normal.         Thought Content: Thought content normal.         Judgment: Judgment normal.             Significant Labs: All pertinent labs within the past 24 hours have been reviewed.  CBC:   Recent Labs   Lab  06/28/24 0624 06/29/24  0651   WBC 12.80* 11.64   HGB 11.8* 11.8*   HCT 35.3* 34.8*    262     CMP:   Recent Labs   Lab 06/28/24 0624 06/29/24  0651   * 132*   K 4.8 4.3    101   CO2 19* 20*   * 215*   BUN 75* 90*   CREATININE 3.0* 3.0*   CALCIUM 9.7 9.5   PROT 7.0 6.7   ALBUMIN 2.6* 2.4*   BILITOT 0.5 0.3   ALKPHOS 76 88   AST 59* 88*   ALT 64* 95*   ANIONGAP 11 11       Significant Imaging: I have reviewed all pertinent imaging results/findings within the past 24 hours.    Assessment/Plan:      * Acute gout of multiple sites  History of gout with previous labs showing uric acid greater than 11.  Reported joint pain in ankles wrists and elbows... as well as shoulders/back.  Unclear if this is a acute gouty flare up versus reactive arthritis.  CRP, ESR elevated. Others pending: anti CCP, rheumatoid factor, MINA with reflex to titer.  Will us will also evaluate PT/OT for her generalized weakness.  Plan to start prednisone 40 mg daily. Today day 3/5.    PLAN:  PT        Uremia        Diffuse arthralgia  History of gout with previous labs showing uric acid greater than 11.  Reported joint pain in ankles wrists and elbows.  Unclear if this is a acute gouty flare up versus reactive arthritis.  For further evaluation will order CRP, ESR, anti CCP, rheumatoid factor, MINA with reflex to titer.  Will us will also evaluate PT/OT for her generalized weakness.  Plan to start prednisone 40 mg daily.      Acute kidney injury superimposed on chronic kidney disease  Patient with acute kidney injury/acute renal failure likely due to pre-renal azotemia due to dehydration JOHN is currently stable. Baseline creatinine  2.1-2.6  - Labs reviewed- Renal function/electrolytes with Estimated Creatinine Clearance: 17.9 mL/min (A) (based on SCr of 3 mg/dL (H)). according to latest data. Monitor urine output and serial BMP and adjust therapy as needed. Avoid nephrotoxins and renally dose meds for GFR listed above.   Will hold losartan given JOHN and CKD.  This is likely prerenal etiology.  Will also further evaluate with urine studies.    Cr 3.0 today from 3.0    Generalized weakness  Reported over the past week.  Previously patient able to walk around her house into the bathroom without difficulty unassisted.  Patient now with generalized weakness and inability to ambulate without assistance.  Plan to consult PT/OT during hospitalization      (HFpEF) heart failure with preserved ejection fraction  Euvolemic on exam  Will continue home Lasix 40 mg twice daily  Continue to monitor urine output and renal function    Paroxysmal A-fib  Patient with Persistent (7 days or more) atrial fibrillation which is controlled currently with Beta Blocker. Patient is currently in atrial fibrillation.[id number] Score: 4. Anticoagulation indicated. Anticoagulation done with Eliquis 5 mg twice daily .  Will continue home medications Eliquis 5 mg twice daily and metoprolol.    Type 2 diabetes mellitus with stage 4 chronic kidney disease, with long-term current use of insulin  Creatine stable for now. BMP reviewed- noted Estimated Creatinine Clearance: 17.9 mL/min (A) (based on SCr of 3 mg/dL (H)). according to latest data. Based on current GFR, CKD stage is stage 4 - GFR 15-29.  Monitor UOP and serial BMP and adjust therapy as needed. Renally dose meds. Avoid nephrotoxic medications and procedures.  Current Home medications include Lantus 5 units nightly and Januvia 25 mg   Hold home oral medications    Plan:  Lantus increased to 7 units bid  - moderate sliding scale insulin  - will titrate PRN  - diet consistent carbohydrate  - blood glucose goal: 140-180 while inpatient  - POCT Glucose 4 times daily before meals and at bedtime       Primary hypertension  Chronic, controlled. Latest blood pressure and vitals reviewed-     Temp:  [97.6 °F (36.4 °C)-98.4 °F (36.9 °C)]   Pulse:  [57-84]   Resp:  [17-18]   BP: (113-132)/(58-70)   SpO2:  [97 %-99 %] .    Home meds for hypertension were reviewed and noted below.   Hypertension Medications               amLODIPine (NORVASC) 10 MG tablet Take 1 tablet (10 mg total) by mouth once daily.    furosemide (LASIX) 40 MG tablet Take 1 tablet (40 mg total) by mouth 2 (two) times daily.    losartan (COZAAR) 25 MG tablet Take 1 tablet (25 mg total) by mouth once daily.    metoprolol succinate (TOPROL-XL) 100 MG 24 hr tablet Take 1 tablet (100 mg total) by mouth once daily.    nitroGLYCERIN (NITROSTAT) 0.4 MG SL tablet Place 0.4 mg under the tongue every 5 (five) minutes as needed for Chest pain.          Will plan to continue amlodipine 10 mg daily and metoprolol.  Hold losartan given JOHN.    While in the hospital, will manage blood pressure as follows; Continue home antihypertensive regimen    Will utilize p.r.n. blood pressure medication only if patient's blood pressure greater than 180/110 and she develops symptoms such as worsening chest pain or shortness of breath.      VTE Risk Mitigation (From admission, onward)           Ordered     apixaban tablet 5 mg  2 times daily         06/27/24 1125     IP VTE HIGH RISK PATIENT  Once         06/27/24 1125     Place sequential compression device  Until discontinued         06/27/24 1125     Reason for No Pharmacological VTE Prophylaxis  Once        Question:  Reasons:  Answer:  Already adequately anticoagulated on oral Anticoagulants    06/27/24 1125                    Discharge Planning   EMILIA:      Code Status: Full Code   Is the patient medically ready for discharge?:     Reason for patient still in hospital (select all that apply): Patient trending condition  Discharge Plan A: Home, Home with family (outpt physical therapy)                  Scott Damon MD  Department of Hospital Medicine   [facility]

## 2024-06-29 NOTE — PROGRESS NOTES
Progress Note  Nephrology      Consult Requested By: Afua Denis MD      SUBJECTIVE:     Overnight events  Patient is a 73 y.o. female     Patient Active Problem List   Diagnosis    Primary hypertension    Type 2 diabetes mellitus with stage 4 chronic kidney disease, with long-term current use of insulin    Paroxysmal A-fib    Atherosclerosis of native coronary artery of native heart without angina pectoris    MDD (major depressive disorder)    Anemia due to stage 4 chronic kidney disease    RBBB (right bundle branch block with left anterior fascicular block)    Stented coronary artery    Stage 4 chronic kidney disease    GERD (gastroesophageal reflux disease)    History of nephrolithiasis    (HFpEF) heart failure with preserved ejection fraction    Gout of multiple sites    Nuclear sclerotic cataract of right eye    Generalized weakness    Acute gout of multiple sites    Acute kidney injury superimposed on chronic kidney disease    Diffuse arthralgia    Uremia     Past Medical History:   Diagnosis Date    (HFpEF) heart failure with preserved ejection fraction     TTE (2020) w/ EF 55% and grade II diastolic dysfunction    Anemia due to stage 4 chronic kidney disease 8/16/2021    Gastric ulcer due to Helicobacter pylori 07/2017    Gastrointestinal hemorrhage with melena 07/2017    GERD (gastroesophageal reflux disease)     HLD (hyperlipidemia)     Hydronephrosis with ureteropelvic junction (UPJ) obstruction 8/16/2021    Hypertension     MDD (major depressive disorder) 8/16/2021    Nephrolithiasis 8/25/2021    NSTEMI (non-ST elevated myocardial infarction) 07/2017    Paroxysmal A-fib 12/24/2020    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/2020    Stage 4 chronic kidney disease 10/19/2021    STEMI (ST elevation myocardial infarction) 12/2020    Type 2 diabetes mellitus     Urge incontinence of urine               OBJECTIVE:     Vitals:    06/28/24 1931 06/28/24 2347 06/29/24 0341  06/29/24 0748   BP: (!) 123/59 132/66 128/61 124/60   BP Location:    Right arm   Patient Position:    Lying   Pulse: 75 84 (!) 57 65   Resp: 17 17 17 18   Temp: 98 °F (36.7 °C) 98.4 °F (36.9 °C) 97.9 °F (36.6 °C) 97.6 °F (36.4 °C)   TempSrc:  Oral Oral Oral   SpO2: 98% 97% 97% 97%   Weight: 86.3 kg (190 lb 4.1 oz)      Height:           Temp: 97.6 °F (36.4 °C) (06/29/24 0748)  Pulse: 65 (06/29/24 0748)  Resp: 18 (06/29/24 0748)  BP: 124/60 (06/29/24 0748)  SpO2: 97 % (06/29/24 0748)    Date 06/29/24 0700 - 06/30/24 0659   Shift 4725-4791 2972-5034 9391-7430 24 Hour Total   INTAKE   Shift Total(mL/kg)       OUTPUT   Urine(mL/kg/hr) 200   200   Shift Total(mL/kg) 200(2.3)   200(2.3)   Weight (kg) 86.3 86.3 86.3 86.3             Medications:   amLODIPine  10 mg Oral Daily    apixaban  5 mg Oral BID    insulin glargine U-100  7 Units Subcutaneous BID    ketorolac 0.4%  1 drop Right Eye TID    metoprolol succinate  100 mg Oral Daily    ofloxacin  1 drop Right Eye TID    polyethylene glycol  17 g Oral Daily    prednisoLONE acetate  1 drop Both Eyes TID    predniSONE  40 mg Oral Daily    sodium bicarbonate  1,300 mg Oral QID                 Physical Exam:  General appearance:  Feeling better  Lungs: RR 18  Pulse oximeter 97 % O2  Heart: Pulse 66  Abdomen: soft  Extremities: edema  Skin: dry    Laboratory:  ABG  Labs reviewed  No results found for this or any previous visit (from the past 336 hour(s)).  Recent Results (from the past 336 hour(s))   CBC with Automated Differential    Collection Time: 06/29/24  6:51 AM   Result Value Ref Range    WBC 11.64 3.90 - 12.70 K/uL    Hemoglobin 11.8 (L) 12.0 - 16.0 g/dL    Hematocrit 34.8 (L) 37.0 - 48.5 %    Platelets 262 150 - 450 K/uL   CBC with Automated Differential    Collection Time: 06/28/24  6:24 AM   Result Value Ref Range    WBC 12.80 (H) 3.90 - 12.70 K/uL    Hemoglobin 11.8 (L) 12.0 - 16.0 g/dL    Hematocrit 35.3 (L) 37.0 - 48.5 %    Platelets 229 150 - 450 K/uL   CBC  auto differential    Collection Time: 06/27/24  9:36 AM   Result Value Ref Range    WBC 12.66 3.90 - 12.70 K/uL    Hemoglobin 13.3 12.0 - 16.0 g/dL    Hematocrit 40.7 37.0 - 48.5 %    Platelets 251 150 - 450 K/uL     Urinalysis  Recent Labs   Lab 06/28/24  1656   COLORU Yellow   SPECGRAV 1.015   PHUR 5.0   PROTEINUA Negative   NITRITE Negative   LEUKOCYTESUR Negative   UROBILINOGEN Negative       Diagnostic Results:  X-Ray: Reviewed  US: Reviewed  Echo: Reviewed  ACCESS    ASSESSMENT/PLAN:     JOHN/ CKD 4-5  Diabetes mellitus  On Jardiance  EF 55% - 60 %  Diastolic dysfunction  On lasix 40 bid   History nephrolithiasis, obstructive uropathy,  2021 - left  nephrostomy  UA protein trace  UA Sodium < 20  US -  Right kidney: The right kidney measures 8.5 cm. No cortical thinning. No loss of corticomedullary distinction. Resistive index measures 0.88.  No mass. No renal stone. No hydronephrosis.  Left kidney: The left kidney measures 9.5 cm. No cortical thinning. No loss of corticomedullary distinction. Resistive index measures 0.88.  No mass. 0.4 cm echogenic focus with twinkle artifact.  No hydronephrosis.  Spleen resistive index measures 0.68.  Bladder is unremarkable  UA protein trace  UA microalbumin/creatinine 167  Creatinine 3.5 - 3  GFR 13 - 16 cc/min  BUN 67- 75- 90  NAG metabolic acidosis  Sodium bicarbonate.  Na 132.  Metabolic bone disease  Hb 13.3- 11.8  Poor nutrition  Albumin 2.4  Uric acid 11.5  Allopurinol 100 mg M-W-F  Blood pressure 116/68  Echo  Left Ventricle: There is normal systolic function with a visually estimated ejection fraction of 55 - 60%. Grade II diastolic dysfunction. Elevated left ventricular filling pressure.    Right Ventricle: Systolic function is normal.    Mitral Valve: There is mild regurgitation.    Left Atrium: Left atrium is moderately dilated.    Tricuspid Valve: There is mild regurgitation.    IVC/SVC: Intermediate venous pressure at 8 mmHg.    Weight daily  I and  O  Renal diet as tolerated.  Avoid nephrotoxic agents, hypotension, hypovolemia.  Will restart jardiance .

## 2024-06-29 NOTE — PLAN OF CARE
Patient is AAOx4. Pt given medications as ordered per MAR. Blood glucose monitoring maintained. Mepilex to Left abdomen/back CDI. Up to bedside commode with 1 person assist. Safety maintained. Bed alarm set. Instructed to use call light for assistance. Will continue to monitor.        Problem: Diabetes Comorbidity  Goal: Blood Glucose Level Within Targeted Range  Outcome: Progressing     Problem: Acute Kidney Injury/Impairment  Goal: Effective Renal Function  Outcome: Progressing     Problem: Skin Injury Risk Increased  Goal: Skin Health and Integrity  Outcome: Progressing

## 2024-06-29 NOTE — ASSESSMENT & PLAN NOTE
Patient with acute kidney injury/acute renal failure likely due to pre-renal azotemia due to dehydration JOHN is currently stable. Baseline creatinine  2.1-2.6  - Labs reviewed- Renal function/electrolytes with Estimated Creatinine Clearance: 17.9 mL/min (A) (based on SCr of 3 mg/dL (H)). according to latest data. Monitor urine output and serial BMP and adjust therapy as needed. Avoid nephrotoxins and renally dose meds for GFR listed above.  Will hold losartan given JOHN and CKD.  This is likely prerenal etiology.  Will also further evaluate with urine studies.    Cr 3.0 today from 3.0

## 2024-06-29 NOTE — ASSESSMENT & PLAN NOTE
Creatine stable for now. BMP reviewed- noted Estimated Creatinine Clearance: 17.9 mL/min (A) (based on SCr of 3 mg/dL (H)). according to latest data. Based on current GFR, CKD stage is stage 4 - GFR 15-29.  Monitor UOP and serial BMP and adjust therapy as needed. Renally dose meds. Avoid nephrotoxic medications and procedures.  Current Home medications include Lantus 5 units nightly and Januvia 25 mg   Hold home oral medications    Plan:  Lantus increased to 7 units bid  - moderate sliding scale insulin  - will titrate PRN  - diet consistent carbohydrate  - blood glucose goal: 140-180 while inpatient  - POCT Glucose 4 times daily before meals and at bedtime

## 2024-06-29 NOTE — ASSESSMENT & PLAN NOTE
Patient with Persistent (7 days or more) atrial fibrillation which is controlled currently with Beta Blocker. Patient is currently in atrial fibrillation.[id number] Score: 4. Anticoagulation indicated. Anticoagulation done with Eliquis 5 mg twice daily .  Will continue home medications Eliquis 5 mg twice daily and metoprolol.

## 2024-06-29 NOTE — SUBJECTIVE & OBJECTIVE
Interval History:   NAEON. VSSAF RA. Feeling better, kidney function trending to baseline. Feels ready to go home.  24hr urine electrophoresis pending. Likely DC tomorrow.    Review of Systems   Musculoskeletal:  Negative for arthralgias, joint swelling and myalgias.   All other systems reviewed and are negative.    Objective:     Vital Signs (Most Recent):  Temp: 97.6 °F (36.4 °C) (06/29/24 0748)  Pulse: 65 (06/29/24 0748)  Resp: 18 (06/29/24 0748)  BP: 124/60 (06/29/24 0748)  SpO2: 97 % (06/29/24 0748) Vital Signs (24h Range):  Temp:  [97.6 °F (36.4 °C)-98.4 °F (36.9 °C)] 97.6 °F (36.4 °C)  Pulse:  [57-84] 65  Resp:  [17-18] 18  SpO2:  [97 %-99 %] 97 %  BP: (113-132)/(58-70) 124/60     Weight: 86.3 kg (190 lb 4.1 oz)  Body mass index is 33.7 kg/m².    Intake/Output Summary (Last 24 hours) at 6/29/2024 1105  Last data filed at 6/29/2024 1030  Gross per 24 hour   Intake --   Output 800 ml   Net -800 ml         Physical Exam  Constitutional:       Appearance: Normal appearance. She is obese.   HENT:      Head: Normocephalic and atraumatic.   Eyes:      Extraocular Movements: Extraocular movements intact.   Cardiovascular:      Rate and Rhythm: Normal rate and regular rhythm.      Pulses: Normal pulses.      Heart sounds: Normal heart sounds. No murmur heard.  Pulmonary:      Effort: Pulmonary effort is normal. No respiratory distress.      Breath sounds: Normal breath sounds. No stridor. No wheezing, rhonchi or rales.   Abdominal:      General: Abdomen is flat.      Palpations: Abdomen is soft.      Tenderness: There is no abdominal tenderness.   Musculoskeletal:         General: No tenderness. Normal range of motion.      Right lower leg: No edema.      Left lower leg: No edema.   Skin:     Findings: No erythema or rash.   Neurological:      General: No focal deficit present.      Mental Status: She is alert and oriented to person, place, and time.   Psychiatric:         Mood and Affect: Mood normal.          Behavior: Behavior normal.         Thought Content: Thought content normal.         Judgment: Judgment normal.             Significant Labs: All pertinent labs within the past 24 hours have been reviewed.  CBC:   Recent Labs   Lab 06/28/24 0624 06/29/24  0651   WBC 12.80* 11.64   HGB 11.8* 11.8*   HCT 35.3* 34.8*    262     CMP:   Recent Labs   Lab 06/28/24  0624 06/29/24  0651   * 132*   K 4.8 4.3    101   CO2 19* 20*   * 215*   BUN 75* 90*   CREATININE 3.0* 3.0*   CALCIUM 9.7 9.5   PROT 7.0 6.7   ALBUMIN 2.6* 2.4*   BILITOT 0.5 0.3   ALKPHOS 76 88   AST 59* 88*   ALT 64* 95*   ANIONGAP 11 11       Significant Imaging: I have reviewed all pertinent imaging results/findings within the past 24 hours.

## 2024-06-29 NOTE — ASSESSMENT & PLAN NOTE
Chronic, controlled. Latest blood pressure and vitals reviewed-     Temp:  [97.6 °F (36.4 °C)-98.4 °F (36.9 °C)]   Pulse:  [57-84]   Resp:  [17-18]   BP: (113-132)/(58-70)   SpO2:  [97 %-99 %] .   Home meds for hypertension were reviewed and noted below.   Hypertension Medications               amLODIPine (NORVASC) 10 MG tablet Take 1 tablet (10 mg total) by mouth once daily.    furosemide (LASIX) 40 MG tablet Take 1 tablet (40 mg total) by mouth 2 (two) times daily.    losartan (COZAAR) 25 MG tablet Take 1 tablet (25 mg total) by mouth once daily.    metoprolol succinate (TOPROL-XL) 100 MG 24 hr tablet Take 1 tablet (100 mg total) by mouth once daily.    nitroGLYCERIN (NITROSTAT) 0.4 MG SL tablet Place 0.4 mg under the tongue every 5 (five) minutes as needed for Chest pain.          Will plan to continue amlodipine 10 mg daily and metoprolol.  Hold losartan given JOHN.    While in the hospital, will manage blood pressure as follows; Continue home antihypertensive regimen    Will utilize p.r.n. blood pressure medication only if patient's blood pressure greater than 180/110 and she develops symptoms such as worsening chest pain or shortness of breath.

## 2024-06-30 VITALS
HEART RATE: 69 BPM | WEIGHT: 190.25 LBS | DIASTOLIC BLOOD PRESSURE: 67 MMHG | SYSTOLIC BLOOD PRESSURE: 130 MMHG | OXYGEN SATURATION: 100 % | BODY MASS INDEX: 33.71 KG/M2 | RESPIRATION RATE: 18 BRPM | HEIGHT: 63 IN | TEMPERATURE: 98 F

## 2024-06-30 LAB
ALBUMIN SERPL BCP-MCNC: 2.5 G/DL (ref 3.5–5.2)
ALBUMIN/CREAT UR: NORMAL UG/MG (ref 0–30)
ALP SERPL-CCNC: 77 U/L (ref 55–135)
ALT SERPL W/O P-5'-P-CCNC: 98 U/L (ref 10–44)
ANION GAP SERPL CALC-SCNC: 10 MMOL/L (ref 8–16)
AST SERPL-CCNC: 59 U/L (ref 10–40)
BASOPHILS # BLD AUTO: 0.01 K/UL (ref 0–0.2)
BASOPHILS NFR BLD: 0.1 % (ref 0–1.9)
BILIRUB SERPL-MCNC: 0.4 MG/DL (ref 0.1–1)
BNP SERPL-MCNC: 481 PG/ML (ref 0–99)
BNP SERPL-MCNC: 481 PG/ML (ref 0–99)
BUN SERPL-MCNC: 95 MG/DL (ref 8–23)
CALCIUM SERPL-MCNC: 9.4 MG/DL (ref 8.7–10.5)
CHLORIDE SERPL-SCNC: 103 MMOL/L (ref 95–110)
CO2 SERPL-SCNC: 22 MMOL/L (ref 23–29)
CREAT SERPL-MCNC: 3 MG/DL (ref 0.5–1.4)
CREAT UR-MCNC: 44 MG/DL (ref 15–325)
DIFFERENTIAL METHOD BLD: ABNORMAL
EOSINOPHIL # BLD AUTO: 0 K/UL (ref 0–0.5)
EOSINOPHIL NFR BLD: 0.1 % (ref 0–8)
ERYTHROCYTE [DISTWIDTH] IN BLOOD BY AUTOMATED COUNT: 13.9 % (ref 11.5–14.5)
EST. GFR  (NO RACE VARIABLE): 16 ML/MIN/1.73 M^2
GLUCOSE SERPL-MCNC: 156 MG/DL (ref 70–110)
HCT VFR BLD AUTO: 33.4 % (ref 37–48.5)
HGB BLD-MCNC: 11.6 G/DL (ref 12–16)
IMM GRANULOCYTES # BLD AUTO: 0.09 K/UL (ref 0–0.04)
IMM GRANULOCYTES NFR BLD AUTO: 0.9 % (ref 0–0.5)
LYMPHOCYTES # BLD AUTO: 1.6 K/UL (ref 1–4.8)
LYMPHOCYTES NFR BLD: 14.8 % (ref 18–48)
MAGNESIUM SERPL-MCNC: 2.3 MG/DL (ref 1.6–2.6)
MCH RBC QN AUTO: 28.9 PG (ref 27–31)
MCHC RBC AUTO-ENTMCNC: 34.7 G/DL (ref 32–36)
MCV RBC AUTO: 83 FL (ref 82–98)
MICROALBUMIN UR DL<=1MG/L-MCNC: <5 UG/ML
MONOCYTES # BLD AUTO: 0.8 K/UL (ref 0.3–1)
MONOCYTES NFR BLD: 7.9 % (ref 4–15)
NEUTROPHILS # BLD AUTO: 8 K/UL (ref 1.8–7.7)
NEUTROPHILS NFR BLD: 76.2 % (ref 38–73)
NRBC BLD-RTO: 0 /100 WBC
PHOSPHATE SERPL-MCNC: 4.4 MG/DL (ref 2.7–4.5)
PLATELET # BLD AUTO: 297 K/UL (ref 150–450)
PMV BLD AUTO: 9.7 FL (ref 9.2–12.9)
POCT GLUCOSE: 172 MG/DL (ref 70–110)
POCT GLUCOSE: 177 MG/DL (ref 70–110)
POTASSIUM SERPL-SCNC: 4.2 MMOL/L (ref 3.5–5.1)
PROT PATTERN UR ELPH-IMP: NORMAL
PROT SERPL-MCNC: 6.7 G/DL (ref 6–8.4)
RBC # BLD AUTO: 4.02 M/UL (ref 4–5.4)
SODIUM SERPL-SCNC: 135 MMOL/L (ref 136–145)
WBC # BLD AUTO: 10.5 K/UL (ref 3.9–12.7)

## 2024-06-30 PROCEDURE — 84100 ASSAY OF PHOSPHORUS: CPT | Performed by: STUDENT IN AN ORGANIZED HEALTH CARE EDUCATION/TRAINING PROGRAM

## 2024-06-30 PROCEDURE — 80053 COMPREHEN METABOLIC PANEL: CPT | Performed by: STUDENT IN AN ORGANIZED HEALTH CARE EDUCATION/TRAINING PROGRAM

## 2024-06-30 PROCEDURE — 86334 IMMUNOFIX E-PHORESIS SERUM: CPT | Mod: 26,,, | Performed by: PATHOLOGY

## 2024-06-30 PROCEDURE — 83880 ASSAY OF NATRIURETIC PEPTIDE: CPT | Performed by: INTERNAL MEDICINE

## 2024-06-30 PROCEDURE — 25000242 PHARM REV CODE 250 ALT 637 W/ HCPCS: Performed by: INTERNAL MEDICINE

## 2024-06-30 PROCEDURE — G0378 HOSPITAL OBSERVATION PER HR: HCPCS

## 2024-06-30 PROCEDURE — 82570 ASSAY OF URINE CREATININE: CPT | Performed by: INTERNAL MEDICINE

## 2024-06-30 PROCEDURE — 83521 IG LIGHT CHAINS FREE EACH: CPT | Mod: 59 | Performed by: INTERNAL MEDICINE

## 2024-06-30 PROCEDURE — 25000003 PHARM REV CODE 250: Performed by: STUDENT IN AN ORGANIZED HEALTH CARE EDUCATION/TRAINING PROGRAM

## 2024-06-30 PROCEDURE — 85025 COMPLETE CBC W/AUTO DIFF WBC: CPT | Performed by: STUDENT IN AN ORGANIZED HEALTH CARE EDUCATION/TRAINING PROGRAM

## 2024-06-30 PROCEDURE — 25000003 PHARM REV CODE 250: Performed by: INTERNAL MEDICINE

## 2024-06-30 PROCEDURE — 86334 IMMUNOFIX E-PHORESIS SERUM: CPT | Performed by: INTERNAL MEDICINE

## 2024-06-30 PROCEDURE — 36415 COLL VENOUS BLD VENIPUNCTURE: CPT | Performed by: INTERNAL MEDICINE

## 2024-06-30 PROCEDURE — 25000003 PHARM REV CODE 250

## 2024-06-30 PROCEDURE — 83735 ASSAY OF MAGNESIUM: CPT | Performed by: STUDENT IN AN ORGANIZED HEALTH CARE EDUCATION/TRAINING PROGRAM

## 2024-06-30 PROCEDURE — 63600175 PHARM REV CODE 636 W HCPCS: Performed by: STUDENT IN AN ORGANIZED HEALTH CARE EDUCATION/TRAINING PROGRAM

## 2024-06-30 RX ORDER — FUROSEMIDE 20 MG/1
20 TABLET ORAL DAILY
Qty: 30 TABLET | Refills: 11 | Status: SHIPPED | OUTPATIENT
Start: 2024-07-01 | End: 2025-07-01

## 2024-06-30 RX ORDER — ALLOPURINOL 100 MG/1
100 TABLET ORAL
Qty: 12 TABLET | Refills: 6 | Status: SHIPPED | OUTPATIENT
Start: 2024-07-01

## 2024-06-30 RX ORDER — PREDNISONE 20 MG/1
40 TABLET ORAL DAILY
Qty: 1 TABLET | Refills: 0 | Status: SHIPPED | OUTPATIENT
Start: 2024-07-01 | End: 2024-07-02

## 2024-06-30 RX ADMIN — FUROSEMIDE 20 MG: 20 TABLET ORAL at 08:06

## 2024-06-30 RX ADMIN — APIXABAN 5 MG: 5 TABLET, FILM COATED ORAL at 08:06

## 2024-06-30 RX ADMIN — EMPAGLIFLOZIN 10 MG: 10 TABLET, FILM COATED ORAL at 08:06

## 2024-06-30 RX ADMIN — PREDNISOLONE ACETATE 1 DROP: 10 SUSPENSION/ DROPS OPHTHALMIC at 08:06

## 2024-06-30 RX ADMIN — INSULIN ASPART 2 UNITS: 100 INJECTION, SOLUTION INTRAVENOUS; SUBCUTANEOUS at 12:06

## 2024-06-30 RX ADMIN — KETOROLAC TROMETHAMINE 1 DROP: 4 SOLUTION/ DROPS OPHTHALMIC at 09:06

## 2024-06-30 RX ADMIN — SODIUM BICARBONATE 1300 MG: 650 TABLET ORAL at 06:06

## 2024-06-30 RX ADMIN — METOPROLOL SUCCINATE 100 MG: 50 TABLET, EXTENDED RELEASE ORAL at 08:06

## 2024-06-30 RX ADMIN — SODIUM BICARBONATE 1300 MG: 650 TABLET ORAL at 11:06

## 2024-06-30 RX ADMIN — AMLODIPINE BESYLATE 10 MG: 5 TABLET ORAL at 08:06

## 2024-06-30 RX ADMIN — INSULIN ASPART 2 UNITS: 100 INJECTION, SOLUTION INTRAVENOUS; SUBCUTANEOUS at 08:06

## 2024-06-30 RX ADMIN — OFLOXACIN 1 DROP: 3 SOLUTION/ DROPS OPHTHALMIC at 08:06

## 2024-06-30 RX ADMIN — INSULIN GLARGINE 7 UNITS: 100 INJECTION, SOLUTION SUBCUTANEOUS at 08:06

## 2024-06-30 RX ADMIN — POLYETHYLENE GLYCOL 3350 17 G: 17 POWDER, FOR SOLUTION ORAL at 08:06

## 2024-06-30 RX ADMIN — PREDNISONE 40 MG: 20 TABLET ORAL at 08:06

## 2024-06-30 NOTE — NURSING
AVS reviewed with patient by virtual nurse. Verbalized understanding of upcoming appointments and home medications. IV removed. Prescriptions sent to preferred pharmacy. Voiced no concerns. W/c transport to escort patient and belongings to main lobby.

## 2024-06-30 NOTE — PROGRESS NOTES
Discharge orders noted. Additional clinical references attached. Patient's discharge instructions given by bedside RN and reviewed via this VN.  Education provided on new and previous medications, diagnosis, and follow-up appointments.  New medications delivered by pharmacy. Patient verbalized understanding and teach back method was used. Patient's ride/transportation home at bedside. All questions answered. Transport to [facility] requested. Floor nurse notified. Daughter at bedside             06/30/24 1334    Notification    Notified Of Discharge Status   Admission   Communication Issues? None   Shift   Virtual Nurse - Patient Verbalized Approval Of Camera Use   Safety/Activity   Patient Rounds bed in low position;call light in patient/parent reach;clutter free environment maintained;visualized patient   Safety Promotion/Fall Prevention family to remain at bedside;side rails raised x 2   Positioning   Body Position supine   Head of Bed (HOB) Positioning HOB elevated

## 2024-06-30 NOTE — PROGRESS NOTES
Progress Note  Nephrology      Consult Requested By: Afua Denis MD      SUBJECTIVE:     Overnight events  Patient is a 73 y.o. female     Patient Active Problem List   Diagnosis    Primary hypertension    Type 2 diabetes mellitus with stage 4 chronic kidney disease, with long-term current use of insulin    Paroxysmal A-fib    Atherosclerosis of native coronary artery of native heart without angina pectoris    MDD (major depressive disorder)    Anemia due to stage 4 chronic kidney disease    RBBB (right bundle branch block with left anterior fascicular block)    Stented coronary artery    Stage 4 chronic kidney disease    GERD (gastroesophageal reflux disease)    History of nephrolithiasis    (HFpEF) heart failure with preserved ejection fraction    Gout of multiple sites    Nuclear sclerotic cataract of right eye    Generalized weakness    Acute gout of multiple sites    Acute kidney injury superimposed on chronic kidney disease    Diffuse arthralgia    Uremia     Past Medical History:   Diagnosis Date    (HFpEF) heart failure with preserved ejection fraction     TTE (2020) w/ EF 55% and grade II diastolic dysfunction    Anemia due to stage 4 chronic kidney disease 8/16/2021    Gastric ulcer due to Helicobacter pylori 07/2017    Gastrointestinal hemorrhage with melena 07/2017    GERD (gastroesophageal reflux disease)     HLD (hyperlipidemia)     Hydronephrosis with ureteropelvic junction (UPJ) obstruction 8/16/2021    Hypertension     MDD (major depressive disorder) 8/16/2021    Nephrolithiasis 8/25/2021    NSTEMI (non-ST elevated myocardial infarction) 07/2017    Paroxysmal A-fib 12/24/2020    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/2020    Stage 4 chronic kidney disease 10/19/2021    STEMI (ST elevation myocardial infarction) 12/2020    Type 2 diabetes mellitus     Urge incontinence of urine               OBJECTIVE:     Vitals:    06/30/24 0801 06/30/24 0813 06/30/24 0814  06/30/24 1202   BP: 117/67 117/67 117/67 130/67   BP Location:       Patient Position:       Pulse: 69 69  69   Resp: 18   18   Temp: 98.5 °F (36.9 °C)   98.3 °F (36.8 °C)   TempSrc:       SpO2: 99%   100%   Weight:       Height:           Temp: 98.3 °F (36.8 °C) (06/30/24 1202)  Pulse: 69 (06/30/24 1202)  Resp: 18 (06/30/24 1202)  BP: 130/67 (06/30/24 1202)  SpO2: 100 % (06/30/24 1202)    Date 06/30/24 0700 - 07/01/24 0659   Shift 5863-3848 4604-5285 1411-3762 24 Hour Total   INTAKE   Shift Total(mL/kg)       OUTPUT   Urine(mL/kg/hr) 450   450   Shift Total(mL/kg) 450(5.2)   450(5.2)   Weight (kg) 86.3 86.3 86.3 86.3             Medications:   allopurinoL  100 mg Oral Every Mon, Wed, Fri    amLODIPine  10 mg Oral Daily    apixaban  5 mg Oral BID    empagliflozin  10 mg Oral Daily    furosemide  20 mg Oral Daily    insulin glargine U-100  7 Units Subcutaneous BID    ketorolac 0.4%  1 drop Right Eye TID    metoprolol succinate  100 mg Oral Daily    ofloxacin  1 drop Right Eye TID    polyethylene glycol  17 g Oral Daily    prednisoLONE acetate  1 drop Both Eyes TID    predniSONE  40 mg Oral Daily    sodium bicarbonate  1,300 mg Oral QID       physical Exam:  General appearance:NAD  Lungs: RR 18   Pulse oximeter 100 % O2  Heart: Pulse 69  Abdomen: soft  Extremities: no edema  Skin: dry  Laboratory:  ABG  Labs reviewed  No results found for this or any previous visit (from the past 336 hour(s)).  Recent Results (from the past 336 hour(s))   CBC with Automated Differential    Collection Time: 06/30/24  7:11 AM   Result Value Ref Range    WBC 10.50 3.90 - 12.70 K/uL    Hemoglobin 11.6 (L) 12.0 - 16.0 g/dL    Hematocrit 33.4 (L) 37.0 - 48.5 %    Platelets 297 150 - 450 K/uL   CBC with Automated Differential    Collection Time: 06/29/24  6:51 AM   Result Value Ref Range    WBC 11.64 3.90 - 12.70 K/uL    Hemoglobin 11.8 (L) 12.0 - 16.0 g/dL    Hematocrit 34.8 (L) 37.0 - 48.5 %    Platelets 262 150 - 450 K/uL   CBC with  "Automated Differential    Collection Time: 06/28/24  6:24 AM   Result Value Ref Range    WBC 12.80 (H) 3.90 - 12.70 K/uL    Hemoglobin 11.8 (L) 12.0 - 16.0 g/dL    Hematocrit 35.3 (L) 37.0 - 48.5 %    Platelets 229 150 - 450 K/uL     Urinalysis  No results for input(s): "COLORU", "CLARITYU", "SPECGRAV", "PHUR", "PROTEINUA", "GLUCOSEU", "BILIRUBINCON", "BLOODU", "WBCU", "RBCU", "BACTERIA", "MUCUS", "NITRITE", "LEUKOCYTESUR", "UROBILINOGEN", "HYALINECASTS" in the last 24 hours.    Diagnostic Results:  X-Ray: Reviewed  US: Reviewed  Echo: Reviewed  ACCESS    ASSESSMENT/PLAN:     JOHN/ CKD 4-5  Diabetes mellitus  On Jardiance  EF 55% - 60 %  Diastolic dysfunction  On lasix 40 bid   History nephrolithiasis, obstructive uropathy,  2021 - left  nephrostomy  UA protein trace  UA Sodium < 20  US -  Right kidney: The right kidney measures 8.5 cm. No cortical thinning. No loss of corticomedullary distinction. Resistive index measures 0.88.  No mass. No renal stone. No hydronephrosis.  Left kidney: The left kidney measures 9.5 cm. No cortical thinning. No loss of corticomedullary distinction. Resistive index measures 0.88.  No mass. 0.4 cm echogenic focus with twinkle artifact.  No hydronephrosis.  Spleen resistive index measures 0.68.  Bladder is unremarkable  UA protein trace  UA microalbumin/creatinine 167  Creatinine 3.5 - 3  GFR 13 - 16 cc/min  BUN 67- 75- 90- 95.  NAG metabolic acidosis  Sodium bicarbonate.  Na 132 - 135.  Metabolic bone disease  Hb 13.3- 11.8  Poor nutrition  Albumin 2.4  Uric acid 11.5  Allopurinol 100 mg M-W-F  Blood pressure 116/68  Echo  Left Ventricle: There is normal systolic function with a visually estimated ejection fraction of 55 - 60%. Grade II diastolic dysfunction. Elevated left ventricular filling pressure.    Right Ventricle: Systolic function is normal.    Mitral Valve: There is mild regurgitation.    Left Atrium: Left atrium is moderately dilated.    Tricuspid Valve: There is mild " regurgitation.    IVC/SVC: Intermediate venous pressure at 8 mmHg.  - 481.  Weight daily  I and O  Renal diet as tolerated.  Avoid nephrotoxic agents, hypotension, hypovolemia.  Continue  jardiance .  Lasix 40 mg - am, 20 mg -  pm.  Lasix 40 mg bid prn weight gain > 3-4 lbs/day.

## 2024-06-30 NOTE — DISCHARGE SUMMARY
[facility]  Discharge Summary      Patient Name: Eliza Louie  MRN: [id number]  SUMA: [id number]  Patient Class: OP- Observation  Admission Date: 6/27/2024  Hospital Length of Stay: 1 days  Discharge Date and Time:  06/30/2024 12:12 PM  Attending Physician: Afua Denis MD   Discharging Provider: Scott Damon MD  Primary Care Provider: Casi Garcia MD    Primary Care Team: Networked reference to record PCT     HPI:   Eliza Louie is a 73 year old female with a PMHx of pAfib on Eliquis, HTN, Stage 4 CKD, T2DM on insulin, HFpEF, gout presents today with her daughter for generalized weakness to the lower extremities since Friday. She also admits diffuse arthralgias to the upper extremities and back since Friday as well. She states her ankles and arms feel weak and tender. Previously she was able to ambulate on her own, but since Friday has needed assistance walking around at home. Denies tingling, loss of sensation to the extremities, headache, palpitations, chest pain or urinary symptoms. Yesterday she had cataract surgery to the left eye and denies any post-op complications. She endorses not taking any of her medications this morning.     In the ED her vitals are HR 82, /74, resp 14, temp 98.9, spo2 99%. CBC was unremarkable. CMP consisted of CO2 21, glucose 218, BUN/Cr 67/3.5 (baseline Cr 2.1-2.6), albumin 3.2, AST//79. Rheumatologic workup pending. Pt was [facility] for generalized weakness, diffuse arthralgias, and JOHN CKD Stage 4.      Current medications:  Crestor 40 mg daily  Amlodipine 10 mg daily  Losartan 25 mg daily  Lasix 40 mg twice daily  Eliquis 5 mg twice daily  Metoprolol succinate 100 mg daily  Potassium supplementation  Prednisolone eyedrops to both eyes    * No surgery found *      Review of Systems   Musculoskeletal:  Negative for joint pain.   All other systems reviewed and are negative.    Physical  Exam  Constitutional:       Appearance: Normal appearance. She is obese.   HENT:      Head: Normocephalic and atraumatic.   Eyes:      Extraocular Movements: Extraocular movements intact.   Cardiovascular:      Rate and Rhythm: Normal rate and regular rhythm.      Pulses: Normal pulses.      Heart sounds: Normal heart sounds. No murmur heard.  Pulmonary:      Effort: Pulmonary effort is normal. No respiratory distress.      Breath sounds: Normal breath sounds. No stridor. No wheezing, rhonchi or rales.   Abdominal:      General: Abdomen is flat.      Palpations: Abdomen is soft.      Tenderness: There is no abdominal tenderness.   Musculoskeletal:         General: No swelling, tenderness or deformity. Normal range of motion.      Right lower leg: No edema.      Left lower leg: No edema.   Neurological:      General: No focal deficit present.      Mental Status: She is alert and oriented to person, place, and time.   Psychiatric:         Mood and Affect: Mood normal.         Behavior: Behavior normal.         Thought Content: Thought content normal.         Judgment: Judgment normal.           Hospital Course:   Ms. Louie was admitted for diffuse joint pains presumed to be gout in the setting of Uric Acid 11.5. She had been treated with colchicine, but this was stopped due to worsening kidney function. A 5 day course of prednisone was started which helped to alleviate the pain and swelling to feet, wrists, shoulders. Nephrology was consulted. Their service recommended continuing lasix at 20mg qd vs 40mg. Allopurinol should be taken MWF, Jardiance was started daily.. The patient will follow up with Dr. Shields in clinic as well as [facility]. She was referred to outpatient PT for joint pains. She was counseled to return to Emergency if her symptoms severely worsen.       Goals of Care Treatment Preferences:  Code Status: Full Code      Consults:   Consults (From admission, onward)          Status Ordering Provider      Inpatient consult to [facility] (Cornelius Vaughn, [place])  Once        Provider:  (Not yet assigned)    Completed DONALD FUENTES            No new Assessment & Plan notes have been filed under this hospital service since the last note was generated.  Service: Hospital Medicine    Final Active Diagnoses:    Diagnosis Date Noted POA    PRINCIPAL PROBLEM:  Acute gout of multiple sites [M10.9] 06/27/2024 Yes    Generalized weakness [R53.1] 06/27/2024 Yes    Acute kidney injury superimposed on chronic kidney disease [N17.9, N18.9] 06/27/2024 Yes    Diffuse arthralgia [M25.50] 06/27/2024 Yes    Uremia [N19] 06/27/2024 Yes    (HFpEF) heart failure with preserved ejection fraction [I50.30]  Yes     Chronic    Paroxysmal A-fib [I48.0] 12/24/2020 Yes     Chronic    Type 2 diabetes mellitus with stage 4 chronic kidney disease, with long-term current use of insulin [E11.22, N18.4, Z79.4]  Not Applicable    Primary hypertension [I10] 07/02/2017 Yes     Chronic      Problems Resolved During this Admission:       Discharged Condition: stable    Disposition: Home or Self Care    Follow Up:   Follow-up Information       [facility] Follow up.    Specialty: Family Medicine  Why: you will be contacted with a hospital follow up apt per  Carissa.  Contact information:  [address], [address]  [facility] [address] [phone]  Additional information:  Please park in [address] or D and use [address]. Take [address] elevators.             Ivania Shields MD Follow up on 7/5/2024.    Specialty: Nephrology  Why: 11:00 am  Contact information:  [address]  [address]  [facility]  [facility] [address] [phone]               outpatient physical therapy Follow up.    Why: Referral has been placed - you will be contacted with an appointment  ([phone]                         Patient Instructions:      Ambulatory referral/consult to  Physical/Occupational Therapy   Standing Status: Future   Referral Priority: Routine Referral Type: Physical Medicine   Referral Reason: Specialty Services Required   Referred to Provider: [facility] [place]   Number of Visits Requested: 1     ACCEPT - Ambulatory referral/consult to Cardiac Electrophysiology   Standing Status: Future   Referral Priority: Routine Referral Type: Consultation   Referral Reason: Specialty Services Required   Requested Specialty: Cardiology   Number of Visits Requested: 1     Diet renal     Diet diabetic     Diet Cardiac     Notify your health care provider if you experience any of the following:  increased confusion or weakness     Notify your health care provider if you experience any of the following:  persistent dizziness, light-headedness, or visual disturbances     Notify your health care provider if you experience any of the following:  worsening rash     Notify your health care provider if you experience any of the following:  severe persistent headache     Notify your health care provider if you experience any of the following:  difficulty breathing or increased cough     Notify your health care provider if you experience any of the following:  redness, tenderness, or signs of infection (pain, swelling, redness, odor or green/yellow discharge around incision site)     Notify your health care provider if you experience any of the following:  severe uncontrolled pain     Notify your health care provider if you experience any of the following:  persistent nausea and vomiting or diarrhea     Notify your health care provider if you experience any of the following:  temperature >100.4     Activity as tolerated       Significant Diagnostic Studies: Labs: CMP   Recent Labs   Lab 06/29/24  0651 06/30/24  0711   * 135*   K 4.3 4.2    103   CO2 20* 22*   * 156*   BUN 90* 95*   CREATININE 3.0* 3.0*   CALCIUM 9.5 9.4   PROT 6.7 6.7   ALBUMIN 2.4* 2.5*    BILITOT 0.3 0.4   ALKPHOS 88 77   AST 88* 59*   ALT 95* 98*   ANIONGAP 11 10    and CBC   Recent Labs   Lab 06/29/24  0651 06/30/24 0711   WBC 11.64 10.50   HGB 11.8* 11.6*   HCT 34.8* 33.4*    297       Pending Diagnostic Studies:       Procedure Component Value Units Date/Time    Immunofixation Electrophoresis [[id number]] Collected: 06/30/24 0711    Order Status: Sent Lab Status: No result     Specimen: Blood     Immunoglobulin Free LT Chains Blood [[id number]] Collected: 06/30/24 0711    Order Status: Sent Lab Status: No result     Specimen: Blood     Microalbumin/creatinine urine ratio [[id number]] Collected: 06/30/24 0743    Order Status: Sent Lab Status: In process Updated: 06/30/24 0829    Specimen: Urine, Clean Catch            Medications:  Reconciled Home Medications:      Medication List        START taking these medications      allopurinoL 100 MG tablet  Commonly known as: ZYLOPRIM  Take 1 tablet (100 mg total) by mouth every Mon, Wed, Fri.  Start taking on: July 1, 2024     predniSONE 20 MG tablet  Commonly known as: DELTASONE  Take 2 tablets (40 mg total) by mouth once daily. for 1 day  Start taking on: July 1, 2024            CHANGE how you take these medications      furosemide 20 MG tablet  Commonly known as: LASIX  Take 1 tablet (20 mg total) by mouth once daily.  Start taking on: July 1, 2024  What changed:   medication strength  how much to take  when to take this            CONTINUE taking these medications      amLODIPine 10 MG tablet  Commonly known as: NORVASC  Take 1 tablet (10 mg total) by mouth once daily.     apixaban 5 mg Tab  Commonly known as: ELIQUIS  Take 1 tablet (5 mg total) by mouth 2 (two) times daily.     blood sugar diagnostic Strp  To check BG 2 times daily, to use with insurance preferred meter     DEXCOM G6 SENSOR S[place]a  Generic drug: blood-glucose sensor  1 each by Misc.(Non-Drug; Combo Route) route once daily.     FREESTYLE FERCHO 2 SENSOR Kit  Generic drug:  flash glucose sensor  1 each by Misc.(Non-Drug; Combo Route) route once daily.     FREESTYLE SYSTEM KIT MISC  by Misc.(Non-Drug; Combo Route) route.     ketorolac 0.5% 0.5 % Drop  Commonly known as: ACULAR  Place 1 drop into the right eye 3 (three) times daily.     lancets Misc  To check BG 2 times daily, to use with insurance preferred meter     LANTUS SOLOSTAR U-100 INSULIN 100 unit/mL (3 mL) Inpn pen  Generic drug: insulin glargine U-100 (Lantus)  Inject 5 Units into the skin every evening.     losartan 25 MG tablet  Commonly known as: COZAAR  Take 1 tablet (25 mg total) by mouth once daily.     magnesium oxide 400 mg (241.3 mg magnesium) tablet  Commonly known as: MAG-OX  Take 1 tablet (400 mg total) by mouth 2 (two) times daily.     metoprolol succinate 100 MG 24 hr tablet  Commonly known as: TOPROL-XL  Take 1 tablet (100 mg total) by mouth once daily.     nitroGLYCERIN 0.4 MG SL tablet  Commonly known as: NITROSTAT  Place 0.4 mg under the tongue every 5 (five) minutes as needed for Chest pain.     ofloxacin 0.3 % ophthalmic solution  Commonly known as: OCUFLOX  Place 1 drop into the right eye 3 (three) times daily.     potassium chloride 10 MEQ Cpsr  Commonly known as: MICRO-K  Take 1 capsule (10 mEq total) by mouth once daily.     prednisoLONE acetate 1 % Drps  Commonly known as: PRED FORTE  Place 1 drop into the right eye 3 (three) times daily.     rosuvastatin 40 MG Tab  Commonly known as: CRESTOR  Take 1 tablet (40 mg total) by mouth once daily.     SITagliptin phosphate 25 MG Tab  Commonly known as: JANUVIA  Take 1 tablet (25 mg total) by mouth once daily.            STOP taking these medications      colchicine 0.6 mg tablet  Commonly known as: COLCRYS              Indwelling Lines/Drains at time of discharge:   Lines/Drains/Airways       None                   Time spent on the discharge of patient: 40 minutes         Scott Damon MD  Department of Hospital Medicine  [facility]

## 2024-06-30 NOTE — PLAN OF CARE
Problem: Adult Inpatient Plan of Care  Goal: Plan of Care Review  2024 1335 by Magdalene Tran RN  Outcome: Met  2024 0733 by Magdalene Tran RN  Outcome: Progressing  Goal: Patient-Specific Goal (Individualized)  Outcome: Met  Goal: Absence of Hospital-Acquired Illness or Injury  Outcome: Met  Goal: Optimal Comfort and Wellbeing  Outcome: Met  Goal: Readiness for Transition of Care  Outcome: Met     Problem: Diabetes Comorbidity  Goal: Blood Glucose Level Within Targeted Range  Outcome: Met     Problem: Acute Kidney Injury/Impairment  Goal: Fluid and Electrolyte Balance  Outcome: Met  Goal: Improved Oral Intake  Outcome: Met  Goal: Effective Renal Function  Outcome: Met     Problem: Wound  Goal: Optimal Coping  Outcome: Met  Goal: Optimal Functional Ability  Outcome: Met  Goal: Absence of Infection Signs and Symptoms  Outcome: Met  Goal: Improved Oral Intake  Outcome: Met  Goal: Optimal Pain Control and Function  Outcome: Met  Goal: Skin Health and Integrity  Outcome: Met  Goal: Optimal Wound Healing  Outcome: Met     Problem: Skin Injury Risk Increased  Goal: Skin Health and Integrity  Outcome: Met     Problem: Occupational Therapy  Goal: Occupational Therapy Goal  Description: Goals to be met by:      Patient will increase functional independence with ADLs by performing:    LE Dressing with Stand-by Assistance.  Grooming while standing at sink with Modified [place] and Supervision.  Toileting from toilet with Modified [place] and Supervision for hygiene and clothing management.   Toilet transfer to toilet with Modified [place] and Supervision.  Increased functional strength to WFL for ADLs.    Outcome: Met     Problem: Physical Therapy  Goal: Physical Therapy Goal  Description: Goals to be met by: 24     Patient will increase functional independence with mobility by performin. Supine to sit with Modified [place]  2. Sit to supine with Modified [place]  3. Sit to stand  transfer with Modified [place]  4. Bed to chair transfer with Modified [place] using rollator  5. Gait  x 150 feet with Modified [place] using rollator.   6. Ascend/descend 7 steps/stairs with right Handrails Contact Guard Assistance using HHA from spouse.     Outcome: Met

## 2024-06-30 NOTE — PLAN OF CARE
Pt clear to D/C home today. Pt family to transport Pt home. F/U appts have been requested and scheduled. No other D/C needs identified. Pt clear from CM.     Future Appointments   Date Time Provider Department Center   7/5/2024 11:00 AM Ivania Shields MD [facility]   7/25/2024  2:40 PM Mary Ponce, OD [facility] [place]         [place] [facility]  Discharge Final Note    Primary Care Provider: Casi Garcia MD    Expected Discharge Date: 6/30/2024    Final Discharge Note (most recent)       Final Note - 06/30/24 1219          Final Note    Assessment Type Final Discharge Note (P)      Anticipated Discharge Disposition Home or Self Care (P)      What phone number can be called within the next 1-3 days to see how you are doing after discharge? [phone] (P)      Hospital Resources/Appts/Education Provided Appointments scheduled and added to AVS (P)         Post-Acute Status    Discharge Delays None known at this time (P)                      Important Message from Medicare             Contact Info       [facility]   Specialty: Family Medicine    [address], [address]  [place] [address]   Phone: [phone]       Next Steps: Follow up    Instructions: you will be contacted with a hospital follow up apt per  Ivania Roger MD   Specialty: Nephrology    [address]  [address]  [facility]  [place] [address]   Phone: [phone]       Next Steps: Follow up on 7/5/2024    Instructions: 11:00 am    outpatient physical therapy        Next Steps: Follow up    Instructions: Referral has been placed - you will be contacted with an appointment  ([phone]              Misty Sanchez LMSW  Phone: [phone]  [facility]

## 2024-06-30 NOTE — HOSPITAL COURSE
Ms. Louie was admitted for diffuse joint pains presumed to be gout in the setting of Uric Acid 11.5. She had been treated with colchicine, but this was stopped due to worsening kidney function. A 5 day course of prednisone was started which helped to alleviate the pain and swelling to feet, wrists, shoulders. At no point were her joints erythematous or especially warm. Mild swelling was noted. Nephrology was consulted. Their service recommended continuing lasix at 20mg qd vs 40mg. Allopurinol should be taken MWF, Jardiance was started daily.. The patient will follow up with Dr. Shields in clinic as well as [facility]. She was referred to outpatient PT for joint pains. She was counseled to return to Emergency if her symptoms severely worsen.

## 2024-06-30 NOTE — PLAN OF CARE
Problem: Adult Inpatient Plan of Care  Goal: Plan of Care Review  Outcome: Progressing  Goal: Patient-Specific Goal (Individualized)  Outcome: Progressing  Goal: Absence of Hospital-Acquired Illness or Injury  Outcome: Progressing  Goal: Optimal Comfort and Wellbeing  Outcome: Progressing  Goal: Readiness for Transition of Care  Outcome: Progressing     Problem: Acute Kidney Injury/Impairment  Goal: Fluid and Electrolyte Balance  Outcome: Progressing  Goal: Improved Oral Intake  Outcome: Progressing  Goal: Effective Renal Function  Outcome: Progressing     Problem: Wound  Goal: Skin Health and Integrity  Outcome: Progressing  Goal: Optimal Wound Healing  Outcome: Progressing     Problem: Skin Injury Risk Increased  Goal: Skin Health and Integrity  Outcome: Progressing   Pt aaox4, Urine collection completed at 9:15pm, no c/o weakness, numbness to extremity's, bed alarm set, bed wheels locked, call bell within reach     Metformin Antihyperglycemics Refill Protocol Failed 10/07/2022 12:05 PM   Protocol Details  Seen by prescribing provider or same department within the last 12 months or has a future appt in 3 months - IF FAILED PLEASE LOOK AT CHART REVIEW FOR LAST VISIT AND PROCEED ACCORDINGLY        Last visit 8/23/2021, no future appt scheduled.

## 2024-07-01 ENCOUNTER — TELEPHONE (OUTPATIENT)
Dept: FAMILY MEDICINE | Facility: HOSPITAL | Age: 74
End: 2024-07-01
Payer: MEDICARE

## 2024-07-01 DIAGNOSIS — N18.4 CKD (CHRONIC KIDNEY DISEASE), STAGE IV: Primary | ICD-10-CM

## 2024-07-01 LAB
INTERPRETATION SERPL IFE-IMP: NORMAL
KAPPA LC SER QL IA: 5.84 MG/DL (ref 0.33–1.94)
KAPPA LC/LAMBDA SER IA: 1.07 (ref 0.26–1.65)
LAMBDA LC SER QL IA: 5.44 MG/DL (ref 0.57–2.63)
PATHOLOGIST INTERPRETATION SPE: NORMAL
PATHOLOGIST INTERPRETATION UPE: NORMAL

## 2024-07-01 NOTE — TELEPHONE ENCOUNTER
----- Message from Alix Yusra sent at 6/28/2024  4:02 PM CDT -----  Type:  Sooner Apoointment Request    Caller is requesting a sooner appointment.  Caller declined first available appointment listed below.  Caller will not accept being placed on the waitlist and is requesting a message be sent to doctor.  Name of Caller: Nurse Martinez  When is the first available appointment? N/A  Symptoms: pt is discharging this weekend, need hosp f/u  Would the patient rather a call back or a response via [employer]? Call   Best Call Back Number: [phone]  Additional Information:  EPIC not showing dates. Advised nurse insurance may be in conflict

## 2024-07-02 LAB
INTERPRETATION SERPL IFE-IMP: NORMAL
PATHOLOGIST INTERPRETATION IFE: NORMAL
PATHOLOGIST INTERPRETATION IFE: NORMAL

## 2024-07-03 ENCOUNTER — LAB VISIT (OUTPATIENT)
Dept: LAB | Facility: HOSPITAL | Age: 74
End: 2024-07-03
Attending: INTERNAL MEDICINE
Payer: MEDICARE

## 2024-07-03 DIAGNOSIS — N18.4 CKD (CHRONIC KIDNEY DISEASE), STAGE IV: ICD-10-CM

## 2024-07-03 LAB
ALBUMIN SERPL BCP-MCNC: 3.3 G/DL (ref 3.5–5.2)
ANION GAP SERPL CALC-SCNC: 14 MMOL/L (ref 8–16)
BILIRUB UR QL STRIP: NEGATIVE
BUN SERPL-MCNC: 88 MG/DL (ref 8–23)
CALCIUM SERPL-MCNC: 9.8 MG/DL (ref 8.7–10.5)
CHLORIDE SERPL-SCNC: 101 MMOL/L (ref 95–110)
CLARITY UR: CLEAR
CO2 SERPL-SCNC: 22 MMOL/L (ref 23–29)
COLOR UR: COLORLESS
CREAT SERPL-MCNC: 2.9 MG/DL (ref 0.5–1.4)
EST. GFR  (NO RACE VARIABLE): 17 ML/MIN/1.73 M^2
GLUCOSE SERPL-MCNC: 107 MG/DL (ref 70–110)
GLUCOSE UR QL STRIP: ABNORMAL
HGB UR QL STRIP: NEGATIVE
KETONES UR QL STRIP: NEGATIVE
LEUKOCYTE ESTERASE UR QL STRIP: NEGATIVE
MAGNESIUM SERPL-MCNC: 2.2 MG/DL (ref 1.6–2.6)
NITRITE UR QL STRIP: NEGATIVE
PH UR STRIP: 6 [PH] (ref 5–8)
PHOSPHATE SERPL-MCNC: 4.4 MG/DL (ref 2.7–4.5)
POTASSIUM SERPL-SCNC: 4.4 MMOL/L (ref 3.5–5.1)
PROT UR QL STRIP: NEGATIVE
SODIUM SERPL-SCNC: 137 MMOL/L (ref 136–145)
SP GR UR STRIP: 1.01 (ref 1–1.03)
URN SPEC COLLECT METH UR: ABNORMAL
UROBILINOGEN UR STRIP-ACNC: NEGATIVE EU/DL

## 2024-07-03 PROCEDURE — 83735 ASSAY OF MAGNESIUM: CPT | Performed by: INTERNAL MEDICINE

## 2024-07-03 PROCEDURE — 81003 URINALYSIS AUTO W/O SCOPE: CPT | Performed by: INTERNAL MEDICINE

## 2024-07-03 PROCEDURE — 36415 COLL VENOUS BLD VENIPUNCTURE: CPT | Performed by: INTERNAL MEDICINE

## 2024-07-03 PROCEDURE — 82040 ASSAY OF SERUM ALBUMIN: CPT | Performed by: INTERNAL MEDICINE

## 2024-07-03 PROCEDURE — 84100 ASSAY OF PHOSPHORUS: CPT | Performed by: INTERNAL MEDICINE

## 2024-07-03 PROCEDURE — 80048 BASIC METABOLIC PNL TOTAL CA: CPT | Performed by: INTERNAL MEDICINE

## 2024-07-25 ENCOUNTER — OFFICE VISIT (OUTPATIENT)
Dept: OPTOMETRY | Facility: CLINIC | Age: 74
End: 2024-07-25
Payer: MEDICARE

## 2024-07-25 ENCOUNTER — PATIENT MESSAGE (OUTPATIENT)
Dept: OPTOMETRY | Facility: CLINIC | Age: 74
End: 2024-07-25

## 2024-07-25 DIAGNOSIS — Z98.41 STATUS POST CATARACT EXTRACTION OF BOTH EYES WITH INSERTION OF INTRAOCULAR LENS: Primary | ICD-10-CM

## 2024-07-25 DIAGNOSIS — Z98.42 STATUS POST CATARACT EXTRACTION OF BOTH EYES WITH INSERTION OF INTRAOCULAR LENS: Primary | ICD-10-CM

## 2024-07-25 DIAGNOSIS — Z96.1 STATUS POST CATARACT EXTRACTION OF BOTH EYES WITH INSERTION OF INTRAOCULAR LENS: Primary | ICD-10-CM

## 2024-07-25 PROCEDURE — 99024 POSTOP FOLLOW-UP VISIT: CPT | Mod: S$GLB,,, | Performed by: OPTOMETRIST

## 2024-07-25 PROCEDURE — 92015 DETERMINE REFRACTIVE STATE: CPT | Mod: S$GLB,,, | Performed by: OPTOMETRIST

## 2024-07-25 PROCEDURE — 99999 PR PBB SHADOW E&M-EST. PATIENT-LVL III: CPT | Mod: PBBFAC,,, | Performed by: OPTOMETRIST

## 2024-07-25 PROCEDURE — 3066F NEPHROPATHY DOC TX: CPT | Mod: CPTII,S$GLB,, | Performed by: OPTOMETRIST

## 2024-07-25 PROCEDURE — 1159F MED LIST DOCD IN RCRD: CPT | Mod: CPTII,S$GLB,, | Performed by: OPTOMETRIST

## 2024-07-25 PROCEDURE — 1126F AMNT PAIN NOTED NONE PRSNT: CPT | Mod: CPTII,S$GLB,, | Performed by: OPTOMETRIST

## 2024-07-25 PROCEDURE — 3288F FALL RISK ASSESSMENT DOCD: CPT | Mod: CPTII,S$GLB,, | Performed by: OPTOMETRIST

## 2024-07-25 PROCEDURE — 3044F HG A1C LEVEL LT 7.0%: CPT | Mod: CPTII,S$GLB,, | Performed by: OPTOMETRIST

## 2024-07-25 PROCEDURE — 4010F ACE/ARB THERAPY RXD/TAKEN: CPT | Mod: CPTII,S$GLB,, | Performed by: OPTOMETRIST

## 2024-07-25 PROCEDURE — 1101F PT FALLS ASSESS-DOCD LE1/YR: CPT | Mod: CPTII,S$GLB,, | Performed by: OPTOMETRIST

## 2024-07-25 PROCEDURE — 3061F NEG MICROALBUMINURIA REV: CPT | Mod: CPTII,S$GLB,, | Performed by: OPTOMETRIST

## 2024-07-25 NOTE — PROGRESS NOTES
HPI    Pt is here today for 1 month po. Patient denies pain/discomfort.  DLS: 6/13/2024 Dr. Arenas   (-)Flashes   (-)Floaters   (-)Diplopia   (-)Headaches   (+)Itching   (+)Tearing  (-)Burning  (-)Dryness   (-)Photophobia  (-)Glare   (-)Blurred VA  Past Eye Sx: Cataract with IOL OU   Eye Meds: Pred TID OS                   Oflox  TID OS                   Keto TID OS  Last edited by Mary Ponce, OD on 7/25/2024  2:52 PM.            Assessment /Plan     For exam results, see Encounter Report.    Status post cataract extraction of both eyes with insertion of intraocular lens      Educated on today's WNL findings OU. Eyes well healed from cataract surgery OU. Pt OK to discontinue post operative medications.     Eyeglass Final Rx       Eyeglass Final Rx         Sphere Cylinder Axis Add    Right -1.00 +0.75 175 +2.50    Left -1.75 +1.00 135 +2.50      Type: PAL    Expiration Date: 7/25/2025                   RTC in 1 year for annual eye exam unless changes noted sooner.

## 2024-08-02 ENCOUNTER — LAB VISIT (OUTPATIENT)
Dept: LAB | Facility: HOSPITAL | Age: 74
End: 2024-08-02
Attending: INTERNAL MEDICINE
Payer: MEDICARE

## 2024-08-02 DIAGNOSIS — E11.22 TYPE 2 DIABETES MELLITUS WITH STAGE 4 CHRONIC KIDNEY DISEASE, WITH LONG-TERM CURRENT USE OF INSULIN: ICD-10-CM

## 2024-08-02 DIAGNOSIS — N18.4 STAGE 4 CHRONIC KIDNEY DISEASE: ICD-10-CM

## 2024-08-02 DIAGNOSIS — N18.4 TYPE 2 DIABETES MELLITUS WITH STAGE 4 CHRONIC KIDNEY DISEASE, WITH LONG-TERM CURRENT USE OF INSULIN: ICD-10-CM

## 2024-08-02 DIAGNOSIS — N18.4 CKD (CHRONIC KIDNEY DISEASE), STAGE IV: ICD-10-CM

## 2024-08-02 DIAGNOSIS — I10 PRIMARY HYPERTENSION: ICD-10-CM

## 2024-08-02 DIAGNOSIS — Z79.4 TYPE 2 DIABETES MELLITUS WITH STAGE 4 CHRONIC KIDNEY DISEASE, WITH LONG-TERM CURRENT USE OF INSULIN: ICD-10-CM

## 2024-08-02 LAB
ALBUMIN SERPL BCP-MCNC: 2.7 G/DL (ref 3.5–5.2)
ALBUMIN/CREAT UR: 10.7 UG/MG (ref 0–30)
ALBUMIN/CREAT UR: 10.7 UG/MG (ref 0–30)
ANION GAP SERPL CALC-SCNC: 16 MMOL/L (ref 8–16)
ANION GAP SERPL CALC-SCNC: 16 MMOL/L (ref 8–16)
BILIRUB UR QL STRIP: NEGATIVE
BUN SERPL-MCNC: 59 MG/DL (ref 8–23)
BUN SERPL-MCNC: 59 MG/DL (ref 8–23)
CALCIUM SERPL-MCNC: 9.3 MG/DL (ref 8.7–10.5)
CALCIUM SERPL-MCNC: 9.3 MG/DL (ref 8.7–10.5)
CHLORIDE SERPL-SCNC: 99 MMOL/L (ref 95–110)
CHLORIDE SERPL-SCNC: 99 MMOL/L (ref 95–110)
CHOLEST SERPL-MCNC: 72 MG/DL (ref 120–199)
CHOLEST/HDLC SERPL: 3.3 {RATIO} (ref 2–5)
CLARITY UR: ABNORMAL
CO2 SERPL-SCNC: 22 MMOL/L (ref 23–29)
CO2 SERPL-SCNC: 22 MMOL/L (ref 23–29)
COLOR UR: YELLOW
CREAT SERPL-MCNC: 3.2 MG/DL (ref 0.5–1.4)
CREAT SERPL-MCNC: 3.2 MG/DL (ref 0.5–1.4)
CREAT UR-MCNC: 131 MG/DL (ref 15–325)
CREAT UR-MCNC: 131 MG/DL (ref 15–325)
EST. GFR  (NO RACE VARIABLE): 15 ML/MIN/1.73 M^2
EST. GFR  (NO RACE VARIABLE): 15 ML/MIN/1.73 M^2
GLUCOSE SERPL-MCNC: 159 MG/DL (ref 70–110)
GLUCOSE SERPL-MCNC: 159 MG/DL (ref 70–110)
GLUCOSE UR QL STRIP: NEGATIVE
HDLC SERPL-MCNC: 22 MG/DL (ref 40–75)
HDLC SERPL: 30.6 % (ref 20–50)
HGB UR QL STRIP: NEGATIVE
KETONES UR QL STRIP: NEGATIVE
LDLC SERPL CALC-MCNC: 23.8 MG/DL (ref 63–159)
LEUKOCYTE ESTERASE UR QL STRIP: NEGATIVE
MAGNESIUM SERPL-MCNC: 2.3 MG/DL (ref 1.6–2.6)
MICROALBUMIN UR DL<=1MG/L-MCNC: 14 UG/ML
MICROALBUMIN UR DL<=1MG/L-MCNC: 14 UG/ML
NITRITE UR QL STRIP: NEGATIVE
NONHDLC SERPL-MCNC: 50 MG/DL
PH UR STRIP: 6 [PH] (ref 5–8)
PHOSPHATE SERPL-MCNC: 3.5 MG/DL (ref 2.7–4.5)
POTASSIUM SERPL-SCNC: 3.8 MMOL/L (ref 3.5–5.1)
POTASSIUM SERPL-SCNC: 3.8 MMOL/L (ref 3.5–5.1)
PROT UR QL STRIP: NEGATIVE
PTH-INTACT SERPL-MCNC: 211.6 PG/ML (ref 9–77)
SODIUM SERPL-SCNC: 137 MMOL/L (ref 136–145)
SODIUM SERPL-SCNC: 137 MMOL/L (ref 136–145)
SP GR UR STRIP: 1.01 (ref 1–1.03)
TRIGL SERPL-MCNC: 131 MG/DL (ref 30–150)
URN SPEC COLLECT METH UR: ABNORMAL
UROBILINOGEN UR STRIP-ACNC: NEGATIVE EU/DL

## 2024-08-02 PROCEDURE — 84100 ASSAY OF PHOSPHORUS: CPT | Performed by: INTERNAL MEDICINE

## 2024-08-02 PROCEDURE — 82043 UR ALBUMIN QUANTITATIVE: CPT

## 2024-08-02 PROCEDURE — 80061 LIPID PANEL: CPT

## 2024-08-02 PROCEDURE — 80048 BASIC METABOLIC PNL TOTAL CA: CPT

## 2024-08-02 PROCEDURE — 82570 ASSAY OF URINE CREATININE: CPT

## 2024-08-02 PROCEDURE — 81003 URINALYSIS AUTO W/O SCOPE: CPT | Performed by: INTERNAL MEDICINE

## 2024-08-02 PROCEDURE — 83970 ASSAY OF PARATHORMONE: CPT | Performed by: INTERNAL MEDICINE

## 2024-08-02 PROCEDURE — 83735 ASSAY OF MAGNESIUM: CPT | Performed by: INTERNAL MEDICINE

## 2024-08-02 PROCEDURE — 82040 ASSAY OF SERUM ALBUMIN: CPT | Performed by: INTERNAL MEDICINE

## 2024-08-02 PROCEDURE — 36415 COLL VENOUS BLD VENIPUNCTURE: CPT

## 2024-08-06 DIAGNOSIS — E11.22 TYPE 2 DIABETES MELLITUS WITH STAGE 4 CHRONIC KIDNEY DISEASE, WITH LONG-TERM CURRENT USE OF INSULIN: ICD-10-CM

## 2024-08-06 DIAGNOSIS — N18.4 TYPE 2 DIABETES MELLITUS WITH STAGE 4 CHRONIC KIDNEY DISEASE, WITH LONG-TERM CURRENT USE OF INSULIN: ICD-10-CM

## 2024-08-06 DIAGNOSIS — Z79.4 TYPE 2 DIABETES MELLITUS WITH STAGE 4 CHRONIC KIDNEY DISEASE, WITH LONG-TERM CURRENT USE OF INSULIN: ICD-10-CM

## 2024-09-04 ENCOUNTER — TELEPHONE (OUTPATIENT)
Dept: CARDIOLOGY | Facility: CLINIC | Age: 74
End: 2024-09-04
Payer: MEDICARE

## 2024-09-30 ENCOUNTER — OFFICE VISIT (OUTPATIENT)
Dept: FAMILY MEDICINE | Facility: HOSPITAL | Age: 74
End: 2024-09-30
Payer: MEDICARE

## 2024-09-30 VITALS
HEIGHT: 63 IN | SYSTOLIC BLOOD PRESSURE: 112 MMHG | DIASTOLIC BLOOD PRESSURE: 74 MMHG | OXYGEN SATURATION: 100 % | BODY MASS INDEX: 29.69 KG/M2 | WEIGHT: 167.56 LBS | HEART RATE: 92 BPM

## 2024-09-30 DIAGNOSIS — I10 PRIMARY HYPERTENSION: ICD-10-CM

## 2024-09-30 DIAGNOSIS — Z79.4 TYPE 2 DIABETES MELLITUS WITH STAGE 4 CHRONIC KIDNEY DISEASE, WITH LONG-TERM CURRENT USE OF INSULIN: Primary | ICD-10-CM

## 2024-09-30 DIAGNOSIS — L21.0 DANDRUFF IN ADULT: ICD-10-CM

## 2024-09-30 DIAGNOSIS — N18.4 TYPE 2 DIABETES MELLITUS WITH STAGE 4 CHRONIC KIDNEY DISEASE, WITH LONG-TERM CURRENT USE OF INSULIN: Primary | ICD-10-CM

## 2024-09-30 DIAGNOSIS — I50.32 CHRONIC HEART FAILURE WITH PRESERVED EJECTION FRACTION: ICD-10-CM

## 2024-09-30 DIAGNOSIS — I25.10 ATHEROSCLEROSIS OF NATIVE CORONARY ARTERY OF NATIVE HEART WITHOUT ANGINA PECTORIS: Chronic | ICD-10-CM

## 2024-09-30 DIAGNOSIS — E78.5 HYPERLIPIDEMIA, UNSPECIFIED HYPERLIPIDEMIA TYPE: ICD-10-CM

## 2024-09-30 DIAGNOSIS — I48.0 PAROXYSMAL A-FIB: Chronic | ICD-10-CM

## 2024-09-30 DIAGNOSIS — E11.22 TYPE 2 DIABETES MELLITUS WITH STAGE 4 CHRONIC KIDNEY DISEASE, WITH LONG-TERM CURRENT USE OF INSULIN: Primary | ICD-10-CM

## 2024-09-30 DIAGNOSIS — M85.851 OSTEOPENIA OF RIGHT HIP: ICD-10-CM

## 2024-09-30 DIAGNOSIS — N18.4 STAGE 4 CHRONIC KIDNEY DISEASE: ICD-10-CM

## 2024-09-30 PROBLEM — N18.9 ACUTE KIDNEY INJURY SUPERIMPOSED ON CHRONIC KIDNEY DISEASE: Status: RESOLVED | Noted: 2024-06-27 | Resolved: 2024-09-30

## 2024-09-30 PROBLEM — N17.9 ACUTE KIDNEY INJURY SUPERIMPOSED ON CHRONIC KIDNEY DISEASE: Status: RESOLVED | Noted: 2024-06-27 | Resolved: 2024-09-30

## 2024-09-30 PROCEDURE — 99214 OFFICE O/P EST MOD 30 MIN: CPT

## 2024-09-30 RX ORDER — POTASSIUM CHLORIDE 750 MG/1
10 CAPSULE, EXTENDED RELEASE ORAL DAILY
Qty: 90 CAPSULE | Refills: 1 | Status: SHIPPED | OUTPATIENT
Start: 2024-09-30

## 2024-09-30 RX ORDER — CHOLECALCIFEROL (VITAMIN D3) 50 MCG
1 TABLET ORAL DAILY
Qty: 30 TABLET | Refills: 11 | Status: SHIPPED | OUTPATIENT
Start: 2024-09-30 | End: 2025-09-30

## 2024-09-30 RX ORDER — METOPROLOL SUCCINATE 100 MG/1
100 TABLET, EXTENDED RELEASE ORAL DAILY
Qty: 90 TABLET | Refills: 0 | Status: SHIPPED | OUTPATIENT
Start: 2024-09-30

## 2024-09-30 RX ORDER — ROSUVASTATIN CALCIUM 40 MG/1
40 TABLET, COATED ORAL DAILY
Qty: 90 TABLET | Refills: 3 | Status: SHIPPED | OUTPATIENT
Start: 2024-09-30 | End: 2025-09-30

## 2024-09-30 RX ORDER — KETOCONAZOLE 20 MG/ML
SHAMPOO, SUSPENSION TOPICAL
Qty: 120 ML | Refills: 0 | Status: SHIPPED | OUTPATIENT
Start: 2024-09-30 | End: 2024-10-30

## 2024-09-30 RX ORDER — AMLODIPINE BESYLATE 10 MG/1
10 TABLET ORAL DAILY
Qty: 90 TABLET | Refills: 3 | Status: SHIPPED | OUTPATIENT
Start: 2024-09-30 | End: 2025-09-30

## 2024-09-30 RX ORDER — FUROSEMIDE 20 MG/1
20 TABLET ORAL DAILY
Qty: 30 TABLET | Refills: 11 | Status: SHIPPED | OUTPATIENT
Start: 2024-09-30 | End: 2025-09-30

## 2024-09-30 RX ORDER — ALLOPURINOL 100 MG/1
100 TABLET ORAL DAILY
Qty: 12 TABLET | Refills: 6 | Status: SHIPPED | OUTPATIENT
Start: 2024-09-30

## 2024-09-30 RX ORDER — SODIUM BICARBONATE 650 MG/1
650 TABLET ORAL 3 TIMES DAILY
Qty: 90 TABLET | Refills: 11 | Status: SHIPPED | OUTPATIENT
Start: 2024-09-30 | End: 2025-09-30

## 2024-09-30 RX ORDER — LOSARTAN POTASSIUM 25 MG/1
25 TABLET ORAL DAILY
Qty: 90 TABLET | Refills: 3 | Status: SHIPPED | OUTPATIENT
Start: 2024-09-30 | End: 2025-09-30

## 2024-09-30 NOTE — PROGRESS NOTES
[facility]    Subjective:     Eliza Louie is a 74 y.o. year old female with PMHx of pAfib on Eliquis, HTN, Stage 4 CKD, T2DM on insulin, HFpEF, gout who presents to clinic for follow up.    T2DM: Controlled; Last A1c 6.5% (May 2024); patient does complain of numbness/tingling in bilateral feet. Patient denies polyuria, polydipsia, polyphagia. Patient endorses compliance with medication regimen, including 5 units of Lantus qhs and Januvia 25 mg. Denies any episodes of hypoglycemia.      HTN: BP today 112/74. Well-controlled. Patient denies any SOB, lightheadedness, dizziness, palpitations, chest pain, or vision changes. Patient endorses compliance with medication regimen, including Amlodipine 10 mg and Losartan 25 mg.     Patient Active Problem List    Diagnosis Date Noted    Generalized weakness 06/27/2024    Acute gout of multiple sites 06/27/2024    Diffuse arthralgia 06/27/2024    Uremia 06/27/2024    Nuclear sclerotic cataract of right eye 04/15/2024    Gout of multiple sites 08/16/2022    (HFpEF) heart failure with preserved ejection fraction     Stage 4 chronic kidney disease 10/19/2021    History of nephrolithiasis 10/19/2021    GERD (gastroesophageal reflux disease)     RBBB (right bundle branch block with left anterior fascicular block) 08/28/2021    MDD (major depressive disorder) 08/16/2021    Anemia due to stage 4 chronic kidney disease 08/16/2021    Atherosclerosis of native coronary artery of native heart without angina pectoris 01/11/2021    Paroxysmal A-fib 12/24/2020    Type 2 diabetes mellitus with stage 4 chronic kidney disease, with long-term current use of insulin     Stented coronary artery 11/28/2017    Primary hypertension 07/02/2017        Review of patient's allergies indicates:   Allergen Reactions    Ace inhibitors Other (See Comments)     Cough        Past Medical History:   Diagnosis Date    (HFpEF) heart failure with preserved ejection fraction     TTE (2020) w/ EF 55% and  grade II diastolic dysfunction    Anemia due to stage 4 chronic kidney disease 8/16/2021    Gastric ulcer due to Helicobacter pylori 07/2017    Gastrointestinal hemorrhage with melena 07/2017    GERD (gastroesophageal reflux disease)     HLD (hyperlipidemia)     Hydronephrosis with ureteropelvic junction (UPJ) obstruction 8/16/2021    Hypertension     MDD (major depressive disorder) 8/16/2021    Nephrolithiasis 8/25/2021    NSTEMI (non-ST elevated myocardial infarction) 07/2017    Paroxysmal A-fib 12/24/2020    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/2020    Stage 4 chronic kidney disease 10/19/2021    STEMI (ST elevation myocardial infarction) 12/2020    Type 2 diabetes mellitus     Urge incontinence of urine       Past Surgical History:   Procedure Laterality Date    CARDIAC CATHETERIZATION  07/2017    RCA w/ 95% stenosis w/ stent placement    CATARACT EXTRACTION W/  INTRAOCULAR LENS IMPLANT Right 5/29/2024    Procedure: EXTRACTION, CATARACT, WITH IOL INSERTION;  Surgeon: Maddy Arenas MD;  Location: [facility] OR;  Service: Ophthalmology;  Laterality: Right;    CATARACT EXTRACTION W/  INTRAOCULAR LENS IMPLANT Left 6/26/2024    Procedure: EXTRACTION, CATARACT, WITH IOL INSERTION;  Surgeon: Maddy Arenas MD;  Location: [facility] OR;  Service: Ophthalmology;  Laterality: Left;    RETROGRADE PYELOGRAPHY  8/25/2021    Procedure: PYELOGRAM, RETROGRADE;  Surgeon: Rody Smith MD;  Location: [facility] OR;  Service: Urology;;    URETEROSCOPIC REMOVAL OF URETERIC CALCULUS Left 8/25/2021    Procedure: left ureteroscopy, stone basketing, stent placement;  left nephrostomy tube removal;  Surgeon: Rody Smith MD;  Location: [facility] OR;  Service: Urology;  Laterality: Left;      No family history on file.   Social History     Tobacco Use    Smoking status: Never    Smokeless tobacco: Never   Substance Use Topics    Alcohol use: No        Objective:     Vitals:    09/30/24 1520   BP: 112/74   BP  "Location: Right arm   Patient Position: Sitting   Pulse: 92   SpO2: 100%   Weight: 76 kg (167 lb 8.8 oz)   Height: 5' 3" (1.6 m)     Body mass index is 29.68 kg/m².    Physical Exam  Vitals reviewed.   Constitutional:       General: She is not in acute distress.     Appearance: She is not toxic-appearing.   HENT:      Head:      Comments: Seborrheic dermatitis of scalp noted  Eyes:      Extraocular Movements: Extraocular movements intact.   Cardiovascular:      Rate and Rhythm: Normal rate. Rhythm irregular.   Pulmonary:      Effort: Pulmonary effort is normal. No respiratory distress.      Breath sounds: Normal breath sounds.   Musculoskeletal:      Right lower leg: No edema.      Left lower leg: No edema.   Skin:     General: Skin is warm.   Neurological:      Mental Status: She is alert and oriented to person, place, and time. Mental status is at baseline.   Psychiatric:         Mood and Affect: Mood normal.         Behavior: Behavior normal.         Thought Content: Thought content normal.         Assessment/Plan:     Eliza Louie is a 74 y.o. year old female who presents to clinic for follow up.    Type 2 diabetes mellitus with stage 4 chronic kidney disease, with long-term current use of insulin (Primary)  - Controlled based on last A1c <7%  - Will re-check HEMOGLOBIN A1C again today.   - If remains at goal <7% will discontinue 5 units if insulin/lantus   - Continue SITagliptin phosphate (JANUVIA) 25 MG Tab; Take 1 tablet (25 mg total) by mouth once daily.  Dispense: 120 tablet; Refill: 0    Primary hypertension  - Well-controlled   - Continue metoprolol succinate (TOPROL-XL) 100 MG 24 hr tablet; Take 1 tablet (100 mg total) by mouth once daily.  Dispense: 90 tablet; Refill: 0  - Continue amLODIPine (NORVASC) 10 MG tablet; Take 1 tablet (10 mg total) by mouth once daily.  Dispense: 90 tablet; Refill: 3    Osteopenia of right hip  - Started patient on cholecalciferol, vitamin D3, (VITAMIN D3) 50 mcg (2,000 unit) " Tab; Take 1 tablet (2,000 Units total) by mouth once daily.  Dispense: 30 tablet; Refill: 11    Paroxysmal A-fib  - Rate controlled  - Continue metoprolol succinate (TOPROL-XL) 100 MG 24 hr tablet; Take 1 tablet (100 mg total) by mouth once daily.  Dispense: 90 tablet; Refill: 0  - Continue apixaban (ELIQUIS) 5 mg Tab; Take 1 tablet (5 mg total) by mouth 2 (two) times daily.  Dispense: 180 tablet; Refill: 3    Atherosclerosis of native coronary artery of native heart without angina pectoris  - Continue rosuvastatin (CRESTOR) 40 MG Tab; Take 1 tablet (40 mg total) by mouth once daily.  Dispense: 90 tablet; Refill: 3    Stage 4 chronic kidney disease  - Followed by Nephrology  - Continue potassium chloride (MICRO-K) 10 MEQ CpSR; Take 1 capsule (10 mEq total) by mouth once daily.  Dispense: 90 capsule; Refill: 1  - Continue sodium bicarbonate 650 MG tablet; Take 1 tablet (650 mg total) by mouth 3 (three) times daily.  Dispense: 90 tablet; Refill: 11    Dandruff in adult  - Started patient on ketoconazole (NIZORAL) 2 % shampoo; Apply topically twice a week.  Dispense: 120 mL; Refill: 0    Follow-up: 2-3 months     A total of 35 minutes was spent on patient care during this encounter which included chart review, examining the patient, formulating a treatment plan and documentation.      Case discussed with staff: MADDIE Garcia MD  [facility], PGY-3  10/01/2024

## 2024-12-16 ENCOUNTER — TELEPHONE (OUTPATIENT)
Dept: FAMILY MEDICINE | Facility: HOSPITAL | Age: 74
End: 2024-12-16
Payer: MEDICARE

## 2024-12-16 NOTE — TELEPHONE ENCOUNTER
----- Message from Dianne sent at 12/16/2024 10:12 AM CST -----  Type:  Sooner Appointment Request    Caller is requesting a sooner appointment.  Caller declined first available appointment listed below.  Caller will not accept being placed on the waitlist and is requesting a message be sent to doctor.  Name of Caller: Pt   When is the first available appointment? Panels were closed   Symptoms: F/U   Would the patient rather a call back or a response via [employer]? Call back   Best Call Back Number: [phone]  Additional Information: Please be advised, pt states that she would like her and her  (Noah Louie) to be scheduled on the same day back to back

## 2025-01-16 ENCOUNTER — HOSPITAL ENCOUNTER (INPATIENT)
Facility: HOSPITAL | Age: 75
LOS: 3 days | Discharge: HOME | DRG: 291 | End: 2025-01-20
Attending: EMERGENCY MEDICINE | Admitting: HOSPITALIST
Payer: MEDICARE

## 2025-01-16 DIAGNOSIS — I50.9 ACUTE ON CHRONIC CONGESTIVE HEART FAILURE, UNSPECIFIED HEART FAILURE TYPE: ICD-10-CM

## 2025-01-16 DIAGNOSIS — Z79.4 TYPE 2 DIABETES MELLITUS WITH STAGE 4 CHRONIC KIDNEY DISEASE, WITH LONG-TERM CURRENT USE OF INSULIN: ICD-10-CM

## 2025-01-16 DIAGNOSIS — R06.02 SOB (SHORTNESS OF BREATH): ICD-10-CM

## 2025-01-16 DIAGNOSIS — N18.4 ANEMIA DUE TO STAGE 4 CHRONIC KIDNEY DISEASE: Chronic | ICD-10-CM

## 2025-01-16 DIAGNOSIS — M1A.09X0 CHRONIC GOUT OF MULTIPLE SITES, UNSPECIFIED CAUSE: ICD-10-CM

## 2025-01-16 DIAGNOSIS — E11.22 TYPE 2 DIABETES MELLITUS WITH STAGE 4 CHRONIC KIDNEY DISEASE, WITH LONG-TERM CURRENT USE OF INSULIN: ICD-10-CM

## 2025-01-16 DIAGNOSIS — D63.1 ANEMIA DUE TO STAGE 4 CHRONIC KIDNEY DISEASE: Chronic | ICD-10-CM

## 2025-01-16 DIAGNOSIS — M79.89 LEG SWELLING: ICD-10-CM

## 2025-01-16 DIAGNOSIS — I48.0 PAROXYSMAL A-FIB: Chronic | ICD-10-CM

## 2025-01-16 DIAGNOSIS — I12.9 HYPERTENSION ASSOCIATED WITH STAGE 4 CHRONIC KIDNEY DISEASE DUE TO TYPE 2 DIABETES MELLITUS: ICD-10-CM

## 2025-01-16 DIAGNOSIS — N18.4 TYPE 2 DIABETES MELLITUS WITH STAGE 4 CHRONIC KIDNEY DISEASE, WITH LONG-TERM CURRENT USE OF INSULIN: ICD-10-CM

## 2025-01-16 DIAGNOSIS — E11.22 HYPERTENSION ASSOCIATED WITH STAGE 4 CHRONIC KIDNEY DISEASE DUE TO TYPE 2 DIABETES MELLITUS: ICD-10-CM

## 2025-01-16 DIAGNOSIS — N18.4 HYPERTENSION ASSOCIATED WITH STAGE 4 CHRONIC KIDNEY DISEASE DUE TO TYPE 2 DIABETES MELLITUS: ICD-10-CM

## 2025-01-16 DIAGNOSIS — I50.9 CHF EXACERBATION: Primary | ICD-10-CM

## 2025-01-16 PROBLEM — E78.5 HLD (HYPERLIPIDEMIA): Status: ACTIVE | Noted: 2025-01-16

## 2025-01-16 LAB
ALBUMIN SERPL BCP-MCNC: 4 G/DL (ref 3.5–5.2)
ALP SERPL-CCNC: 80 U/L (ref 40–150)
ALT SERPL W/O P-5'-P-CCNC: 19 U/L (ref 10–44)
ANION GAP SERPL CALC-SCNC: 11 MMOL/L (ref 8–16)
APICAL FOUR CHAMBER EJECTION FRACTION: 68 %
APICAL TWO CHAMBER EJECTION FRACTION: 54 %
ASCENDING AORTA: 3.2 CM
AST SERPL-CCNC: 27 U/L (ref 10–40)
AV INDEX (PROSTH): 0.8
AV MEAN GRADIENT: 4 MMHG
AV PEAK GRADIENT: 7 MMHG
AV REGURGITATION PRESSURE HALF TIME: 543 MS
AV VALVE AREA BY VELOCITY RATIO: 2.9 CM²
AV VALVE AREA: 2.5 CM²
AV VELOCITY RATIO: 0.92
BASOPHILS # BLD AUTO: 0.04 K/UL (ref 0–0.2)
BASOPHILS NFR BLD: 0.7 % (ref 0–1.9)
BILIRUB SERPL-MCNC: 0.6 MG/DL (ref 0.1–1)
BNP SERPL-MCNC: 483 PG/ML (ref 0–99)
BSA FOR ECHO PROCEDURE: 1.89 M2
BUN SERPL-MCNC: 30 MG/DL (ref 8–23)
CALCIUM SERPL-MCNC: 9 MG/DL (ref 8.7–10.5)
CHLORIDE SERPL-SCNC: 109 MMOL/L (ref 95–110)
CO2 SERPL-SCNC: 21 MMOL/L (ref 23–29)
CREAT SERPL-MCNC: 2.3 MG/DL (ref 0.5–1.4)
CV ECHO LV RWT: 0.41 CM
DIFFERENTIAL METHOD BLD: ABNORMAL
DOP CALC AO PEAK VEL: 1.3 M/S
DOP CALC AO VTI: 33.8 CM
DOP CALC LVOT AREA: 3.1 CM2
DOP CALC LVOT DIAMETER: 2 CM
DOP CALC LVOT PEAK VEL: 1.2 M/S
DOP CALC LVOT STROKE VOLUME: 84.8 CM3
DOP CALCLVOT PEAK VEL VTI: 27 CM
E WAVE DECELERATION TIME: 98 MSEC
E/A RATIO: 2.66
E/E' RATIO: 14 M/S
ECHO LV POSTERIOR WALL: 0.9 CM (ref 0.6–1.1)
EOSINOPHIL # BLD AUTO: 0.3 K/UL (ref 0–0.5)
EOSINOPHIL NFR BLD: 5.5 % (ref 0–8)
ERYTHROCYTE [DISTWIDTH] IN BLOOD BY AUTOMATED COUNT: 16.8 % (ref 11.5–14.5)
EST. GFR  (NO RACE VARIABLE): 22 ML/MIN/1.73 M^2
FRACTIONAL SHORTENING: 45.5 % (ref 28–44)
GLUCOSE SERPL-MCNC: 116 MG/DL (ref 70–110)
HCT VFR BLD AUTO: 31.7 % (ref 37–48.5)
HGB BLD-MCNC: 9.7 G/DL (ref 12–16)
IMM GRANULOCYTES # BLD AUTO: 0.01 K/UL (ref 0–0.04)
IMM GRANULOCYTES NFR BLD AUTO: 0.2 % (ref 0–0.5)
INTERVENTRICULAR SEPTUM: 0.9 CM (ref 0.6–1.1)
IVRT: 91 MSEC
LEFT ATRIUM AREA SYSTOLIC (APICAL 2 CHAMBER): 27.85 CM2
LEFT ATRIUM AREA SYSTOLIC (APICAL 4 CHAMBER): 28.87 CM2
LEFT ATRIUM VOLUME INDEX MOD: 51 ML/M2
LEFT ATRIUM VOLUME MOD: 94 ML
LEFT INTERNAL DIMENSION IN SYSTOLE: 2.4 CM (ref 2.1–4)
LEFT VENTRICLE DIASTOLIC VOLUME INDEX: 48.65 ML/M2
LEFT VENTRICLE DIASTOLIC VOLUME: 89.52 ML
LEFT VENTRICLE END DIASTOLIC VOLUME APICAL 2 CHAMBER: 61.72 ML
LEFT VENTRICLE END DIASTOLIC VOLUME APICAL 4 CHAMBER: 65.68 ML
LEFT VENTRICLE END SYSTOLIC VOLUME APICAL 2 CHAMBER: 90.04 ML
LEFT VENTRICLE END SYSTOLIC VOLUME APICAL 4 CHAMBER: 96.25 ML
LEFT VENTRICLE MASS INDEX: 69.6 G/M2
LEFT VENTRICLE SYSTOLIC VOLUME INDEX: 11.4 ML/M2
LEFT VENTRICLE SYSTOLIC VOLUME: 21.01 ML
LEFT VENTRICULAR INTERNAL DIMENSION IN DIASTOLE: 4.4 CM (ref 3.5–6)
LEFT VENTRICULAR MASS: 128 G
LV LATERAL E/E' RATIO: 11.4 M/S
LV SEPTAL E/E' RATIO: 17.9 M/S
LVED V (TEICH): 89.52 ML
LVES V (TEICH): 21.01 ML
LVOT MG: 3.22 MMHG
LVOT MV: 0.85 CM/S
LYMPHOCYTES # BLD AUTO: 1.6 K/UL (ref 1–4.8)
LYMPHOCYTES NFR BLD: 27.5 % (ref 18–48)
MAGNESIUM SERPL-MCNC: 2.1 MG/DL (ref 1.6–2.6)
MCH RBC QN AUTO: 28 PG (ref 27–31)
MCHC RBC AUTO-ENTMCNC: 30.6 G/DL (ref 32–36)
MCV RBC AUTO: 91 FL (ref 82–98)
MONOCYTES # BLD AUTO: 0.5 K/UL (ref 0.3–1)
MONOCYTES NFR BLD: 7.7 % (ref 4–15)
MV PEAK A VEL: 0.47 M/S
MV PEAK E VEL: 1.25 M/S
MV STENOSIS PRESSURE HALF TIME: 28.4 MS
MV VALVE AREA P 1/2 METHOD: 7.75 CM2
NEUTROPHILS # BLD AUTO: 3.4 K/UL (ref 1.8–7.7)
NEUTROPHILS NFR BLD: 58.4 % (ref 38–73)
NRBC BLD-RTO: 0 /100 WBC
OHS CV RV/LV RATIO: 0.66 CM
OHS LV EJECTION FRACTION SIMPSONS BIPLANE MOD: 60 %
PISA AR MAX VEL: 4.12 M/S
PISA TR MAX VEL: 2.9 M/S
PLATELET # BLD AUTO: 177 K/UL (ref 150–450)
PMV BLD AUTO: 10.9 FL (ref 9.2–12.9)
POCT GLUCOSE: 112 MG/DL (ref 70–110)
POCT GLUCOSE: 118 MG/DL (ref 70–110)
POCT GLUCOSE: 121 MG/DL (ref 70–110)
POCT GLUCOSE: 122 MG/DL (ref 70–110)
POCT GLUCOSE: 128 MG/DL (ref 70–110)
POTASSIUM SERPL-SCNC: 4.8 MMOL/L (ref 3.5–5.1)
PROT SERPL-MCNC: 7.6 G/DL (ref 6–8.4)
RA PRESSURE ESTIMATED: 15 MMHG
RA VOL SYS: 56.78 ML
RBC # BLD AUTO: 3.47 M/UL (ref 4–5.4)
RIGHT ATRIAL AREA: 19.5 CM2
RIGHT ATRIUM VOLUME AREA LENGTH APICAL 4 CHAMBER: 53.26 ML
RIGHT VENTRICLE DIASTOLIC BASEL DIMENSION: 2.9 CM
RV TB RVSP: 18 MMHG
RV TISSUE DOPPLER FREE WALL SYSTOLIC VELOCITY 1 (APICAL 4 CHAMBER VIEW): 9.31 CM/S
SINUS: 3.08 CM
SODIUM SERPL-SCNC: 141 MMOL/L (ref 136–145)
STJ: 2.61 CM
TDI LATERAL: 0.11 M/S
TDI SEPTAL: 0.07 M/S
TDI: 0.09 M/S
TR MAX PG: 34 MMHG
TRICUSPID ANNULAR PLANE SYSTOLIC EXCURSION: 1.75 CM
TROPONIN I SERPL DL<=0.01 NG/ML-MCNC: 0.02 NG/ML (ref 0–0.03)
TV REST PULMONARY ARTERY PRESSURE: 49 MMHG
WBC # BLD AUTO: 5.81 K/UL (ref 3.9–12.7)
Z-SCORE OF LEFT VENTRICULAR DIMENSION IN END DIASTOLE: -1.49
Z-SCORE OF LEFT VENTRICULAR DIMENSION IN END SYSTOLE: -2.15

## 2025-01-16 PROCEDURE — 25000003 PHARM REV CODE 250: Performed by: EMERGENCY MEDICINE

## 2025-01-16 PROCEDURE — 96374 THER/PROPH/DIAG INJ IV PUSH: CPT

## 2025-01-16 PROCEDURE — 25000003 PHARM REV CODE 250: Performed by: NURSE PRACTITIONER

## 2025-01-16 PROCEDURE — 63600175 PHARM REV CODE 636 W HCPCS: Performed by: EMERGENCY MEDICINE

## 2025-01-16 PROCEDURE — 27000221 HC OXYGEN, UP TO 24 HOURS

## 2025-01-16 PROCEDURE — 99900035 HC TECH TIME PER 15 MIN (STAT)

## 2025-01-16 PROCEDURE — 25000003 PHARM REV CODE 250

## 2025-01-16 PROCEDURE — 94761 N-INVAS EAR/PLS OXIMETRY MLT: CPT

## 2025-01-16 PROCEDURE — 96376 TX/PRO/DX INJ SAME DRUG ADON: CPT

## 2025-01-16 PROCEDURE — 99291 CRITICAL CARE FIRST HOUR: CPT

## 2025-01-16 PROCEDURE — 63600175 PHARM REV CODE 636 W HCPCS: Performed by: NURSE PRACTITIONER

## 2025-01-16 PROCEDURE — G0378 HOSPITAL OBSERVATION PER HR: HCPCS

## 2025-01-16 PROCEDURE — 85025 COMPLETE CBC W/AUTO DIFF WBC: CPT | Performed by: EMERGENCY MEDICINE

## 2025-01-16 PROCEDURE — 93010 ELECTROCARDIOGRAM REPORT: CPT | Mod: ,,, | Performed by: INTERNAL MEDICINE

## 2025-01-16 PROCEDURE — 80053 COMPREHEN METABOLIC PANEL: CPT | Performed by: EMERGENCY MEDICINE

## 2025-01-16 PROCEDURE — 83880 ASSAY OF NATRIURETIC PEPTIDE: CPT | Performed by: EMERGENCY MEDICINE

## 2025-01-16 PROCEDURE — 93005 ELECTROCARDIOGRAM TRACING: CPT

## 2025-01-16 PROCEDURE — 83735 ASSAY OF MAGNESIUM: CPT | Performed by: EMERGENCY MEDICINE

## 2025-01-16 PROCEDURE — 84484 ASSAY OF TROPONIN QUANT: CPT | Performed by: EMERGENCY MEDICINE

## 2025-01-16 RX ORDER — METOLAZONE 2.5 MG/1
5 TABLET ORAL DAILY
Status: DISCONTINUED | OUTPATIENT
Start: 2025-01-16 | End: 2025-01-20 | Stop reason: HOSPADM

## 2025-01-16 RX ORDER — ATORVASTATIN CALCIUM 40 MG/1
40 TABLET, FILM COATED ORAL DAILY
Status: DISCONTINUED | OUTPATIENT
Start: 2025-01-16 | End: 2025-01-20 | Stop reason: HOSPADM

## 2025-01-16 RX ORDER — SODIUM CHLORIDE 0.9 % (FLUSH) 0.9 %
5 SYRINGE (ML) INJECTION
Status: DISCONTINUED | OUTPATIENT
Start: 2025-01-16 | End: 2025-01-20 | Stop reason: HOSPADM

## 2025-01-16 RX ORDER — GLUCAGON 1 MG
1 KIT INJECTION
Status: DISCONTINUED | OUTPATIENT
Start: 2025-01-16 | End: 2025-01-20 | Stop reason: HOSPADM

## 2025-01-16 RX ORDER — FUROSEMIDE 10 MG/ML
80 INJECTION INTRAMUSCULAR; INTRAVENOUS EVERY 12 HOURS
Status: DISCONTINUED | OUTPATIENT
Start: 2025-01-16 | End: 2025-01-20 | Stop reason: HOSPADM

## 2025-01-16 RX ORDER — INSULIN ASPART 100 [IU]/ML
0-5 INJECTION, SOLUTION INTRAVENOUS; SUBCUTANEOUS
Status: DISCONTINUED | OUTPATIENT
Start: 2025-01-16 | End: 2025-01-20 | Stop reason: HOSPADM

## 2025-01-16 RX ORDER — ALLOPURINOL 100 MG/1
100 TABLET ORAL DAILY
Status: DISCONTINUED | OUTPATIENT
Start: 2025-01-16 | End: 2025-01-20 | Stop reason: HOSPADM

## 2025-01-16 RX ORDER — ACETAMINOPHEN 325 MG/1
650 TABLET ORAL EVERY 4 HOURS PRN
Status: DISCONTINUED | OUTPATIENT
Start: 2025-01-16 | End: 2025-01-20 | Stop reason: HOSPADM

## 2025-01-16 RX ORDER — IBUPROFEN 200 MG
24 TABLET ORAL
Status: DISCONTINUED | OUTPATIENT
Start: 2025-01-16 | End: 2025-01-20 | Stop reason: HOSPADM

## 2025-01-16 RX ORDER — LANOLIN ALCOHOL/MO/W.PET/CERES
400 CREAM (GRAM) TOPICAL 2 TIMES DAILY
Status: DISCONTINUED | OUTPATIENT
Start: 2025-01-16 | End: 2025-01-20 | Stop reason: HOSPADM

## 2025-01-16 RX ORDER — SODIUM BICARBONATE 650 MG/1
650 TABLET ORAL 3 TIMES DAILY
Status: DISCONTINUED | OUTPATIENT
Start: 2025-01-16 | End: 2025-01-20 | Stop reason: HOSPADM

## 2025-01-16 RX ORDER — IBUPROFEN 200 MG
16 TABLET ORAL
Status: DISCONTINUED | OUTPATIENT
Start: 2025-01-16 | End: 2025-01-20 | Stop reason: HOSPADM

## 2025-01-16 RX ORDER — LOSARTAN POTASSIUM 25 MG/1
25 TABLET ORAL DAILY
Status: DISCONTINUED | OUTPATIENT
Start: 2025-01-16 | End: 2025-01-20 | Stop reason: HOSPADM

## 2025-01-16 RX ORDER — CHOLECALCIFEROL (VITAMIN D3) 25 MCG
2000 TABLET ORAL DAILY
Status: DISCONTINUED | OUTPATIENT
Start: 2025-01-16 | End: 2025-01-20 | Stop reason: HOSPADM

## 2025-01-16 RX ORDER — TALC
6 POWDER (GRAM) TOPICAL NIGHTLY PRN
Status: DISCONTINUED | OUTPATIENT
Start: 2025-01-16 | End: 2025-01-20 | Stop reason: HOSPADM

## 2025-01-16 RX ORDER — ONDANSETRON HYDROCHLORIDE 2 MG/ML
4 INJECTION, SOLUTION INTRAVENOUS EVERY 8 HOURS PRN
Status: DISCONTINUED | OUTPATIENT
Start: 2025-01-16 | End: 2025-01-20 | Stop reason: HOSPADM

## 2025-01-16 RX ORDER — AMLODIPINE BESYLATE 5 MG/1
10 TABLET ORAL DAILY
Status: DISCONTINUED | OUTPATIENT
Start: 2025-01-16 | End: 2025-01-20 | Stop reason: HOSPADM

## 2025-01-16 RX ORDER — NALOXONE HCL 0.4 MG/ML
0.02 VIAL (ML) INJECTION
Status: DISCONTINUED | OUTPATIENT
Start: 2025-01-16 | End: 2025-01-20 | Stop reason: HOSPADM

## 2025-01-16 RX ORDER — AMOXICILLIN 250 MG
1 CAPSULE ORAL 2 TIMES DAILY
Status: DISCONTINUED | OUTPATIENT
Start: 2025-01-16 | End: 2025-01-20 | Stop reason: HOSPADM

## 2025-01-16 RX ORDER — FUROSEMIDE 10 MG/ML
80 INJECTION INTRAMUSCULAR; INTRAVENOUS
Status: COMPLETED | OUTPATIENT
Start: 2025-01-16 | End: 2025-01-16

## 2025-01-16 RX ORDER — POLYETHYLENE GLYCOL 3350 17 G/17G
17 POWDER, FOR SOLUTION ORAL DAILY PRN
Status: DISCONTINUED | OUTPATIENT
Start: 2025-01-16 | End: 2025-01-20 | Stop reason: HOSPADM

## 2025-01-16 RX ORDER — METOLAZONE 2.5 MG/1
5 TABLET ORAL DAILY
Status: DISCONTINUED | OUTPATIENT
Start: 2025-01-16 | End: 2025-01-16

## 2025-01-16 RX ORDER — METOPROLOL SUCCINATE 50 MG/1
100 TABLET, EXTENDED RELEASE ORAL DAILY
Status: DISCONTINUED | OUTPATIENT
Start: 2025-01-16 | End: 2025-01-20 | Stop reason: HOSPADM

## 2025-01-16 RX ADMIN — CHOLECALCIFEROL TAB 25 MCG (1000 UNIT) 2000 UNITS: 25 TAB at 09:01

## 2025-01-16 RX ADMIN — APIXABAN 5 MG: 5 TABLET, FILM COATED ORAL at 09:01

## 2025-01-16 RX ADMIN — FUROSEMIDE 80 MG: 10 INJECTION, SOLUTION INTRAMUSCULAR; INTRAVENOUS at 08:01

## 2025-01-16 RX ADMIN — AMLODIPINE BESYLATE 10 MG: 5 TABLET ORAL at 09:01

## 2025-01-16 RX ADMIN — METOPROLOL SUCCINATE 100 MG: 50 TABLET, EXTENDED RELEASE ORAL at 09:01

## 2025-01-16 RX ADMIN — MAGNESIUM OXIDE TAB 400 MG (241.3 MG ELEMENTAL MG) 400 MG: 400 (241.3 MG) TAB at 08:01

## 2025-01-16 RX ADMIN — LOSARTAN POTASSIUM 25 MG: 25 TABLET, FILM COATED ORAL at 09:01

## 2025-01-16 RX ADMIN — METOLAZONE 5 MG: 2.5 TABLET ORAL at 08:01

## 2025-01-16 RX ADMIN — NITROGLYCERIN 1 INCH: 20 OINTMENT TOPICAL at 03:01

## 2025-01-16 RX ADMIN — ALLOPURINOL 100 MG: 100 TABLET ORAL at 09:01

## 2025-01-16 RX ADMIN — FUROSEMIDE 80 MG: 10 INJECTION, SOLUTION INTRAMUSCULAR; INTRAVENOUS at 04:01

## 2025-01-16 RX ADMIN — FUROSEMIDE 80 MG: 10 INJECTION, SOLUTION INTRAMUSCULAR; INTRAVENOUS at 09:01

## 2025-01-16 RX ADMIN — MAGNESIUM OXIDE TAB 400 MG (241.3 MG ELEMENTAL MG) 400 MG: 400 (241.3 MG) TAB at 09:01

## 2025-01-16 RX ADMIN — ATORVASTATIN CALCIUM 40 MG: 40 TABLET, FILM COATED ORAL at 09:01

## 2025-01-16 RX ADMIN — APIXABAN 5 MG: 5 TABLET, FILM COATED ORAL at 08:01

## 2025-01-16 NOTE — PROGRESS NOTES
01/16/25 0753   Admission   Initial VN Admission Questions Complete   Communication Issues? None   Shift   Virtual Nurse - Rounding Complete   Virtual Nurse - Patient Verbalized Approval Of Camera Use   Type of Frequent Check   Type Patient Rounds   Safety/Activity   Patient Rounds bed in low position;visualized patient;call light in patient/parent reach   Safety Promotion/Fall Prevention instructed to call staff for mobility;patient expresses understanding of fall risk and prevention;side rails raised x 2   Positioning   Body Position position changed independently   Head of Bed (HOB) Positioning HOB at 60-90 degrees   Positioning/Transfer Devices pillows;in use     VN cued into patient's room and permission given to adjust the camera towards the patient who is resting in bed and in no apparent distress.  Admission assessment questions completed.  Plan of care reviewed.  Safety is maintained with bed low, side rails up and call light within reach.  Patient is instructed to use the call light to call for any assistance and patient verbalized understanding. Will be available to intervene if needed.

## 2025-01-16 NOTE — ASSESSMENT & PLAN NOTE
Creatine stable for now. BMP reviewed- noted Estimated Creatinine Clearance: 23 mL/min (A) (based on SCr of 2.3 mg/dL (H)). according to latest data. Based on current GFR, CKD stage is stage 4 - GFR 15-29.  Monitor UOP and serial BMP and adjust therapy as needed. Renally dose meds. Avoid nephrotoxic medications and procedures.    Plan:  Hold home Lantus 5 mg q.h.s.   Hold home oral hypoglycemic agents  CARLO  [facility]

## 2025-01-16 NOTE — PLAN OF CARE
MOON Message     Medicare Outpatient and Observation Notification regarding financial responsibility Explained to patient/caregiver; Other (comments)Medicare Outpatient and Observation Notification regarding financial responsibility. Explained to patient/caregiver; Other (comments). The comment is Patient gave verbal consent. Taken on 1/16/25 3029   Date AMATO was signed 1/16/2025   Time AMATO was signed [id number]

## 2025-01-16 NOTE — ED NOTES
Pt. Stated her left and right legs are swollen since June. She has edema in both lower extremities and feet. she states she has orthopnea since thanksgiving. Pt. A/O X4. Pt.on cardiac monitor and spo2. Family at bedside.

## 2025-01-16 NOTE — H&P
[facility]  History & Physical    Patient Name: Eliza Louie  MRN: [id number]  Patient Class: OP- Observation  Admission Date: 1/16/2025  Attending Physician: Manny Gatica,*   Primary Care Provider: Casi Garcia MD         Patient information was obtained from patient and ER records.     Subjective:     Principal Problem:Acute exacerbation of CHF (congestive heart failure)    Chief Complaint:   Chief Complaint   Patient presents with    Leg Swelling    Shortness of Breath     Patient reports bilateral leg edema and orthopnea since November. She states her symptoms are worsening recently. She currently takes lasix and is compliant. Denies chest pain.         HPI: 74-year-old female with a past medical history of HTN, HLD, IDDM 2, proximal Afib( on Eliquis), HFpEF (last Echo 55-60%, grade 2 diastolic dysfunction), CKD stage 4, osteopenia right hip, CAD with  stent placement (2017) who presented to the emergency room with complaints of increased shortness of breath that started prior to Thanksgiving.  She also endorses increased edema to both lower extremity that started in June and increased pain to both lower extremity that started 2 weeks ago.  Patient was unable to sleep due to the pain in her legs and this was the inciting event that brought her to emergency room today.  In addition she reports orthopnea requiring 3 pillows at night.  She reports that she is compliant with all of her medications.  She denies any exertional shortness of breath, chest pain, palpitations, coughing, recent upper respiratory infection, fever, nausea, vomiting, diarrhea or abdominal pain.      In the ER, initial vital signs were as follows: Temp 98.6, /72, HR in 84, SpO2 95 % on room air. Labs as follows: WBC 5.81, Hgb 9.7, BUN/Cr 30/2.3 (baseline Cr 3.2).  Troponin 0.021,  (baseline 483)    CXR suggestive of edema/CHF with bilateral pleural effusions as above. .     EKG atrial  fibrillation with rig RBB at 69,     In the ER, patient was given nitroglycerin paste and furosemide 80 mg x 1 dose with  ml.    Patient was admitted to [facility] for CHF exacerbation.                  Past Medical History:   Diagnosis Date    (HFpEF) heart failure with preserved ejection fraction     TTE (2020) w/ EF 55% and grade II diastolic dysfunction    Anemia due to stage 4 chronic kidney disease 8/16/2021    Gastric ulcer due to Helicobacter pylori 07/2017    Gastrointestinal hemorrhage with melena 07/2017    GERD (gastroesophageal reflux disease)     HLD (hyperlipidemia)     Hydronephrosis with ureteropelvic junction (UPJ) obstruction 8/16/2021    Hypertension     MDD (major depressive disorder) 8/16/2021    Nephrolithiasis 8/25/2021    NSTEMI (non-ST elevated myocardial infarction) 07/2017    Paroxysmal A-fib 12/24/2020    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/2020    Stage 4 chronic kidney disease 10/19/2021    STEMI (ST elevation myocardial infarction) 12/2020    Type 2 diabetes mellitus     Urge incontinence of urine        Past Surgical History:   Procedure Laterality Date    CARDIAC CATHETERIZATION  07/2017    RCA w/ 95% stenosis w/ stent placement    CATARACT EXTRACTION W/  INTRAOCULAR LENS IMPLANT Right 5/29/2024    Procedure: EXTRACTION, CATARACT, WITH IOL INSERTION;  Surgeon: Maddy Arenas MD;  Location: [facility] OR;  Service: Ophthalmology;  Laterality: Right;    CATARACT EXTRACTION W/  INTRAOCULAR LENS IMPLANT Left 6/26/2024    Procedure: EXTRACTION, CATARACT, WITH IOL INSERTION;  Surgeon: Maddy Arenas MD;  Location: [facility] OR;  Service: Ophthalmology;  Laterality: Left;    RETROGRADE PYELOGRAPHY  8/25/2021    Procedure: PYELOGRAM, RETROGRADE;  Surgeon: Rody Smith MD;  Location: [facility] OR;  Service: Urology;;    URETEROSCOPIC REMOVAL OF URETERIC CALCULUS Left 8/25/2021    Procedure: left ureteroscopy, stone basketing, stent  placement;  left nephrostomy tube removal;  Surgeon: Rody Smith MD;  Location: [facility];  Service: Urology;  Laterality: Left;       Review of patient's allergies indicates:   Allergen Reactions    Ace inhibitors Other (See Comments)     Cough       No current facility-administered medications on file prior to encounter.     Current Outpatient Medications on File Prior to Encounter   Medication Sig    allopurinoL (ZYLOPRIM) 100 MG tablet Take 1 tablet (100 mg total) by mouth once daily.    amLODIPine (NORVASC) 10 MG tablet Take 1 tablet (10 mg total) by mouth once daily.    apixaban (ELIQUIS) 5 mg Tab Take 1 tablet (5 mg total) by mouth 2 (two) times daily.    blood sugar diagnostic Strp To check BG 2 times daily, to use with insurance preferred meter    blood-glucose meter (FREESTYLE SYSTEM KIT MISC) by Misc.(Non-Drug; Combo Route) route.    blood-glucose sensor (DEXCOM G6 SENSOR) Sowmya 1 each by Misc.(Non-Drug; Combo Route) route once daily. (Patient not taking: Reported on 9/30/2024)    cholecalciferol, vitamin D3, (VITAMIN D3) 50 mcg (2,000 unit) Tab Take 1 tablet (2,000 Units total) by mouth once daily.    colchicine (COLCRYS) 0.6 mg tablet Take 1 tablet (0.6 mg total) by mouth daily as needed (acute gout flare).    furosemide (LASIX) 20 MG tablet Take 1 tablet (20 mg total) by mouth once daily.    insulin glargine U-100, Lantus, (LANTUS SOLOSTAR U-100 INSULIN) 100 unit/mL (3 mL) InPn pen Inject 5 Units into the skin every evening.    ketoconazole (NIZORAL) 2 % shampoo Apply topically twice a week.    ketorolac 0.5% (ACULAR) 0.5 % Drop Place 1 drop into the right eye 3 (three) times daily. (Patient not taking: Reported on 9/30/2024)    lancets Misc To check BG 2 times daily, to use with insurance preferred meter    losartan (COZAAR) 25 MG tablet Take 1 tablet (25 mg total) by mouth once daily.    magnesium oxide (MAG-OX) 400 mg (241.3 mg magnesium) tablet Take 1 tablet (400 mg total) by mouth 2 (two) times  daily.    metoprolol succinate (TOPROL-XL) 100 MG 24 hr tablet Take 1 tablet (100 mg total) by mouth once daily.    ofloxacin (OCUFLOX) 0.3 % ophthalmic solution Place 1 drop into the right eye 3 (three) times daily. (Patient not taking: Reported on 9/30/2024)    potassium chloride (MICRO-K) 10 MEQ CpSR Take 1 capsule (10 mEq total) by mouth once daily.    prednisoLONE acetate (PRED FORTE) 1 % DrpS Place 1 drop into the right eye 3 (three) times daily. (Patient not taking: Reported on 9/30/2024)    rosuvastatin (CRESTOR) 40 MG Tab Take 1 tablet (40 mg total) by mouth once daily.    SITagliptin phosphate (JANUVIA) 25 MG Tab Take 1 tablet (25 mg total) by mouth once daily.    sodium bicarbonate 650 MG tablet Take 1 tablet (650 mg total) by mouth 3 (three) times daily.     Family History    None       Tobacco Use    Smoking status: Never    Smokeless tobacco: Never   Substance and Sexual Activity    Alcohol use: No    Drug use: No    Sexual activity: Not Currently     Review of Systems  Negative except as stated in HPI  Objective:     Vital Signs (Most Recent):  Temp: 98.4 °F (36.9 °C) (01/16/25 0616)  Pulse: 79 (01/16/25 0616)  Resp: (!) 23 (01/16/25 0548)  BP: 139/80 (01/16/25 0616)  SpO2: (!) 92 % (01/16/25 0616) Vital Signs (24h Range):  Temp:  [97.6 °F (36.4 °C)-98.6 °F (37 °C)] 98.4 °F (36.9 °C)  Pulse:  [69-88] 79  Resp:  [18-30] 23  SpO2:  [91 %-95 %] 92 %  BP: (128-155)/(62-80) 139/80     Weight: 86.2 kg (190 lb 0.6 oz)  Body mass index is 33.66 kg/m².     Physical Exam  Constitutional:       Appearance: Normal appearance.   HENT:      Head: Normocephalic and atraumatic.      Nose: Nose normal.      Mouth/Throat:      Mouth: Mucous membranes are moist.   Eyes:      Extraocular Movements: Extraocular movements intact.      Conjunctiva/sclera: Conjunctivae normal.      Pupils: Pupils are equal, round, and reactive to light.   Cardiovascular:      Rate and Rhythm: Normal rate. Rhythm irregular. No JVD       Pulses: Normal pulses.      Heart sounds: Normal heart sounds. No murmur heard.     No friction rub. No gallop.   Pulmonary:      Effort: Respiratory distress present.      Breath sounds: No stridor. No wheezing, rhonchi or rales.      Comments: Diminished breath sounds at the bases.  Clear to auscultation bilaterally  Chest:      Chest wall: No tenderness.   Abdominal:      General: Abdomen is flat. Bowel sounds are normal. There is no distension.      Palpations: Abdomen is soft.      Tenderness: There is no abdominal tenderness. There is no right CVA tenderness, left CVA tenderness, guarding or rebound.   Musculoskeletal:      Right lower leg: Edema (+4 to mid thigh) present.      Left lower leg: Edema (+4 to mid thigh) present.   Skin:     General: Skin is warm and dry.      Capillary Refill: Capillary refill takes less than 2 seconds.      Coloration: Skin is not jaundiced or pale.      Findings: No bruising, erythema, lesion or rash.   Neurological:      General: No focal deficit present.      Mental Status: She is alert and oriented to person, place, and time. Mental status is at baseline.      Cranial Nerves: No cranial nerve deficit.   Psychiatric:         Mood and Affect: Mood normal.         Behavior: Behavior normal.              CRANIAL NERVES     CN III, IV, VI   Pupils are equal, round, and reactive to light.       Significant Labs: All pertinent labs within the past 24 hours have been reviewed.    Significant Imaging: I have reviewed all pertinent imaging results/findings within the past 24 hours.  Assessment/Plan:     * Acute exacerbation of CHF (congestive heart failure)  Patient has Diastolic (HFpEF) heart failure that is Acute on chronic. On presentation their CHF was well compensated. Most recent BNP and echo results are listed below.  Recent Labs     01/16/25  0324   *     Latest ECHO  Results for orders placed in visit on 03/11/24    Echo    Interpretation Summary    Left Ventricle:  There is normal systolic function with a visually estimated ejection fraction of 55 - 60%. Grade II diastolic dysfunction. Elevated left ventricular filling pressure.    Right Ventricle: Systolic function is normal.    Mitral Valve: There is mild regurgitation.    Left Atrium: Left atrium is moderately dilated.    Tricuspid Valve: There is mild regurgitation.    IVC/SVC: Intermediate venous pressure at 8 mmHg.    Current Heart Failure Medications  losartan tablet 25 mg, Daily, Oral  metoprolol succinate (TOPROL-XL) 24 hr tablet 100 mg, Daily, Oral  furosemide injection 80 mg, Every 12 hours, Intravenous    Plan  - Monitor strict I&Os and daily weights.    - Place on telemetry  - Low sodium diet  - Place on fluid restriction of 1.5 L.   - Cardiology has been consulted  - The patient's volume status is stable but not at their baseline as indicated by edema, weight gain, orthopnea, and shortness of breath  -Echo this am to rule out worsening diastolic or systolic function  -Furosemide 80 mg b.i.d.   -Hold home KCl 10 mEq daily dose, will replete potassium accordingly  -Consider checking TSH, albumin, UA, and renal function    HLD (hyperlipidemia)  Plan:  Start atorvastain 40 mg daily instead of home Rosuvastatin       CKD (chronic kidney disease), stage IV  Creatine stable for now. BMP reviewed- noted Estimated Creatinine Clearance: 22.2 mL/min (A) (based on SCr of 2.3 mg/dL (H)). according to latest data. Based on current GFR, CKD stage is stage 4 - GFR 15-29.    Suspect anemia due to anemia of chronic disease d/t CKD stage 4      Plan:  -Monitor UOP and serial BMP and adjust therapy as needed.   -Renally dose meds.   -Avoid nephrotoxic medications and procedures  -continue home sodium bicarb 650 mg t.i.d.  -continue home vitamin-D 3 2000 I U daily    Gout of multiple sites      Plan:  Continue home allopurinol 100 mg daily      Anemia due to stage 4 chronic kidney disease  Anemia is likely due to chronic disease due  to Chronic Kidney Disease. Most recent hemoglobin and hematocrit are listed below.  Recent Labs     01/16/25  0324   HGB 9.7*   HCT 31.7*     Plan  - Monitor serial CBC: Daily  - Transfuse PRBC if patient becomes hemodynamically unstable, symptomatic or H/H drops below 7/21.  - Patient has not received any PRBC transfusions to date  - Patient's anemia is currently stable      Paroxysmal A-fib  Patient has long standing persistent (>12 months) atrial fibrillation. Patient is currently in [facility].  I am sorry I can not hear you very well endorse what was running and would beta shin darrius ROSALES of a past medical history side okay no problem okay thank you. [id number] Score: 4. The patients heart rate in the last 24 hours is as follows:  Pulse  Min: 69  Max: 88     Antiarrhythmics  metoprolol succinate (TOPROL-XL) 24 hr tablet 100 mg, Daily, Oral    Anticoagulants  apixaban tablet 5 mg, 2 times daily, Oral    Plan  - Replete lytes with a goal of K>4, Mg >2  -CBC, CMP, Mg and Phos in am  - Patient is anticoagulated, see medications listed above.  - Patient's afib is currently controlled  - continue home metoprolol succinate 100 mg daily   -continue home Eliquis 5 mg b.i.d.        Type 2 diabetes mellitus with stage 4 chronic kidney disease, with long-term current use of insulin  Creatine stable for now. BMP reviewed- noted Estimated Creatinine Clearance: 23 mL/min (A) (based on SCr of 2.3 mg/dL (H)). according to latest data. Based on current GFR, CKD stage is stage 4 - GFR 15-29.  Monitor UOP and serial BMP and adjust therapy as needed. Renally dose meds. Avoid nephrotoxic medications and procedures.    Plan:  Hold home Lantus 5 mg q.h.s.   Hold home oral hypoglycemic agents  CARLO  ACHS  Diabetic diet    Essential hypertension  Patient's blood pressure range in the last 24 hours was: BP  Min: 128/72  Max: 155/72.The patient's inpatient anti-hypertensive regimen is listed below:  Current  Antihypertensives  amLODIPine tablet 10 mg, Daily, Oral  losartan tablet 25 mg, Daily, Oral  metoprolol succinate (TOPROL-XL) 24 hr tablet 100 mg, Daily, Oral  furosemide injection 80 mg, Every 12 hours, Intravenous    Plan  - BP is controlled, no changes needed to their regimen  - hold home Lasix 20 mg daily dose  -continue home amlodipine 10 mg daily and losartan 25 mg daily      VTE Risk Mitigation (From admission, onward)           Ordered     apixaban tablet 5 mg  2 times daily         01/16/25 0620                     .      On 01/16/2025, patient should be placed in hospital observation services under my care in collaboration with Dr. Moraes.         Audrey Murrieta MD  Department of Hospital Medicine  [facility]

## 2025-01-16 NOTE — PLAN OF CARE
Pt. AAOx4. Pt. C/o SOB during ambulation. Pt. Placed on 2 1/2 L NC with improvement of O2. No c/o pain. BS monitored. Bed alarm on and call light in reach. Pt. Instructed to call for assistance. Plan of care continued.   Problem: Adult Inpatient Plan of Care  Goal: Plan of Care Review  Outcome: Progressing     Problem: Adult Inpatient Plan of Care  Goal: Patient-Specific Goal (Individualized)  Outcome: Progressing     Problem: Adult Inpatient Plan of Care  Goal: Absence of Hospital-Acquired Illness or Injury  Outcome: Progressing     Problem: Adult Inpatient Plan of Care  Goal: Optimal Comfort and Wellbeing  Outcome: Progressing     Problem: Adult Inpatient Plan of Care  Goal: Readiness for Transition of Care  Outcome: Progressing

## 2025-01-16 NOTE — SUBJECTIVE & OBJECTIVE
Past Medical History:   Diagnosis Date    (HFpEF) heart failure with preserved ejection fraction     TTE (2020) w/ EF 55% and grade II diastolic dysfunction    Anemia due to stage 4 chronic kidney disease 8/16/2021    Gastric ulcer due to Helicobacter pylori 07/2017    Gastrointestinal hemorrhage with melena 07/2017    GERD (gastroesophageal reflux disease)     HLD (hyperlipidemia)     Hydronephrosis with ureteropelvic junction (UPJ) obstruction 8/16/2021    Hypertension     MDD (major depressive disorder) 8/16/2021    Nephrolithiasis 8/25/2021    NSTEMI (non-ST elevated myocardial infarction) 07/2017    Paroxysmal A-fib 12/24/2020    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/2020    Stage 4 chronic kidney disease 10/19/2021    STEMI (ST elevation myocardial infarction) 12/2020    Type 2 diabetes mellitus     Urge incontinence of urine        Past Surgical History:   Procedure Laterality Date    CARDIAC CATHETERIZATION  07/2017    RCA w/ 95% stenosis w/ stent placement    CATARACT EXTRACTION W/  INTRAOCULAR LENS IMPLANT Right 5/29/2024    Procedure: EXTRACTION, CATARACT, WITH IOL INSERTION;  Surgeon: Maddy Arenas MD;  Location: [facility] OR;  Service: Ophthalmology;  Laterality: Right;    CATARACT EXTRACTION W/  INTRAOCULAR LENS IMPLANT Left 6/26/2024    Procedure: EXTRACTION, CATARACT, WITH IOL INSERTION;  Surgeon: Maddy Arenas MD;  Location: [facility] OR;  Service: Ophthalmology;  Laterality: Left;    RETROGRADE PYELOGRAPHY  8/25/2021    Procedure: PYELOGRAM, RETROGRADE;  Surgeon: Rody Smith MD;  Location: [facility] OR;  Service: Urology;;    URETEROSCOPIC REMOVAL OF URETERIC CALCULUS Left 8/25/2021    Procedure: left ureteroscopy, stone basketing, stent placement;  left nephrostomy tube removal;  Surgeon: Rody Smith MD;  Location: [facility] OR;  Service: Urology;  Laterality: Left;       Review of patient's allergies indicates:   Allergen Reactions    Ace inhibitors Other (See  Comments)     Cough       No current facility-administered medications on file prior to encounter.     Current Outpatient Medications on File Prior to Encounter   Medication Sig    allopurinoL (ZYLOPRIM) 100 MG tablet Take 1 tablet (100 mg total) by mouth once daily.    amLODIPine (NORVASC) 10 MG tablet Take 1 tablet (10 mg total) by mouth once daily.    apixaban (ELIQUIS) 5 mg Tab Take 1 tablet (5 mg total) by mouth 2 (two) times daily.    blood sugar diagnostic Strp To check BG 2 times daily, to use with insurance preferred meter    blood-glucose meter (FREESTYLE SYSTEM KIT MISC) by Misc.(Non-Drug; Combo Route) route.    blood-glucose sensor (DEXCOM G6 SENSOR) Sowmya 1 each by Misc.(Non-Drug; Combo Route) route once daily. (Patient not taking: Reported on 9/30/2024)    cholecalciferol, vitamin D3, (VITAMIN D3) 50 mcg (2,000 unit) Tab Take 1 tablet (2,000 Units total) by mouth once daily.    colchicine (COLCRYS) 0.6 mg tablet Take 1 tablet (0.6 mg total) by mouth daily as needed (acute gout flare).    furosemide (LASIX) 20 MG tablet Take 1 tablet (20 mg total) by mouth once daily.    insulin glargine U-100, Lantus, (LANTUS SOLOSTAR U-100 INSULIN) 100 unit/mL (3 mL) InPn pen Inject 5 Units into the skin every evening.    ketoconazole (NIZORAL) 2 % shampoo Apply topically twice a week.    ketorolac 0.5% (ACULAR) 0.5 % Drop Place 1 drop into the right eye 3 (three) times daily. (Patient not taking: Reported on 9/30/2024)    lancets [facility] To check BG 2 times daily, to use with insurance preferred meter    losartan (COZAAR) 25 MG tablet Take 1 tablet (25 mg total) by mouth once daily.    magnesium oxide (MAG-OX) 400 mg (241.3 mg magnesium) tablet Take 1 tablet (400 mg total) by mouth 2 (two) times daily.    metoprolol succinate (TOPROL-XL) 100 MG 24 hr tablet Take 1 tablet (100 mg total) by mouth once daily.    ofloxacin (OCUFLOX) 0.3 % ophthalmic solution Place 1 drop into the right eye 3 (three) times daily. (Patient not  taking: Reported on 9/30/2024)    potassium chloride (MICRO-K) 10 MEQ CpSR Take 1 capsule (10 mEq total) by mouth once daily.    prednisoLONE acetate (PRED FORTE) 1 % DrpS Place 1 drop into the right eye 3 (three) times daily. (Patient not taking: Reported on 9/30/2024)    rosuvastatin (CRESTOR) 40 MG Tab Take 1 tablet (40 mg total) by mouth once daily.    SITagliptin phosphate (JANUVIA) 25 MG Tab Take 1 tablet (25 mg total) by mouth once daily.    sodium bicarbonate 650 MG tablet Take 1 tablet (650 mg total) by mouth 3 (three) times daily.     Family History    None       Tobacco Use    Smoking status: Never    Smokeless tobacco: Never   Substance and Sexual Activity    Alcohol use: No    Drug use: No    Sexual activity: Not Currently     Review of Systems  Negative except as stated in HPI  Objective:     Vital Signs (Most Recent):  Temp: 98.4 °F (36.9 °C) (01/16/25 0616)  Pulse: 79 (01/16/25 0616)  Resp: (!) 23 (01/16/25 0548)  BP: 139/80 (01/16/25 0616)  SpO2: (!) 92 % (01/16/25 0616) Vital Signs (24h Range):  Temp:  [97.6 °F (36.4 °C)-98.6 °F (37 °C)] 98.4 °F (36.9 °C)  Pulse:  [69-88] 79  Resp:  [18-30] 23  SpO2:  [91 %-95 %] 92 %  BP: (128-155)/(62-80) 139/80     Weight: 86.2 kg (190 lb 0.6 oz)  Body mass index is 33.66 kg/m².     Physical Exam  Constitutional:       Appearance: Normal appearance.   HENT:      Head: Normocephalic and atraumatic.      Nose: Nose normal.      Mouth/Throat:      Mouth: Mucous membranes are moist.   Eyes:      Extraocular Movements: Extraocular movements intact.      Conjunctiva/sclera: Conjunctivae normal.      Pupils: Pupils are equal, round, and reactive to light.   Cardiovascular:      Rate and Rhythm: Normal rate. Rhythm irregular.      Pulses: Normal pulses.      Heart sounds: Normal heart sounds. No murmur heard.     No friction rub. No gallop.   Pulmonary:      Effort: Respiratory distress present.      Breath sounds: No stridor. No wheezing, rhonchi or rales.       Comments: Diminished breath sounds at the bases.  Clear to auscultation bilaterally  Chest:      Chest wall: No tenderness.   Abdominal:      General: Abdomen is flat. Bowel sounds are normal. There is no distension.      Palpations: Abdomen is soft.      Tenderness: There is no abdominal tenderness. There is no right CVA tenderness, left CVA tenderness, guarding or rebound.   Musculoskeletal:      Right lower leg: Edema (+4 to mid thigh) present.      Left lower leg: Edema (+4 to mid thigh) present.   Skin:     General: Skin is warm and dry.      Capillary Refill: Capillary refill takes less than 2 seconds.      Coloration: Skin is not jaundiced or pale.      Findings: No bruising, erythema, lesion or rash.   Neurological:      General: No focal deficit present.      Mental Status: She is alert and oriented to person, place, and time. Mental status is at baseline.      Cranial Nerves: No cranial nerve deficit.   Psychiatric:         Mood and Affect: Mood normal.         Behavior: Behavior normal.              CRANIAL NERVES     CN III, IV, VI   Pupils are equal, round, and reactive to light.       Significant Labs: All pertinent labs within the past 24 hours have been reviewed.    Significant Imaging: I have reviewed all pertinent imaging results/findings within the past 24 hours.

## 2025-01-16 NOTE — ASSESSMENT & PLAN NOTE
Patient's blood pressure range in the last 24 hours was: BP  Min: 128/72  Max: 155/72.The patient's inpatient anti-hypertensive regimen is listed below:  Current Antihypertensives  amLODIPine tablet 10 mg, Daily, Oral  losartan tablet 25 mg, Daily, Oral  metoprolol succinate (TOPROL-XL) 24 hr tablet 100 mg, Daily, Oral  furosemide injection 80 mg, Every 12 hours, Intravenous    Plan  - BP is controlled, no changes needed to their regimen  - hold home Lasix 20 mg daily dose  -continue home amlodipine 10 mg daily and losartan 25 mg daily

## 2025-01-16 NOTE — H&P
[facility] Medicine  History & Physical    Patient Name: Eliza Louie  MRN: [id number]  Patient Class: OP- Observation  Admission Date: 1/16/2025  Attending Physician: Manny Gatica,*   Primary Care Provider: Casi Garcia MD         Patient information was obtained from patient and ER records.     Subjective:     Principal Problem:Acute exacerbation of CHF (congestive heart failure)    Chief Complaint:   Chief Complaint   Patient presents with    Leg Swelling    Shortness of Breath     Patient reports bilateral leg edema and orthopnea since November. She states her symptoms are worsening recently. She currently takes lasix and is compliant. Denies chest pain.         HPI: 74-year-old female with a past medical history of HTN, HLD, IDDM 2, proximal Afib( on Eliquis), HFpEF (last Echo 55-60%, grade 2 diastolic dysfunction), CKD stage 4, osteopenia right hip, CAD with  stent placement (2017) who presented to the emergency room with complaints of increased shortness of breath that started prior to Thanksgiving.  She also endorses increased edema to both lower extremity that started in June and increased pain to both lower extremity that started 2 weeks ago.  Patient was unable to sleep due to the pain in her legs and this was the inciting event that brought her to emergency room today.  In addition she reports orthopnea requiring 3 pillows at night.  She reports that she is compliant with all of her medications.  She denies any exertional shortness of breath, chest pain, palpitations, coughing, recent upper respiratory infection, fever, nausea, vomiting, diarrhea or abdominal pain.      In the ER, initial vital signs were as follows: Temp 98.6, /72, HR in 84, SpO2 95 % on room air. Labs as follows: WBC 5.81, Hgb 9.7, BUN/Cr 30/2.3 (baseline Cr 3.2).  Troponin 0.021,  (baseline 483)    CXR suggestive of edema/CHF with bilateral pleural effusions as above. .     EKG atrial  fibrillation with rig RBB at 69,     In the ER, patient was given nitroglycerin paste and furosemide 80 mg x 1 dose with  ml.    Patient was admitted to [facility] for CHF exacerbation.                  No new subjective & objective note has been filed under this hospital service since the last note was generated.    Assessment/Plan:     * Acute exacerbation of CHF (congestive heart failure)  Patient has Diastolic (HFpEF) heart failure that is Acute on chronic. On presentation their CHF was well compensated. Most recent BNP and echo results are listed below.  Recent Labs     01/16/25  0324   *     Latest ECHO  Results for orders placed in visit on 03/11/24    Echo    Interpretation Summary    Left Ventricle: There is normal systolic function with a visually estimated ejection fraction of 55 - 60%. Grade II diastolic dysfunction. Elevated left ventricular filling pressure.    Right Ventricle: Systolic function is normal.    Mitral Valve: There is mild regurgitation.    Left Atrium: Left atrium is moderately dilated.    Tricuspid Valve: There is mild regurgitation.    IVC/SVC: Intermediate venous pressure at 8 mmHg.    Current Heart Failure Medications  losartan tablet 25 mg, Daily, Oral  metoprolol succinate (TOPROL-XL) 24 hr tablet 100 mg, Daily, Oral  furosemide injection 80 mg, Every 12 hours, Intravenous    Plan  - Monitor strict I&Os and daily weights.    - Place on telemetry  - Low sodium diet  - Place on fluid restriction of 1.5 L.   - Cardiology has been consulted  - The patient's volume status is stable but not at their baseline as indicated by edema, weight gain, orthopnea, and shortness of breath  -Echo this am to rule out worsening diastolic or systolic function  -Furosemide 80 mg b.i.d.   -hold home KCl 10 mEq daily dose      HLD (hyperlipidemia)  Plan:  Start atorvastain 40 mg daily instead of home Rosuvastatin      CKD (chronic kidney disease), stage IV  Creatine stable  for now. BMP reviewed- noted Estimated Creatinine Clearance: 22.2 mL/min (A) (based on SCr of 2.3 mg/dL (H)). according to latest data. Based on current GFR, CKD stage is stage 4 - GFR 15-29.    Suspect anemia due to anemia of chronic disease d/t CKD stage 4      Plan:  -Monitor UOP and serial BMP and adjust therapy as needed.   -Renally dose meds.   -Avoid nephrotoxic medications and procedures  -continue home sodium bicarb 650 mg t.i.d.  -continue home vitamin-D 3 2000 I U daily    Gout of multiple sites      Plan:  Continue home allopurinol 100 mg daily      Anemia due to stage 4 chronic kidney disease  Anemia is likely due to chronic disease due to Chronic Kidney Disease. Most recent hemoglobin and hematocrit are listed below.  Recent Labs     01/16/25  0324   HGB 9.7*   HCT 31.7*     Plan  - Monitor serial CBC: Daily  - Transfuse PRBC if patient becomes hemodynamically unstable, symptomatic or H/H drops below 7/21.  - Patient has not received any PRBC transfusions to date  - Patient's anemia is currently stable      Paroxysmal A-fib  Patient has long standing persistent (>12 months) atrial fibrillation. Patient is currently [facility].  I am sorry I can not hear you very well endorse what was running and would beta shin okay EIKEL of a past medical history side okay no problem okay thank you. [id number] Score: 4. The patients heart rate in the last 24 hours is as follows:  Pulse  Min: 69  Max: 88     Antiarrhythmics  metoprolol succinate (TOPROL-XL) 24 hr tablet 100 mg, Daily, Oral    Anticoagulants  apixaban tablet 5 mg, 2 times daily, Oral    Plan  - Replete lytes with a goal of K>4, Mg >2  - Patient is anticoagulated, see medications listed above.  - Patient's afib is currently controlled  - continue home metoprolol succinate 100 mg daily   -continue home Eliquis 5 mg b.i.d.        Type 2 diabetes mellitus with stage 4 chronic kidney disease, with long-term current use of insulin  Creatine  stable for now. BMP reviewed- noted Estimated Creatinine Clearance: 23 mL/min (A) (based on SCr of 2.3 mg/dL (H)). according to latest data. Based on current GFR, CKD stage is stage 4 - GFR 15-29.  Monitor UOP and serial BMP and adjust therapy as needed. Renally dose meds. Avoid nephrotoxic medications and procedures.    Plan:  Hold home Lantus 5 mg q.h.s.   Hold home oral hypoglycemic agents  CARLO  ACHS    Essential hypertension  Patient's blood pressure range in the last 24 hours was: BP  Min: 128/72  Max: 155/72.The patient's inpatient anti-hypertensive regimen is listed below:  Current Antihypertensives  amLODIPine tablet 10 mg, Daily, Oral  losartan tablet 25 mg, Daily, Oral  metoprolol succinate (TOPROL-XL) 24 hr tablet 100 mg, Daily, Oral  furosemide injection 80 mg, Every 12 hours, Intravenous    Plan  - BP is controlled, no changes needed to their regimen  - hold home Lasix 20 mg daily dose  -continue home amlodipine 10 mg daily and losartan 25 mg daily      VTE Risk Mitigation (From admission, onward)           Ordered     apixaban tablet 5 mg  2 times daily         01/16/25 0620                           On 01/16/2025, patient should be placed in hospital observation services under my care in collaboration with Dr. Moraes.         Audrey Murrieta MD  PGY -1  Department of [facility]  [facility]

## 2025-01-16 NOTE — PLAN OF CARE
Pt AAOX4. Cardiac monitor applied. SOB with exertion, orthopnea present. HOB elevated. Purewick applied. . Encouraged to call with questions/concerns/assistance. Safety.

## 2025-01-16 NOTE — ASSESSMENT & PLAN NOTE
Creatine stable for now. BMP reviewed- noted Estimated Creatinine Clearance: 22.2 mL/min (A) (based on SCr of 2.3 mg/dL (H)). according to latest data. Based on current GFR, CKD stage is stage 4 - GFR 15-29.    Suspect anemia due to anemia of chronic disease d/t CKD stage 4      Plan:  -Monitor UOP and serial BMP and adjust therapy as needed.   -Renally dose meds.   -Avoid nephrotoxic medications and procedures  -continue home sodium bicarb 650 mg t.i.d.  -continue home vitamin-D 3 2000 I U daily

## 2025-01-16 NOTE — PLAN OF CARE
CM met with pt - granddaughter Eveline at bedside   Pt is AAO x 3; confirmed demographics   Pt lives at home with Noah Louie Spouse [phone]  Daughter Katie  [phone] is available to assist pt as well   Pt has a rollator and a shower chair - no HH prior to admit.    Family to transport pt to home at discharge.   dx:  SOB, CHF Exacerbation   pcp - [facility];  Cardiologist - Dr. Thompson    Future Appointments   Date Time Provider Department Center   2/5/2025 10:00 AM Casi Garcia MD [facility]     Pharmacy - [facility] on [address] - reports Eliquis is expensive - CM gave pt a Good Rx card - granddaughter to check to see if pt can find this med at a less expensive price.         01/16/25 1726   Discharge Planning   Assessment Type Discharge Planning Brief Assessment   Resource/Environmental Concerns none   Support Systems Spouse/significant other  (Noah Louie  Spouse  [phone])   Equipment Currently Used at Home rollator;shower chair   Current Living Arrangements home   Patient/Family Anticipates Transition to home;home with family   Patient/Family Anticipated Services at Transition none   DME Needed Upon Discharge  none   Discharge Plan A Home;Home with family   Discharge Plan B Home;Home with family;Home Health

## 2025-01-16 NOTE — SUBJECTIVE & OBJECTIVE
Interval History: NAEO,VSS.AF. Patient endorsed no shortness of breath. We will continue diuresis.      Review of Systems   Constitutional: Negative.    HENT: Negative.     Eyes: Negative.    Respiratory: Negative.     Cardiovascular: Negative.    Gastrointestinal: Negative.    Endocrine: Negative.    Genitourinary: Negative.    Musculoskeletal: Negative.    Skin: Negative.    Allergic/Immunologic: Negative.    Neurological: Negative.    Hematological: Negative.    Psychiatric/Behavioral: Negative.       Objective:     Vital Signs (Most Recent):  Temp: 97.9 °F (36.6 °C) (01/16/25 1136)  Pulse: 70 (01/16/25 1136)  Resp: 20 (01/16/25 1136)  BP: 132/79 (01/16/25 1136)  SpO2: 98 % (01/16/25 1136) Vital Signs (24h Range):  Temp:  [97.6 °F (36.4 °C)-98.6 °F (37 °C)] 97.9 °F (36.6 °C)  Pulse:  [63-88] 70  Resp:  [18-30] 20  SpO2:  [91 %-98 %] 98 %  BP: (128-155)/(62-82) 132/79     Weight: 80.3 kg (177 lb)  Body mass index is 31.35 kg/m².    Intake/Output Summary (Last 24 hours) at 1/16/2025 1146  Last data filed at 1/16/2025 0540  Gross per 24 hour   Intake --   Output 120 ml   Net -120 ml         Physical Exam  Vitals and nursing note reviewed.   Constitutional:       Appearance: She is obese.   HENT:      Head: Normocephalic and atraumatic.   Eyes:      Conjunctiva/sclera: Conjunctivae normal.      Pupils: Pupils are equal, round, and reactive to light.   Cardiovascular:      Rate and Rhythm: Normal rate and regular rhythm.      Pulses: Normal pulses.      Heart sounds: Normal heart sounds.   Pulmonary:      Effort: Pulmonary effort is normal.      Breath sounds: Normal breath sounds.   Abdominal:      General: Bowel sounds are normal.      Palpations: Abdomen is soft.   Musculoskeletal:      Cervical back: Normal range of motion and neck supple.      Right lower leg: Edema present.      Left lower leg: Edema present.   Skin:     General: Skin is warm.      Capillary Refill: Capillary refill takes less than 2 seconds.    Neurological:      General: No focal deficit present.      Mental Status: She is alert and oriented to person, place, and time.   Psychiatric:         Mood and Affect: Mood normal.         Behavior: Behavior normal.             Significant Labs: All pertinent labs within the past 24 hours have been reviewed.    Significant Imaging: I have reviewed all pertinent imaging results/findings within the past 24 hours.

## 2025-01-16 NOTE — ASSESSMENT & PLAN NOTE
Patient has long standing persistent (>12 months) atrial fibrillation. Patient is currently [facility].  I am sorry I can not hear you very well endorse what was running and would beta shin okay EIKEL of a past medical history side okay no problem okay thank you. [id number] Score: 4. The patients heart rate in the last 24 hours is as follows:  Pulse  Min: 69  Max: 88     Antiarrhythmics  metoprolol succinate (TOPROL-XL) 24 hr tablet 100 mg, Daily, Oral    Anticoagulants  apixaban tablet 5 mg, 2 times daily, Oral    Plan  - Replete lytes with a goal of K>4, Mg >2  - Patient is anticoagulated, see medications listed above.  - Patient's afib is currently controlled  - continue home metoprolol succinate 100 mg daily   -continue home Eliquis 5 mg b.i.d.

## 2025-01-16 NOTE — HPI
74-year-old female with a past medical history of HTN, HLD, IDDM 2, proximal Afib( on Eliquis), HFpEF (last Echo 55-60%, grade 2 diastolic dysfunction), CKD stage 4, osteopenia right hip, CAD with  stent placement (2017) who presented to the emergency room with complaints of increased shortness of breath that started prior to Thanksgiving.  She also endorses increased edema to both lower extremity that started in June and increased pain to both lower extremity that started 2 weeks ago.  Patient was unable to sleep due to the pain in her legs and this was the inciting event that brought her to emergency room today.  In addition she reports orthopnea requiring 3 pillows at night.  She reports that she is compliant with all of her medications.  She denies any exertional shortness of breath, chest pain, palpitations, coughing, recent upper respiratory infection, fever, nausea, vomiting, diarrhea or abdominal pain.      In the ER, initial vital signs were as follows: Temp 98.6, /72, HR in 84, SpO2 95 % on room air. Labs as follows: WBC 5.81, Hgb 9.7, BUN/Cr 30/2.3 (baseline Cr 3.2).  Troponin 0.021,  (baseline 483)    CXR suggestive of edema/CHF with bilateral pleural effusions as above. .     EKG atrial fibrillation with rig RBB at 69,     In the ER, patient was given nitroglycerin paste and furosemide 80 mg x 1 dose with  ml.    Patient was admitted [facility] for CHF exacerbation.

## 2025-01-16 NOTE — ED NOTES
PT. Resting comfortably.   at side. Instructed pt. To call if bathroom needed. She declined a bedpan.

## 2025-01-16 NOTE — ASSESSMENT & PLAN NOTE
Anemia is likely due to chronic disease due to Chronic Kidney Disease. Most recent hemoglobin and hematocrit are listed below.  Recent Labs     01/16/25  0324   HGB 9.7*   HCT 31.7*     Plan  - Monitor serial CBC: Daily  - Transfuse PRBC if patient becomes hemodynamically unstable, symptomatic or H/H drops below 7/21.  - Patient has not received any PRBC transfusions to date  - Patient's anemia is currently stable

## 2025-01-16 NOTE — ASSESSMENT & PLAN NOTE
Patient has Diastolic (HFpEF) heart failure that is Acute on chronic. On presentation their CHF was well compensated. Most recent BNP and echo results are listed below.  Recent Labs     01/16/25  0324   *     Latest ECHO  Results for orders placed in visit on 03/11/24    Echo    Interpretation Summary    Left Ventricle: There is normal systolic function with a visually estimated ejection fraction of 55 - 60%. Grade II diastolic dysfunction. Elevated left ventricular filling pressure.    Right Ventricle: Systolic function is normal.    Mitral Valve: There is mild regurgitation.    Left Atrium: Left atrium is moderately dilated.    Tricuspid Valve: There is mild regurgitation.    IVC/SVC: Intermediate venous pressure at 8 mmHg.    Current Heart Failure Medications  losartan tablet 25 mg, Daily, Oral  metoprolol succinate (TOPROL-XL) 24 hr tablet 100 mg, Daily, Oral  furosemide injection 80 mg, Every 12 hours, Intravenous    Plan  - Monitor strict I&Os and daily weights.    - Place on telemetry  - Low sodium diet  - Place on fluid restriction of 1.5 L.   - Cardiology has been consulted  - The patient's volume status is stable but not at their baseline as indicated by edema, weight gain, orthopnea, and shortness of breath  -Echo this am to rule out worsening diastolic or systolic function  -Furosemide 80 mg b.i.d.   -hold home KCl 10 mEq daily dose

## 2025-01-16 NOTE — ED PROVIDER NOTES
"Encounter Date: 1/16/2025       History     Chief Complaint   Patient presents with    Leg Swelling    Shortness of Breath     Patient reports bilateral leg edema and orthopnea since November. She states her symptoms are worsening recently. She currently takes lasix and is compliant. Denies chest pain.      74-year-old female brought to emergency department by  complaining of shortness of breath, leg swelling and pain.  States she has been gradually feeling more short of breath with exertion and lying supine since "before Thanksgiving".  States her legs have been gradually swelling since "before Thanksgiving" symptoms have gradually worsened.  Pain began a week or 2 ago and has gradually worsened.  Comes in this morning because "I could not sleep because of the leg pain".  Denies any chest pain.  Denies any cough, congestion, fever, nausea, vomiting.  Does note exertional shortness of breath.  No other symptoms reported at this time.  States she has been medication as directed.      Review of patient's allergies indicates:   Allergen Reactions    Ace inhibitors Other (See Comments)     Cough     Past Medical History:   Diagnosis Date    (HFpEF) heart failure with preserved ejection fraction     TTE (2020) w/ EF 55% and grade II diastolic dysfunction    Anemia due to stage 4 chronic kidney disease 8/16/2021    Gastric ulcer due to Helicobacter pylori 07/2017    Gastrointestinal hemorrhage with melena 07/2017    GERD (gastroesophageal reflux disease)     HLD (hyperlipidemia)     Hydronephrosis with ureteropelvic junction (UPJ) obstruction 8/16/2021    Hypertension     MDD (major depressive disorder) 8/16/2021    Nephrolithiasis 8/25/2021    NSTEMI (non-ST elevated myocardial infarction) 07/2017    Paroxysmal A-fib 12/24/2020    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/2020    Stage 4 chronic kidney disease 10/19/2021    STEMI (ST elevation myocardial infarction) 12/2020    Type 2 " diabetes mellitus     Urge incontinence of urine      Past Surgical History:   Procedure Laterality Date    CARDIAC CATHETERIZATION  07/2017    RCA w/ 95% stenosis w/ stent placement    CATARACT EXTRACTION W/  INTRAOCULAR LENS IMPLANT Right 5/29/2024    Procedure: EXTRACTION, CATARACT, WITH IOL INSERTION;  Surgeon: Maddy Arenas MD;  Location: [facility] OR;  Service: Ophthalmology;  Laterality: Right;    CATARACT EXTRACTION W/  INTRAOCULAR LENS IMPLANT Left 6/26/2024    Procedure: EXTRACTION, CATARACT, WITH IOL INSERTION;  Surgeon: Maddy Arenas MD;  Location: [facility] OR;  Service: Ophthalmology;  Laterality: Left;    RETROGRADE PYELOGRAPHY  8/25/2021    Procedure: PYELOGRAM, RETROGRADE;  Surgeon: Rody Smith MD;  Location: [facility] OR;  Service: Urology;;    URETEROSCOPIC REMOVAL OF URETERIC CALCULUS Left 8/25/2021    Procedure: left ureteroscopy, stone basketing, stent placement;  left nephrostomy tube removal;  Surgeon: Rody Smith MD;  Location: [facility] OR;  Service: Urology;  Laterality: Left;     No family history on file.  Social History     Tobacco Use    Smoking status: Never    Smokeless tobacco: Never   Substance Use Topics    Alcohol use: No    Drug use: No     Review of Systems   Constitutional:  Negative for chills and fever.   HENT:  Negative for congestion.    Respiratory:  Positive for shortness of breath. Negative for cough.    Cardiovascular:  Positive for leg swelling. Negative for chest pain.   Gastrointestinal:  Negative for abdominal pain.   Musculoskeletal:  Negative for back pain.   Neurological:  Negative for headaches.       Physical Exam     Initial Vitals [01/16/25 0251]   BP Pulse Resp Temp SpO2   (!) 155/72 84 18 98.6 °F (37 °C) 95 %      MAP       --         Physical Exam    Nursing note and vitals reviewed.  Constitutional: She appears well-developed and well-nourished. No distress.   HENT:   Head: Normocephalic and atraumatic.   Eyes: Conjunctivae and EOM are normal. Pupils are  equal, round, and reactive to light.   Neck: Neck supple. No tracheal deviation present.   Normal range of motion.  Cardiovascular:  Normal rate and intact distal pulses.           Pulmonary/Chest: No respiratory distress.   Musculoskeletal:         General: Edema (2+ pitting edema to bilateral lower extremities) present. Normal range of motion.      Cervical back: Normal range of motion and neck supple.     Neurological: She is alert and oriented to person, place, and time. She has normal strength. No cranial nerve deficit. GCS score is 15. GCS eye subscore is 4. GCS verbal subscore is 5. GCS motor subscore is 6.   Skin: Skin is warm and dry.         ED Course   Critical Care    Date/Time: 1/16/2025 5:12 AM    Performed by: Jone Gorman MD  Authorized by: Jone Gorman MD  Direct patient critical care time: 15 minutes  Additional history critical care time: 15 minutes  Ordering / reviewing critical care time: 15 minutes  Documentation critical care time: 15 minutes  Consulting other physicians critical care time: 15 minutes  Consult with family critical care time: 10 minutes  Total critical care time (exclusive of procedural time) : 85 minutes  Critical care time was exclusive of separately billable procedures and treating other patients.  Critical care was necessary to treat or prevent imminent or life-threatening deterioration of the following conditions: cardiac failure and respiratory failure.  Critical care was time spent personally by me on the following activities: development of treatment plan with patient or surrogate, interpretation of cardiac output measurements, evaluation of patient's response to treatment, examination of patient, obtaining history from patient or surrogate, pulse oximetry, re-evaluation of patient's condition, ordering and review of radiographic studies, ordering and review of laboratory studies, ordering and performing treatments and interventions and review of old  charts.        Labs Reviewed   CBC W/ AUTO DIFFERENTIAL - Abnormal       Result Value    WBC 5.81      RBC 3.47 (*)     Hemoglobin 9.7 (*)     Hematocrit 31.7 (*)     MCV 91      MCH 28.0      MCHC 30.6 (*)     RDW 16.8 (*)     Platelets 177      MPV 10.9      Immature Granulocytes 0.2      Gran # (ANC) 3.4      Immature Grans (Abs) 0.01      Lymph # 1.6      Mono # 0.5      Eos # 0.3      Baso # 0.04      nRBC 0      Gran % 58.4      Lymph % 27.5      Mono % 7.7      Eosinophil % 5.5      Basophil % 0.7      Differential Method Automated     COMPREHENSIVE METABOLIC PANEL - Abnormal    Sodium 141      Potassium 4.8      Chloride 109      CO2 21 (*)     Glucose 116 (*)     BUN 30 (*)     Creatinine 2.3 (*)     Calcium 9.0      Total Protein 7.6      Albumin 4.0      Total Bilirubin 0.6      Alkaline Phosphatase 80      AST 27      ALT 19      eGFR 22 (*)     Anion Gap 11     B-TYPE NATRIURETIC PEPTIDE - Abnormal     (*)    POCT GLUCOSE - Abnormal    POCT Glucose 128 (*)    TROPONIN I    Troponin I 0.021     MAGNESIUM    Magnesium 2.1     POCT GLUCOSE MONITORING CONTINUOUS     EKG Readings: (Independently Interpreted)   Initial Reading: No STEMI. Previous EKG: Compared with most recent EKG Previous EKG Date: 8/28/2021 (Nonspecific change). Rhythm: Atrial Fibrillation. Heart Rate: 69. Ectopy: No Ectopy. Conduction: Bifasicular. ST Segments: Normal ST Segments. Axis: Normal.   EKG independently interpreted by me pending Cardiology review           X-Rays:   Independently Interpreted Readings:   Other Readings:  Chest x-ray independently interpreted by me pending radiology review:  Concerning for pulmonary edema; no acute infiltrate, no pneumothorax    Imaging Results              X-Ray Chest AP Portable (Final result)  Result time 01/16/25 04:09:35      Final result by James Chau MD (01/16/25 04:09:35)                   Impression:      Radiographic features suggestive of edema/CHF with bilateral pleural  effusions as above.      Electronically signed by: James Chau MD  Date:    01/16/2025  Time:    04:09               Narrative:    EXAMINATION:  XR CHEST AP PORTABLE    CLINICAL HISTORY:  Leg swelling;    TECHNIQUE:  Single frontal view of the chest was performed.    COMPARISON:  08/17/2021    FINDINGS:  Cardiac monitoring leads overlie the chest.  The cardiomediastinal silhouette is enlarged.  There is aortic atherosclerosis.  Lungs demonstrate increased interstitial attenuation favored to reflect interstitial edema.  There are bilateral pleural effusions with associated atelectasis and/or consolidation, left greater than right.  No large pneumothorax identified.  Osseous structures demonstrate degenerative changes.                                      Medications   nitroGLYCERIN 2% TD oint ointment 1 inch (1 inch Topical (Top) Given 1/16/25 7167)   furosemide injection 80 mg (80 mg Intravenous Given 1/16/25 0657)     Medical Decision Making  74-year-old female presents emergency department complaining of shortness of breath and leg swelling      Differential: ACS, CHF, COPD, anemia, hypervolemia      Patient given nitro paste and Lasix.  She has diuresed somewhat but still get short of breath just walking across the [place].  Discussed with [facility] who will see and admit the patient for further evaluation and management.    Problems Addressed:  CHF exacerbation: acute illness or injury with systemic symptoms that poses a threat to life or bodily functions  Leg swelling: acute illness or injury    Amount and/or Complexity of Data Reviewed  External Data Reviewed: ECG and notes.     Details: Reviewed most recent EKG for comparison     Reviewed most recent PCP note documenting baseline medications and past medical history  Labs: ordered.     Details: CBC without leukocytosis, anemia similar to baseline; CMP with CKD similar to baseline, normal LFTs and electrolytes; troponin within normal limits; BNP  moderately elevated  Radiology: ordered and independent interpretation performed. Decision-making details documented in ED Course.  ECG/medicine tests: ordered and independent interpretation performed. Decision-making details documented in ED Course.  Discussion of management or test interpretation with external provider(s): Discussed with [facility] team regarding patient's past medical history, presentation, labs, imaging, interventions, plan to admit    Risk  OTC drugs.  Prescription drug management.  Parenteral controlled substances.  Decision regarding hospitalization.    Critical Care  Total time providing critical care: 85 minutes                                      Clinical Impression:  Final diagnoses:  [M79.89] Leg swelling  [I50.9] CHF exacerbation          ED Disposition Condition    Observation                 Jone Gorman MD  01/16/25 0512

## 2025-01-16 NOTE — ED NOTES
Pt. Has some difficulty ambulating. Added a pure wick for pt's convenience and to monitor urine output.

## 2025-01-17 ENCOUNTER — TELEPHONE (OUTPATIENT)
Dept: CARDIOLOGY | Facility: CLINIC | Age: 75
End: 2025-01-17
Payer: MEDICARE

## 2025-01-17 LAB
ALBUMIN SERPL BCP-MCNC: 3.7 G/DL (ref 3.5–5.2)
ALP SERPL-CCNC: 66 U/L (ref 40–150)
ALT SERPL W/O P-5'-P-CCNC: 16 U/L (ref 10–44)
ANION GAP SERPL CALC-SCNC: 9 MMOL/L (ref 8–16)
AST SERPL-CCNC: 22 U/L (ref 10–40)
BASOPHILS # BLD AUTO: 0.03 K/UL (ref 0–0.2)
BASOPHILS NFR BLD: 0.6 % (ref 0–1.9)
BILIRUB SERPL-MCNC: 0.7 MG/DL (ref 0.1–1)
BUN SERPL-MCNC: 34 MG/DL (ref 8–23)
CALCIUM SERPL-MCNC: 9.2 MG/DL (ref 8.7–10.5)
CHLORIDE SERPL-SCNC: 107 MMOL/L (ref 95–110)
CO2 SERPL-SCNC: 25 MMOL/L (ref 23–29)
CREAT SERPL-MCNC: 2.3 MG/DL (ref 0.5–1.4)
DIFFERENTIAL METHOD BLD: ABNORMAL
EOSINOPHIL # BLD AUTO: 0.3 K/UL (ref 0–0.5)
EOSINOPHIL NFR BLD: 6.7 % (ref 0–8)
ERYTHROCYTE [DISTWIDTH] IN BLOOD BY AUTOMATED COUNT: 16.7 % (ref 11.5–14.5)
EST. GFR  (NO RACE VARIABLE): 22 ML/MIN/1.73 M^2
GLUCOSE SERPL-MCNC: 99 MG/DL (ref 70–110)
HCT VFR BLD AUTO: 32.1 % (ref 37–48.5)
HGB BLD-MCNC: 9.9 G/DL (ref 12–16)
IMM GRANULOCYTES # BLD AUTO: 0.01 K/UL (ref 0–0.04)
IMM GRANULOCYTES NFR BLD AUTO: 0.2 % (ref 0–0.5)
LYMPHOCYTES # BLD AUTO: 1.4 K/UL (ref 1–4.8)
LYMPHOCYTES NFR BLD: 29.2 % (ref 18–48)
MAGNESIUM SERPL-MCNC: 2 MG/DL (ref 1.6–2.6)
MCH RBC QN AUTO: 27.9 PG (ref 27–31)
MCHC RBC AUTO-ENTMCNC: 30.8 G/DL (ref 32–36)
MCV RBC AUTO: 90 FL (ref 82–98)
MONOCYTES # BLD AUTO: 0.4 K/UL (ref 0.3–1)
MONOCYTES NFR BLD: 8.9 % (ref 4–15)
NEUTROPHILS # BLD AUTO: 2.7 K/UL (ref 1.8–7.7)
NEUTROPHILS NFR BLD: 54.4 % (ref 38–73)
NRBC BLD-RTO: 0 /100 WBC
PHOSPHATE SERPL-MCNC: 4.6 MG/DL (ref 2.7–4.5)
PLATELET # BLD AUTO: 158 K/UL (ref 150–450)
PMV BLD AUTO: 11.1 FL (ref 9.2–12.9)
POCT GLUCOSE: 104 MG/DL (ref 70–110)
POCT GLUCOSE: 118 MG/DL (ref 70–110)
POCT GLUCOSE: 152 MG/DL (ref 70–110)
POCT GLUCOSE: 97 MG/DL (ref 70–110)
POTASSIUM SERPL-SCNC: 4.9 MMOL/L (ref 3.5–5.1)
PROT SERPL-MCNC: 7.2 G/DL (ref 6–8.4)
RBC # BLD AUTO: 3.55 M/UL (ref 4–5.4)
SODIUM SERPL-SCNC: 141 MMOL/L (ref 136–145)
WBC # BLD AUTO: 4.93 K/UL (ref 3.9–12.7)

## 2025-01-17 PROCEDURE — 25000003 PHARM REV CODE 250: Performed by: NURSE PRACTITIONER

## 2025-01-17 PROCEDURE — 85025 COMPLETE CBC W/AUTO DIFF WBC: CPT | Performed by: NURSE PRACTITIONER

## 2025-01-17 PROCEDURE — 96376 TX/PRO/DX INJ SAME DRUG ADON: CPT

## 2025-01-17 PROCEDURE — 99900035 HC TECH TIME PER 15 MIN (STAT)

## 2025-01-17 PROCEDURE — 21400001 HC TELEMETRY ROOM

## 2025-01-17 PROCEDURE — 36415 COLL VENOUS BLD VENIPUNCTURE: CPT | Performed by: NURSE PRACTITIONER

## 2025-01-17 PROCEDURE — 25000242 PHARM REV CODE 250 ALT 637 W/ HCPCS

## 2025-01-17 PROCEDURE — 80053 COMPREHEN METABOLIC PANEL: CPT | Performed by: NURSE PRACTITIONER

## 2025-01-17 PROCEDURE — 84100 ASSAY OF PHOSPHORUS: CPT | Performed by: NURSE PRACTITIONER

## 2025-01-17 PROCEDURE — 94761 N-INVAS EAR/PLS OXIMETRY MLT: CPT

## 2025-01-17 PROCEDURE — 25000003 PHARM REV CODE 250

## 2025-01-17 PROCEDURE — 63600175 PHARM REV CODE 636 W HCPCS: Performed by: NURSE PRACTITIONER

## 2025-01-17 PROCEDURE — 83735 ASSAY OF MAGNESIUM: CPT | Performed by: NURSE PRACTITIONER

## 2025-01-17 PROCEDURE — 94640 AIRWAY INHALATION TREATMENT: CPT

## 2025-01-17 PROCEDURE — 27000221 HC OXYGEN, UP TO 24 HOURS

## 2025-01-17 RX ORDER — GUAIFENESIN 100 MG/5ML
200 SOLUTION ORAL EVERY 4 HOURS PRN
Status: DISCONTINUED | OUTPATIENT
Start: 2025-01-17 | End: 2025-01-20 | Stop reason: HOSPADM

## 2025-01-17 RX ORDER — IPRATROPIUM BROMIDE AND ALBUTEROL SULFATE 2.5; .5 MG/3ML; MG/3ML
3 SOLUTION RESPIRATORY (INHALATION) EVERY 6 HOURS PRN
Status: DISCONTINUED | OUTPATIENT
Start: 2025-01-17 | End: 2025-01-20 | Stop reason: HOSPADM

## 2025-01-17 RX ADMIN — FUROSEMIDE 80 MG: 10 INJECTION, SOLUTION INTRAMUSCULAR; INTRAVENOUS at 09:01

## 2025-01-17 RX ADMIN — MAGNESIUM OXIDE TAB 400 MG (241.3 MG ELEMENTAL MG) 400 MG: 400 (241.3 MG) TAB at 09:01

## 2025-01-17 RX ADMIN — METOLAZONE 5 MG: 2.5 TABLET ORAL at 08:01

## 2025-01-17 RX ADMIN — AMLODIPINE BESYLATE 10 MG: 5 TABLET ORAL at 08:01

## 2025-01-17 RX ADMIN — CHOLECALCIFEROL TAB 25 MCG (1000 UNIT) 2000 UNITS: 25 TAB at 08:01

## 2025-01-17 RX ADMIN — ALLOPURINOL 100 MG: 100 TABLET ORAL at 08:01

## 2025-01-17 RX ADMIN — IPRATROPIUM BROMIDE AND ALBUTEROL SULFATE 3 ML: .5; 3 SOLUTION RESPIRATORY (INHALATION) at 06:01

## 2025-01-17 RX ADMIN — APIXABAN 5 MG: 5 TABLET, FILM COATED ORAL at 08:01

## 2025-01-17 RX ADMIN — MAGNESIUM OXIDE TAB 400 MG (241.3 MG ELEMENTAL MG) 400 MG: 400 (241.3 MG) TAB at 08:01

## 2025-01-17 RX ADMIN — ATORVASTATIN CALCIUM 40 MG: 40 TABLET, FILM COATED ORAL at 08:01

## 2025-01-17 RX ADMIN — APIXABAN 5 MG: 5 TABLET, FILM COATED ORAL at 09:01

## 2025-01-17 RX ADMIN — LOSARTAN POTASSIUM 25 MG: 25 TABLET, FILM COATED ORAL at 08:01

## 2025-01-17 RX ADMIN — METOPROLOL SUCCINATE 100 MG: 50 TABLET, EXTENDED RELEASE ORAL at 08:01

## 2025-01-17 RX ADMIN — GUAIFENESIN 200 MG: 200 SOLUTION ORAL at 06:01

## 2025-01-17 NOTE — PROGRESS NOTES
[facility]  Progress Note    Patient Name: Eliza Louie  MRN: [id number]  Patient Class: OP- Observation   Admission Date: 1/16/2025  Length of Stay: 0 days  Attending Physician: Manny Gatica,*  Primary Care Provider: Casi Garcia MD        Subjective     Principal Problem:Acute exacerbation of CHF (congestive heart failure)        HPI:  74-year-old female with a past medical history of HTN, HLD, IDDM 2, proximal Afib( on Eliquis), HFpEF (last Echo 55-60%, grade 2 diastolic dysfunction), CKD stage 4, osteopenia right hip, CAD with  stent placement (2017) who presented to the emergency room with complaints of increased shortness of breath that started prior to Thanksgiving.  She also endorses increased edema to both lower extremity that started in June and increased pain to both lower extremity that started 2 weeks ago.  Patient was unable to sleep due to the pain in her legs and this was the inciting event that brought her to emergency room today.  In addition she reports orthopnea requiring 3 pillows at night.  She reports that she is compliant with all of her medications.  She denies any exertional shortness of breath, chest pain, palpitations, coughing, recent upper respiratory infection, fever, nausea, vomiting, diarrhea or abdominal pain.      In the ER, initial vital signs were as follows: Temp 98.6, /72, HR in 84, SpO2 95 % on room air. Labs as follows: WBC 5.81, Hgb 9.7, BUN/Cr 30/2.3 (baseline Cr 3.2).  Troponin 0.021,  (baseline 483)    CXR suggestive of edema/CHF with bilateral pleural effusions as above. .     EKG atrial fibrillation with rig RBB at 69,     In the ER, patient was given nitroglycerin paste and furosemide 80 mg x 1 dose with  ml.    Patient was admitted to [facility] for CHF exacerbation.                  Overview/Hospital Course:  No notes on file    Interval History: NAEO,VSS.AF. Patient endorsed no shortness of  breath. We will continue diuresis.      Review of Systems   Constitutional: Negative.    HENT: Negative.     Eyes: Negative.    Respiratory: Negative.     Cardiovascular: Negative.    Gastrointestinal: Negative.    Endocrine: Negative.    Genitourinary: Negative.    Musculoskeletal: Negative.    Skin: Negative.    Allergic/Immunologic: Negative.    Neurological: Negative.    Hematological: Negative.    Psychiatric/Behavioral: Negative.       Objective:     Vital Signs (Most Recent):  Temp: 97.9 °F (36.6 °C) (01/16/25 1136)  Pulse: 70 (01/16/25 1136)  Resp: 20 (01/16/25 1136)  BP: 132/79 (01/16/25 1136)  SpO2: 98 % (01/16/25 1136) Vital Signs (24h Range):  Temp:  [97.6 °F (36.4 °C)-98.6 °F (37 °C)] 97.9 °F (36.6 °C)  Pulse:  [63-88] 70  Resp:  [18-30] 20  SpO2:  [91 %-98 %] 98 %  BP: (128-155)/(62-82) 132/79     Weight: 80.3 kg (177 lb)  Body mass index is 31.35 kg/m².    Intake/Output Summary (Last 24 hours) at 1/16/2025 1146  Last data filed at 1/16/2025 0540  Gross per 24 hour   Intake --   Output 120 ml   Net -120 ml         Physical Exam  Vitals and nursing note reviewed.   Constitutional:       Appearance: She is obese.   HENT:      Head: Normocephalic and atraumatic.   Eyes:      Conjunctiva/sclera: Conjunctivae normal.      Pupils: Pupils are equal, round, and reactive to light.   Cardiovascular:      Rate and Rhythm: Normal rate and regular rhythm.      Pulses: Normal pulses.      Heart sounds: Normal heart sounds.   Pulmonary:      Effort: Pulmonary effort is normal.      Breath sounds: Normal breath sounds.   Abdominal:      General: Bowel sounds are normal.      Palpations: Abdomen is soft.   Musculoskeletal:      Cervical back: Normal range of motion and neck supple.      Right lower leg: Edema present.      Left lower leg: Edema present.   Skin:     General: Skin is warm.      Capillary Refill: Capillary refill takes less than 2 seconds.   Neurological:      General: No focal deficit present.       Mental Status: She is alert and oriented to person, place, and time.   Psychiatric:         Mood and Affect: Mood normal.         Behavior: Behavior normal.             Significant Labs: All pertinent labs within the past 24 hours have been reviewed.    Significant Imaging: I have reviewed all pertinent imaging results/findings within the past 24 hours.    Assessment and Plan     * Acute exacerbation of CHF (congestive heart failure)  Patient has Diastolic (HFpEF) heart failure that is Acute on chronic. On presentation their CHF was well compensated. Most recent BNP and echo results are listed below.  Recent Labs     01/16/25  0324   *       Latest ECHO  Results for orders placed in visit on 03/11/24    Echo    Interpretation Summary    Left Ventricle: There is normal systolic function with a visually estimated ejection fraction of 55 - 60%. Grade II diastolic dysfunction. Elevated left ventricular filling pressure.    Right Ventricle: Systolic function is normal.    Mitral Valve: There is mild regurgitation.    Left Atrium: Left atrium is moderately dilated.    Tricuspid Valve: There is mild regurgitation.    IVC/SVC: Intermediate venous pressure at 8 mmHg.    Current Heart Failure Medications  losartan tablet 25 mg, Daily, Oral  metoprolol succinate (TOPROL-XL) 24 hr tablet 100 mg, Daily, Oral  furosemide injection 80 mg, Every 12 hours, Intravenous  metOLazone tablet 5 mg, Daily, Oral    Plan  - Monitor strict I&Os and daily weights.    - Place on telemetry  - Low sodium diet  - Place on fluid restriction of 1.5 L.   - Cardiology has been consulted  - The patient's volume status is stable but not at their baseline as indicated by edema, weight gain, orthopnea, and shortness of breath  -Echo this am to rule out worsening diastolic or systolic function  -Furosemide 80 mg b.i.d.   -Metolazone 5mg daily 30 min before Lasix   -hold home KCl 10 mEq daily dose      HLD (hyperlipidemia)  Plan:  Start atorvastain  40 mg daily instead of home Rosuvastatin      CKD (chronic kidney disease), stage IV  Creatine stable for now. BMP reviewed- noted Estimated Creatinine Clearance: 22.2 mL/min (A) (based on SCr of 2.3 mg/dL (H)). according to latest data. Based on current GFR, CKD stage is stage 4 - GFR 15-29.    Suspect anemia due to anemia of chronic disease d/t CKD stage 4      Plan:  -Monitor UOP and serial BMP and adjust therapy as needed.   -Renally dose meds.   -Avoid nephrotoxic medications and procedures  -continue home sodium bicarb 650 mg t.i.d.  -continue home vitamin-D 3 2000 I U daily    Gout of multiple sites      Plan:  Continue home allopurinol 100 mg daily      Anemia due to stage 4 chronic kidney disease  Anemia is likely due to chronic disease due to Chronic Kidney Disease. Most recent hemoglobin and hematocrit are listed below.  Recent Labs     01/16/25  0324 01/17/25  0224   HGB 9.7* 9.9*   HCT 31.7* 32.1*       Plan  - Monitor serial CBC: Daily  - Transfuse PRBC if patient becomes hemodynamically unstable, symptomatic or H/H drops below 7/21.  - Patient has not received any PRBC transfusions to date  - Patient's anemia is currently stable      Paroxysmal A-fib  Patient has long standing persistent (>12 months) atrial fibrillation. Patient is [facility].  I am sorry I can not hear you very well endorse what was running and would beta shin okay EIKEL of a past medical history side okay no problem okay thank you. [id number] Score: 4. The patients heart rate in the last 24 hours is as follows:  Pulse  Min: 69  Max: 88     Antiarrhythmics  metoprolol succinate (TOPROL-XL) 24 hr tablet 100 mg, Daily, Oral    Anticoagulants  apixaban tablet 5 mg, 2 times daily, Oral    Plan  - Replete lytes with a goal of K>4, Mg >2  - Patient is anticoagulated, see medications listed above.  - Patient's afib is currently controlled  - continue home metoprolol succinate 100 mg daily   -continue home Eliquis  5 mg b.i.d.        Type 2 diabetes mellitus with stage 4 chronic kidney disease, with long-term current use of insulin  Creatine stable for now. BMP reviewed- noted Estimated Creatinine Clearance: 23 mL/min (A) (based on SCr of 2.3 mg/dL (H)). according to latest data. Based on current GFR, CKD stage is stage 4 - GFR 15-29.  Monitor UOP and serial BMP and adjust therapy as needed. Renally dose meds. Avoid nephrotoxic medications and procedures.    Plan:  Hold home Lantus 5 mg q.h.s.   Hold home oral hypoglycemic agents  CARLO  ACHS    Essential hypertension  Patient's blood pressure range in the last 24 hours was: BP  Min: 128/72  Max: 155/72.The patient's inpatient anti-hypertensive regimen is listed below:  Current Antihypertensives  amLODIPine tablet 10 mg, Daily, Oral  losartan tablet 25 mg, Daily, Oral  metoprolol succinate (TOPROL-XL) 24 hr tablet 100 mg, Daily, Oral  furosemide injection 80 mg, Every 12 hours, Intravenous    Plan  - BP is controlled, no changes needed to their regimen  - hold home Lasix 20 mg daily dose  -continue home amlodipine 10 mg daily and losartan 25 mg daily      VTE Risk Mitigation (From admission, onward)           Ordered     apixaban tablet 5 mg  2 times daily         01/16/25 0620                    Discharge Planning   EMILIA: 1/17/2025     Code Status: Full Code   Medical Readiness for Discharge Date:   Discharge Plan A: Home, Home with family                        Barbara Alcantara MD PGY-1  Department of Hospital Medicine   [facility]

## 2025-01-17 NOTE — TELEPHONE ENCOUNTER
----- Message from Josh Park sent at 1/17/2025 10:53 AM CST -----  Regarding: FW: HFU    ----- Message -----  From: Taty Mckinney  Sent: 1/17/2025  10:46 AM CST  To: Donna Mccarty Staff  Subject: HFU                                              Patient will be discharging from [facility] and a HFU was requested with Dr Thompson by DC provider.  Please schedule and message me back so DC can relay appointment information to patient prior to discharge. Patient is established.    DX: CHF    Rosmery Yuan  Physician Referral Specialist/Discharge

## 2025-01-17 NOTE — PLAN OF CARE
Inpatient Upgrade Note    Eliza Louie has warranted treatment spanning two or more midnights of hospital level care for the management of heart failure. She continues to require IV diuresis, daily labs, supplemental oxygen, monitoring of vital signs, and medication adjustments. Her condition is also complicated by the following comorbidities: Hypertension, Chronic kidney disease, Heart failure, and STEMI, Type 2 DM, and GERD  .

## 2025-01-17 NOTE — ASSESSMENT & PLAN NOTE
Patient has Diastolic (HFpEF) heart failure that is Acute on chronic. On presentation their CHF was well compensated. Most recent BNP and echo results are listed below.  Recent Labs     01/16/25  0324   *       Latest ECHO  Results for orders placed in visit on 03/11/24    Echo    Interpretation Summary    Left Ventricle: There is normal systolic function with a visually estimated ejection fraction of 55 - 60%. Grade II diastolic dysfunction. Elevated left ventricular filling pressure.    Right Ventricle: Systolic function is normal.    Mitral Valve: There is mild regurgitation.    Left Atrium: Left atrium is moderately dilated.    Tricuspid Valve: There is mild regurgitation.    IVC/SVC: Intermediate venous pressure at 8 mmHg.    Current Heart Failure Medications  losartan tablet 25 mg, Daily, Oral  metoprolol succinate (TOPROL-XL) 24 hr tablet 100 mg, Daily, Oral  furosemide injection 80 mg, Every 12 hours, Intravenous  metOLazone tablet 5 mg, Daily, Oral    Plan  - Monitor strict I&Os and daily weights.    - Place on telemetry  - Low sodium diet  - Place on fluid restriction of 1.5 L.   - Cardiology has been consulted  - The patient's volume status is stable but not at their baseline as indicated by edema, weight gain, orthopnea, and shortness of breath  -Echo this am to rule out worsening diastolic or systolic function  -Furosemide 80 mg b.i.d.   -Metolazone 5mg daily 30 min before Lasix   -hold home KCl 10 mEq daily dose

## 2025-01-17 NOTE — PLAN OF CARE
CM met with pt to advise her that her current [employer] insurance only covers [facility]   CM confirmed - she doesn't want to be seen for f/u at an [facility] facility and will pay as needed to f/u with Dr. Felipe Botello with scheduling sent a message to office requesting f/u apt   CM advised pt to have her daughter call [employer] as soon as possible to get it changed back to her former plan.    Pt lives at home with Noah Louie Spouse [phone]  Daughter Katie  [phone] is available to assist pt as well   Pt has a rollator and a shower chair - no HH prior to admit.    Family to transport pt to home at discharge.   dx:  SOB, CHF Exacerbation   pcp - [facility];  Cardiologist - Dr. Thompson    Future Appointments   Date Time Provider Department Center   1/23/2025 10:30 AM Noah Shields PA-C [facility]   2/5/2025 10:00 AM Casi Garcia MD [facility]        01/17/25 1742   Post-Acute Status   Post-Acute Authorization Other   Other Status No Post-Acute Service Needs   Discharge Delays None known at this time   Discharge Plan   Discharge Plan A Home;Home with family   Discharge Plan B Home;Home with family

## 2025-01-17 NOTE — PLAN OF CARE
Pt AAOx4. Medications administered per order. 2L NC. BLE edema present. Purewick maintained, up to bedside commode with assistance. Cardiac monitor maintained. Pt denies pain, discomfort or nausea. Encouraged to call with questions/concerns/assistance. Safety.

## 2025-01-17 NOTE — ASSESSMENT & PLAN NOTE
Anemia is likely due to chronic disease due to Chronic Kidney Disease. Most recent hemoglobin and hematocrit are listed below.  Recent Labs     01/16/25  0324 01/17/25  0224   HGB 9.7* 9.9*   HCT 31.7* 32.1*       Plan  - Monitor serial CBC: Daily  - Transfuse PRBC if patient becomes hemodynamically unstable, symptomatic or H/H drops below 7/21.  - Patient has not received any PRBC transfusions to date  - Patient's anemia is currently stable

## 2025-01-18 LAB
ALBUMIN SERPL BCP-MCNC: 3.6 G/DL (ref 3.5–5.2)
ALP SERPL-CCNC: 75 U/L (ref 40–150)
ALT SERPL W/O P-5'-P-CCNC: 15 U/L (ref 10–44)
ANION GAP SERPL CALC-SCNC: 12 MMOL/L (ref 8–16)
AST SERPL-CCNC: 17 U/L (ref 10–40)
BASOPHILS # BLD AUTO: 0.03 K/UL (ref 0–0.2)
BASOPHILS NFR BLD: 0.4 % (ref 0–1.9)
BILIRUB SERPL-MCNC: 0.6 MG/DL (ref 0.1–1)
BUN SERPL-MCNC: 43 MG/DL (ref 8–23)
CALCIUM SERPL-MCNC: 9.2 MG/DL (ref 8.7–10.5)
CHLORIDE SERPL-SCNC: 102 MMOL/L (ref 95–110)
CO2 SERPL-SCNC: 25 MMOL/L (ref 23–29)
CREAT SERPL-MCNC: 2.7 MG/DL (ref 0.5–1.4)
DIFFERENTIAL METHOD BLD: ABNORMAL
EOSINOPHIL # BLD AUTO: 0.3 K/UL (ref 0–0.5)
EOSINOPHIL NFR BLD: 4.3 % (ref 0–8)
ERYTHROCYTE [DISTWIDTH] IN BLOOD BY AUTOMATED COUNT: 16.5 % (ref 11.5–14.5)
EST. GFR  (NO RACE VARIABLE): 18 ML/MIN/1.73 M^2
GLUCOSE SERPL-MCNC: 100 MG/DL (ref 70–110)
HCT VFR BLD AUTO: 33.1 % (ref 37–48.5)
HGB BLD-MCNC: 10.2 G/DL (ref 12–16)
IMM GRANULOCYTES # BLD AUTO: 0.01 K/UL (ref 0–0.04)
IMM GRANULOCYTES NFR BLD AUTO: 0.1 % (ref 0–0.5)
LYMPHOCYTES # BLD AUTO: 1.5 K/UL (ref 1–4.8)
LYMPHOCYTES NFR BLD: 21.8 % (ref 18–48)
MAGNESIUM SERPL-MCNC: 1.9 MG/DL (ref 1.6–2.6)
MCH RBC QN AUTO: 27.6 PG (ref 27–31)
MCHC RBC AUTO-ENTMCNC: 30.8 G/DL (ref 32–36)
MCV RBC AUTO: 90 FL (ref 82–98)
MONOCYTES # BLD AUTO: 0.7 K/UL (ref 0.3–1)
MONOCYTES NFR BLD: 9.7 % (ref 4–15)
NEUTROPHILS # BLD AUTO: 4.3 K/UL (ref 1.8–7.7)
NEUTROPHILS NFR BLD: 63.7 % (ref 38–73)
NRBC BLD-RTO: 0 /100 WBC
PHOSPHATE SERPL-MCNC: 5.3 MG/DL (ref 2.7–4.5)
PLATELET # BLD AUTO: 162 K/UL (ref 150–450)
PMV BLD AUTO: 11.6 FL (ref 9.2–12.9)
POCT GLUCOSE: 102 MG/DL (ref 70–110)
POCT GLUCOSE: 119 MG/DL (ref 70–110)
POCT GLUCOSE: 133 MG/DL (ref 70–110)
POTASSIUM SERPL-SCNC: 4.6 MMOL/L (ref 3.5–5.1)
PROT SERPL-MCNC: 7.2 G/DL (ref 6–8.4)
RBC # BLD AUTO: 3.69 M/UL (ref 4–5.4)
SODIUM SERPL-SCNC: 139 MMOL/L (ref 136–145)
WBC # BLD AUTO: 6.79 K/UL (ref 3.9–12.7)

## 2025-01-18 PROCEDURE — 85025 COMPLETE CBC W/AUTO DIFF WBC: CPT | Performed by: NURSE PRACTITIONER

## 2025-01-18 PROCEDURE — 25000003 PHARM REV CODE 250: Performed by: NURSE PRACTITIONER

## 2025-01-18 PROCEDURE — 97530 THERAPEUTIC ACTIVITIES: CPT

## 2025-01-18 PROCEDURE — 83735 ASSAY OF MAGNESIUM: CPT | Performed by: NURSE PRACTITIONER

## 2025-01-18 PROCEDURE — 25000003 PHARM REV CODE 250

## 2025-01-18 PROCEDURE — 27000221 HC OXYGEN, UP TO 24 HOURS

## 2025-01-18 PROCEDURE — 97116 GAIT TRAINING THERAPY: CPT

## 2025-01-18 PROCEDURE — 97535 SELF CARE MNGMENT TRAINING: CPT

## 2025-01-18 PROCEDURE — 36415 COLL VENOUS BLD VENIPUNCTURE: CPT | Performed by: NURSE PRACTITIONER

## 2025-01-18 PROCEDURE — 21400001 HC TELEMETRY ROOM

## 2025-01-18 PROCEDURE — 97165 OT EVAL LOW COMPLEX 30 MIN: CPT

## 2025-01-18 PROCEDURE — 97161 PT EVAL LOW COMPLEX 20 MIN: CPT

## 2025-01-18 PROCEDURE — 84100 ASSAY OF PHOSPHORUS: CPT | Performed by: NURSE PRACTITIONER

## 2025-01-18 PROCEDURE — 94761 N-INVAS EAR/PLS OXIMETRY MLT: CPT

## 2025-01-18 PROCEDURE — 80053 COMPREHEN METABOLIC PANEL: CPT | Performed by: NURSE PRACTITIONER

## 2025-01-18 PROCEDURE — 63600175 PHARM REV CODE 636 W HCPCS: Performed by: NURSE PRACTITIONER

## 2025-01-18 PROCEDURE — 99900035 HC TECH TIME PER 15 MIN (STAT)

## 2025-01-18 RX ADMIN — METOLAZONE 5 MG: 2.5 TABLET ORAL at 09:01

## 2025-01-18 RX ADMIN — CHOLECALCIFEROL TAB 25 MCG (1000 UNIT) 2000 UNITS: 25 TAB at 09:01

## 2025-01-18 RX ADMIN — LOSARTAN POTASSIUM 25 MG: 25 TABLET, FILM COATED ORAL at 09:01

## 2025-01-18 RX ADMIN — FUROSEMIDE 80 MG: 10 INJECTION, SOLUTION INTRAMUSCULAR; INTRAVENOUS at 08:01

## 2025-01-18 RX ADMIN — METOPROLOL SUCCINATE 100 MG: 50 TABLET, EXTENDED RELEASE ORAL at 09:01

## 2025-01-18 RX ADMIN — APIXABAN 5 MG: 5 TABLET, FILM COATED ORAL at 08:01

## 2025-01-18 RX ADMIN — APIXABAN 5 MG: 5 TABLET, FILM COATED ORAL at 09:01

## 2025-01-18 RX ADMIN — AMLODIPINE BESYLATE 10 MG: 5 TABLET ORAL at 09:01

## 2025-01-18 RX ADMIN — ATORVASTATIN CALCIUM 40 MG: 40 TABLET, FILM COATED ORAL at 09:01

## 2025-01-18 RX ADMIN — FUROSEMIDE 80 MG: 10 INJECTION, SOLUTION INTRAMUSCULAR; INTRAVENOUS at 09:01

## 2025-01-18 RX ADMIN — ALLOPURINOL 100 MG: 100 TABLET ORAL at 09:01

## 2025-01-18 RX ADMIN — MAGNESIUM OXIDE TAB 400 MG (241.3 MG ELEMENTAL MG) 400 MG: 400 (241.3 MG) TAB at 09:01

## 2025-01-18 RX ADMIN — MAGNESIUM OXIDE TAB 400 MG (241.3 MG ELEMENTAL MG) 400 MG: 400 (241.3 MG) TAB at 08:01

## 2025-01-18 NOTE — NURSING
Home Oxygen Evaluation    Date Performed: 2025    1) Patient's Home O2 Sat on room air, while at rest: 92%.        If O2 sats on room air at rest are 88% or below, patient qualifies. No additional testing needed. Document N/A in steps 2 and 3. If 89% or above, complete steps 2.      2) Patient's O2 Sat on room air while exercisin%.        If O2 sats on room air while exercising remain 89% or above patient does not qualify, no further testing needed Document N/A in step 3. If O2 sats on room air while exercising are 88% or below, continue to step 3.      3) Patient's O2 Sat while exercising on O2: 95% at 2 LPM.          (Must show improvement from #2 for patients to qualify)    If O2 sats improve on oxygen, patient qualifies for portable oxygen. If not, the patient does not qualify.

## 2025-01-18 NOTE — PLAN OF CARE
Problem: Physical Therapy  Goal: Physical Therapy Goal  Description: Goals to be met by: 25     Patient will increase functional independence with mobility by performin. Supine to sit with Modified [place]  2. Sit to stand transfer with Supervision with RW.   3. Gait  x 100 feet with Stand-by Assistance using Rolling Walker.   4. Ascend/descend 7 stair with bilateral Handrails Contact Guard Assistance     Outcome: Progressing   Evaluation completed and goals appropriate. 2025

## 2025-01-18 NOTE — PLAN OF CARE
OT evaluation completed. Patient with PLOF of not on home O2, no recent falls, hx of urinary incontinence which she usually self manages with depends and self-initiated hygiene change, and completes all ADLs/ household mobility with modified independence with rollator; spouse performs all IADLs and driving. On evaluation this date patient limited by SpO2 dropping from mid 90s% on room air to 85-86% post ambulation requiring ~ 3 minutes recovery  on room air. Patient requiring minimal assist for LB ADLs and CGA with rolling walker for brief in room mobility. Will progress as able, however, at this time recommend moderate intensity therapy as patent has 7 steps to enter her home.     Problem: Occupational Therapy  Goal: Occupational Therapy Goal  Description: Goals to be met by: 2/15/2025     Patient will increase functional independence with ADLs by performing:    LE Dressing with Modified [place] inclusive of item retrieval and transport with use of least restrictive mobility device  Toileting from toilet with Modified [place] for hygiene and clothing management inclusive of self management of changing soiled depends/underwear.   Toilet transfer to toilet with Modified [place].  Functional mobility to / from bathroom with least restrictive device and modified independence with SpO2 > 90% on room air  100% reciprocation of at least two techniques to support implementation of energy conservation.    Outcome: Progressing

## 2025-01-18 NOTE — SUBJECTIVE & OBJECTIVE
Interval History: NAEON,VSS,AF. Patient endorsed feeling good this am.     Review of Systems   Constitutional: Negative.    HENT: Negative.     Eyes: Negative.    Respiratory: Negative.     Cardiovascular: Negative.    Gastrointestinal: Negative.    Endocrine: Negative.    Genitourinary: Negative.    Musculoskeletal: Negative.    Skin: Negative.    Allergic/Immunologic: Negative.    Neurological: Negative.    Hematological: Negative.    Psychiatric/Behavioral: Negative.       Objective:     Vital Signs (Most Recent):  Temp: 98.5 °F (36.9 °C) (01/18/25 0748)  Pulse: 68 (01/18/25 0748)  Resp: 18 (01/18/25 0748)  BP: 134/66 (01/18/25 0748)  SpO2: 98 % (01/18/25 0748) Vital Signs (24h Range):  Temp:  [97.5 °F (36.4 °C)-99.1 °F (37.3 °C)] 98.5 °F (36.9 °C)  Pulse:  [56-71] 68  Resp:  [18] 18  SpO2:  [92 %-98 %] 98 %  BP: (114-143)/(58-69) 134/66     Weight: 87 kg (191 lb 12.8 oz)  Body mass index is 33.98 kg/m².    Intake/Output Summary (Last 24 hours) at 1/18/2025 0831  Last data filed at 1/18/2025 0817  Gross per 24 hour   Intake 240 ml   Output 3150 ml   Net -2910 ml         Physical Exam  Vitals and nursing note reviewed.   Constitutional:       Appearance: She is obese.   HENT:      Head: Normocephalic and atraumatic.   Eyes:      Conjunctiva/sclera: Conjunctivae normal.      Pupils: Pupils are equal, round, and reactive to light.   Cardiovascular:      Rate and Rhythm: Normal rate and regular rhythm.   Pulmonary:      Effort: Pulmonary effort is normal.      Breath sounds: Normal breath sounds.   Abdominal:      General: Bowel sounds are normal.      Palpations: Abdomen is soft.   Musculoskeletal:      Cervical back: Normal range of motion and neck supple.      Right lower leg: Edema present.      Left lower leg: Edema present.      Comments: Edema 1+   Skin:     General: Skin is warm.      Capillary Refill: Capillary refill takes less than 2 seconds.   Neurological:      General: No focal deficit present.       Mental Status: She is alert and oriented to person, place, and time.   Psychiatric:         Mood and Affect: Mood normal.         Behavior: Behavior normal.             Significant Labs: All pertinent labs within the past 24 hours have been reviewed.    Significant Imaging: I have reviewed all pertinent imaging results/findings within the past 24 hours.

## 2025-01-18 NOTE — PLAN OF CARE
A&O x4. Fluid restriction continued. Pt denies N/V, SOB, distress, and pain. Edema improving. Medications given per MAR. Vital signs stable. Afib on telemetry. Stayed in bed overnight, weight shifting independently. Safety precautions maintained. Bed alarm set. Call bell within reach. Patient encouraged to call for assistance.      Problem: Skin Injury Risk Increased  Goal: Skin Health and Integrity  Outcome: Progressing     Problem: Diabetes Comorbidity  Goal: Blood Glucose Level Within Targeted Range  Outcome: Progressing     Problem: Comorbidity Management  Goal: Maintenance of Heart Failure Symptom Control  Outcome: Progressing

## 2025-01-18 NOTE — PROGRESS NOTES
[facility]  Progress Note    Patient Name: Eliza Louie  MRN: [id number]  Patient Class: IP- Inpatient   Admission Date: 1/16/2025  Length of Stay: 1 days  Attending Physician: Manny Gatica,*  Primary Care Provider: Casi Garcia MD        Subjective     Principal Problem:Acute exacerbation of CHF (congestive heart failure)        HPI:  74-year-old female with a past medical history of HTN, HLD, IDDM 2, proximal Afib( on Eliquis), HFpEF (last Echo 55-60%, grade 2 diastolic dysfunction), CKD stage 4, osteopenia right hip, CAD with  stent placement (2017) who presented to the emergency room with complaints of increased shortness of breath that started prior to Thanksgiving.  She also endorses increased edema to both lower extremity that started in June and increased pain to both lower extremity that started 2 weeks ago.  Patient was unable to sleep due to the pain in her legs and this was the inciting event that brought her to emergency room today.  In addition she reports orthopnea requiring 3 pillows at night.  She reports that she is compliant with all of her medications.  She denies any exertional shortness of breath, chest pain, palpitations, coughing, recent upper respiratory infection, fever, nausea, vomiting, diarrhea or abdominal pain.      In the ER, initial vital signs were as follows: Temp 98.6, /72, HR in 84, SpO2 95 % on room air. Labs as follows: WBC 5.81, Hgb 9.7, BUN/Cr 30/2.3 (baseline Cr 3.2).  Troponin 0.021,  (baseline 483)    CXR suggestive of edema/CHF with bilateral pleural effusions as above. .     EKG atrial fibrillation with rig RBB at 69,     In the ER, patient was given nitroglycerin paste and furosemide 80 mg x 1 dose with  ml.    Patient was admitted to [facility] for CHF exacerbation.                  Overview/Hospital Course:  No notes on file    Interval History: MARGARET HANSEN,AF. Patient endorsed feeling good this am.  Patient endorsed no shortness of breath. Still on oxygen 1L.    Review of Systems   Constitutional: Negative.    HENT: Negative.     Eyes: Negative.    Respiratory: Negative.     Cardiovascular: Negative.    Gastrointestinal: Negative.    Endocrine: Negative.    Genitourinary: Negative.    Musculoskeletal: Negative.    Skin: Negative.    Allergic/Immunologic: Negative.    Neurological: Negative.    Hematological: Negative.    Psychiatric/Behavioral: Negative.       Objective:     Vital Signs (Most Recent):  Temp: 98.5 °F (36.9 °C) (01/18/25 0748)  Pulse: 68 (01/18/25 0748)  Resp: 18 (01/18/25 0748)  BP: 134/66 (01/18/25 0748)  SpO2: 98 % (01/18/25 0748) Vital Signs (24h Range):  Temp:  [97.5 °F (36.4 °C)-99.1 °F (37.3 °C)] 98.5 °F (36.9 °C)  Pulse:  [56-71] 68  Resp:  [18] 18  SpO2:  [92 %-98 %] 98 %  BP: (114-143)/(58-69) 134/66     Weight: 87 kg (191 lb 12.8 oz)  Body mass index is 33.98 kg/m².    Intake/Output Summary (Last 24 hours) at 1/18/2025 0831  Last data filed at 1/18/2025 0817  Gross per 24 hour   Intake 240 ml   Output 3150 ml   Net -2910 ml         Physical Exam  Vitals and nursing note reviewed.   Constitutional:       Appearance: She is obese.   HENT:      Head: Normocephalic and atraumatic.   Eyes:      Conjunctiva/sclera: Conjunctivae normal.      Pupils: Pupils are equal, round, and reactive to light.   Cardiovascular:      Rate and Rhythm: Normal rate and regular rhythm.   Pulmonary:      Effort: Pulmonary effort is normal.      Breath sounds: Normal breath sounds.   Abdominal:      General: Bowel sounds are normal.      Palpations: Abdomen is soft.   Musculoskeletal:      Cervical back: Normal range of motion and neck supple.      Right lower leg: Edema present.      Left lower leg: Edema present.      Comments: Edema 1+   Skin:     General: Skin is warm.      Capillary Refill: Capillary refill takes less than 2 seconds.   Neurological:      General: No focal deficit present.      Mental Status:  She is alert and oriented to person, place, and time.   Psychiatric:         Mood and Affect: Mood normal.         Behavior: Behavior normal.             Significant Labs: All pertinent labs within the past 24 hours have been reviewed.    Significant Imaging: I have reviewed all pertinent imaging results/findings within the past 24 hours.    Assessment and Plan     * Acute exacerbation of CHF (congestive heart failure)  Patient has Diastolic (HFpEF) heart failure that is Acute on chronic. On presentation their CHF was well compensated. Most recent BNP and echo results are listed below.  Recent Labs     01/16/25  0324   *       Latest ECHO  Results for orders placed in visit on 03/11/24    Echo    Interpretation Summary    Left Ventricle: There is normal systolic function with a visually estimated ejection fraction of 55 - 60%. Grade II diastolic dysfunction. Elevated left ventricular filling pressure.    Right Ventricle: Systolic function is normal.    Mitral Valve: There is mild regurgitation.    Left Atrium: Left atrium is moderately dilated.    Tricuspid Valve: There is mild regurgitation.    IVC/SVC: Intermediate venous pressure at 8 mmHg.    Current Heart Failure Medications  losartan tablet 25 mg, Daily, Oral  metoprolol succinate (TOPROL-XL) 24 hr tablet 100 mg, Daily, Oral  furosemide injection 80 mg, Every 12 hours, Intravenous  metOLazone tablet 5 mg, Daily, Oral    Plan  - Monitor strict I&Os and daily weights.    - Place on telemetry  - Low sodium diet  - Place on fluid restriction of 1.5 L.   - Cardiology has been consulted  - The patient's volume status is stable but not at their baseline as indicated by edema, weight gain, orthopnea, and shortness of breath  -Echo this am to rule out worsening diastolic or systolic function  -Furosemide 80 mg b.i.d.   -Metolazone 5mg daily 30 min before Lasix   -hold home KCl 10 mEq daily dose      HLD (hyperlipidemia)  Plan:  Start atorvastain 40 mg daily  instead of home Rosuvastatin      CKD (chronic kidney disease), stage IV  Creatine stable for now. BMP reviewed- noted Estimated Creatinine Clearance: 22.2 mL/min (A) (based on SCr of 2.3 mg/dL (H)). according to latest data. Based on current GFR, CKD stage is stage 4 - GFR 15-29.    Suspect anemia due to anemia of chronic disease d/t CKD stage 4      Plan:  -Monitor UOP and serial BMP and adjust therapy as needed.   -Renally dose meds.   -Avoid nephrotoxic medications and procedures  -continue home sodium bicarb 650 mg t.i.d.  -continue home vitamin-D 3 2000 I U daily    Gout of multiple sites      Plan:  Continue home allopurinol 100 mg daily      Anemia due to stage 4 chronic kidney disease  Anemia is likely due to chronic disease due to Chronic Kidney Disease. Most recent hemoglobin and hematocrit are listed below.  Recent Labs     01/16/25  0324 01/17/25  0224   HGB 9.7* 9.9*   HCT 31.7* 32.1*       Plan  - Monitor serial CBC: Daily  - Transfuse PRBC if patient becomes hemodynamically unstable, symptomatic or H/H drops below 7/21.  - Patient has not received any PRBC transfusions to date  - Patient's anemia is currently stable      Paroxysmal A-fib  Patient has long standing persistent (>12 months) atrial fibrillation. Patient is [facility].  I am sorry I can not hear you very well endorse what was running and would beta shin darrius ROSALES of a past medical history side okay no problem okay thank you. [id number] Score: 4. The patients heart rate in the last 24 hours is as follows:  Pulse  Min: 69  Max: 88     Antiarrhythmics  metoprolol succinate (TOPROL-XL) 24 hr tablet 100 mg, Daily, Oral    Anticoagulants  apixaban tablet 5 mg, 2 times daily, Oral    Plan  - Replete lytes with a goal of K>4, Mg >2  - Patient is anticoagulated, see medications listed above.  - Patient's afib is currently controlled  - continue home metoprolol succinate 100 mg daily   -continue home Eliquis 5 mg  b.i.d.        Type 2 diabetes mellitus with stage 4 chronic kidney disease, with long-term current use of insulin  Creatine stable for now. BMP reviewed- noted Estimated Creatinine Clearance: 23 mL/min (A) (based on SCr of 2.3 mg/dL (H)). according to latest data. Based on current GFR, CKD stage is stage 4 - GFR 15-29.  Monitor UOP and serial BMP and adjust therapy as needed. Renally dose meds. Avoid nephrotoxic medications and procedures.    Plan:  Hold home Lantus 5 mg q.h.s.   Hold home oral hypoglycemic agents  CARLO  ACHS    Essential hypertension  Patient's blood pressure range in the last 24 hours was: BP  Min: 128/72  Max: 155/72.The patient's inpatient anti-hypertensive regimen is listed below:  Current Antihypertensives  amLODIPine tablet 10 mg, Daily, Oral  losartan tablet 25 mg, Daily, Oral  metoprolol succinate (TOPROL-XL) 24 hr tablet 100 mg, Daily, Oral  furosemide injection 80 mg, Every 12 hours, Intravenous    Plan  - BP is controlled, no changes needed to their regimen  - hold home Lasix 20 mg daily dose  -continue home amlodipine 10 mg daily and losartan 25 mg daily      VTE Risk Mitigation (From admission, onward)           Ordered     apixaban tablet 5 mg  2 times daily         01/16/25 0620                    Discharge Planning   EMILIA: 1/18/2025     Code Status: Full Code   Medical Readiness for Discharge Date:   Discharge Plan A: Home, Home with family   Discharge Delays: None known at this time            Please place Justification for DME        Barbara Alcantara MD PGY-1  Department of [facility] Medicine   [facility]

## 2025-01-18 NOTE — PLAN OF CARE
Problem: Adult Inpatient Plan of Care  Goal: Plan of Care Review  Outcome: Progressing  Goal: Patient-Specific Goal (Individualized)  Outcome: Progressing  Goal: Absence of Hospital-Acquired Illness or Injury  Outcome: Progressing  Goal: Optimal Comfort and Wellbeing  Outcome: Progressing  Goal: Readiness for Transition of Care  Outcome: Progressing     Problem: Skin Injury Risk Increased  Goal: Skin Health and Integrity  Outcome: Progressing     Problem: Diabetes Comorbidity  Goal: Blood Glucose Level Within Targeted Range  Outcome: Progressing     Problem: Wound  Goal: Optimal Coping  Outcome: Progressing  Goal: Optimal Functional Ability  Outcome: Progressing  Goal: Absence of Infection Signs and Symptoms  Outcome: Progressing  Goal: Improved Oral Intake  Outcome: Progressing  Goal: Optimal Pain Control and Function  Outcome: Progressing  Goal: Skin Health and Integrity  Outcome: Progressing  Goal: Optimal Wound Healing  Outcome: Progressing     Problem: Comorbidity Management  Goal: Blood Pressure in Desired Range  Outcome: Progressing  Goal: Maintenance of Behavioral Health Symptom Control  Outcome: Progressing  Goal: Maintenance of Heart Failure Symptom Control  Outcome: Progressing     Problem: Mobility Impairment  Goal: Optimal Mobility [place] and Safety  Outcome: Progressing     Problem: Fall Injury Risk  Goal: Absence of Fall and Fall-Related Injury  Outcome: Progressing     Problem: Heart Failure  Goal: Optimal Coping  Outcome: Progressing  Goal: Optimal Cardiac Output  Outcome: Progressing  Goal: Stable Heart Rate and Rhythm  Outcome: Progressing  Goal: Optimal Functional Ability  Outcome: Progressing  Goal: Fluid and Electrolyte Balance  Outcome: Progressing  Goal: Improved Oral Intake  Outcome: Progressing  Goal: Effective Oxygenation and Ventilation  Outcome: Progressing  Goal: Effective Breathing Pattern During Sleep  Outcome: Progressing     Problem: Pain Acute  Goal: Optimal Pain Control  and Function  Outcome: Progressing

## 2025-01-18 NOTE — PT/OT/SLP EVAL
Occupational Therapy   Evaluation and treatment    Name: Eliza Louie  MRN: [id number]  Admitting Diagnosis: Acute exacerbation of CHF (congestive heart failure)  Recent Surgery: * No surgery found *      Recommendations:     Discharge Recommendations: Moderate Intensity Therapy  Discharge Equipment Recommendations:   (tub transfer bench; pulse oximeter)  Barriers to discharge:  Other (Comment) (7 steps to enter home; SpO2 drops to mid 80s % with ambulation)    Assessment:     Eliza Louie is a 74 y.o. female with a medical diagnosis of Acute exacerbation of CHF (congestive heart failure).  She presents with .Diagnoses of Leg swelling, CHF exacerbation, and SOB (shortness of breath) were pertinent to this visit.  . Performance deficits affecting function: impaired endurance, impaired self care skills, impaired functional mobility, gait instability, impaired cardiopulmonary response to activity, impaired balance, edema.      OT evaluation completed.  On evaluation this date patient limited by SpO2 dropping from mid 90s% on room air to 85-86% post ambulation requiring ~ 3 minutes recovery  on room air. Patient requiring minimal assist for LB ADLs and CGA with rolling walker for brief in room mobility. Will progress as able, however, at this time recommend moderate intensity therapy as patent has 7 steps to enter her home.         Rehab Prognosis: Good; patient would benefit from acute skilled OT services to address these deficits and reach maximum level of function.       Plan:     Patient to be seen 4 x/week to address the above listed problems via self-care/home management, therapeutic activities, therapeutic exercises  Plan of Care Expires: 02/15/25  Plan of Care Reviewed with: patient    Subjective     Chief Complaint: reports some shortness of breath when inquired additionally by therapist post ambulation out of bathroom  Patient/Family Comments/goals: reports need to have a bowel movement    Occupational  Profile:  Living Environment: resides with spouse in a single story home, 7 steps to enter, tub shower. Indicates there are B handrails but that they are difficult to grasp due to thickness of rails. 18 year old grandson also resides in the home per report  Previous level of function: Patient with PLOF of not on home O2, no recent falls, hx of urinary incontinence which she usually self manages with depends and self-initiated hygiene change, and completes all ADLs/ household mobility with modified independence with rollator; spouse performs all IADLs and driving.  Equipment Used at Home: raised toilet, rollator, shower chair  Assistance upon Discharge: spouse/ grandson    Pain/Comfort:  Pain Rating 1: 0/10  Pain Addressed 1: Reposition  Pain Rating Post-Intervention 1: 0/10      Objective:     Communicated with: nursing prior to session.  Patient found HOB elevated with bed alarm, PureWick, telemetry upon OT entry to room.    General Precautions: Standard, fall  Orthopedic Precautions: N/A  Braces: N/A  Respiratory Status: Room air    Occupational Performance:    Bed Mobility:    Patient completed Supine to Sit with stand by assistance and increased time, scooting to eob with SBA    Functional Mobility/Transfers:  Patient completed Sit <> Stand Transfer with CGA/SBA  with  rolling walker and min verbal cues for anterior weightshifting   Patient completed Toilet Transfer Step Transfer technique with contact guard assistance with  rolling walker and with R grab bar  Functional Mobility: brief in room functional mobility from bed to bathroom, seated rest break, and bathroom toward door with chair follow; CGA with rolling walker and min verbal cues for proximity    Activities of Daily Living:  Upper Body Dressing: set up  Lower Body Dressing: minimum assistance ; increased exertion with forward trunk flexion; instructed on figure four vs bringing LE on side of bed  Toileting: minimum assistance ; bowel movement; true  bathroom; min verbal cues     Cognitive/Visual Perceptual:  Cognitive/Psychosocial Skills:     -       Oriented to: Person, Place, Time, and Situation   -       Follows Commands/attention:Follows multistep  commands  -       Safety awareness/insight to disability: intact   -       Mood/Affect/Coping skills/emotional control: Cooperative    Physical Exam:  Edema:  mild BLE; removed small socks off patient - provided with larger socks due to noted min indention   Upper Extremity Range of Motion:     -       Right Upper Extremity: WFL  -       Left Upper Extremity: WFL  Upper Extremity Strength:    -       Right Upper Extremity: WFL  -       Left Upper Extremity: WFL  Impaired endurance    AMPA 6 Click ADL:  AMPA Total Score: 21    Treatment & Education:  Patient educated on role of OT.  Occupational profile developed.  ADL/ functional mobility instruction as above.  Intermittent verbal cues provided for pursed lip breathing, posture awareness sitting and standing, and proximity to walker.  SpO2 monitored on room air: supine HOB elevated at rest at 93%, eob seated at 91%, decreases to 85-86% after toileting/functional mobility requiring ~3-4 minutes recovery. End of session - seated in chair, improved to 94-95%.  Educated on repositioning, use of call light to notify staff when ready to transition out of chair, and technique of elevating BLE for edema management.        Patient left up in chair with all lines intact, call button in reach, and nursing notified  Chair alarm not intact - RN aware; patient expresses understanding to use call light.    GOALS:   Multidisciplinary Problems       Occupational Therapy Goals          Problem: Occupational Therapy    Goal Priority Disciplines Outcome Interventions   Occupational Therapy Goal     OT, PT/OT Progressing    Description: Goals to be met by: 2/15/2025     Patient will increase functional independence with ADLs by performing:    LE Dressing with Modified [place]  inclusive of item retrieval and transport with use of least restrictive mobility device  Toileting from toilet with Modified [place] for hygiene and clothing management inclusive of self management of changing soiled depends/underwear.   Toilet transfer to toilet with Modified [place].  Functional mobility to / from bathroom with least restrictive device and modified independence with SpO2 > 90% on room air  100% reciprocation of at least two techniques to support implementation of energy conservation.                         DME Justifications:  Tub transfer bench to mitigate fall risk when getting in / out of tub shower  Pulse ox - support self health management of SpO2 levels    History:     Past Medical History:   Diagnosis Date    (HFpEF) heart failure with preserved ejection fraction     TTE (2020) w/ EF 55% and grade II diastolic dysfunction    Anemia due to stage 4 chronic kidney disease 8/16/2021    Gastric ulcer due to Helicobacter pylori 07/2017    Gastrointestinal hemorrhage with melena 07/2017    GERD (gastroesophageal reflux disease)     HLD (hyperlipidemia)     Hydronephrosis with ureteropelvic junction (UPJ) obstruction 8/16/2021    Hypertension     MDD (major depressive disorder) 8/16/2021    Nephrolithiasis 8/25/2021    NSTEMI (non-ST elevated myocardial infarction) 07/2017    Paroxysmal A-fib 12/24/2020    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/2020    Stage 4 chronic kidney disease 10/19/2021    STEMI (ST elevation myocardial infarction) 12/2020    Type 2 diabetes mellitus     Urge incontinence of urine          Past Surgical History:   Procedure Laterality Date    CARDIAC CATHETERIZATION  07/2017    RCA w/ 95% stenosis w/ stent placement    CATARACT EXTRACTION W/  INTRAOCULAR LENS IMPLANT Right 5/29/2024    Procedure: EXTRACTION, CATARACT, WITH IOL INSERTION;  Surgeon: Maddy Arenas MD;  Location: [facility];  Service: Ophthalmology;  Laterality:  Right;    CATARACT EXTRACTION W/  INTRAOCULAR LENS IMPLANT Left 6/26/2024    Procedure: EXTRACTION, CATARACT, WITH IOL INSERTION;  Surgeon: Maddy Arenas MD;  Location: [facility] OR;  Service: Ophthalmology;  Laterality: Left;    RETROGRADE PYELOGRAPHY  8/25/2021    Procedure: PYELOGRAM, RETROGRADE;  Surgeon: Rody Smith MD;  Location: [facility];  Service: Urology;;    URETEROSCOPIC REMOVAL OF URETERIC CALCULUS Left 8/25/2021    Procedure: left ureteroscopy, stone basketing, stent placement;  left nephrostomy tube removal;  Surgeon: Rody Smith MD;  Location: [facility];  Service: Urology;  Laterality: Left;       Time Tracking:     OT Date of Treatment: 01/18/25  OT Start Time: 1018  OT Stop Time: 1044  OT Total Time (min): 26 min total time    Billable Minutes:Evaluation 8 min  Self Care/Home Management 8 min  Therapeutic Activity 8 min    1/18/2025

## 2025-01-18 NOTE — PT/OT/SLP EVAL
Physical Therapy  Co-Evaluation and treatment with OT    Patient Name:  Eliza Louie   MRN:  [id number]    Recommendations:     Discharge Recommendations: Moderate Intensity Therapy may progress  Discharge Equipment Recommendations: none   Barriers to discharge: Decreased caregiver support    Assessment:     Eliza Louie is a 74 y.o. female admitted with a medical diagnosis of Acute exacerbation of CHF (congestive heart failure).  She presents with the following impairments/functional limitations: impaired endurance, impaired functional mobility, gait instability, impaired balance, decreased safety awareness pt tolerated treatment well but decreased O2 sats and c/o SOB during treatment limited functional mobility. Pt will benefit from skilled PT 4x/wk to progress physically.  Pt is significantly below previous functional level, increased risk of falls and increased burden of care currently. Patient currently demonstrates a need for moderate intensity therapy on a daily basis post acute secondary to a decline in functional status due to illness. Pt came to ED with c/o SOB and leg swelling.     Rehab Prognosis: Good; patient would benefit from acute skilled PT services to address these deficits and reach maximum level of function.    Recent Surgery: * No surgery found *      Plan:     During this hospitalization, patient to be seen 4 x/week to address the identified rehab impairments via gait training, therapeutic activities and progress toward the following goals:    Plan of Care Expires:  02/16/25    Subjective     Chief Complaint: pt c/o slight SOB after treatment.   Patient/Family Comments/goals: to go home.   Pain/Comfort:  Pain Rating 1: 0/10  Pain Rating Post-Intervention 1: 0/10    Patients cultural, spiritual, Congregational conflicts given the current situation: no    Living Environment:  Pt is retired and lives with her retired  and 17 yo grandson in 1 story with 7 steps and B handrails to entrance.   Prior  to admission, patients level of function was modified independent with rollator.  Equipment used at home: rollator, shower chair, raised toilet.  DME owned (not currently used): none.  Upon discharge, patient will have assistance from  .    Objective:     Communicated with nurse  prior to session.  Patient found supine with telemetry, PureWick, bed alarm (hep lock IV)  upon PT entry to room.    General Precautions: Standard, fall  Orthopedic Precautions:    Braces:    Respiratory Status: Room air Pt had O2 1L but was not wearing it.     Exams:  Cognitive Exam:  Patient is oriented to Person, Place, Time, and Situation  RLE ROM: WNL  RLE Strength: WNL  LLE ROM: WNL  LLE Strength: WNL    Functional Mobility:  Bed Mobility:     Rolling Left:  stand by assistance  Supine to Sit: stand by assistance Head of bed elevated.    Transfers:     Sit to Stand:  stand by assistance with rolling walker from bed and CGA  with RW from toilet.     Gait: pt received gait training ~ 8 ft  x 2 reps with RW and CGA. Pt sat to use toilet between distances gait trained.     Balance: pt stood to clean after using bathroom with  SBA. Pt needed verbal cues to use RW for balance.     Pt O2 sats were 93% supine prior to treatment, 91% after sitting EOB, 85-86% after gait training and toileting. Pt was put in chair and sats increased to 95%. RN notified of numbers.     PT concentrated on BLE MMT, ROM and gait training with evaluation and treatment  Pt white board updated with current therapists name and level of mobility assistance needed.     AM-PAC 6 CLICK MOBILITY  Total Score:16       Treatment & Education:  Pt received verbal instructions in role of PT , POC and using RN call button for assistance and mobility. Pt verbally expressed understanding of such.     Patient left up in chair with all lines intact, call button in reach, and RN  notified. RN approved pt sitting in chair without chair alarm.     GOALS:   Multidisciplinary  Problems       Physical Therapy Goals          Problem: Physical Therapy    Goal Priority Disciplines Outcome Interventions   Physical Therapy Goal     PT, PT/OT Progressing    Description: Goals to be met by: 25     Patient will increase functional independence with mobility by performin. Supine to sit with Modified [place]  2. Sit to stand transfer with Supervision with RW.   3. Gait  x 100 feet with Stand-by Assistance using Rolling Walker.   4. Ascend/descend 7 stair with bilateral Handrails Contact Guard Assistance                          DME Justifications:  No DME recommended requiring DME justifications    History:     Past Medical History:   Diagnosis Date    (HFpEF) heart failure with preserved ejection fraction     TTE () w/ EF 55% and grade II diastolic dysfunction    Anemia due to stage 4 chronic kidney disease 2021    Gastric ulcer due to Helicobacter pylori 2017    Gastrointestinal hemorrhage with melena 2017    GERD (gastroesophageal reflux disease)     HLD (hyperlipidemia)     Hydronephrosis with ureteropelvic junction (UPJ) obstruction 2021    Hypertension     MDD (major depressive disorder) 2021    Nephrolithiasis 2021    NSTEMI (non-ST elevated myocardial infarction) 2017    Paroxysmal A-fib 2020    RBBB (right bundle branch block with left anterior fascicular block) 2020    Stage 4 chronic kidney disease 10/19/2021    STEMI (ST elevation myocardial infarction) 2020    Type 2 diabetes mellitus     Urge incontinence of urine        Past Surgical History:   Procedure Laterality Date    CARDIAC CATHETERIZATION  2017    RCA w/ 95% stenosis w/ stent placement    CATARACT EXTRACTION W/  INTRAOCULAR LENS IMPLANT Right 2024    Procedure: EXTRACTION, CATARACT, WITH IOL INSERTION;  Surgeon: Maddy Arenas MD;  Location: [facility];  Service: Ophthalmology;  Laterality: Right;    CATARACT EXTRACTION W/  INTRAOCULAR LENS IMPLANT Left  6/26/2024    Procedure: EXTRACTION, CATARACT, WITH IOL INSERTION;  Surgeon: Maddy Arenas MD;  Location: [facility];  Service: Ophthalmology;  Laterality: Left;    RETROGRADE PYELOGRAPHY  8/25/2021    Procedure: PYELOGRAM, RETROGRADE;  Surgeon: Rody Smith MD;  Location: [facility] OR;  Service: Urology;;    URETEROSCOPIC REMOVAL OF URETERIC CALCULUS Left 8/25/2021    Procedure: left ureteroscopy, stone basketing, stent placement;  left nephrostomy tube removal;  Surgeon: Rody Smith MD;  Location: [facility] OR;  Service: Urology;  Laterality: Left;       Time Tracking:     PT Received On: 01/18/25  PT Start Time: 1018     PT Stop Time: 1044  PT Total Time (min): 26 min     Billable Minutes: Evaluation 10 min  and Gait Training 10 min      01/18/2025

## 2025-01-19 LAB
ALBUMIN SERPL BCP-MCNC: 3.4 G/DL (ref 3.5–5.2)
ALP SERPL-CCNC: 64 U/L (ref 40–150)
ALT SERPL W/O P-5'-P-CCNC: 8 U/L (ref 10–44)
ANION GAP SERPL CALC-SCNC: 14 MMOL/L (ref 8–16)
AST SERPL-CCNC: 16 U/L (ref 10–40)
BASOPHILS # BLD AUTO: 0.02 K/UL (ref 0–0.2)
BASOPHILS NFR BLD: 0.3 % (ref 0–1.9)
BILIRUB SERPL-MCNC: 0.7 MG/DL (ref 0.1–1)
BUN SERPL-MCNC: 52 MG/DL (ref 8–23)
CALCIUM SERPL-MCNC: 9 MG/DL (ref 8.7–10.5)
CHLORIDE SERPL-SCNC: 96 MMOL/L (ref 95–110)
CO2 SERPL-SCNC: 28 MMOL/L (ref 23–29)
CREAT SERPL-MCNC: 2.8 MG/DL (ref 0.5–1.4)
DIFFERENTIAL METHOD BLD: ABNORMAL
EOSINOPHIL # BLD AUTO: 0.1 K/UL (ref 0–0.5)
EOSINOPHIL NFR BLD: 1.9 % (ref 0–8)
ERYTHROCYTE [DISTWIDTH] IN BLOOD BY AUTOMATED COUNT: 16.2 % (ref 11.5–14.5)
EST. GFR  (NO RACE VARIABLE): 17 ML/MIN/1.73 M^2
GLUCOSE SERPL-MCNC: 115 MG/DL (ref 70–110)
HCT VFR BLD AUTO: 31.4 % (ref 37–48.5)
HGB BLD-MCNC: 10.1 G/DL (ref 12–16)
IMM GRANULOCYTES # BLD AUTO: 0.04 K/UL (ref 0–0.04)
IMM GRANULOCYTES NFR BLD AUTO: 0.6 % (ref 0–0.5)
LYMPHOCYTES # BLD AUTO: 1.2 K/UL (ref 1–4.8)
LYMPHOCYTES NFR BLD: 16.7 % (ref 18–48)
MAGNESIUM SERPL-MCNC: 1.8 MG/DL (ref 1.6–2.6)
MCH RBC QN AUTO: 28 PG (ref 27–31)
MCHC RBC AUTO-ENTMCNC: 32.2 G/DL (ref 32–36)
MCV RBC AUTO: 87 FL (ref 82–98)
MONOCYTES # BLD AUTO: 0.8 K/UL (ref 0.3–1)
MONOCYTES NFR BLD: 10.7 % (ref 4–15)
NEUTROPHILS # BLD AUTO: 5.1 K/UL (ref 1.8–7.7)
NEUTROPHILS NFR BLD: 69.8 % (ref 38–73)
NRBC BLD-RTO: 0 /100 WBC
PHOSPHATE SERPL-MCNC: 4.7 MG/DL (ref 2.7–4.5)
PLATELET # BLD AUTO: 164 K/UL (ref 150–450)
PMV BLD AUTO: 11.4 FL (ref 9.2–12.9)
POCT GLUCOSE: 119 MG/DL (ref 70–110)
POCT GLUCOSE: 121 MG/DL (ref 70–110)
POCT GLUCOSE: 124 MG/DL (ref 70–110)
POCT GLUCOSE: 131 MG/DL (ref 70–110)
POCT GLUCOSE: 161 MG/DL (ref 70–110)
POTASSIUM SERPL-SCNC: 4.2 MMOL/L (ref 3.5–5.1)
PROT SERPL-MCNC: 6.9 G/DL (ref 6–8.4)
RBC # BLD AUTO: 3.61 M/UL (ref 4–5.4)
SODIUM SERPL-SCNC: 138 MMOL/L (ref 136–145)
WBC # BLD AUTO: 7.23 K/UL (ref 3.9–12.7)

## 2025-01-19 PROCEDURE — 25000003 PHARM REV CODE 250

## 2025-01-19 PROCEDURE — 85025 COMPLETE CBC W/AUTO DIFF WBC: CPT | Performed by: NURSE PRACTITIONER

## 2025-01-19 PROCEDURE — 21400001 HC TELEMETRY ROOM

## 2025-01-19 PROCEDURE — 36415 COLL VENOUS BLD VENIPUNCTURE: CPT | Performed by: NURSE PRACTITIONER

## 2025-01-19 PROCEDURE — 80053 COMPREHEN METABOLIC PANEL: CPT | Performed by: NURSE PRACTITIONER

## 2025-01-19 PROCEDURE — 25000003 PHARM REV CODE 250: Performed by: NURSE PRACTITIONER

## 2025-01-19 PROCEDURE — 99900035 HC TECH TIME PER 15 MIN (STAT)

## 2025-01-19 PROCEDURE — 83735 ASSAY OF MAGNESIUM: CPT | Performed by: NURSE PRACTITIONER

## 2025-01-19 PROCEDURE — 63600175 PHARM REV CODE 636 W HCPCS: Performed by: NURSE PRACTITIONER

## 2025-01-19 PROCEDURE — 84100 ASSAY OF PHOSPHORUS: CPT | Performed by: NURSE PRACTITIONER

## 2025-01-19 RX ADMIN — APIXABAN 5 MG: 5 TABLET, FILM COATED ORAL at 09:01

## 2025-01-19 RX ADMIN — APIXABAN 5 MG: 5 TABLET, FILM COATED ORAL at 08:01

## 2025-01-19 RX ADMIN — MAGNESIUM OXIDE TAB 400 MG (241.3 MG ELEMENTAL MG) 400 MG: 400 (241.3 MG) TAB at 08:01

## 2025-01-19 RX ADMIN — AMLODIPINE BESYLATE 10 MG: 5 TABLET ORAL at 09:01

## 2025-01-19 RX ADMIN — ALLOPURINOL 100 MG: 100 TABLET ORAL at 09:01

## 2025-01-19 RX ADMIN — METOPROLOL SUCCINATE 100 MG: 50 TABLET, EXTENDED RELEASE ORAL at 09:01

## 2025-01-19 RX ADMIN — FUROSEMIDE 80 MG: 10 INJECTION, SOLUTION INTRAMUSCULAR; INTRAVENOUS at 09:01

## 2025-01-19 RX ADMIN — ACETAMINOPHEN 650 MG: 325 TABLET ORAL at 08:01

## 2025-01-19 RX ADMIN — METOLAZONE 5 MG: 2.5 TABLET ORAL at 09:01

## 2025-01-19 RX ADMIN — ATORVASTATIN CALCIUM 40 MG: 40 TABLET, FILM COATED ORAL at 09:01

## 2025-01-19 RX ADMIN — LOSARTAN POTASSIUM 25 MG: 25 TABLET, FILM COATED ORAL at 09:01

## 2025-01-19 RX ADMIN — MAGNESIUM OXIDE TAB 400 MG (241.3 MG ELEMENTAL MG) 400 MG: 400 (241.3 MG) TAB at 09:01

## 2025-01-19 RX ADMIN — CHOLECALCIFEROL TAB 25 MCG (1000 UNIT) 2000 UNITS: 25 TAB at 09:01

## 2025-01-19 RX ADMIN — FUROSEMIDE 80 MG: 10 INJECTION, SOLUTION INTRAMUSCULAR; INTRAVENOUS at 08:01

## 2025-01-19 NOTE — PLAN OF CARE
AAO x4. Pt denies N/V, SOB, distress, and pain. Medications given per MAR. Vital signs stable. Safety precautions maintained. Bed alarm set. Call bell within reach. Patient encouraged to call for assistance.      Problem: Skin Injury Risk Increased  Goal: Skin Health and Integrity  Outcome: Progressing     Problem: Diabetes Comorbidity  Goal: Blood Glucose Level Within Targeted Range  Outcome: Progressing     Problem: Comorbidity Management  Goal: Maintenance of Heart Failure Symptom Control  Outcome: Progressing

## 2025-01-19 NOTE — PROGRESS NOTES
Family Medicine  Progress Note    Patient Name: Eliza Louie  MRN: [id number]  Patient Class: IP- Inpatient   Admission Date: 1/16/2025  Length of Stay: 2 days  Attending Physician: Manny Gatica,*  Primary Care Provider: Casi Garcia MD      Subjective     Principal Problem:Acute exacerbation of CHF (congestive heart failure)        HPI:  74-year-old female with a past medical history of HTN, HLD, IDDM 2, proximal Afib( on Eliquis), HFpEF (last Echo 55-60%, grade 2 diastolic dysfunction), CKD stage 4, osteopenia right hip, CAD with  stent placement (2017) who presented to the emergency room with complaints of increased shortness of breath that started prior to Thanksgiving.  She also endorses increased edema to both lower extremity that started in June and increased pain to both lower extremity that started 2 weeks ago.  Patient was unable to sleep due to the pain in her legs and this was the inciting event that brought her to emergency room today.  In addition she reports orthopnea requiring 3 pillows at night.  She reports that she is compliant with all of her medications.  She denies any exertional shortness of breath, chest pain, palpitations, coughing, recent upper respiratory infection, fever, nausea, vomiting, diarrhea or abdominal pain.      In the ER, initial vital signs were as follows: Temp 98.6, /72, HR in 84, SpO2 95 % on room air. Labs as follows: WBC 5.81, Hgb 9.7, BUN/Cr 30/2.3 (baseline Cr 3.2).  Troponin 0.021,  (baseline 483)    CXR suggestive of edema/CHF with bilateral pleural effusions as above. .     EKG atrial fibrillation with rig RBB at 69,     In the ER, patient was given nitroglycerin paste and furosemide 80 mg x 1 dose with  ml.    Patient was admitted to [facility] for CHF exacerbation.                  Overview/Hospital Course:  No notes on file    Interval History: no fevers, chills, chest pain. Patients SOB improving. UOP ~4L yesterday.  Improving edema but still likely overloaded. Will need more diuresis. Good response from current dose.     Review of Systems   Constitutional: Negative.    HENT: Negative.     Eyes: Negative.    Respiratory:  Positive for shortness of breath.    Cardiovascular: Negative.    Gastrointestinal: Negative.    Endocrine: Negative.    Genitourinary: Negative.    Musculoskeletal: Negative.    Skin: Negative.    Allergic/Immunologic: Negative.    Neurological: Negative.    Hematological: Negative.    Psychiatric/Behavioral: Negative.       Objective:     Vital Signs (Most Recent):  Temp: 98.3 °F (36.8 °C) (01/19/25 0713)  Pulse: 69 (01/19/25 1045)  Resp: 18 (01/19/25 0713)  BP: 123/60 (01/19/25 0713)  SpO2: 97 % (01/19/25 0713) Vital Signs (24h Range):  Temp:  [98 °F (36.7 °C)-99 °F (37.2 °C)] 98.3 °F (36.8 °C)  Pulse:  [58-75] 69  Resp:  [16-18] 18  SpO2:  [96 %-100 %] 97 %  BP: (116-148)/(55-68) 123/60     Weight: 87 kg (191 lb 12.8 oz)  Body mass index is 33.98 kg/m².    Intake/Output Summary (Last 24 hours) at 1/19/2025 1118  Last data filed at 1/19/2025 0919  Gross per 24 hour   Intake 360 ml   Output 4150 ml   Net -3790 ml         Physical Exam  Vitals and nursing note reviewed.   Constitutional:       Appearance: She is obese.   HENT:      Head: Normocephalic and atraumatic.   Eyes:      Conjunctiva/sclera: Conjunctivae normal.      Pupils: Pupils are equal, round, and reactive to light.   Cardiovascular:      Rate and Rhythm: Normal rate and regular rhythm.   Pulmonary:      Effort: Pulmonary effort is normal.      Breath sounds: Normal breath sounds.   Abdominal:      General: Bowel sounds are normal.      Palpations: Abdomen is soft.   Musculoskeletal:      Cervical back: Normal range of motion and neck supple.      Right lower leg: Edema present.      Left lower leg: Edema present.      Comments: Edema 1+   Skin:     General: Skin is warm.      Capillary Refill: Capillary refill takes less than 2 seconds.  "  Neurological:      General: No focal deficit present.      Mental Status: She is alert and oriented to person, place, and time.   Psychiatric:         Mood and Affect: Mood normal.         Behavior: Behavior normal.             Significant Labs: All pertinent labs within the past 24 hours have been reviewed.  CBC:   Recent Labs   Lab 01/18/25  0346 01/19/25  0444   WBC 6.79 7.23   HGB 10.2* 10.1*   HCT 33.1* 31.4*    164     CMP:   Recent Labs   Lab 01/18/25  0346 01/19/25  0444    138   K 4.6 4.2    96   CO2 25 28    115*   BUN 43* 52*   CREATININE 2.7* 2.8*   CALCIUM 9.2 9.0   PROT 7.2 6.9   ALBUMIN 3.6 3.4*   BILITOT 0.6 0.7   ALKPHOS 75 64   AST 17 16   ALT 15 8*   ANIONGAP 12 14       Significant Imaging: I have reviewed all pertinent imaging results/findings within the past 24 hours.    Assessment and Plan     * Acute exacerbation of CHF (congestive heart failure)  Patient has Diastolic (HFpEF) heart failure that is Acute on chronic. On presentation their CHF was well compensated. Most recent BNP and echo results are listed below.  No results for input(s): "BNP" in the last 72 hours.    Latest ECHO  Results for orders placed in visit on 03/11/24    Echo    Interpretation Summary    Left Ventricle: There is normal systolic function with a visually estimated ejection fraction of 55 - 60%. Grade II diastolic dysfunction. Elevated left ventricular filling pressure.    Right Ventricle: Systolic function is normal.    Mitral Valve: There is mild regurgitation.    Left Atrium: Left atrium is moderately dilated.    Tricuspid Valve: There is mild regurgitation.    IVC/SVC: Intermediate venous pressure at 8 mmHg.    Current Heart Failure Medications  losartan tablet 25 mg, Daily, Oral  metoprolol succinate (TOPROL-XL) 24 hr tablet 100 mg, Daily, Oral  furosemide injection 80 mg, Every 12 hours, Intravenous  metOLazone tablet 5 mg, Daily, Oral    Plan  - Monitor strict I&Os and daily weights.  "   - Place on telemetry  - Low sodium diet  - Place on fluid restriction of 1.5 L.   - Cardiology has been consulted  - The patient's volume status is stable but not at their baseline as indicated by edema, weight gain, orthopnea, and shortness of breath  -Echo this am to rule out worsening diastolic or systolic function  -Furosemide 80 mg b.i.d.   -Metolazone 5mg daily 30 min before Lasix   -hold home KCl 10 mEq daily dose      HLD (hyperlipidemia)  Plan:  Start atorvastain 40 mg daily instead of home Rosuvastatin      CKD (chronic kidney disease), stage IV  Creatine stable for now. BMP reviewed- noted Estimated Creatinine Clearance: 19 mL/min (A) (based on SCr of 2.8 mg/dL (H)). according to latest data. Based on current GFR, CKD stage is stage 4 - GFR 15-29.    Suspect anemia due to anemia of chronic disease d/t CKD stage 4      Plan:  -Monitor UOP and serial BMP and adjust therapy as needed.   -Renally dose meds.   -Avoid nephrotoxic medications and procedures  -continue home sodium bicarb 650 mg t.i.d.  -continue home vitamin-D 3 2000 I U daily    Gout of multiple sites    Plan:  Continue home allopurinol 100 mg daily      Anemia due to stage 4 chronic kidney disease  Anemia is likely due to chronic disease due to Chronic Kidney Disease. Most recent hemoglobin and hematocrit are listed below.  Recent Labs     01/17/25  0224 01/18/25  0346 01/19/25  0444   HGB 9.9* 10.2* 10.1*   HCT 32.1* 33.1* 31.4*       Plan  - Monitor serial CBC: Daily  - Transfuse PRBC if patient becomes hemodynamically unstable, symptomatic or H/H drops below 7/21.  - Patient has not received any PRBC transfusions to date  - Patient's anemia is currently stable      Paroxysmal A-fib  Patient has long standing persistent (>12 months) atrial fibrillation. Patient is currently in [facility].  I am sorry I can not hear you very well endorse what was running and would beta shin okay EIKEL of a past medical history side okay no  problem darrius thank you. [id number] Score: 4. The patients heart rate in the last 24 hours is as follows:  Pulse  Min: 58  Max: 75     Antiarrhythmics  metoprolol succinate (TOPROL-XL) 24 hr tablet 100 mg, Daily, Oral    Anticoagulants  apixaban tablet 5 mg, 2 times daily, Oral    Plan  - Replete lytes with a goal of K>4, Mg >2  - Patient is anticoagulated, see medications listed above.  - Patient's afib is currently controlled  - continue home metoprolol succinate 100 mg daily   -continue home Eliquis 5 mg b.i.d.        Type 2 diabetes mellitus with stage 4 chronic kidney disease, with long-term current use of insulin  Creatine stable for now. BMP reviewed- noted Estimated Creatinine Clearance: 19 mL/min (A) (based on SCr of 2.8 mg/dL (H)). according to latest data. Based on current GFR, CKD stage is stage 4 - GFR 15-29.  Monitor UOP and serial BMP and adjust therapy as needed. Renally dose meds. Avoid nephrotoxic medications and procedures.    Plan:  Hold home Lantus 5 mg q.h.s.   Hold home oral hypoglycemic agents  CARLO  ACHS    Essential hypertension  Patient's blood pressure range in the last 24 hours was: BP  Min: 116/55  Max: 148/65.The patient's inpatient anti-hypertensive regimen is listed below:  Current Antihypertensives  amLODIPine tablet 10 mg, Daily, Oral  losartan tablet 25 mg, Daily, Oral  metoprolol succinate (TOPROL-XL) 24 hr tablet 100 mg, Daily, Oral  furosemide injection 80 mg, Every 12 hours, Intravenous  metOLazone tablet 5 mg, Daily, Oral    Plan  - BP is controlled, no changes needed to their regimen  - hold home Lasix 20 mg daily dose  -continue home amlodipine 10 mg daily and losartan 25 mg daily      VTE Risk Mitigation (From admission, onward)           Ordered     apixaban tablet 5 mg  2 times daily         01/16/25 0620                    Discharge Planning   EMILIA: 1/18/2025     Code Status: Full Code   Medical Readiness for Discharge Date:   Discharge Plan A: Home, Home with family    Discharge Delays: None known at this time      Wei Drake MD  [facility], PGY-2  01/19/2025

## 2025-01-19 NOTE — ASSESSMENT & PLAN NOTE
Creatine stable for now. BMP reviewed- noted Estimated Creatinine Clearance: 19 mL/min (A) (based on SCr of 2.8 mg/dL (H)). according to latest data. Based on current GFR, CKD stage is stage 4 - GFR 15-29.  Monitor UOP and serial BMP and adjust therapy as needed. Renally dose meds. Avoid nephrotoxic medications and procedures.    Plan:  Hold home Lantus 5 mg q.h.s.   Hold home oral hypoglycemic agents  CARLO  [facility]

## 2025-01-19 NOTE — ASSESSMENT & PLAN NOTE
Patient's blood pressure range in the last 24 hours was: BP  Min: 116/55  Max: 148/65.The patient's inpatient anti-hypertensive regimen is listed below:  Current Antihypertensives  amLODIPine tablet 10 mg, Daily, Oral  losartan tablet 25 mg, Daily, Oral  metoprolol succinate (TOPROL-XL) 24 hr tablet 100 mg, Daily, Oral  furosemide injection 80 mg, Every 12 hours, Intravenous  metOLazone tablet 5 mg, Daily, Oral    Plan  - BP is controlled, no changes needed to their regimen  - hold home Lasix 20 mg daily dose  -continue home amlodipine 10 mg daily and losartan 25 mg daily

## 2025-01-19 NOTE — ASSESSMENT & PLAN NOTE
"Patient has Diastolic (HFpEF) heart failure that is Acute on chronic. On presentation their CHF was well compensated. Most recent BNP and echo results are listed below.  No results for input(s): "BNP" in the last 72 hours.    Latest ECHO  Results for orders placed in visit on 03/11/24    Echo    Interpretation Summary    Left Ventricle: There is normal systolic function with a visually estimated ejection fraction of 55 - 60%. Grade II diastolic dysfunction. Elevated left ventricular filling pressure.    Right Ventricle: Systolic function is normal.    Mitral Valve: There is mild regurgitation.    Left Atrium: Left atrium is moderately dilated.    Tricuspid Valve: There is mild regurgitation.    IVC/SVC: Intermediate venous pressure at 8 mmHg.    Current Heart Failure Medications  losartan tablet 25 mg, Daily, Oral  metoprolol succinate (TOPROL-XL) 24 hr tablet 100 mg, Daily, Oral  furosemide injection 80 mg, Every 12 hours, Intravenous  metOLazone tablet 5 mg, Daily, Oral    Plan  - Monitor strict I&Os and daily weights.    - Place on telemetry  - Low sodium diet  - Place on fluid restriction of 1.5 L.   - Cardiology has been consulted  - The patient's volume status is stable but not at their baseline as indicated by edema, weight gain, orthopnea, and shortness of breath  -Echo this am to rule out worsening diastolic or systolic function  -Furosemide 80 mg b.i.d.   -Metolazone 5mg daily 30 min before Lasix   -hold home KCl 10 mEq daily dose    "

## 2025-01-19 NOTE — ASSESSMENT & PLAN NOTE
Patient has long standing persistent (>12 months) atrial fibrillation. Patient is currently in [facility].  I am sorry I can not hear you very well endorse what was running and would beta shin okay EIKEL of a past medical history side okay no problem okay thank you. [id number] Score: 4. The patients heart rate in the last 24 hours is as follows:  Pulse  Min: 58  Max: 75     Antiarrhythmics  metoprolol succinate (TOPROL-XL) 24 hr tablet 100 mg, Daily, Oral    Anticoagulants  apixaban tablet 5 mg, 2 times daily, Oral    Plan  - Replete lytes with a goal of K>4, Mg >2  - Patient is anticoagulated, see medications listed above.  - Patient's afib is currently controlled  - continue home metoprolol succinate 100 mg daily   -continue home Eliquis 5 mg b.i.d.

## 2025-01-19 NOTE — ASSESSMENT & PLAN NOTE
Anemia is likely due to chronic disease due to Chronic Kidney Disease. Most recent hemoglobin and hematocrit are listed below.  Recent Labs     01/17/25  0224 01/18/25  0346 01/19/25  0444   HGB 9.9* 10.2* 10.1*   HCT 32.1* 33.1* 31.4*       Plan  - Monitor serial CBC: Daily  - Transfuse PRBC if patient becomes hemodynamically unstable, symptomatic or H/H drops below 7/21.  - Patient has not received any PRBC transfusions to date  - Patient's anemia is currently stable

## 2025-01-19 NOTE — ASSESSMENT & PLAN NOTE
Creatine stable for now. BMP reviewed- noted Estimated Creatinine Clearance: 19 mL/min (A) (based on SCr of 2.8 mg/dL (H)). according to latest data. Based on current GFR, CKD stage is stage 4 - GFR 15-29.    Suspect anemia due to anemia of chronic disease d/t CKD stage 4      Plan:  -Monitor UOP and serial BMP and adjust therapy as needed.   -Renally dose meds.   -Avoid nephrotoxic medications and procedures  -continue home sodium bicarb 650 mg t.i.d.  -continue home vitamin-D 3 2000 I U daily

## 2025-01-19 NOTE — SUBJECTIVE & OBJECTIVE
Interval History: no fevers, chills, chest pain. Patients SOB improving. UOP ~4L yesterday. Improving edema but still likely overloaded. Will need more diuresis. Good response from current dose.     Review of Systems   Constitutional: Negative.    HENT: Negative.     Eyes: Negative.    Respiratory:  Positive for shortness of breath.    Cardiovascular: Negative.    Gastrointestinal: Negative.    Endocrine: Negative.    Genitourinary: Negative.    Musculoskeletal: Negative.    Skin: Negative.    Allergic/Immunologic: Negative.    Neurological: Negative.    Hematological: Negative.    Psychiatric/Behavioral: Negative.       Objective:     Vital Signs (Most Recent):  Temp: 98.3 °F (36.8 °C) (01/19/25 0713)  Pulse: 69 (01/19/25 1045)  Resp: 18 (01/19/25 0713)  BP: 123/60 (01/19/25 0713)  SpO2: 97 % (01/19/25 0713) Vital Signs (24h Range):  Temp:  [98 °F (36.7 °C)-99 °F (37.2 °C)] 98.3 °F (36.8 °C)  Pulse:  [58-75] 69  Resp:  [16-18] 18  SpO2:  [96 %-100 %] 97 %  BP: (116-148)/(55-68) 123/60     Weight: 87 kg (191 lb 12.8 oz)  Body mass index is 33.98 kg/m².    Intake/Output Summary (Last 24 hours) at 1/19/2025 1118  Last data filed at 1/19/2025 0919  Gross per 24 hour   Intake 360 ml   Output 4150 ml   Net -3790 ml         Physical Exam  Vitals and nursing note reviewed.   Constitutional:       Appearance: She is obese.   HENT:      Head: Normocephalic and atraumatic.   Eyes:      Conjunctiva/sclera: Conjunctivae normal.      Pupils: Pupils are equal, round, and reactive to light.   Cardiovascular:      Rate and Rhythm: Normal rate and regular rhythm.   Pulmonary:      Effort: Pulmonary effort is normal.      Breath sounds: Normal breath sounds.   Abdominal:      General: Bowel sounds are normal.      Palpations: Abdomen is soft.   Musculoskeletal:      Cervical back: Normal range of motion and neck supple.      Right lower leg: Edema present.      Left lower leg: Edema present.      Comments: Edema 1+   Skin:      General: Skin is warm.      Capillary Refill: Capillary refill takes less than 2 seconds.   Neurological:      General: No focal deficit present.      Mental Status: She is alert and oriented to person, place, and time.   Psychiatric:         Mood and Affect: Mood normal.         Behavior: Behavior normal.             Significant Labs: All pertinent labs within the past 24 hours have been reviewed.  CBC:   Recent Labs   Lab 01/18/25  0346 01/19/25  0444   WBC 6.79 7.23   HGB 10.2* 10.1*   HCT 33.1* 31.4*    164     CMP:   Recent Labs   Lab 01/18/25  0346 01/19/25  0444    138   K 4.6 4.2    96   CO2 25 28    115*   BUN 43* 52*   CREATININE 2.7* 2.8*   CALCIUM 9.2 9.0   PROT 7.2 6.9   ALBUMIN 3.6 3.4*   BILITOT 0.6 0.7   ALKPHOS 75 64   AST 17 16   ALT 15 8*   ANIONGAP 12 14       Significant Imaging: I have reviewed all pertinent imaging results/findings within the past 24 hours.

## 2025-01-20 VITALS
RESPIRATION RATE: 18 BRPM | HEIGHT: 63 IN | BODY MASS INDEX: 33.98 KG/M2 | TEMPERATURE: 99 F | WEIGHT: 191.81 LBS | SYSTOLIC BLOOD PRESSURE: 122 MMHG | OXYGEN SATURATION: 92 % | DIASTOLIC BLOOD PRESSURE: 60 MMHG | HEART RATE: 79 BPM

## 2025-01-20 LAB
ALBUMIN SERPL BCP-MCNC: 3.2 G/DL (ref 3.5–5.2)
ALP SERPL-CCNC: 55 U/L (ref 40–150)
ALT SERPL W/O P-5'-P-CCNC: 8 U/L (ref 10–44)
ANION GAP SERPL CALC-SCNC: 15 MMOL/L (ref 8–16)
AST SERPL-CCNC: 15 U/L (ref 10–40)
BASOPHILS # BLD AUTO: 0.02 K/UL (ref 0–0.2)
BASOPHILS NFR BLD: 0.2 % (ref 0–1.9)
BILIRUB SERPL-MCNC: 0.9 MG/DL (ref 0.1–1)
BUN SERPL-MCNC: 57 MG/DL (ref 8–23)
CALCIUM SERPL-MCNC: 9 MG/DL (ref 8.7–10.5)
CHLORIDE SERPL-SCNC: 92 MMOL/L (ref 95–110)
CO2 SERPL-SCNC: 30 MMOL/L (ref 23–29)
CREAT SERPL-MCNC: 3 MG/DL (ref 0.5–1.4)
DIFFERENTIAL METHOD BLD: ABNORMAL
EOSINOPHIL # BLD AUTO: 0.1 K/UL (ref 0–0.5)
EOSINOPHIL NFR BLD: 1.6 % (ref 0–8)
ERYTHROCYTE [DISTWIDTH] IN BLOOD BY AUTOMATED COUNT: 15.9 % (ref 11.5–14.5)
EST. GFR  (NO RACE VARIABLE): 16 ML/MIN/1.73 M^2
GLUCOSE SERPL-MCNC: 96 MG/DL (ref 70–110)
HCT VFR BLD AUTO: 32.2 % (ref 37–48.5)
HGB BLD-MCNC: 10.3 G/DL (ref 12–16)
IMM GRANULOCYTES # BLD AUTO: 0.03 K/UL (ref 0–0.04)
IMM GRANULOCYTES NFR BLD AUTO: 0.4 % (ref 0–0.5)
LYMPHOCYTES # BLD AUTO: 1.8 K/UL (ref 1–4.8)
LYMPHOCYTES NFR BLD: 23 % (ref 18–48)
MAGNESIUM SERPL-MCNC: 1.8 MG/DL (ref 1.6–2.6)
MCH RBC QN AUTO: 27.7 PG (ref 27–31)
MCHC RBC AUTO-ENTMCNC: 32 G/DL (ref 32–36)
MCV RBC AUTO: 87 FL (ref 82–98)
MONOCYTES # BLD AUTO: 1 K/UL (ref 0.3–1)
MONOCYTES NFR BLD: 12.4 % (ref 4–15)
NEUTROPHILS # BLD AUTO: 5 K/UL (ref 1.8–7.7)
NEUTROPHILS NFR BLD: 62.4 % (ref 38–73)
NRBC BLD-RTO: 0 /100 WBC
PHOSPHATE SERPL-MCNC: 4.5 MG/DL (ref 2.7–4.5)
PLATELET # BLD AUTO: 157 K/UL (ref 150–450)
PMV BLD AUTO: 10.9 FL (ref 9.2–12.9)
POCT GLUCOSE: 149 MG/DL (ref 70–110)
POCT GLUCOSE: 96 MG/DL (ref 70–110)
POTASSIUM SERPL-SCNC: 3.6 MMOL/L (ref 3.5–5.1)
PROT SERPL-MCNC: 6.7 G/DL (ref 6–8.4)
RBC # BLD AUTO: 3.72 M/UL (ref 4–5.4)
SODIUM SERPL-SCNC: 137 MMOL/L (ref 136–145)
WBC # BLD AUTO: 8.01 K/UL (ref 3.9–12.7)

## 2025-01-20 PROCEDURE — 36415 COLL VENOUS BLD VENIPUNCTURE: CPT | Performed by: NURSE PRACTITIONER

## 2025-01-20 PROCEDURE — 25000003 PHARM REV CODE 250

## 2025-01-20 PROCEDURE — 97530 THERAPEUTIC ACTIVITIES: CPT | Mod: CQ

## 2025-01-20 PROCEDURE — 25000003 PHARM REV CODE 250: Performed by: NURSE PRACTITIONER

## 2025-01-20 PROCEDURE — 84100 ASSAY OF PHOSPHORUS: CPT | Performed by: NURSE PRACTITIONER

## 2025-01-20 PROCEDURE — 80053 COMPREHEN METABOLIC PANEL: CPT | Performed by: NURSE PRACTITIONER

## 2025-01-20 PROCEDURE — 85025 COMPLETE CBC W/AUTO DIFF WBC: CPT | Performed by: NURSE PRACTITIONER

## 2025-01-20 PROCEDURE — 99900035 HC TECH TIME PER 15 MIN (STAT)

## 2025-01-20 PROCEDURE — 97530 THERAPEUTIC ACTIVITIES: CPT | Mod: CO

## 2025-01-20 PROCEDURE — 94761 N-INVAS EAR/PLS OXIMETRY MLT: CPT

## 2025-01-20 PROCEDURE — 63600175 PHARM REV CODE 636 W HCPCS: Performed by: NURSE PRACTITIONER

## 2025-01-20 PROCEDURE — 97535 SELF CARE MNGMENT TRAINING: CPT | Mod: CO

## 2025-01-20 PROCEDURE — 83735 ASSAY OF MAGNESIUM: CPT | Performed by: NURSE PRACTITIONER

## 2025-01-20 PROCEDURE — 63600175 PHARM REV CODE 636 W HCPCS

## 2025-01-20 RX ORDER — PREDNISONE 20 MG/1
40 TABLET ORAL ONCE
Status: COMPLETED | OUTPATIENT
Start: 2025-01-20 | End: 2025-01-20

## 2025-01-20 RX ORDER — FUROSEMIDE 20 MG/1
40 TABLET ORAL DAILY
Qty: 120 TABLET | Refills: 0 | Status: SHIPPED | OUTPATIENT
Start: 2025-01-20 | End: 2025-03-21

## 2025-01-20 RX ORDER — ACETAMINOPHEN 325 MG/1
650 TABLET ORAL ONCE
Status: COMPLETED | OUTPATIENT
Start: 2025-01-20 | End: 2025-01-20

## 2025-01-20 RX ORDER — LIDOCAINE 50 MG/G
1 PATCH TOPICAL ONCE
Status: DISCONTINUED | OUTPATIENT
Start: 2025-01-20 | End: 2025-01-20 | Stop reason: HOSPADM

## 2025-01-20 RX ORDER — DICLOFENAC SODIUM 10 MG/G
4 GEL TOPICAL ONCE
Status: COMPLETED | OUTPATIENT
Start: 2025-01-20 | End: 2025-01-20

## 2025-01-20 RX ADMIN — AMLODIPINE BESYLATE 10 MG: 5 TABLET ORAL at 09:01

## 2025-01-20 RX ADMIN — DICLOFENAC SODIUM 4 G: 10 GEL TOPICAL at 02:01

## 2025-01-20 RX ADMIN — ACETAMINOPHEN 650 MG: 325 TABLET ORAL at 12:01

## 2025-01-20 RX ADMIN — POTASSIUM BICARBONATE 40 MEQ: 391 TABLET, EFFERVESCENT ORAL at 09:01

## 2025-01-20 RX ADMIN — CHOLECALCIFEROL TAB 25 MCG (1000 UNIT) 2000 UNITS: 25 TAB at 09:01

## 2025-01-20 RX ADMIN — LIDOCAINE 1 PATCH: 50 PATCH CUTANEOUS at 12:01

## 2025-01-20 RX ADMIN — METOPROLOL SUCCINATE 100 MG: 50 TABLET, EXTENDED RELEASE ORAL at 09:01

## 2025-01-20 RX ADMIN — FUROSEMIDE 80 MG: 10 INJECTION, SOLUTION INTRAMUSCULAR; INTRAVENOUS at 09:01

## 2025-01-20 RX ADMIN — METOLAZONE 5 MG: 2.5 TABLET ORAL at 09:01

## 2025-01-20 RX ADMIN — APIXABAN 5 MG: 5 TABLET, FILM COATED ORAL at 09:01

## 2025-01-20 RX ADMIN — ALLOPURINOL 100 MG: 100 TABLET ORAL at 09:01

## 2025-01-20 RX ADMIN — ATORVASTATIN CALCIUM 40 MG: 40 TABLET, FILM COATED ORAL at 09:01

## 2025-01-20 RX ADMIN — LOSARTAN POTASSIUM 25 MG: 25 TABLET, FILM COATED ORAL at 09:01

## 2025-01-20 RX ADMIN — PREDNISONE 40 MG: 20 TABLET ORAL at 02:01

## 2025-01-20 RX ADMIN — MAGNESIUM OXIDE TAB 400 MG (241.3 MG ELEMENTAL MG) 400 MG: 400 (241.3 MG) TAB at 09:01

## 2025-01-20 NOTE — PLAN OF CARE
Problem: Occupational Therapy  Goal: Occupational Therapy Goal  Description: Goals to be met by: 2/15/2025     Patient will increase functional independence with ADLs by performing:    LE Dressing with Modified [place] inclusive of item retrieval and transport with use of least restrictive mobility device  Toileting from toilet with Modified [place] for hygiene and clothing management inclusive of self management of changing soiled depends/underwear.   Toilet transfer to toilet with Modified [place].  Functional mobility to / from bathroom with least restrictive device and modified independence with SpO2 > 90% on room air  100% reciprocation of at least two techniques to support implementation of energy conservation.    Outcome: Letitia Louie is a 74 y.o. female with a medical diagnosis of Acute exacerbation of CHF (congestive heart failure).   Performance deficits affecting function are weakness, impaired endurance, impaired self care skills, impaired functional mobility, gait instability, impaired balance, decreased upper extremity function, decreased lower extremity function, decreased safety awareness, pain, impaired coordination, impaired skin.    Pt found in bed, agreeable to therapy.  Pt requiring increased assistance with BADL tasks and transfers 2/2 R knee and ankle pain. Dtr present for training. Rec 24/7 assistance at time of discharge. Continue OT services to address functional goals, progressing as able.

## 2025-01-20 NOTE — DISCHARGE SUMMARY
[facility] Medicine  Discharge Summary      Patient Name: Eliza Louie  MRN: [id number]  SUMA: [id number]  Patient Class: IP- Inpatient  Admission Date: 1/16/2025  Hospital Length of Stay: 3 days  Discharge Date and Time:  01/20/2025 5:38 PM  Attending Physician: No att. providers found   Discharging Provider: Barbara Alcantara MD  Primary Care Provider: Casi Garcia MD    Primary Care Team: Networked reference to record PCT     HPI:   74-year-old female with a past medical history of HTN, HLD, IDDM 2, proximal Afib( on Eliquis), HFpEF (last Echo 55-60%, grade 2 diastolic dysfunction), CKD stage 4, osteopenia right hip, CAD with  stent placement (2017) who presented to the emergency room with complaints of increased shortness of breath that started prior to Thanksgiving.  She also endorses increased edema to both lower extremity that started in June and increased pain to both lower extremity that started 2 weeks ago.  Patient was unable to sleep due to the pain in her legs and this was the inciting event that brought her to emergency room today.  In addition she reports orthopnea requiring 3 pillows at night.  She reports that she is compliant with all of her medications.  She denies any exertional shortness of breath, chest pain, palpitations, coughing, recent upper respiratory infection, fever, nausea, vomiting, diarrhea or abdominal pain.      In the ER, initial vital signs were as follows: Temp 98.6, /72, HR in 84, SpO2 95 % on room air. Labs as follows: WBC 5.81, Hgb 9.7, BUN/Cr 30/2.3 (baseline Cr 3.2).  Troponin 0.021,  (baseline 483)    CXR suggestive of edema/CHF with bilateral pleural effusions as above. .     EKG atrial fibrillation with rig RBB at 69,     In the ER, patient was given nitroglycerin paste and furosemide 80 mg x 1 dose with  ml.    Patient was admitted to [facility] for CHF exacerbation.                  * No surgery found *      Hospital  Course:   Patient was admitted to [facility] for CHF exacerbation. Latest Echo 03/11/24 showed EF-60%. Patient was started on Lasix 80 mg IV BID for three days. Patent oxygen requirements improved as well as symptoms. On day of discharge, patient was hemodynamically stable.Patient was sent home with instructions to continue home medications, change dosage of Lasix from 20 mg to 40 mg daily. We also recommended start SGLT2 medication in the outpatient setting. Patient was referred to Cardiology and Nephrology as outpatient. Patient will need to schedule with their PCP for hospital discharge followup. Patient agreeable to discharge plan & expressed understanding of all aforementioned changes. All questions were answered and return precautions were discussed & detailed in the after-visit summary.      Goals of Care Treatment Preferences:  Code Status: Full Code    Physical Exam  Vitals and nursing note reviewed.   Constitutional:       Appearance: Normal appearance.   HENT:      Head: Normocephalic and atraumatic.   Eyes:      Conjunctiva/sclera: Conjunctivae normal.      Pupils: Pupils are equal, round, and reactive to light.   Cardiovascular:      Rate and Rhythm: Normal rate and regular rhythm.      Pulses: Normal pulses.      Heart sounds: Normal heart sounds.   Pulmonary:      Effort: Pulmonary effort is normal.      Breath sounds: Normal breath sounds.   Abdominal:      General: Bowel sounds are normal.      Palpations: Abdomen is soft.   Musculoskeletal:         General: Normal range of motion.      Cervical back: Normal range of motion and neck supple.   Skin:     General: Skin is warm.      Capillary Refill: Capillary refill takes less than 2 seconds.   Neurological:      General: No focal deficit present.      Mental Status: She is alert and oriented to person, place, and time.   Psychiatric:         Mood and Affect: Mood normal.         Behavior: Behavior normal.        SDOH Screening:  The patient  "was screened for utility difficulties, food insecurity, transport difficulties, housing insecurity, and interpersonal safety and there were no concerns identified this admission.     Consults:     * Acute exacerbation of CHF (congestive heart failure)  Patient has Diastolic (HFpEF) heart failure that is Acute on chronic. On presentation their CHF was well compensated. Most recent BNP and echo results are listed below.  No results for input(s): "BNP" in the last 72 hours.    Latest ECHO  Results for orders placed in visit on 03/11/24    Echo    Interpretation Summary    Left Ventricle: There is normal systolic function with a visually estimated ejection fraction of 55 - 60%. Grade II diastolic dysfunction. Elevated left ventricular filling pressure.    Right Ventricle: Systolic function is normal.    Mitral Valve: There is mild regurgitation.    Left Atrium: Left atrium is moderately dilated.    Tricuspid Valve: There is mild regurgitation.    IVC/SVC: Intermediate venous pressure at 8 mmHg.    Current Heart Failure Medications  losartan tablet 25 mg, Daily, Oral  metoprolol succinate (TOPROL-XL) 24 hr tablet 100 mg, Daily, Oral  furosemide injection 80 mg, Every 12 hours, Intravenous  metOLazone tablet 5 mg, Daily, Oral    Plan  - Monitor strict I&Os and daily weights.    - Place on telemetry  - Low sodium diet  - Place on fluid restriction of 1.5 L.   - Cardiology has been consulted  - The patient's volume status is stable but not at their baseline as indicated by edema, weight gain, orthopnea, and shortness of breath  -Echo this am to rule out worsening diastolic or systolic function  -Furosemide 80 mg b.i.d.   -Metolazone 5mg daily 30 min before Lasix   -hold home KCl 10 mEq daily dose      HLD (hyperlipidemia)  Plan:  Start atorvastain 40 mg daily instead of home Rosuvastatin      CKD (chronic kidney disease), stage IV  Creatine stable for now. BMP reviewed- noted Estimated Creatinine Clearance: 19 mL/min (A) " (based on SCr of 2.8 mg/dL (H)). according to latest data. Based on current GFR, CKD stage is stage 4 - GFR 15-29.    Suspect anemia due to anemia of chronic disease d/t CKD stage 4      Plan:  -Monitor UOP and serial BMP and adjust therapy as needed.   -Renally dose meds.   -Avoid nephrotoxic medications and procedures  -continue home sodium bicarb 650 mg t.i.d.  -continue home vitamin-D 3 2000 I U daily    Gout of multiple sites    Plan:  Continue home allopurinol 100 mg daily      Anemia due to stage 4 chronic kidney disease  Anemia is likely due to chronic disease due to Chronic Kidney Disease. Most recent hemoglobin and hematocrit are listed below.  Recent Labs     01/17/25  0224 01/18/25  0346 01/19/25  0444   HGB 9.9* 10.2* 10.1*   HCT 32.1* 33.1* 31.4*       Plan  - Monitor serial CBC: Daily  - Transfuse PRBC if patient becomes hemodynamically unstable, symptomatic or H/H drops below 7/21.  - Patient has not received any PRBC transfusions to date  - Patient's anemia is currently stable      Paroxysmal A-fib  Patient has long standing persistent (>12 months) atrial fibrillation. Patient is [facility].  I am sorry I can not hear you very well endorse what was running and would beta shin okay EIKEL of a past medical history side okay no problem okay thank you. [id number] Score: 4. The patients heart rate in the last 24 hours is as follows:  Pulse  Min: 58  Max: 75     Antiarrhythmics  metoprolol succinate (TOPROL-XL) 24 hr tablet 100 mg, Daily, Oral    Anticoagulants  apixaban tablet 5 mg, 2 times daily, Oral    Plan  - Replete lytes with a goal of K>4, Mg >2  - Patient is anticoagulated, see medications listed above.  - Patient's afib is currently controlled  - continue home metoprolol succinate 100 mg daily   -continue home Eliquis 5 mg b.i.d.        Type 2 diabetes mellitus with stage 4 chronic kidney disease, with long-term current use of insulin  Creatine stable for now. BMP  reviewed- noted Estimated Creatinine Clearance: 19 mL/min (A) (based on SCr of 2.8 mg/dL (H)). according to latest data. Based on current GFR, CKD stage is stage 4 - GFR 15-29.  Monitor UOP and serial BMP and adjust therapy as needed. Renally dose meds. Avoid nephrotoxic medications and procedures.    Plan:  Hold home Lantus 5 mg q.h.s.   Hold home oral hypoglycemic agents  CARLO  ACHS    Essential hypertension  Patient's blood pressure range in the last 24 hours was: BP  Min: 116/55  Max: 148/65.The patient's inpatient anti-hypertensive regimen is listed below:  Current Antihypertensives  amLODIPine tablet 10 mg, Daily, Oral  losartan tablet 25 mg, Daily, Oral  metoprolol succinate (TOPROL-XL) 24 hr tablet 100 mg, Daily, Oral  furosemide injection 80 mg, Every 12 hours, Intravenous  metOLazone tablet 5 mg, Daily, Oral    Plan  - BP is controlled, no changes needed to their regimen  - hold home Lasix 20 mg daily dose  -continue home amlodipine 10 mg daily and losartan 25 mg daily      Final Active Diagnoses:    Diagnosis Date Noted POA    PRINCIPAL PROBLEM:  Acute exacerbation of CHF (congestive heart failure) [I50.9] 01/16/2025 Yes    CKD (chronic kidney disease), stage IV [N18.4] 01/16/2025 Yes    HLD (hyperlipidemia) [E78.5] 01/16/2025 Yes    Gout of multiple sites [M10.9] 08/16/2022 Yes    Anemia due to stage 4 chronic kidney disease [N18.4, D63.1] 08/16/2021 Yes     Chronic    Paroxysmal A-fib [I48.0] 12/24/2020 Yes     Chronic    Type 2 diabetes mellitus with stage 4 chronic kidney disease, with long-term current use of insulin [E11.22, N18.4, Z79.4]  Not Applicable    Essential hypertension [I10] 07/02/2017 Yes      Problems Resolved During this Admission:       Discharged Condition: good    Disposition: Home or Self Care    Follow Up:   Follow-up Information       [facility] Follow up on 1/23/2025.    Specialty: Family Medicine  Why: 10:30 am  Contact information:  [address]  [facility] [address] [phone]  Additional information:  Please park in [address] or D and use [address]. Take Medical Office [facility]. elevators.             Lul Thompson MD Follow up.    Specialties: Cardiology, Interventional Cardiology  Why: YOU WILL BE CONTACTED WITH A FOLLOW UP APT  Contact information:  [address]  [address]  [facility] [address] [phone]               NEPHROLOGY Follow up.    Why: YOU WILL BE CONTACTED WITH A NEPHROLOGY F/U APT.                         Patient Instructions:      ACCEPT - Ambulatory referral/consult to Cardiac Electrophysiology   Standing Status: Future   Referral Priority: Routine Referral Type: Consultation   Referral Reason: Specialty Services Required   Requested Specialty: Cardiology   Number of Visits Requested: 1     Ambulatory referral/consult to Nephrology   Standing Status: Future   Referral Priority: Routine Referral Type: Consultation   Referral Reason: Specialty Services Required   Requested Specialty: Nephrology   Number of Visits Requested: 1     Ambulatory referral/consult to Cardiology   Standing Status: Future   Referral Priority: Routine Referral Type: Consultation   Referral Reason: Specialty Services Required   Requested Specialty: Cardiology   Number of Visits Requested: 1     Ambulatory Referral/Consult to Physical Therapy/Occupational Therapy   Standing Status: Future   Referral Priority: Routine Referral Type: Physical Medicine   Referral Reason: Specialty Services Required   Number of Visits Requested: 1     Notify your health care provider if you experience any of the following:  temperature >100.4     Notify your health care provider if you experience any of the following:  persistent nausea and vomiting or diarrhea     Notify your health care provider if you experience any of the following:  severe uncontrolled pain     Notify your health care provider if you experience any of the following:  redness,  tenderness, or signs of infection (pain, swelling, redness, odor or green/yellow discharge around incision site)     Notify your health care provider if you experience any of the following:  difficulty breathing or increased cough     Notify your health care provider if you experience any of the following:  severe persistent headache     Notify your health care provider if you experience any of the following:  worsening rash     Notify your health care provider if you experience any of the following:  persistent dizziness, light-headedness, or visual disturbances     Notify your health care provider if you experience any of the following:  increased confusion or weakness     Notify your health care provider if you experience any of the following:     Activity as tolerated       Significant Diagnostic Studies: Labs: CMP   Recent Labs   Lab 01/19/25  0444 01/20/25  0257    137   K 4.2 3.6   CL 96 92*   CO2 28 30*   * 96   BUN 52* 57*   CREATININE 2.8* 3.0*   CALCIUM 9.0 9.0   PROT 6.9 6.7   ALBUMIN 3.4* 3.2*   BILITOT 0.7 0.9   ALKPHOS 64 55   AST 16 15   ALT 8* 8*   ANIONGAP 14 15       Pending Diagnostic Studies:       None           Medications:  Reconciled Home Medications:      Medication List        CHANGE how you take these medications      furosemide 20 MG tablet  Commonly known as: LASIX  Take 2 tablets (40 mg total) by mouth once daily.  What changed: how much to take            CONTINUE taking these medications      allopurinoL 100 MG tablet  Commonly known as: ZYLOPRIM  Take 1 tablet (100 mg total) by mouth once daily.     amLODIPine 10 MG tablet  Commonly known as: NORVASC  Take 1 tablet (10 mg total) by mouth once daily.     apixaban 5 mg Tab  Commonly known as: ELIQUIS  Take 1 tablet (5 mg total) by mouth 2 (two) times daily.     blood sugar diagnostic Strp  To check BG 2 times daily, to use with insurance preferred meter     cholecalciferol (vitamin D3) 50 mcg (2,000 unit) Tab  Commonly  known as: VITAMIN D3  Take 1 tablet (2,000 Units total) by mouth once daily.     colchicine 0.6 mg tablet  Commonly known as: COLCRYS  Take 1 tablet (0.6 mg total) by mouth daily as needed (acute gout flare).     DEXCOM G6 SENSOR [place]  Generic drug: blood-glucose sensor  1 each by Misc.(Non-Drug; Combo Route) route once daily.     FREESTYLE SYSTEM KIT MISC  by Misc.(Non-Drug; Combo Route) route.     ketoconazole 2 % shampoo  Commonly known as: NIZORAL  Apply topically twice a week.     lancets [facility]  To check BG 2 times daily, to use with insurance preferred meter     LANTUS SOLOSTAR U-100 INSULIN 100 unit/mL (3 mL) Inpn pen  Generic drug: insulin glargine U-100 (Lantus)  Inject 5 Units into the skin every evening.     losartan 25 MG tablet  Commonly known as: COZAAR  Take 1 tablet (25 mg total) by mouth once daily.     magnesium oxide 400 mg (241.3 mg magnesium) tablet  Commonly known as: MAG-OX  Take 1 tablet (400 mg total) by mouth 2 (two) times daily.     metoprolol succinate 100 MG 24 hr tablet  Commonly known as: TOPROL-XL  Take 1 tablet (100 mg total) by mouth once daily.     potassium chloride 10 MEQ Cpsr  Commonly known as: MICRO-K  Take 1 capsule (10 mEq total) by mouth once daily.     rosuvastatin 40 MG Tab  Commonly known as: CRESTOR  Take 1 tablet (40 mg total) by mouth once daily.     SITagliptin phosphate 25 MG Tab  Commonly known as: JANUVIA  Take 1 tablet (25 mg total) by mouth once daily.     sodium bicarbonate 650 MG tablet  Take 1 tablet (650 mg total) by mouth 3 (three) times daily.            STOP taking these medications      ketorolac 0.5% 0.5 % Drop  Commonly known as: ACULAR     ofloxacin 0.3 % ophthalmic solution  Commonly known as: OCUFLOX     prednisoLONE acetate 1 % Drps  Commonly known as: PRED FORTE              Indwelling Lines/Drains at time of discharge:   Lines/Drains/Airways       Drain  Duration             Female External Urinary Catheter w/ Suction 01/18/25 0700 2 days                     Time spent on the discharge of patient: 35 minutes         Barbara Alcantara MD PGY-1  Department of [facility] Medicine  [facility]

## 2025-01-20 NOTE — PT/OT/SLP PROGRESS
Occupational Therapy   Treatment    Name: Eliza Louie  MRN: [id number]  Admitting Diagnosis:  Acute exacerbation of CHF (congestive heart failure)       Recommendations:     Discharge Recommendations: Moderate Intensity Therapy (24/7 assistance)  Discharge Equipment Recommendations:  none  Barriers to discharge:  Inaccessible home environment, Other (Comment) (Increased level of assistance)    Assessment:     Eliza Louie is a 74 y.o. female with a medical diagnosis of Acute exacerbation of CHF (congestive heart failure).   Performance deficits affecting function are weakness, impaired endurance, impaired self care skills, impaired functional mobility, gait instability, impaired balance, decreased upper extremity function, decreased lower extremity function, decreased safety awareness, pain, impaired coordination, impaired skin.    Pt found in bed, agreeable to therapy.  Pt requiring increased assistance with BADL tasks and transfers 2/2 R knee and ankle pain. Dtr present for training. Rec 24/7 assistance at time of discharge. Continue OT services to address functional goals, progressing as able.    Rehab Prognosis:  Good; patient would benefit from acute skilled OT services to address these deficits and reach maximum level of function.       Plan:     Patient to be seen 4 x/week to address the above listed problems via self-care/home management, therapeutic activities, therapeutic exercises  Plan of Care Expires: 02/15/25  Plan of Care Reviewed with: patient, daughter    Subjective     Chief Complaint: R ankle pain   Patient/Family Comments/goals: to go home   Pain/Comfort:  Pain Rating 1:  (R knee and ankle-did not rate)    Objective:     Communicated with: nurse prior to session.  Patient found HOB elevated with bed alarm, peripheral IV, PureWick, telemetry upon OT entry to room.    General Precautions: Standard, fall    Orthopedic Precautions:N/A  Braces: N/A  Respiratory Status: Room air     Occupational  Performance:     Bed Mobility:    Patient completed Rolling/Turning to Left with  minimum assistance without side rail  Patient completed Scooting/Bridging with contact guard assistance to scoot seated to EOB  Patient completed Supine to Sit with moderate assistance from flat bed, increased time and effort, vc's for effective technique      Functional Mobility/Transfers:  Patient completed Sit <> Stand Transfer with minimum assistance and moderate assistance  with  rolling walker x 3 trials and vcs for effect tech/hand placement.  Patient completed Bed <> Chair Transfer using Stand Pivot technique with minimum assistance and moderate assistance with rolling walker  Functional Mobility: Pt took steps during transfer with Min/Mod A using RW.  Pt limiting wt through RLE 2/2 pain with difficulty standing fully upright. Pt requires assist to safely mange RW and for 3 point gait tech.     Activities of Daily Living:  Bathing: moderate assistance sponge bath seated EOB  Upper Body Dressing: minimum assistance don overhead tshirt  Lower Body Dressing: moderate assistance don pants/briefs and socks       AMPAC 6 Click ADL: 17    Treatment & Education-Pt and Pts daughter:  -Safe and effective transfer technique with use of gait belt.  Pt's dtr provided with gait belt and instructed on use.   -Rec use of BSC over toilet during daytime and at beside for night use.   -3 point gait technique using RW 2/2 RLE pain  -Managing threshold step with RW use. Up with good/down with bad. Pt will be discharging to dts [place] with threshold step to enter.   Pts dtr understands pt with require 24/7 assistance at this time.         Patient left up in chair with all lines intact, call button in reach, nurse notified, and dtr present    GOALS:   Multidisciplinary Problems       Occupational Therapy Goals          Problem: Occupational Therapy    Goal Priority Disciplines Outcome Interventions   Occupational Therapy Goal     OT, PT/OT Progressing     Description: Goals to be met by: 2/15/2025     Patient will increase functional independence with ADLs by performing:    LE Dressing with Modified [place] inclusive of item retrieval and transport with use of least restrictive mobility device  Toileting from toilet with Modified [place] for hygiene and clothing management inclusive of self management of changing soiled depends/underwear.   Toilet transfer to toilet with Modified [place].  Functional mobility to / from bathroom with least restrictive device and modified independence with SpO2 > 90% on room air  100% reciprocation of at least two techniques to support implementation of energy conservation.                           Time Tracking:     OT Date of Treatment: 01/20/25  OT Start Time: 1340  OT Stop Time: 1408  OT Total Time (min): 28 min    Billable Minutes:Self Care/Home Management 14  Therapeutic Activity 14               1/20/2025

## 2025-01-20 NOTE — PLAN OF CARE
Problem: Physical Therapy  Goal: Physical Therapy Goal  Description: Goals to be met by: 25     Patient will increase functional independence with mobility by performin. Supine to sit with Modified [place]  2. Sit to stand transfer with Supervision with RW.   3. Gait  x 100 feet with Stand-by Assistance using Rolling Walker.   4. Ascend/descend 7 stair with bilateral Handrails Contact Guard Assistance     Outcome: Progressing

## 2025-01-20 NOTE — PLAN OF CARE
Pt for d/c to home today   Pt is now on room air - no dme, no hh ordered   CM spoke with pt - her daughter is going to pick her up.    Discharging nurse to review all d/c meds/instructions.   IB message sent to Dr. Wang's office to request f/u Cards apt.   Message sent to [facility] - pt will need a Nephrology f/u apt      Future Appointments   Date Time Provider Department Center   1/23/2025 10:30 AM Noah Shields PA-C [facility]   2/5/2025 10:00 AM Casi Garcia MD [facility]        01/20/25 0914   Final Note   Assessment Type Final Discharge Note   Anticipated Discharge Disposition Home   What phone number can be called within the next 1-3 days to see how you are doing after discharge? [phone]   Hospital Resources/Appts/Education Provided Appointments scheduled and added to AVS   Post-Acute Status   Discharge Delays None known at this time

## 2025-01-20 NOTE — PT/OT/SLP PROGRESS
Physical Therapy Treatment    Patient Name:  Eliza Louie   MRN:  [id number]    Recommendations:     Discharge Recommendations: Moderate Intensity Therapy (pt states she is going home)  Discharge Equipment Recommendations: none  Barriers to discharge:  decreased mobility,strength and endurance    Assessment:     Eliza Louie is a 74 y.o. female admitted with a medical diagnosis of Acute exacerbation of CHF (congestive heart failure).  She presents with the following impairments/functional limitations: weakness, impaired endurance, impaired functional mobility, gait instability, impaired balance, pain, decreased lower extremity function, decreased ROM, impaired coordination,pt unable to ambulate or stand secondary to c/o's 10/10 R knee pain(nsg present),nsg to contact MD..    Rehab Prognosis: Fair; patient would benefit from acute skilled PT services to address these deficits and reach maximum level of function.    Recent Surgery: * No surgery found *      Plan:     During this hospitalization, patient to be seen 4 x/week to address the identified rehab impairments via gait training, therapeutic activities, therapeutic exercises, neuromuscular re-education and progress toward the following goals:    Plan of Care Expires:  02/16/25    Subjective     Chief Complaint: R knee pain  Patient/Family Comments/goals: pt can't stand.  Pain/Comfort:  Pain Rating 1: 10/10  Location - Side 1: Right  Location - Orientation 1: generalized  Location 1: knee  Pain Addressed 1: Reposition, Distraction, Cessation of Activity  Pain Rating Post-Intervention 1: 10/10      Objective:     Communicated with nsg prior to session.  Patient found supine with bed alarm, PureWick, telemetry upon PT entry to room.     General Precautions: Standard, fall  Orthopedic Precautions: N/A  Braces: N/A  Respiratory Status: Room air     Functional Mobility:  Bed Mobility:     Supine to Sit: minimum assistance  Sit to Supine: minimum assistance and moderate  assistance  Transfers:     Sit to Stand:  maximal assistance and unable to achieve with rolling walker  Balance: good sitting balance      AM-PAC 6 CLICK MOBILITY  Turning over in bed (including adjusting bedclothes, sheets and blankets)?: 3  Sitting down on and standing up from a chair with arms (e.g., wheelchair, bedside commode, etc.): 2  Moving from lying on back to sitting on the side of the bed?: 3  Moving to and from a bed to a chair (including a wheelchair)?: 2  Need to walk in hospital room?: 2  Climbing 3-5 steps with a railing?: 1  Basic Mobility Total Score: 13       Treatment & Education: attempted sit-stand X 3 trials but pt states too much pain,this R knee pain started last night,pt ambulated yesterday,nsg aware.      Patient left supine with all lines intact, call button in reach, bed alarm on, and nsg notified..    GOALS: see general POC  Multidisciplinary Problems       Physical Therapy Goals          Problem: Physical Therapy    Goal Priority Disciplines Outcome Interventions   Physical Therapy Goal     PT, PT/OT Progressing    Description: Goals to be met by: 25     Patient will increase functional independence with mobility by performin. Supine to sit with Modified [place]  2. Sit to stand transfer with Supervision with RW.   3. Gait  x 100 feet with Stand-by Assistance using Rolling Walker.   4. Ascend/descend 7 stair with bilateral Handrails Contact Guard Assistance                          DME Justifications:  No DME recommended requiring DME justifications    Time Tracking:     PT Received On: 25  PT Start Time: 1025     PT Stop Time: 1040  PT Total Time (min): 15 min     Billable Minutes: Therapeutic Activity 15    Treatment Type: Treatment  PT/PTA: PTA     Number of PTA visits since last PT visit: 2025

## 2025-01-20 NOTE — PROGRESS NOTES
Virtual Nurse:Discharge orders noted; additional clinical references attached.  and pharmacy tech notified.  Patient's discharge instruction packet given by bedside RN.    Cued into patient's room.  Permission received per patient's daughter Katie to turn camera to view patient.  Introduced as VN that will be instructing on discharge instructions.  Family member at bedside.  Educated patient and her daughter on reason for admission; medications to hold, continue, and start, appointment to follow-up with doctor, and when to return to ED. Teach back method used. Verbalized understanding  Number given for 24/7 Nurse Line. Opportunity given for questions and questions answered.  Bedside nurse updated.        01/20/25 1513   Shift   Virtual Nurse - Patient Verbalized Approval Of VN Rounding;Camera Use   Type of Frequent Check   Type Patient Rounds   Safety/Activity   Patient Rounds visualized patient   Positioning   Body Position other (see comments)  (sitting up in chair)

## 2025-01-20 NOTE — HOSPITAL COURSE
Patient was admitted to [facility] for CHF exacerbation. Latest Echo 03/11/24 showed EF-60%. Patient was started on Lasix 80 mg IV BID for three days. Patent oxygen requirements improved as well as symptoms. On day of discharge, patient was hemodynamically stable.Patient was sent home with instructions to continue home medications, change dosage of Lasix from 20 mg to 40 mg daily. Patient was referred to Cardiology and Nephrology as outpatient. Patient will need to schedule with their PCP for hospital discharge followup. Patient agreeable to discharge plan & expressed understanding of all aforementioned changes. All questions were answered and return precautions were discussed & detailed in the after-visit summary.

## 2025-01-20 NOTE — PLAN OF CARE
Pt on RA. Pt does not require prn therapy at this time. Pt with no apparent respiratory distress noted. Will continue to monitor.,

## 2025-01-21 LAB
OHS QRS DURATION: 134 MS
OHS QTC CALCULATION: 471 MS

## 2025-01-23 PROBLEM — Z79.4 TYPE 2 DIABETES MELLITUS WITH STAGE 4 CHRONIC KIDNEY DISEASE, WITH LONG-TERM CURRENT USE OF INSULIN: Status: ACTIVE | Noted: 2025-01-23

## 2025-01-23 PROBLEM — E11.22 TYPE 2 DIABETES MELLITUS WITH STAGE 4 CHRONIC KIDNEY DISEASE, WITH LONG-TERM CURRENT USE OF INSULIN: Status: ACTIVE | Noted: 2025-01-23

## 2025-01-23 PROBLEM — N18.4 TYPE 2 DIABETES MELLITUS WITH STAGE 4 CHRONIC KIDNEY DISEASE, WITH LONG-TERM CURRENT USE OF INSULIN: Status: ACTIVE | Noted: 2025-01-23

## 2025-01-24 ENCOUNTER — TELEPHONE (OUTPATIENT)
Dept: CARDIOLOGY | Facility: CLINIC | Age: 75
End: 2025-01-24
Payer: MEDICARE

## 2025-01-24 NOTE — TELEPHONE ENCOUNTER
PT STATED SHE WILL PAY FOR APPOINTMENT  PT IS SCHEDULED          ----- Message from Nurse Martinez sent at 1/20/2025  8:48 AM CST -----  Hello - Ms. Eliza Louie is discharging today, Mon 1/20/25   Please contact the pt with a follow up apt  -   THANK YOU!   Michelle Pabon RN  [phone]

## 2025-01-24 NOTE — TELEPHONE ENCOUNTER
----- Message from Josh Mcclain sent at 1/17/2025  4:57 PM CST -----  Regarding: FW: HFU    ----- Message -----  From: Taty Mckinney  Sent: 1/17/2025   4:52 PM CST  To: Donna Mccarty Staff  Subject: FW: HFU                                           spoke to patient, she was unaware of change with her insurance to [facility].  Patient states she wants to go thru [facility] only and will pay to see Dr Thompson until her insurance is changed.  Please schedule and message me back so this can be relayed to patient and family.  ----- Message -----  From: Taty Mckinney  Sent: 1/17/2025  10:46 AM CST  To: Donna Mccarty Staff  Subject: HFU                                              Patient will be discharging from [facility] and a HFU was requested with Dr Thompson by DC provider.  Please schedule and message me back so DC can relay appointment information to patient prior to discharge. Patient is established.    DX: CHF    Rosmery Yuan  Physician Referral Specialist/Discharge

## 2025-02-05 ENCOUNTER — OFFICE VISIT (OUTPATIENT)
Dept: FAMILY MEDICINE | Facility: HOSPITAL | Age: 75
End: 2025-02-05
Payer: MEDICARE

## 2025-02-05 VITALS
DIASTOLIC BLOOD PRESSURE: 69 MMHG | OXYGEN SATURATION: 100 % | HEIGHT: 63 IN | HEART RATE: 67 BPM | BODY MASS INDEX: 29.21 KG/M2 | WEIGHT: 164.88 LBS | SYSTOLIC BLOOD PRESSURE: 106 MMHG

## 2025-02-05 DIAGNOSIS — N18.4 STAGE 4 CHRONIC KIDNEY DISEASE: ICD-10-CM

## 2025-02-05 DIAGNOSIS — E11.22 TYPE 2 DIABETES MELLITUS WITH STAGE 4 CHRONIC KIDNEY DISEASE, WITH LONG-TERM CURRENT USE OF INSULIN: Primary | ICD-10-CM

## 2025-02-05 DIAGNOSIS — M10.9 GOUT, UNSPECIFIED CAUSE, UNSPECIFIED CHRONICITY, UNSPECIFIED SITE: ICD-10-CM

## 2025-02-05 DIAGNOSIS — Z79.4 TYPE 2 DIABETES MELLITUS WITH STAGE 4 CHRONIC KIDNEY DISEASE, WITH LONG-TERM CURRENT USE OF INSULIN: Primary | ICD-10-CM

## 2025-02-05 DIAGNOSIS — I50.30 HEART FAILURE WITH PRESERVED EJECTION FRACTION, UNSPECIFIED HF CHRONICITY: ICD-10-CM

## 2025-02-05 DIAGNOSIS — N18.4 TYPE 2 DIABETES MELLITUS WITH STAGE 4 CHRONIC KIDNEY DISEASE, WITH LONG-TERM CURRENT USE OF INSULIN: Primary | ICD-10-CM

## 2025-02-05 DIAGNOSIS — Z12.11 ENCOUNTER FOR SCREENING FOR MALIGNANT NEOPLASM OF COLON: ICD-10-CM

## 2025-02-05 PROCEDURE — 99215 OFFICE O/P EST HI 40 MIN: CPT

## 2025-02-05 RX ORDER — FUROSEMIDE 20 MG/1
40 TABLET ORAL DAILY
Qty: 120 TABLET | Refills: 0 | Status: SHIPPED | OUTPATIENT
Start: 2025-02-05 | End: 2025-04-06

## 2025-02-05 RX ORDER — ALLOPURINOL 100 MG/1
100 TABLET ORAL DAILY
Qty: 12 TABLET | Refills: 6 | Status: SHIPPED | OUTPATIENT
Start: 2025-02-05

## 2025-02-05 NOTE — PROGRESS NOTES
\[facility]\"" Family Medicine    Subjective:     Eliza Louie is a 74 y.o. year old female with PMHx of pAfib on Eliquis, HTN, Stage 4 CKD, T2DM on insulin, HFpEF, gout  who presents to clinic for follow up.     Hospitalized in January for CHF exacerbation which improved with IV diuresis. Unclear etiology of exacerbation per patient. She states she was taking her Lasix but the swelling continued to get worse over several months until she had SOB. She is currently on lasix 20 mg BID with her swelling now minimal. Patient denies any SOB, lightheadedness, dizziness, palpitations, chest pain, or vision changes. The recommendation was SGLT2 on discharge. However, pt reports side effects 2/2 Jardiance and prefers not to be on it. She has an appointment with Dr. Gautam Cardiology on 2/12. Does not have a Nephrology follow up currently scheduled.     DM: Controlled; Last A1c 6.2% (September 2024); patient does not complain of numbness/tingling in bilateral feet. Patient denies polyuria, polydipsia, polyphagia. Patient is not currently taking any medications for this. She states she took herself off the 5 units of Lantus since her last visit. She has made significant dietary changes with the help of her family. Unclear if she is actually taking the Januvia at home. She states she may have been off of it for a few months now.   Last foot exam- done today    Immunizations - willing to get RSV and Pneumococcal; does not want to get Influenza due to bad reaction in past  Ordered colonoscopy referral. Had positive occult blood in 2017 with EGD that was done but no colonoscopy.     Patient Active Problem List    Diagnosis Date Noted    Type 2 diabetes mellitus with stage 4 chronic kidney disease, with long-term current use of insulin 01/23/2025    Acute exacerbation of CHF (congestive heart failure) 01/16/2025    CKD (chronic kidney disease), stage IV 01/16/2025    HLD (hyperlipidemia) 01/16/2025    Generalized weakness 06/27/2024    Acute gout  of multiple sites 06/27/2024    Diffuse arthralgia 06/27/2024    Uremia 06/27/2024    Nuclear sclerotic cataract of right eye 04/15/2024    Gout of multiple sites 08/16/2022    (HFpEF) heart failure with preserved ejection fraction     Stage 4 chronic kidney disease 10/19/2021    History of nephrolithiasis 10/19/2021    GERD (gastroesophageal reflux disease)     RBBB (right bundle branch block with left anterior fascicular block) 08/28/2021    MDD (major depressive disorder) 08/16/2021    Anemia due to stage 4 chronic kidney disease 08/16/2021    Atherosclerosis of native coronary artery of native heart without angina pectoris 01/11/2021    Paroxysmal A-fib 12/24/2020    Hypertension associated with stage 4 chronic kidney disease due to type 2 diabetes mellitus     Stented coronary artery 11/28/2017    Essential hypertension 07/02/2017        Review of patient's allergies indicates:   Allergen Reactions    Ace inhibitors Other (See Comments)     Cough        Past Medical History:   Diagnosis Date    (HFpEF) heart failure with preserved ejection fraction     TTE (2020) w/ EF 55% and grade II diastolic dysfunction    Anemia due to stage 4 chronic kidney disease 8/16/2021    Gastric ulcer due to Helicobacter pylori 07/2017    Gastrointestinal hemorrhage with melena 07/2017    GERD (gastroesophageal reflux disease)     HLD (hyperlipidemia)     Hydronephrosis with ureteropelvic junction (UPJ) obstruction 8/16/2021    Hypertension     MDD (major depressive disorder) 8/16/2021    Nephrolithiasis 8/25/2021    NSTEMI (non-ST elevated myocardial infarction) 07/2017    Paroxysmal A-fib 12/24/2020    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/2020    Stage 4 chronic kidney disease 10/19/2021    STEMI (ST elevation myocardial infarction) 12/2020    Type 2 diabetes mellitus     Urge incontinence of urine       Past Surgical History:   Procedure Laterality Date    CARDIAC CATHETERIZATION  07/2017     "RCA w/ 95% stenosis w/ stent placement    CATARACT EXTRACTION W/  INTRAOCULAR LENS IMPLANT Right 5/29/2024    Procedure: EXTRACTION, CATARACT, WITH IOL INSERTION;  Surgeon: Maddy Arenas MD;  Location: [facility] OR;  Service: Ophthalmology;  Laterality: Right;    CATARACT EXTRACTION W/  INTRAOCULAR LENS IMPLANT Left 6/26/2024    Procedure: EXTRACTION, CATARACT, WITH IOL INSERTION;  Surgeon: Maddy Arenas MD;  Location: [facility] OR;  Service: Ophthalmology;  Laterality: Left;    RETROGRADE PYELOGRAPHY  8/25/2021    Procedure: PYELOGRAM, RETROGRADE;  Surgeon: Rody Smith MD;  Location: [facility] OR;  Service: Urology;;    URETEROSCOPIC REMOVAL OF URETERIC CALCULUS Left 8/25/2021    Procedure: left ureteroscopy, stone basketing, stent placement;  left nephrostomy tube removal;  Surgeon: Rody Smith MD;  Location: [facility] OR;  Service: Urology;  Laterality: Left;      No family history on file.   Social History     Tobacco Use    Smoking status: Never    Smokeless tobacco: Never   Substance Use Topics    Alcohol use: No        Objective:     Vitals:    02/05/25 0959   BP: 106/69   Patient Position: Sitting   Pulse: 67   SpO2: 100%   Weight: 74.8 kg (164 lb 14.5 oz)   Height: 5' 3" (1.6 m)     Body mass index is 29.21 kg/m².    Physical Exam  Vitals reviewed.   Constitutional:       General: She is not in acute distress.     Appearance: She is not toxic-appearing.   Cardiovascular:      Rate and Rhythm: Normal rate. Rhythm irregular.   Pulmonary:      Effort: Pulmonary effort is normal. No respiratory distress.      Breath sounds: Normal breath sounds. No wheezing, rhonchi or rales.   Musculoskeletal:      Right lower leg: Edema present.      Left lower leg: Edema present.      Comments: Trace pitting edema bilaterally   Skin:     General: Skin is warm.   Neurological:      Mental Status: She is alert. Mental status is at baseline.   Psychiatric:         Mood and Affect: Mood normal.         Behavior: Behavior normal.    "      Thought Content: Thought content normal.       Assessment/Plan:     Eliza Louie is a 74 y.o. year old female who presents to clinic for follow up.    1. Type 2 diabetes mellitus with stage 4 chronic kidney disease, with long-term current use of insulin (Primary)  - Well controlled base on last A1c <8  - Repeat Hemoglobin A1C today  - Continue SITagliptin phosphate (JANUVIA) 25 MG Tab; Take 1 tablet (25 mg total) by mouth once daily.  Dispense: 120 tablet; Refill: 0    2. Heart failure with preserved ejection fraction, unspecified HF chronicity  - Stable, improved after diuresis  - Continue Lasix 20 mg BID, CMP today to check potassium  - Declined SGLT-2 due to prior side effects with Jardiance  - Follow up with Cardiology as scheduled on 2/12; unclear why patient will now have to pay out of pocket. Will message Dr. Gautam to see if she no longer accepts pt's insurance?    3. Stage 4 chronic kidney disease  - Stable, will check CMP to assess kidney function  - Provided pt number to follow up with Dr. Shields     4. Encounter for screening for malignant neoplasm of colon  - Ambulatory referral/consult to Endo Procedure ; Future    5. Gout  - Chronic and stable  - Refilled allopurinoL (ZYLOPRIM) 100 MG tablet; Take 1 tablet (100 mg total) by mouth once daily.  Dispense: 12 tablet; Refill: 6    Follow-up: 3 months or sooner if needed    Case discussed with staff: GRACE Garcia MD  [facility], PGY-3  02/05/2025

## 2025-02-11 ENCOUNTER — TELEPHONE (OUTPATIENT)
Dept: CARDIOLOGY | Facility: CLINIC | Age: 75
End: 2025-02-11
Payer: MEDICARE

## 2025-02-11 NOTE — TELEPHONE ENCOUNTER
Pt daughter wanted to know what time her mom appt was for I lvm w/ time and date of appt      ad      ----- Message from Enriqueta sent at 2/11/2025  2:46 PM CST -----  Type:  Sooner Apoointment Request    Caller is requesting a sooner appointment.  Caller declined first available appointment listed below.  Caller will not accept being placed on the waitlist and is requesting a message be sent to doctor.  Name of Caller: Pt  When is the first available appointment?  Symptoms:  appt   Would the patient rather a call back or a response via [facility]? Call/ [facility]  Best Call Back Number:  [phone] (Daughter cell  Additional Information: Pt daughter would like to speak to nurse in office regarding appt date and time.

## 2025-02-12 ENCOUNTER — TELEPHONE (OUTPATIENT)
Dept: CARDIOLOGY | Facility: CLINIC | Age: 75
End: 2025-02-12
Payer: MEDICARE

## 2025-02-12 NOTE — TELEPHONE ENCOUNTER
Called pt daughter to get pt scheduled pt is scheduled  And I also stated to pt she would have to pay out of pocket    ad  ----- Message from Ozzie sent at 2/12/2025 11:13 AM CST -----  Contact: Katie SIMMONS  Type:  Patient Call          Who Called:Patient daughter - Katie SIMMONS        Does the patient know what this is regarding?: Requesting a call back from a missed call about having her mom appt rescheduled possibly vladislav 3/17 ; please advise           Would the patient rather a call back or a response via [facility]?call           Best Call Back Number:[phone]            Additional Information:

## 2025-02-22 NOTE — PROGRESS NOTES
[facility] Cardiology [address]     SUBJECTIVE:     History of Present Illness:  Patient is a 74 y.o. female presents with CAD and here for followup. Prior to January admission; fluid was building up slowly; she denies increase in salt intake or outside food. Was seen by nephrology 8/24 and was only on lasix 20 mg BID  Primary Diagnosis:  1. DM  2. CAD: s/p stent right; occluded OM2  3. GI bleed   4. hypertension.         5. CKD 4     6. Paroxysmal atrial fibrillation on eliquis   7. S/p gall bladder surgery 2013     ROS  Since last visit  3/24: was hospitalized twice in June for weakness and in 1/25 for shortness of breath and fluid overload . Since discharge and is doing better:   A. No chest pains  B. No shortness of breath; no PND or orthopnea   C. No bleeding   D. No syncope  E. No palpitations  F. Activity: walks in the store 3 times a week; takes care of the house without chest pains; limited by leg pain in knees and back ; doing more walking   G. Blood pressures at home: usually in the 120's;        Past Hospitalization  1. Admitted 12/21/20 and discharged 12/27/20: admitted for vomiting and nausea; EKG with ST elevation lead III only; ST depression noted; renal function decreased; echo with inferobasal akinesis; K 3.1; troponin was elevated 38 in the ER. She was treated medically; had episode of atrial fibrillation in the ICU and failure requiring diuresis   6/24: generalized weakness; noncardiac issues   1/25: shortness of breath; lower extremity edema; heart failure exacerbation; diuresed; /72     Review of patient's allergies indicates:   Allergen Reactions    Ace inhibitors Other (See Comments)     Cough       Past Medical History:   Diagnosis Date    (HFpEF) heart failure with preserved ejection fraction     TTE (2020) w/ EF 55% and grade II diastolic dysfunction    Anemia due to stage 4 chronic kidney disease 8/16/2021    Gastric ulcer due to Helicobacter pylori 07/2017    Gastrointestinal hemorrhage with  melena 07/2017    GERD (gastroesophageal reflux disease)     HLD (hyperlipidemia)     Hydronephrosis with ureteropelvic junction (UPJ) obstruction 8/16/2021    Hypertension     MDD (major depressive disorder) 8/16/2021    Nephrolithiasis 8/25/2021    NSTEMI (non-ST elevated myocardial infarction) 07/2017    Paroxysmal A-fib 12/24/2020    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/2020    Stage 4 chronic kidney disease 10/19/2021    STEMI (ST elevation myocardial infarction) 12/2020    Type 2 diabetes mellitus     Urge incontinence of urine        Past Surgical History:   Procedure Laterality Date    CARDIAC CATHETERIZATION  07/2017    RCA w/ 95% stenosis w/ stent placement    CATARACT EXTRACTION W/  INTRAOCULAR LENS IMPLANT Right 5/29/2024    Procedure: EXTRACTION, CATARACT, WITH IOL INSERTION;  Surgeon: Maddy Arenas MD;  Location: [facility] OR;  Service: Ophthalmology;  Laterality: Right;    CATARACT EXTRACTION W/  INTRAOCULAR LENS IMPLANT Left 6/26/2024    Procedure: EXTRACTION, CATARACT, WITH IOL INSERTION;  Surgeon: Maddy Arenas MD;  Location: [facility] OR;  Service: Ophthalmology;  Laterality: Left;    RETROGRADE PYELOGRAPHY  8/25/2021    Procedure: PYELOGRAM, RETROGRADE;  Surgeon: Rody Smith MD;  Location: [facility] OR;  Service: Urology;;    URETEROSCOPIC REMOVAL OF URETERIC CALCULUS Left 8/25/2021    Procedure: left ureteroscopy, stone basketing, stent placement;  left nephrostomy tube removal;  Surgeon: Rody Smith MD;  Location: [facility] OR;  Service: Urology;  Laterality: Left;       No family history on file.    Social History     Tobacco Use    Smoking status: Never    Smokeless tobacco: Never   Substance Use Topics    Alcohol use: No    Drug use: No        Home meds:  Current Outpatient Medications on File Prior to Visit   Medication Sig Dispense Refill    allopurinoL (ZYLOPRIM) 100 MG tablet Take 1 tablet (100 mg total) by mouth once daily. 12 tablet 6    amLODIPine  (NORVASC) 10 MG tablet Take 1 tablet (10 mg total) by mouth once daily. 90 tablet 3    apixaban (ELIQUIS) 5 mg Tab Take 1 tablet (5 mg total) by mouth 2 (two) times daily. 180 tablet 3    blood sugar diagnostic Strp To check BG 2 times daily, to use with insurance preferred meter 100 each 7    blood-glucose meter (FREESTYLE SYSTEM KIT MISC) by Misc.(Non-Drug; Combo Route) route.      blood-glucose sensor (DEXCOM G6 SENSOR) [place] 1 each by Misc.(Non-Drug; Combo Route) route once daily. (Patient not taking: Reported on 9/30/2024) 1 each 1    cholecalciferol, vitamin D3, (VITAMIN D3) 50 mcg (2,000 unit) Tab Take 1 tablet (2,000 Units total) by mouth once daily. 30 tablet 11    colchicine (COLCRYS) 0.6 mg tablet Take 1 tablet (0.6 mg total) by mouth daily as needed (acute gout flare). 30 tablet 3    furosemide (LASIX) 20 MG tablet Take 2 tablets (40 mg total) by mouth once daily. 120 tablet 0    ketoconazole (NIZORAL) 2 % shampoo Apply topically twice a week. 120 mL 0    lancets Misc To check BG 2 times daily, to use with insurance preferred meter 100 each 7    losartan (COZAAR) 25 MG tablet Take 1 tablet (25 mg total) by mouth once daily. 90 tablet 3    magnesium oxide (MAG-OX) 400 mg (241.3 mg magnesium) tablet Take 1 tablet (400 mg total) by mouth 2 (two) times daily. 60 tablet 6    metoprolol succinate (TOPROL-XL) 100 MG 24 hr tablet Take 1 tablet (100 mg total) by mouth once daily. 90 tablet 0    pneumococcal 23-diana ps (PNEUMOVAX 23) 25 mcg/0.5 mL vaccine Inject into the muscle. 1 mL 0    potassium chloride (MICRO-K) 10 MEQ CpSR Take 1 capsule (10 mEq total) by mouth once daily. 90 capsule 1    rosuvastatin (CRESTOR) 40 MG Tab Take 1 tablet (40 mg total) by mouth once daily. 90 tablet 3    SITagliptin phosphate (JANUVIA) 25 MG Tab Take 1 tablet (25 mg total) by mouth once daily. 120 tablet 0    sodium bicarbonate 650 MG tablet Take 1 tablet (650 mg total) by mouth 3 (three) times daily. 90 tablet 11     No current  facility-administered medications on file prior to visit.       Cardiac meds:     ASA 81 mg - not on this   crestor 40 mg   Amlodipine 10 mg   eliquis 5 mg BID  metorpolol succiante 100 mg   Lasix 40 mg daily   K 10 meq  Losartan 25 mg     OBJECTIVE:     Vital Signs (Most Recent)  Pulse: 77 (25 1021)  BP: 121/62 (25 1021)  SpO2: 99 % (25 1021)  162 lbs     Physical Exam:  BP normal at home  Neck: normal carotids, nobruits  lungs: clear  Heart:i RR, normal S1,S2, AI murmur noted loudest RSB; along LSB as well; no MR noted   Exts: normal pulses ; no edema             LABS    : GFR 16   : LDL 96;  ; A1c 9.4  : GFR 21; K 4.1  : GFR 17; A1c 8.0  : GFR 19; A1c 5.9  : , LDL 23   : GFR 16 and 17; A1 6.1  Diagnostic Results:    1. EKGs- sinus to atrial fibrillation to sinus 1b. EKG's : atrial fibrillation and sinus; T wave changes anterolateral leads, RBBB which was new ; minimal ST elevation lead III  1c. EK2021:sinus with PAC's, RBBB; ST upcoving with T wave inversion with q waves inferiorly   1d. EK/24: a fib; RBBB  2. Echos- 7/3/17: mild LAE, normal EF, LVH   2b. Echo: 6/15/20: normal EF,moderate AI, LVH, diastolic dysfunction; no wall motion changes   2c. Echo: : EF 55%; LVE, LAE, diastolic dysfunction; mild MR, mild TR, mild AI, normal RV function; inferobasal akinesis   2d. Echo: : normal EF; mild TR  2e. Echo: : normal EF; RV function mild decrease; JAMES, moderate TR, PAS 49  3. Stress Test- [  ]  4. Cath- 7/3/17: Left main: normal; LAD normal; Cx: normal; Right 95% 2 discrete lesions; OM2 occluded; intervention 17: proximal and  mid right 4X30  5. Device- [  ].         ASSESSMENT/PLAN:     1. S/p acute MI : no intervention since presentation was delayed with renal issues; stable now. Most likely from the right  2. RBBB   3. Abnormal EKG - age indeterminate inferior infarct   4. CKD 4: followed by    5. Abnormal  TG's - looks good with low HDL   6. Chronic diastolic  heart failure - with recent exacerbation: why? No increase in salt intake over the holidays? Could this be ischemia??? Not hypertensive? No recent viral infections   Plan: 1.continue all medications  2. Daily weights and if over 2 or 3 lbs, take extra lasix  3. Return 3 months     Lul Thompson MD

## 2025-02-24 ENCOUNTER — OFFICE VISIT (OUTPATIENT)
Dept: CARDIOLOGY | Facility: CLINIC | Age: 75
End: 2025-02-24
Payer: MEDICARE

## 2025-02-24 ENCOUNTER — TELEPHONE (OUTPATIENT)
Dept: CARDIOLOGY | Facility: CLINIC | Age: 75
End: 2025-02-24
Payer: MEDICARE

## 2025-02-24 VITALS
BODY MASS INDEX: 28.85 KG/M2 | HEART RATE: 77 BPM | OXYGEN SATURATION: 99 % | WEIGHT: 162.81 LBS | SYSTOLIC BLOOD PRESSURE: 121 MMHG | HEIGHT: 63 IN | DIASTOLIC BLOOD PRESSURE: 62 MMHG

## 2025-02-24 DIAGNOSIS — I50.9 CHF EXACERBATION: ICD-10-CM

## 2025-02-24 DIAGNOSIS — Z95.5 STENTED CORONARY ARTERY: Primary | ICD-10-CM

## 2025-02-24 DIAGNOSIS — I48.0 PAROXYSMAL A-FIB: ICD-10-CM

## 2025-02-24 DIAGNOSIS — I45.2 RBBB (RIGHT BUNDLE BRANCH BLOCK WITH LEFT ANTERIOR FASCICULAR BLOCK): ICD-10-CM

## 2025-02-24 DIAGNOSIS — N18.4 CKD (CHRONIC KIDNEY DISEASE) STAGE 4, GFR 15-29 ML/MIN: ICD-10-CM

## 2025-02-24 PROCEDURE — 3078F DIAST BP <80 MM HG: CPT | Mod: CPTII,S$GLB,, | Performed by: INTERNAL MEDICINE

## 2025-02-24 PROCEDURE — 99999 PR PBB SHADOW E&M-EST. PATIENT-LVL III: CPT | Mod: PBBFAC,,, | Performed by: INTERNAL MEDICINE

## 2025-02-24 PROCEDURE — 3044F HG A1C LEVEL LT 7.0%: CPT | Mod: CPTII,S$GLB,, | Performed by: INTERNAL MEDICINE

## 2025-02-24 PROCEDURE — 99214 OFFICE O/P EST MOD 30 MIN: CPT | Mod: S$GLB,,, | Performed by: INTERNAL MEDICINE

## 2025-02-24 PROCEDURE — 3288F FALL RISK ASSESSMENT DOCD: CPT | Mod: CPTII,S$GLB,, | Performed by: INTERNAL MEDICINE

## 2025-02-24 PROCEDURE — 3008F BODY MASS INDEX DOCD: CPT | Mod: CPTII,S$GLB,, | Performed by: INTERNAL MEDICINE

## 2025-02-24 PROCEDURE — 3074F SYST BP LT 130 MM HG: CPT | Mod: CPTII,S$GLB,, | Performed by: INTERNAL MEDICINE

## 2025-02-24 PROCEDURE — 1126F AMNT PAIN NOTED NONE PRSNT: CPT | Mod: CPTII,S$GLB,, | Performed by: INTERNAL MEDICINE

## 2025-02-24 PROCEDURE — 1101F PT FALLS ASSESS-DOCD LE1/YR: CPT | Mod: CPTII,S$GLB,, | Performed by: INTERNAL MEDICINE

## 2025-02-24 PROCEDURE — 1160F RVW MEDS BY RX/DR IN RCRD: CPT | Mod: CPTII,S$GLB,, | Performed by: INTERNAL MEDICINE

## 2025-02-24 PROCEDURE — 1159F MED LIST DOCD IN RCRD: CPT | Mod: CPTII,S$GLB,, | Performed by: INTERNAL MEDICINE

## 2025-02-24 NOTE — TELEPHONE ENCOUNTER
Patient saw Dr Thompson today in clinic.  Dr. Thompson wanted to see the patient back in 3 months.  Patient has [facility] insurance and is OON.  The follow information was given to patient and her daughter.  The patients insurance has no benefits for services at this location. The patient will have to pay out of pocket for this visit. The patient can contact their insurance carrier to locate providers in their network. By proceeding I acknowledge that I have shared this information with the patient. Patient and daughter verbalized understanding.

## 2025-02-27 NOTE — PROGRESS NOTES
I assume primary medical responsibility for this patient. I have reviewed the history, physical, and assessement & treatment plan with the resident and agree that the care is reasonable and necessary. This service has been performed by a resident with the presence of a teaching physician under the primary care exception. If necessary, an addendum of additional findings or evaluation beyond the resident documentation will be noted below.       Afua Denis MD    [facility]

## 2025-03-31 DIAGNOSIS — I10 PRIMARY HYPERTENSION: ICD-10-CM

## 2025-03-31 DIAGNOSIS — I50.32 CHRONIC HEART FAILURE WITH PRESERVED EJECTION FRACTION: ICD-10-CM

## 2025-03-31 DIAGNOSIS — I48.0 PAROXYSMAL A-FIB: Chronic | ICD-10-CM

## 2025-04-01 RX ORDER — METOPROLOL SUCCINATE 100 MG/1
100 TABLET, EXTENDED RELEASE ORAL DAILY
Qty: 90 TABLET | Refills: 0 | Status: SHIPPED | OUTPATIENT
Start: 2025-04-01

## 2025-04-07 DIAGNOSIS — I50.32 CHRONIC HEART FAILURE WITH PRESERVED EJECTION FRACTION: ICD-10-CM

## 2025-04-07 DIAGNOSIS — N18.4 STAGE 4 CHRONIC KIDNEY DISEASE: ICD-10-CM

## 2025-04-07 DIAGNOSIS — I10 PRIMARY HYPERTENSION: ICD-10-CM

## 2025-04-07 DIAGNOSIS — I48.0 PAROXYSMAL A-FIB: Chronic | ICD-10-CM

## 2025-04-07 DIAGNOSIS — E11.22 TYPE 2 DIABETES MELLITUS WITH STAGE 4 CHRONIC KIDNEY DISEASE, WITH LONG-TERM CURRENT USE OF INSULIN: ICD-10-CM

## 2025-04-07 DIAGNOSIS — N18.4 TYPE 2 DIABETES MELLITUS WITH STAGE 4 CHRONIC KIDNEY DISEASE, WITH LONG-TERM CURRENT USE OF INSULIN: ICD-10-CM

## 2025-04-07 DIAGNOSIS — Z79.4 TYPE 2 DIABETES MELLITUS WITH STAGE 4 CHRONIC KIDNEY DISEASE, WITH LONG-TERM CURRENT USE OF INSULIN: ICD-10-CM

## 2025-04-08 RX ORDER — METOPROLOL SUCCINATE 100 MG/1
100 TABLET, EXTENDED RELEASE ORAL DAILY
Qty: 90 TABLET | Refills: 0 | Status: SHIPPED | OUTPATIENT
Start: 2025-04-08

## 2025-04-08 RX ORDER — LOSARTAN POTASSIUM 25 MG/1
25 TABLET ORAL DAILY
Qty: 90 TABLET | Refills: 3 | Status: SHIPPED | OUTPATIENT
Start: 2025-04-08 | End: 2026-04-08

## 2025-04-08 RX ORDER — POTASSIUM CHLORIDE 750 MG/1
10 CAPSULE, EXTENDED RELEASE ORAL DAILY
Qty: 90 CAPSULE | Refills: 1 | Status: SHIPPED | OUTPATIENT
Start: 2025-04-08

## 2025-04-11 DIAGNOSIS — I25.10 ATHEROSCLEROSIS OF NATIVE CORONARY ARTERY OF NATIVE HEART WITHOUT ANGINA PECTORIS: Chronic | ICD-10-CM

## 2025-04-11 DIAGNOSIS — E78.5 HYPERLIPIDEMIA, UNSPECIFIED HYPERLIPIDEMIA TYPE: ICD-10-CM

## 2025-04-11 RX ORDER — ROSUVASTATIN CALCIUM 40 MG/1
40 TABLET, COATED ORAL DAILY
Qty: 90 TABLET | Refills: 3 | Status: SHIPPED | OUTPATIENT
Start: 2025-04-11 | End: 2026-04-11

## 2025-04-11 NOTE — TELEPHONE ENCOUNTER
----- Message from Alix sent at 4/11/2025  8:46 AM CDT -----  Type:  RX Refill RequestWho Called: Pt Refill or New Rx: Refill RX Name and Strength:rosuvastatin (CRESTOR) 40 MG Tab Preferred Pharmacy with phone number:[id number] [place] [address]Cqcvw: [phone] Fax: [phone] or Mail Order: Local Ordering Provider: Garcia Would the patient rather a call back or a response via [facility]? Call Best Call Back Number: [phone] Additional Information:

## 2025-04-18 ENCOUNTER — HOSPITAL ENCOUNTER (EMERGENCY)
Facility: HOSPITAL | Age: 75
Discharge: OTHER INSTITUTION | End: 2025-04-18
Attending: EMERGENCY MEDICINE
Payer: MEDICARE

## 2025-04-18 VITALS
DIASTOLIC BLOOD PRESSURE: 56 MMHG | BODY MASS INDEX: 28.71 KG/M2 | WEIGHT: 162.06 LBS | RESPIRATION RATE: 18 BRPM | HEART RATE: 68 BPM | OXYGEN SATURATION: 100 % | TEMPERATURE: 98 F | SYSTOLIC BLOOD PRESSURE: 107 MMHG | HEIGHT: 63 IN

## 2025-04-18 DIAGNOSIS — R45.851 SUICIDAL IDEATION: Primary | ICD-10-CM

## 2025-04-18 DIAGNOSIS — T46.6X1A: ICD-10-CM

## 2025-04-18 DIAGNOSIS — T65.92XA INGESTION OF SUBSTANCE, INTENTIONAL SELF-HARM, INITIAL ENCOUNTER: ICD-10-CM

## 2025-04-18 LAB
ABSOLUTE EOSINOPHIL (OHS): 0.4 K/UL
ABSOLUTE MONOCYTE (OHS): 0.71 K/UL (ref 0.3–1)
ABSOLUTE NEUTROPHIL COUNT (OHS): 4.47 K/UL (ref 1.8–7.7)
ALBUMIN SERPL BCP-MCNC: 3.8 G/DL (ref 3.5–5.2)
ALP SERPL-CCNC: 114 UNIT/L (ref 40–150)
ALT SERPL W/O P-5'-P-CCNC: 13 UNIT/L (ref 10–44)
AMPHET UR QL SCN: NEGATIVE
ANION GAP (OHS): 12 MMOL/L (ref 8–16)
APAP SERPL-MCNC: <3 UG/ML (ref 10–20)
AST SERPL-CCNC: 21 UNIT/L (ref 11–45)
BACTERIA #/AREA URNS AUTO: ABNORMAL /HPF
BARBITURATE SCN PRESENT UR: NEGATIVE
BASOPHILS # BLD AUTO: 0.06 K/UL
BASOPHILS NFR BLD AUTO: 0.7 %
BENZODIAZ UR QL SCN: NEGATIVE
BILIRUB SERPL-MCNC: 0.4 MG/DL (ref 0.1–1)
BILIRUB UR QL STRIP.AUTO: NEGATIVE
BUN SERPL-MCNC: 51 MG/DL (ref 8–23)
CALCIUM SERPL-MCNC: 9.4 MG/DL (ref 8.7–10.5)
CANNABINOIDS UR QL SCN: NEGATIVE
CHLORIDE SERPL-SCNC: 105 MMOL/L (ref 95–110)
CLARITY UR: CLEAR
CO2 SERPL-SCNC: 22 MMOL/L (ref 23–29)
COCAINE UR QL SCN: NEGATIVE
COLOR UR AUTO: COLORLESS
CREAT SERPL-MCNC: 2.4 MG/DL (ref 0.5–1.4)
CREAT UR-MCNC: 41.4 MG/DL (ref 15–325)
ERYTHROCYTE [DISTWIDTH] IN BLOOD BY AUTOMATED COUNT: 17.3 % (ref 11.5–14.5)
ETHANOL SERPL-MCNC: <10 MG/DL
GFR SERPLBLD CREATININE-BSD FMLA CKD-EPI: 21 ML/MIN/1.73/M2
GLUCOSE SERPL-MCNC: 134 MG/DL (ref 70–110)
GLUCOSE UR QL STRIP: NEGATIVE
HCT VFR BLD AUTO: 35.3 % (ref 37–48.5)
HGB BLD-MCNC: 11.4 GM/DL (ref 12–16)
HGB UR QL STRIP: NEGATIVE
HOLD SPECIMEN: NORMAL
IMM GRANULOCYTES # BLD AUTO: 0.04 K/UL (ref 0–0.04)
IMM GRANULOCYTES NFR BLD AUTO: 0.5 % (ref 0–0.5)
KETONES UR QL STRIP: NEGATIVE
LEUKOCYTE ESTERASE UR QL STRIP: ABNORMAL
LYMPHOCYTES # BLD AUTO: 3 K/UL (ref 1–4.8)
MCH RBC QN AUTO: 28.4 PG (ref 27–31)
MCHC RBC AUTO-ENTMCNC: 32.3 G/DL (ref 32–36)
MCV RBC AUTO: 88 FL (ref 82–98)
METHADONE UR QL SCN: NEGATIVE
MICROSCOPIC COMMENT: ABNORMAL
NITRITE UR QL STRIP: NEGATIVE
NUCLEATED RBC (/100WBC) (OHS): 0 /100 WBC
OPIATES UR QL SCN: NEGATIVE
PCP UR QL: NEGATIVE
PH UR STRIP: 8 [PH]
PLATELET # BLD AUTO: 255 K/UL (ref 150–450)
PMV BLD AUTO: 10.5 FL (ref 9.2–12.9)
POTASSIUM SERPL-SCNC: 4.3 MMOL/L (ref 3.5–5.1)
PROT SERPL-MCNC: 8.7 GM/DL (ref 6–8.4)
PROT UR QL STRIP: ABNORMAL
RBC # BLD AUTO: 4.01 M/UL (ref 4–5.4)
RBC #/AREA URNS AUTO: 1 /HPF (ref 0–4)
RELATIVE EOSINOPHIL (OHS): 4.6 %
RELATIVE LYMPHOCYTE (OHS): 34.6 % (ref 18–48)
RELATIVE MONOCYTE (OHS): 8.2 % (ref 4–15)
RELATIVE NEUTROPHIL (OHS): 51.4 % (ref 38–73)
SALICYLATES SERPL-MCNC: <5 MG/DL (ref 15–30)
SODIUM SERPL-SCNC: 139 MMOL/L (ref 136–145)
SP GR UR STRIP: 1.01
SQUAMOUS #/AREA URNS AUTO: 12 /HPF
UROBILINOGEN UR STRIP-ACNC: NEGATIVE EU/DL
WBC # BLD AUTO: 8.68 K/UL (ref 3.9–12.7)
WBC #/AREA URNS AUTO: 6 /HPF (ref 0–5)

## 2025-04-18 PROCEDURE — 80143 DRUG ASSAY ACETAMINOPHEN: CPT | Performed by: EMERGENCY MEDICINE

## 2025-04-18 PROCEDURE — 85025 COMPLETE CBC W/AUTO DIFF WBC: CPT | Performed by: EMERGENCY MEDICINE

## 2025-04-18 PROCEDURE — 80053 COMPREHEN METABOLIC PANEL: CPT | Performed by: EMERGENCY MEDICINE

## 2025-04-18 PROCEDURE — 81003 URINALYSIS AUTO W/O SCOPE: CPT | Mod: 59 | Performed by: EMERGENCY MEDICINE

## 2025-04-18 PROCEDURE — 80307 DRUG TEST PRSMV CHEM ANLYZR: CPT | Performed by: EMERGENCY MEDICINE

## 2025-04-18 PROCEDURE — 82077 ASSAY SPEC XCP UR&BREATH IA: CPT | Performed by: EMERGENCY MEDICINE

## 2025-04-18 PROCEDURE — 99285 EMERGENCY DEPT VISIT HI MDM: CPT

## 2025-04-18 PROCEDURE — 80179 DRUG ASSAY SALICYLATE: CPT | Performed by: EMERGENCY MEDICINE

## 2025-04-18 RX ORDER — FUROSEMIDE 20 MG/1
40 TABLET ORAL DAILY
COMMUNITY

## 2025-04-18 NOTE — ED PROVIDER NOTES
"Encounter Date: 4/18/2025       History     Chief Complaint   Patient presents with    Ingestion     Patient presents bib  EMS after a Suicide attempt. Patient ingested 90, 40mg rosuvastatin     74-year-old female past medical history as below brought in by EMS for evaluation of suicidal ideation after intentional overdose of statin.  Patient admits to taking ninety 40 mg rosuvastatin pills around 2 AM and telling her  that she wanted to die.  She says that for people have recently told her that she is "crazy" and this made her take the medication.  She endorses ongoing suicidal ideation.  Denies homicidal ideation.  Says that she has never taken medication for her mood or been admitted to a psychiatric facility before.  She denies abdominal pain, nausea, vomiting.  EMS reports that patient had no vital sign abnormalities EN route to the ED.    The history is provided by the patient and the EMS personnel.     Review of patient's allergies indicates:   Allergen Reactions    Ace inhibitors Other (See Comments)     Cough     Past Medical History:   Diagnosis Date    (HFpEF) heart failure with preserved ejection fraction     TTE (2020) w/ EF 55% and grade II diastolic dysfunction    Anemia due to stage 4 chronic kidney disease 8/16/2021    Gastric ulcer due to Helicobacter pylori 07/2017    Gastrointestinal hemorrhage with melena 07/2017    GERD (gastroesophageal reflux disease)     HLD (hyperlipidemia)     Hydronephrosis with ureteropelvic junction (UPJ) obstruction 8/16/2021    Hypertension     MDD (major depressive disorder) 8/16/2021    Nephrolithiasis 8/25/2021    NSTEMI (non-ST elevated myocardial infarction) 07/2017    Paroxysmal A-fib 12/24/2020    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/2020    Stage 4 chronic kidney disease 10/19/2021    STEMI (ST elevation myocardial infarction) 12/2020    Type 2 diabetes mellitus     Urge incontinence of urine      Past Surgical " History:   Procedure Laterality Date    CARDIAC CATHETERIZATION  07/2017    RCA w/ 95% stenosis w/ stent placement    CATARACT EXTRACTION W/  INTRAOCULAR LENS IMPLANT Right 5/29/2024    Procedure: EXTRACTION, CATARACT, WITH IOL INSERTION;  Surgeon: Maddy Arenas MD;  Location: [facility] OR;  Service: Ophthalmology;  Laterality: Right;    CATARACT EXTRACTION W/  INTRAOCULAR LENS IMPLANT Left 6/26/2024    Procedure: EXTRACTION, CATARACT, WITH IOL INSERTION;  Surgeon: Maddy Arenas MD;  Location: [facility] OR;  Service: Ophthalmology;  Laterality: Left;    RETROGRADE PYELOGRAPHY  8/25/2021    Procedure: PYELOGRAM, RETROGRADE;  Surgeon: Rody Smith MD;  Location: [facility] OR;  Service: Urology;;    URETEROSCOPIC REMOVAL OF URETERIC CALCULUS Left 8/25/2021    Procedure: left ureteroscopy, stone basketing, stent placement;  left nephrostomy tube removal;  Surgeon: Rody Smith MD;  Location: [facility] OR;  Service: Urology;  Laterality: Left;     No family history on file.  Social History[1]  Review of Systems    Physical Exam     Initial Vitals   BP Pulse Resp Temp SpO2   04/18/25 0235 04/18/25 0235 04/18/25 0235 04/18/25 0401 04/18/25 0235   (!) 144/104 104 16 98.3 °F (36.8 °C) 100 %      MAP       --                Physical Exam    Nursing note and vitals reviewed.  Constitutional: She appears well-developed. She is not diaphoretic. No distress.   HENT:   Head: Normocephalic and atraumatic.   Eyes: EOM are normal. Pupils are equal, round, and reactive to light.   Neck:   Normal range of motion.  Cardiovascular:  Normal rate.           Pulmonary/Chest: No respiratory distress.   Abdominal: She exhibits no distension.   Musculoskeletal:         General: Normal range of motion.      Cervical back: Normal range of motion.     Neurological: She is alert and oriented to person, place, and time.   Skin: Skin is warm and dry.   Psychiatric: She expresses suicidal ideation.         ED Course   Procedures  Labs Reviewed    COMPREHENSIVE METABOLIC PANEL - Abnormal       Result Value    Sodium 139      Potassium 4.3      Chloride 105      CO2 22 (*)     Glucose 134 (*)     BUN 51 (*)     Creatinine 2.4 (*)     Calcium 9.4      Protein Total 8.7 (*)     Albumin 3.8      Bilirubin Total 0.4            AST 21      ALT 13      Anion Gap 12      eGFR 21 (*)    URINALYSIS, REFLEX TO URINE CULTURE - Abnormal    Color, UA Colorless (*)     Appearance, UA Clear      pH, UA 8.0      Spec Grav UA 1.010      Protein, UA Trace (*)     Glucose, UA Negative      Ketones, UA Negative      Bilirubin, UA Negative      Blood, UA Negative      Nitrites, UA Negative      Urobilinogen, UA Negative      Leukocyte Esterase, UA Trace (*)    ACETAMINOPHEN LEVEL - Abnormal    Acetaminophen Level <3.0 (*)    CBC WITH DIFFERENTIAL - Abnormal    WBC 8.68      RBC 4.01      HGB 11.4 (*)     HCT 35.3 (*)     MCV 88      MCH 28.4      MCHC 32.3      RDW 17.3 (*)     Platelet Count 255      MPV 10.5      Nucleated RBC 0      Neut % 51.4      Lymph % 34.6      Mono % 8.2      Eos % 4.6      Basophil % 0.7      Imm Grans % 0.5      Neut # 4.47      Lymph # 3.00      Mono # 0.71      Eos # 0.40      Baso # 0.06      Imm Grans # 0.04     SALICYLATE LEVEL - Abnormal    Salicylate Level <5.0 (*)    URINALYSIS MICROSCOPIC - Abnormal    RBC, UA 1      WBC, UA 6 (*)     Bacteria, UA Occasional      Squamous Epithelial Cells, UA 12      Microscopic Comment       DRUG SCREEN PANEL, URINE EMERGENCY - Normal    Benzodiazepine, Urine Negative      Methadone, Urine Negative      Cocaine, Urine Negative      Opiates, Urine Negative      Barbiturates, Urine Negative      Amphetamines, Urine Negative      THC Negative      Phencyclidine, Urine Negative      Urine Creatinine 41.4      Narrative:     This screen includes the following classes of drugs at the listed cut-off:     Benzodiazepines:        200 ng/ml   Methadone:              300 ng/ml   Cocaine metabolite:     300  "ng/ml   Opiates:                300 ng/ml   Barbiturates:           200 ng/ml   Amphetamines:           1000 ng/ml   Marijuana metabs (THC): 50 ng/ml   Phencyclidine (PCP):    25 ng/ml     This is a screening test. If results do not correlate with clinical presentation, then a confirmatory send out test is advised.    This report is intended for use in clinical monitoring and management of patients. It is not intended for use in employment related drug testing."   ALCOHOL,MEDICAL (ETHANOL) - Normal    Alcohol, Serum <10     CBC W/ AUTO DIFFERENTIAL    Narrative:     The following orders were created for panel order CBC auto differential.  Procedure                               Abnormality         Status                     ---------                               -----------         ------                     CBC with Differential[[id number]]       Abnormal            Final result                 Please view results for these tests on the individual orders.   GREY TOP URINE HOLD    Extra Tube Hold for add-ons.            Imaging Results    None          Medications - No data to display  Medical Decision Making  74-year-old female past medical history as above brought in by EMS after intentional medication overdose of rosuvastatin, and ongoing suicidal ideation. PEC filed and pt placed on 1:1 obs. Chart review shows that pt has a history of major depressive disorder and was previously on Zoloft but no longer takes this medication. Poison control was contacted and recommends a 4-6 hour period of observation with no intervention needed unless she develops symptoms of nausea, vomiting or abdominal discomfort.  No acute lab abnormalities.  Pt remained asymptomatic throughout period of observation in the ED and was medically cleared for transfer to psychiatric facility.     Amount and/or Complexity of Data Reviewed  Independent Historian: EMS     Details: As documented above  External Data Reviewed: notes.     Details: " Seen 2/24/25 by cardiology   Labs: ordered. Decision-making details documented in ED Course.    Risk  Decision regarding hospitalization.  Diagnosis or treatment significantly limited by social determinants of health.               ED Course as of 04/18/25 0558   Fri Apr 18, 2025   0336 Comprehensive metabolic panel(!)  CKD, slightly improved from prior. Normal LFTs. [AT]   0336 Acetaminophen Level(!): <3.0  Normal  [AT]   0336 Alcohol, Serum: <10  Normal  [AT]   0336 WBC: 8.68  Normal  [AT]      ED Course User Index  [AT] Lenore Villarreal MD       Medically cleared for psychiatry placement: 4/18/2025  5:56 AM                   Clinical Impression:  Final diagnoses:  [T65.92XA] Ingestion of substance, intentional self-harm, initial encounter  [T46.6X1A] Overdose of statin  [R45.851] Suicidal ideation (Primary)          ED Disposition Condition    Transfer to Psych Facility Stable          ED Prescriptions    None       Follow-up Information    None                          Lenore Villarreal MD  04/18/25 0557         [1]   Social History  Tobacco Use    Smoking status: Never    Smokeless tobacco: Never   Substance Use Topics    Alcohol use: No    Drug use: No        Lenore Villarreal MD  04/18/25 0558

## 2025-04-18 NOTE — ED NOTES
Contacted patient's  Noah @ [phone] and notified of patient's placement. Provided  phone number for [facility].

## 2025-04-18 NOTE — ED NOTES
Received report from CELINE Quintero and assumed care of patient. Patient resting in stretcher comfortably, respirations even and unlabored, equal rise and fall to chest. Will continue to monitor.

## 2025-04-18 NOTE — ED NOTES
Pt arrived via EMS after taking a whole bottle of crestor 1 hour PTA.  Pt stated 4 people over the past several days have told her she was crazy.Stated she can't sleep and she does not want to live anymore.  She stated at this time that she does not want to live anymore.  She denies any previous Suicide attempt.

## 2025-04-18 NOTE — ED NOTES
Attempted to call patient's daughter Katie @ [phone] to inform of placement information. No answer left message for her to return call.

## 2025-04-18 NOTE — ED NOTES
Pt stated to Dr. Villarreal that she was not going to a mental health facility, and stated she would attempt suicide again.  Denied Homicidal ideations.  Pt is alert and oriented x 3.

## 2025-04-18 NOTE — ED NOTES
Per poison control, monitor for GI upset, if so, check liver enzymes. 4-6 hour obs time. Otherwise, follow normal SI protocol. Spoke with Lynn GUILLEN

## 2025-05-25 NOTE — PROGRESS NOTES
[facility] [address]     SUBJECTIVE:     History of Present Illness:  Patient is a 74 y.o. female presents with CAD and here for followup. Prior to January admission; fluid was building up slowly; she denies increase in salt intake or outside food. Was seen by nephrology 8/24 and was only on lasix 20 mg BID  Primary Diagnosis:  1. DM  2. CAD: s/p stent right; occluded OM2  3. GI bleed   4. hypertension.         5. CKD 4     6. Paroxysmal atrial fibrillation on eliquis   7. S/p gall bladder surgery 2013     ROS  Since last visit  2/25: no change   A. No chest pains  B. No shortness of breath; no PND or orthopnea   C. No bleeding   D. No syncope  E. No palpitations  F. Activity:walking more now with no issues   G. Blood pressures at home: usually in the 120's;        Past Hospitalization  1. Admitted 12/21/20 and discharged 12/27/20: admitted for vomiting and nausea; EKG with ST elevation lead III only; ST depression noted; renal function decreased; echo with inferobasal akinesis; K 3.1; troponin was elevated 38 in the ER. She was treated medically; had episode of atrial fibrillation in the ICU and failure requiring diuresis   6/24: generalized weakness; noncardiac issues   1/25: shortness of breath; lower extremity edema; heart failure exacerbation; diuresed; /72     Review of patient's allergies indicates:   Allergen Reactions    Ace inhibitors Other (See Comments)     Cough       Past Medical History:   Diagnosis Date    (HFpEF) heart failure with preserved ejection fraction     TTE (2020) w/ EF 55% and grade II diastolic dysfunction    Anemia due to stage 4 chronic kidney disease 8/16/2021    Gastric ulcer due to Helicobacter pylori 07/2017    Gastrointestinal hemorrhage with melena 07/2017    GERD (gastroesophageal reflux disease)     HLD (hyperlipidemia)     Hydronephrosis with ureteropelvic junction (UPJ) obstruction 8/16/2021    Hypertension     MDD (major depressive disorder) 8/16/2021    Nephrolithiasis  8/25/2021    NSTEMI (non-ST elevated myocardial infarction) 07/2017    Paroxysmal A-fib 12/24/2020    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/2020    Stage 4 chronic kidney disease 10/19/2021    STEMI (ST elevation myocardial infarction) 12/2020    Type 2 diabetes mellitus     Urge incontinence of urine        Past Surgical History:   Procedure Laterality Date    CARDIAC CATHETERIZATION  07/2017    RCA w/ 95% stenosis w/ stent placement    CATARACT EXTRACTION W/  INTRAOCULAR LENS IMPLANT Right 5/29/2024    Procedure: EXTRACTION, CATARACT, WITH IOL INSERTION;  Surgeon: Maddy Arenas MD;  Location: [facility] OR;  Service: Ophthalmology;  Laterality: Right;    CATARACT EXTRACTION W/  INTRAOCULAR LENS IMPLANT Left 6/26/2024    Procedure: EXTRACTION, CATARACT, WITH IOL INSERTION;  Surgeon: Maddy Arenas MD;  Location: [facility] OR;  Service: Ophthalmology;  Laterality: Left;    RETROGRADE PYELOGRAPHY  8/25/2021    Procedure: PYELOGRAM, RETROGRADE;  Surgeon: Rody Smith MD;  Location: [facility] OR;  Service: Urology;;    URETEROSCOPIC REMOVAL OF URETERIC CALCULUS Left 8/25/2021    Procedure: left ureteroscopy, stone basketing, stent placement;  left nephrostomy tube removal;  Surgeon: Rody Smith MD;  Location: [facility] OR;  Service: Urology;  Laterality: Left;       No family history on file.    Social History     Tobacco Use    Smoking status: Never    Smokeless tobacco: Never   Substance Use Topics    Alcohol use: No    Drug use: No        Home meds:  Current Outpatient Medications on File Prior to Visit   Medication Sig Dispense Refill    allopurinoL (ZYLOPRIM) 100 MG tablet Take 1 tablet (100 mg total) by mouth once daily. 12 tablet 6    amLODIPine (NORVASC) 10 MG tablet Take 1 tablet (10 mg total) by mouth once daily. 90 tablet 3    apixaban (ELIQUIS) 5 mg Tab Take 1 tablet (5 mg total) by mouth 2 (two) times daily. 180 tablet 3    blood sugar diagnostic Strp To check BG 2 times daily,  to use with insurance preferred meter 100 each 7    blood-glucose meter (FREESTYLE SYSTEM KIT MISC) by Misc.(Non-Drug; Combo Route) route.      blood-glucose sensor (DEXCOM G6 SENSOR) [place] 1 each by Misc.(Non-Drug; Combo Route) route once daily. (Patient not taking: Reported on 9/30/2024) 1 each 1    cholecalciferol, vitamin D3, (VITAMIN D3) 50 mcg (2,000 unit) Tab Take 1 tablet (2,000 Units total) by mouth once daily. 30 tablet 11    colchicine (COLCRYS) 0.6 mg tablet Take 1 tablet (0.6 mg total) by mouth daily as needed (acute gout flare). (Patient not taking: Reported on 4/18/2025) 30 tablet 3    furosemide (LASIX) 20 MG tablet Take 40 mg by mouth once daily. 2 tablets      ketoconazole (NIZORAL) 2 % shampoo Apply topically twice a week. 120 mL 0    lancets Misc To check BG 2 times daily, to use with insurance preferred meter 100 each 7    losartan (COZAAR) 25 MG tablet Take 1 tablet (25 mg total) by mouth once daily. 90 tablet 3    magnesium oxide (MAG-OX) 400 mg (241.3 mg magnesium) tablet Take 1 tablet (400 mg total) by mouth 2 (two) times daily. 60 tablet 6    metoprolol succinate (TOPROL-XL) 100 MG 24 hr tablet Take 1 tablet (100 mg total) by mouth once daily. 90 tablet 0    pneumococcal 23-diana ps (PNEUMOVAX 23) 25 mcg/0.5 mL vaccine Inject into the muscle. 1 mL 0    potassium chloride (MICRO-K) 10 MEQ CpSR Take 1 capsule (10 mEq total) by mouth once daily. 90 capsule 1    rosuvastatin (CRESTOR) 40 MG Tab Take 1 tablet (40 mg total) by mouth once daily. 90 tablet 3    SITagliptin phosphate (JANUVIA) 25 MG Tab Take 1 tablet (25 mg total) by mouth once daily. 120 tablet 0    sodium bicarbonate 650 MG tablet Take 1 tablet (650 mg total) by mouth 3 (three) times daily. 90 tablet 11     No current facility-administered medications on file prior to visit.       Cardiac meds:     ASA 81 mg - not on this   crestor 40 mg   Amlodipine 10 mg   eliquis 5 mg BID  metorpolol succinate 100 mg   Lasix 40 mg daily   K 10  meq  Losartan 25 mg     OBJECTIVE:     Vital Signs (Most Recent)  Pulse: 94 (25 1020)  BP: (!) 144/78 (25 1020)  SpO2: 99 % (25 1020)  162 lbs     Physical Exam:  BP normal at home  Neck: normal carotids, nobruits  lungs: clear  Heart:i RR, normal S1,S2, AI murmur noted loudest RSB; along LSB as well; no MR noted   Exts: normal pulses ; no edema             LABS    : GFR 16   : LDL 96;  ; A1c 9.4  : GFR 21; K 4.1  : GFR 17; A1c 8.0  : GFR 19; A1c 5.9  : , LDL 23   : GFR 16 and 17; A1 6.1  : GFR 21; LFT's normal   Diagnostic Results:    1. EKGs- sinus to atrial fibrillation to sinus 1b. EKG's : atrial fibrillation and sinus; T wave changes anterolateral leads, RBBB which was new ; minimal ST elevation lead III  1c. EK2021:sinus with PAC's, RBBB; ST upcoving with T wave inversion with q waves inferiorly   1d. EK/24: a fib; RBBB  2. Echos- 7/3/17: mild LAE, normal EF, LVH   2b. Echo: 6/15/20: normal EF,moderate AI, LVH, diastolic dysfunction; no wall motion changes   2c. Echo: : EF 55%; LVE, LAE, diastolic dysfunction; mild MR, mild TR, mild AI, normal RV function; inferobasal akinesis   2d. Echo: : normal EF; mild TR  2e. Echo: : normal EF; RV function mild decrease; JAMES, moderate TR, PAS 49  3. Stress Test- [  ]  4. Cath- 7/3/17: Left main: normal; LAD normal; Cx: normal; Right 95% 2 discrete lesions; OM2 occluded; intervention 17: proximal and  mid right [id number]  5. Device- [  ].         ASSESSMENT/PLAN:     1. S/p acute MI : no intervention since presentation was delayed with renal issues; stable now. Most likely from the right  2. RBBB   3. Abnormal EKG - age indeterminate inferior infarct   4. CKD 4: followed by    5. Abnormal TG's - looks good with low HDL ; LDL 23   6. Chronic diastolic  heart failure - with recent exacerbation: why? No increase in salt intake over the holidays? Could this be ischemia???  Not hypertensive? No recent viral infections   Plan: 1.continue all medications  2. Return 4 months     Lul Thompson MD

## 2025-05-27 ENCOUNTER — OFFICE VISIT (OUTPATIENT)
Dept: CARDIOLOGY | Facility: CLINIC | Age: 75
End: 2025-05-27
Payer: MEDICARE

## 2025-05-27 VITALS
HEART RATE: 94 BPM | WEIGHT: 168.13 LBS | OXYGEN SATURATION: 99 % | SYSTOLIC BLOOD PRESSURE: 144 MMHG | HEIGHT: 63 IN | BODY MASS INDEX: 29.79 KG/M2 | DIASTOLIC BLOOD PRESSURE: 78 MMHG

## 2025-05-27 DIAGNOSIS — N18.4 CKD (CHRONIC KIDNEY DISEASE) STAGE 4, GFR 15-29 ML/MIN: ICD-10-CM

## 2025-05-27 DIAGNOSIS — I48.0 PAROXYSMAL A-FIB: ICD-10-CM

## 2025-05-27 DIAGNOSIS — I45.2 RBBB (RIGHT BUNDLE BRANCH BLOCK WITH LEFT ANTERIOR FASCICULAR BLOCK): ICD-10-CM

## 2025-05-27 DIAGNOSIS — Z95.5 STENTED CORONARY ARTERY: Primary | ICD-10-CM

## 2025-05-27 PROCEDURE — 3044F HG A1C LEVEL LT 7.0%: CPT | Mod: CPTII,S$GLB,, | Performed by: INTERNAL MEDICINE

## 2025-05-27 PROCEDURE — 4010F ACE/ARB THERAPY RXD/TAKEN: CPT | Mod: CPTII,S$GLB,, | Performed by: INTERNAL MEDICINE

## 2025-05-27 PROCEDURE — 1160F RVW MEDS BY RX/DR IN RCRD: CPT | Mod: CPTII,S$GLB,, | Performed by: INTERNAL MEDICINE

## 2025-05-27 PROCEDURE — 3008F BODY MASS INDEX DOCD: CPT | Mod: CPTII,S$GLB,, | Performed by: INTERNAL MEDICINE

## 2025-05-27 PROCEDURE — 99214 OFFICE O/P EST MOD 30 MIN: CPT | Mod: S$GLB,,, | Performed by: INTERNAL MEDICINE

## 2025-05-27 PROCEDURE — 1159F MED LIST DOCD IN RCRD: CPT | Mod: CPTII,S$GLB,, | Performed by: INTERNAL MEDICINE

## 2025-05-27 PROCEDURE — 3288F FALL RISK ASSESSMENT DOCD: CPT | Mod: CPTII,S$GLB,, | Performed by: INTERNAL MEDICINE

## 2025-05-27 PROCEDURE — 3078F DIAST BP <80 MM HG: CPT | Mod: CPTII,S$GLB,, | Performed by: INTERNAL MEDICINE

## 2025-05-27 PROCEDURE — 1126F AMNT PAIN NOTED NONE PRSNT: CPT | Mod: CPTII,S$GLB,, | Performed by: INTERNAL MEDICINE

## 2025-05-27 PROCEDURE — 99999 PR PBB SHADOW E&M-EST. PATIENT-LVL IV: CPT | Mod: PBBFAC,,, | Performed by: INTERNAL MEDICINE

## 2025-05-27 PROCEDURE — 1101F PT FALLS ASSESS-DOCD LE1/YR: CPT | Mod: CPTII,S$GLB,, | Performed by: INTERNAL MEDICINE

## 2025-05-27 PROCEDURE — 3077F SYST BP >= 140 MM HG: CPT | Mod: CPTII,S$GLB,, | Performed by: INTERNAL MEDICINE

## 2025-06-10 ENCOUNTER — PATIENT MESSAGE (OUTPATIENT)
Dept: FAMILY MEDICINE | Facility: HOSPITAL | Age: 75
End: 2025-06-10
Payer: MEDICARE

## 2025-06-10 DIAGNOSIS — M10.9 GOUT, UNSPECIFIED CAUSE, UNSPECIFIED CHRONICITY, UNSPECIFIED SITE: ICD-10-CM

## 2025-06-10 DIAGNOSIS — I50.32 CHRONIC HEART FAILURE WITH PRESERVED EJECTION FRACTION: Primary | ICD-10-CM

## 2025-06-10 RX ORDER — ARIPIPRAZOLE 2 MG/1
2 TABLET ORAL
COMMUNITY
Start: 2025-04-29

## 2025-06-10 RX ORDER — LANOLIN ALCOHOL/MO/W.PET/CERES
400 CREAM (GRAM) TOPICAL 2 TIMES DAILY
Qty: 60 TABLET | Refills: 6 | Status: SHIPPED | OUTPATIENT
Start: 2025-06-10

## 2025-06-10 RX ORDER — FUROSEMIDE 20 MG/1
40 TABLET ORAL DAILY
Qty: 60 TABLET | Refills: 0 | Status: SHIPPED | OUTPATIENT
Start: 2025-06-10 | End: 2025-07-10

## 2025-06-10 RX ORDER — ALLOPURINOL 100 MG/1
100 TABLET ORAL DAILY
Qty: 12 TABLET | Refills: 6 | Status: SHIPPED | OUTPATIENT
Start: 2025-06-10

## 2025-06-10 RX ORDER — ARIPIPRAZOLE 2 MG/1
2 TABLET ORAL
OUTPATIENT
Start: 2025-06-10

## 2025-08-19 ENCOUNTER — TELEPHONE (OUTPATIENT)
Dept: FAMILY MEDICINE | Facility: HOSPITAL | Age: 75
End: 2025-08-19
Payer: MEDICARE

## (undated) DEVICE — GAUZE SPONGE 4X4 12PLY

## (undated) DEVICE — SYR LUER LOCK 1CC

## (undated) DEVICE — GLOVE SURGICAL LATEX SZ 6.5

## (undated) DEVICE — SUPPORT ULNA NERVE PROTECTOR

## (undated) DEVICE — SEE MEDLINE ITEM 157185

## (undated) DEVICE — GLOVE BIOGEL ECLIPSE SZ 6.5

## (undated) DEVICE — SEE MEDLINE ITEM 157117

## (undated) DEVICE — DRAPE OPHTHALMIC 48X62 FEN

## (undated) DEVICE — Device

## (undated) DEVICE — SOL BETADINE 5%

## (undated) DEVICE — GLOVE 7.0 PROTEXIS PI BLUE

## (undated) DEVICE — SET IRR URLGY 2LINE UNIV SPIKE

## (undated) DEVICE — SEE MEDLINE ITEM 152622

## (undated) DEVICE — DRESSING TRANS 2X2 TEGADERM

## (undated) DEVICE — BAG LINGEMAN DRAIN UROLOGY

## (undated) DEVICE — DUOVISC

## (undated) DEVICE — JELLY LUBRICANT STERILE 4 OZ

## (undated) DEVICE — SYR ONLY LUER LOCK 20CC

## (undated) DEVICE — SEE MEDLINE ITEM 154981